# Patient Record
Sex: MALE | Race: WHITE | Employment: PART TIME | URBAN - METROPOLITAN AREA
[De-identification: names, ages, dates, MRNs, and addresses within clinical notes are randomized per-mention and may not be internally consistent; named-entity substitution may affect disease eponyms.]

---

## 2017-07-05 ENCOUNTER — HOSPITAL ENCOUNTER (OUTPATIENT)
Dept: NUCLEAR MEDICINE | Age: 72
Discharge: HOME | End: 2017-07-05
Attending: INTERNAL MEDICINE | Admitting: INTERNAL MEDICINE

## 2017-07-05 DIAGNOSIS — I20.9 ANGINA PECTORIS (HCC): ICD-10-CM

## 2017-07-05 LAB
LV EF: 28 %
LVEF MODALITY: NORMAL

## 2017-07-06 ENCOUNTER — OFFICE VISIT (OUTPATIENT)
Dept: CARDIOLOGY CLINIC | Age: 72
End: 2017-07-06

## 2017-07-06 VITALS
DIASTOLIC BLOOD PRESSURE: 88 MMHG | SYSTOLIC BLOOD PRESSURE: 138 MMHG | WEIGHT: 264 LBS | HEART RATE: 79 BPM | HEIGHT: 72 IN | BODY MASS INDEX: 35.76 KG/M2

## 2017-07-06 DIAGNOSIS — E78.00 PURE HYPERCHOLESTEROLEMIA: ICD-10-CM

## 2017-07-06 DIAGNOSIS — R07.89 OTHER CHEST PAIN: Primary | ICD-10-CM

## 2017-07-06 DIAGNOSIS — R07.9 CHEST PAIN, UNSPECIFIED TYPE: ICD-10-CM

## 2017-07-06 DIAGNOSIS — I10 ESSENTIAL HYPERTENSION: ICD-10-CM

## 2017-07-06 DIAGNOSIS — R94.39 ABNORMAL STRESS TEST: ICD-10-CM

## 2017-07-06 DIAGNOSIS — I42.9 CARDIOMYOPATHY (HCC): ICD-10-CM

## 2017-07-06 PROCEDURE — 99205 OFFICE O/P NEW HI 60 MIN: CPT | Performed by: INTERNAL MEDICINE

## 2017-07-06 PROCEDURE — 93000 ELECTROCARDIOGRAM COMPLETE: CPT | Performed by: INTERNAL MEDICINE

## 2017-07-06 RX ORDER — AMLODIPINE BESYLATE 2.5 MG/1
2.5 TABLET ORAL DAILY
COMMUNITY
End: 2017-07-06

## 2017-07-06 RX ORDER — METOPROLOL SUCCINATE 25 MG/1
25 TABLET, EXTENDED RELEASE ORAL DAILY
Qty: 30 TABLET | Refills: 3 | Status: ON HOLD | OUTPATIENT
Start: 2017-07-06 | End: 2017-07-23

## 2017-07-06 RX ORDER — NITROGLYCERIN 0.6 MG/1
TABLET SUBLINGUAL
Qty: 30 TABLET | Refills: 3 | Status: ON HOLD | OUTPATIENT
Start: 2017-07-06 | End: 2017-07-23 | Stop reason: HOSPADM

## 2017-07-06 RX ORDER — LISINOPRIL 10 MG/1
10 TABLET ORAL DAILY
Qty: 30 TABLET | Refills: 3 | Status: ON HOLD | OUTPATIENT
Start: 2017-07-06 | End: 2017-07-10

## 2017-07-06 RX ORDER — FUROSEMIDE 20 MG/1
20 TABLET ORAL DAILY
Status: ON HOLD | COMMUNITY
End: 2017-07-23 | Stop reason: HOSPADM

## 2017-07-07 ENCOUNTER — TELEPHONE (OUTPATIENT)
Dept: CARDIOLOGY CLINIC | Age: 72
End: 2017-07-07

## 2017-07-12 ENCOUNTER — OFFICE VISIT (OUTPATIENT)
Dept: CARDIOTHORACIC SURGERY | Age: 72
End: 2017-07-12

## 2017-07-12 ENCOUNTER — TELEPHONE (OUTPATIENT)
Dept: CARDIOTHORACIC SURGERY | Age: 72
End: 2017-07-12

## 2017-07-12 VITALS
DIASTOLIC BLOOD PRESSURE: 72 MMHG | HEART RATE: 67 BPM | SYSTOLIC BLOOD PRESSURE: 130 MMHG | HEIGHT: 72 IN | BODY MASS INDEX: 36.16 KG/M2 | WEIGHT: 267 LBS | OXYGEN SATURATION: 97 % | TEMPERATURE: 97.9 F

## 2017-07-12 DIAGNOSIS — I20.9 ISCHEMIC CHEST PAIN (HCC): Primary | ICD-10-CM

## 2017-07-12 PROCEDURE — 99214 OFFICE O/P EST MOD 30 MIN: CPT | Performed by: THORACIC SURGERY (CARDIOTHORACIC VASCULAR SURGERY)

## 2017-07-12 RX ORDER — ASPIRIN 325 MG
325 TABLET ORAL DAILY
Status: ON HOLD | COMMUNITY
End: 2017-07-23 | Stop reason: HOSPADM

## 2017-07-13 ENCOUNTER — HOSPITAL ENCOUNTER (OUTPATIENT)
Dept: VASCULAR LAB | Age: 72
Discharge: HOME | End: 2017-07-13
Attending: THORACIC SURGERY (CARDIOTHORACIC VASCULAR SURGERY) | Admitting: THORACIC SURGERY (CARDIOTHORACIC VASCULAR SURGERY)

## 2017-07-13 ENCOUNTER — TELEPHONE (OUTPATIENT)
Dept: CARDIOTHORACIC SURGERY | Age: 72
End: 2017-07-13

## 2017-07-13 VITALS — OXYGEN SATURATION: 97 %

## 2017-07-13 DIAGNOSIS — I25.10 CORONARY ARTERY DISEASE DUE TO LIPID RICH PLAQUE: ICD-10-CM

## 2017-07-13 DIAGNOSIS — I25.83 CORONARY ARTERY DISEASE DUE TO LIPID RICH PLAQUE: ICD-10-CM

## 2017-07-13 DIAGNOSIS — I25.10 ATHEROSCLEROTIC HEART DISEASE OF NATIVE CORONARY ARTERY WITHOUT ANGINA PECTORIS: ICD-10-CM

## 2017-07-13 DIAGNOSIS — Z01.811 PRE-OP CHEST EXAM: ICD-10-CM

## 2017-07-13 LAB
ABO/RH: NORMAL
ALBUMIN SERPL-MCNC: 4 G/DL (ref 3.4–5)
ALP BLD-CCNC: 62 U/L (ref 40–129)
ALT SERPL-CCNC: 20 U/L (ref 10–40)
ANTIBODY SCREEN: NORMAL
AST SERPL-CCNC: 18 U/L (ref 15–37)
BILIRUB SERPL-MCNC: 0.5 MG/DL (ref 0–1)
BILIRUBIN DIRECT: <0.2 MG/DL (ref 0–0.3)
BILIRUBIN URINE: NEGATIVE
BILIRUBIN, INDIRECT: NORMAL MG/DL (ref 0–1)
BLOOD, URINE: NEGATIVE
CLARITY: CLEAR
COLOR: YELLOW
GLUCOSE URINE: NEGATIVE MG/DL
KETONES, URINE: NEGATIVE MG/DL
LEUKOCYTE ESTERASE, URINE: NEGATIVE
MICROSCOPIC EXAMINATION: NORMAL
NITRITE, URINE: NEGATIVE
PH UA: 6
PROTEIN UA: NEGATIVE MG/DL
SPECIFIC GRAVITY UA: 1.01
TOTAL PROTEIN: 7.6 G/DL (ref 6.4–8.2)
URINE REFLEX TO CULTURE: NORMAL
URINE TYPE: NORMAL
UROBILINOGEN, URINE: 0.2 E.U./DL

## 2017-07-13 RX ORDER — ALBUTEROL SULFATE 90 UG/1
4 AEROSOL, METERED RESPIRATORY (INHALATION) ONCE
Status: COMPLETED | OUTPATIENT
Start: 2017-07-13 | End: 2017-07-13

## 2017-07-13 RX ADMIN — ALBUTEROL SULFATE 4 PUFF: 90 AEROSOL, METERED RESPIRATORY (INHALATION) at 09:13

## 2017-07-14 ENCOUNTER — TELEPHONE (OUTPATIENT)
Dept: CARDIOLOGY CLINIC | Age: 72
End: 2017-07-14

## 2017-07-14 ENCOUNTER — TELEPHONE (OUTPATIENT)
Dept: CARDIOTHORACIC SURGERY | Age: 72
End: 2017-07-14

## 2017-07-14 LAB
ESTIMATED AVERAGE GLUCOSE: 145.6 MG/DL
HBA1C MFR BLD: 6.7 %

## 2017-07-17 ENCOUNTER — TELEPHONE (OUTPATIENT)
Dept: CARDIOTHORACIC SURGERY | Age: 72
End: 2017-07-17

## 2017-07-18 LAB
ACTIVATED CLOTTING TIME: 113 SEC (ref 99–130)
ACTIVATED CLOTTING TIME: 125 SEC (ref 99–130)
ACTIVATED CLOTTING TIME: 465 SEC (ref 99–130)
ACTIVATED CLOTTING TIME: 478 SEC (ref 99–130)
ACTIVATED CLOTTING TIME: 544 SEC (ref 99–130)
ACTIVATED CLOTTING TIME: 571 SEC (ref 99–130)
BASE EXCESS ARTERIAL: -1 (ref -3–3)
BASE EXCESS ARTERIAL: -1 (ref -3–3)
BASE EXCESS ARTERIAL: -2 (ref -3–3)
BASE EXCESS ARTERIAL: -5 (ref -3–3)
BASE EXCESS ARTERIAL: 0 (ref -3–3)
CALCIUM IONIZED: 1.11 MMOL/L (ref 1.12–1.32)
CALCIUM IONIZED: 1.11 MMOL/L (ref 1.12–1.32)
CALCIUM IONIZED: 1.13 MMOL/L (ref 1.12–1.32)
CALCIUM IONIZED: 1.2 MMOL/L (ref 1.12–1.32)
CALCIUM IONIZED: 1.2 MMOL/L (ref 1.12–1.32)
CALCIUM IONIZED: 1.32 MMOL/L (ref 1.12–1.32)
GLUCOSE BLD-MCNC: 127 MG/DL (ref 70–99)
GLUCOSE BLD-MCNC: 128 MG/DL (ref 70–99)
GLUCOSE BLD-MCNC: 141 MG/DL (ref 70–99)
GLUCOSE BLD-MCNC: 144 MG/DL (ref 70–99)
GLUCOSE BLD-MCNC: 148 MG/DL (ref 70–99)
GLUCOSE BLD-MCNC: 163 MG/DL (ref 70–99)
GLUCOSE BLD-MCNC: 172 MG/DL (ref 70–99)
GLUCOSE BLD-MCNC: 172 MG/DL (ref 70–99)
GLUCOSE BLD-MCNC: 182 MG/DL (ref 70–99)
GLUCOSE BLD-MCNC: 209 MG/DL (ref 70–99)
GLUCOSE BLD-MCNC: 209 MG/DL (ref 70–99)
GLUCOSE BLD-MCNC: 216 MG/DL (ref 70–99)
GLUCOSE BLD-MCNC: 219 MG/DL (ref 70–99)
HCO3 ARTERIAL: 21.1 MMOL/L (ref 21–29)
HCO3 ARTERIAL: 21.8 MMOL/L (ref 21–29)
HCO3 ARTERIAL: 23 MMOL/L (ref 21–29)
HCO3 ARTERIAL: 23.2 MMOL/L (ref 21–29)
HCO3 ARTERIAL: 23.4 MMOL/L (ref 21–29)
HCO3 ARTERIAL: 23.5 MMOL/L (ref 21–29)
HCO3 ARTERIAL: 24 MMOL/L (ref 21–29)
HCO3 ARTERIAL: 24.3 MMOL/L (ref 21–29)
HEMOGLOBIN: 10 GM/DL (ref 13.5–17.5)
HEMOGLOBIN: 10.5 GM/DL (ref 13.5–17.5)
HEMOGLOBIN: 12.7 GM/DL (ref 13.5–17.5)
HEMOGLOBIN: 8.8 GM/DL (ref 13.5–17.5)
HEMOGLOBIN: 8.9 GM/DL (ref 13.5–17.5)
HEMOGLOBIN: 9.1 GM/DL (ref 13.5–17.5)
LACTATE: 1.25 MMOL/L (ref 0.4–2)
LACTATE: 1.31 MMOL/L (ref 0.4–2)
LACTATE: 1.42 MMOL/L (ref 0.4–2)
LACTATE: 1.67 MMOL/L (ref 0.4–2)
O2 SAT, ARTERIAL: 100 % (ref 93–100)
O2 SAT, ARTERIAL: 92 % (ref 93–100)
O2 SAT, ARTERIAL: 97 % (ref 93–100)
O2 SAT, ARTERIAL: 98 % (ref 93–100)
PCO2 ARTERIAL: 32 MM HG (ref 35–45)
PCO2 ARTERIAL: 35 MM HG (ref 35–45)
PCO2 ARTERIAL: 35 MM HG (ref 35–45)
PCO2 ARTERIAL: 37 MM HG (ref 35–45)
PCO2 ARTERIAL: 39 MM HG (ref 35–45)
PCO2 ARTERIAL: 39 MM HG (ref 35–45)
PCO2 ARTERIAL: 41 MM HG (ref 35–45)
PCO2 ARTERIAL: 44 MM HG (ref 35–45)
PERFORMED ON: ABNORMAL
PH ARTERIAL: 7.35 (ref 7.35–7.45)
PH ARTERIAL: 7.35 (ref 7.35–7.45)
PH ARTERIAL: 7.36 (ref 7.35–7.45)
PH ARTERIAL: 7.41 (ref 7.35–7.45)
PH ARTERIAL: 7.41 (ref 7.35–7.45)
PH ARTERIAL: 7.42 (ref 7.35–7.45)
PH ARTERIAL: 7.44 (ref 7.35–7.45)
PH ARTERIAL: 7.44 (ref 7.35–7.45)
PO2 ARTERIAL: 309 MM HG (ref 75–108)
PO2 ARTERIAL: 354 MM HG (ref 75–108)
PO2 ARTERIAL: 362 MM HG (ref 75–108)
PO2 ARTERIAL: 371 MM HG (ref 75–108)
PO2 ARTERIAL: 374 MM HG (ref 75–108)
PO2 ARTERIAL: 66 MM HG (ref 75–108)
PO2 ARTERIAL: 92 MM HG (ref 75–108)
PO2 ARTERIAL: 98 MM HG (ref 75–108)
POC HEMATOCRIT: 26 % (ref 41–53)
POC HEMATOCRIT: 26 % (ref 41–53)
POC HEMATOCRIT: 27 % (ref 41–53)
POC HEMATOCRIT: 29 % (ref 41–53)
POC HEMATOCRIT: 31 % (ref 41–53)
POC HEMATOCRIT: 37 % (ref 41–53)
POC POTASSIUM: 4 MMOL/L (ref 3.5–5.1)
POC POTASSIUM: 4.2 MMOL/L (ref 3.5–5.1)
POC POTASSIUM: 4.5 MMOL/L (ref 3.5–5.1)
POC POTASSIUM: 4.8 MMOL/L (ref 3.5–5.1)
POC POTASSIUM: 5.2 MMOL/L (ref 3.5–5.1)
POC POTASSIUM: 5.4 MMOL/L (ref 3.5–5.1)
POC SAMPLE TYPE: ABNORMAL
POC SODIUM: 133 MMOL/L (ref 136–145)
POC SODIUM: 134 MMOL/L (ref 136–145)
POC SODIUM: 135 MMOL/L (ref 136–145)
POC SODIUM: 138 MMOL/L (ref 136–145)
POC SODIUM: 139 MMOL/L (ref 136–145)
POC SODIUM: 140 MMOL/L (ref 136–145)
POTASSIUM SERPL-SCNC: 5 MMOL/L (ref 3.5–5.1)
REASON FOR REJECTION: NORMAL
REJECTED TEST: NORMAL
TCO2 ARTERIAL: 22 MMOL/L
TCO2 ARTERIAL: 23 MMOL/L
TCO2 ARTERIAL: 24 MMOL/L
TCO2 ARTERIAL: 24 MMOL/L
TCO2 ARTERIAL: 25 MMOL/L
TCO2 ARTERIAL: 26 MMOL/L

## 2017-07-19 LAB
GLUCOSE BLD-MCNC: 120 MG/DL (ref 70–99)
GLUCOSE BLD-MCNC: 166 MG/DL (ref 70–99)
GLUCOSE BLD-MCNC: 184 MG/DL (ref 70–99)
PERFORMED ON: ABNORMAL

## 2017-07-20 PROBLEM — Z95.1 S/P CABG X 3: Status: ACTIVE | Noted: 2017-07-20

## 2017-07-21 LAB
BLOOD BANK DISPENSE STATUS: NORMAL
BLOOD BANK DISPENSE STATUS: NORMAL
BLOOD BANK PRODUCT CODE: NORMAL
BLOOD BANK PRODUCT CODE: NORMAL
BPU ID: NORMAL
BPU ID: NORMAL
DESCRIPTION BLOOD BANK: NORMAL
DESCRIPTION BLOOD BANK: NORMAL

## 2017-07-23 PROBLEM — Z87.891 HX OF TOBACCO USE, PRESENTING HAZARDS TO HEALTH: Chronic | Status: ACTIVE | Noted: 2017-07-23

## 2017-07-25 ENCOUNTER — TELEPHONE (OUTPATIENT)
Dept: CARDIOTHORACIC SURGERY | Age: 72
End: 2017-07-25

## 2017-07-25 ENCOUNTER — TELEPHONE (OUTPATIENT)
Dept: CARDIOLOGY CLINIC | Age: 72
End: 2017-07-25

## 2017-07-26 ENCOUNTER — OFFICE VISIT (OUTPATIENT)
Dept: CARDIOTHORACIC SURGERY | Age: 72
End: 2017-07-26

## 2017-07-26 VITALS
HEART RATE: 89 BPM | SYSTOLIC BLOOD PRESSURE: 106 MMHG | WEIGHT: 260 LBS | HEIGHT: 72 IN | OXYGEN SATURATION: 98 % | BODY MASS INDEX: 35.21 KG/M2 | DIASTOLIC BLOOD PRESSURE: 60 MMHG | TEMPERATURE: 98 F

## 2017-07-26 DIAGNOSIS — Z95.1 S/P CABG X 4: Primary | ICD-10-CM

## 2017-07-26 PROCEDURE — 99024 POSTOP FOLLOW-UP VISIT: CPT | Performed by: THORACIC SURGERY (CARDIOTHORACIC VASCULAR SURGERY)

## 2017-07-26 RX ORDER — IBUPROFEN 200 MG
400 TABLET ORAL
Status: ON HOLD | COMMUNITY
End: 2017-08-01 | Stop reason: HOSPADM

## 2017-07-26 RX ORDER — AMIODARONE HYDROCHLORIDE 200 MG/1
200 TABLET ORAL DAILY
Qty: 60 TABLET | Refills: 0 | Status: ON HOLD | OUTPATIENT
Start: 2017-07-26 | End: 2017-08-01

## 2017-07-26 RX ORDER — DIPHENHYDRAMINE HCL 25 MG
25 CAPSULE ORAL EVERY 6 HOURS PRN
Qty: 30 CAPSULE | Refills: 0 | Status: SHIPPED | OUTPATIENT
Start: 2017-07-26 | End: 2017-08-05

## 2017-07-26 RX ORDER — AMIODARONE HYDROCHLORIDE 400 MG/1
400 TABLET ORAL 3 TIMES DAILY
Qty: 12 TABLET | Refills: 0 | Status: ON HOLD | OUTPATIENT
Start: 2017-07-26 | End: 2017-08-01 | Stop reason: HOSPADM

## 2017-07-26 RX ORDER — FUROSEMIDE 40 MG/1
40 TABLET ORAL 2 TIMES DAILY
Qty: 20 TABLET | Refills: 0 | Status: ON HOLD | OUTPATIENT
Start: 2017-07-26 | End: 2017-08-01 | Stop reason: HOSPADM

## 2017-07-28 PROBLEM — R00.2 PALPITATIONS: Status: ACTIVE | Noted: 2017-07-28

## 2017-08-02 ENCOUNTER — TELEPHONE (OUTPATIENT)
Dept: CARDIAC REHAB | Age: 72
End: 2017-08-02

## 2017-08-08 ENCOUNTER — TELEPHONE (OUTPATIENT)
Dept: CARDIOTHORACIC SURGERY | Age: 72
End: 2017-08-08

## 2017-08-11 ENCOUNTER — TELEPHONE (OUTPATIENT)
Dept: CARDIOTHORACIC SURGERY | Age: 72
End: 2017-08-11

## 2017-08-11 ENCOUNTER — OFFICE VISIT (OUTPATIENT)
Dept: CARDIOLOGY CLINIC | Age: 72
End: 2017-08-11

## 2017-08-11 VITALS
DIASTOLIC BLOOD PRESSURE: 60 MMHG | HEART RATE: 90 BPM | SYSTOLIC BLOOD PRESSURE: 104 MMHG | HEIGHT: 72 IN | WEIGHT: 249.5 LBS | OXYGEN SATURATION: 97 % | BODY MASS INDEX: 33.79 KG/M2

## 2017-08-11 DIAGNOSIS — I25.110 CORONARY ARTERY DISEASE INVOLVING NATIVE CORONARY ARTERY OF NATIVE HEART WITH UNSTABLE ANGINA PECTORIS (HCC): Primary | ICD-10-CM

## 2017-08-11 DIAGNOSIS — Z95.1 S/P CABG X 3: ICD-10-CM

## 2017-08-11 DIAGNOSIS — I48.0 PAROXYSMAL ATRIAL FIBRILLATION (HCC): ICD-10-CM

## 2017-08-11 PROCEDURE — 99214 OFFICE O/P EST MOD 30 MIN: CPT | Performed by: INTERNAL MEDICINE

## 2017-08-16 ENCOUNTER — OFFICE VISIT (OUTPATIENT)
Dept: CARDIOTHORACIC SURGERY | Age: 72
End: 2017-08-16

## 2017-08-16 VITALS
SYSTOLIC BLOOD PRESSURE: 106 MMHG | TEMPERATURE: 97.8 F | DIASTOLIC BLOOD PRESSURE: 60 MMHG | WEIGHT: 247 LBS | OXYGEN SATURATION: 94 % | HEART RATE: 92 BPM | BODY MASS INDEX: 33.46 KG/M2 | HEIGHT: 72 IN

## 2017-08-16 DIAGNOSIS — I25.110 CORONARY ARTERY DISEASE INVOLVING NATIVE CORONARY ARTERY OF NATIVE HEART WITH UNSTABLE ANGINA PECTORIS (HCC): ICD-10-CM

## 2017-08-16 DIAGNOSIS — I31.39 PERICARDIAL EFFUSION: Primary | ICD-10-CM

## 2017-08-16 PROCEDURE — 99024 POSTOP FOLLOW-UP VISIT: CPT | Performed by: THORACIC SURGERY (CARDIOTHORACIC VASCULAR SURGERY)

## 2017-08-22 ENCOUNTER — HOSPITAL ENCOUNTER (OUTPATIENT)
Dept: DIABETES SERVICES | Age: 72
Discharge: HOME | End: 2017-08-31
Admitting: INTERNAL MEDICINE

## 2017-08-22 DIAGNOSIS — E11.51 TYPE 2 DIABETES MELLITUS WITH DIABETIC PERIPHERAL ANGIOPATHY WITHOUT GANGRENE, WITHOUT LONG-TERM CURRENT USE OF INSULIN (HCC): Primary | ICD-10-CM

## 2017-08-22 DIAGNOSIS — E11.8 TYPE 2 DIABETES MELLITUS WITH COMPLICATIONS (HCC): ICD-10-CM

## 2017-08-22 DIAGNOSIS — E11.8 TYPE 2 DIABETES MELLITUS WITH COMPLICATION, WITHOUT LONG-TERM CURRENT USE OF INSULIN (HCC): ICD-10-CM

## 2017-08-22 PROCEDURE — G0444 DEPRESSION SCREEN ANNUAL: HCPCS | Performed by: DIETITIAN, REGISTERED

## 2017-08-22 ASSESSMENT — PATIENT HEALTH QUESTIONNAIRE - PHQ9
SUM OF ALL RESPONSES TO PHQ9 QUESTIONS 1 & 2: 0
2. FEELING DOWN, DEPRESSED OR HOPELESS: 0
1. LITTLE INTEREST OR PLEASURE IN DOING THINGS: 0
SUM OF ALL RESPONSES TO PHQ QUESTIONS 1-9: 0

## 2017-08-30 ENCOUNTER — OFFICE VISIT (OUTPATIENT)
Dept: CARDIOTHORACIC SURGERY | Age: 72
End: 2017-08-30

## 2017-08-30 VITALS
HEIGHT: 72 IN | WEIGHT: 249 LBS | OXYGEN SATURATION: 93 % | BODY MASS INDEX: 33.72 KG/M2 | HEART RATE: 88 BPM | DIASTOLIC BLOOD PRESSURE: 62 MMHG | SYSTOLIC BLOOD PRESSURE: 128 MMHG | TEMPERATURE: 97.8 F

## 2017-08-30 DIAGNOSIS — E78.00 PURE HYPERCHOLESTEROLEMIA: ICD-10-CM

## 2017-08-30 DIAGNOSIS — I31.39 PERICARDIAL EFFUSION: ICD-10-CM

## 2017-08-30 DIAGNOSIS — Z95.1 S/P CABG X 3: Primary | ICD-10-CM

## 2017-08-30 PROCEDURE — 99024 POSTOP FOLLOW-UP VISIT: CPT | Performed by: THORACIC SURGERY (CARDIOTHORACIC VASCULAR SURGERY)

## 2017-09-20 DIAGNOSIS — Z95.1 S/P CABG X 4: ICD-10-CM

## 2017-09-20 RX ORDER — AMIODARONE HYDROCHLORIDE 200 MG/1
TABLET ORAL
Qty: 30 TABLET | Refills: 5 | Status: SHIPPED | OUTPATIENT
Start: 2017-09-20 | End: 2018-03-11 | Stop reason: SDUPTHER

## 2017-10-05 ENCOUNTER — OFFICE VISIT (OUTPATIENT)
Dept: CARDIOLOGY CLINIC | Age: 72
End: 2017-10-05

## 2017-10-05 VITALS
HEART RATE: 68 BPM | BODY MASS INDEX: 33.86 KG/M2 | WEIGHT: 250 LBS | HEIGHT: 72 IN | SYSTOLIC BLOOD PRESSURE: 122 MMHG | DIASTOLIC BLOOD PRESSURE: 74 MMHG

## 2017-10-05 VITALS
SYSTOLIC BLOOD PRESSURE: 122 MMHG | HEIGHT: 72 IN | WEIGHT: 250 LBS | DIASTOLIC BLOOD PRESSURE: 74 MMHG | HEART RATE: 68 BPM | BODY MASS INDEX: 33.86 KG/M2

## 2017-10-05 DIAGNOSIS — Z95.1 S/P CABG X 3: Primary | ICD-10-CM

## 2017-10-05 DIAGNOSIS — I48.0 PAROXYSMAL ATRIAL FIBRILLATION (HCC): Primary | ICD-10-CM

## 2017-10-05 DIAGNOSIS — E78.00 PURE HYPERCHOLESTEROLEMIA: ICD-10-CM

## 2017-10-05 DIAGNOSIS — I25.5 ISCHEMIC CARDIOMYOPATHY: ICD-10-CM

## 2017-10-05 DIAGNOSIS — I25.110 CORONARY ARTERY DISEASE INVOLVING NATIVE CORONARY ARTERY OF NATIVE HEART WITH UNSTABLE ANGINA PECTORIS (HCC): ICD-10-CM

## 2017-10-05 DIAGNOSIS — I10 ESSENTIAL HYPERTENSION: ICD-10-CM

## 2017-10-05 PROCEDURE — 99214 OFFICE O/P EST MOD 30 MIN: CPT | Performed by: INTERNAL MEDICINE

## 2017-10-05 PROCEDURE — 93000 ELECTROCARDIOGRAM COMPLETE: CPT | Performed by: NURSE PRACTITIONER

## 2017-10-05 PROCEDURE — 99213 OFFICE O/P EST LOW 20 MIN: CPT | Performed by: NURSE PRACTITIONER

## 2017-10-05 NOTE — PROGRESS NOTES
[facility]   Cardiac Consultation    Referring Provider:  Baylee Sarah MD     Chief Complaint   Patient presents with    Follow-Up from Hospital        History of Present Illness:  New for abnormal stress test, chest pain, S/P CABG    Today he reports he has been feeling well since his surgery in July. He states he has went back to work and is tolerating activity. He denies chest pain, palpitations, dizziness, or syncope. Breathing getting better. He has been following with his PCP for his blood sugars that have been labile. He has been attending cardiac rehab for the last 4-5 weeks. He will be starting water exercise soon. He has been tolerating his medications. Past Medical History:   has a past medical history of Bronchitis; CAD (coronary artery disease); Cardiomyopathy [facility]); CHF (congestive heart failure) ([facility] [facility] Diabetes ([facility] Diastolic dysfunction; Family history of early CAD; Former smoker; High cholesterol; Hypertension; Knee replacement; and Obesity, Class II, BMI 35-39.9, with comorbidity ([facility] Surgical History:   has a past surgical history that includes Appendectomy; meniscectomy (Right, 1965); Tonsillectomy; Cardiac catheterization (07/10/2017); Carpal tunnel release (Bilateral); Coronary artery bypass graft (2017); transesophageal echocardiogram (2017); and Total knee arthroplasty (Right). Social History:   reports that he quit smoking about 39 years ago. His smoking use included Cigarettes, Pipe, and Cigars. He has never used smokeless tobacco. He reports that he drinks about 1.8 oz of alcohol per week  He reports that he does not use illicit drugs. Family History:  Father  at age 48 from \"angina\"    Home Medications:  Prior to Admission medications    Medication Sig Start Date End Date Taking?  Authorizing Provider   amiodarone (CORDARONE) 200 MG tablet TAKE 1 TABLET BY MOUTH DAILY 17   Liudmila Trevino MD   metFORMIN (GLUCOPHAGE) 1000 MG tablet hematuria. · Musculoskeletal:  No gait disturbance, weakness or joint complaints. · Integumentary: No rash or pruritis. · Neurological: No headache, diplopia, change in muscle strength, numbness or tingling. No change in gait, balance, coordination, mood, affect, memory, mentation, behavior. · Psychiatric: No anxiety, no depression. · Endocrine: No malaise, fatigue or temperature intolerance. No excessive thirst, fluid intake, or urination. No tremor. · Hematologic/Lymphatic: No abnormal bruising or bleeding, blood clots or swollen lymph nodes. · Allergic/Immunologic: No nasal congestion or hives. Physical Examination:    Vitals:    10/05/17 1331   BP: 122/74   Pulse: 68        Constitutional and General Appearance: NAD   Respiratory:  · Normal excursion and expansion without use of accessory muscles  Resp Auscultation: Diminished breath sounds, left lower base  Cardiovascular:  · The apical impulses not displaced  · Heart tones are crisp and normal  · Cervical veins are not engorged  · The carotid upstroke is normal in amplitude and contour without delay or bruit  · Normal S1S2, No S3, 1/6 murmur  · Peripheral pulses are symmetrical and full  · There is no clubbing, cyanosis of the extremities. · Bilateral trace edema.  L>R  · Femoral Arteries: 2+ and equal  · Pedal Pulses: 2+ and equal   Abdomen:  · No masses or tenderness  · Liver/Spleen: No Abnormalities Noted  Neurological/Psychiatric:  · Alert and oriented in all spheres  · Moves all extremities well  · Exhibits normal gait balance and coordination  · No abnormalities of mood, affect, memory, mentation, or behavior are noted    Myoview 7/2017   - Abnormal high risk myocardial perfusion study.    - There is a medium size moderate intensity perfusion defect involving the    apex, apical lateral, mid anteroseptal, mid inferior and apical inferior    walls during stress that reverses at rest consistent with ischemia in    distribution of LAD and RCA or

## 2017-10-05 NOTE — PATIENT INSTRUCTIONS
Plan:  Limited echo for low EF and pericardial effusion   Take heart rate and BP at home, keep a log  Stay active and watch your diet   Continue current medications    Follow up with me in 6 months

## 2017-10-05 NOTE — MR AVS SNAPSHOT
(duran/greg/yyyy) as indicated and click Submit. You will be taken to the next sign-up page. 5. Create a [email or web address] ID. This will be your [email or web address] login ID and cannot be changed, so think of one that is secure and easy to remember. 6. Create a [email or web address] password. You can change your password at any time. 7. Enter your Password Reset Question and Answer. This can be used at a later time if you forget your password. 8. Enter your e-mail address. You will receive e-mail notification when new information is available in [address]. 9. Click Sign Up. You can now view your medical record. Additional Information  If you have questions, please contact the physician practice where you receive care. Remember, [email or web address] is NOT to be used for urgent needs. For medical emergencies, dial 911. For questions regarding your [email or web address] account call [phone]. If you have a clinical question, please call your doctor's office.

## 2017-10-05 NOTE — PROGRESS NOTES
[facility]   Electrophysiology  Note              Date:  October 5, 2017  Patient name: Josefina Skelton  YOB: 1945    Primary Care physician: All Andre MD    HISTORY OF PRESENT ILLNESS: The patient is a 81 y.o.  male with a past medical history of CAD, HTN, chronic diastolic CHF, HLD, and DM. He had a CABG x4 on 7/18/2017. He came to the ER on 7/28/2017 complaining of palpitations, SOB, and insomnia. His EKG showed atrial fibrillation with a heart rate of 85. He was started on amiodarone and converted to sinus rhythm on 7/30/2017 at 1249. Echo on 7/28/2017 showed a pericardial effusion. A repeat echo on 7/31/2017 showed no change from previous echo. Today he is being seen for paroxysmal atrial fibrillation. His EKG shows sinus rhythm with an IVCD, HR 68. He is feeling generally well, frustrated with the diagnosis of diabetes post CABG. He complains of an \"odd sensation in the chest\", can't call it short of breath. He denies chest pain, palpitations, and dizziness. No known recurrence of atrial fibrillation. Past Medical History:   has a past medical history of Bronchitis; CAD (coronary artery disease); Cardiomyopathy [facility]); CHF (congestive heart failure) ([facility] Diabetes ([facility] Diastolic dysfunction; Family history of early CAD; Former smoker; High cholesterol; Hypertension; Knee replacement; and Obesity, Class II, BMI 35-39.9, with comorbidity ([facility] Past Surgical History:   has a past surgical history that includes Appendectomy; meniscectomy (Right, 1965); Tonsillectomy; Cardiac catheterization (07/10/2017); Carpal tunnel release (Bilateral); Coronary artery bypass graft (07/18/2017); transesophageal echocardiogram (07/18/2017); and Total knee arthroplasty (Right). Home Medications:    Prior to Admission medications    Medication Sig Start Date End Date Taking?  Authorizing Provider   amiodarone (CORDARONE) 200 MG tablet TAKE 1 TABLET BY MOUTH DAILY 9/20/17 HPI  Gastrointestinal: Negative. Genitourinary: Negative. Musculoskeletal: Negative. Skin: Negative. Neurological: Negative. Hematological: Negative. Psychiatric/Behavioral: Negative. PHYSICAL EXAM:    Physical Examination:    /74  Pulse 68  Ht 6' (1.829 m)  Wt 250 lb (113.4 kg)  BMI 33.91 kg/m2     Constitutional and general appearance: alert, cooperative, no distress and appears stated age  [de-identified]: PERRL, no cervical lymphadenopathy. No masses palpable. Normal oral mucosa  Respiratory:  · Normal excursion and expansion without use of accessory muscles  · Resp auscultation: Normal breath sounds without dullness or wheezing  Cardiovascular:  · The apical impulse is not displaced  · Heart tones are crisp and normal. Regular S1 and S2.  · Jugular venous pulsation Normal  · The carotid upstroke is normal in amplitude and contour without delay or bruit  · Peripheral pulses are symmetrical and full   Abdomen:  · No masses or tenderness  · Bowel sounds present  Extremities:  ·  No cyanosis or clubbing  ·  No lower extremity edema  ·  Skin: warm and dry  Neurological:  · Alert and oriented  · Moves all extremities well  · No abnormalities of mood, affect, memory, mentation, or behavior are noted    DATA:    ECG 10/5/2017:  SR with 1st degree AV block and IVCD HR 68    Echo 7/31/2017:   Limited exam for evaluation of pericardial effusion.   Moderate circumferential pericardial effusion without tamponade physiology.   No significant change since previous exam done 7/28/2017.     Limited echo 7/28/2017:   Limited only for LVEF pericardial effusion post heart surgery. 1 cm   posterior pericardial effusion and 0.5cm anterior pericardial effusion. At   the apex 2cm pericardial effusion. No tamponade physiology.   Left ventricular systolic function is normal. Atrial fibrillation with   controlled ventricular response.  Estimated ejection fraction of 55-60 %.   Severe concentric left ventricular hypertrophy. Small size of left   ventricle. No significant outflow tract obstruction.   Elevated left ventricle diastolic filling pressure.   There is a moderate circumferential pericardial effusion noted.     Limited echo 7/7/2017:  Left ventricular systolic function is normal with the ejection fraction   estimated at 55%.   No regional wall motion abnormalities.   Grade II diastolic dysfunction with elevated filling pressure.   There is moderate concentric left ventricular hypertrophy.   Mildly dilated left atrium.   The right ventricle is mildly enlarged.   Mild mitral regurgitation.     CT surgery 7/18/2017 [facility]):  Coronary artery bypass ×4 LIMA to LAD reverse saphenous vein to OM reverse saphenous vein to diagonal reverse saphenous vein to PDA     [facility] 7/10/2017 ([place]):  LM 70% distal heavily calcified  LAD 70% mid and distal                        D! 70% mid  Cx 50%  RCA 80% prox                        RPDA 100% prox  Collaterals RPL to RPDA  LVEF 35-40%     CABG    CARDIOLOGY LABS:   CBC: No results for input(s): WBC, HGB, HCT, PLT in the last 72 hours. BMP: No results for input(s): NA, K, CO2, BUN, CREATININE, LABGLOM, GLUCOSE in the last 72 hours. PT/INR: No results for input(s): PROTIME, INR in the last 72 hours. APTT:No results for input(s): APTT in the last 72 hours. FASTING LIPID PANEL:  Lab Results   Component Value Date    HDL 44 07/10/2017    LDLCALC 91 07/10/2017    TRIG 93 07/10/2017     LIVER PROFILE:No results for input(s): AST, ALT, ALB in the last 72 hours. Assessment:   1. Symptomatic paroxysmal atrial fibrillation: noted after CABG in 7/2017   -started on amiodarone in 7/2017   -on Eliquis for [id number] score 4 (age, CHF, DM, and HTN)  2. CAD: s/p CABG x4 in 7/2017   -followed by Dr. Gretchen Barbosa  3. Chronic diastolic CHF: compensated  4. HTN: controlled  5. HLD  6. Elevation of left hemidiaphragm  7. Abnormal TSH: 4.65 in 7/2017, free T4 was normal  8.  DM: on metformin    Plan: 1. Check TSH as baseline was slightly elevated. Patient has been on amiodarone for 2 months. 2. TSH, LFT, and BMP every 6 months  3. Continue current medications  4.  Follow up in 6 months    [facility], [address] [facility]  [phone]

## 2017-10-05 NOTE — MR AVS SNAPSHOT
After Visit Summary             Emory Lemon   10/5/2017 1:00 PM   Office Visit    Description:  Male : 1945   Provider:  Harjinder Oviedo CNP   Department:  [address] [place] [address] Cardiology - [address] [facility] and Future Appointments         Below is a list of your follow-up and future appointments. This may not be a complete list as you may have made appointments directly with providers that we are not aware of or your providers may have made some for you. Please call your providers to confirm appointments. It is important to keep your appointments. Please bring your current insurance card, photo ID, co-pay, and all medication bottles to your appointment. If self-pay, payment is expected at the time of service. Your To-Do List     Future Orders Complete By Expires    TSH WITH REFLEX TO FT4 [[id number] Custom]  10/5/2017 10/5/2018         Information from Your Visit        Department     Name Address Phone Fax    [address] [place] [address] Cardiology - [address] [facility] [address] Jareth Stack [address] [place], [place] [address] [facility] [phone]      You Were Seen for:         Comments    Paroxysmal atrial fibrillation [facility])   [[id number]]         Vital Signs     Blood Pressure Pulse Height Weight Body Mass Index Smoking Status    122/74 68 6' (1.829 m) 250 lb (113.4 kg) 33.91 kg/m2 Former Smoker      Additional Information about your Body Mass Index (BMI)           Your BMI as listed above is considered obese (30 or more). BMI is an estimate of body fat, calculated from your height and weight. The higher your BMI, the greater your risk of heart disease, high blood pressure, type 2 diabetes, stroke, gallstones, arthritis, sleep apnea, and certain cancers. BMI is not perfect. It may overestimate body fat in athletes and people who are more muscular.   Even a small weight loss (between 5 and 10 percent of your current weight) by decreasing your calorie intake and becoming more physically active will help lower your risk of developing or you do not sign up before the expiration date, you must request a new code. [employer] Access Code: [email or web address]  Expires: 12/4/2017  1:28 PM    4. Enter your Social Security Number ([id number]) and Date of Birth (mm/dd/yyyy) as indicated and click Submit. You will be taken to the next sign-up page. 5. Create a [employer] ID. This will be your [employer] login ID and cannot be changed, so think of one that is secure and easy to remember. 6. Create a [employer] password. You can change your password at any time. 7. Enter your Password Reset Question and Answer. This can be used at a later time if you forget your password. 8. Enter your e-mail address. You will receive e-mail notification when new information is available in [address] 9. Click Sign Up. You can now view your medical record. Additional Information  If you have questions, please contact the physician practice where you receive care. Remember, [employer] is NOT to be used for urgent needs. For medical emergencies, dial 911. For questions regarding your [employer] account call [phone]. If you have a clinical question, please call your doctor's office.

## 2017-10-05 NOTE — COMMUNICATION BODY
[address]   Cardiac Consultation    Referring Provider:  Virgilio Dunne MD     Chief Complaint   Patient presents with    Follow-Up from Hospital        History of Present Illness:  New for abnormal stress test, chest pain, S/P CABG    Today he reports he has been feeling well since his surgery in July. He states he has went back to work and is tolerating activity. He denies chest pain, palpitations, dizziness, or syncope. Breathing getting better. He has been following with his PCP for his blood sugars that have been labile. He has been attending cardiac rehab for the last 4-5 weeks. He will be starting water exercise soon. He has been tolerating his medications. Past Medical History:   has a past medical history of Bronchitis; CAD (coronary artery disease); Cardiomyopathy [facility]); CHF (congestive heart failure) ([facility] [address] Diabetes ([facility] Diastolic dysfunction; Family history of early CAD; Former smoker; High cholesterol; Hypertension; Knee replacement; and Obesity, Class II, BMI 35-39.9, with comorbidity ([facility] Surgical History:   has a past surgical history that includes Appendectomy; meniscectomy (Right, ); Tonsillectomy; Cardiac catheterization (07/10/2017); Carpal tunnel release (Bilateral); Coronary artery bypass graft (2017); transesophageal echocardiogram (2017); and Total knee arthroplasty (Right). Social History:   reports that he quit smoking about 39 years ago. His smoking use included Cigarettes, Pipe, and Cigars. He has never used smokeless tobacco. He reports that he drinks about 1.8 oz of alcohol per week  He reports that he does not use illicit drugs. Family History:  Father  at age 48 from \"angina\"    Home Medications:  Prior to Admission medications    Medication Sig Start Date End Date Taking?  Authorizing Provider   amiodarone (CORDARONE) 200 MG tablet TAKE 1 TABLET BY MOUTH DAILY 17   Sb Friedman MD   metFORMIN (GLUCOPHAGE) 1000 MG tablet dominant circumflex.    - Apical inferior and mid anteroseptal walls are akinetic. Other walls    severely hypokinetic.    - Left ventricular cavity is dilated.    - Left ventricular systolic function is severely reduced with ejection    fraction of 28 %. Echo 7/2017   Limited only for LVEF pericardial effusion post heart surgery. 1 cm   posterior pericardial effusion and 0.5cm anterior pericardial effusion. At   the apex 2cm pericardial effusion. No tamponade physiology.   Left ventricular systolic function is normal. Atrial fibrillation with   controlled ventricular response. Estimated ejection fraction of 55-60 %.   Severe concentric left ventricular hypertrophy. Small size of left   ventricle. No significant outflow tract obstruction.   Elevated left ventricle diastolic filling pressure.   There is a moderate circumferential pericardial effusion noted. Assessment:     1. Chest pain - no recurrence   3. Essential hypertension - well controlled   4. Pure hypercholesterolemia - controlled. Results reviewed   5. Abnormal stress test    6. Cardiomyopathy  - suspect ischemic, resolved post CABG   7. CAD - stable post CABG. No angina  8. S/P CABG 7/2017 - stable  9. PAF - currently NSR  10. SOB - resolved  11. Edema due to surgery - now minimal  12. Pericardial effusion, post-op - asymptomatic    Plan:  Limited echo for EF and pericardial effusion   Take heart rate and BP at home, keep a log  Stay active and watch your diet   Continue current medications    Follow up with me in 6 months     Mohan Early M.D., [place] [place]

## 2017-10-05 NOTE — LETTER
reports that he drinks about 1.8 oz of alcohol per week  He reports that he does not use illicit drugs. Family History:  Father  at age 48 from \"angina\"    Home Medications:  Prior to Admission medications    Medication Sig Start Date End Date Taking? Authorizing Provider   amiodarone (CORDARONE) 200 MG tablet TAKE 1 TABLET BY MOUTH DAILY 17   Deshaun Mclaughlin MD   metFORMIN (GLUCOPHAGE) 1000 MG tablet Take 1,000 mg by mouth 2 times daily (with meals)    Historical Provider, MD   apixaban (ELIQUIS) 5 MG TABS tablet Take 1 tablet by mouth 2 times daily 17   Deshaun Mclaughlin MD   mirtazapine (REMERON) 15 MG tablet Take 1 tablet by mouth nightly 17   Genet Castro MD   furosemide (LASIX) 40 MG tablet Take 1 tablet by mouth daily 17   Genet Castro MD   metoprolol succinate (TOPROL XL) 25 MG extended release tablet Take 1 tablet by mouth daily 17   Naveen Millan MD   aspirin 81 MG EC tablet Take 1 tablet by mouth daily 17   Naveen Millan MD   atorvastatin (LIPITOR) 80 MG tablet Take 1 tablet by mouth daily 7/10/17   Deshaun Mclaughlin MD   GuaiFENesin (MUCINEX PO) Take 1,200 mg by mouth 2 times daily    Historical Provider, MD   Probiotic Product (PROBIOTIC DAILY PO) Take by mouth    Historical Provider, MD   Glucosamine-Chondroitin (OSTEO BI-FLEX REGULAR STRENGTH PO) Take 2 capsules by mouth     Historical Provider, MD   fish oil-omega-3 fatty acids 1000 MG capsule Take 2 g by mouth daily. Historical Provider, MD   Multiple Vitamin (MULTI-VITAMIN PO) Take  by mouth daily. Historical Provider, MD   Lysine 1000 MG TABS Take  by mouth Daily. Historical Provider, MD   cetirizine (ZYRTEC) 10 MG tablet Take 10 mg by mouth daily. Historical Provider, MD        Allergies:  Oxycodone and Demerol     Review of Systems:   · Constitutional: there has been no unanticipated weight loss.  There's been no change in energy level, sleep pattern, or activity level. · Eyes: No visual changes or diplopia. No scleral icterus. · ENT: No Headaches, hearing loss or vertigo. No mouth sores or sore throat. · Cardiovascular: Reviewed in HPI  · Respiratory: No cough or wheezing, no sputum production. No hematemesis. · Gastrointestinal: No abdominal pain, appetite loss, blood in stools. No change in bowel or bladder habits. · Genitourinary: No dysuria, trouble voiding, or hematuria. · Musculoskeletal:  No gait disturbance, weakness or joint complaints. · Integumentary: No rash or pruritis. · Neurological: No headache, diplopia, change in muscle strength, numbness or tingling. No change in gait, balance, coordination, mood, affect, memory, mentation, behavior. · Psychiatric: No anxiety, no depression. · Endocrine: No malaise, fatigue or temperature intolerance. No excessive thirst, fluid intake, or urination. No tremor. · Hematologic/Lymphatic: No abnormal bruising or bleeding, blood clots or swollen lymph nodes. · Allergic/Immunologic: No nasal congestion or hives. Physical Examination:    Vitals:    10/05/17 1331   BP: 122/74   Pulse: 68        Constitutional and General Appearance: NAD   Respiratory:  · Normal excursion and expansion without use of accessory muscles  Resp Auscultation: Diminished breath sounds, left lower base  Cardiovascular:  · The apical impulses not displaced  · Heart tones are crisp and normal  · Cervical veins are not engorged  · The carotid upstroke is normal in amplitude and contour without delay or bruit  · Normal S1S2, No S3, 1/6 murmur  · Peripheral pulses are symmetrical and full  · There is no clubbing, cyanosis of the extremities. · Bilateral trace edema.  L>R  · Femoral Arteries: 2+ and equal  · Pedal Pulses: 2+ and equal   Abdomen:  · No masses or tenderness  · Liver/Spleen: No Abnormalities Noted  Neurological/Psychiatric:  · Alert and oriented in all spheres · Moves all extremities well  · Exhibits normal gait balance and coordination  · No abnormalities of mood, affect, memory, mentation, or behavior are noted    Myoview 7/2017   - Abnormal high risk myocardial perfusion study.    - There is a medium size moderate intensity perfusion defect involving the    apex, apical lateral, mid anteroseptal, mid inferior and apical inferior    walls during stress that reverses at rest consistent with ischemia in    distribution of LAD and RCA or dominant circumflex.    - Apical inferior and mid anteroseptal walls are akinetic. Other walls    severely hypokinetic.    - Left ventricular cavity is dilated.    - Left ventricular systolic function is severely reduced with ejection    fraction of 28 %. Echo 7/2017   Limited only for LVEF pericardial effusion post heart surgery. 1 cm   posterior pericardial effusion and 0.5cm anterior pericardial effusion. At   the apex 2cm pericardial effusion. No tamponade physiology.   Left ventricular systolic function is normal. Atrial fibrillation with   controlled ventricular response. Estimated ejection fraction of 55-60 %.   Severe concentric left ventricular hypertrophy. Small size of left   ventricle. No significant outflow tract obstruction.   Elevated left ventricle diastolic filling pressure.   There is a moderate circumferential pericardial effusion noted. Assessment:     1. Chest pain - no recurrence   3. Essential hypertension - well controlled   4. Pure hypercholesterolemia - controlled. Results reviewed   5. Abnormal stress test    6. Cardiomyopathy  - suspect ischemic, resolved post CABG   7. CAD - stable post CABG. No angina  8. S/P CABG 7/2017 - stable  9. PAF - currently NSR  10. SOB - resolved  11. Edema due to surgery - now minimal  12.     Pericardial effusion, post-op - asymptomatic    Plan:  Limited echo for EF and pericardial effusion   Take heart rate and BP at home, keep a log Stay active and watch your diet   Continue current medications    Follow up with me in 6 months     Mohan Dupont M.D., [facility]        If you have questions, please do not hesitate to call me. I look forward to following W along with you.     Sincerely,        Melissa Bedoya MD

## 2017-10-27 ENCOUNTER — TELEPHONE (OUTPATIENT)
Dept: CARDIOLOGY CLINIC | Age: 72
End: 2017-10-27

## 2017-10-27 NOTE — TELEPHONE ENCOUNTER
----- Message from Jessica Beard CNP sent at 10/27/2017  1:17 PM EDT -----  Please inform the patient his thyroid function is normal. No changes.

## 2017-10-31 ENCOUNTER — HOSPITAL ENCOUNTER (OUTPATIENT)
Dept: CARDIOLOGY | Facility: CLINIC | Age: 72
Discharge: HOME | End: 2017-10-31
Attending: INTERNAL MEDICINE | Admitting: INTERNAL MEDICINE

## 2017-11-01 ENCOUNTER — HOSPITAL ENCOUNTER (OUTPATIENT)
Dept: OTHER | Age: 72
Discharge: HOME | End: 2017-11-07
Attending: INTERNAL MEDICINE | Admitting: INTERNAL MEDICINE

## 2017-11-01 ENCOUNTER — TELEPHONE (OUTPATIENT)
Dept: CARDIOLOGY CLINIC | Age: 72
End: 2017-11-01

## 2017-11-01 NOTE — TELEPHONE ENCOUNTER
ECHO Limited   Order: [id number]   Status:  Final result   Visible to patient:  No (Not Released)   Notes Recorded by Meme Santacruz on 11/1/2017 at 4:57 PM EDT  [facility] please return call for results.   ------    Notes Recorded by Carmine Finch MD on 11/1/2017 at 4:34 PM EDT  Call  Echo ok  No pericardial fluid  nml hrt fxn

## 2018-01-02 RX ORDER — ATORVASTATIN CALCIUM 80 MG/1
80 TABLET, FILM COATED ORAL DAILY
Qty: 90 TABLET | Refills: 3 | Status: SHIPPED | OUTPATIENT
Start: 2018-01-02 | End: 2018-12-05 | Stop reason: SDUPTHER

## 2018-02-08 RX ORDER — METOPROLOL SUCCINATE 25 MG/1
25 TABLET, EXTENDED RELEASE ORAL DAILY
Qty: 30 TABLET | Refills: 5 | Status: SHIPPED | OUTPATIENT
Start: 2018-02-08 | End: 2018-05-02 | Stop reason: SDUPTHER

## 2018-03-11 DIAGNOSIS — Z95.1 S/P CABG X 4: ICD-10-CM

## 2018-03-12 RX ORDER — AMIODARONE HYDROCHLORIDE 200 MG/1
TABLET ORAL
Qty: 30 TABLET | Refills: 5 | Status: SHIPPED | OUTPATIENT
Start: 2018-03-12 | End: 2018-04-13 | Stop reason: ALTCHOICE

## 2018-04-13 ENCOUNTER — OFFICE VISIT (OUTPATIENT)
Dept: CARDIOLOGY CLINIC | Age: 73
End: 2018-04-13

## 2018-04-13 VITALS
HEIGHT: 72 IN | DIASTOLIC BLOOD PRESSURE: 60 MMHG | HEART RATE: 58 BPM | BODY MASS INDEX: 34.67 KG/M2 | SYSTOLIC BLOOD PRESSURE: 120 MMHG | WEIGHT: 256 LBS

## 2018-04-13 VITALS
HEART RATE: 62 BPM | HEIGHT: 72 IN | BODY MASS INDEX: 34.67 KG/M2 | SYSTOLIC BLOOD PRESSURE: 130 MMHG | WEIGHT: 256 LBS | DIASTOLIC BLOOD PRESSURE: 70 MMHG

## 2018-04-13 DIAGNOSIS — I48.0 PAROXYSMAL ATRIAL FIBRILLATION (HCC): ICD-10-CM

## 2018-04-13 DIAGNOSIS — I25.110 CORONARY ARTERY DISEASE INVOLVING NATIVE CORONARY ARTERY OF NATIVE HEART WITH UNSTABLE ANGINA PECTORIS (HCC): Primary | ICD-10-CM

## 2018-04-13 DIAGNOSIS — Z95.1 S/P CABG X 3: ICD-10-CM

## 2018-04-13 DIAGNOSIS — I25.5 ISCHEMIC CARDIOMYOPATHY: ICD-10-CM

## 2018-04-13 DIAGNOSIS — I50.33 ACUTE ON CHRONIC DIASTOLIC CONGESTIVE HEART FAILURE (HCC): ICD-10-CM

## 2018-04-13 DIAGNOSIS — R00.2 PALPITATIONS: Primary | ICD-10-CM

## 2018-04-13 PROCEDURE — 99214 OFFICE O/P EST MOD 30 MIN: CPT | Performed by: INTERNAL MEDICINE

## 2018-04-13 PROCEDURE — 93000 ELECTROCARDIOGRAM COMPLETE: CPT | Performed by: INTERNAL MEDICINE

## 2018-04-16 ENCOUNTER — HOSPITAL ENCOUNTER (OUTPATIENT)
Dept: OTHER | Age: 73
Discharge: HOME | End: 2018-04-16
Attending: INTERNAL MEDICINE | Admitting: INTERNAL MEDICINE

## 2018-04-16 DIAGNOSIS — I25.110 CORONARY ARTERY DISEASE INVOLVING NATIVE CORONARY ARTERY OF NATIVE HEART WITH UNSTABLE ANGINA PECTORIS (HCC): ICD-10-CM

## 2018-04-16 LAB
A/G RATIO: 1.7 (ref 1.1–2.2)
ALBUMIN SERPL-MCNC: 4.3 G/DL (ref 3.4–5)
ALP BLD-CCNC: 55 U/L (ref 40–129)
ALT SERPL-CCNC: 24 U/L (ref 10–40)
ANION GAP SERPL CALCULATED.3IONS-SCNC: 15 MMOL/L (ref 3–16)
AST SERPL-CCNC: 24 U/L (ref 15–37)
BILIRUB SERPL-MCNC: 0.6 MG/DL (ref 0–1)
BUN BLDV-MCNC: 17 MG/DL (ref 7–20)
CALCIUM SERPL-MCNC: 9.3 MG/DL (ref 8.3–10.6)
CHLORIDE BLD-SCNC: 99 MMOL/L (ref 99–110)
CHOLESTEROL, TOTAL: 136 MG/DL (ref 0–199)
CO2: 28 MMOL/L (ref 21–32)
CREAT SERPL-MCNC: 0.9 MG/DL (ref 0.8–1.3)
GFR AFRICAN AMERICAN: >60
GFR NON-AFRICAN AMERICAN: >60
GLOBULIN: 2.6 G/DL
GLUCOSE FASTING: 107 MG/DL (ref 70–99)
HDLC SERPL-MCNC: 49 MG/DL (ref 40–60)
LDL CHOLESTEROL CALCULATED: 65 MG/DL
POTASSIUM SERPL-SCNC: 4.5 MMOL/L (ref 3.5–5.1)
SODIUM BLD-SCNC: 142 MMOL/L (ref 136–145)
TOTAL PROTEIN: 6.9 G/DL (ref 6.4–8.2)
TRIGL SERPL-MCNC: 110 MG/DL (ref 0–150)
VLDLC SERPL CALC-MCNC: 22 MG/DL

## 2018-04-17 ENCOUNTER — TELEPHONE (OUTPATIENT)
Dept: CARDIOLOGY CLINIC | Age: 73
End: 2018-04-17

## 2018-05-02 RX ORDER — METOPROLOL SUCCINATE 25 MG/1
25 TABLET, EXTENDED RELEASE ORAL DAILY
Qty: 90 TABLET | Refills: 3 | Status: SHIPPED | OUTPATIENT
Start: 2018-05-02 | End: 2018-12-10 | Stop reason: SDUPTHER

## 2018-05-17 ENCOUNTER — TELEPHONE (OUTPATIENT)
Dept: CARDIOLOGY CLINIC | Age: 73
End: 2018-05-17

## 2018-05-29 ENCOUNTER — OFFICE VISIT (OUTPATIENT)
Dept: CARDIOLOGY CLINIC | Age: 73
End: 2018-05-29

## 2018-05-29 VITALS
HEIGHT: 72 IN | HEART RATE: 62 BPM | OXYGEN SATURATION: 96 % | WEIGHT: 254 LBS | DIASTOLIC BLOOD PRESSURE: 60 MMHG | SYSTOLIC BLOOD PRESSURE: 118 MMHG | BODY MASS INDEX: 34.4 KG/M2

## 2018-05-29 DIAGNOSIS — I48.0 PAROXYSMAL ATRIAL FIBRILLATION (HCC): ICD-10-CM

## 2018-05-29 DIAGNOSIS — I25.118 CORONARY ARTERY DISEASE OF NATIVE ARTERY OF NATIVE HEART WITH STABLE ANGINA PECTORIS (HCC): Primary | ICD-10-CM

## 2018-05-29 DIAGNOSIS — Z95.1 S/P CABG X 3: ICD-10-CM

## 2018-05-29 DIAGNOSIS — I10 ESSENTIAL HYPERTENSION: ICD-10-CM

## 2018-05-29 DIAGNOSIS — I25.5 ISCHEMIC CARDIOMYOPATHY: ICD-10-CM

## 2018-05-29 PROCEDURE — 99214 OFFICE O/P EST MOD 30 MIN: CPT | Performed by: INTERNAL MEDICINE

## 2018-05-29 RX ORDER — ASPIRIN 81 MG/1
81 TABLET ORAL DAILY
Qty: 30 TABLET | Refills: 3 | Status: SHIPPED | OUTPATIENT
Start: 2018-05-29

## 2018-06-09 DIAGNOSIS — Z95.1 S/P CABG X 4: ICD-10-CM

## 2018-06-11 RX ORDER — AMIODARONE HYDROCHLORIDE 200 MG/1
TABLET ORAL
Qty: 90 TABLET | Refills: 3 | Status: SHIPPED | OUTPATIENT
Start: 2018-06-11 | End: 2019-03-29 | Stop reason: ALTCHOICE

## 2018-06-21 RX ORDER — LISINOPRIL 5 MG/1
5 TABLET ORAL DAILY
Qty: 90 TABLET | Refills: 3 | Status: SHIPPED | OUTPATIENT
Start: 2018-06-21 | End: 2019-03-29 | Stop reason: SDUPTHER

## 2018-06-21 RX ORDER — CLOPIDOGREL BISULFATE 75 MG/1
75 TABLET ORAL DAILY
Qty: 90 TABLET | Refills: 3 | Status: SHIPPED | OUTPATIENT
Start: 2018-06-21 | End: 2018-12-10 | Stop reason: SDUPTHER

## 2018-07-27 ENCOUNTER — OFFICE VISIT (OUTPATIENT)
Dept: CARDIOLOGY CLINIC | Age: 73
End: 2018-07-27

## 2018-07-27 VITALS
BODY MASS INDEX: 34.81 KG/M2 | SYSTOLIC BLOOD PRESSURE: 106 MMHG | HEART RATE: 63 BPM | OXYGEN SATURATION: 95 % | DIASTOLIC BLOOD PRESSURE: 60 MMHG | HEIGHT: 72 IN | WEIGHT: 257 LBS

## 2018-07-27 DIAGNOSIS — R06.02 SOB (SHORTNESS OF BREATH): ICD-10-CM

## 2018-07-27 DIAGNOSIS — I10 ESSENTIAL HYPERTENSION: ICD-10-CM

## 2018-07-27 DIAGNOSIS — Z95.1 S/P CABG X 4: Primary | ICD-10-CM

## 2018-07-27 DIAGNOSIS — I25.118 CORONARY ARTERY DISEASE OF NATIVE ARTERY OF NATIVE HEART WITH STABLE ANGINA PECTORIS (HCC): ICD-10-CM

## 2018-07-27 DIAGNOSIS — E78.00 PURE HYPERCHOLESTEROLEMIA: ICD-10-CM

## 2018-07-27 PROCEDURE — 99214 OFFICE O/P EST MOD 30 MIN: CPT | Performed by: INTERNAL MEDICINE

## 2018-07-27 NOTE — PROGRESS NOTES
500 mg by mouth daily (with breakfast)   Yes Historical Provider, MD   aspirin EC 81 MG EC tablet Take 1 tablet by mouth daily 5/29/18  Yes Evin Pickard MD   Glucosamine-Chondroitin (OSTEO BI-FLEX REGULAR STRENGTH PO) Take 1 capsule by mouth   Yes Historical Provider, MD   metoprolol succinate (TOPROL XL) 25 MG extended release tablet Take 1 tablet by mouth daily 5/2/18  Yes Evin Pickard MD   apixaban (ELIQUIS) 5 MG TABS tablet Take 1 tablet by mouth 2 times daily 5/2/18  Yes Evin Pickard MD   atorvastatin (LIPITOR) 80 MG tablet TAKE 1 TABLET BY MOUTH DAILY 1/2/18  Yes Giacomo Patterson MD   furosemide (LASIX) 40 MG tablet Take 1 tablet by mouth daily 8/1/17  Yes Maria G Lezama MD   GuaiFENesin (MUCINEX PO) Take 1,200 mg by mouth 2 times daily   Yes Historical Provider, MD   Probiotic Product (PROBIOTIC DAILY PO) Take by mouth   Yes Historical Provider, MD   fish oil-omega-3 fatty acids 1000 MG capsule Take 2 g by mouth daily. Yes Historical Provider, MD   Multiple Vitamin (MULTI-VITAMIN PO) Take  by mouth daily. Yes Historical Provider, MD   Lysine 1000 MG TABS Take  by mouth Daily. Yes Historical Provider, MD   cetirizine (ZYRTEC) 10 MG tablet Take 10 mg by mouth daily. Yes Historical Provider, MD   amiodarone (CORDARONE) 200 MG tablet TAKE 1 TABLET BY MOUTH DAILY 6/11/18   Evin Pickard MD        Allergies:  Oxycodone and Demerol     Review of Systems:   · Constitutional: there has been no unanticipated weight loss. There's been no change in energy level, sleep pattern, or activity level. · Eyes: No visual changes or diplopia. No scleral icterus. · ENT: No Headaches, hearing loss or vertigo. No mouth sores or sore throat. · Cardiovascular: Reviewed in HPI  · Respiratory: No cough or wheezing, no sputum production. No hematemesis. · Gastrointestinal: No abdominal pain, appetite loss, blood in stools. No change in bowel or bladder habits.   · Genitourinary:

## 2018-07-27 NOTE — LETTER
[address] [place] [address] Cardiology - [address] [place] [address] Shy Johnson. [address]  Phone: [phone]  Fax: [phone]    Manuel Vidal MD        July 27, 2018     Edwina Marquez, [address] [facility] [place] [address] [id number]    Patient: Saumya Morrison  MR Number: [id number]  YOB: 1945  Date of Visit: 7/27/2018    Dear Dr. Edwina Marquez:        Melissa 83   Cardiac Consultation    Referring Provider:  Edwina Marquez MD     Chief Complaint   Patient presents with    Coronary Artery Disease        History of Present Illness:  F/U CAD, HLD, chest pain, S/P CABG. Today he states he has been feeling well. He has some SOB when he bends over or walks up stairs. Resolves with rest. No associated chest pain. He is active at the gym and has no chest pain or SOB. Denies recurrent chest pain or SOB, palpitations, dizziness, syncope. Tolerating his medications. Past Medical History:   has a past medical history of Bronchitis; CAD (coronary artery disease); Cardiomyopathy [facility]); CHF (congestive heart failure) ([facility] [address] Diabetes ([facility] [address] Diastolic dysfunction; Family history of early CAD; Former smoker; High cholesterol; Hypertension; Knee replacement; Obesity, Class II, BMI 35-39.9, with comorbidity; and Pneumonia. Surgical History:   has a past surgical history that includes Appendectomy; meniscectomy (Right, 1965); Tonsillectomy; Cardiac catheterization (07/10/2017); Carpal tunnel release (Bilateral); Coronary artery bypass graft (07/18/2017); transesophageal echocardiogram (07/18/2017); Total knee arthroplasty (Right); and Coronary angioplasty with stent (05/18/2018). Social History:   reports that he quit smoking about 40 years ago. His smoking use included Cigarettes, Pipe, and Cigars. He has never used smokeless tobacco. He reports that he drinks about 1.8 oz of alcohol per week . He reports that he does not use drugs.      Family History: Father  at age 48 from \"angina\"    Home Medications:  Prior to Admission medications    Medication Sig Start Date End Date Taking? Authorizing Provider   clopidogrel (PLAVIX) 75 MG tablet Take 1 tablet by mouth daily 18  Yes Justin Magallanes MD   lisinopril (PRINIVIL;ZESTRIL) 5 MG tablet Take 1 tablet by mouth daily 18  Yes Justin Magallanes MD   metFORMIN (GLUCOPHAGE) 500 MG tablet Take 500 mg by mouth daily (with breakfast)   Yes Historical Provider, MD   aspirin EC 81 MG EC tablet Take 1 tablet by mouth daily 18  Yes Justin Magallanes MD   Glucosamine-Chondroitin (OSTEO BI-FLEX REGULAR STRENGTH PO) Take 1 capsule by mouth   Yes Historical Provider, MD   metoprolol succinate (TOPROL XL) 25 MG extended release tablet Take 1 tablet by mouth daily 18  Yes Justin Magallanes MD   apixaban (ELIQUIS) 5 MG TABS tablet Take 1 tablet by mouth 2 times daily 18  Yes Justin Magallanes MD   atorvastatin (LIPITOR) 80 MG tablet TAKE 1 TABLET BY MOUTH DAILY 18  Yes Renata Braxton MD   furosemide (LASIX) 40 MG tablet Take 1 tablet by mouth daily 17  Yes Donald Hays MD   GuaiFENesin (MUCINEX PO) Take 1,200 mg by mouth 2 times daily   Yes Historical Provider, MD   Probiotic Product (PROBIOTIC DAILY PO) Take by mouth   Yes Historical Provider, MD   fish oil-omega-3 fatty acids 1000 MG capsule Take 2 g by mouth daily. Yes Historical Provider, MD   Multiple Vitamin (MULTI-VITAMIN PO) Take  by mouth daily. Yes Historical Provider, MD   Lysine 1000 MG TABS Take  by mouth Daily. Yes Historical Provider, MD   cetirizine (ZYRTEC) 10 MG tablet Take 10 mg by mouth daily. Yes Historical Provider, MD   amiodarone (CORDARONE) 200 MG tablet TAKE 1 TABLET BY MOUTH DAILY 18   Anderson MD Sona        Allergies:  Oxycodone and Demerol     Review of Systems:   · Constitutional: there has been no unanticipated weight loss.  There's · Alert and oriented in all spheres  · Moves all extremities well  · Exhibits normal gait balance and coordination  · No abnormalities of mood, affect, memory, mentation, or behavior are noted    Myoview 7/2017   - Abnormal high risk myocardial perfusion study.    - There is a medium size moderate intensity perfusion defect involving the    apex, apical lateral, mid anteroseptal, mid inferior and apical inferior    walls during stress that reverses at rest consistent with ischemia in    distribution of LAD and RCA or dominant circumflex.    - Apical inferior and mid anteroseptal walls are akinetic. Other walls    severely hypokinetic.    - Left ventricular cavity is dilated.    - Left ventricular systolic function is severely reduced with ejection    fraction of 28 %. Echo 7/2017   Limited only for LVEF pericardial effusion post heart surgery. 1 cm   posterior pericardial effusion and 0.5cm anterior pericardial effusion. At   the apex 2cm pericardial effusion. No tamponade physiology.   Left ventricular systolic function is normal. Atrial fibrillation with   controlled ventricular response. Estimated ejection fraction of 55-60 %.   Severe concentric left ventricular hypertrophy. Small size of left   ventricle. No significant outflow tract obstruction.   Elevated left ventricle diastolic filling pressure.   There is a moderate circumferential pericardial effusion noted. Cath 5/2018 V[facility] Larve   PCI of the native RCA with YUVAL  1. MVCD  2. Normal LVEF  3. Normal hemodynamics except for hypertension  4. Patent SVG to the DIAG, PDA, and OM  5. Patent LIMA to the LAD    Assessment:     1. Chest pain - no recurrence    3. Essential hypertension - well controlled   4. Pure hypercholesterolemia - controlled. Results reviewed w/ pt. Repeat yearly   5. Pericardial effusion, post-op - asymptomatic, resolved   6. Cardiomyopathy  - suspect ischemic, resolved post CABG   7. CAD - stable.  No angina

## 2018-07-27 NOTE — COMMUNICATION BODY
500 mg by mouth daily (with breakfast)   Yes Historical Provider, MD   aspirin EC 81 MG EC tablet Take 1 tablet by mouth daily 5/29/18  Yes Isabel Mccarthy MD   Glucosamine-Chondroitin (OSTEO BI-FLEX REGULAR STRENGTH PO) Take 1 capsule by mouth   Yes Historical Provider, MD   metoprolol succinate (TOPROL XL) 25 MG extended release tablet Take 1 tablet by mouth daily 5/2/18  Yes Isabel Mccarthy MD   apixaban (ELIQUIS) 5 MG TABS tablet Take 1 tablet by mouth 2 times daily 5/2/18  Yes Isabel Mccarthy MD   atorvastatin (LIPITOR) 80 MG tablet TAKE 1 TABLET BY MOUTH DAILY 1/2/18  Yes Brad Tamez MD   furosemide (LASIX) 40 MG tablet Take 1 tablet by mouth daily 8/1/17  Yes Wade Baeza MD   GuaiFENesin (MUCINEX PO) Take 1,200 mg by mouth 2 times daily   Yes Historical Provider, MD   Probiotic Product (PROBIOTIC DAILY PO) Take by mouth   Yes Historical Provider, MD   fish oil-omega-3 fatty acids 1000 MG capsule Take 2 g by mouth daily. Yes Historical Provider, MD   Multiple Vitamin (MULTI-VITAMIN PO) Take  by mouth daily. Yes Historical Provider, MD   Lysine 1000 MG TABS Take  by mouth Daily. Yes Historical Provider, MD   cetirizine (ZYRTEC) 10 MG tablet Take 10 mg by mouth daily. Yes Historical Provider, MD   amiodarone (CORDARONE) 200 MG tablet TAKE 1 TABLET BY MOUTH DAILY 6/11/18   Isabel Mccarthy MD        Allergies:  Oxycodone and Demerol     Review of Systems:   · Constitutional: there has been no unanticipated weight loss. There's been no change in energy level, sleep pattern, or activity level. · Eyes: No visual changes or diplopia. No scleral icterus. · ENT: No Headaches, hearing loss or vertigo. No mouth sores or sore throat. · Cardiovascular: Reviewed in HPI  · Respiratory: No cough or wheezing, no sputum production. No hematemesis. · Gastrointestinal: No abdominal pain, appetite loss, blood in stools. No change in bowel or bladder habits.   · Genitourinary: No dysuria, trouble voiding, or hematuria. · Musculoskeletal:  No gait disturbance, weakness or joint complaints. · Integumentary: No rash or pruritis. · Neurological: No headache, diplopia, change in muscle strength, numbness or tingling. No change in gait, balance, coordination, mood, affect, memory, mentation, behavior. · Psychiatric: No anxiety, no depression. · Endocrine: No malaise, fatigue or temperature intolerance. No excessive thirst, fluid intake, or urination. No tremor. · Hematologic/Lymphatic: No abnormal bruising or bleeding, blood clots or swollen lymph nodes. · Allergic/Immunologic: No nasal congestion or hives. Physical Examination:    Vitals:    07/27/18 0914   BP: 106/60   Pulse: 63   SpO2: 95%        Constitutional and General Appearance: NAD   Respiratory:  · Normal excursion and expansion without use of accessory muscles  Resp Auscultation: Diminished breath sounds, left lower base  Cardiovascular:  · The apical impulses not displaced  · Heart tones are crisp and normal  · Cervical veins are not engorged  · The carotid upstroke is normal in amplitude and contour without delay or bruit  · Normal S1S2, No S3, 1/6 murmur  · Peripheral pulses are symmetrical and full  · There is no clubbing, cyanosis of the extremities. · Bilateral trace edema.  L>R  · Femoral Arteries: 2+ and equal  · Pedal Pulses: 2+ and equal   Abdomen:  · No masses or tenderness  · Liver/Spleen: No Abnormalities Noted  Neurological/Psychiatric:  · Alert and oriented in all spheres  · Moves all extremities well  · Exhibits normal gait balance and coordination  · No abnormalities of mood, affect, memory, mentation, or behavior are noted    Myoview 7/2017   - Abnormal high risk myocardial perfusion study.    - There is a medium size moderate intensity perfusion defect involving the    apex, apical lateral, mid anteroseptal, mid inferior and apical inferior    walls during stress that reverses at rest consistent with

## 2018-07-27 NOTE — PATIENT INSTRUCTIONS
Plan:  Take heart rate and BP at home, keep a log  Stay active and watch your diet   Continue current medications   Will try and wait for dental work for 6 months   Follow up with me in 6 months

## 2018-12-06 RX ORDER — ATORVASTATIN CALCIUM 80 MG/1
80 TABLET, FILM COATED ORAL DAILY
Qty: 90 TABLET | Refills: 5 | Status: SHIPPED | OUTPATIENT
Start: 2018-12-06 | End: 2018-12-10 | Stop reason: SDUPTHER

## 2018-12-10 RX ORDER — METOPROLOL SUCCINATE 25 MG/1
25 TABLET, EXTENDED RELEASE ORAL DAILY
Qty: 90 TABLET | Refills: 3 | Status: SHIPPED | OUTPATIENT
Start: 2018-12-10 | End: 2019-03-29 | Stop reason: SDUPTHER

## 2018-12-10 RX ORDER — CLOPIDOGREL BISULFATE 75 MG/1
75 TABLET ORAL DAILY
Qty: 90 TABLET | Refills: 3 | Status: SHIPPED | OUTPATIENT
Start: 2018-12-10 | End: 2019-03-29 | Stop reason: SDUPTHER

## 2018-12-10 RX ORDER — ATORVASTATIN CALCIUM 80 MG/1
80 TABLET, FILM COATED ORAL DAILY
Qty: 90 TABLET | Refills: 5 | Status: SHIPPED | OUTPATIENT
Start: 2018-12-10 | End: 2019-03-29 | Stop reason: SDUPTHER

## 2018-12-10 NOTE — TELEPHONE ENCOUNTER
[facility] Pt  Last office visit 7/28/2017, return in 6 months  Lipid 5/19/18  Bmp 5/17/18  No upcoming visit scheduled.

## 2019-03-11 RX ORDER — APIXABAN 5 MG/1
TABLET, FILM COATED ORAL
Qty: 180 TABLET | Refills: 3 | Status: SHIPPED | OUTPATIENT
Start: 2019-03-11 | End: 2019-03-29 | Stop reason: SDUPTHER

## 2019-03-15 ENCOUNTER — APPOINTMENT (OUTPATIENT)
Dept: GENERAL RADIOLOGY | Age: 74
End: 2019-03-15
Payer: MEDICARE

## 2019-03-15 ENCOUNTER — HOSPITAL ENCOUNTER (EMERGENCY)
Age: 74
Discharge: HOME | End: 2019-03-15
Attending: EMERGENCY MEDICINE
Payer: MEDICARE

## 2019-03-15 VITALS
BODY MASS INDEX: 35.35 KG/M2 | SYSTOLIC BLOOD PRESSURE: 113 MMHG | WEIGHT: 261 LBS | OXYGEN SATURATION: 96 % | HEIGHT: 72 IN | RESPIRATION RATE: 18 BRPM | HEART RATE: 80 BPM | DIASTOLIC BLOOD PRESSURE: 66 MMHG | TEMPERATURE: 98.3 F

## 2019-03-15 DIAGNOSIS — J44.1 COPD EXACERBATION (HCC): Primary | ICD-10-CM

## 2019-03-15 LAB
A/G RATIO: 1.2 (ref 1.1–2.2)
ALBUMIN SERPL-MCNC: 3.7 G/DL (ref 3.4–5)
ALP BLD-CCNC: 61 U/L (ref 40–129)
ALT SERPL-CCNC: 21 U/L (ref 10–40)
ANION GAP SERPL CALCULATED.3IONS-SCNC: 13 MMOL/L (ref 3–16)
AST SERPL-CCNC: 23 U/L (ref 15–37)
BASOPHILS ABSOLUTE: 0.1 K/UL (ref 0–0.2)
BASOPHILS RELATIVE PERCENT: 1.1 %
BILIRUB SERPL-MCNC: 0.4 MG/DL (ref 0–1)
BUN BLDV-MCNC: 16 MG/DL (ref 7–20)
CALCIUM SERPL-MCNC: 9.2 MG/DL (ref 8.3–10.6)
CHLORIDE BLD-SCNC: 104 MMOL/L (ref 99–110)
CO2: 20 MMOL/L (ref 21–32)
CREAT SERPL-MCNC: 0.8 MG/DL (ref 0.8–1.3)
EKG ATRIAL RATE: 89 BPM
EKG DIAGNOSIS: NORMAL
EKG P AXIS: 105 DEGREES
EKG P-R INTERVAL: 234 MS
EKG Q-T INTERVAL: 416 MS
EKG QRS DURATION: 128 MS
EKG QTC CALCULATION (BAZETT): 483 MS
EKG R AXIS: -69 DEGREES
EKG T AXIS: 87 DEGREES
EKG VENTRICULAR RATE: 81 BPM
EOSINOPHILS ABSOLUTE: 0.1 K/UL (ref 0–0.6)
EOSINOPHILS RELATIVE PERCENT: 1.1 %
GFR AFRICAN AMERICAN: >60
GFR NON-AFRICAN AMERICAN: >60
GLOBULIN: 3.2 G/DL
GLUCOSE BLD-MCNC: 112 MG/DL (ref 70–99)
HCT VFR BLD CALC: 40.6 % (ref 40.5–52.5)
HEMOGLOBIN: 14.1 G/DL (ref 13.5–17.5)
LYMPHOCYTES ABSOLUTE: 2.8 K/UL (ref 1–5.1)
LYMPHOCYTES RELATIVE PERCENT: 27.6 %
MCH RBC QN AUTO: 31 PG (ref 26–34)
MCHC RBC AUTO-ENTMCNC: 34.9 G/DL (ref 31–36)
MCV RBC AUTO: 88.8 FL (ref 80–100)
MONOCYTES ABSOLUTE: 0.9 K/UL (ref 0–1.3)
MONOCYTES RELATIVE PERCENT: 9 %
NEUTROPHILS ABSOLUTE: 6.1 K/UL (ref 1.7–7.7)
NEUTROPHILS RELATIVE PERCENT: 61.2 %
PDW BLD-RTO: 12.7 % (ref 12.4–15.4)
PLATELET # BLD: 310 K/UL (ref 135–450)
PMV BLD AUTO: 7.5 FL (ref 5–10.5)
POTASSIUM SERPL-SCNC: 4 MMOL/L (ref 3.5–5.1)
RBC # BLD: 4.57 M/UL (ref 4.2–5.9)
SODIUM BLD-SCNC: 137 MMOL/L (ref 136–145)
TOTAL PROTEIN: 6.9 G/DL (ref 6.4–8.2)
WBC # BLD: 10 K/UL (ref 4–11)

## 2019-03-15 PROCEDURE — 96360 HYDRATION IV INFUSION INIT: CPT

## 2019-03-15 PROCEDURE — 99285 EMERGENCY DEPT VISIT HI MDM: CPT

## 2019-03-15 PROCEDURE — 94644 CONT INHLJ TX 1ST HOUR: CPT

## 2019-03-15 PROCEDURE — 80053 COMPREHEN METABOLIC PANEL: CPT

## 2019-03-15 PROCEDURE — 93010 ELECTROCARDIOGRAM REPORT: CPT | Performed by: INTERNAL MEDICINE

## 2019-03-15 PROCEDURE — 6370000000 HC RX 637 (ALT 250 FOR IP): Performed by: EMERGENCY MEDICINE

## 2019-03-15 PROCEDURE — 6360000002 HC RX W HCPCS: Performed by: EMERGENCY MEDICINE

## 2019-03-15 PROCEDURE — 85025 COMPLETE CBC W/AUTO DIFF WBC: CPT

## 2019-03-15 PROCEDURE — 71045 X-RAY EXAM CHEST 1 VIEW: CPT

## 2019-03-15 PROCEDURE — 2580000003 HC RX 258: Performed by: EMERGENCY MEDICINE

## 2019-03-15 PROCEDURE — 93005 ELECTROCARDIOGRAM TRACING: CPT | Performed by: EMERGENCY MEDICINE

## 2019-03-15 RX ORDER — ALBUTEROL SULFATE 90 UG/1
2 AEROSOL, METERED RESPIRATORY (INHALATION) 4 TIMES DAILY PRN
Qty: 1 INHALER | Refills: 0 | Status: SHIPPED | OUTPATIENT
Start: 2019-03-15 | End: 2021-10-22 | Stop reason: SDUPTHER

## 2019-03-15 RX ORDER — 0.9 % SODIUM CHLORIDE 0.9 %
1000 INTRAVENOUS SOLUTION INTRAVENOUS ONCE
Status: COMPLETED | OUTPATIENT
Start: 2019-03-15 | End: 2019-03-15

## 2019-03-15 RX ORDER — IPRATROPIUM BROMIDE AND ALBUTEROL SULFATE 2.5; .5 MG/3ML; MG/3ML
1 SOLUTION RESPIRATORY (INHALATION) ONCE
Status: COMPLETED | OUTPATIENT
Start: 2019-03-15 | End: 2019-03-15

## 2019-03-15 RX ORDER — PREDNISONE 20 MG/1
60 TABLET ORAL ONCE
Status: COMPLETED | OUTPATIENT
Start: 2019-03-15 | End: 2019-03-15

## 2019-03-15 RX ORDER — ALBUTEROL SULFATE 2.5 MG/3ML
2.5 SOLUTION RESPIRATORY (INHALATION) ONCE
Status: COMPLETED | OUTPATIENT
Start: 2019-03-15 | End: 2019-03-15

## 2019-03-15 RX ORDER — PREDNISONE 20 MG/1
20 TABLET ORAL 2 TIMES DAILY
Qty: 10 TABLET | Refills: 0 | Status: SHIPPED | OUTPATIENT
Start: 2019-03-15 | End: 2019-03-20

## 2019-03-15 RX ADMIN — ALBUTEROL SULFATE 2.5 MG: 2.5 SOLUTION RESPIRATORY (INHALATION) at 15:55

## 2019-03-15 RX ADMIN — IPRATROPIUM BROMIDE AND ALBUTEROL SULFATE 1 AMPULE: .5; 3 SOLUTION RESPIRATORY (INHALATION) at 15:55

## 2019-03-15 RX ADMIN — PREDNISONE 60 MG: 20 TABLET ORAL at 16:31

## 2019-03-15 RX ADMIN — SODIUM CHLORIDE 1000 ML: 9 INJECTION, SOLUTION INTRAVENOUS at 16:32

## 2019-03-22 ENCOUNTER — OFFICE VISIT (OUTPATIENT)
Dept: PULMONOLOGY | Age: 74
End: 2019-03-22
Payer: MEDICARE

## 2019-03-22 VITALS
BODY MASS INDEX: 36.03 KG/M2 | TEMPERATURE: 97.3 F | HEART RATE: 81 BPM | WEIGHT: 266 LBS | SYSTOLIC BLOOD PRESSURE: 131 MMHG | DIASTOLIC BLOOD PRESSURE: 77 MMHG | HEIGHT: 72 IN | OXYGEN SATURATION: 94 %

## 2019-03-22 DIAGNOSIS — Z87.891 FORMER SMOKER: ICD-10-CM

## 2019-03-22 DIAGNOSIS — R06.02 SOB (SHORTNESS OF BREATH): Primary | ICD-10-CM

## 2019-03-22 DIAGNOSIS — I51.7 LVH (LEFT VENTRICULAR HYPERTROPHY): ICD-10-CM

## 2019-03-22 DIAGNOSIS — R29.818 SUSPECTED SLEEP APNEA: ICD-10-CM

## 2019-03-22 DIAGNOSIS — J41.0 SIMPLE CHRONIC BRONCHITIS (HCC): ICD-10-CM

## 2019-03-22 DIAGNOSIS — J98.4 RESTRICTIVE LUNG DISEASE: ICD-10-CM

## 2019-03-22 DIAGNOSIS — E66.01 CLASS 2 SEVERE OBESITY WITH SERIOUS COMORBIDITY AND BODY MASS INDEX (BMI) OF 36.0 TO 36.9 IN ADULT, UNSPECIFIED OBESITY TYPE (HCC): ICD-10-CM

## 2019-03-22 DIAGNOSIS — I51.89 DIASTOLIC DYSFUNCTION: ICD-10-CM

## 2019-03-22 DIAGNOSIS — Z95.1 S/P CABG X 3: ICD-10-CM

## 2019-03-22 PROBLEM — E66.812 CLASS 2 OBESITY IN ADULT: Status: ACTIVE | Noted: 2019-03-22

## 2019-03-22 PROBLEM — E66.9 CLASS 2 OBESITY IN ADULT: Status: ACTIVE | Noted: 2019-03-22

## 2019-03-22 PROCEDURE — 99204 OFFICE O/P NEW MOD 45 MIN: CPT | Performed by: INTERNAL MEDICINE

## 2019-03-29 ENCOUNTER — OFFICE VISIT (OUTPATIENT)
Dept: CARDIOLOGY CLINIC | Age: 74
End: 2019-03-29
Payer: MEDICARE

## 2019-03-29 VITALS
SYSTOLIC BLOOD PRESSURE: 126 MMHG | DIASTOLIC BLOOD PRESSURE: 62 MMHG | BODY MASS INDEX: 36.16 KG/M2 | HEIGHT: 72 IN | HEART RATE: 78 BPM | WEIGHT: 267 LBS | OXYGEN SATURATION: 96 %

## 2019-03-29 DIAGNOSIS — I25.118 CORONARY ARTERY DISEASE OF NATIVE ARTERY OF NATIVE HEART WITH STABLE ANGINA PECTORIS (HCC): Primary | ICD-10-CM

## 2019-03-29 DIAGNOSIS — I25.5 ISCHEMIC CARDIOMYOPATHY: ICD-10-CM

## 2019-03-29 DIAGNOSIS — I10 ESSENTIAL HYPERTENSION: ICD-10-CM

## 2019-03-29 DIAGNOSIS — R06.02 SOB (SHORTNESS OF BREATH): ICD-10-CM

## 2019-03-29 DIAGNOSIS — E78.00 PURE HYPERCHOLESTEROLEMIA: ICD-10-CM

## 2019-03-29 PROCEDURE — 99214 OFFICE O/P EST MOD 30 MIN: CPT | Performed by: INTERNAL MEDICINE

## 2019-03-29 RX ORDER — CLOPIDOGREL BISULFATE 75 MG/1
75 TABLET ORAL DAILY
Qty: 90 TABLET | Refills: 3 | Status: ON HOLD | OUTPATIENT
Start: 2019-03-29 | End: 2020-01-20 | Stop reason: ALTCHOICE

## 2019-03-29 RX ORDER — LISINOPRIL 5 MG/1
5 TABLET ORAL DAILY
Qty: 90 TABLET | Refills: 3 | Status: SHIPPED | OUTPATIENT
Start: 2019-03-29 | End: 2020-08-31 | Stop reason: SDUPTHER

## 2019-03-29 RX ORDER — ATORVASTATIN CALCIUM 80 MG/1
80 TABLET, FILM COATED ORAL DAILY
Qty: 90 TABLET | Refills: 3 | Status: SHIPPED | OUTPATIENT
Start: 2019-03-29 | End: 2020-08-31 | Stop reason: SDUPTHER

## 2019-03-29 RX ORDER — FUROSEMIDE 40 MG/1
40 TABLET ORAL DAILY
Qty: 90 TABLET | Refills: 3 | Status: SHIPPED | OUTPATIENT
Start: 2019-03-29 | End: 2020-04-30

## 2019-03-29 RX ORDER — METOPROLOL SUCCINATE 25 MG/1
25 TABLET, EXTENDED RELEASE ORAL DAILY
Qty: 90 TABLET | Refills: 3 | Status: SHIPPED | OUTPATIENT
Start: 2019-03-29 | End: 2020-06-10

## 2019-04-09 ENCOUNTER — HOSPITAL ENCOUNTER (OUTPATIENT)
Age: 74
Discharge: HOME | End: 2019-04-09
Payer: MEDICARE

## 2019-04-09 DIAGNOSIS — R06.02 SOB (SHORTNESS OF BREATH): ICD-10-CM

## 2019-04-09 LAB
ANION GAP SERPL CALCULATED.3IONS-SCNC: 13 MMOL/L (ref 3–16)
BUN BLDV-MCNC: 21 MG/DL (ref 7–20)
CALCIUM SERPL-MCNC: 9.3 MG/DL (ref 8.3–10.6)
CHLORIDE BLD-SCNC: 101 MMOL/L (ref 99–110)
CO2: 25 MMOL/L (ref 21–32)
CREAT SERPL-MCNC: 0.8 MG/DL (ref 0.8–1.3)
GFR AFRICAN AMERICAN: >60
GFR NON-AFRICAN AMERICAN: >60
GLUCOSE BLD-MCNC: 177 MG/DL (ref 70–99)
POTASSIUM SERPL-SCNC: 4.1 MMOL/L (ref 3.5–5.1)
PRO-BNP: 233 PG/ML (ref 0–124)
SODIUM BLD-SCNC: 139 MMOL/L (ref 136–145)

## 2019-04-09 PROCEDURE — 36415 COLL VENOUS BLD VENIPUNCTURE: CPT

## 2019-04-09 PROCEDURE — 83880 ASSAY OF NATRIURETIC PEPTIDE: CPT

## 2019-04-09 PROCEDURE — 80048 BASIC METABOLIC PNL TOTAL CA: CPT

## 2019-04-10 ENCOUNTER — TELEPHONE (OUTPATIENT)
Dept: CARDIOLOGY CLINIC | Age: 74
End: 2019-04-10

## 2019-04-10 NOTE — TELEPHONE ENCOUNTER
Created telephone encounter. Spoke with Stephanie Jurist relayed message per [address] regarding labs. Pt verbalized understanding.

## 2019-04-16 ENCOUNTER — TELEPHONE (OUTPATIENT)
Dept: CARDIOLOGY CLINIC | Age: 74
End: 2019-04-16

## 2019-04-16 ENCOUNTER — HOSPITAL ENCOUNTER (OUTPATIENT)
Dept: NON INVASIVE DIAGNOSTICS | Age: 74
Discharge: HOME | End: 2019-04-16
Payer: MEDICARE

## 2019-04-16 DIAGNOSIS — R06.02 SOB (SHORTNESS OF BREATH): ICD-10-CM

## 2019-04-16 LAB
LV EF: 58 %
LVEF MODALITY: NORMAL

## 2019-04-16 PROCEDURE — 93306 TTE W/DOPPLER COMPLETE: CPT

## 2019-04-16 NOTE — TELEPHONE ENCOUNTER
----- Message from Elsi Bangura MD sent at 4/16/2019  4:31 PM EDT -----  Call  Echo looks ok  hrt fxn nml

## 2019-04-19 ENCOUNTER — OFFICE VISIT (OUTPATIENT)
Dept: CARDIOLOGY CLINIC | Age: 74
End: 2019-04-19
Payer: MEDICARE

## 2019-04-19 VITALS
HEART RATE: 82 BPM | BODY MASS INDEX: 36.7 KG/M2 | DIASTOLIC BLOOD PRESSURE: 64 MMHG | WEIGHT: 271 LBS | OXYGEN SATURATION: 96 % | HEIGHT: 72 IN | SYSTOLIC BLOOD PRESSURE: 130 MMHG

## 2019-04-19 DIAGNOSIS — I10 ESSENTIAL HYPERTENSION: ICD-10-CM

## 2019-04-19 DIAGNOSIS — I25.118 CORONARY ARTERY DISEASE OF NATIVE ARTERY OF NATIVE HEART WITH STABLE ANGINA PECTORIS (HCC): Primary | ICD-10-CM

## 2019-04-19 DIAGNOSIS — E78.00 PURE HYPERCHOLESTEROLEMIA: ICD-10-CM

## 2019-04-19 PROCEDURE — 99214 OFFICE O/P EST MOD 30 MIN: CPT | Performed by: INTERNAL MEDICINE

## 2019-04-19 NOTE — PROGRESS NOTES
[facility] [address]   Cardiac Consultation    Referring Provider:  Malik Augustine MD     Chief Complaint   Patient presents with    Congestive Heart Failure    Hypertension    Cardiomyopathy    Hyperlipidemia    Atrial Fibrillation    Shortness of Breath     on steps, or when he bends over and comes up        History of Present Illness:  Mr. Ric Richardson is here for follow up for CAD, HLD, chest pain, S/P CABG. Today he states he does not feel better with his increase in lasix. He has been having issues sleeping with the medication change due to urinating frequently. He has been having fatigue and falls asleep sitting. He has not seen a change in his SOB. No chest pain. Patient currently denies any weight gain, edema, palpitations, chest pain, dizziness, and syncope. He will be having a test for sleep apnea. Past Medical History:   has a past medical history of Bronchitis, CAD (coronary artery disease), Cardiomyopathy ([address] CHF (congestive heart failure) ([facility] [address] Diabetes ([facility] [address] Diastolic dysfunction, Family history of early CAD, Former smoker, High cholesterol, Hypertension, Knee replacement, Obesity, Class II, 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 Surgical History:   has a past surgical history that includes Appendectomy; meniscectomy (Right, ); Tonsillectomy; Cardiac catheterization (07/10/2017); Carpal tunnel release (Bilateral); Coronary artery bypass graft (2017); transesophageal echocardiogram (2017); Total knee arthroplasty (Right); and Coronary angioplasty with stent (2018). Social History:   reports that he quit smoking about 41 years ago. His smoking use included cigarettes, pipe, and cigars. He has never used smokeless tobacco. He reports that he drinks about 1.8 oz of alcohol per week. He reports that he does not use drugs.      Family History:  Father  at age 48 from \"angina\"    Home Medications:  Prior to Admission medications    Medication Sig Start Date Provider, MD   cetirizine (ZYRTEC) 10 MG tablet Take 10 mg by mouth daily. Yes Historical Provider, MD        Allergies:  Oxycodone and Demerol     Review of Systems:   · Constitutional: there has been no unanticipated weight loss. There's been no change in energy level, sleep pattern, or activity level. · Eyes: No visual changes or diplopia. No scleral icterus. · ENT: No Headaches, hearing loss or vertigo. No mouth sores or sore throat. · Cardiovascular: Reviewed in HPI  · Respiratory: rales in basis   · Gastrointestinal: No abdominal pain, appetite loss, blood in stools. No change in bowel or bladder habits. · Genitourinary: No dysuria, trouble voiding, or hematuria. · Musculoskeletal:  No gait disturbance, weakness or joint complaints. · Integumentary: No rash or pruritis. · Neurological: No headache, diplopia, change in muscle strength, numbness or tingling. No change in gait, balance, coordination, mood, affect, memory, mentation, behavior. · Psychiatric: No anxiety, no depression. · Endocrine: No malaise, fatigue or temperature intolerance. No excessive thirst, fluid intake, or urination. No tremor. · Hematologic/Lymphatic: No abnormal bruising or bleeding, blood clots or swollen lymph nodes. · Allergic/Immunologic: No nasal congestion or hives. Physical Examination:    Vitals:    04/19/19 1021   BP: 130/64   Pulse: 82   SpO2: 96%        Constitutional and General Appearance: NAD   Respiratory:  · Normal excursion and expansion without use of accessory muscles  Resp Auscultation: decreased breath sounds left base   Cardiovascular:  · The apical impulses not displaced  · Heart tones are crisp and normal  · Cervical veins- mild elevated JVD  · The carotid upstroke is normal in amplitude and contour without delay or bruit  · Normal S1S2, No S3, 1-2/6 murmur  · Peripheral pulses are symmetrical and full  · There is no clubbing, cyanosis of the extremities.   · trace edema   · Femoral Arteries: 2+ and equal  · Pedal Pulses: 2+ and equal   Abdomen:  · No masses or tenderness  · Liver/Spleen: No Abnormalities Noted  Neurological/Psychiatric:  · Alert and oriented in all spheres  · Moves all extremities well  · Exhibits normal gait balance and coordination  · No abnormalities of mood, affect, memory, mentation, or behavior are noted    Myoview 7/2017   - Abnormal high risk myocardial perfusion study.    - There is a medium size moderate intensity perfusion defect involving the    apex, apical lateral, mid anteroseptal, mid inferior and apical inferior    walls during stress that reverses at rest consistent with ischemia in    distribution of LAD and RCA or dominant circumflex.    - Apical inferior and mid anteroseptal walls are akinetic. Other walls    severely hypokinetic.    - Left ventricular cavity is dilated.    - Left ventricular systolic function is severely reduced with ejection    fraction of 28 %. Echo 7/2017   Limited only for LVEF pericardial effusion post heart surgery. 1 cm   posterior pericardial effusion and 0.5cm anterior pericardial effusion. At   the apex 2cm pericardial effusion. No tamponade physiology.   Left ventricular systolic function is normal. Atrial fibrillation with   controlled ventricular response. Estimated ejection fraction of 55-60 %.   Severe concentric left ventricular hypertrophy. Small size of left   ventricle. No significant outflow tract obstruction.   Elevated left ventricle diastolic filling pressure.   There is a moderate circumferential pericardial effusion noted. Cath 5/2018 [facility]   PCI of the native RCA with YUVAL  1. MVCD  2. Normal LVEF  3. Normal hemodynamics except for hypertension  4. Patent SVG to the DIAG, PDA, and OM  5. Patent LIMA to the LAD      Echo 4/16/19  Technically difficult examination. Left ventricular systolic function is normal with ejection fraction   estimated at 55-60 %. There is severe concentric left ventricular hypertrophy.    Left ventricular size is decreased. Diastolic dysfunction grade and filing pressure are indeterminate. The ascending aorta is mildly dilated. Aortic valve appears sclerotic but opens adequately. Mitral annular calcification is present. Mild mitral regurgitation. Previous echo done 10/2017 - EF 60%, LVH         Assessment:     1. Chest pain - no recurrence    3. Essential hypertension - well controlled   4. Pure hypercholesterolemia - controlled. Results reviewed w/ pt. Repeat yearly   5. Pericardial effusion, post-op - asymptomatic, resolved   6. Cardiomyopathy  - suspect ischemic, resolved post CABG. Resolved   7. CAD - stable. No angina  8. S/P CABG 7/2017 - stable  9. PAF - currently NSR. stable  10. SOB - not cardiac. Attempt at increase diuretics failed to improve symptoms. worsened sleep due to nocturia   11. Edema w/o sig change since surgery - unchanged      Have previously reviewed cath films along w/ pt and presentation at time of cath. No objective evidence ACS. Primary issue is small artery disease. Distal OM compromised by left main but also gets retrograde flow from OM1 SVG. Unlikely to alter symptoms which are at this time atypical. Consider for exertional angina, refractory to med mgmt. Plan:  Stop evening dose of lasix   Agree with sleep study   Check blood pressure at home weekly  Regular exercise and following a healthy diet encouraged   Follow up with me in 6 months       Scribe's attestation: This note was scribed in the presence of Dr. Arina Aguila M.D. By Corinne Booth RN    The scribes docuementation has been prepared under my direction and personally reviewed by me in its entirety. I confirm that the note above accurately reflects all work, treatment, procedures, and medical decision making performed by me. MD Dequan Castellon.  Lis Muller M.D., Sandra Harrell

## 2019-04-19 NOTE — LETTER
Medication Sig Start Date End Date Taking? Authorizing Provider   clopidogrel (PLAVIX) 75 MG tablet Take 1 tablet by mouth daily 3/29/19  Yes Samy Rosas MD   apixaban (ELIQUIS) 5 MG TABS tablet TAKE 1 TABLET BY MOUTH TWICE A DAY 3/29/19  Yes Samy Rosas MD   metoprolol succinate (TOPROL XL) 25 MG extended release tablet Take 1 tablet by mouth daily 3/29/19  Yes Samy Rosas MD   atorvastatin (LIPITOR) 80 MG tablet Take 1 tablet by mouth daily 3/29/19  Yes Samy Rosas MD   lisinopril (PRINIVIL;ZESTRIL) 5 MG tablet Take 1 tablet by mouth daily 3/29/19  Yes Samy Rosas MD   furosemide (LASIX) 40 MG tablet Take 1 tablet by mouth daily  Patient taking differently: Take 40 mg by mouth 2 times daily  3/29/19  Yes Samy Rosas MD   Fluticasone-Umeclidin-Vilant (TRELEGY ELLIPTA) 868-19.1-43 MCG/INH AEPB Inhale 1 puff into the lungs daily 3/22/19 4/22/19 Yes Jennyfer Ramirez MD   albuterol sulfate  (90 Base) MCG/ACT inhaler Inhale 2 puffs into the lungs 4 times daily as needed for Wheezing 3/15/19  Yes Salas Martinez DO   Spacer/Aero-Holding Chambers LETA 1 Device by Does not apply route daily as needed (sob) 3/15/19  Yes Salas Martinez, DO   metFORMIN (GLUCOPHAGE) 500 MG tablet Take 500 mg by mouth daily (with breakfast)   Yes Historical Provider, MD   aspirin EC 81 MG EC tablet Take 1 tablet by mouth daily 5/29/18  Yes Samy Rosas MD   Glucosamine-Chondroitin (OSTEO BI-FLEX REGULAR STRENGTH PO) Take 1 capsule by mouth   Yes Historical Provider, MD   GuaiFENesin (MUCINEX PO) Take 1,200 mg by mouth 2 times daily   Yes Historical Provider, MD   Probiotic Product (PROBIOTIC DAILY PO) Take by mouth   Yes Historical Provider, MD   fish oil-omega-3 fatty acids 1000 MG capsule Take 2 g by mouth daily. Yes Historical Provider, MD   Multiple Vitamin (MULTI-VITAMIN PO) Take  by mouth daily.      Yes Historical Provider, MD Lysine 1000 MG TABS Take  by mouth Daily. Yes Historical Provider, MD   cetirizine (ZYRTEC) 10 MG tablet Take 10 mg by mouth daily. Yes Historical Provider, MD        Allergies:  Oxycodone and Demerol     Review of Systems:   · Constitutional: there has been no unanticipated weight loss. There's been no change in energy level, sleep pattern, or activity level. · Eyes: No visual changes or diplopia. No scleral icterus. · ENT: No Headaches, hearing loss or vertigo. No mouth sores or sore throat. · Cardiovascular: Reviewed in HPI  · Respiratory: rales in basis   · Gastrointestinal: No abdominal pain, appetite loss, blood in stools. No change in bowel or bladder habits. · Genitourinary: No dysuria, trouble voiding, or hematuria. · Musculoskeletal:  No gait disturbance, weakness or joint complaints. · Integumentary: No rash or pruritis. · Neurological: No headache, diplopia, change in muscle strength, numbness or tingling. No change in gait, balance, coordination, mood, affect, memory, mentation, behavior. · Psychiatric: No anxiety, no depression. · Endocrine: No malaise, fatigue or temperature intolerance. No excessive thirst, fluid intake, or urination. No tremor. · Hematologic/Lymphatic: No abnormal bruising or bleeding, blood clots or swollen lymph nodes. · Allergic/Immunologic: No nasal congestion or hives.     Physical Examination:    Vitals:    04/19/19 1021   BP: 130/64   Pulse: 82   SpO2: 96%        Constitutional and General Appearance: NAD   Respiratory:  · Normal excursion and expansion without use of accessory muscles  Resp Auscultation: decreased breath sounds left base   Cardiovascular:  · The apical impulses not displaced  · Heart tones are crisp and normal  · Cervical veins- mild elevated JVD  · The carotid upstroke is normal in amplitude and contour without delay or bruit  · Normal S1S2, No S3, 1-2/6 murmur  · Peripheral pulses are symmetrical and full · There is no clubbing, cyanosis of the extremities. · trace edema   · Femoral Arteries: 2+ and equal  · Pedal Pulses: 2+ and equal   Abdomen:  · No masses or tenderness  · Liver/Spleen: No Abnormalities Noted  Neurological/Psychiatric:  · Alert and oriented in all spheres  · Moves all extremities well  · Exhibits normal gait balance and coordination  · No abnormalities of mood, affect, memory, mentation, or behavior are noted    Myoview 7/2017   - Abnormal high risk myocardial perfusion study.    - There is a medium size moderate intensity perfusion defect involving the    apex, apical lateral, mid anteroseptal, mid inferior and apical inferior    walls during stress that reverses at rest consistent with ischemia in    distribution of LAD and RCA or dominant circumflex.    - Apical inferior and mid anteroseptal walls are akinetic. Other walls    severely hypokinetic.    - Left ventricular cavity is dilated.    - Left ventricular systolic function is severely reduced with ejection    fraction of 28 %. Echo 7/2017   Limited only for LVEF pericardial effusion post heart surgery. 1 cm   posterior pericardial effusion and 0.5cm anterior pericardial effusion. At   the apex 2cm pericardial effusion. No tamponade physiology.   Left ventricular systolic function is normal. Atrial fibrillation with   controlled ventricular response. Estimated ejection fraction of 55-60 %.   Severe concentric left ventricular hypertrophy. Small size of left   ventricle. No significant outflow tract obstruction.   Elevated left ventricle diastolic filling pressure.   There is a moderate circumferential pericardial effusion noted. Cath 5/2018 [facility] [place]   PCI of the native RCA with YUVAL  1. MVCD  2. Normal LVEF  3. Normal hemodynamics except for hypertension  4. Patent SVG to the DIAG, PDA, and OM  5. Patent LIMA to the LAD      Echo 4/16/19  Technically difficult examination.    Left ventricular systolic function is normal with ejection fraction estimated at 55-60 %. There is severe concentric left ventricular hypertrophy. Left ventricular size is decreased. Diastolic dysfunction grade and filing pressure are indeterminate. The ascending aorta is mildly dilated. Aortic valve appears sclerotic but opens adequately. Mitral annular calcification is present. Mild mitral regurgitation. Previous echo done 10/2017 - EF 60%, LVH         Assessment:     1. Chest pain - no recurrence    3. Essential hypertension - well controlled   4. Pure hypercholesterolemia - controlled. Results reviewed w/ pt. Repeat yearly   5. Pericardial effusion, post-op - asymptomatic, resolved   6. Cardiomyopathy  - suspect ischemic, resolved post CABG. Resolved   7. CAD - stable. No angina  8. S/P CABG 7/2017 - stable  9. PAF - currently NSR. stable  10. SOB - not cardiac. Attempt at increase diuretics failed to improve symptoms. worsened sleep due to nocturia   11. Edema w/o sig change since surgery - unchanged      Have previously reviewed cath films along w/ pt and presentation at time of cath. No objective evidence ACS. Primary issue is small artery disease. Distal OM compromised by left main but also gets retrograde flow from OM1 SVG. Unlikely to alter symptoms which are at this time atypical. Consider for exertional angina, refractory to med mgmt. Plan:  Stop evening dose of lasix   Agree with sleep study   Check blood pressure at home weekly  Regular exercise and following a healthy diet encouraged   Follow up with me in 6 months       Scribe's attestation: This note was scribed in the presence of Dr. Allison Ramos M.D. By Ray Bell RN    The scribes docuementation has been prepared under my direction and personally reviewed by me in its entirety. I confirm that the note above accurately reflects all work, treatment, procedures, and medical decision making performed by me.     Dr. Allison Ramos MD Ray Morris M.D., Nadine Maki

## 2019-04-23 ENCOUNTER — OFFICE VISIT (OUTPATIENT)
Dept: PULMONOLOGY | Age: 74
End: 2019-04-23
Payer: MEDICARE

## 2019-04-23 VITALS
HEIGHT: 72 IN | OXYGEN SATURATION: 96 % | DIASTOLIC BLOOD PRESSURE: 77 MMHG | BODY MASS INDEX: 37.25 KG/M2 | SYSTOLIC BLOOD PRESSURE: 132 MMHG | RESPIRATION RATE: 16 BRPM | HEART RATE: 82 BPM | WEIGHT: 275 LBS

## 2019-04-23 DIAGNOSIS — J98.4 RESTRICTIVE LUNG DISEASE: ICD-10-CM

## 2019-04-23 DIAGNOSIS — I51.7 LVH (LEFT VENTRICULAR HYPERTROPHY): ICD-10-CM

## 2019-04-23 DIAGNOSIS — R06.02 SOB (SHORTNESS OF BREATH): Primary | ICD-10-CM

## 2019-04-23 DIAGNOSIS — G47.30 OBSERVED SLEEP APNEA: ICD-10-CM

## 2019-04-23 DIAGNOSIS — Z87.891 FORMER SMOKER: ICD-10-CM

## 2019-04-23 DIAGNOSIS — J44.1 COPD EXACERBATION (HCC): ICD-10-CM

## 2019-04-23 DIAGNOSIS — I51.89 DIASTOLIC DYSFUNCTION: ICD-10-CM

## 2019-04-23 DIAGNOSIS — J30.1 SEASONAL ALLERGIC RHINITIS DUE TO POLLEN: ICD-10-CM

## 2019-04-23 DIAGNOSIS — J41.0 SIMPLE CHRONIC BRONCHITIS (HCC): ICD-10-CM

## 2019-04-23 PROCEDURE — 99214 OFFICE O/P EST MOD 30 MIN: CPT | Performed by: INTERNAL MEDICINE

## 2019-04-23 RX ORDER — TRIAMCINOLONE ACETONIDE 40 MG/ML
80 INJECTION, SUSPENSION INTRA-ARTICULAR; INTRAMUSCULAR ONCE
Status: COMPLETED | OUTPATIENT
Start: 2019-04-23 | End: 2019-04-23

## 2019-04-23 RX ORDER — METHYLPREDNISOLONE 4 MG/1
TABLET ORAL
Qty: 1 KIT | Refills: 0 | Status: SHIPPED | OUTPATIENT
Start: 2019-04-23 | End: 2019-04-29

## 2019-04-23 RX ADMIN — TRIAMCINOLONE ACETONIDE 80 MG: 40 INJECTION, SUSPENSION INTRA-ARTICULAR; INTRAMUSCULAR at 15:54

## 2019-04-23 NOTE — PROGRESS NOTES
syncope, patient says that he was given a course of antibiotics and inhaler by his PCP without any improvement, patient's symptoms were progressively becoming worse for which reason he went to the ER and patient was given an inhaler with a spacer device along with that patient was also given a course of steroids and patient's symptoms are somewhat better, patient says that he has history of smoking in the past but he quit in ;patient states that he has been a passive smoker patient 2 years back because his wife is a chronic smoker who  of lung cancers about 2 years back, patient says that along with the cough he has scanty secretions which were initially clear then becomes yellow and also he had some hemoptysis at one point, patient says that he has no increasing nasal congestion, patient says only once in a while he has epistaxis but not on regular basis, patient does not have any sore throat, patient does not have to clear his throat quite often, patient does not have any otalgia or ear discharge, patient says that he takes Mucinex on regular basis which he improves his congestion, patient does not have any difficulty in swallowing, no coughing or choking with eating, no odynophagia or dysphagia, patient says that for last few weeks time he has not done any workout but otherwise he walks in a full breath and does some weights in the pool, patient says that he has difficulty in  Climbing stairs; patient does not have any pleuritic chest pain patient does not have any altered bowel habits, patient is not having dysuria or hematuria, patient says that he does not have any increasing leg edema, patient says that he is following excess program and he has lost about 40 pounds in last 1 year or so, patient also takes a water pill on whenever basis, patient does not have any change in the ambient environment or any sick contacts, patient does work as a manager straight, patient's family state that he snores;patient gives a history suggestive of sleep fragmentation;patient has a H/O CABG 2 years back and also has had PTCA with stent placement and is on chronic anticoagulation;patient does not have any other pertinent ROS of concern     Past Medical History:   Diagnosis Date    Bronchitis     chronic bronchitis once or twice per year twice has gone into pneumonia    CAD (coronary artery disease) 07/10/2017    + Stress, multi vessel CAD    Cardiomyopathy ([facility] [address] 2017    EF 35-40%    CHF (congestive heart failure) ([facility] [address] 2017    [facility]     Diastolic dysfunction     Family history of early CAD     father  at 48 of heart attack    Former smoker     High cholesterol 2017    Hypertension     Knee replacement     Right     Obesity, Class II, BMI 35-39.9, with comorbidity     Pneumonia        Past Surgical History:   Procedure Laterality Date    APPENDECTOMY      CARDIAC CATHETERIZATION  07/10/2017    Dr. Eileen Cowden - multi-vessel CAD, referred for CABG    CARPAL TUNNEL RELEASE Bilateral     CORONARY ANGIOPLASTY WITH STENT PLACEMENT  2018    YUVAL- 2.75 x 18 pRCA    CORONARY ARTERY BYPASS GRAFT  2017    Dr. Blanche Price - x4 (LIMA-LAD, reverse R SVG-diagonal, OM & PDA) w/placement of temporary pacing wire    MENISCECTOMY Right 1965    TONSILLECTOMY      TOTAL KNEE ARTHROPLASTY Right     TRANSESOPHAGEAL ECHOCARDIOGRAM  2017    during CABG       Allergies   Allergen Reactions    Oxycodone Other (See Comments)     hallucinatiions    Demerol Nausea And Vomiting       Medication list was reviewed and updated as needed in Epic    Social History     Socioeconomic History    Marital status:       Spouse name: Not on file    Number of children: Not on file    Years of education: Not on file    Highest education level: Not on file   Occupational History    Not on file   Social Needs    Financial resource strain: Not on file    Food insecurity:     Worry: Not on file     Inability: Not on file    Transportation needs:     Medical: Not on file     Non-medical: Not on file   Tobacco Use    Smoking status: Former Smoker     Packs/day: 1.00     Years: 15.00     Pack years: 15.00     Types: Cigarettes, Pipe, Cigars     Last attempt to quit: 1978     Years since quittin.3    Smokeless tobacco: Never Used    Tobacco comment: 2nd hand smoke until 1 year ago   Substance and Sexual Activity    Alcohol use: Yes     Alcohol/week: 1.8 oz     Types: 1 Shots of liquor, 2 Standard drinks or equivalent per week     Comment: occaisional    Drug use: No    Sexual activity: Not Currently     Partners: Female   Lifestyle    Physical activity:     Days per week: Not on file     Minutes per session: Not on file    Stress: Not on file   Relationships    Social connections:     Talks on phone: Not on file     Gets together: Not on file     Attends Sikhism service: Not on file     Active member of club or organization: Not on file     Attends meetings of clubs or organizations: Not on file     Relationship status: Not on file    Intimate partner violence:     Fear of current or ex partner: Not on file     Emotionally abused: Not on file     Physically abused: Not on file     Forced sexual activity: Not on file   Other Topics Concern    Not on file   Social History Narrative    Not on file       Family History   Problem Relation Age of Onset    Heart Attack Father     Heart Disease Father     Heart Attack Brother     High Blood Pressure Brother             Review of Systems same as above     Physical Exam:  Blood pressure 132/77, pulse 82, resp. rate 16, height 6' (1.829 m), weight 275 lb (124.7 kg), SpO2 96 %.'  Constitutional:  No acute distress. looking tired   HENT:  Oropharynx is clear and moist. No thyromegaly. Mild nasal mucosal hyperemia   Eyes:  Conjunctivae arenormal. Pupils equal, round, and reactive to light. No scleral icterus. Neck: .  No tracheal to as low as reasonably achievable.       COMPARISON:   None.       HISTORY:   ORDERING PHYSICIAN PROVIDED HISTORY: sob   TECHNOLOGIST PROVIDED HISTORY:   Technologist Provided Reason for Exam: s/p open heart x's 9 days ago, pt. now   having panic attacks, sweaty, irregular heart beat. .. best images possible,   pt uncooperative for scan, states he is not able to raise arms for scan       Elevated D-dimer       FINDINGS:   Pulmonary Arteries: Evaluation is severely compromised by motion artifact,   particularly at the lung bases.  There is also streak artifact from the   patient's arms.  No definite upper lobe pulmonary emboli are identified. Evaluation of the middle lobe, lingula and lower lobes is essentially   nondiagnostic.       Mediastinum: There is a moderate pericardial effusion.  There is stranding of   the anterior mediastinal fat without hematoma formation.  There is no aortic   aneurysm or dissection.  There is no adenopathy.       Lungs/pleura: There are small bilateral pleural effusions with airspace   disease in the middle lobe, lingula and both lower lobes.  There is no   pneumothorax.       Upper Abdomen: Limited images of the upper abdomen are unremarkable.       Soft Tissues/Bones: No acute bone or soft tissue abnormality.           Impression   1. No definite scan evidence for upper lobe pulmonary emboli.  The study is   nondiagnostic for pulmonary emboli in the middle lobe, lingula and lower   lobes. 2. Pericardial effusion with bilateral pleural effusions. 3. Bilateral airspace disease probably represents atelectasis though   pneumonia cannot be excluded.      ECHO in 2017-    Summary   Limited echo for LV function and pericardial effusion.   Normal systolic function with an estimated ejection fraction of 60%.   Moderate concentric left ventricular hypertrophy.   No regional wall motion abnormalities are seen.   Grade II diastolic dysfunction with elevated filing pressure.   No pericardial effusion.     PFT in 2017 prior to the CABG -Combined Moderate OAD and restrictive lung disease     Repeat echocardiogram-Summary   Technically difficult examination.   Left ventricular systolic function is normal with ejection fraction   estimated at 55-60 %.   There is severe concentric left ventricular hypertrophy.   Left ventricular size is decreased.   Diastolic dysfunction grade and filing pressure are indeterminate.   The ascending aorta is mildly dilated.   Aortic valve appears sclerotic but opens adequately.   Mitral annular calcification is present.   Mild mitral regurgitation.   Previous echo done 10/2017 - EF 60%, LVH      Assessment:    1. SOB (shortness of breath)      2. S/P CABG x 3      3. Simple chronic bronchitis (HCC) with acute exacerbation      4. Restrictive lung disease      5. Class 2 severe obesity with serious comorbidity and body mass index (BMI) of 36.0 to 36.9 in adult, unspecified obesity type ([address]      6. Suspected sleep apnea      7. LVH (left ventricular hypertrophy)      8.  Diastolic dysfunction          Plan:   · Patient and family were told about the clinical findings including auscultation and implications  · Patient and family were also told about the PFT results from 2017 along with interpretation and implications  · Patient clinically has combination of COPD reactive airway disease along with that patient also has morbid obesity with suspected sleep apnea and his treating disease along with that patient also has LVH with diastolic dysfunction and patient also appears to have some atelectasis along with this patient has history of CAD status post CABG and PTCA with stent placement which may be causing patient to have increased shortness of breath  · Patient does have acute bronchospasm at this time which may be because of change of weather along with that patient also has allergic rhinitis which may be contributing to patient's symptomatology  · Patient was given Kenalog 80 mg IM into 1  · Patient was also told her to take some saline nasal spray twice a day patient can try some Flonase or Nasacort over-the-counter and patient was told how to use it properly in order to avoid epistaxis patient in the view that he is on anti-coagulation  · Patient was also given a prescription for Medrol Dosepak   · Patient was given a sample of Anoro and Arnuity ellipta as Trelegy sample was not available and  was told to take one puff daily and was taught how to take it properly and also to rinse mouth with water after use otherwise he can have hoarseness of voice or oral thrush  · Patient to take albuterol inhaler 2 puffs every 6 on whenever necessary basis  · Patient was taught how to take the inhaler properly without a spacer device and patient exhibits understanding  · Patient does not need any antibiotics  · Patient's echocardiogram from 2017 was reviewed and patient has LVH with diastolic dysfunction  · Patient needs to have a less salt and less fluid and diet  · Patient to continue diuretics as given by the PCP/cardiology-patient's was given Lasix twice a day by Dr. Gi Rubin as per patient and patient had to  the whole night because of going to the bathroom and it was cut down back to once a day as per the patient  · Patient needs to lose weight  · Patient was told to suck up any hard candy or lozenges which are sugar-free   · Patient was told about the concept and sequelae of RHONDA  · Patient was offered a polysomnography -patient is agreeable -we will get a sleep studies done but not at this time may be after 1 month as patient is symptomatic at this time   · Patient's further treatment depending on patient's clinical status and the follow-up on above recommendations

## 2019-04-30 ENCOUNTER — TELEPHONE (OUTPATIENT)
Dept: PULMONOLOGY | Age: 74
End: 2019-04-30

## 2019-04-30 DIAGNOSIS — J41.0 SIMPLE CHRONIC BRONCHITIS (HCC): Primary | ICD-10-CM

## 2019-04-30 NOTE — TELEPHONE ENCOUNTER
Patient called and his insurance does not cover trelegy or anoro arnuity combo very will co pay for both 642.00 per month. Medication with PA will be 42.00 monthly. Patients only other inhaler is albuterol .     Patient has completed steroid and the cough is not ant better     Please advise

## 2019-05-01 RX ORDER — BUDESONIDE AND FORMOTEROL FUMARATE DIHYDRATE 160; 4.5 UG/1; UG/1
2 AEROSOL RESPIRATORY (INHALATION) 2 TIMES DAILY
Qty: 3 INHALER | Refills: 1 | Status: SHIPPED | OUTPATIENT
Start: 2019-05-01 | End: 2020-03-09

## 2019-05-23 ENCOUNTER — HOSPITAL ENCOUNTER (OUTPATIENT)
Dept: SLEEP CENTER | Age: 74
Discharge: HOME | End: 2019-05-25
Payer: MEDICARE

## 2019-05-23 DIAGNOSIS — G47.30 OBSERVED SLEEP APNEA: ICD-10-CM

## 2019-05-23 PROCEDURE — 95810 POLYSOM 6/> YRS 4/> PARAM: CPT

## 2019-06-11 ENCOUNTER — OFFICE VISIT (OUTPATIENT)
Dept: PULMONOLOGY | Age: 74
End: 2019-06-11
Payer: MEDICARE

## 2019-06-11 VITALS
BODY MASS INDEX: 36.03 KG/M2 | WEIGHT: 266 LBS | SYSTOLIC BLOOD PRESSURE: 118 MMHG | HEIGHT: 72 IN | OXYGEN SATURATION: 98 % | HEART RATE: 72 BPM | DIASTOLIC BLOOD PRESSURE: 68 MMHG | RESPIRATION RATE: 16 BRPM

## 2019-06-11 DIAGNOSIS — G47.33 OSA (OBSTRUCTIVE SLEEP APNEA): Primary | ICD-10-CM

## 2019-06-11 DIAGNOSIS — J98.4 RESTRICTIVE LUNG DISEASE: ICD-10-CM

## 2019-06-11 DIAGNOSIS — E66.01 CLASS 2 SEVERE OBESITY WITH SERIOUS COMORBIDITY AND BODY MASS INDEX (BMI) OF 35.0 TO 35.9 IN ADULT, UNSPECIFIED OBESITY TYPE (HCC): ICD-10-CM

## 2019-06-11 DIAGNOSIS — J41.0 SIMPLE CHRONIC BRONCHITIS (HCC): ICD-10-CM

## 2019-06-11 DIAGNOSIS — Z95.1 S/P CABG X 3: ICD-10-CM

## 2019-06-11 PROCEDURE — 99214 OFFICE O/P EST MOD 30 MIN: CPT | Performed by: INTERNAL MEDICINE

## 2019-06-11 NOTE — PROGRESS NOTES
doing his exercises in the water which includes lifting up of weight of 6 pounds and patient says that this weekend after the exercise he went to [employer] and he was having excruciating pain in the left wrist region for which reason patient had to use some Ace bandage, patient does not eat too much of salt, patient does take fluids, patient is on diuretics, patient does give history of sleep fragmentation has not been evaluated in the past, patient does not have any confusion or lethargy, no change in the ambient environment or any sick contacts, no other pertinent review of system of concern      : Patient is 81-year-old male who was come to the office for a pulmonary evaluation for bronchitis  Patient says that he has been diagnosed as having chronic bronchitis since that time he was in high school but patient says that he was asymptomatic and he was in his 35s and 45s and subsequently patient says that for last 15 years so his symptoms have become worse, patient has been having coughing along with expectoration and shortness of breath and on average twice a year his symptoms are quite significant, patient says that he has bouts of coughing which is supposed bad that he sees stars but patient does not have any history of syncope, patient says that he was given a course of antibiotics and inhaler by his PCP without any improvement, patient's symptoms were progressively becoming worse for which reason he went to the ER and patient was given an inhaler with a spacer device along with that patient was also given a course of steroids and patient's symptoms are somewhat better, patient says that he has history of smoking in the past but he quit in ;patient states that he has been a passive smoker patient 2 years back because his wife is a chronic smoker who  of lung cancers about 2 years back, patient says that along with the cough he has scanty secretions which were initially clear then becomes yellow and also he had some hemoptysis at one point, patient says that he has no increasing nasal congestion, patient says only once in a while he has epistaxis but not on regular basis, patient does not have any sore throat, patient does not have to clear his throat quite often, patient does not have any otalgia or ear discharge, patient says that he takes Mucinex on regular basis which he improves his congestion, patient does not have any difficulty in swallowing, no coughing or choking with eating, no odynophagia or dysphagia, patient says that for last few weeks time he has not done any workout but otherwise he walks in a full breath and does some weights in the pool, patient says that he has difficulty in  Climbing stairs; patient does not have any pleuritic chest pain patient does not have any altered bowel habits, patient is not having dysuria or hematuria, patient says that he does not have any increasing leg edema, patient says that he is following excess program and he has lost about 40 pounds in last 1 year or so, patient also takes a water pill on whenever basis, patient does not have any change in the ambient environment or any sick contacts, patient does work as a manager [employer], patient's family state that he snores;patient gives a history suggestive of sleep fragmentation;patient has a H/O CABG 2 years back and also has had PTCA with stent placement and is on chronic anticoagulation;patient does not have any other pertinent ROS of concern     Past Medical History:   Diagnosis Date    Bronchitis     chronic bronchitis once or twice per year twice has gone into pneumonia    CAD (coronary artery disease) 07/10/2017    + Stress, multi vessel CAD    [facility] 2017    EF 35-40%    CHF (congestive heart failure) ([facility] 2017    Diabetes ([facility]     Diastolic dysfunction     Family history of early CAD     father  at 48 of heart attack    Former smoker     High cholesterol 2017    Hypertension     Knee replacement 2009    Right     Obesity, Class II, BMI 35-39.9, with comorbidity     Pneumonia        Past Surgical History:   Procedure Laterality Date    APPENDECTOMY      CARDIAC CATHETERIZATION  07/10/2017    Dr. Jarett Salgado - multi-vessel CAD, referred for CABG    CARPAL TUNNEL RELEASE Bilateral     CORONARY ANGIOPLASTY WITH STENT PLACEMENT  2018    YUVAL- 2.75 x 18 pRCA    CORONARY ARTERY BYPASS GRAFT  2017    Dr. Daniela Centeno - x4 (LIMA-LAD, reverse R SVG-diagonal, OM & PDA) w/placement of temporary pacing wire    MENISCECTOMY Right 1965    TONSILLECTOMY      TOTAL KNEE ARTHROPLASTY Right     TRANSESOPHAGEAL ECHOCARDIOGRAM  2017    during CABG       Allergies   Allergen Reactions    Oxycodone Other (See Comments)     hallucinatiions    Demerol Nausea And Vomiting       Medication list was reviewed and updated as needed in Epic    Social History     Socioeconomic History    Marital status:      Spouse name: Not on file    Number of children: Not on file    Years of education: Not on file    Highest education level: Not on file   Occupational History    Not on file   Social Needs    Financial resource strain: Not on file    Food insecurity:     Worry: Not on file     Inability: Not on file    Transportation needs:     Medical: Not on file     Non-medical: Not on file   Tobacco Use    Smoking status: Former Smoker     Packs/day: 1.00     Years: 15.00     Pack years: 15.00     Types: Cigarettes, Pipe, Cigars     Last attempt to quit: 1978     Years since quittin.4    Smokeless tobacco: Never Used    Tobacco comment: 2nd hand smoke until 1 year ago   Substance and Sexual Activity    Alcohol use:  Yes     Alcohol/week: 1.8 oz     Types: 1 Shots of liquor, 2 Standard drinks or equivalent per week     Comment: occaisional    Drug use: No    Sexual activity: Not Currently     Partners: Female   Lifestyle    Physical activity:     Days per week: Not on file     Minutes per session: Not on file    Stress: Not on file   Relationships    Social connections:     Talks on phone: Not on file     Gets together: Not on file     Attends Latter-day service: Not on file     Active member of club or organization: Not on file     Attends meetings of clubs or organizations: Not on file     Relationship status: Not on file    Intimate partner violence:     Fear of current or ex partner: Not on file     Emotionally abused: Not on file     Physically abused: Not on file     Forced sexual activity: Not on file   Other Topics Concern    Not on file   Social History Narrative    Not on file       Family History   Problem Relation Age of Onset    Heart Attack Father     Heart Disease Father     Heart Attack Brother     High Blood Pressure Brother             Review of Systems same as above     Physical Exam:  Blood pressure 118/68, pulse 72, resp. rate 16, height 6' (1.829 m), weight 266 lb (120.7 kg), SpO2 98 %.'  Constitutional:  No acute distress. looking tired   HENT:  Oropharynx is clear and moist. No thyromegaly. Mild nasal mucosal hyperemia   Eyes:  Conjunctivae arenormal. Pupils equal, round, and reactive to light. No scleral icterus. Neck: . No tracheal deviation present. No obvious thyroid mass. Diameter is increased   Cardiovascular:Normal rate, regular rhythm, normal heart sounds. No right ventricular heave. (+) lower extremity edema. Pulmonary/Chest: Bilateral diffuse wheezes. Minimal  rales. Chest wall is not dull to percussion. Noaccessory muscle usage or stridor. decreased BSI  Abdominal: Soft. Bowel sounds present. No distension or hernia. No tenderness;ovese. Musculoskeletal: No cyanosis. No clubbing. No obvious joint deformity. Lymphadenopathy: No cervical or supraclavicular adenopathy. Skin: Skin is warm and dry. No rash or nodules on the exposed extremities. Psychiatric: Normal mood and affect.  Behavior is normal.  No anxiety. Neurologic: Alert, awake and oriented. PERRL. Speech fluent      Data:     Imaging:  I have reviewed radiology images personally. No orders to display     Xr Chest Portable    Result Date: 3/15/2019  EXAMINATION: SINGLE XRAY VIEW OF THE CHEST 3/15/2019 3:27 pm COMPARISON: May 17, 2018 HISTORY: ORDERING SYSTEM PROVIDED HISTORY: sob TECHNOLOGIST PROVIDED HISTORY: Reason for exam:->sob Ordering Physician Provided Reason for Exam: Shortness of Breath (3 weeks with SOB, on 3 ATB with no relief, was sent by PCP for evaluation, Hx of 1 stent in May with Hx of 4 way Bypass) FINDINGS: Elevation left hemidiaphragm, similar to previous exam.  Mild left basilar atelectasis, unchanged. Right lung is clear. No acute bony abnormality. No acute findings. No significant change in elevation left hemidiaphragm and mild left basilar atelectasis. Ct chest in 2017-  CTA OF THE CHEST 7/28/2017 3:42 am       TECHNIQUE:   CTA of the chest was performed after the administration of intravenous   contrast.  Multiplanar reformatted images are provided for review.  MIP   images are provided for review. Dose modulation, iterative reconstruction,   and/or weight based adjustment of the mA/kV was utilized to reduce the   radiation dose to as low as reasonably achievable.       COMPARISON:   None.       HISTORY:   ORDERING PHYSICIAN PROVIDED HISTORY: sob   TECHNOLOGIST PROVIDED HISTORY:   Technologist Provided Reason for Exam: s/p open heart x's 9 days ago, pt. now   having panic attacks, sweaty, irregular heart beat. .. best images possible,   pt uncooperative for scan, states he is not able to raise arms for scan       Elevated D-dimer       FINDINGS:   Pulmonary Arteries: Evaluation is severely compromised by motion artifact,   particularly at the lung bases.  There is also streak artifact from the   patient's arms.  No definite upper lobe pulmonary emboli are identified.    Evaluation of the middle lobe, lingula and lower lobes is essentially   nondiagnostic.       Mediastinum: There is a moderate pericardial effusion.  There is stranding of   the anterior mediastinal fat without hematoma formation.  There is no aortic   aneurysm or dissection.  There is no adenopathy.       Lungs/pleura: There are small bilateral pleural effusions with airspace   disease in the middle lobe, lingula and both lower lobes.  There is no   pneumothorax.       Upper Abdomen: Limited images of the upper abdomen are unremarkable.       Soft Tissues/Bones: No acute bone or soft tissue abnormality.           Impression   1. No definite scan evidence for upper lobe pulmonary emboli.  The study is   nondiagnostic for pulmonary emboli in the middle lobe, lingula and lower   lobes. 2. Pericardial effusion with bilateral pleural effusions. 3. Bilateral airspace disease probably represents atelectasis though   pneumonia cannot be excluded.      ECHO in 2017-    Summary   Limited echo for LV function and pericardial effusion.   Normal systolic function with an estimated ejection fraction of 60%.   Moderate concentric left ventricular hypertrophy.   No regional wall motion abnormalities are seen.   Grade II diastolic dysfunction with elevated filing pressure.   No pericardial effusion.     PFT in 2017 prior to the CABG -Combined Moderate OAD and restrictive lung disease     Repeat echocardiogram-Summary   Technically difficult examination.   Left ventricular systolic function is normal with ejection fraction   estimated at 55-60 %.   There is severe concentric left ventricular hypertrophy.   Left ventricular size is decreased.   Diastolic dysfunction grade and filing pressure are indeterminate.   The ascending aorta is mildly dilated.   Aortic valve appears sclerotic but opens adequately.   Mitral annular calcification is present.   Mild mitral regurgitation.   Previous echo done 10/2017 - EF 60%, LVH      Patient sleep study shows that patient has moderate RHONDA in general but he has severe RHONDA with AHI of 36/h and REM sleep along with that patient also has nocturnal hypoxemia to some extent    Assessment:    1. SOB (shortness of breath)      2. S/P CABG x 3      3. Simple chronic bronchitis (HCC) with acute exacerbation      4. Restrictive lung disease      5. Class 2 severe obesity with serious comorbidity and body mass index (BMI) of 36.0 to 36.9 in adult, unspecified obesity type ([address]      6. Obstructive sleep apnea      7. LVH (left ventricular hypertrophy)      8.  Diastolic dysfunction          Plan:   · Patient was e told about the clinical findings including auscultation and implications  · Patient was again told about the PFT results from 2017 along with interpretation and implications  · Patient clinically has combination of COPD reactive airway disease along with that patient also has morbid obesity with suspected sleep apnea and his treating disease along with that patient also has LVH with diastolic dysfunction and patient also appears to have some atelectasis along with this patient has history of CAD status post CABG and PTCA with stent placement which may be causing patient to have increased shortness of breath  · Patient was told about the sleep study results along with implications and patient was told about the potential short-term and long-term implications of RHONDA  · Patient was told that he will benefit from treatment for RHONDA but patient states that he is feeling great and he does not want any more additional treatment at this time in spite of knowing the consequences but if patient says that his symptoms worsen and he feels that he needs additional help then he will call for trying the CPAP  · Patient was also told  to take some saline nasal spray twice a day patient can try some Flonase or Nasacort over-the-counter and patient was told how to use it properly in order to avoid epistaxis patient in the view that he is on anti-coagulation  · Patient was also given a prescription for Medrol Dosepak   · Patient to continue with Symbicort and Spiriva as given before on the basis of patient's insurance coverage  · Patient to take albuterol inhaler 2 puffs every 6 on whenever necessary basis  · Patient does not need any antibiotics or steroids  · Patient's echocardiogram from 2017 was reviewed and patient has LVH with diastolic dysfunction  · Patient needs to have a less salt and less fluid and diet  · Patient to continue diuretics as given by the PCP/cardiology-patient's was given Lasix twice a day by Dr. Aravind Miller as per patient and patient had to  the whole night because of going to the bathroom and it was cut down back to once a day as per the patient  · Patient needs to lose weight  · Patient was told to suck up any hard candy or lozenges which are sugar-free   · Patient was told about the concept and sequelae of RHONDA  · Patient's further treatment depending on patient's clinical status and the follow-up on above recommendations

## 2019-08-08 ENCOUNTER — TELEPHONE (OUTPATIENT)
Dept: CARDIOLOGY CLINIC | Age: 74
End: 2019-08-08

## 2019-08-08 DIAGNOSIS — R06.02 SOB (SHORTNESS OF BREATH): Primary | ICD-10-CM

## 2019-08-08 DIAGNOSIS — R06.09 DYSPNEA ON EXERTION: ICD-10-CM

## 2019-08-08 NOTE — TELEPHONE ENCOUNTER
Patient only wants to speak directly to Armando Powell.   [place] 4[place]19/19 [facility] [place]

## 2019-08-09 ENCOUNTER — HOSPITAL ENCOUNTER (OUTPATIENT)
Age: 74
Discharge: HOME | End: 2019-08-09
Payer: MEDICARE

## 2019-08-09 ENCOUNTER — HOSPITAL ENCOUNTER (OUTPATIENT)
Dept: GENERAL RADIOLOGY | Age: 74
Discharge: HOME | End: 2019-08-09
Payer: MEDICARE

## 2019-08-09 DIAGNOSIS — R06.02 SOB (SHORTNESS OF BREATH): ICD-10-CM

## 2019-08-09 DIAGNOSIS — R06.09 DYSPNEA ON EXERTION: ICD-10-CM

## 2019-08-09 LAB
A/G RATIO: 1.5 (ref 1.1–2.2)
ALBUMIN SERPL-MCNC: 4.3 G/DL (ref 3.4–5)
ALP BLD-CCNC: 63 U/L (ref 40–129)
ALT SERPL-CCNC: 24 U/L (ref 10–40)
ANION GAP SERPL CALCULATED.3IONS-SCNC: 14 MMOL/L (ref 3–16)
AST SERPL-CCNC: 25 U/L (ref 15–37)
BILIRUB SERPL-MCNC: 0.5 MG/DL (ref 0–1)
BUN BLDV-MCNC: 13 MG/DL (ref 7–20)
CALCIUM SERPL-MCNC: 9.7 MG/DL (ref 8.3–10.6)
CHLORIDE BLD-SCNC: 100 MMOL/L (ref 99–110)
CO2: 24 MMOL/L (ref 21–32)
CREAT SERPL-MCNC: 0.8 MG/DL (ref 0.8–1.3)
GFR AFRICAN AMERICAN: >60
GFR NON-AFRICAN AMERICAN: >60
GLOBULIN: 2.9 G/DL
GLUCOSE BLD-MCNC: 85 MG/DL (ref 70–99)
HCT VFR BLD CALC: 43.1 % (ref 40.5–52.5)
HEMOGLOBIN: 14.6 G/DL (ref 13.5–17.5)
MCH RBC QN AUTO: 31.3 PG (ref 26–34)
MCHC RBC AUTO-ENTMCNC: 34 G/DL (ref 31–36)
MCV RBC AUTO: 92.2 FL (ref 80–100)
PDW BLD-RTO: 13 % (ref 12.4–15.4)
PLATELET # BLD: 299 K/UL (ref 135–450)
PMV BLD AUTO: 8.3 FL (ref 5–10.5)
POTASSIUM SERPL-SCNC: 4.1 MMOL/L (ref 3.5–5.1)
PRO-BNP: 222 PG/ML (ref 0–124)
RBC # BLD: 4.67 M/UL (ref 4.2–5.9)
SODIUM BLD-SCNC: 138 MMOL/L (ref 136–145)
TOTAL PROTEIN: 7.2 G/DL (ref 6.4–8.2)
TSH REFLEX FT4: 1.83 UIU/ML (ref 0.27–4.2)
WBC # BLD: 9.3 K/UL (ref 4–11)

## 2019-08-09 PROCEDURE — 36415 COLL VENOUS BLD VENIPUNCTURE: CPT

## 2019-08-09 PROCEDURE — 71046 X-RAY EXAM CHEST 2 VIEWS: CPT

## 2019-08-09 PROCEDURE — 85027 COMPLETE CBC AUTOMATED: CPT

## 2019-08-09 PROCEDURE — 83880 ASSAY OF NATRIURETIC PEPTIDE: CPT

## 2019-08-09 PROCEDURE — 84443 ASSAY THYROID STIM HORMONE: CPT

## 2019-08-09 PROCEDURE — 80053 COMPREHEN METABOLIC PANEL: CPT

## 2019-08-09 NOTE — TELEPHONE ENCOUNTER
Pt called back on answering service wants to get heath today he is off work.  Pls call to advise Thank you

## 2019-08-12 ENCOUNTER — TELEPHONE (OUTPATIENT)
Dept: CARDIOLOGY CLINIC | Age: 74
End: 2019-08-12

## 2019-09-30 ENCOUNTER — TELEPHONE (OUTPATIENT)
Dept: PULMONOLOGY | Age: 74
End: 2019-09-30

## 2019-09-30 RX ORDER — DOXYCYCLINE HYCLATE 100 MG
100 TABLET ORAL 2 TIMES DAILY
Qty: 14 TABLET | Refills: 0 | Status: SHIPPED | OUTPATIENT
Start: 2019-09-30 | End: 2019-10-07

## 2019-10-15 ENCOUNTER — OFFICE VISIT (OUTPATIENT)
Dept: PULMONOLOGY | Age: 74
End: 2019-10-15
Payer: MEDICARE

## 2019-10-15 VITALS
WEIGHT: 272.4 LBS | DIASTOLIC BLOOD PRESSURE: 68 MMHG | SYSTOLIC BLOOD PRESSURE: 123 MMHG | BODY MASS INDEX: 36.9 KG/M2 | RESPIRATION RATE: 18 BRPM | HEIGHT: 72 IN | OXYGEN SATURATION: 92 % | TEMPERATURE: 98.4 F | HEART RATE: 80 BPM

## 2019-10-15 DIAGNOSIS — J44.1 COPD EXACERBATION (HCC): Primary | ICD-10-CM

## 2019-10-15 PROCEDURE — 99213 OFFICE O/P EST LOW 20 MIN: CPT | Performed by: INTERNAL MEDICINE

## 2019-10-25 ENCOUNTER — OFFICE VISIT (OUTPATIENT)
Dept: CARDIOLOGY CLINIC | Age: 74
End: 2019-10-25
Payer: MEDICARE

## 2019-10-25 VITALS
BODY MASS INDEX: 36.98 KG/M2 | DIASTOLIC BLOOD PRESSURE: 78 MMHG | WEIGHT: 273 LBS | HEIGHT: 72 IN | OXYGEN SATURATION: 93 % | SYSTOLIC BLOOD PRESSURE: 140 MMHG | HEART RATE: 72 BPM

## 2019-10-25 DIAGNOSIS — Z95.1 S/P CABG X 3: ICD-10-CM

## 2019-10-25 DIAGNOSIS — I25.118 CORONARY ARTERY DISEASE OF NATIVE ARTERY OF NATIVE HEART WITH STABLE ANGINA PECTORIS (HCC): Primary | ICD-10-CM

## 2019-10-25 DIAGNOSIS — I10 ESSENTIAL HYPERTENSION: ICD-10-CM

## 2019-10-25 PROCEDURE — 99214 OFFICE O/P EST MOD 30 MIN: CPT | Performed by: INTERNAL MEDICINE

## 2019-10-25 RX ORDER — TADALAFIL 5 MG/1
5 TABLET ORAL DAILY
Status: ON HOLD | COMMUNITY
End: 2020-01-23

## 2019-10-25 ASSESSMENT — ENCOUNTER SYMPTOMS
ABDOMINAL PAIN: 0
DIARRHEA: 0
EYE ITCHING: 0
SORE THROAT: 0
CHOKING: 0
EYE DISCHARGE: 0
VOICE CHANGE: 0
CHEST TIGHTNESS: 0
STRIDOR: 0
CONSTIPATION: 0
EYE PAIN: 0

## 2019-11-25 ENCOUNTER — TELEPHONE (OUTPATIENT)
Dept: PULMONOLOGY | Age: 74
End: 2019-11-25

## 2019-11-25 RX ORDER — DOXYCYCLINE HYCLATE 100 MG
100 TABLET ORAL 2 TIMES DAILY
Qty: 14 TABLET | Refills: 0 | Status: SHIPPED | OUTPATIENT
Start: 2019-11-25 | End: 2019-12-02

## 2019-12-12 ENCOUNTER — TELEPHONE (OUTPATIENT)
Dept: PULMONOLOGY | Age: 74
End: 2019-12-12

## 2019-12-12 NOTE — TELEPHONE ENCOUNTER
Pt LVM stating he had some concerns about his COPD and wanted to discuss them or schedule an appt. I attempted to call patient back but VM was full. Will try again later.

## 2019-12-20 ENCOUNTER — TELEPHONE (OUTPATIENT)
Dept: CARDIOLOGY CLINIC | Age: 74
End: 2019-12-20

## 2020-01-15 ENCOUNTER — TELEPHONE (OUTPATIENT)
Dept: PULMONOLOGY | Age: 75
End: 2020-01-15

## 2020-01-15 NOTE — TELEPHONE ENCOUNTER
Occ.  productive no color but not sure did not look, no temp, some wheezing.  General ache  Head and chest.

## 2020-01-15 NOTE — TELEPHONE ENCOUNTER
Patient called and stated he just returned from vacation and is coughing, would like to know if you can call something in. [facility] [address].

## 2020-01-16 RX ORDER — METHYLPREDNISOLONE 4 MG/1
TABLET ORAL
Qty: 1 KIT | Refills: 0 | Status: ON HOLD
Start: 2020-01-16 | End: 2020-01-24 | Stop reason: HOSPADM

## 2020-01-20 ENCOUNTER — HOSPITAL ENCOUNTER (INPATIENT)
Age: 75
LOS: 4 days | Discharge: HOME HEALTH | DRG: 193 | End: 2020-01-24
Attending: EMERGENCY MEDICINE | Admitting: INTERNAL MEDICINE
Payer: MEDICARE

## 2020-01-20 ENCOUNTER — TELEPHONE (OUTPATIENT)
Dept: PULMONOLOGY | Age: 75
End: 2020-01-20

## 2020-01-20 ENCOUNTER — APPOINTMENT (OUTPATIENT)
Dept: GENERAL RADIOLOGY | Age: 75
DRG: 193 | End: 2020-01-20
Payer: MEDICARE

## 2020-01-20 PROBLEM — J96.00 ACUTE RESPIRATORY FAILURE (HCC): Status: ACTIVE | Noted: 2020-01-20

## 2020-01-20 LAB
A/G RATIO: 1.1 (ref 1.1–2.2)
ALBUMIN SERPL-MCNC: 4 G/DL (ref 3.4–5)
ALP BLD-CCNC: 46 U/L (ref 40–129)
ALT SERPL-CCNC: 21 U/L (ref 10–40)
ANION GAP SERPL CALCULATED.3IONS-SCNC: 14 MMOL/L (ref 3–16)
AST SERPL-CCNC: 50 U/L (ref 15–37)
BASE EXCESS VENOUS: -1.1 MMOL/L (ref -3–3)
BASOPHILS ABSOLUTE: 0.1 K/UL (ref 0–0.2)
BASOPHILS RELATIVE PERCENT: 0.7 %
BILIRUB SERPL-MCNC: 0.8 MG/DL (ref 0–1)
BUN BLDV-MCNC: 18 MG/DL (ref 7–20)
CALCIUM SERPL-MCNC: 9 MG/DL (ref 8.3–10.6)
CARBOXYHEMOGLOBIN: 1.7 % (ref 0–1.5)
CHLORIDE BLD-SCNC: 97 MMOL/L (ref 99–110)
CO2: 21 MMOL/L (ref 21–32)
CREAT SERPL-MCNC: 0.8 MG/DL (ref 0.8–1.3)
EOSINOPHILS ABSOLUTE: 0 K/UL (ref 0–0.6)
EOSINOPHILS RELATIVE PERCENT: 0.2 %
GFR AFRICAN AMERICAN: >60
GFR NON-AFRICAN AMERICAN: >60
GLOBULIN: 3.5 G/DL
GLUCOSE BLD-MCNC: 142 MG/DL (ref 70–99)
GLUCOSE BLD-MCNC: 162 MG/DL (ref 70–99)
GLUCOSE BLD-MCNC: 186 MG/DL (ref 70–99)
GLUCOSE BLD-MCNC: 201 MG/DL (ref 70–99)
HCO3 VENOUS: 22.7 MMOL/L (ref 23–29)
HCT VFR BLD CALC: 44.1 % (ref 40.5–52.5)
HEMOGLOBIN: 14.8 G/DL (ref 13.5–17.5)
LACTIC ACID: 2.9 MMOL/L (ref 0.4–2)
LACTIC ACID: NORMAL MMOL/L (ref 0.4–2)
LYMPHOCYTES ABSOLUTE: 1.9 K/UL (ref 1–5.1)
LYMPHOCYTES RELATIVE PERCENT: 12.8 %
MCH RBC QN AUTO: 30.7 PG (ref 26–34)
MCHC RBC AUTO-ENTMCNC: 33.6 G/DL (ref 31–36)
MCV RBC AUTO: 91.5 FL (ref 80–100)
METHEMOGLOBIN VENOUS: 0.4 %
MONOCYTES ABSOLUTE: 1.2 K/UL (ref 0–1.3)
MONOCYTES RELATIVE PERCENT: 7.9 %
NEUTROPHILS ABSOLUTE: 11.5 K/UL (ref 1.7–7.7)
NEUTROPHILS RELATIVE PERCENT: 78.4 %
O2 CONTENT, VEN: 17 VOL %
O2 SAT, VEN: 82 %
O2 THERAPY: ABNORMAL
PCO2, VEN: 35.6 MMHG (ref 40–50)
PDW BLD-RTO: 13.2 % (ref 12.4–15.4)
PERFORMED ON: ABNORMAL
PH VENOUS: 7.42 (ref 7.35–7.45)
PLATELET # BLD: 281 K/UL (ref 135–450)
PMV BLD AUTO: 7.8 FL (ref 5–10.5)
PO2, VEN: 44.1 MMHG (ref 25–40)
POTASSIUM SERPL-SCNC: 3.6 MMOL/L (ref 3.5–5.1)
POTASSIUM SERPL-SCNC: 5.6 MMOL/L (ref 3.5–5.1)
PRO-BNP: 389 PG/ML (ref 0–124)
PROCALCITONIN: 0.04 NG/ML (ref 0–0.15)
RAPID INFLUENZA  B AGN: NEGATIVE
RAPID INFLUENZA A AGN: NEGATIVE
RBC # BLD: 4.81 M/UL (ref 4.2–5.9)
SODIUM BLD-SCNC: 132 MMOL/L (ref 136–145)
TCO2 CALC VENOUS: 24 MMOL/L
TOTAL PROTEIN: 7.5 G/DL (ref 6.4–8.2)
TROPONIN: 0.03 NG/ML
TROPONIN: 0.03 NG/ML
WBC # BLD: 14.6 K/UL (ref 4–11)

## 2020-01-20 PROCEDURE — 2580000003 HC RX 258: Performed by: PHYSICIAN ASSISTANT

## 2020-01-20 PROCEDURE — 6370000000 HC RX 637 (ALT 250 FOR IP): Performed by: EMERGENCY MEDICINE

## 2020-01-20 PROCEDURE — 36415 COLL VENOUS BLD VENIPUNCTURE: CPT

## 2020-01-20 PROCEDURE — 83880 ASSAY OF NATRIURETIC PEPTIDE: CPT

## 2020-01-20 PROCEDURE — 6370000000 HC RX 637 (ALT 250 FOR IP): Performed by: PHYSICIAN ASSISTANT

## 2020-01-20 PROCEDURE — 96375 TX/PRO/DX INJ NEW DRUG ADDON: CPT

## 2020-01-20 PROCEDURE — 83605 ASSAY OF LACTIC ACID: CPT

## 2020-01-20 PROCEDURE — 6370000000 HC RX 637 (ALT 250 FOR IP): Performed by: INTERNAL MEDICINE

## 2020-01-20 PROCEDURE — 6360000002 HC RX W HCPCS: Performed by: PHYSICIAN ASSISTANT

## 2020-01-20 PROCEDURE — 94640 AIRWAY INHALATION TREATMENT: CPT

## 2020-01-20 PROCEDURE — 87040 BLOOD CULTURE FOR BACTERIA: CPT

## 2020-01-20 PROCEDURE — 87804 INFLUENZA ASSAY W/OPTIC: CPT

## 2020-01-20 PROCEDURE — 1200000000 HC SEMI PRIVATE

## 2020-01-20 PROCEDURE — 96365 THER/PROPH/DIAG IV INF INIT: CPT

## 2020-01-20 PROCEDURE — 84132 ASSAY OF SERUM POTASSIUM: CPT

## 2020-01-20 PROCEDURE — 99285 EMERGENCY DEPT VISIT HI MDM: CPT

## 2020-01-20 PROCEDURE — 94761 N-INVAS EAR/PLS OXIMETRY MLT: CPT

## 2020-01-20 PROCEDURE — 80053 COMPREHEN METABOLIC PANEL: CPT

## 2020-01-20 PROCEDURE — 84145 PROCALCITONIN (PCT): CPT

## 2020-01-20 PROCEDURE — 71046 X-RAY EXAM CHEST 2 VIEWS: CPT

## 2020-01-20 PROCEDURE — 2700000000 HC OXYGEN THERAPY PER DAY

## 2020-01-20 PROCEDURE — 84484 ASSAY OF TROPONIN QUANT: CPT

## 2020-01-20 PROCEDURE — 82803 BLOOD GASES ANY COMBINATION: CPT

## 2020-01-20 PROCEDURE — 85025 COMPLETE CBC W/AUTO DIFF WBC: CPT

## 2020-01-20 RX ORDER — OMEGA-3/DHA/EPA/FISH OIL 300-1000MG
2 CAPSULE ORAL DAILY
Status: DISCONTINUED | OUTPATIENT
Start: 2020-01-20 | End: 2020-01-20 | Stop reason: CLARIF

## 2020-01-20 RX ORDER — ASPIRIN 81 MG/1
81 TABLET ORAL DAILY
Status: DISCONTINUED | OUTPATIENT
Start: 2020-01-21 | End: 2020-01-24 | Stop reason: HOSPADM

## 2020-01-20 RX ORDER — DEXTROSE MONOHYDRATE 50 MG/ML
100 INJECTION, SOLUTION INTRAVENOUS PRN
Status: DISCONTINUED | OUTPATIENT
Start: 2020-01-20 | End: 2020-01-24 | Stop reason: HOSPADM

## 2020-01-20 RX ORDER — DEXTROSE MONOHYDRATE 25 G/50ML
12.5 INJECTION, SOLUTION INTRAVENOUS PRN
Status: DISCONTINUED | OUTPATIENT
Start: 2020-01-20 | End: 2020-01-24 | Stop reason: HOSPADM

## 2020-01-20 RX ORDER — IPRATROPIUM BROMIDE AND ALBUTEROL SULFATE 2.5; .5 MG/3ML; MG/3ML
3 SOLUTION RESPIRATORY (INHALATION) ONCE
Status: COMPLETED | OUTPATIENT
Start: 2020-01-20 | End: 2020-01-20

## 2020-01-20 RX ORDER — METHYLPREDNISOLONE SODIUM SUCCINATE 40 MG/ML
40 INJECTION, POWDER, LYOPHILIZED, FOR SOLUTION INTRAMUSCULAR; INTRAVENOUS EVERY 12 HOURS
Status: DISCONTINUED | OUTPATIENT
Start: 2020-01-21 | End: 2020-01-22

## 2020-01-20 RX ORDER — SODIUM CHLORIDE 0.9 % (FLUSH) 0.9 %
10 SYRINGE (ML) INJECTION PRN
Status: DISCONTINUED | OUTPATIENT
Start: 2020-01-20 | End: 2020-01-24 | Stop reason: HOSPADM

## 2020-01-20 RX ORDER — GUAIFENESIN 600 MG/1
1200 TABLET, EXTENDED RELEASE ORAL 2 TIMES DAILY
Status: DISCONTINUED | OUTPATIENT
Start: 2020-01-20 | End: 2020-01-24 | Stop reason: HOSPADM

## 2020-01-20 RX ORDER — CETIRIZINE HYDROCHLORIDE 10 MG/1
10 TABLET ORAL DAILY
Status: DISCONTINUED | OUTPATIENT
Start: 2020-01-20 | End: 2020-01-24 | Stop reason: HOSPADM

## 2020-01-20 RX ORDER — BUDESONIDE AND FORMOTEROL FUMARATE DIHYDRATE 160; 4.5 UG/1; UG/1
2 AEROSOL RESPIRATORY (INHALATION) 2 TIMES DAILY
Status: DISCONTINUED | OUTPATIENT
Start: 2020-01-20 | End: 2020-01-24 | Stop reason: HOSPADM

## 2020-01-20 RX ORDER — NICOTINE POLACRILEX 4 MG
15 LOZENGE BUCCAL PRN
Status: DISCONTINUED | OUTPATIENT
Start: 2020-01-20 | End: 2020-01-24 | Stop reason: HOSPADM

## 2020-01-20 RX ORDER — METOPROLOL SUCCINATE 25 MG/1
25 TABLET, EXTENDED RELEASE ORAL DAILY
Status: DISCONTINUED | OUTPATIENT
Start: 2020-01-21 | End: 2020-01-24 | Stop reason: HOSPADM

## 2020-01-20 RX ORDER — FUROSEMIDE 40 MG/1
40 TABLET ORAL DAILY
Status: DISCONTINUED | OUTPATIENT
Start: 2020-01-21 | End: 2020-01-24 | Stop reason: HOSPADM

## 2020-01-20 RX ORDER — LISINOPRIL 5 MG/1
5 TABLET ORAL DAILY
Status: DISCONTINUED | OUTPATIENT
Start: 2020-01-20 | End: 2020-01-24 | Stop reason: HOSPADM

## 2020-01-20 RX ORDER — METHYLPREDNISOLONE SODIUM SUCCINATE 125 MG/2ML
125 INJECTION, POWDER, LYOPHILIZED, FOR SOLUTION INTRAMUSCULAR; INTRAVENOUS ONCE
Status: COMPLETED | OUTPATIENT
Start: 2020-01-20 | End: 2020-01-20

## 2020-01-20 RX ORDER — CODEINE PHOSPHATE AND GUAIFENESIN 10; 100 MG/5ML; MG/5ML
10 SOLUTION ORAL ONCE
Status: COMPLETED | OUTPATIENT
Start: 2020-01-20 | End: 2020-01-20

## 2020-01-20 RX ORDER — ONDANSETRON 2 MG/ML
4 INJECTION INTRAMUSCULAR; INTRAVENOUS EVERY 6 HOURS PRN
Status: DISCONTINUED | OUTPATIENT
Start: 2020-01-20 | End: 2020-01-24 | Stop reason: HOSPADM

## 2020-01-20 RX ORDER — ACETAMINOPHEN 325 MG/1
650 TABLET ORAL ONCE
Status: COMPLETED | OUTPATIENT
Start: 2020-01-20 | End: 2020-01-20

## 2020-01-20 RX ORDER — IPRATROPIUM BROMIDE AND ALBUTEROL SULFATE 2.5; .5 MG/3ML; MG/3ML
1 SOLUTION RESPIRATORY (INHALATION)
Status: DISCONTINUED | OUTPATIENT
Start: 2020-01-20 | End: 2020-01-21

## 2020-01-20 RX ORDER — 0.9 % SODIUM CHLORIDE 0.9 %
30 INTRAVENOUS SOLUTION INTRAVENOUS ONCE
Status: COMPLETED | OUTPATIENT
Start: 2020-01-20 | End: 2020-01-20

## 2020-01-20 RX ORDER — TADALAFIL 5 MG/1
5 TABLET ORAL DAILY
Status: DISCONTINUED | OUTPATIENT
Start: 2020-01-20 | End: 2020-01-23

## 2020-01-20 RX ORDER — ATORVASTATIN CALCIUM 80 MG/1
80 TABLET, FILM COATED ORAL DAILY
Status: DISCONTINUED | OUTPATIENT
Start: 2020-01-20 | End: 2020-01-24 | Stop reason: HOSPADM

## 2020-01-20 RX ORDER — SODIUM CHLORIDE 0.9 % (FLUSH) 0.9 %
10 SYRINGE (ML) INJECTION EVERY 12 HOURS SCHEDULED
Status: DISCONTINUED | OUTPATIENT
Start: 2020-01-20 | End: 2020-01-24 | Stop reason: HOSPADM

## 2020-01-20 RX ORDER — BENZONATATE 100 MG/1
100 CAPSULE ORAL 3 TIMES DAILY PRN
Status: DISCONTINUED | OUTPATIENT
Start: 2020-01-20 | End: 2020-01-24 | Stop reason: HOSPADM

## 2020-01-20 RX ADMIN — GUAIFENESIN AND CODEINE PHOSPHATE 10 ML: 10; 100 LIQUID ORAL at 15:05

## 2020-01-20 RX ADMIN — ATORVASTATIN CALCIUM 80 MG: 80 TABLET, FILM COATED ORAL at 20:15

## 2020-01-20 RX ADMIN — AZITHROMYCIN MONOHYDRATE 500 MG: 500 INJECTION, POWDER, LYOPHILIZED, FOR SOLUTION INTRAVENOUS at 15:05

## 2020-01-20 RX ADMIN — APIXABAN 5 MG: 5 TABLET, FILM COATED ORAL at 20:15

## 2020-01-20 RX ADMIN — IPRATROPIUM BROMIDE AND ALBUTEROL SULFATE 3 AMPULE: .5; 3 SOLUTION RESPIRATORY (INHALATION) at 13:23

## 2020-01-20 RX ADMIN — SODIUM CHLORIDE 2000 ML: 9 INJECTION, SOLUTION INTRAVENOUS at 16:11

## 2020-01-20 RX ADMIN — CEFTRIAXONE SODIUM 1 G: 1 INJECTION, POWDER, FOR SOLUTION INTRAMUSCULAR; INTRAVENOUS at 16:10

## 2020-01-20 RX ADMIN — GUAIFENESIN 1200 MG: 600 TABLET, EXTENDED RELEASE ORAL at 20:15

## 2020-01-20 RX ADMIN — ACETAMINOPHEN 650 MG: 325 TABLET ORAL at 15:30

## 2020-01-20 RX ADMIN — METHYLPREDNISOLONE SODIUM SUCCINATE 125 MG: 125 INJECTION, POWDER, FOR SOLUTION INTRAMUSCULAR; INTRAVENOUS at 13:53

## 2020-01-20 ASSESSMENT — PAIN SCALES - GENERAL
PAINLEVEL_OUTOF10: 1
PAINLEVEL_OUTOF10: 4
PAINLEVEL_OUTOF10: 0

## 2020-01-20 NOTE — PROGRESS NOTES
Patient arrived to the floor in calm and stable condition. AOx4. VS and assessment completed. 4 eye skin assessment completed- no wounds present. Right AC PIV infusing saline per sepsis protocol order. Patient oriented to the room and plan of care. SR on tele monitor. Continuous SpO2 in place and monitoring- on 4L via NC but does not normally wear oxygen at home. Call light and bedside table within reach, bed alarm off as the patient is independent, bed in lowest position and locked with 2/4 rails raised. Patient denies needs. Dietary dept called to order dinner for patient.

## 2020-01-20 NOTE — H&P
MENISCECTOMY Right 1965    TONSILLECTOMY      TOTAL KNEE ARTHROPLASTY Right     TRANSESOPHAGEAL ECHOCARDIOGRAM  07/18/2017    during CABG       Medications Prior to Admission:      Prior to Admission medications    Medication Sig Start Date End Date Taking? Authorizing Provider   methylPREDNISolone (MEDROL, ROSALVA,) 4 MG tablet Take by mouth. 1/16/20 1/22/20 Yes Sara Gonzáles MD   budesonide-formoterol (SYMBICORT) 160-4.5 MCG/ACT AERO Inhale 2 puffs into the lungs 2 times daily 5/1/19  Yes Sara Gonzáles MD   tiotropium (SPIRIVA RESPIMAT) 2.5 MCG/ACT AERS inhaler Inhale 2 puffs into the lungs daily 5/1/19  Yes Sara Gonzáles MD   apixaban (ELIQUIS) 5 MG TABS tablet TAKE 1 TABLET BY MOUTH TWICE A DAY 3/29/19  Yes Treasa Frankel, MD   metoprolol succinate (TOPROL XL) 25 MG extended release tablet Take 1 tablet by mouth daily 3/29/19  Yes Treasa Frankel, MD   atorvastatin (LIPITOR) 80 MG tablet Take 1 tablet by mouth daily 3/29/19  Yes Treasa Frankel, MD   lisinopril (PRINIVIL;ZESTRIL) 5 MG tablet Take 1 tablet by mouth daily 3/29/19  Yes Treasa Frankel, MD   furosemide (LASIX) 40 MG tablet Take 1 tablet by mouth daily 3/29/19  Yes Treasa Frankel, MD   metFORMIN (GLUCOPHAGE) 500 MG tablet Take 500 mg by mouth daily (with breakfast)   Yes Historical Provider, MD   aspirin EC 81 MG EC tablet Take 1 tablet by mouth daily 5/29/18  Yes Treasa Frankel, MD   Glucosamine-Chondroitin (OSTEO BI-FLEX REGULAR STRENGTH PO) Take 1 capsule by mouth   Yes Historical Provider, MD   GuaiFENesin (MUCINEX PO) Take 1,200 mg by mouth 2 times daily   Yes Historical Provider, MD   Probiotic Product (PROBIOTIC DAILY PO) Take by mouth   Yes Historical Provider, MD   fish oil-omega-3 fatty acids 1000 MG capsule Take 2 g by mouth daily. Yes Historical Provider, MD   Multiple Vitamin (MULTI-VITAMIN PO) Take  by mouth daily. Yes Historical Provider, MD   Lysine 1000 MG TABS Take  by mouth Daily.      Yes Historical Provider, MD   cetirizine (ZYRTEC) 10 MG tablet Take 10 mg by mouth daily. Yes Historical Provider, MD   tadalafil (CIALIS) 5 MG tablet Take 5 mg by mouth daily    Historical Provider, MD   albuterol sulfate  (90 Base) MCG/ACT inhaler Inhale 2 puffs into the lungs 4 times daily as needed for Wheezing 3/15/19   Neptali Bella DO   Spacer/Aero-Holding Chambers LETA 1 Device by Does not apply route daily as needed (sob) 3/15/19   Neptali Bella DO       Allergies:  Oxycodone and Demerol    Social History:      The patient currently lives at home    TOBACCO:   reports that he quit smoking about 42 years ago. His smoking use included cigarettes, pipe, and cigars. He has a 15.00 pack-year smoking history. He has never used smokeless tobacco.  ETOH:   reports current alcohol use of about 3.0 standard drinks of alcohol per week. E-Cigarettes Vaping or Juuling     Questions Responses    E-Cigarette Use Never User    Start Date     Does device contain nicotine? Quit Date     E-Cigarette Type             Family History:       Reviewed in detail and negative for DM, CAD, Cancer, CVA. Positive as follows:        Problem Relation Age of Onset    Heart Attack Father     Heart Disease Father     Heart Attack Brother     High Blood Pressure Brother        REVIEW OF SYSTEMS:   Pertinent positives as noted in the HPI. All other systems reviewed and negative. PHYSICAL EXAM PERFORMED:    /74   Pulse 88   Temp 98.7 °F (37.1 °C) (Oral)   Resp 16   Ht 6' (1.829 m)   Wt 267 lb 3 oz (121.2 kg)   SpO2 96%   BMI 36.24 kg/m²     General appearance:  No apparent distress, appears stated age and cooperative. obese  HEENT:  Normal cephalic, atraumatic without obvious deformity. Pupils equal, round, and reactive to light. Extra ocular muscles intact. Conjunctivae/corneas clear. Neck: Supple, with full range of motion. No jugular venous distention. Trachea midline.   Respiratory:  Normal respiratory PLAN:    Acute resp failure- possibly from early pna and/or copd exaccerbation, not on oxygen at home, 88% ra in ER  Iv rocephin/azithro given on 1/20, continued for now, reeval need in AM  -duonebs q6h while awake(wa)  -iv steroids ordered  Tessalon perles  On home symbicort(sub dulera here), on spiriva  -checked procalcitonin, consider stopping abx  -pulm consulted(pt sees Dr. Lloyd Ivey and apparently failed outpt tx)    Troponin elevation-mild, ?demand , pt denies cp  -trended out  -tele    Cardiomyopathy/CAD- per emr  Continued home meds of asa, acei,statin, lasix,bb    afib- rate controlled  On eliquis  -tele monitored    DM2-controlled  Ac/hs bs  Low ssi  Held metformin for now    DVT Prophylaxis: on home eliquis  Diet: DIET CARB CONTROL; Low Sodium (2 GM); Daily Fluid Restriction: 2000 ml  Code Status: Full Code    PT/OT Eval Status: not ordered    Dispo - pending workup, Carmen Caceres MD    Thank you Isidra Gilman MD for the opportunity to be involved in this patient's care. If you have any questions or concerns please feel free to contact me at [phone].

## 2020-01-20 NOTE — ED NOTES
ELICEO Bullard at bedside assessing pt immediatly on arrival to ER.      Lizzie Coronado RN  01/20/20 [id number]

## 2020-01-20 NOTE — ED NOTES
Pt's family came to nurses station and state pt is having increased difficulty breathing after a coughing spell. RN applied 3L nasal cannula to pt and it is currently reading at 93%.      Haritha Carter RN  01/20/20 [id number]

## 2020-01-20 NOTE — PROGRESS NOTES
RESPIRATORY THERAPY ASSESSMENT    Name:  [facility] Record Number:  [id number]  Age: 69 y.o. Gender: male  : 1945  Today's Date:  2020  Room:  [facility]0363-01    Assessment     Is the patient being admitted for a COPD or Asthma exacerbation? Yes   (If yes the patient will be seen every 4 hours for the first 24 hours and then reassessed)    Patient Admission Diagnosis      Allergies  Allergies   Allergen Reactions    Oxycodone Other (See Comments)     hallucinatiions    Demerol Nausea And Vomiting       Minimum Predicted Vital Capacity:     N/A          Actual Vital Capacity:      N/A           Pulmonary History:COPD  Home Oxygen Therapy:  room air  Home Respiratory Therapy:Budesonide/Formoterol , Tiotropium  Current Respiratory Therapy:  same  Treatment Type: N  Medications: Albuterol/Ipratropium    Respiratory Severity Index(RSI)   Patients with orders for inhalation medications, oxygen, or any therapeutic treatment modality will be placed on Respiratory Protocol. They will be assessed with the first treatment and at least every 72 hours thereafter. The following severity scale will be used to determine frequency of treatment intervention. Smoking History: Severe Exacerbation = 4    Social History  Social History     Tobacco Use    Smoking status: Former Smoker     Packs/day: 1.00     Years: 15.00     Pack years: 15.00     Types: Cigarettes, Pipe, Cigars     Last attempt to quit: 1978     Years since quittin.0    Smokeless tobacco: Never Used    Tobacco comment: 2nd hand smoke until 1 year ago   Substance Use Topics    Alcohol use:  Yes     Alcohol/week: 3.0 standard drinks     Types: 1 Shots of liquor, 2 Standard drinks or equivalent per week     Comment: occaisional    Drug use: No       Recent Surgical History: None = 0  Past Surgical History  Past Surgical History:   Procedure Laterality Date    APPENDECTOMY      CARDIAC CATHETERIZATION  07/10/2017     Bridget - multi-vessel CAD, referred for CABG    CARPAL TUNNEL RELEASE Bilateral     CORONARY ANGIOPLASTY WITH STENT PLACEMENT  05/18/2018    YUVAL- 2.75 x 18 pRCA    CORONARY ARTERY BYPASS GRAFT  07/18/2017    Dr. Siria Kilpatrick - x4 (LIMA-LAD, reverse R SVG-diagonal, OM & PDA) w/placement of temporary pacing wire    MENISCECTOMY Right 1965    TONSILLECTOMY      TOTAL KNEE ARTHROPLASTY Right     TRANSESOPHAGEAL ECHOCARDIOGRAM  07/18/2017    during CABG       Level of Consciousness: Alert, Oriented, and Cooperative = 0    Level of Activity: Walking with assistance = 1    Respiratory Pattern: Increased; RR 21-30 = 1    Breath Sounds: Absent bilaterally and/or with wheezes = 3    Sputum   ,  ,    Cough: Strong, productive = 1    Vital Signs   /71   Pulse 87   Temp 97.7 °F (36.5 °C) (Oral)   Resp 18   Ht 6' (1.829 m)   Wt 267 lb 3 oz (121.2 kg)   SpO2 96%   BMI 36.24 kg/m²   SPO2 (COPD values may differ): 88-89% on room air or greater than 92% on FiO2 28- 35% = 2    Peak Flow (asthma only): not applicable = 0    RSI: 99-43 = Q6H or QID and Q4HPRN for dyspnea        Plan       Goals: medication delivery, mobilize retained secretions, volume expansion and improve oxygenation    Patient/caregiver was educated on the proper method of use for Respiratory Care Devices:  Yes      Level of patient/caregiver understanding able to:   ? Verbalize understanding   ? Demonstrate understanding       ? Teach back        ? Needs reinforcement       ? No available caregiver               ? Other:     Response to education:  Excellent     Is patient being placed on Home Treatment Regimen? yes    Does the patient have everything they need prior to discharge? Yes     Comments: Home tx. Plan of Care:  - CAH given in ED    Electronically signed by Ligia Berger RCP on 1/20/2020 at 5:54 PM    Respiratory Protocol Guidelines     1.  Assessment and treatment by Respiratory Therapy will be initiated for medication and therapeutic interventions upon initiation of aerosolized medication. 2. Physician will be contacted for respiratory rate (RR) greater than 35 breaths per minute. Therapy will be held for heart rate (HR) greater than 140 beats per minute, pending direction from physician. 3. Bronchodilators will be administered via Metered Dose Inhaler (MDI) with spacer when the following criteria are met:  a. Alert and cooperative     b. HR < 140 bpm  c. RR < 30 bpm                d. Can demonstrate a 2-3 second inspiratory hold  4. Bronchodilators will be administered via Hand Held Nebulizer [facility]) to patients when ANY of the following criteria are met  a. Incognizant or uncooperative          b. Patients treated with HHN at Home        c. Unable to demonstrate proper use of MDI with spacer     d. RR > 30 bpm   5. Bronchodilators will be delivered via Metered Dose Inhaler (MDI), HHN, Aerogen to intubated patients on mechanical ventilation. 6. Inhalation medication orders will be delivered and/or substituted as outlined below. Aerosolized Medications Ordering and Administration Guidelines:    1. All Medications will be ordered by a physician, and their frequency and/or modality will be adjusted as defined by the patients Respiratory Severity Index (RSI) score. 2. If the patient does not have documented COPD, consider discontinuing anticholinergics when RSI is less than 9.  3. If the bronchospasm worsens (increased RSI), then the bronchodilator frequency can be increased to a maximum of every 4 hours. If greater than every 4 hours is required, the physician will be contacted. 4. If the bronchospasm improves, the frequency of the bronchodilator can be decreased, based on the patient's RSI, but not less than home treatment regimen frequency. 5. Bronchodilator(s) will be discontinued if patient has a RSI less than 9 and has received no scheduled or as needed treatment for 72  Hrs. Patients Ordered on a Mucolytic Agent:    1.  Must always be administered with a bronchodilator. 2. Discontinue if patient experiences worsened bronchospasm, or secretions have lessened to the point that the patient is able to clear them with a cough. Anti-inflammatory and Combination Medications:    1. If the patient lacks prior history of lung disease, is not using inhaled anti-inflammatory medication at home, and lacks wheezing by examination or by history for at least 24 hours, contact physician for possible discontinuation.

## 2020-01-20 NOTE — PROGRESS NOTES
4 Eyes Skin Assessment     The patient is being assess for   Admission    I agree that 2 RN's have performed a thorough Head to Toe Skin Assessment on the patient. ALL assessment sites listed below have been assessed. Areas assessed by both nurses:   [x]   Head, Face, and Ears   [x]   Shoulders, Back, and Chest, Abdomen  [x]   Arms, Elbows, and Hands   [x]   Coccyx, Sacrum, and Ischium  [x]   Legs, Feet, and Heels            **SHARE this note so that the co-signing nurse is able to place an eSignature**    Co-signer eSignature: Electronically signed by Ronaldo Stallworth RN on 1/20/20 at 6:00 PM    Does the Patient have Skin Breakdown?   No          Jesús Prevention initiated:  NA   Wound Care Orders initiated:  NA      [facility] nurse consulted for Pressure Injury (Stage 3,4, Unstageable, DTI, NWPT, Complex wounds)and New or Established Ostomies:  NA      Primary Nurse eSignature: Electronically signed by Jai Conley RN on 1/20/20 at 5:52 PM

## 2020-01-20 NOTE — ED PROVIDER NOTES
[facility] Emergency Department    CHIEF COMPLAINT  Shortness of Breath (x1 week. family at bedside states frequently has pneumonia. )      HISTORY OF PRESENT ILLNESS  Freedom Avendaño is a 69 y.o. male who presents to the ED complaining of several day history of worsening cough and shortness of breath. Accompanied by daughters today for evaluation. Patient with history of COPD. On maintenance inhalers and has rescue inhaler at home. States recently diagnosed with bronchitis. Placed on steroids. States that cough has worsened and he feels more short of breath. Denies chest pain. No neck, arm, jaw pain. No back pain. Does go into coughing fits. Has had no headaches or confusion. Does feel dizzy following coughing bouts. Does have some chest tightness. No abdominal pain. No nausea or vomiting. No diarrhea or constipation. No fevers chills. No recent travel, trauma or surgery. Denies pain with deep breaths. No leg pain or swelling. No sick contacts. No rashes. No other complaints, modifying factors or associated symptoms. Nursing notes reviewed.    Past Medical History:   Diagnosis Date    Bronchitis     chronic bronchitis once or twice per year twice has gone into pneumonia    CAD (coronary artery disease) 07/10/2017    + Stress, multi vessel CAD    Cardiomyopathy ([facility] [address] 2017    EF 35-40%    CHF (congestive heart failure) ([facility] [address] 2017    Diabetes ([facility] [address] 4935    Diastolic dysfunction     Family history of early CAD     father  at 48 of heart attack    Former smoker     High cholesterol 2017    Hypertension     Knee replacement     Right     Obesity, Class II, BMI 35-39.9, with comorbidity     Pneumonia      Past Surgical History:   Procedure Laterality Date    APPENDECTOMY      CARDIAC CATHETERIZATION  07/10/2017    Dr. Jose Burgess - multi-vessel CAD, referred for CABG    CARPAL TUNNEL RELEASE Bilateral     CORONARY ANGIOPLASTY WITH STENT PLACEMENT  2018    YUVAL- 2.75 x 18 pRCA    CORONARY ARTERY BYPASS GRAFT  2017    Dr. Adam Ware - x4 (LIMA-LAD, reverse R SVG-diagonal, OM & PDA) w/placement of temporary pacing wire    MENISCECTOMY Right 1965    TONSILLECTOMY      TOTAL KNEE ARTHROPLASTY Right     TRANSESOPHAGEAL ECHOCARDIOGRAM  2017    during CABG     Family History   Problem Relation Age of Onset    Heart Attack Father     Heart Disease Father     Heart Attack Brother     High Blood Pressure Brother      Social History     Socioeconomic History    Marital status:      Spouse name: Not on file    Number of children: Not on file    Years of education: Not on file    Highest education level: Not on file   Occupational History    Not on file   Social Needs    Financial resource strain: Not on file    Food insecurity:     Worry: Not on file     Inability: Not on file    Transportation needs:     Medical: Not on file     Non-medical: Not on file   Tobacco Use    Smoking status: Former Smoker     Packs/day: 1.00     Years: 15.00     Pack years: 15.00     Types: Cigarettes, Pipe, Cigars     Last attempt to quit: 1978     Years since quittin.0    Smokeless tobacco: Never Used    Tobacco comment: 2nd hand smoke until 1 year ago   Substance and Sexual Activity    Alcohol use:  Yes     Alcohol/week: 3.0 standard drinks     Types: 1 Shots of liquor, 2 Standard drinks or equivalent per week     Comment: occaisional    Drug use: No    Sexual activity: Not Currently     Partners: Female   Lifestyle    Physical activity:     Days per week: Not on file     Minutes per session: Not on file    Stress: Not on file   Relationships    Social connections:     Talks on phone: Not on file     Gets together: Not on file     Attends Gnosticism service: Not on file     Active member of club or organization: Not on file     Attends meetings of clubs or organizations: Not on file     Relationship status: Not on file  Intimate partner violence:     Fear of current or ex partner: Not on file     Emotionally abused: Not on file     Physically abused: Not on file     Forced sexual activity: Not on file   Other Topics Concern    Not on file   Social History Narrative    Not on file     Current Facility-Administered Medications   Medication Dose Route Frequency Provider Last Rate Last Dose    ipratropium-albuterol (DUONEB) nebulizer solution 1 ampule  1 ampule Inhalation Q4H Mitzi Marrufo MD   1 ampule at 01/21/20 1621    apixaban (ELIQUIS) tablet 5 mg  5 mg Oral BID Liyah Ferreira MD   5 mg at 01/21/20 1004    aspirin EC tablet 81 mg  81 mg Oral Daily Liyah Ferreira MD   81 mg at 01/21/20 1001    atorvastatin (LIPITOR) tablet 80 mg  80 mg Oral Daily Liyah Ferreira MD   80 mg at 01/20/20 2015    budesonide-formoterol (SYMBICORT) 160-4.5 MCG/ACT inhaler 2 puff  2 puff Inhalation BID Liyah Ferreira MD        cetirizine (ZYRTEC) tablet 10 mg  10 mg Oral Daily Liyah Ferreira MD   10 mg at 01/21/20 1003    furosemide (LASIX) tablet 40 mg  40 mg Oral Daily Liyah Ferreira MD   40 mg at 01/21/20 1002    guaiFENesin (MUCINEX) extended release tablet 1,200 mg  1,200 mg Oral BID Liyah Ferreira MD   1,200 mg at 01/21/20 1002    lisinopril (PRINIVIL;ZESTRIL) tablet 5 mg  5 mg Oral Daily Liyah Ferreira MD   5 mg at 01/21/20 1003    metoprolol succinate (TOPROL XL) extended release tablet 25 mg  25 mg Oral Daily Liyah Ferreira MD   25 mg at 01/21/20 1004    tadalafil (CIALIS) tablet 5 mg  5 mg Oral Daily Liyah Ferreira MD        sodium chloride flush 0.9 % injection 10 mL  10 mL Intravenous 2 times per day Liyah Ferreira MD   10 mL at 01/21/20 1006    sodium chloride flush 0.9 % injection 10 mL  10 mL Intravenous PRN Liyah Ferreira MD        magnesium hydroxide (MILK OF MAGNESIA) 400 MG/5ML suspension 30 mL  30 mL Oral Daily PRN Liyah Ferreira MD        ondansetron [facility] injection Trachea is midline. Osseous structures and soft tissues are grossly intact. Multilevel degenerative disease of the thoracic spine. Stable examination. Elevation of the left hemidiaphragm with adjacent airspace disease/atelectasis. Otherwise, no convincing evidence for acute cardiopulmonary pathology. ED COURSE   I have evaluated this patient in collaboration with Dr. Lamont Norris. Pain control was not required while here in the emergency department. Triage vitals without tachycardia. Room air oxygen saturation 88%. Patient does not wear home oxygen. Placed on nasal cannula 2 L with increased to 95%. Patient had leukocytosis at 14.6 on CBC with left shift. No bandemia. Lactic acid 2.9. Blood cultures x2 pending. Initiated on a 30 mL/kg IV fluid bolus using ideal body to prevent fluid overload. Chest x-ray with what appears to be left lower lobe airspace disease. Initiated on Rocephin and azithromycin. Rapid flu negative. VBG without acidosis. CMP with mild dehydration. Troponin elevated at 0.03. Expected to be appears secondary to demand and hypoxia. BNP was negative. At this time we are recommending admission for com a while patient does appear septic does not appear in septic shock. Munity-acquired pneumonia and hypoxia. Discussed case with hospital medicine and admission orders placed. A discussion was had with Mr. Martha Dinero regarding cough and shortness of breath, ED findings and recommendations for admission. All questions were answered is in agreement. 60 minutes of critical care time spent not including any separately billable procedures. MDM  Results for orders placed or performed during the hospital encounter of 01/20/20   Rapid influenza A/B antigens   Result Value Ref Range    Rapid Influenza A Ag Negative Negative    Rapid Influenza B Ag Negative Negative   Culture Blood #1   Result Value Ref Range    Blood Culture, Routine       No Growth to date.   Any change in status will be Content, [address] Not Established VOL %    O2 Therapy Unknown    Troponin   Result Value Ref Range    Troponin 0.03 (H) <0.01 ng/mL   Brain Natriuretic Peptide   Result Value Ref Range    Pro- (H) 0 - 124 pg/mL   Lactic acid, plasma   Result Value Ref Range    Lactic Acid 2.9 (H) 0.4 - 2.0 mmol/L   Potassium   Result Value Ref Range    Potassium 3.6 3.5 - 5.1 mmol/L   Troponin   Result Value Ref Range    Troponin 0.02 (H) <0.01 ng/mL   Procalcitonin   Result Value Ref Range    Procalcitonin 0.04 0.00 - 0.15 ng/mL   Troponin   Result Value Ref Range    Troponin 0.03 (H) <0.01 ng/mL   Basic Metabolic Panel w/ Reflex to MG   Result Value Ref Range    Sodium 135 (L) 136 - 145 mmol/L    Potassium reflex Magnesium 4.4 3.5 - 5.1 mmol/L    Chloride 101 99 - 110 mmol/L    CO2 23 21 - 32 mmol/L    Anion Gap 11 3 - 16    Glucose 170 (H) 70 - 99 mg/dL    BUN 20 7 - 20 mg/dL    CREATININE 0.7 (L) 0.8 - 1.3 mg/dL    GFR Non-African American >60 >60    GFR African American >60 >60    Calcium 8.9 8.3 - 10.6 mg/dL   CBC auto differential   Result Value Ref Range    WBC 12.9 (H) 4.0 - 11.0 K/uL    RBC 4.49 4.20 - 5.90 M/uL    Hemoglobin 13.9 13.5 - 17.5 g/dL    Hematocrit 40.8 40.5 - 52.5 %    MCV 90.9 80.0 - 100.0 fL    MCH 30.9 26.0 - 34.0 pg    MCHC 34.0 31.0 - 36.0 g/dL    RDW 13.3 12.4 - 15.4 %    Platelets 979 047 - 500 K/uL    MPV 7.9 5.0 - 10.5 fL    Neutrophils % 88.6 %    Lymphocytes % 8.8 %    Monocytes % 2.4 %    Eosinophils % 0.0 %    Basophils % 0.2 %    Neutrophils Absolute 11.4 (H) 1.7 - 7.7 K/uL    Lymphocytes Absolute 1.1 1.0 - 5.1 K/uL    Monocytes Absolute 0.3 0.0 - 1.3 K/uL    Eosinophils Absolute 0.0 0.0 - 0.6 K/uL    Basophils Absolute 0.0 0.0 - 0.2 K/uL   Troponin   Result Value Ref Range    Troponin 0.02 (H) <0.01 ng/mL   POCT Glucose   Result Value Ref Range    POC Glucose 162 (H) 70 - 99 mg/dl    Performed on ACCU-CHEK    POCT Glucose   Result Value Ref Range    POC Glucose 186 (H) 70 - 99 mg/dl Performed on ACCU-CHEK    POCT Glucose   Result Value Ref Range    POC Glucose 201 (H) 70 - 99 mg/dl    Performed on ACCU-CHEK    POCT Glucose   Result Value Ref Range    POC Glucose 166 (H) 70 - 99 mg/dl    Performed on ACCU-CHEK    POCT Glucose   Result Value Ref Range    POC Glucose 235 (H) 70 - 99 mg/dl    Performed on ACCU-CHEK    POCT Glucose   Result Value Ref Range    POC Glucose 113 (H) 70 - 99 mg/dl    Performed on ACCU-CHEK      I spoke with Prudence Poe and [address] [place] [address] [employer] [facility]. We thoroughly discussed the history, physical exam, laboratory and imaging studies, as well as, emergency department course. Based upon that discussion, we've decided to admit Lilly Antoine to the hospital for further observation, evaluation, and treatment. Final Impression  1. Acute respiratory failure with hypoxia ([address]    2. Community acquired pneumonia of left lower lobe of lung ([address]      Blood pressure 129/71, pulse 72, temperature 98.1 °F (36.7 °C), temperature source Oral, resp. rate 18, height 6' (1.829 m), weight 270 lb 11.2 oz (122.8 kg), SpO2 95 %. DISPOSITION  Patient was admitted to the hospital in stable condition.           [place], [place]  01/21/20 6588

## 2020-01-21 ENCOUNTER — APPOINTMENT (OUTPATIENT)
Dept: GENERAL RADIOLOGY | Age: 75
DRG: 193 | End: 2020-01-21
Payer: MEDICARE

## 2020-01-21 PROBLEM — J96.01 ACUTE RESPIRATORY FAILURE WITH HYPOXIA (HCC): Status: ACTIVE | Noted: 2020-01-20

## 2020-01-21 LAB
ANION GAP SERPL CALCULATED.3IONS-SCNC: 11 MMOL/L (ref 3–16)
BASOPHILS ABSOLUTE: 0 K/UL (ref 0–0.2)
BASOPHILS RELATIVE PERCENT: 0.2 %
BUN BLDV-MCNC: 20 MG/DL (ref 7–20)
CALCIUM SERPL-MCNC: 8.9 MG/DL (ref 8.3–10.6)
CHLORIDE BLD-SCNC: 101 MMOL/L (ref 99–110)
CO2: 23 MMOL/L (ref 21–32)
CREAT SERPL-MCNC: 0.7 MG/DL (ref 0.8–1.3)
EOSINOPHILS ABSOLUTE: 0 K/UL (ref 0–0.6)
EOSINOPHILS RELATIVE PERCENT: 0 %
GFR AFRICAN AMERICAN: >60
GFR NON-AFRICAN AMERICAN: >60
GLUCOSE BLD-MCNC: 113 MG/DL (ref 70–99)
GLUCOSE BLD-MCNC: 166 MG/DL (ref 70–99)
GLUCOSE BLD-MCNC: 167 MG/DL (ref 70–99)
GLUCOSE BLD-MCNC: 170 MG/DL (ref 70–99)
GLUCOSE BLD-MCNC: 235 MG/DL (ref 70–99)
HCT VFR BLD CALC: 40.8 % (ref 40.5–52.5)
HEMOGLOBIN: 13.9 G/DL (ref 13.5–17.5)
LYMPHOCYTES ABSOLUTE: 1.1 K/UL (ref 1–5.1)
LYMPHOCYTES RELATIVE PERCENT: 8.8 %
MCH RBC QN AUTO: 30.9 PG (ref 26–34)
MCHC RBC AUTO-ENTMCNC: 34 G/DL (ref 31–36)
MCV RBC AUTO: 90.9 FL (ref 80–100)
MONOCYTES ABSOLUTE: 0.3 K/UL (ref 0–1.3)
MONOCYTES RELATIVE PERCENT: 2.4 %
NEUTROPHILS ABSOLUTE: 11.4 K/UL (ref 1.7–7.7)
NEUTROPHILS RELATIVE PERCENT: 88.6 %
PDW BLD-RTO: 13.3 % (ref 12.4–15.4)
PERFORMED ON: ABNORMAL
PLATELET # BLD: 265 K/UL (ref 135–450)
PMV BLD AUTO: 7.9 FL (ref 5–10.5)
POTASSIUM REFLEX MAGNESIUM: 4.4 MMOL/L (ref 3.5–5.1)
RBC # BLD: 4.49 M/UL (ref 4.2–5.9)
SODIUM BLD-SCNC: 135 MMOL/L (ref 136–145)
TROPONIN: 0.02 NG/ML
TROPONIN: 0.02 NG/ML
WBC # BLD: 12.9 K/UL (ref 4–11)

## 2020-01-21 PROCEDURE — 6360000002 HC RX W HCPCS: Performed by: INTERNAL MEDICINE

## 2020-01-21 PROCEDURE — 94761 N-INVAS EAR/PLS OXIMETRY MLT: CPT

## 2020-01-21 PROCEDURE — 84484 ASSAY OF TROPONIN QUANT: CPT

## 2020-01-21 PROCEDURE — 6370000000 HC RX 637 (ALT 250 FOR IP): Performed by: INTERNAL MEDICINE

## 2020-01-21 PROCEDURE — 94669 MECHANICAL CHEST WALL OSCILL: CPT

## 2020-01-21 PROCEDURE — 99223 1ST HOSP IP/OBS HIGH 75: CPT | Performed by: INTERNAL MEDICINE

## 2020-01-21 PROCEDURE — 85025 COMPLETE CBC W/AUTO DIFF WBC: CPT

## 2020-01-21 PROCEDURE — 2580000003 HC RX 258: Performed by: INTERNAL MEDICINE

## 2020-01-21 PROCEDURE — 1200000000 HC SEMI PRIVATE

## 2020-01-21 PROCEDURE — 71046 X-RAY EXAM CHEST 2 VIEWS: CPT

## 2020-01-21 PROCEDURE — 94640 AIRWAY INHALATION TREATMENT: CPT

## 2020-01-21 PROCEDURE — 36415 COLL VENOUS BLD VENIPUNCTURE: CPT

## 2020-01-21 PROCEDURE — 80048 BASIC METABOLIC PNL TOTAL CA: CPT

## 2020-01-21 PROCEDURE — 94150 VITAL CAPACITY TEST: CPT

## 2020-01-21 PROCEDURE — 2700000000 HC OXYGEN THERAPY PER DAY

## 2020-01-21 RX ORDER — IPRATROPIUM BROMIDE AND ALBUTEROL SULFATE 2.5; .5 MG/3ML; MG/3ML
1 SOLUTION RESPIRATORY (INHALATION)
Status: DISCONTINUED | OUTPATIENT
Start: 2020-01-21 | End: 2020-01-24 | Stop reason: HOSPADM

## 2020-01-21 RX ADMIN — FUROSEMIDE 40 MG: 40 TABLET ORAL at 10:02

## 2020-01-21 RX ADMIN — IPRATROPIUM BROMIDE AND ALBUTEROL SULFATE 1 AMPULE: .5; 3 SOLUTION RESPIRATORY (INHALATION) at 16:21

## 2020-01-21 RX ADMIN — CEFTRIAXONE SODIUM 1 G: 1 INJECTION, POWDER, FOR SOLUTION INTRAMUSCULAR; INTRAVENOUS at 10:05

## 2020-01-21 RX ADMIN — APIXABAN 5 MG: 5 TABLET, FILM COATED ORAL at 20:38

## 2020-01-21 RX ADMIN — IPRATROPIUM BROMIDE AND ALBUTEROL SULFATE 1 AMPULE: .5; 3 SOLUTION RESPIRATORY (INHALATION) at 11:46

## 2020-01-21 RX ADMIN — ATORVASTATIN CALCIUM 80 MG: 80 TABLET, FILM COATED ORAL at 20:38

## 2020-01-21 RX ADMIN — Medication 10 ML: at 10:06

## 2020-01-21 RX ADMIN — BENZONATATE 100 MG: 100 CAPSULE ORAL at 12:58

## 2020-01-21 RX ADMIN — IPRATROPIUM BROMIDE AND ALBUTEROL SULFATE 1 AMPULE: .5; 3 SOLUTION RESPIRATORY (INHALATION) at 00:38

## 2020-01-21 RX ADMIN — GUAIFENESIN 1200 MG: 600 TABLET, EXTENDED RELEASE ORAL at 20:38

## 2020-01-21 RX ADMIN — METHYLPREDNISOLONE SODIUM SUCCINATE 40 MG: 40 INJECTION, POWDER, FOR SOLUTION INTRAMUSCULAR; INTRAVENOUS at 04:30

## 2020-01-21 RX ADMIN — APIXABAN 5 MG: 5 TABLET, FILM COATED ORAL at 10:04

## 2020-01-21 RX ADMIN — GUAIFENESIN 1200 MG: 600 TABLET, EXTENDED RELEASE ORAL at 10:02

## 2020-01-21 RX ADMIN — IPRATROPIUM BROMIDE AND ALBUTEROL SULFATE 1 AMPULE: .5; 3 SOLUTION RESPIRATORY (INHALATION) at 20:04

## 2020-01-21 RX ADMIN — METHYLPREDNISOLONE SODIUM SUCCINATE 40 MG: 40 INJECTION, POWDER, FOR SOLUTION INTRAMUSCULAR; INTRAVENOUS at 20:38

## 2020-01-21 RX ADMIN — CETIRIZINE HYDROCHLORIDE 10 MG: 10 TABLET, FILM COATED ORAL at 10:03

## 2020-01-21 RX ADMIN — IPRATROPIUM BROMIDE AND ALBUTEROL SULFATE 1 AMPULE: .5; 3 SOLUTION RESPIRATORY (INHALATION) at 08:07

## 2020-01-21 RX ADMIN — AZITHROMYCIN MONOHYDRATE 500 MG: 500 INJECTION, POWDER, LYOPHILIZED, FOR SOLUTION INTRAVENOUS at 12:11

## 2020-01-21 RX ADMIN — METOPROLOL SUCCINATE 25 MG: 25 TABLET, EXTENDED RELEASE ORAL at 10:04

## 2020-01-21 RX ADMIN — LISINOPRIL 5 MG: 5 TABLET ORAL at 10:03

## 2020-01-21 RX ADMIN — ASPIRIN 81 MG: 81 TABLET, COATED ORAL at 10:01

## 2020-01-21 ASSESSMENT — ENCOUNTER SYMPTOMS
CONSTIPATION: 0
EYE DISCHARGE: 0
VOICE CHANGE: 0
COUGH: 1
SORE THROAT: 0
SHORTNESS OF BREATH: 1
STRIDOR: 0
EYE ITCHING: 0
CHEST TIGHTNESS: 0
EYE PAIN: 0
ABDOMINAL PAIN: 0
CHOKING: 0
DIARRHEA: 0

## 2020-01-21 ASSESSMENT — PAIN DESCRIPTION - LOCATION: LOCATION: HEAD

## 2020-01-21 ASSESSMENT — PAIN SCALES - GENERAL
PAINLEVEL_OUTOF10: 4
PAINLEVEL_OUTOF10: 0

## 2020-01-21 NOTE — CONSULTS
Pulmonary & Critical Care Consultation Note   Nancy Azul MD    REASONFOR CONSULTATION/CC: acute resp failure    Consult at request of  Giovani Chapman MD  PCP: Pamella Loza MD    HISTORYOF PRESENT ILLNESS:    69y.o. 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 He was recently treated for an acute respiratory infection back in October with doxy. Went to [place] and upon returning he developed increased congestion, a daily cough scantly productive of sputum, and shortness of breath now constant at rest. Symptoms have progressed over the past 10 days despite treatment for an acute exac with prednisone. He denies fevers. Workup in the ED was significant for leukocytosis and a possible pneumonia, was admitted and placed on CABP coverage atb. Required supplemental oxygen for hypoxia.         Past Medical History:   Diagnosis Date    Bronchitis     chronic bronchitis once or twice per year twice has gone into pneumonia    CAD (coronary artery disease) 07/10/2017    + Stress, multi vessel CAD    Cardiomy[facility] [address] 2017    EF 35-40%    CHF (congestive heart failure) ([facility] [address] 2017    Diabetes ([facility] [address]     Diastolic dysfunction     Family history of early CAD     father  at 48 of heart attack    Former smoker     High cholesterol 2017    Hypertension     Knee replacement     Right     Obesity, Class II, BMI 35-39.9, with comorbidity     Pneumonia        Past Surgical History:   Procedure Laterality Date    APPENDECTOMY      CARDIAC CATHETERIZATION  07/10/2017    Dr. Froylan Rutherford - multi-vessel CAD, referred for CABG    CARPAL TUNNEL RELEASE Bilateral     CORONARY ANGIOPLASTY WITH STENT PLACEMENT  2018    YUVAL- 2.75 x 18 pRCA    CORONARY ARTERY BYPASS GRAFT  2017    Dr. Loly Aaron - x4 (LIMA-LAD, reverse R SVG-diagonal, OM & PDA) w/placement of temporary pacing wire    MENISCECTOMY Right 1965    TONSILLECTOMY sores, sore throat and voice change. Eyes: Negative for pain, discharge and itching. Respiratory: Positive for cough and shortness of breath. Negative for choking, chest tightness and stridor. Cardiovascular: Negative for chest pain, palpitations and leg swelling. Gastrointestinal: Negative for abdominal pain, constipation and diarrhea. Endocrine: Negative for cold intolerance, heat intolerance and polydipsia. Genitourinary: Negative for dysuria, frequency and hematuria. Musculoskeletal: Negative for gait problem, joint swelling and neck stiffness. Neurological: Negative for dizziness, numbness and headaches. Psychiatric/Behavioral: Negative for agitation, confusion and hallucinations. Objective:   PHYSICAL EXAM:  /80   Pulse 73   Temp 98 °F (36.7 °C) (Oral)   Resp 14   Ht 6' (1.829 m)   Wt 270 lb 11.2 oz (122.8 kg)   SpO2 95%   BMI 36.71 kg/m²    Physical Exam  Constitutional:       General: He is not in acute distress. Appearance: He is well-developed. He is not diaphoretic. HENT:      Head: Normocephalic and atraumatic. Mouth/Throat:      Pharynx: No oropharyngeal exudate. Eyes:      Pupils: Pupils are equal, round, and reactive to light. Neck:      Musculoskeletal: Neck supple. Vascular: No JVD. Cardiovascular:      Heart sounds: Normal heart sounds. No murmur. No friction rub. No gallop. Pulmonary:      Effort: Pulmonary effort is normal.      Breath sounds: No wheezing or rales. Abdominal:      General: Bowel sounds are normal. There is no distension. Palpations: Abdomen is soft. Tenderness: There is no tenderness. Musculoskeletal: Normal range of motion. Lymphadenopathy:      Cervical: No cervical adenopathy. Skin:     General: Skin is warm and dry. Findings: No rash. Neurological:      Mental Status: He is alert. Cranial Nerves: No cranial nerve deficit.       Comments: CN 2-12 grossly intact            Data Reviewed:   LABS:  CBC:   Recent Labs     01/20/20  1318 01/21/20  0758   WBC 14.6* 12.9*   HGB 14.8 13.9   HCT 44.1 40.8   MCV 91.5 90.9    265     BMP:   Recent Labs     01/20/20  1318 01/20/20  1429 01/21/20  0758   *  --  135*   K 5.6* 3.6 4.4   CL 97*  --  101   CO2 21  --  23   BUN 18  --  20   CREATININE 0.8  --  0.7*     LIVER PROFILE:   Recent Labs     01/20/20  1318   AST 50*   ALT 21   BILITOT 0.8   ALKPHOS 46     PT/INR: No results for input(s): PROTIME, INR in the last 72 hours. APTT:No results for input(s): APTT in the last 72 hours. UA:No results for input(s): NITRITE, COLORU, PHUR, LABCAST, WBCUA, RBCUA, MUCUS, TRICHOMONAS, YEAST, BACTERIA, CLARITYU, SPECGRAV, LEUKOCYTESUR, UROBILINOGEN, BILIRUBINUR, BLOODU, GLUCOSEU, AMORPHOUS in the last 72 hours. Invalid input(s): KETONESU  No results for input(s): PHART, FRU8TEH, PO2ART in the last 72 hours. CXR personally reviewed, elevated left diaphragm and possible atelectasis or infiltrate           Assessment:     1. Acute on chronic resp failure, hypoxic  2. CABP, left lung  3. Chronic elevated diaphragm, left   -?congenital malformation of left lung  4. COPD without acute exac  5.  Chronic systolic CHF    Plan:      -Continue empiric atb  -Pulm toileting  -Bronch today or tomorrow depending on endo schedule  -Eventual CTA chest if not improving, he is already committed to anticoagulation so low prob for acute PE  -Wean FIO2 as tolerated  -Bronchodilators  -Follows in the outpatient pulm clinic with us already    Luis Bloom MD

## 2020-01-21 NOTE — PROGRESS NOTES
Hospitalist Progress Note      PCP: Ventura Lemus MD    Date of Admission: 1/20/2020        Hospital Course: admitted with acute SOB, managed for acute resp failure with hypoxia , w/u in progress. Subjective: pt seen and examined. Still dyspnic at rest, on O2, 4L. Per pt plans for bronch per pulmo. Medications:  Reviewed    Infusion Medications    dextrose       Scheduled Medications    ipratropium-albuterol  1 ampule Inhalation Q4H WA    apixaban  5 mg Oral BID    aspirin EC  81 mg Oral Daily    atorvastatin  80 mg Oral Daily    budesonide-formoterol  2 puff Inhalation BID    cetirizine  10 mg Oral Daily    furosemide  40 mg Oral Daily    guaiFENesin  1,200 mg Oral BID    lisinopril  5 mg Oral Daily    metoprolol succinate  25 mg Oral Daily    tadalafil  5 mg Oral Daily    sodium chloride flush  10 mL Intravenous 2 times per day    insulin lispro  0-6 Units Subcutaneous TID WC    insulin lispro  0-3 Units Subcutaneous Nightly    cefTRIAXone (ROCEPHIN) IV  1 g Intravenous Q24H    azithromycin  500 mg Intravenous Q24H    methylPREDNISolone  40 mg Intravenous Q12H     PRN Meds: sodium chloride flush, magnesium hydroxide, ondansetron, glucose, dextrose, glucagon (rDNA), dextrose, benzonatate      Intake/Output Summary (Last 24 hours) at 1/21/2020 1032  Last data filed at 1/20/2020 2212  Gross per 24 hour   Intake 375 ml   Output 300 ml   Net 75 ml       Physical Exam Performed:    /80   Pulse 73   Temp 98 °F (36.7 °C) (Oral)   Resp 14   Ht 6' (1.829 m)   Wt 270 lb 11.2 oz (122.8 kg)   SpO2 95%   BMI 36.71 kg/m²     General appearance: No apparent distress, appears stated age and cooperative. HEENT: mild icterus  Respiratory:  Reduced BS on the Left lung fields , intermittent wheeze  Cardiovascular: Regular rate and rhythm with normal S1/S2 without murmurs, rubs or gallops. Abdomen: Soft, non-tender, non-distended with normal bowel sounds.   Musculoskeletal: No clubbing, controlled, monitor FS, andres scale as needed   Cont home cardiac meds  cont supportive care  Follow up with pulmo recomm  DVT Prophylaxis: on eliquis  Diet: DIET CARB CONTROL; Low Sodium (2 GM);  Daily Fluid Restriction: 2000 ml  Code Status: Full Code      Dispo - possible d/c home in 2-3 days pending progress     Nicholaus Schirmer, MD

## 2020-01-21 NOTE — CARE COORDINATION
CASE MANAGEMENT INITIAL ASSESSMENT      Reviewed chart and met with patient today, re: potential needs at discharge. Triggered by age and dx  Explained Case Management role/services. Family present: yes. Daughter. Primary contact information: Steffanie cast [phone]    Admit date/status: inpatient 01/20/2020  Diagnosis: acute respiratory failure    Is this a Readmission? no    Insurance: BCBS Medicare    Precert required for SNF - Y        3 night stay required - N    Living arrangements, Adls, care needs, prior to admission: lives alone in 2 story home. indep in adls. drives    Transportation: likely private/family    Durable Medical Equipment at home: none    Services in the home and/or outpatient, prior to admission: none    PT/OT recs: [address] Notification (HEN): not started, as pt anticipates being discharged home. Barriers to discharge: none identified    Plan/comments: pt intends to discharge home without needs from case mgmt team. IF home o2 or home care is needed at discharge, pt IS agreeable and states he used [facility] [employer] [facility] and [facility] home care previously (after CABG). CM will follow for potential home o2 needs. Nursing will need to document o2 walk test in chart. Dot phrase placed in treatment team sticky note.     Desmond Rader RN CM

## 2020-01-21 NOTE — PROGRESS NOTES
Date    APPENDECTOMY      CARDIAC CATHETERIZATION  07/10/2017    Dr. Derick Escalante - multi-vessel CAD, referred for CABG    CARPAL TUNNEL RELEASE Bilateral     CORONARY ANGIOPLASTY WITH STENT PLACEMENT  05/18/2018    YUVAL- 2.75 x 18 pRCA    CORONARY ARTERY BYPASS GRAFT  07/18/2017    Dr. Romana Petite - x4 (LIMA-LAD, reverse R SVG-diagonal, OM & PDA) w/placement of temporary pacing wire    MENISCECTOMY Right 1965    TONSILLECTOMY      TOTAL KNEE ARTHROPLASTY Right     TRANSESOPHAGEAL ECHOCARDIOGRAM  07/18/2017    during CABG       Level of Consciousness: Alert, Oriented, and Cooperative = 0    Level of Activity: Walking with assistance = 1    Respiratory Pattern: Dyspnea with exertion;Irregular pattern;or RR less than 6 = 2    Breath Sounds: Absent bilaterally and/or with wheezes = 3    Sputum   ,  ,    Cough: Strong, productive = 1    Vital Signs   /71   Pulse 70   Temp 97.9 °F (36.6 °C) (Oral)   Resp 16   Ht 6' (1.829 m)   Wt 267 lb 3 oz (121.2 kg)   SpO2 95%   BMI 36.24 kg/m²   SPO2 (COPD values may differ): 86-87% on room air or greater than 92% on FiO2 35- 50% = 3    Peak Flow (asthma only): not applicable = 0    RSI: 50-52 = Q6H or QID and Q4HPRN for dyspnea        Plan       Goals: medication delivery    Patient/caregiver was educated on the proper method of use for Respiratory Care Devices:  Yes      Level of patient/caregiver understanding able to:   ? Verbalize understanding   ? Demonstrate understanding       ? Teach back        ? Needs reinforcement       ? No available caregiver               ? Other:     Response to education:  Excellent     Is patient being placed on Home Treatment Regimen? No     Does the patient have everything they need prior to discharge? NA     Comments: pt seen and chart reviewed    Plan of Care: duoneb q4w/a    Electronically signed by Miguel King RCP on 1/21/2020 at 12:58 AM    Respiratory Protocol Guidelines     1.  Assessment and treatment by Respiratory Therapy will be initiated for medication and therapeutic interventions upon initiation of aerosolized medication. 2. Physician will be contacted for respiratory rate (RR) greater than 35 breaths per minute. Therapy will be held for heart rate (HR) greater than 140 beats per minute, pending direction from physician. 3. Bronchodilators will be administered via Metered Dose Inhaler (MDI) with spacer when the following criteria are met:  a. Alert and cooperative     b. HR < 140 bpm  c. RR < 30 bpm                d. Can demonstrate a 2-3 second inspiratory hold  4. Bronchodilators will be administered via Hand Held Nebulizer [facility]) to patients when ANY of the following criteria are met  a. Incognizant or uncooperative          b. Patients treated with HHN at Home        c. Unable to demonstrate proper use of MDI with spacer     d. RR > 30 bpm   5. Bronchodilators will be delivered via Metered Dose Inhaler (MDI), HHN, Aerogen to intubated patients on mechanical ventilation. 6. Inhalation medication orders will be delivered and/or substituted as outlined below. Aerosolized Medications Ordering and Administration Guidelines:    1. All Medications will be ordered by a physician, and their frequency and/or modality will be adjusted as defined by the patients Respiratory Severity Index (RSI) score. 2. If the patient does not have documented COPD, consider discontinuing anticholinergics when RSI is less than 9.  3. If the bronchospasm worsens (increased RSI), then the bronchodilator frequency can be increased to a maximum of every 4 hours. If greater than every 4 hours is required, the physician will be contacted. 4. If the bronchospasm improves, the frequency of the bronchodilator can be decreased, based on the patient's RSI, but not less than home treatment regimen frequency.   5. Bronchodilator(s) will be discontinued if patient has a RSI less than 9 and has received no scheduled or as needed treatment for 72  Hrs. Patients Ordered on a Mucolytic Agent:    1. Must always be administered with a bronchodilator. 2. Discontinue if patient experiences worsened bronchospasm, or secretions have lessened to the point that the patient is able to clear them with a cough. Anti-inflammatory and Combination Medications:    1. If the patient lacks prior history of lung disease, is not using inhaled anti-inflammatory medication at home, and lacks wheezing by examination or by history for at least 24 hours, contact physician for possible discontinuation.

## 2020-01-22 LAB
APPEARANCE BAL (LAVAGE): ABNORMAL
CLOT EVALUATION BAL: ABNORMAL
COLOR LAVAGE: COLORLESS
EPITHELIAL CELLS FLUID: 30 %
GLUCOSE BLD-MCNC: 163 MG/DL (ref 70–99)
GLUCOSE BLD-MCNC: 163 MG/DL (ref 70–99)
GLUCOSE BLD-MCNC: 233 MG/DL (ref 70–99)
GLUCOSE BLD-MCNC: 95 MG/DL (ref 70–99)
LYMPHOCYTES, BAL: 43 % (ref 5–10)
NUMBER OF CELLS COUNTED BAL (LAVAGE): 100
PERFORMED ON: ABNORMAL
PERFORMED ON: NORMAL
RBC, BAL: 33 /CUMM
SEGMENTED NEUTROPHILS, BAL: 27 % (ref 5–10)
WBC/EPI CELLS BAL: 56 /CUMM

## 2020-01-22 PROCEDURE — 88312 SPECIAL STAINS GROUP 1: CPT

## 2020-01-22 PROCEDURE — 6360000002 HC RX W HCPCS: Performed by: INTERNAL MEDICINE

## 2020-01-22 PROCEDURE — 7100000010 HC PHASE II RECOVERY - FIRST 15 MIN: Performed by: INTERNAL MEDICINE

## 2020-01-22 PROCEDURE — 99233 SBSQ HOSP IP/OBS HIGH 50: CPT | Performed by: INTERNAL MEDICINE

## 2020-01-22 PROCEDURE — 31645 BRNCHSC W/THER ASPIR 1ST: CPT | Performed by: INTERNAL MEDICINE

## 2020-01-22 PROCEDURE — 7100000011 HC PHASE II RECOVERY - ADDTL 15 MIN: Performed by: INTERNAL MEDICINE

## 2020-01-22 PROCEDURE — 94640 AIRWAY INHALATION TREATMENT: CPT

## 2020-01-22 PROCEDURE — 6370000000 HC RX 637 (ALT 250 FOR IP): Performed by: INTERNAL MEDICINE

## 2020-01-22 PROCEDURE — 99152 MOD SED SAME PHYS/QHP 5/>YRS: CPT | Performed by: INTERNAL MEDICINE

## 2020-01-22 PROCEDURE — 0BC78ZZ EXTIRPATION OF MATTER FROM LEFT MAIN BRONCHUS, VIA NATURAL OR ARTIFICIAL OPENING ENDOSCOPIC: ICD-10-PCS | Performed by: INTERNAL MEDICINE

## 2020-01-22 PROCEDURE — 99153 MOD SED SAME PHYS/QHP EA: CPT | Performed by: INTERNAL MEDICINE

## 2020-01-22 PROCEDURE — 87206 SMEAR FLUORESCENT/ACID STAI: CPT

## 2020-01-22 PROCEDURE — 87070 CULTURE OTHR SPECIMN AEROBIC: CPT

## 2020-01-22 PROCEDURE — 3609010900 HC BRONCHOSCOPY THERAPUTIC ASPIRATION INITIAL: Performed by: INTERNAL MEDICINE

## 2020-01-22 PROCEDURE — 2700000000 HC OXYGEN THERAPY PER DAY

## 2020-01-22 PROCEDURE — 87632 RESP VIRUS 6-11 TARGETS: CPT

## 2020-01-22 PROCEDURE — 87015 SPECIMEN INFECT AGNT CONCNTJ: CPT

## 2020-01-22 PROCEDURE — 1200000000 HC SEMI PRIVATE

## 2020-01-22 PROCEDURE — 94669 MECHANICAL CHEST WALL OSCILL: CPT

## 2020-01-22 PROCEDURE — 87205 SMEAR GRAM STAIN: CPT

## 2020-01-22 PROCEDURE — 94761 N-INVAS EAR/PLS OXIMETRY MLT: CPT

## 2020-01-22 PROCEDURE — 2500000003 HC RX 250 WO HCPCS: Performed by: INTERNAL MEDICINE

## 2020-01-22 PROCEDURE — 2580000003 HC RX 258: Performed by: INTERNAL MEDICINE

## 2020-01-22 PROCEDURE — 88112 CYTOPATH CELL ENHANCE TECH: CPT

## 2020-01-22 PROCEDURE — 89051 BODY FLUID CELL COUNT: CPT

## 2020-01-22 PROCEDURE — 0B9J8ZX DRAINAGE OF LEFT LOWER LUNG LOBE, VIA NATURAL OR ARTIFICIAL OPENING ENDOSCOPIC, DIAGNOSTIC: ICD-10-PCS | Performed by: INTERNAL MEDICINE

## 2020-01-22 PROCEDURE — 87102 FUNGUS ISOLATION CULTURE: CPT

## 2020-01-22 PROCEDURE — 3609010800 HC BRONCHOSCOPY ALVEOLAR LAVAGE: Performed by: INTERNAL MEDICINE

## 2020-01-22 PROCEDURE — 87116 MYCOBACTERIA CULTURE: CPT

## 2020-01-22 PROCEDURE — 31624 DX BRONCHOSCOPE/LAVAGE: CPT | Performed by: INTERNAL MEDICINE

## 2020-01-22 PROCEDURE — 2709999900 HC NON-CHARGEABLE SUPPLY: Performed by: INTERNAL MEDICINE

## 2020-01-22 PROCEDURE — 88305 TISSUE EXAM BY PATHOLOGIST: CPT

## 2020-01-22 RX ORDER — MIDAZOLAM HYDROCHLORIDE 5 MG/ML
INJECTION INTRAMUSCULAR; INTRAVENOUS PRN
Status: DISCONTINUED | OUTPATIENT
Start: 2020-01-22 | End: 2020-01-22 | Stop reason: ALTCHOICE

## 2020-01-22 RX ORDER — LIDOCAINE HYDROCHLORIDE 20 MG/ML
INJECTION, SOLUTION INFILTRATION; PERINEURAL PRN
Status: DISCONTINUED | OUTPATIENT
Start: 2020-01-22 | End: 2020-01-22 | Stop reason: ALTCHOICE

## 2020-01-22 RX ORDER — PREDNISONE 20 MG/1
40 TABLET ORAL DAILY
Status: DISCONTINUED | OUTPATIENT
Start: 2020-01-23 | End: 2020-01-24 | Stop reason: HOSPADM

## 2020-01-22 RX ORDER — ACETYLCYSTEINE 200 MG/ML
SOLUTION ORAL; RESPIRATORY (INHALATION) PRN
Status: DISCONTINUED | OUTPATIENT
Start: 2020-01-22 | End: 2020-01-22 | Stop reason: ALTCHOICE

## 2020-01-22 RX ORDER — MAGNESIUM HYDROXIDE 1200 MG/15ML
LIQUID ORAL CONTINUOUS PRN
Status: COMPLETED | OUTPATIENT
Start: 2020-01-22 | End: 2020-01-22

## 2020-01-22 RX ORDER — FENTANYL CITRATE 50 UG/ML
INJECTION, SOLUTION INTRAMUSCULAR; INTRAVENOUS PRN
Status: DISCONTINUED | OUTPATIENT
Start: 2020-01-22 | End: 2020-01-22 | Stop reason: ALTCHOICE

## 2020-01-22 RX ORDER — 0.9 % SODIUM CHLORIDE 0.9 %
INTRAVENOUS SOLUTION INTRAVENOUS CONTINUOUS PRN
Status: COMPLETED | OUTPATIENT
Start: 2020-01-22 | End: 2020-01-22

## 2020-01-22 RX ADMIN — CETIRIZINE HYDROCHLORIDE 10 MG: 10 TABLET, FILM COATED ORAL at 13:27

## 2020-01-22 RX ADMIN — GUAIFENESIN 1200 MG: 600 TABLET, EXTENDED RELEASE ORAL at 20:59

## 2020-01-22 RX ADMIN — IPRATROPIUM BROMIDE AND ALBUTEROL SULFATE 1 AMPULE: .5; 3 SOLUTION RESPIRATORY (INHALATION) at 20:25

## 2020-01-22 RX ADMIN — IPRATROPIUM BROMIDE AND ALBUTEROL SULFATE 1 AMPULE: .5; 3 SOLUTION RESPIRATORY (INHALATION) at 08:03

## 2020-01-22 RX ADMIN — ASPIRIN 81 MG: 81 TABLET, COATED ORAL at 13:27

## 2020-01-22 RX ADMIN — AZITHROMYCIN MONOHYDRATE 500 MG: 500 INJECTION, POWDER, LYOPHILIZED, FOR SOLUTION INTRAVENOUS at 13:27

## 2020-01-22 RX ADMIN — APIXABAN 5 MG: 5 TABLET, FILM COATED ORAL at 20:59

## 2020-01-22 RX ADMIN — Medication 10 ML: at 13:28

## 2020-01-22 RX ADMIN — GUAIFENESIN 1200 MG: 600 TABLET, EXTENDED RELEASE ORAL at 07:52

## 2020-01-22 RX ADMIN — METOPROLOL SUCCINATE 25 MG: 25 TABLET, EXTENDED RELEASE ORAL at 13:27

## 2020-01-22 RX ADMIN — CEFTRIAXONE SODIUM 1 G: 1 INJECTION, POWDER, FOR SOLUTION INTRAMUSCULAR; INTRAVENOUS at 13:26

## 2020-01-22 RX ADMIN — IPRATROPIUM BROMIDE AND ALBUTEROL SULFATE 1 AMPULE: .5; 3 SOLUTION RESPIRATORY (INHALATION) at 15:30

## 2020-01-22 RX ADMIN — FUROSEMIDE 40 MG: 40 TABLET ORAL at 13:27

## 2020-01-22 RX ADMIN — Medication 10 ML: at 20:59

## 2020-01-22 RX ADMIN — ATORVASTATIN CALCIUM 80 MG: 80 TABLET, FILM COATED ORAL at 20:59

## 2020-01-22 RX ADMIN — METHYLPREDNISOLONE SODIUM SUCCINATE 40 MG: 40 INJECTION, POWDER, FOR SOLUTION INTRAMUSCULAR; INTRAVENOUS at 07:51

## 2020-01-22 RX ADMIN — Medication 10 ML: at 07:52

## 2020-01-22 RX ADMIN — LISINOPRIL 5 MG: 5 TABLET ORAL at 13:27

## 2020-01-22 RX ADMIN — APIXABAN 5 MG: 5 TABLET, FILM COATED ORAL at 13:27

## 2020-01-22 ASSESSMENT — PAIN SCALES - GENERAL
PAINLEVEL_OUTOF10: 0

## 2020-01-22 ASSESSMENT — PAIN - FUNCTIONAL ASSESSMENT: PAIN_FUNCTIONAL_ASSESSMENT: 0-10

## 2020-01-22 NOTE — PROGRESS NOTES
Assessment complete. VSS Afebrile lungs wheezy throughout states he feels worse today achy and more SOB. Keeping NPO for possible bronch today. Call light left in reach.

## 2020-01-22 NOTE — PROGRESS NOTES
Called report to Yoseph Herrera. Reported procedure done, specimen sent, procedure toleration, meds given during procedure and NPO until 1400. Reported pt condition now and oxygen requirements at this time. Steph To states understanding.

## 2020-01-22 NOTE — PROGRESS NOTES
Pt continues to refuse SSI. Educated pt on importance of BS control and also will increase while on steroids. Pt verbalized understanding, refused teaching, no evidence of learning. Wife at bedside.

## 2020-01-22 NOTE — PROGRESS NOTES
Pulmonary & Critical Care Inpatient Progress Note   Renae Andrea MD     REASON FOR TODAY'S VISIT:  Acute resp failure    SUBJECTIVE:   Remains congested, worse than yesterday  Plan for bronch today asa 3, m2    Scheduled Meds:   ipratropium-albuterol  1 ampule Inhalation Q4H WA    apixaban  5 mg Oral BID    aspirin EC  81 mg Oral Daily    atorvastatin  80 mg Oral Daily    budesonide-formoterol  2 puff Inhalation BID    cetirizine  10 mg Oral Daily    furosemide  40 mg Oral Daily    guaiFENesin  1,200 mg Oral BID    lisinopril  5 mg Oral Daily    metoprolol succinate  25 mg Oral Daily    tadalafil  5 mg Oral Daily    sodium chloride flush  10 mL Intravenous 2 times per day    insulin lispro  0-6 Units Subcutaneous TID WC    insulin lispro  0-3 Units Subcutaneous Nightly    cefTRIAXone (ROCEPHIN) IV  1 g Intravenous Q24H    azithromycin  500 mg Intravenous Q24H    methylPREDNISolone  40 mg Intravenous Q12H       Continuous Infusions:   dextrose         PRN Meds:  fentaNYL, midazolam, sodium chloride flush, magnesium hydroxide, ondansetron, glucose, dextrose, glucagon (rDNA), dextrose, benzonatate    ALLERGIES:  Patient is allergic to oxycodone and demerol. Objective:   PHYSICAL EXAM:  BP (!) 152/76   Pulse 80   Temp 98.1 °F (36.7 °C)   Resp 20   Ht 6' (1.829 m)   Wt 268 lb (121.6 kg)   SpO2 95%   BMI 36.35 kg/m²    Physical Exam  Constitutional:       General: He is not in acute distress. Appearance: He is well-developed. He is not diaphoretic. HENT:      Head: Normocephalic and atraumatic. Mouth/Throat:      Pharynx: No oropharyngeal exudate. Eyes:      Pupils: Pupils are equal, round, and reactive to light. Neck:      Musculoskeletal: Neck supple. Vascular: No JVD. Cardiovascular:      Heart sounds: Normal heart sounds. No murmur. No friction rub. No gallop. Pulmonary:      Effort: Pulmonary effort is normal.      Breath sounds: No wheezing or rales.    Abdominal: General: Bowel sounds are normal. There is no distension. Palpations: Abdomen is soft. Tenderness: There is no tenderness. Musculoskeletal: Normal range of motion. Lymphadenopathy:      Cervical: No cervical adenopathy. Skin:     General: Skin is warm and dry. Findings: No rash. Neurological:      Mental Status: He is alert. Cranial Nerves: No cranial nerve deficit. Comments: CN 2-12 grossly intact            Data Reviewed:   LABS:  CBC:  Recent Labs     01/20/20  1318 01/21/20  0758   WBC 14.6* 12.9*   HGB 14.8 13.9   HCT 44.1 40.8   MCV 91.5 90.9    265     BMP:  Recent Labs     01/20/20  1318 01/20/20  1429 01/21/20  0758   *  --  135*   K 5.6* 3.6 4.4   CL 97*  --  101   CO2 21  --  23   BUN 18  --  20   CREATININE 0.8  --  0.7*     LIVER PROFILE:   Recent Labs     01/20/20  1318   AST 50*   ALT 21   BILITOT 0.8   ALKPHOS 46     PT/INR:No results for input(s): PROTIME, INR in the last 72 hours. APTT: No results for input(s): APTT in the last 72 hours. UA:No results for input(s): NITRITE, COLORU, PHUR, LABCAST, WBCUA, RBCUA, MUCUS, TRICHOMONAS, YEAST, BACTERIA, CLARITYU, SPECGRAV, LEUKOCYTESUR, UROBILINOGEN, BILIRUBINUR, BLOODU, GLUCOSEU, AMORPHOUS in the last 72 hours. Invalid input(s): KETONESU  No results for input(s): PHART, WLC2DAY, PO2ART in the last 72 hours. CXR personally reviewed, elevated left diaphragm and possible atelectasis or infiltrate            Assessment:     1. Acute on chronic resp failure, hypoxic  2. CABP, left lung  3. Chronic elevated diaphragm, left              -?congenital malformation of left lung  4. COPD without acute exac  5. Chronic systolic CHF    Plan:      -Bronch today  -Pulm toileting  -Empiric atb pending BAL cultures  -CT if not improving  -Wean FIO2    Addendum: Bronch completed, complete occlusion of left airways with thick bloody mucous plugs aspirated out. Needs aggressive pulm toileting.      Scott Bejarano MD

## 2020-01-22 NOTE — OP NOTE
Preoperative Diagnosis:  1. Acute resp failure  2. COPD  3. Elevated left diaphragm    Postoperative Diagnosis:  1. Acute resp failure  2. COPD  3. Elevated left diaphragm    Procedure Performed:  1. Flexible bronchoscopy  2. Therapeutic aspiration of endobronchial secretions  3. Bronchoalveolar lavage of left lower lobe    Findings:  1. Improved airway patency after aspiration of secretions from the tracheobronchial tree, complete occlusion of left airways with thick mucous plugging    Recommendations:  1. Await results of collected specimen    Indications:  Filemon Cooney is a pleasant 69year old male who has had ongoing congestion and infiltrates on chest imaging of his left lung. Bronchoscopy indicated for further evaluation of findings and to assist with pulmonary toileting. After review of the procedure and associated risks consent was obtained to proceed. ASA 3, Mallampati 2    Procedure Details: The patient was correctly identified as  Filemon Cooney, a safety timeout was performed. After sedation was administered with versed and fentanyl intravenously as well as topical lidocaine an adult therapeutic bronchoscope was inserted through the mouth and into the airways. There was complete occlusion of the left airway starting at the left mainstem with mucous plugs. Thick yellow and bloody secretions were aspirated from the tracheobronchial tree with subsequent improved airway patency. An airway exam was then performed, all subsegments were well visualized and did not appear to show any acute abnormality. The bronchoscope was wedged into the left lower lobe and a bronchoalveolar lavage was performed. Bronchoscope was withdrawn and the procedure was terminated. There was no immediate complications, the patient tolerated the procedure well. Estimated blood loss was less than 1 cc.      Specimens:  LLL BAL    Sedation Details:  Sedation start time 1140  Sedation stop time 1158  Total moderate sedation time 18 minutes  I was physically present and the patient was monitored continuously 1:1 throughout the entire procedure while administering sedation.

## 2020-01-22 NOTE — PROGRESS NOTES
nontender  No edema          Labs:   Recent Labs     01/20/20  1318 01/21/20  0758   WBC 14.6* 12.9*   HGB 14.8 13.9   HCT 44.1 40.8    265     Recent Labs     01/20/20  1318 01/20/20  1429 01/21/20  0758   *  --  135*   K 5.6* 3.6 4.4   CL 97*  --  101   CO2 21  --  23   BUN 18  --  20   CREATININE 0.8  --  0.7*   CALCIUM 9.0  --  8.9     Recent Labs     01/20/20  1318   AST 50*   ALT 21   BILITOT 0.8   ALKPHOS 46     No results for input(s): INR in the last 72 hours. Recent Labs     01/20/20  1429 01/20/20  2343 01/21/20  0758   TROPONINI 0.03* 0.02* 0.02*       Urinalysis:      Lab Results   Component Value Date    NITRU Negative 07/28/2017    WBCUA 6-10 07/28/2017    BACTERIA 1+ 07/28/2017    RBCUA  07/28/2017    BLOODU LARGE 07/28/2017    SPECGRAV 1.010 07/28/2017    GLUCOSEU Negative 07/28/2017       Radiology:  XR CHEST STANDARD (2 VW)   Final Result   Suspected cardiomegaly. Gross but stable elevation of the left hemidiaphragm. This is most likely   diaphragmatic paralysis. XR CHEST STANDARD (2 VW)   Final Result   Stable examination. Elevation of the left hemidiaphragm with adjacent airspace   disease/atelectasis. Otherwise, no convincing evidence for acute   cardiopulmonary pathology. 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 Aggressive pulmonary toilet, incentive spirometry, nebs as needed. Wean off oxygen as tolerated   Empiric treatment antibiotics for underlying pneumonia  Possible COPD exacerbation in setting of above: Continue nebs as needed, steroids and antibiotics. Transition to p.o. steroids.   DM type II: Monitor fingersticks, correction scale as needed  Continue home medications  Continue supportive care  DVT Prophylaxis: on eliquis  Diet: DIET CARB CONTROL; Low Sodium (2 GM);  Daily Fluid Restriction: 2000 ml  Code Status: Full Code      Dispo -possible discharge home in 1 to 2 days pending progress     Kenny Hyman MD

## 2020-01-23 LAB
GLUCOSE BLD-MCNC: 105 MG/DL (ref 70–99)
GLUCOSE BLD-MCNC: 138 MG/DL (ref 70–99)
GLUCOSE BLD-MCNC: 179 MG/DL (ref 70–99)
GLUCOSE BLD-MCNC: 199 MG/DL (ref 70–99)
PERFORMED ON: ABNORMAL

## 2020-01-23 PROCEDURE — 2580000003 HC RX 258: Performed by: INTERNAL MEDICINE

## 2020-01-23 PROCEDURE — 99233 SBSQ HOSP IP/OBS HIGH 50: CPT | Performed by: INTERNAL MEDICINE

## 2020-01-23 PROCEDURE — 6360000002 HC RX W HCPCS: Performed by: INTERNAL MEDICINE

## 2020-01-23 PROCEDURE — 94669 MECHANICAL CHEST WALL OSCILL: CPT

## 2020-01-23 PROCEDURE — 6370000000 HC RX 637 (ALT 250 FOR IP): Performed by: INTERNAL MEDICINE

## 2020-01-23 PROCEDURE — 94640 AIRWAY INHALATION TREATMENT: CPT

## 2020-01-23 PROCEDURE — 1200000000 HC SEMI PRIVATE

## 2020-01-23 PROCEDURE — 87252 VIRUS INOCULATION TISSUE: CPT

## 2020-01-23 RX ORDER — SODIUM CHLORIDE FOR INHALATION 0.9 %
3 VIAL, NEBULIZER (ML) INHALATION 3 TIMES DAILY
Status: COMPLETED | OUTPATIENT
Start: 2020-01-23 | End: 2020-01-24

## 2020-01-23 RX ADMIN — LISINOPRIL 5 MG: 5 TABLET ORAL at 08:59

## 2020-01-23 RX ADMIN — CETIRIZINE HYDROCHLORIDE 10 MG: 10 TABLET, FILM COATED ORAL at 08:59

## 2020-01-23 RX ADMIN — ASPIRIN 81 MG: 81 TABLET, COATED ORAL at 08:58

## 2020-01-23 RX ADMIN — PREDNISONE 40 MG: 20 TABLET ORAL at 08:59

## 2020-01-23 RX ADMIN — APIXABAN 5 MG: 5 TABLET, FILM COATED ORAL at 21:54

## 2020-01-23 RX ADMIN — IPRATROPIUM BROMIDE AND ALBUTEROL SULFATE 1 AMPULE: .5; 3 SOLUTION RESPIRATORY (INHALATION) at 12:11

## 2020-01-23 RX ADMIN — Medication 10 ML: at 21:54

## 2020-01-23 RX ADMIN — FUROSEMIDE 40 MG: 40 TABLET ORAL at 08:59

## 2020-01-23 RX ADMIN — CEFTRIAXONE SODIUM 1 G: 1 INJECTION, POWDER, FOR SOLUTION INTRAMUSCULAR; INTRAVENOUS at 09:43

## 2020-01-23 RX ADMIN — APIXABAN 5 MG: 5 TABLET, FILM COATED ORAL at 08:58

## 2020-01-23 RX ADMIN — METOPROLOL SUCCINATE 25 MG: 25 TABLET, EXTENDED RELEASE ORAL at 08:59

## 2020-01-23 RX ADMIN — GUAIFENESIN 1200 MG: 600 TABLET, EXTENDED RELEASE ORAL at 08:59

## 2020-01-23 RX ADMIN — ISODIUM CHLORIDE 3 ML: 0.03 SOLUTION RESPIRATORY (INHALATION) at 12:11

## 2020-01-23 RX ADMIN — Medication 10 ML: at 08:59

## 2020-01-23 RX ADMIN — ISODIUM CHLORIDE 3 ML: 0.03 SOLUTION RESPIRATORY (INHALATION) at 19:59

## 2020-01-23 RX ADMIN — IPRATROPIUM BROMIDE AND ALBUTEROL SULFATE 1 AMPULE: .5; 3 SOLUTION RESPIRATORY (INHALATION) at 16:16

## 2020-01-23 RX ADMIN — GUAIFENESIN 1200 MG: 600 TABLET, EXTENDED RELEASE ORAL at 21:54

## 2020-01-23 RX ADMIN — IPRATROPIUM BROMIDE AND ALBUTEROL SULFATE 1 AMPULE: .5; 3 SOLUTION RESPIRATORY (INHALATION) at 19:59

## 2020-01-23 RX ADMIN — IPRATROPIUM BROMIDE AND ALBUTEROL SULFATE 1 AMPULE: .5; 3 SOLUTION RESPIRATORY (INHALATION) at 07:28

## 2020-01-23 RX ADMIN — ATORVASTATIN CALCIUM 80 MG: 80 TABLET, FILM COATED ORAL at 21:54

## 2020-01-23 RX ADMIN — AZITHROMYCIN MONOHYDRATE 500 MG: 500 INJECTION, POWDER, LYOPHILIZED, FOR SOLUTION INTRAVENOUS at 10:49

## 2020-01-23 ASSESSMENT — PAIN SCALES - GENERAL
PAINLEVEL_OUTOF10: 0
PAINLEVEL_OUTOF10: 0

## 2020-01-23 NOTE — PROGRESS NOTES
Oxygen documentation:    O2 saturation at REST on ROOM AIR = ____91__%    If saturation is 89% or above please proceed with steps 2 and 3.     O2 saturation with AMBULATION of _____ feet on ROOM AIR = ____88_%  O2 saturation with AMBULATION on _______ liter/min = ______%

## 2020-01-23 NOTE — PLAN OF CARE
Patient education ongoing
Patient will remain free from falls this shift. Call light within reach, bedside table within reach, bed in lowest position, bed alarm off as the patient is independent, 2/4 rails raised, bed in locked position, phone within patient's reach, non-skid socks on. Patient instructed to call out if he needs to get up or needs any assistance. Patient verbalized understanding. RN will continue to monitor.
Problem: Falls - Risk of:  Goal: Will remain free from falls  Description  Will remain free from falls  1/21/2020 1209 by Oliver Soliz RN  Outcome: Ongoing  1/21/2020 0004 by Alvarado Orta RN  Outcome: Ongoing     Problem: Falls - Risk of:  Goal: Absence of physical injury  Description  Absence of physical injury  1/21/2020 1209 by Oliver Soliz RN  Outcome: Ongoing  1/21/2020 0004 by Alvarado Orta RN  Outcome: Ongoing
discharge

## 2020-01-23 NOTE — PROGRESS NOTES
Labs     01/20/20  1318 01/20/20  1429 01/21/20  0758   *  --  135*   K 5.6* 3.6 4.4   CL 97*  --  101   CO2 21  --  23   BUN 18  --  20   CREATININE 0.8  --  0.7*   CALCIUM 9.0  --  8.9     Recent Labs     01/20/20  1318   AST 50*   ALT 21   BILITOT 0.8   ALKPHOS 46     No results for input(s): INR in the last 72 hours. Recent Labs     01/20/20  1429 01/20/20  2343 01/21/20  0758   TROPONINI 0.03* 0.02* 0.02*       Urinalysis:      Lab Results   Component Value Date    NITRU Negative 07/28/2017    WBCUA 6-10 07/28/2017    BACTERIA 1+ 07/28/2017    RBCUA  07/28/2017    BLOODU LARGE 07/28/2017    SPECGRAV 1.010 07/28/2017    GLUCOSEU Negative 07/28/2017       Radiology:  XR CHEST STANDARD (2 VW)   Final Result   Suspected cardiomegaly. Gross but stable elevation of the left hemidiaphragm. This is most likely   diaphragmatic paralysis. XR CHEST STANDARD (2 VW)   Final Result   Stable examination. Elevation of the left hemidiaphragm with adjacent airspace   disease/atelectasis. Otherwise, no convincing evidence for acute   cardiopulmonary pathology. 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  Still needing O2 with ambulation  - pulm toilet    COPD with mild exacerbation  - continue steroids, breathing treatments, abx  - holding home inhalers for now    Troponin elevation with known CAD  - mild, suspect demand ischemia related to pneumonia  - trop down trending, chest pain free  - low concern for ACS  - continue home ASA, statin, BB    DMII  - well controlled  - holding home oral regimen  - SSI ordered    Paroxsymal Afib  - rate controlled  - continue BB  - on eliquis for [facility]    Chronic diastolic heart failure  - appears euvolemic  - last echo 4/19 with EF 55-60%  - continue home lasix, BB, ACEi    HTN  - well controlled  - continue home regimen    HLD  - continue statin    DVT Prophylaxis: eliquis  Diet: DIET CARB CONTROL; Low Sodium (2 GM); Daily Fluid Restriction: 2000 ml  Code Status: Full Code    Dispo - plan for DC home tomorrow. May need Home O2.     Anisa Terrazas MD

## 2020-01-24 VITALS
OXYGEN SATURATION: 95 % | HEIGHT: 72 IN | TEMPERATURE: 98.2 F | SYSTOLIC BLOOD PRESSURE: 115 MMHG | HEART RATE: 73 BPM | RESPIRATION RATE: 16 BRPM | BODY MASS INDEX: 35.65 KG/M2 | WEIGHT: 263.2 LBS | DIASTOLIC BLOOD PRESSURE: 72 MMHG

## 2020-01-24 LAB
ADENOVIRUS PCR: NOT DETECTED
BLOOD CULTURE, ROUTINE: NORMAL
CULTURE, BLOOD 2: NORMAL
CULTURE, RESPIRATORY: NORMAL
GLUCOSE BLD-MCNC: 104 MG/DL (ref 70–99)
GLUCOSE BLD-MCNC: 128 MG/DL (ref 70–99)
GRAM STAIN RESULT: NORMAL
HUMAN METAPNEUMOVIRUS PCR: DETECTED
INFLUENZA A: NOT DETECTED
INFLUENZA B: NOT DETECTED
PARAINFLUENZA 1 PCR: NOT DETECTED
PARAINFLUENZA 2 PCR: NOT DETECTED
PARAINFLUENZA 3 PCR: NOT DETECTED
PARAINFLUENZA 4 PCR: NOT DETECTED
PERFORMED ON: ABNORMAL
PERFORMED ON: ABNORMAL
RHINO/ENTEROVIRUS PCR: DETECTED
RSV BY PCR: NOT DETECTED
RSV SOURCE: ABNORMAL

## 2020-01-24 PROCEDURE — 6370000000 HC RX 637 (ALT 250 FOR IP): Performed by: INTERNAL MEDICINE

## 2020-01-24 PROCEDURE — 2580000003 HC RX 258: Performed by: INTERNAL MEDICINE

## 2020-01-24 PROCEDURE — 94640 AIRWAY INHALATION TREATMENT: CPT

## 2020-01-24 PROCEDURE — 94669 MECHANICAL CHEST WALL OSCILL: CPT

## 2020-01-24 PROCEDURE — 6360000002 HC RX W HCPCS: Performed by: INTERNAL MEDICINE

## 2020-01-24 RX ORDER — BENZONATATE 100 MG/1
100 CAPSULE ORAL 3 TIMES DAILY PRN
Qty: 21 CAPSULE | Refills: 0 | Status: SHIPPED | OUTPATIENT
Start: 2020-01-24 | End: 2020-01-31

## 2020-01-24 RX ORDER — PREDNISONE 20 MG/1
40 TABLET ORAL DAILY
Qty: 6 TABLET | Refills: 0 | Status: SHIPPED | OUTPATIENT
Start: 2020-01-24 | End: 2020-01-27

## 2020-01-24 RX ADMIN — IPRATROPIUM BROMIDE AND ALBUTEROL SULFATE 1 AMPULE: .5; 3 SOLUTION RESPIRATORY (INHALATION) at 12:25

## 2020-01-24 RX ADMIN — ASPIRIN 81 MG: 81 TABLET, COATED ORAL at 10:03

## 2020-01-24 RX ADMIN — GUAIFENESIN 1200 MG: 600 TABLET, EXTENDED RELEASE ORAL at 10:02

## 2020-01-24 RX ADMIN — CETIRIZINE HYDROCHLORIDE 10 MG: 10 TABLET, FILM COATED ORAL at 10:03

## 2020-01-24 RX ADMIN — PREDNISONE 40 MG: 20 TABLET ORAL at 10:03

## 2020-01-24 RX ADMIN — CEFTRIAXONE SODIUM 1 G: 1 INJECTION, POWDER, FOR SOLUTION INTRAMUSCULAR; INTRAVENOUS at 09:45

## 2020-01-24 RX ADMIN — AZITHROMYCIN MONOHYDRATE 500 MG: 500 INJECTION, POWDER, LYOPHILIZED, FOR SOLUTION INTRAVENOUS at 10:42

## 2020-01-24 RX ADMIN — ISODIUM CHLORIDE 3 ML: 0.03 SOLUTION RESPIRATORY (INHALATION) at 08:15

## 2020-01-24 RX ADMIN — FUROSEMIDE 40 MG: 40 TABLET ORAL at 10:03

## 2020-01-24 RX ADMIN — APIXABAN 5 MG: 5 TABLET, FILM COATED ORAL at 10:02

## 2020-01-24 RX ADMIN — LISINOPRIL 5 MG: 5 TABLET ORAL at 10:02

## 2020-01-24 RX ADMIN — Medication 10 ML: at 09:45

## 2020-01-24 RX ADMIN — METOPROLOL SUCCINATE 25 MG: 25 TABLET, EXTENDED RELEASE ORAL at 10:02

## 2020-01-24 RX ADMIN — IPRATROPIUM BROMIDE AND ALBUTEROL SULFATE 1 AMPULE: .5; 3 SOLUTION RESPIRATORY (INHALATION) at 08:15

## 2020-01-24 ASSESSMENT — PAIN SCALES - GENERAL
PAINLEVEL_OUTOF10: 0
PAINLEVEL_OUTOF10: 3

## 2020-01-24 ASSESSMENT — PAIN DESCRIPTION - FREQUENCY: FREQUENCY: CONTINUOUS

## 2020-01-24 ASSESSMENT — PAIN DESCRIPTION - PAIN TYPE: TYPE: ACUTE PAIN

## 2020-01-24 ASSESSMENT — PAIN DESCRIPTION - ONSET: ONSET: GRADUAL

## 2020-01-24 ASSESSMENT — PAIN DESCRIPTION - PROGRESSION: CLINICAL_PROGRESSION: GRADUALLY WORSENING

## 2020-01-24 ASSESSMENT — PAIN DESCRIPTION - DESCRIPTORS: DESCRIPTORS: ACHING;DULL

## 2020-01-24 ASSESSMENT — PAIN DESCRIPTION - LOCATION: LOCATION: HEAD

## 2020-01-24 ASSESSMENT — PULMONARY FUNCTION TESTS: PEFR_L/MIN: 20

## 2020-01-24 ASSESSMENT — PAIN DESCRIPTION - ORIENTATION: ORIENTATION: MID

## 2020-01-24 ASSESSMENT — PAIN - FUNCTIONAL ASSESSMENT: PAIN_FUNCTIONAL_ASSESSMENT: ACTIVITIES ARE NOT PREVENTED

## 2020-01-24 NOTE — DISCHARGE INSTR - COC
kg)     Mental Status:  {IP PT MENTAL STATUS:}    IV Access:  { TAMI IV ACCESS:573476906}    Nursing Mobility/ADLs:  Walking   {CHP DME EMUI:650842008}  Transfer  {CHP DME AKVL:451363618}  Bathing  {CHP DME HBQV:494897218}  Dressing  {CHP DME ZVJC:299666029}  Toileting  {CHP DME HKTW:097122178}  Feeding  {P DME CSKT:756629431}  Med Admin  {P DME AMEB:123457809}  Med Delivery   { TAMI MED Delivery:410225898}    Wound Care Documentation and Therapy:        Elimination:  Continence:   · Bowel: {YES / ZU:05494}  · Bladder: {YES / ZJ:56927}  Urinary Catheter: {Urinary Catheter:210979646}   Colostomy/Ileostomy/Ileal Conduit: {YES / IVETTE:22033}       Date of Last BM: ***    Intake/Output Summary (Last 24 hours) at 2020 1247  Last data filed at 2020 0437  Gross per 24 hour   Intake 960 ml   Output 1925 ml   Net -965 ml     I/O last 3 completed shifts:   In: 1440 [P.O.:1440]  Out: 2475 [Urine:2475]    Safety Concerns:     [employer] Safety Concerns:743827446}    Impairments/Disabilities:      [employer] Impairments/Disabilities:567199654}    Nutrition Therapy:  Current Nutrition Therapy:   [employer] Diet List:726111312}    Routes of Feeding: {[facility] DME Other Feedings:270699559}  Liquids: {Slp liquid thickness:52199}  Daily Fluid Restriction: {CHP DME Yes amt example:071257112}  Last Modified Barium Swallow with Video (Video Swallowing Test): {Done Not Done YINP:215919198}    Treatments at the Time of Hospital Discharge:   Respiratory Treatments: ***  Oxygen Therapy:  {Therapy; copd oxygen:55956}  Ventilator:    { CC Vent KJDF:160259863}    Rehab Therapies: {THERAPEUTIC INTERVENTION:6230983529}  Weight Bearing Status/Restrictions: [employer]  Weight Bearin}  Other Medical Equipment (for information only, NOT a DME order):  {EQUIPMENT:454132416}  Other Treatments: ***    Patient's personal belongings (please select all that are sent with patient):  {[facility] DME Belongings:053955791}    RN SIGNATURE:

## 2020-01-24 NOTE — DISCHARGE SUMMARY
ACEi     HTN  - well controlled  - continue home regimen     HLD  - continue statin    Physical Exam Performed:     /72   Pulse 72   Temp 97.8 °F (36.6 °C) (Oral)   Resp 16   Ht 6' (1.829 m)   Wt 263 lb 3.2 oz (119.4 kg)   SpO2 96%   BMI 35.70 kg/m²       General appearance:  No apparent distress, appears stated age and cooperative. obese  HEENT:  Normal cephalic, atraumatic without obvious deformity. Pupils equal, round, and reactive to light. Extra ocular muscles intact. Conjunctivae/corneas clear. Neck: Supple, with full range of motion. No jugular venous distention. Trachea midline. Respiratory:  Normal respiratory effort. Clear to auscultation, bilaterally without Rales/Wheezes/Rhonchi. Dullness at left base noted  Cardiovascular:  Regular rate and rhythm with normal S1/S2 without murmurs, rubs or gallops. Abdomen: Soft, non-tender, non-distended with normal bowel sounds. Musculoskeletal:  No clubbing, cyanosis or edema bilaterally. Full range of motion without deformity. Skin: Skin color, texture, turgor normal.  No rashes or lesions. Neurologic:  Neurovascularly intact without any focal sensory/motor deficits. Cranial nerves: II-XII intact, grossly non-focal.  Psychiatric:  Alert and oriented, thought content appropriate, normal insight  Capillary Refill: Brisk,< 3 seconds   Peripheral Pulses: +2 palpable, equal bilaterally     Labs:  For convenience and continuity at follow-up the following most recent labs are provided:      CBC:    Lab Results   Component Value Date    WBC 12.9 01/21/2020    HGB 13.9 01/21/2020    HCT 40.8 01/21/2020     01/21/2020       Renal:    Lab Results   Component Value Date     01/21/2020    K 4.4 01/21/2020     01/21/2020    CO2 23 01/21/2020    BUN 20 01/21/2020    CREATININE 0.7 01/21/2020    CALCIUM 8.9 01/21/2020    PHOS 3.4 07/18/2017         Significant Diagnostic Studies    Radiology:   XR CHEST STANDARD (2 VW)   Final Result

## 2020-02-03 LAB
FINAL REPORT: NORMAL
PRELIMINARY: NORMAL

## 2020-02-04 ENCOUNTER — OFFICE VISIT (OUTPATIENT)
Dept: PULMONOLOGY | Age: 75
End: 2020-02-04
Payer: MEDICARE

## 2020-02-04 VITALS
TEMPERATURE: 97.3 F | HEIGHT: 72 IN | HEART RATE: 79 BPM | OXYGEN SATURATION: 95 % | WEIGHT: 269.8 LBS | BODY MASS INDEX: 36.54 KG/M2 | SYSTOLIC BLOOD PRESSURE: 116 MMHG | DIASTOLIC BLOOD PRESSURE: 59 MMHG

## 2020-02-04 PROCEDURE — 99214 OFFICE O/P EST MOD 30 MIN: CPT | Performed by: INTERNAL MEDICINE

## 2020-02-04 RX ORDER — IPRATROPIUM BROMIDE AND ALBUTEROL SULFATE 2.5; .5 MG/3ML; MG/3ML
1 SOLUTION RESPIRATORY (INHALATION) 4 TIMES DAILY
Qty: 360 ML | Refills: 11 | Status: SHIPPED | OUTPATIENT
Start: 2020-02-04 | End: 2021-05-03

## 2020-02-04 ASSESSMENT — ENCOUNTER SYMPTOMS
SORE THROAT: 0
EYE DISCHARGE: 0
CONSTIPATION: 0
STRIDOR: 0
DIARRHEA: 0
EYE ITCHING: 0
SHORTNESS OF BREATH: 1
EYE PAIN: 0
VOICE CHANGE: 0
ABDOMINAL PAIN: 0
CHOKING: 0
CHEST TIGHTNESS: 0

## 2020-02-04 NOTE — PROGRESS NOTES
Pulmonary Outpatient Note   Marilu Heck MD       2020    Chief Complaint:  Follow-Up from Hospital     HPI:   70y.o. year old male here for further evaluation of COPD. Has an elevated left diaphragm and subsequent impaired mucous clearance. Was hospitalized for an acute left side pneumonia after failing outpatient treatment. Required a bronchoscopy with extensive therapeutic aspiration of mucous plugs from his left lung. Also placed on a vest device while inpatient after bronch findings which helped his symptoms. Prior CXRs personally reviewed, elevated left diaphragm.      Past Medical History:   Diagnosis Date    Bronchitis     chronic bronchitis once or twice per year twice has gone into pneumonia    CAD (coronary artery disease) 07/10/2017    + Stress, multi vessel CAD    Cardiomyopathy ([address] 2017    EF 35-40%    [facility]    [facility]     Diabetes ([facility]     Diastolic dysfunction     Family history of early CAD     father  at 48 of heart attack    Former smoker     High cholesterol 2017    Hypertension     Knee replacement     Right     Obesity, Class II, BMI 35-39.9, with comorbidity     Pneumonia        Past Surgical History:   Procedure Laterality Date    APPENDECTOMY      BRONCHOSCOPY N/A 2020    BRONCHOSCOPY ALVEOLAR LAVAGE performed by Luzmaria Porras MD at [address]   2020    BRONCHOSCOPY [address] [facility]  [facility]    Dr. Mary Ann Hebert - multi-vessel CAD, referred for CABG    CARPAL TUNNEL RELEASE Bilateral     CORONARY ANGIOPLASTY WITH STENT PLACEMENT  2018    YUVAL- 2.75 x 18 pRCA    CORONARY ARTERY BYPASS GRAFT  2017    Dr. Aleja Pugh - x4 (LIMA-LAD, reverse R SVG-diagonal, OM & PDA) w/placement of temporary pacing wire    MENISCECTOMY Right 1965    TONSILLECTOMY      TOTAL KNEE ARTHROPLASTY Right     TRANSESOPHAGEAL ECHOCARDIOGRAM  2017    during CABG       Social History     Tobacco Use    Smoking status: Former Smoker     Packs/day: 1.00     Years: 15.00     Pack years: 15.00     Types: Cigarettes, Pipe, Cigars     Start date: 1968     Last attempt to quit: 1978     Years since quittin.1    Smokeless tobacco: Never Used    Tobacco comment: 2nd hand smoke until 1 year ago   Substance Use Topics    Alcohol use:  Yes     Alcohol/week: 3.0 standard drinks     Types: 1 Shots of liquor, 2 Standard drinks or equivalent per week     Comment: occaisional          Family History   Problem Relation Age of Onset    Heart Attack Father     Heart Disease Father     Heart Attack Brother     High Blood Pressure Brother          Current Outpatient Medications:     ipratropium-albuterol (DUONEB) 0.5-2.5 (3) MG/3ML SOLN nebulizer solution, Inhale 3 mLs into the lungs 4 times daily, Disp: 360 mL, Rfl: 11    apixaban (ELIQUIS) 5 MG TABS tablet, TAKE 1 TABLET BY MOUTH TWICE A DAY, Disp: 180 tablet, Rfl: 3    metoprolol succinate (TOPROL XL) 25 MG extended release tablet, Take 1 tablet by mouth daily, Disp: 90 tablet, Rfl: 3    atorvastatin (LIPITOR) 80 MG tablet, Take 1 tablet by mouth daily, Disp: 90 tablet, Rfl: 3    lisinopril (PRINIVIL;ZESTRIL) 5 MG tablet, Take 1 tablet by mouth daily, Disp: 90 tablet, Rfl: 3    furosemide (LASIX) 40 MG tablet, Take 1 tablet by mouth daily, Disp: 90 tablet, Rfl: 3    albuterol sulfate  (90 Base) MCG/ACT inhaler, Inhale 2 puffs into the lungs 4 times daily as needed for Wheezing, Disp: 1 Inhaler, Rfl: 0    Spacer/Aero-Holding Chambers LETA, 1 Device by Does not apply route daily as needed (sob), Disp: 1 Device, Rfl: 0    metFORMIN (GLUCOPHAGE) 500 MG tablet, Take 500 mg by mouth daily (with breakfast), Disp: , Rfl:     aspirin EC 81 MG EC tablet, Take 1 tablet by mouth daily, Disp: 30

## 2020-02-04 NOTE — PATIENT INSTRUCTIONS
Remember to bring all pulmonary medications to your next appointment with the office. Please keep all of your future appointments scheduled by [facility]. Out of respect for other patients and providers, you may be asked to reschedule your appointment if you arrive later than your scheduled appointment time. Appointments cancelled less than 24hrs in advance will be considered a no show. Patients with three missed appointments within 1 year or four missed appointments within 2 years can be dismissed from the practice. You may receive a survey regarding the care you received during your visit. Your input is valuable to us. We encourage you to complete and return your survey. We hope you will choose us in the future for your healthcare needs.

## 2020-02-07 ENCOUNTER — HOSPITAL ENCOUNTER (OUTPATIENT)
Dept: GENERAL RADIOLOGY | Age: 75
Discharge: HOME | End: 2020-02-07
Payer: MEDICARE

## 2020-02-07 PROCEDURE — 76000 FLUOROSCOPY <1 HR PHYS/QHP: CPT

## 2020-02-11 ENCOUNTER — TELEPHONE (OUTPATIENT)
Dept: CARDIOLOGY CLINIC | Age: 75
End: 2020-02-11

## 2020-02-12 ENCOUNTER — TELEPHONE (OUTPATIENT)
Dept: CARDIOLOGY CLINIC | Age: 75
End: 2020-02-12

## 2020-02-12 NOTE — LETTER
[facility] - [address] [place] [address] [facility]  Phone: [phone]  Fax: [phone]    Tanya Kolb MD        February 12, 2020     [address] [address]  1945      To whom it may concern,             Anthony Naqvi is ok for procedure. He can stop Eliquis for 3 days prior. He should remain on Aspirin without interruption. If you have any questions or concerns, please don't hesitate to call.     Sincerely,        Tanya Kolb MD

## 2020-02-18 ENCOUNTER — OFFICE VISIT (OUTPATIENT)
Dept: PULMONOLOGY | Age: 75
End: 2020-02-18
Payer: MEDICARE

## 2020-02-18 ENCOUNTER — HOSPITAL ENCOUNTER (OUTPATIENT)
Age: 75
Discharge: HOME | End: 2020-02-18
Payer: MEDICARE

## 2020-02-18 VITALS
HEIGHT: 72 IN | TEMPERATURE: 97.5 F | SYSTOLIC BLOOD PRESSURE: 128 MMHG | BODY MASS INDEX: 36.54 KG/M2 | HEART RATE: 76 BPM | DIASTOLIC BLOOD PRESSURE: 73 MMHG | WEIGHT: 269.8 LBS

## 2020-02-18 LAB
EKG ATRIAL RATE: 78 BPM
EKG DIAGNOSIS: NORMAL
EKG P AXIS: 100 DEGREES
EKG P-R INTERVAL: 246 MS
EKG Q-T INTERVAL: 430 MS
EKG QRS DURATION: 132 MS
EKG QTC CALCULATION (BAZETT): 490 MS
EKG R AXIS: -66 DEGREES
EKG T AXIS: 95 DEGREES
EKG VENTRICULAR RATE: 78 BPM

## 2020-02-18 PROCEDURE — 99214 OFFICE O/P EST MOD 30 MIN: CPT | Performed by: INTERNAL MEDICINE

## 2020-02-18 ASSESSMENT — ENCOUNTER SYMPTOMS
CONSTIPATION: 0
VOICE CHANGE: 0
ABDOMINAL PAIN: 0
CHOKING: 0
SORE THROAT: 0
DIARRHEA: 0
STRIDOR: 0
EYE PAIN: 0
CHEST TIGHTNESS: 0
EYE DISCHARGE: 0
EYE ITCHING: 0

## 2020-02-18 NOTE — PROGRESS NOTES
Pulmonary Outpatient Note   Khushboo Day MD       2020    Chief Complaint:  Follow-up (4 mo) and COPD     HPI:   70y.o. year old male here for follow up regarding COPD and an abnormal diaphragm. Sniff test reviewed, paralyzed diaphragm with paradoxical motion. Decreased congestion with neb treatments 3 times a day  Has not yet received his pulmonary vest.   Still very short of breath with minimal exertion.      Past Medical History:   Diagnosis Date    Bronchitis     chronic bronchitis once or twice per year twice has gone into pneumonia    CAD (coronary artery disease) 07/10/2017    + Stress, multi vessel CAD    Cardiomy[facility] 2017    EF 35-40%    [facility]    [facility]ch[facility]     Diabetes ([facility]     Diastolic dysfunction     Family history of early CAD     father  at 48 of heart attack    Former smoker     High cholesterol 2017    Hypertension     Knee replacement     Right     Obesity, Class II, BMI 35-39.9, with comorbidity     Pneumonia        Past Surgical History:   Procedure Laterality Date    APPENDECTOMY      BRONCHOSCOPY N/A 2020    BRONCHOSCOPY ALVEOLAR LAVAGE performed by Doc Story MD at [address]  2020    [address]COPY [address] [facility] [address] performed by Doc Story MD at [address]  07/10/2017    Dr. Rigo Gan - multi-vessel CAD, referred for CABG    CARPAL TUNNEL RELEASE Bilateral     CORONARY ANGIOPLASTY WITH STENT PLACEMENT  2018    YUVAL- 2.75 x 18 pRCA    CORONARY ARTERY BYPASS GRAFT  2017    Dr. Adam Ware - x4 (LIMA-LAD, reverse R SVG-diagonal, OM & PDA) w/placement of temporary pacing wire    MENISCECTOMY Right 1965    TONSILLECTOMY      TOTAL KNEE ARTHROPLASTY Right     TRANSESOPHAGEAL ECHOCARDIOGRAM  2017    during CABG       Social History     Tobacco Use    Smoking Rfl:     aspirin EC 81 MG EC tablet, Take 1 tablet by mouth daily, Disp: 30 tablet, Rfl: 3    Glucosamine-Chondroitin (OSTEO BI-FLEX REGULAR STRENGTH PO), Take 1 capsule by mouth, Disp: , Rfl:     GuaiFENesin (MUCINEX PO), Take 1,200 mg by mouth 2 times daily, Disp: , Rfl:     Probiotic Product (PROBIOTIC DAILY PO), Take by mouth, Disp: , Rfl:     fish oil-omega-3 fatty acids 1000 MG capsule, Take 2 g by mouth daily. , Disp: , Rfl:     Multiple Vitamin (MULTI-VITAMIN PO), Take  by mouth daily. , Disp: , Rfl:     Lysine 1000 MG TABS, Take  by mouth Daily. , Disp: , Rfl:     cetirizine (ZYRTEC) 10 MG tablet, Take 10 mg by mouth daily. , Disp: , Rfl:     Oxycodone and Demerol    Vitals:    02/18/20 1338   BP: 128/73   Pulse: 76   Temp: 97.5 °F (36.4 °C)   TempSrc: Oral   Weight: 269 lb 12.8 oz (122.4 kg)   Height: 6' (1.829 m)       Review of Systems   Constitutional: Negative for chills, fever and unexpected weight change. HENT: Negative for mouth sores, sore throat and voice change. Eyes: Negative for pain, discharge and itching. Respiratory: Negative for choking, chest tightness and stridor. Cardiovascular: Negative for chest pain, palpitations and leg swelling. Gastrointestinal: Negative for abdominal pain, constipation and diarrhea. Endocrine: Negative for cold intolerance, heat intolerance and polydipsia. Genitourinary: Negative for dysuria, frequency and hematuria. Musculoskeletal: Negative for gait problem, joint swelling and neck stiffness. Neurological: Negative for dizziness, numbness and headaches. Psychiatric/Behavioral: Negative for agitation, confusion and hallucinations. Physical Exam  Constitutional:       General: He is not in acute distress. Appearance: He is well-developed. He is not diaphoretic. HENT:      Head: Normocephalic and atraumatic. Mouth/Throat:      Pharynx: No oropharyngeal exudate.    Eyes:      Pupils: Pupils are equal, round, and

## 2020-02-24 LAB
FUNGUS (MYCOLOGY) CULTURE: NORMAL
FUNGUS STAIN: NORMAL

## 2020-03-09 ENCOUNTER — OFFICE VISIT (OUTPATIENT)
Dept: CARDIOTHORACIC SURGERY | Age: 75
End: 2020-03-09
Payer: MEDICARE

## 2020-03-09 VITALS
SYSTOLIC BLOOD PRESSURE: 132 MMHG | OXYGEN SATURATION: 96 % | BODY MASS INDEX: 36.98 KG/M2 | TEMPERATURE: 97.9 F | DIASTOLIC BLOOD PRESSURE: 68 MMHG | WEIGHT: 273 LBS | HEIGHT: 72 IN | HEART RATE: 80 BPM | RESPIRATION RATE: 14 BRPM

## 2020-03-09 PROCEDURE — 99214 OFFICE O/P EST MOD 30 MIN: CPT | Performed by: THORACIC SURGERY (CARDIOTHORACIC VASCULAR SURGERY)

## 2020-03-10 LAB
AFB CULTURE (MYCOBACTERIA): NORMAL
AFB SMEAR: NORMAL

## 2020-03-11 ENCOUNTER — TELEPHONE (OUTPATIENT)
Dept: CARDIOTHORACIC SURGERY | Age: 75
End: 2020-03-11

## 2020-03-11 NOTE — TELEPHONE ENCOUNTER
Dr. Jayne Fraire saw patient on Monday. Ordered pulmonary rehab referral and referral to Dr. Laisha Padron. Also ordered a CT Chest w/o. I called and spoke to [facility]. Gave referral over the phone. They will call patient to schedule initial consultation. Patient needs 3 weeks of rehab before surgery. Spoke to Dr. Aissatou Wells office. Paper referral faxed. They will call patient to set up appointment. Dr. Jayne Fraire will be doing a left diaphragmatic plication on 2/67/87. If patient qualifies for gastric bypass surgery, it will be a co-surgery with Dr. Laisha Padron. Patient aware that all referrals were faxed. I also gave him the office phone numbers to call if he doesn't hear from them. CT chest scheduled for 3/20/20. Patient agreeable to that date. We will continue to follow.

## 2020-03-12 NOTE — PROGRESS NOTES
TABS Take  by mouth Daily. Yes Historical Provider, MD   cetirizine (ZYRTEC) 10 MG tablet Take 10 mg by mouth daily. Yes Historical Provider, MD        Facility Administered Medications: Allergies: Allergies   Allergen Reactions    Oxycodone Other (See Comments)     hallucinatiions    Demerol Nausea And Vomiting        Social History:    Working:   Caffeine:   Lifestyle:    Social History     Socioeconomic History    Marital status:      Spouse name: Not on file    Number of children: Not on file    Years of education: Not on file    Highest education level: Not on file   Occupational History    Not on file   Social Needs    Financial resource strain: Not on file    Food insecurity     Worry: Not on file     Inability: Not on file    Transportation needs     Medical: Not on file     Non-medical: Not on file   Tobacco Use    Smoking status: Former Smoker     Packs/day: 1.00     Years: 15.00     Pack years: 15.00     Types: Cigarettes, Pipe, Cigars     Start date: 1968     Last attempt to quit: 1978     Years since quittin.2    Smokeless tobacco: Never Used    Tobacco comment: 2nd hand smoke until 1 year ago   Substance and Sexual Activity    Alcohol use:  Yes     Alcohol/week: 3.0 standard drinks     Types: 1 Shots of liquor, 2 Standard drinks or equivalent per week     Comment: occaisional    Drug use: No    Sexual activity: Not Currently     Partners: Female   Lifestyle    Physical activity     Days per week: Not on file     Minutes per session: Not on file    Stress: Not on file   Relationships    Social connections     Talks on phone: Not on file     Gets together: Not on file     Attends Yarsanism service: Not on file     Active member of club or organization: Not on file     Attends meetings of clubs or organizations: Not on file     Relationship status: Not on file    Intimate partner violence     Fear of current or ex partner: Not on file     Emotionally abused: Not on file     Physically abused: Not on file     Forced sexual activity: Not on file   Other Topics Concern    Not on file   Social History Narrative    Not on file       Family History:  Heart Disease:   Stroke:   Cancer:   Diabetes:   Hypertension:   Aneurysm/PVD:         Problem Relation Age of Onset    Heart Attack Father     Heart Disease Father     Heart Attack Brother     High Blood Pressure Brother        Review of Systems:  Constitutional:  No night sweats, headaches, weight loss. Eyes:  No glaucoma, cataracts. ENMT:  No nosebleeds, deviated septum. Cardiac:  No arrhythmias, previous MI. Vascular:  No claudication, varicosities. GI:  No PUD, heartburn. :  No kidney stones, frequent UTIs  Musculoskeletal:  No arthritis, gout. Respiratory:  No SOB, emphysema, asthma. Integumentary:  No dermatitis, itching, rash. Neurological:  No stroke, TIAs, seizures. Psychiatric:  No depression, anxiety. Endocrine: No diabetes, thyroid issues. Hematologic:  No bleeding, easy bruising. Immunologic:  No known cancer, steroid therapies. Physical Examination:    /68 (Site: Left Upper Arm, Position: Sitting)   Pulse 80   Temp 97.9 °F (36.6 °C)   Resp 14   Ht 6' (1.829 m)   Wt 273 lb (123.8 kg)   SpO2 96%   BMI 37.03 kg/m²      BP RUE:  BP LUE:   Admission Weight: 273 lb (123.8 kg)   Hand dominance:    General appearance: NAD, well nourished  Eyes: anicteric, PERRLA  ENMT: no scars or lesions, no nasal deformity, normal dentition, no cyanosis of oral mucosa  Neck: no masses, no thyroid enlargement, no JVD. Respiratory: effort is unlabored, symmetric, no crackles, wheezes or rubs. No palpable/percussable abnormalities. Cardiovascular: regular, no murmur. PMI normal, no thrill. No carotid bruits. No edema or varicosities. Abdominal aorta cannot be appreciated given body habitus. GI: abdomen soft, nondistended, no organomegaly. No masses.   Lymphatic: no cervical/supraclavicular adenopathy  Musculoskeletal: strength and tone normal. Full ROM. No scoliosis. Extremities: warm and pink. No clubbing or petechiae. Skin: no dermatitis or ulceration. No nodularity or induration. Neuro: CN grossly intact. Sensation and motor function grossly intact. Psychiatric: oriented, appropriate mood/affect. MEDICAL DECISION MAKING/TESTING  Studies personally reviewed. Echo:     CXR:   Left hemidiaphragm paralysis             CT    PET/CT    PFT          Labs:   CBC: No results for input(s): WBC, HGB, HCT, MCV, PLT in the last 72 hours. BMP: No results for input(s): NA, K, CL, CO2, PHOS, BUN, CREATININE, CALCIUM, MG in the last 72 hours. Cardiac Enzymes: No results for input(s): CKTOTAL, CKMB, CKMBINDEX, TROPONINI in the last 72 hours. PT/INR: No results for input(s): PROTIME, INR in the last 72 hours. APTT: No results for input(s): APTT in the last 72 hours.   Liver Profile:  Lab Results   Component Value Date    AST 50 01/20/2020    ALT 21 01/20/2020    BILIDIR <0.2 07/13/2017    BILITOT 0.8 01/20/2020    ALKPHOS 46 01/20/2020     Lab Results   Component Value Date    CHOL 100 05/19/2018    HDL 39 05/19/2018    TRIG 71 05/19/2018     UA:   Lab Results   Component Value Date    COLORU Yellow 07/28/2017    PHUR 5.5 07/28/2017    WBCUA 6-10 07/28/2017    RBCUA  07/28/2017    MUCUS 1+ 07/28/2017    BACTERIA 1+ 07/28/2017    CLARITYU SL CLOUDY 07/28/2017    SPECGRAV 1.010 07/28/2017    LEUKOCYTESUR Negative 07/28/2017    UROBILINOGEN 0.2 07/28/2017    BILIRUBINUR Negative 07/28/2017    BLOODU LARGE 07/28/2017    GLUCOSEU Negative 07/28/2017       History obtained: chart, pt    Risk factors: Heart surgery, overweight    Diagnosis: Left diaphragmatic paralysis    Plan: Laparoscopic left diaphragmatic plication  I suggested to the patient's to proceed with visit to weight loss program because if there would like to perform any procedure we could do it in a combination fashion    Typical periop/postop course reviewed including initial limitations on driving/heavy lifting. Risks, benefits and postoperative complications discussed including bleeding, infection, stroke, death, postop pulmonary and renal issues.     Lucia Bean MD FACS

## 2020-03-13 ENCOUNTER — TELEPHONE (OUTPATIENT)
Dept: BARIATRICS/WEIGHT MGMT | Age: 75
End: 2020-03-13

## 2020-03-13 NOTE — TELEPHONE ENCOUNTER
Patient was calling to schedule an appointment. Went to [facility] and was under the impression he could get an appointment right away.  * See telephone encounter on 3/11/20*

## 2020-03-20 ENCOUNTER — HOSPITAL ENCOUNTER (OUTPATIENT)
Dept: CT IMAGING | Age: 75
Discharge: HOME | End: 2020-03-20
Payer: MEDICARE

## 2020-03-20 PROCEDURE — 71250 CT THORAX DX C-: CPT

## 2020-03-26 ENCOUNTER — TELEPHONE (OUTPATIENT)
Dept: PULMONOLOGY | Age: 75
End: 2020-03-26

## 2020-03-31 ENCOUNTER — TELEPHONE (OUTPATIENT)
Dept: BARIATRICS/WEIGHT MGMT | Age: 75
End: 2020-03-31

## 2020-04-06 ENCOUNTER — VIRTUAL VISIT (OUTPATIENT)
Dept: CARDIOTHORACIC SURGERY | Age: 75
End: 2020-04-06

## 2020-04-07 NOTE — PROGRESS NOTES
[facility]iac[facility]  Clinic Note     4/6/2020 8:26 PM  Surgeon:  Marino Flowers     left diaphragmatic paralysis       chief complaint :  Subjective:  Mr. Ascencion Portillo   Significant shortness of breath     vital Signs: There were no vitals taken for this visit. I/O:  No intake or output data in the 24 hours ending 04/06/20 2026    Exam:   Cardiovascular: S1 plus S2 +0 no additional sound  Pulmonary: Bilaterally clear no additional sound    Labs:   CBC: No results for input(s): WBC, HGB, HCT, MCV, PLT in the last 72 hours. BMP: No results for input(s): NA, K, CL, CO2, PHOS, BUN, CREATININE in the last 72 hours. Invalid input(s): CA  PT/INR: No results for input(s): PROTIME, INR in the last 72 hours. APTT: No results for input(s): APTT in the last 72 hours. Scheduled Meds:     Patient Active Problem List   Diagnosis    HTN (hypertension)    Hyperlipidemia    Allergic rhinitis    Chest pain    Abnormal stress test    Cardiomyopathy ([address]    Coronary artery disease of native artery of native heart with stable angina pectoris ([address]    S/P CABG x 3    Hx of tobacco use, presenting hazards to health    Palpitations    SOB (shortness of breath)    Acute on chronic diastolic congestive heart failure (HCC)    Acid reflux    History of total knee replacement    Vasovagal syncope    Paroxysmal atrial fibrillation (HCC)    Pericardial effusion    Acute pericarditis    Panic disorder    Obesity, Class II, BMI 35-39.9, with comorbidity    NSTEMI (non-ST elevated myocardial infarction) (HCC)    Simple chronic bronchitis (HCC)    Restrictive lung disease    Class 2 obesity in adult    Suspected sleep apnea    LVH (left ventricular hypertrophy)    Diastolic dysfunction    Former smoker    COPD exacerbation ([address]    RHONDA (obstructive sleep apnea)    Acute respiratory failure with hypoxia (HCC)       Assessment/Plan:   1.  Discussion about the timing of surgery as discussed with the

## 2020-04-10 ENCOUNTER — HOSPITAL ENCOUNTER (OUTPATIENT)
Age: 75
Discharge: HOME | End: 2020-04-10
Payer: MEDICARE

## 2020-04-10 ENCOUNTER — TELEPHONE (OUTPATIENT)
Dept: CARDIOLOGY CLINIC | Age: 75
End: 2020-04-10

## 2020-04-10 ENCOUNTER — OFFICE VISIT (OUTPATIENT)
Dept: CARDIOLOGY CLINIC | Age: 75
End: 2020-04-10
Payer: MEDICARE

## 2020-04-10 VITALS
DIASTOLIC BLOOD PRESSURE: 66 MMHG | WEIGHT: 271 LBS | SYSTOLIC BLOOD PRESSURE: 114 MMHG | BODY MASS INDEX: 36.7 KG/M2 | HEART RATE: 96 BPM | HEIGHT: 72 IN | OXYGEN SATURATION: 96 %

## 2020-04-10 LAB
ANION GAP SERPL CALCULATED.3IONS-SCNC: 17 MMOL/L (ref 3–16)
BUN BLDV-MCNC: 17 MG/DL (ref 7–20)
CALCIUM SERPL-MCNC: 9.7 MG/DL (ref 8.3–10.6)
CHLORIDE BLD-SCNC: 99 MMOL/L (ref 99–110)
CO2: 25 MMOL/L (ref 21–32)
CREAT SERPL-MCNC: 0.9 MG/DL (ref 0.8–1.3)
GFR AFRICAN AMERICAN: >60
GFR NON-AFRICAN AMERICAN: >60
GLUCOSE BLD-MCNC: 176 MG/DL (ref 70–99)
HCT VFR BLD CALC: 42.9 % (ref 40.5–52.5)
HEMOGLOBIN: 14.4 G/DL (ref 13.5–17.5)
MCH RBC QN AUTO: 30.8 PG (ref 26–34)
MCHC RBC AUTO-ENTMCNC: 33.7 G/DL (ref 31–36)
MCV RBC AUTO: 91.3 FL (ref 80–100)
PDW BLD-RTO: 13.2 % (ref 12.4–15.4)
PLATELET # BLD: 260 K/UL (ref 135–450)
PMV BLD AUTO: 8.6 FL (ref 5–10.5)
POTASSIUM SERPL-SCNC: 4.5 MMOL/L (ref 3.5–5.1)
PRO-BNP: 385 PG/ML (ref 0–124)
RBC # BLD: 4.7 M/UL (ref 4.2–5.9)
SODIUM BLD-SCNC: 141 MMOL/L (ref 136–145)
WBC # BLD: 10.1 K/UL (ref 4–11)

## 2020-04-10 PROCEDURE — 83880 ASSAY OF NATRIURETIC PEPTIDE: CPT

## 2020-04-10 PROCEDURE — 99214 OFFICE O/P EST MOD 30 MIN: CPT | Performed by: INTERNAL MEDICINE

## 2020-04-10 PROCEDURE — 36415 COLL VENOUS BLD VENIPUNCTURE: CPT

## 2020-04-10 PROCEDURE — 85027 COMPLETE CBC AUTOMATED: CPT

## 2020-04-10 PROCEDURE — 80048 BASIC METABOLIC PNL TOTAL CA: CPT

## 2020-04-10 NOTE — PROGRESS NOTES
[facility]   Cardiac Consultation    Referring Provider:  Karrie Pastrana MD     Chief Complaint   Patient presents with    Follow-up     was hospitalized in Jan for a week.  Coronary Artery Disease    Cardiomyopathy    Atrial Fibrillation    Hypertension    Hyperlipidemia    Shortness of Breath    Edema     left leg started 3 days ago    Fatigue        History of Present Illness:  Mr. Gordy Anaya is here for follow up for CAD, HLD, chest pain, S/P CABG, SOB, and preoperative cardiac evaluation for laparoscopic left diaphragmatic plication now in June due to Covid-19. He is following with Dr. Mike Kang for SOB and left diaphragmatic paralysis. He was admitted to the hospital in January for 1/2020 for SOB, underwent bronchoscopy and treated for PNA. Today he states he has been having fatigue and left foot swelling. Left leg is SVG site from CABG surgery. He always has some swelling in this leg but it has recently been worse. He has also been having a frequent cough. He coughs so hard he thinks he will pass out. No actual syncope. No fevers. This started in January when he was being treated for PNA. He has been having the feeling of an electrical shock that runs through his right inner thigh. This occurs when he is sitting or walking. It has almost caused him to fall. No leg cramps. Blood pressure has been stable at home. His weight is up 3 lbs recently. It has been fluctuating over the last 6 months. He has been anxious due to Covid-19.  Patient currently denies any palpitations, chest pain, and syncope          Past Medical History:   has a past medical history of Bronchitis, CAD (coronary artery disease), Cardiomyopathy ([address] CHF (congestive heart failure) ([address] COPD (chronic obstructive pulmonary disease) ([address] Diabetes ([address] Diastolic dysfunction, Family history of early CAD, Former smoker, High cholesterol, Hypertension, Knee replacement, Obesity, Class II, 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 Surgical History:   has a past surgical history that includes Appendectomy; meniscectomy (Right, 1965); Tonsillectomy; Cardiac catheterization (07/10/2017); Carpal tunnel release (Bilateral); Coronary artery bypass graft (2017); transesophageal echocardiogram (2017); Total knee arthroplasty (Right); Coronary angioplasty with stent (2018); bronchoscopy (N/A, 2020); and bronchoscopy (2020). Social History:   reports that he quit smoking about 42 years ago. His smoking use included cigarettes, pipe, and cigars. He started smoking about 52 years ago. He has a 15.00 pack-year smoking history. He has never used smokeless tobacco. He reports current alcohol use of about 3.0 standard drinks of alcohol per week. He reports that he does not use drugs. Family History:  Father  at age 48 from \"angina\"    Home Medications:  Prior to Admission medications    Medication Sig Start Date End Date Taking?  Authorizing Provider   UNABLE TO FIND VESIVEST TWICE DAILY FOR 20 MINUTES   Yes Historical Provider, MD   ipratropium-albuterol (DUONEB) 0.5-2.5 (3) MG/3ML SOLN nebulizer solution Inhale 3 mLs into the lungs 4 times daily 20  Yes Richie Dunn MD   apixaban (ELIQUIS) 5 MG TABS tablet TAKE 1 TABLET BY MOUTH TWICE A DAY 3/29/19  Yes Grace Marx MD   metoprolol succinate (TOPROL XL) 25 MG extended release tablet Take 1 tablet by mouth daily 3/29/19  Yes Grace Marx MD   atorvastatin (LIPITOR) 80 MG tablet Take 1 tablet by mouth daily 3/29/19  Yes Grace Marx MD   lisinopril (PRINIVIL;ZESTRIL) 5 MG tablet Take 1 tablet by mouth daily 3/29/19  Yes Grace Marx MD   furosemide (LASIX) 40 MG tablet Take 1 tablet by mouth daily 3/29/19  Yes Grace Marx MD   albuterol sulfate  (90 Base) MCG/ACT inhaler Inhale 2 puffs into the lungs 4 times daily as needed for Wheezing 3/15/19  Yes Lilly Nunez, DO   [facility] presentation at time of cath. No objective evidence ACS. Primary issue is small artery disease. Distal OM compromised by left main but also gets retrograde flow from OM1 SVG. Unlikely to alter symptoms which are at this time atypical. Consider for exertional angina, refractory to med mgmt. Plan:  Increase Lasix to 40 mg twice a day (6 hours apart)   Labs- BNP and BMP, CBC now   Echocardiogram- We will call to get this scheduled   Repeat Labs- BNP and BMP in 7-10 days after starting Lasix   Try to eat bananas daily   Continue other medications   Check blood pressure at home daily   Regular exercise and following a healthy diet encouraged   Follow up with me in a virtual visit in 2 weeks     Scribe's attestation: This note was scribed in the presence of Dr. Sophie Loredo M.D. By Memo Rider RN     The scribes documentation has been prepared under my direction and personally reviewed by me in its entirety. I confirm that the note above accurately reflects all work, treatment, procedures, and medical decision making performed by me. MD Rupa Kauffman.  Jo Ann Amor M.D., Monique Melendez

## 2020-04-10 NOTE — LETTER
[address]   Cardiac Consultation    Referring Provider:  Josh Freire MD     Chief Complaint   Patient presents with    Follow-up     was hospitalized in Jan for a week.  Coronary Artery Disease    Cardiomyopathy    Atrial Fibrillation    Hypertension    Hyperlipidemia    Shortness of Breath    Edema     left leg started 3 days ago    Fatigue        History of Present Illness:  Mr. Florencia Moraes is here for follow up for CAD, HLD, chest pain, S/P CABG, SOB, and preoperative cardiac evaluation for laparoscopic left diaphragmatic plication now in June due to Covid-19. He is following with Dr. Bismark Quintanilla for SOB and left diaphragmatic paralysis. He was admitted to the hospital in January for 1/2020 for SOB, underwent bronchoscopy and treated for PNA. Today he states he has been having fatigue and left foot swelling. Left leg is SVG site from CABG surgery. He always has some swelling in this leg but it has recently been worse. He has also been having a frequent cough. He coughs so hard he thinks he will pass out. No actual syncope. No fevers. This started in January when he was being treated for PNA. He has been having the feeling of an electrical shock that runs through his right inner thigh. This occurs when he is sitting or walking. It has almost caused him to fall. No leg cramps. Blood pressure has been stable at home. His weight is up 3 lbs recently. It has been fluctuating over the last 6 months. He has been anxious due to Covid-19.  Patient currently denies any palpitations, chest pain, and syncope          Past Medical History:   has a past medical history of Bronchitis, CAD (coronary artery disease), Cardiomyopathy ([address] CHF (congestive heart failure) ([address] COPD (chronic obstructive pulmonary disease) ([address] Diabetes ([address] Diastolic dysfunction, Family history of early CAD, Former smoker, High cholesterol, Hypertension, Knee replacement, Obesity, Class II, 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 Surgical History:   has a past surgical history that includes Appendectomy; meniscectomy (Right, ); Tonsillectomy; Cardiac catheterization (07/10/2017); Carpal tunnel release (Bilateral); Coronary artery bypass graft (2017); transesophageal echocardiogram (2017); Total knee arthroplasty (Right); Coronary angioplasty with stent (2018); bronchoscopy (N/A, 2020); and bronchoscopy (2020). Social History:   reports that he quit smoking about 42 years ago. His smoking use included cigarettes, pipe, and cigars. He started smoking about 52 years ago. He has a 15.00 pack-year smoking history. He has never used smokeless tobacco. He reports current alcohol use of about 3.0 standard drinks of alcohol per week. He reports that he does not use drugs. Family History:  Father  at age 48 from \"angina\"    Home Medications:  Prior to Admission medications    Medication Sig Start Date End Date Taking?  Authorizing Provider   UNABLE TO FIND VESIVEST TWICE DAILY FOR 20 MINUTES   Yes Historical Provider, MD   ipratropium-albuterol (DUONEB) 0.5-2.5 (3) MG/3ML SOLN nebulizer solution Inhale 3 mLs into the lungs 4 times daily 20  Yes Sweta Barron MD   apixaban (ELIQUIS) 5 MG TABS tablet TAKE 1 TABLET BY MOUTH TWICE A DAY 3/29/19  Yes Livia Garcia MD   metoprolol succinate (TOPROL XL) 25 MG extended release tablet Take 1 tablet by mouth daily 3/29/19  Yes Livia Garcia MD   atorvastatin (LIPITOR) 80 MG tablet Take 1 tablet by mouth daily 3/29/19  Yes Livia Garcia MD   lisinopril (PRINIVIL;ZESTRIL) 5 MG tablet Take 1 tablet by mouth daily 3/29/19  Yes Livia Garcia MD   furosemide (LASIX) 40 MG tablet Take 1 tablet by mouth daily 3/29/19  Yes Livai Garcia MD   albuterol sulfate  (90 Base) MCG/ACT inhaler Inhale 2 puffs into the lungs 4 times daily as needed for Wheezing 3/15/19  Yes Solange Rodriguez,   Limited only for LVEF pericardial effusion post heart surgery. 1 cm   posterior pericardial effusion and 0.5cm anterior pericardial effusion. At   the apex 2cm pericardial effusion. No tamponade physiology.   Left ventricular systolic function is normal. Atrial fibrillation with   controlled ventricular response. Estimated ejection fraction of 55-60 %.   Severe concentric left ventricular hypertrophy. Small size of left   ventricle. No significant outflow tract obstruction.   Elevated left ventricle diastolic filling pressure.   There is a moderate circumferential pericardial effusion noted. Cath 5/2018 [facility]   PCI of the native RCA with YUVAL  1. MVCD  2. Normal LVEF  3. Normal hemodynamics except for hypertension  4. Patent SVG to the DIAG, PDA, and OM  5. Patent LIMA to the LAD      Echo 4/16/19  Technically difficult examination. Left ventricular systolic function is normal with ejection fraction   estimated at 55-60 %. There is severe concentric left ventricular hypertrophy. Left ventricular size is decreased. Diastolic dysfunction grade and filing pressure are indeterminate. The ascending aorta is mildly dilated. Aortic valve appears sclerotic but opens adequately. Mitral annular calcification is present. Mild mitral regurgitation. Previous echo done 10/2017 - EF 60%, LVH         Assessment:     1. Chest pain - no recurrence    3. Essential hypertension - well controlled. 4. Pure hypercholesterolemia - controlled. Results reviewed w/ pt. Repeat yearly   5. Pericardial effusion, post-op - asymptomatic, resolved   6. Cardiomyopathy  - suspect ischemic, resolved post CABG. Possible recurrent   7. CAD - stable. No angina  8. S/P CABG 7/2017 - stable  9. PAF - currently NSR. stable   10. SOB - possible cardiac. Increased. Note attempt at increase diuretics failed to improve symptoms in past. Will reattempt. 11.    Edema - increased.  Suspect volume overload

## 2020-04-16 ENCOUNTER — HOSPITAL ENCOUNTER (OUTPATIENT)
Dept: NON INVASIVE DIAGNOSTICS | Age: 75
Discharge: HOME | End: 2020-04-16
Payer: MEDICARE

## 2020-04-16 ENCOUNTER — TELEPHONE (OUTPATIENT)
Dept: CARDIOLOGY CLINIC | Age: 75
End: 2020-04-16

## 2020-04-16 LAB
LV EF: 55 %
LVEF MODALITY: NORMAL

## 2020-04-16 PROCEDURE — 6360000004 HC RX CONTRAST MEDICATION: Performed by: INTERNAL MEDICINE

## 2020-04-16 PROCEDURE — C8929 TTE W OR WO FOL WCON,DOPPLER: HCPCS

## 2020-04-16 RX ADMIN — PERFLUTREN 2.2 MG: 6.52 INJECTION, SUSPENSION INTRAVENOUS at 10:13

## 2020-04-16 NOTE — TELEPHONE ENCOUNTER
----- Message from Lucero Kam MD sent at 4/16/2020 11:12 AM EDT -----  Call  Echo looks good  Normal heart function

## 2020-04-29 ENCOUNTER — HOSPITAL ENCOUNTER (OUTPATIENT)
Age: 75
Discharge: HOME | End: 2020-04-29
Payer: MEDICARE

## 2020-04-29 ENCOUNTER — VIRTUAL VISIT (OUTPATIENT)
Dept: CARDIOLOGY CLINIC | Age: 75
End: 2020-04-29
Payer: MEDICARE

## 2020-04-29 LAB
ANION GAP SERPL CALCULATED.3IONS-SCNC: 12 MMOL/L (ref 3–16)
BUN BLDV-MCNC: 17 MG/DL (ref 7–20)
CALCIUM SERPL-MCNC: 9.1 MG/DL (ref 8.3–10.6)
CHLORIDE BLD-SCNC: 98 MMOL/L (ref 99–110)
CO2: 27 MMOL/L (ref 21–32)
CREAT SERPL-MCNC: 0.9 MG/DL (ref 0.8–1.3)
GFR AFRICAN AMERICAN: >60
GFR NON-AFRICAN AMERICAN: >60
GLUCOSE BLD-MCNC: 174 MG/DL (ref 70–99)
POTASSIUM SERPL-SCNC: 4.1 MMOL/L (ref 3.5–5.1)
PRO-BNP: 193 PG/ML (ref 0–124)
SODIUM BLD-SCNC: 137 MMOL/L (ref 136–145)

## 2020-04-29 PROCEDURE — 99214 OFFICE O/P EST MOD 30 MIN: CPT | Performed by: INTERNAL MEDICINE

## 2020-04-29 PROCEDURE — 83880 ASSAY OF NATRIURETIC PEPTIDE: CPT

## 2020-04-29 PROCEDURE — 36415 COLL VENOUS BLD VENIPUNCTURE: CPT

## 2020-04-29 PROCEDURE — 80048 BASIC METABOLIC PNL TOTAL CA: CPT

## 2020-04-29 NOTE — LETTER
'    2020    Follow-up; Congestive Heart Failure; Hypertension; Atrial Fibrillation; Coronary Artery Disease; and Edema (increased lasix at last visit, for edema in ankles, swells in the evening)       TELEHEALTH EVALUATION -- Audio/Visual (During [id number] public health emergency)    HPI: Arthur Philip is a 70 y.o. male who is here for a virtual visit today for follow up for CAD, HLD, chest pain, S/P CABG, and SOB. Plan is for laparoscopic left diaphragmatic plication now in  due to Covid-19  He was admitted to the hospital in January for 2020 for SOB, underwent bronchoscopy and treated for PNA. he complained of SOB at his last visit a few weeks ago. Lasix was increased to 40 mg BID. An echocardiogram on 4/10/2020 showed an EF of 55%. BNP was 385. Today he states he has had a decrease in his cough and swelling in his left ankle with lasix. He states left leg swelling is gone in the morning and increases as the day goes on worse by 7:00 pm. Assoc SOB is improved. Overall better since his last visit. His weight is stable, no change since hs last visit. He had repeat blood work this morning. He is tolerating his medications. He was not able to check his heat rate or blood pressure at home. Patient currently denies any palpitations, chest pain, dizziness, and syncope. Arthur Philip (:  1945) has requested an audio/video evaluation for the following concern(s): follow up for SOB and CAD. [x] Medications and dosages reviewed. ROS:  [x]Full ROS obtained and negative except as mentioned in HPI    Prior to Visit Medications    Medication Sig Taking?  Authorizing Provider   UNABLE TO FIND VESIVEST TWICE DAILY FOR 20 MINUTES Yes Historical Provider, MD   ipratropium-albuterol (DUONEB) 0.5-2.5 (3) MG/3ML SOLN nebulizer solution Inhale 3 mLs into the lungs 4 times daily Yes Mikal Trujillo MD   apixaban (ELIQUIS) 5 MG TABS tablet TAKE 1 TABLET BY MOUTH TWICE A DAY Yes Teresa Leon MD Types: 1 Shots of liquor, 2 Standard drinks or equivalent per week     Comment: occaisional    Drug use: No      There were no vitals taken for this visit.     Allergies   Allergen Reactions    Oxycodone Other (See Comments)     hallucinatiions    Demerol Nausea And Vomiting   ,   Past Medical History:   Diagnosis Date    Bronchitis     chronic bronchitis once or twice per year twice has gone into pneumonia    CAD (coronary artery disease) 07/10/2017    + Stress, multi vessel CAD    Cardiomy[facility] 2017    EF 35-40%    CHF (congestive heart failure) ([facility] 2017    COPD (chronic obstructive pulmonary disease) [facility]     Diabetes ([facility]     Diastolic dysfunction     Family history of early CAD     father  at 48 of heart attack    Former smoker     High cholesterol 2017    Hypertension     Knee replacement     Right     Obesity, Class II, BMI 35-39.9, with comorbidity     Pneumonia    ,   Past Surgical History:   Procedure Laterality Date    APPENDECTOMY      BRONCHOSCOPY N/A 2020    BRONCHOSCOPY ALVEOLAR LAVAGE performed by Eloisa Harris MD at [address] [place] [address]  2020    BRONCHOSCOPY [facility] performed by Eloisa Harris MD at [place] [address]  07/10/2017    Dr. Chin Led - multi-vessel CAD, referred for CABG    CARPAL TUNNEL RELEASE Bilateral     CORONARY ANGIOPLASTY WITH STENT PLACEMENT  2018    YUVAL- 2.75 x 18 pRCA    CORONARY ARTERY BYPASS GRAFT  2017    Dr. Tanya Schneider - x4 (LIMA-LAD, reverse R SVG-diagonal, OM & PDA) w/placement of temporary pacing wire    MENISCECTOMY Right 1965    TONSILLECTOMY      TOTAL KNEE ARTHROPLASTY Right     TRANSESOPHAGEAL ECHOCARDIOGRAM  2017    during CABG   ,   Social History     Tobacco Use    Smoking status: Former Smoker     Packs/day: 1.00     Years: 15.00     Pack years: 15.00     Types: Cigarettes, Pipe, Cigars

## 2020-04-29 NOTE — PROGRESS NOTES
2020    Follow-up; Congestive Heart Failure; Hypertension; Atrial Fibrillation; Coronary Artery Disease; and Edema (increased lasix at last visit, for edema in ankles, swells in the evening)       TELEHEALTH EVALUATION -- Audio/Visual (During VTDGS-17 public health emergency)    HPI: Erin Banda is a 70 y.o. male who is here for a virtual visit today for follow up for CAD, HLD, chest pain, S/P CABG, and SOB. Plan is for laparoscopic left diaphragmatic plication now in  due to Covid-19  He was admitted to the hospital in January for 2020 for SOB, underwent bronchoscopy and treated for PNA. he complained of SOB at his last visit a few weeks ago. Lasix was increased to 40 mg BID. An echocardiogram on 4/10/2020 showed an EF of 55%. BNP was 385. Today he states he has had a decrease in his cough and swelling in his left ankle with lasix. He states left leg swelling is gone in the morning and increases as the day goes on worse by 7:00 pm. Assoc SOB is improved. Overall better since his last visit. His weight is stable, no change since hs last visit. He had repeat blood work this morning. He is tolerating his medications. He was not able to check his heat rate or blood pressure at home. Patient currently denies any palpitations, chest pain, dizziness, and syncope. Erin Banda (:  1945) has requested an audio/video evaluation for the following concern(s): follow up for SOB and CAD. [x] Medications and dosages reviewed. ROS:  [x]Full ROS obtained and negative except as mentioned in HPI    Prior to Visit Medications    Medication Sig Taking?  Authorizing Provider   UNABLE TO FIND VESIVEST TWICE DAILY FOR 20 MINUTES Yes Historical Provider, MD   ipratropium-albuterol (DUONEB) 0.5-2.5 (3) MG/3ML SOLN nebulizer solution Inhale 3 mLs into the lungs 4 times daily Yes Terri Phelps MD   apixaban (ELIQUIS) 5 MG TABS tablet TAKE 1 TABLET BY MOUTH TWICE A DAY Yes Alla Lockhart MD 1 Shots of liquor, 2 Standard drinks or equivalent per week     Comment: occaisional    Drug use: No      There were no vitals taken for this visit.     Allergies   Allergen Reactions    Oxycodone Other (See Comments)     hallucinatiions    Demerol Nausea And Vomiting   ,   Past Medical History:   Diagnosis Date    Bronchitis     chronic bronchitis once or twice per year twice has gone into pneumonia    CAD (coronary artery disease) 07/10/2017    + Stress, multi vessel CAD    Cardiomyopathy ([facility] 2017    EF 35-40%    CHF (congestive heart failure) ([facility] 2017    COPD (chronic obstructive pulmonary disease) ([facility]     Diabetes ([facility]     Diastolic dysfunction     Family history of early CAD     father  at 48 of heart attack    Former smoker     High cholesterol 2017    Hypertension     Knee replacement     Right     Obesity, Class II, BMI 35-39.9, with comorbidity     Pneumonia    ,   Past Surgical History:   Procedure Laterality Date    APPENDECTOMY      BRONCHOSCOPY N/A 2020    BRONCHOSCOPY ALVEOLAR LAVAGE performed by Stas Woody MD at [address] [place] [address]  2020    BRONCHOSCOPY [facility] performed by Stas Woody MD at [place] [address]  07/10/2017    Dr. Madan Mora - multi-vessel CAD, referred for CABG    CARPAL TUNNEL RELEASE Bilateral     CORONARY ANGIOPLASTY WITH STENT PLACEMENT  2018    YUVAL- 2.75 x 18 pRCA    CORONARY ARTERY BYPASS GRAFT  2017    Dr. Nena Dave - x4 (LIMA-LAD, reverse R SVG-diagonal, OM & PDA) w/placement of temporary pacing wire    MENISCECTOMY Right 1965    TONSILLECTOMY      TOTAL KNEE ARTHROPLASTY Right     TRANSESOPHAGEAL ECHOCARDIOGRAM  2017    during CABG   ,   Social History     Tobacco Use    Smoking status: Former Smoker     Packs/day: 1.00     Years: 15.00     Pack years: 15.00     Types: Cigarettes, Pipe, Cigars     Start date: 1968 Last attempt to quit: 1978     Years since quittin.3    Smokeless tobacco: Never Used    Tobacco comment: 2nd hand smoke until 1 year ago   Substance Use Topics    Alcohol use: Yes     Alcohol/week: 3.0 standard drinks     Types: 1 Shots of liquor, 2 Standard drinks or equivalent per week     Comment: occaisional    Drug use: No   ,   Family History   Problem Relation Age of Onset    Heart Attack Father     Heart Disease Father     Heart Attack Brother     High Blood Pressure Brother    ,   There is no immunization history on file for this patient.,   Health Maintenance   Topic Date Due    AAA screen  1945    Hepatitis C screen  1945    Diabetic foot exam  1957    Diabetic retinal exam  1957    Diabetic microalbuminuria test  1965    DTaP/Tdap/Td vaccine (1 - Tdap) 1966    Shingles Vaccine (1 of 2) 1997    Colon cancer screen colonoscopy  1997    Pneumococcal 65+ years Vaccine (1 of 1 - PPSV23) 2012    A1C test (Diabetic or Prediabetic)  2018    Lipid screen  2019    Annual Wellness Visit (AWV)  2019    Flu vaccine (Season Ended) 2020    Potassium monitoring  04/10/2021    Creatinine monitoring  04/10/2021    Hepatitis A vaccine  Aged Out    Hepatitis B vaccine  Aged Out    Hib vaccine  Aged Out    Meningococcal (ACWY) vaccine  Aged Out     There were no vitals taken for this visit.      PHYSICAL EXAMINATION:  [ INSTRUCTIONS:  \"[x]\" Indicates a positive item  \"[]\" Indicates a negative item  -- DELETE ALL ITEMS NOT EXAMINED]  Vital Signs: (As obtained by patient/caregiver or practitioner observation)    Blood pressure-  Heart rate-    Respiratory rate-    Temperature-  Pulse oximetry-     Constitutional: [x] Appears well-developed and well-nourished [] No apparent distress      [] Abnormal-   Mental status  [x] Alert and awake  [] Oriented to person/place/time []Able to follow commands      Eyes:  EOM [x]  Normal  [] Abnormal-  Sclera  [x]  Normal  [] Abnormal -         Discharge []  None visible  [] Abnormal -    HENT:   [x] Normocephalic, atraumatic. [] Abnormal   [x] Mouth/Throat: Mucous membranes are moist.     External Ears [x] Normal  [] Abnormal-     Neck: [x] No visualized mass     Pulmonary/Chest: [x] Respiratory effort normal.  [] No visualized signs of difficulty breathing or respiratory distress        [] Abnormal-      Musculoskeletal:   [x] Normal gait with no signs of ataxia         [] Normal range of motion of neck        [] Abnormal-       Neurological:        [x] No Facial Asymmetry (Cranial nerve 7 motor function) (limited exam to video visit)          [] No gaze palsy        [] Abnormal-         Skin:        [x] No significant exanthematous lesions or discoloration noted on facial skin         [] Abnormal-            Psychiatric:       [x] Normal Affect [] No Hallucinations        [] Abnormal-     Other pertinent observable physical exam findings-     Myoview 7/2017   - Abnormal high risk myocardial perfusion study.    - There is a medium size moderate intensity perfusion defect involving the    apex, apical lateral, mid anteroseptal, mid inferior and apical inferior    walls during stress that reverses at rest consistent with ischemia in    distribution of LAD and RCA or dominant circumflex.    - Apical inferior and mid anteroseptal walls are akinetic. Other walls    severely hypokinetic.    - Left ventricular cavity is dilated.    - Left ventricular systolic function is severely reduced with ejection    fraction of 28 %.    Echo 7/2017   Limited only for LVEF pericardial effusion post heart surgery. 1 cm   posterior pericardial effusion and 0.5cm anterior pericardial effusion. At   the apex 2cm pericardial effusion. No tamponade physiology.   Left ventricular systolic function is normal. Atrial fibrillation with   controlled ventricular response.  Estimated ejection fraction of 55-60 medications   Check blood pressure at home weekly  Regular exercise and following a healthy diet encouraged   Remain on high dose lasix  Follow up with me in 3 months         Kenia Gomez is a 70 y.o. male being evaluated by a Virtual Visit (video visit) encounter to address concerns as mentioned above. A caregiver was present when appropriate. Due to this being a TeleHealth encounter (During TQFPG-82 public health emergency), evaluation of the following organ systems was limited: Vitals/Constitutional/EENT/Resp/CV/GI//MS/Neuro/Skin/Heme-Lymph-Imm. Pursuant to the emergency declaration under the [address] [place] [address], [address] [place] [address] authority and the [employer] and [employer] Act, this Virtual Visit was conducted with patient's (and/or legal guardian's) consent, to reduce the patient's risk of exposure to COVID-19 and provide necessary medical care. The patient (and/or legal guardian) has also been advised to contact this office for worsening conditions or problems, and seek emergency medical treatment and/or call 911 if deemed necessary. Services were provided through a video synchronous discussion virtually to substitute for in-person clinic visit. Scribe's attestation: This note was scribed in the presence of Dr. Sourav Flores M.D. By Elvi Meade RN    The scribes documentation has been prepared under my direction and personally reviewed by me in its entirety. I confirm that the note above accurately reflects all work, treatment, procedures, and medical decision making performed by me. Dr. Sourav Flores MD      I am seeing the patient today from my  office and the patient from their home. --Elvi Meade RN on 4/29/2020 at 1:41 PM    An electronic signature was used to authenticate this note.

## 2020-04-30 RX ORDER — FUROSEMIDE 40 MG/1
TABLET ORAL
Qty: 90 TABLET | Refills: 3 | Status: SHIPPED | OUTPATIENT
Start: 2020-04-30 | End: 2020-08-31 | Stop reason: SDUPTHER

## 2020-04-30 NOTE — TELEPHONE ENCOUNTER
----- Message from Alber Ivey MD sent at 4/30/2020  8:55 AM EDT -----  Call  Labs ok  Cont current dose lasix

## 2020-05-12 ENCOUNTER — TELEPHONE (OUTPATIENT)
Dept: CARDIOTHORACIC SURGERY | Age: 75
End: 2020-05-12

## 2020-05-12 NOTE — TELEPHONE ENCOUNTER
Spoke with patient regarding surgery scheduled for 5/28/2020 at 12:00 pm with arrival time of 10:00 am @ [facility] with Dr. Sy Gardiner to include: left laparoscopic diaphragmatic plication. Patient is scheduled for COVID-19 swab on 5/22/2020 @ 10:30 am with pre-op lab completion to either precede or follow with a nurse from the hospital calling to coordinate. Patient knows not to eat or drink after midnight the morning of surgery and to call our office with any questions or concerns.

## 2020-05-13 ENCOUNTER — TELEPHONE (OUTPATIENT)
Dept: CARDIOLOGY CLINIC | Age: 75
End: 2020-05-13

## 2020-05-22 ENCOUNTER — OFFICE VISIT (OUTPATIENT)
Dept: PRIMARY CARE CLINIC | Age: 75
End: 2020-05-22

## 2020-05-22 ENCOUNTER — HOSPITAL ENCOUNTER (OUTPATIENT)
Dept: PREADMISSION TESTING | Age: 75
Discharge: HOME | End: 2020-05-26
Payer: MEDICARE

## 2020-05-22 ENCOUNTER — HOSPITAL ENCOUNTER (OUTPATIENT)
Dept: GENERAL RADIOLOGY | Age: 75
Discharge: HOME | End: 2020-05-22
Payer: MEDICARE

## 2020-05-22 ENCOUNTER — TELEPHONE (OUTPATIENT)
Dept: CARDIOTHORACIC SURGERY | Age: 75
End: 2020-05-22

## 2020-05-22 LAB
ABO/RH: NORMAL
ANION GAP SERPL CALCULATED.3IONS-SCNC: 10 MMOL/L (ref 3–16)
ANTIBODY SCREEN: NORMAL
BASOPHILS ABSOLUTE: 0 K/UL (ref 0–0.2)
BASOPHILS RELATIVE PERCENT: 0.5 %
BILIRUBIN URINE: NEGATIVE
BLOOD, URINE: NEGATIVE
BUN BLDV-MCNC: 16 MG/DL (ref 7–20)
CALCIUM SERPL-MCNC: 9.2 MG/DL (ref 8.3–10.6)
CHLORIDE BLD-SCNC: 103 MMOL/L (ref 99–110)
CLARITY: CLEAR
CO2: 27 MMOL/L (ref 21–32)
COLOR: YELLOW
CREAT SERPL-MCNC: 0.8 MG/DL (ref 0.8–1.3)
EOSINOPHILS ABSOLUTE: 0.4 K/UL (ref 0–0.6)
EOSINOPHILS RELATIVE PERCENT: 5.2 %
GFR AFRICAN AMERICAN: >60
GFR NON-AFRICAN AMERICAN: >60
GLUCOSE BLD-MCNC: 164 MG/DL (ref 70–99)
GLUCOSE URINE: NEGATIVE MG/DL
HCT VFR BLD CALC: 41.6 % (ref 40.5–52.5)
HEMOGLOBIN: 13.9 G/DL (ref 13.5–17.5)
KETONES, URINE: NEGATIVE MG/DL
LEUKOCYTE ESTERASE, URINE: NEGATIVE
LYMPHOCYTES ABSOLUTE: 2.9 K/UL (ref 1–5.1)
LYMPHOCYTES RELATIVE PERCENT: 33.8 %
MCH RBC QN AUTO: 30.4 PG (ref 26–34)
MCHC RBC AUTO-ENTMCNC: 33.5 G/DL (ref 31–36)
MCV RBC AUTO: 90.6 FL (ref 80–100)
MICROSCOPIC EXAMINATION: NORMAL
MONOCYTES ABSOLUTE: 0.7 K/UL (ref 0–1.3)
MONOCYTES RELATIVE PERCENT: 8.2 %
NEUTROPHILS ABSOLUTE: 4.4 K/UL (ref 1.7–7.7)
NEUTROPHILS RELATIVE PERCENT: 52.3 %
NITRITE, URINE: NEGATIVE
PDW BLD-RTO: 12.8 % (ref 12.4–15.4)
PH UA: 7 (ref 5–8)
PLATELET # BLD: 225 K/UL (ref 135–450)
PMV BLD AUTO: 8.3 FL (ref 5–10.5)
POTASSIUM SERPL-SCNC: 4.4 MMOL/L (ref 3.5–5.1)
PROTEIN UA: NEGATIVE MG/DL
RBC # BLD: 4.59 M/UL (ref 4.2–5.9)
SODIUM BLD-SCNC: 140 MMOL/L (ref 136–145)
SPECIFIC GRAVITY UA: 1.01 (ref 1–1.03)
URINE REFLEX TO CULTURE: NORMAL
URINE TYPE: NORMAL
UROBILINOGEN, URINE: 0.2 E.U./DL
WBC # BLD: 8.5 K/UL (ref 4–11)

## 2020-05-22 PROCEDURE — 81003 URINALYSIS AUTO W/O SCOPE: CPT

## 2020-05-22 PROCEDURE — 71046 X-RAY EXAM CHEST 2 VIEWS: CPT

## 2020-05-22 PROCEDURE — 80048 BASIC METABOLIC PNL TOTAL CA: CPT

## 2020-05-22 PROCEDURE — 36415 COLL VENOUS BLD VENIPUNCTURE: CPT

## 2020-05-22 PROCEDURE — 86850 RBC ANTIBODY SCREEN: CPT

## 2020-05-22 PROCEDURE — 85025 COMPLETE CBC W/AUTO DIFF WBC: CPT

## 2020-05-22 PROCEDURE — 86901 BLOOD TYPING SEROLOGIC RH(D): CPT

## 2020-05-22 PROCEDURE — 86900 BLOOD TYPING SEROLOGIC ABO: CPT

## 2020-05-26 ENCOUNTER — ANESTHESIA EVENT (OUTPATIENT)
Dept: OPERATING ROOM | Age: 75
DRG: 164 | End: 2020-05-26
Payer: MEDICARE

## 2020-05-26 LAB
SARS-COV-2: NOT DETECTED
SOURCE: NORMAL

## 2020-05-26 NOTE — RESULT ENCOUNTER NOTE
Please contact patient with their testing results: Your test for COVID-19, also known as novel coronavirus, came back negative. No virus was detected from the sample collected. Until your symptoms are fully resolved, you may still be contagious. We recommend that you remain isolated for 7 days minimum or 72 hours after your symptoms have completely resolved, whichever is longer. Continually monitor symptoms. Contact a medical provider if symptoms are worsening. If you have any additional questions, contact your PCP.     For additional information, please visit the Centers for Disease Control and Prevention   [email or web address]

## 2020-05-27 ASSESSMENT — LIFESTYLE VARIABLES: SMOKING_STATUS: 0

## 2020-05-27 NOTE — ANESTHESIA PRE PROCEDURE
Department of Anesthesiology  Preprocedure Note       Name:  Neelam Jamison   Age:  70 y.o.  :  1945                                          MRN:  [id number]         Date:  2020      Surgeon: Kaleb Finn):  Antoinette Duong MD    Procedure: Procedure(s):  LEFT LAPAROSCOPIC DIAPHRAGMATIC PLICATION  CPT CODE - 36672, 88393    Medications prior to admission:   Prior to Admission medications    Medication Sig Start Date End Date Taking?  Authorizing Provider   furosemide (LASIX) 40 MG tablet TAKE 1 TABLET BY MOUTH EVERY DAY  Patient taking differently: Take 40 mg by mouth 2 times daily  20  Yes Karoline Mark MD   UNABLE TO FIND VESIVEST TWICE DAILY FOR 20 MINUTES - lung congestion   Yes Historical Provider, MD   ipratropium-albuterol (DUONEB) 0.5-2.5 (3) MG/3ML SOLN nebulizer solution Inhale 3 mLs into the lungs 4 times daily  Patient taking differently: Inhale 1 vial into the lungs 2 times daily  20  Yes Audelia Ponce MD   metoprolol succinate (TOPROL XL) 25 MG extended release tablet Take 1 tablet by mouth daily 3/29/19  Yes Karoline Mark MD   atorvastatin (LIPITOR) 80 MG tablet Take 1 tablet by mouth daily  Patient taking differently: Take 80 mg by mouth nightly  3/29/19  Yes Karoline Mark MD   lisinopril (PRINIVIL;ZESTRIL) 5 MG tablet Take 1 tablet by mouth daily 3/29/19  Yes Karoline Mark MD   Spacer/Aero-Holding Dairl Bachelor 1 Device by Does not apply route daily as needed (sob) 3/15/19  Yes David Truong DO   metFORMIN (GLUCOPHAGE) 500 MG tablet Take 500 mg by mouth daily (with breakfast)   Yes Historical Provider, MD   aspirin EC 81 MG EC tablet Take 1 tablet by mouth daily  Patient taking differently: Take 81 mg by mouth nightly  18  Yes Karoline Mark MD   Glucosamine-Chondroitin (OSTEO BI-FLEX REGULAR STRENGTH PO) Take 1 capsule by mouth daily    Yes Historical Provider, MD   GuaiFENesin (MUCINEX PO) Take 1,200 mg by mouth 2 times daily   Yes Historical

## 2020-05-28 ENCOUNTER — HOSPITAL ENCOUNTER (INPATIENT)
Age: 75
LOS: 1 days | Discharge: HOME HEALTH | DRG: 164 | End: 2020-05-29
Attending: THORACIC SURGERY (CARDIOTHORACIC VASCULAR SURGERY) | Admitting: THORACIC SURGERY (CARDIOTHORACIC VASCULAR SURGERY)
Payer: MEDICARE

## 2020-05-28 ENCOUNTER — APPOINTMENT (OUTPATIENT)
Dept: GENERAL RADIOLOGY | Age: 75
DRG: 164 | End: 2020-05-28
Attending: THORACIC SURGERY (CARDIOTHORACIC VASCULAR SURGERY)
Payer: MEDICARE

## 2020-05-28 ENCOUNTER — ANESTHESIA (OUTPATIENT)
Dept: OPERATING ROOM | Age: 75
DRG: 164 | End: 2020-05-28
Payer: MEDICARE

## 2020-05-28 VITALS — SYSTOLIC BLOOD PRESSURE: 133 MMHG | OXYGEN SATURATION: 97 % | TEMPERATURE: 96.8 F | DIASTOLIC BLOOD PRESSURE: 96 MMHG

## 2020-05-28 PROBLEM — J98.6 DIAPHRAGM PARALYSIS: Status: ACTIVE | Noted: 2020-05-28

## 2020-05-28 LAB
ABO/RH: NORMAL
ANTIBODY SCREEN: NORMAL
GLUCOSE BLD-MCNC: 140 MG/DL (ref 70–99)
GLUCOSE BLD-MCNC: 144 MG/DL (ref 70–99)
GLUCOSE BLD-MCNC: 208 MG/DL (ref 70–99)
HCT VFR BLD CALC: 44.8 % (ref 40.5–52.5)
HEMOGLOBIN: 15.1 G/DL (ref 13.5–17.5)
MCH RBC QN AUTO: 30.4 PG (ref 26–34)
MCHC RBC AUTO-ENTMCNC: 33.8 G/DL (ref 31–36)
MCV RBC AUTO: 90 FL (ref 80–100)
PDW BLD-RTO: 12.9 % (ref 12.4–15.4)
PERFORMED ON: ABNORMAL
PLATELET # BLD: 226 K/UL (ref 135–450)
PMV BLD AUTO: 7.7 FL (ref 5–10.5)
RBC # BLD: 4.98 M/UL (ref 4.2–5.9)
WBC # BLD: 14.5 K/UL (ref 4–11)

## 2020-05-28 PROCEDURE — 2720000010 HC SURG SUPPLY STERILE: Performed by: THORACIC SURGERY (CARDIOTHORACIC VASCULAR SURGERY)

## 2020-05-28 PROCEDURE — 6370000000 HC RX 637 (ALT 250 FOR IP): Performed by: THORACIC SURGERY (CARDIOTHORACIC VASCULAR SURGERY)

## 2020-05-28 PROCEDURE — 71045 X-RAY EXAM CHEST 1 VIEW: CPT

## 2020-05-28 PROCEDURE — 6360000002 HC RX W HCPCS: Performed by: NURSE ANESTHETIST, CERTIFIED REGISTERED

## 2020-05-28 PROCEDURE — 6360000002 HC RX W HCPCS: Performed by: ANESTHESIOLOGY

## 2020-05-28 PROCEDURE — 2500000003 HC RX 250 WO HCPCS: Performed by: NURSE ANESTHETIST, CERTIFIED REGISTERED

## 2020-05-28 PROCEDURE — 2580000003 HC RX 258: Performed by: ANESTHESIOLOGY

## 2020-05-28 PROCEDURE — 3600000005 HC SURGERY LEVEL 5 BASE: Performed by: THORACIC SURGERY (CARDIOTHORACIC VASCULAR SURGERY)

## 2020-05-28 PROCEDURE — 2000000000 HC ICU R&B

## 2020-05-28 PROCEDURE — 6360000002 HC RX W HCPCS: Performed by: THORACIC SURGERY (CARDIOTHORACIC VASCULAR SURGERY)

## 2020-05-28 PROCEDURE — 0BQT4ZZ REPAIR DIAPHRAGM, PERCUTANEOUS ENDOSCOPIC APPROACH: ICD-10-PCS | Performed by: THORACIC SURGERY (CARDIOTHORACIC VASCULAR SURGERY)

## 2020-05-28 PROCEDURE — 7100000000 HC PACU RECOVERY - FIRST 15 MIN: Performed by: THORACIC SURGERY (CARDIOTHORACIC VASCULAR SURGERY)

## 2020-05-28 PROCEDURE — 85027 COMPLETE CBC AUTOMATED: CPT

## 2020-05-28 PROCEDURE — 3700000000 HC ANESTHESIA ATTENDED CARE: Performed by: THORACIC SURGERY (CARDIOTHORACIC VASCULAR SURGERY)

## 2020-05-28 PROCEDURE — 7100000001 HC PACU RECOVERY - ADDTL 15 MIN: Performed by: THORACIC SURGERY (CARDIOTHORACIC VASCULAR SURGERY)

## 2020-05-28 PROCEDURE — 3700000001 HC ADD 15 MINUTES (ANESTHESIA): Performed by: THORACIC SURGERY (CARDIOTHORACIC VASCULAR SURGERY)

## 2020-05-28 PROCEDURE — 94761 N-INVAS EAR/PLS OXIMETRY MLT: CPT

## 2020-05-28 PROCEDURE — 39545 REVISION OF DIAPHRAGM: CPT | Performed by: THORACIC SURGERY (CARDIOTHORACIC VASCULAR SURGERY)

## 2020-05-28 PROCEDURE — 86901 BLOOD TYPING SEROLOGIC RH(D): CPT

## 2020-05-28 PROCEDURE — 2580000003 HC RX 258: Performed by: NURSE ANESTHETIST, CERTIFIED REGISTERED

## 2020-05-28 PROCEDURE — 2500000003 HC RX 250 WO HCPCS

## 2020-05-28 PROCEDURE — 3600000015 HC SURGERY LEVEL 5 ADDTL 15MIN: Performed by: THORACIC SURGERY (CARDIOTHORACIC VASCULAR SURGERY)

## 2020-05-28 PROCEDURE — 86850 RBC ANTIBODY SCREEN: CPT

## 2020-05-28 PROCEDURE — 2709999900 HC NON-CHARGEABLE SUPPLY: Performed by: THORACIC SURGERY (CARDIOTHORACIC VASCULAR SURGERY)

## 2020-05-28 PROCEDURE — 36415 COLL VENOUS BLD VENIPUNCTURE: CPT

## 2020-05-28 PROCEDURE — 2500000003 HC RX 250 WO HCPCS: Performed by: THORACIC SURGERY (CARDIOTHORACIC VASCULAR SURGERY)

## 2020-05-28 PROCEDURE — 86900 BLOOD TYPING SEROLOGIC ABO: CPT

## 2020-05-28 PROCEDURE — 2700000000 HC OXYGEN THERAPY PER DAY

## 2020-05-28 RX ORDER — PROPOFOL 10 MG/ML
INJECTION, EMULSION INTRAVENOUS PRN
Status: DISCONTINUED | OUTPATIENT
Start: 2020-05-28 | End: 2020-05-28 | Stop reason: SDUPTHER

## 2020-05-28 RX ORDER — DEXTROSE, SODIUM CHLORIDE, AND POTASSIUM CHLORIDE 5; .45; .15 G/100ML; G/100ML; G/100ML
INJECTION INTRAVENOUS CONTINUOUS
Status: DISCONTINUED | OUTPATIENT
Start: 2020-05-28 | End: 2020-05-29 | Stop reason: HOSPADM

## 2020-05-28 RX ORDER — PROMETHAZINE HYDROCHLORIDE 25 MG/ML
INJECTION, SOLUTION INTRAMUSCULAR; INTRAVENOUS
Status: DISPENSED
Start: 2020-05-28 | End: 2020-05-29

## 2020-05-28 RX ORDER — OXYCODONE HYDROCHLORIDE AND ACETAMINOPHEN 5; 325 MG/1; MG/1
2 TABLET ORAL PRN
Status: ACTIVE | OUTPATIENT
Start: 2020-05-28 | End: 2020-05-28

## 2020-05-28 RX ORDER — ONDANSETRON 2 MG/ML
4 INJECTION INTRAMUSCULAR; INTRAVENOUS
Status: COMPLETED | OUTPATIENT
Start: 2020-05-28 | End: 2020-05-28

## 2020-05-28 RX ORDER — FENTANYL CITRATE 50 UG/ML
INJECTION, SOLUTION INTRAMUSCULAR; INTRAVENOUS PRN
Status: DISCONTINUED | OUTPATIENT
Start: 2020-05-28 | End: 2020-05-28 | Stop reason: SDUPTHER

## 2020-05-28 RX ORDER — LIDOCAINE HYDROCHLORIDE 10 MG/ML
INJECTION, SOLUTION INFILTRATION; PERINEURAL PRN
Status: DISCONTINUED | OUTPATIENT
Start: 2020-05-28 | End: 2020-05-28 | Stop reason: SDUPTHER

## 2020-05-28 RX ORDER — DEXTROSE, SODIUM CHLORIDE, AND POTASSIUM CHLORIDE 5; .45; .15 G/100ML; G/100ML; G/100ML
INJECTION INTRAVENOUS
Status: COMPLETED
Start: 2020-05-28 | End: 2020-05-28

## 2020-05-28 RX ORDER — PROMETHAZINE HYDROCHLORIDE 25 MG/ML
6.25 INJECTION, SOLUTION INTRAMUSCULAR; INTRAVENOUS EVERY 10 MIN PRN
Status: COMPLETED | OUTPATIENT
Start: 2020-05-28 | End: 2020-05-28

## 2020-05-28 RX ORDER — PROMETHAZINE HYDROCHLORIDE 25 MG/ML
6.25 INJECTION, SOLUTION INTRAMUSCULAR; INTRAVENOUS EVERY 10 MIN PRN
Status: DISCONTINUED | OUTPATIENT
Start: 2020-05-28 | End: 2020-05-28

## 2020-05-28 RX ORDER — CLONIDINE 0.2 MG/24H
1 PATCH, EXTENDED RELEASE TRANSDERMAL WEEKLY
Status: DISCONTINUED | OUTPATIENT
Start: 2020-05-28 | End: 2020-05-29 | Stop reason: HOSPADM

## 2020-05-28 RX ORDER — MORPHINE SULFATE 2 MG/ML
1 INJECTION, SOLUTION INTRAMUSCULAR; INTRAVENOUS EVERY 5 MIN PRN
Status: DISCONTINUED | OUTPATIENT
Start: 2020-05-28 | End: 2020-05-29

## 2020-05-28 RX ORDER — SODIUM CHLORIDE, SODIUM LACTATE, POTASSIUM CHLORIDE, CALCIUM CHLORIDE 600; 310; 30; 20 MG/100ML; MG/100ML; MG/100ML; MG/100ML
INJECTION, SOLUTION INTRAVENOUS CONTINUOUS
Status: DISCONTINUED | OUTPATIENT
Start: 2020-05-28 | End: 2020-05-29

## 2020-05-28 RX ORDER — SODIUM CHLORIDE 0.9 % (FLUSH) 0.9 %
10 SYRINGE (ML) INJECTION EVERY 12 HOURS SCHEDULED
Status: DISCONTINUED | OUTPATIENT
Start: 2020-05-28 | End: 2020-05-29

## 2020-05-28 RX ORDER — ROCURONIUM BROMIDE 10 MG/ML
INJECTION, SOLUTION INTRAVENOUS PRN
Status: DISCONTINUED | OUTPATIENT
Start: 2020-05-28 | End: 2020-05-28 | Stop reason: SDUPTHER

## 2020-05-28 RX ORDER — SODIUM CHLORIDE 0.9 % (FLUSH) 0.9 %
10 SYRINGE (ML) INJECTION PRN
Status: DISCONTINUED | OUTPATIENT
Start: 2020-05-28 | End: 2020-05-29

## 2020-05-28 RX ORDER — ONDANSETRON 2 MG/ML
INJECTION INTRAMUSCULAR; INTRAVENOUS PRN
Status: DISCONTINUED | OUTPATIENT
Start: 2020-05-28 | End: 2020-05-28 | Stop reason: SDUPTHER

## 2020-05-28 RX ORDER — MORPHINE SULFATE 2 MG/ML
2 INJECTION, SOLUTION INTRAMUSCULAR; INTRAVENOUS EVERY 5 MIN PRN
Status: DISCONTINUED | OUTPATIENT
Start: 2020-05-28 | End: 2020-05-29

## 2020-05-28 RX ORDER — OXYCODONE HYDROCHLORIDE AND ACETAMINOPHEN 5; 325 MG/1; MG/1
1 TABLET ORAL PRN
Status: ACTIVE | OUTPATIENT
Start: 2020-05-28 | End: 2020-05-28

## 2020-05-28 RX ORDER — SODIUM CHLORIDE, SODIUM LACTATE, POTASSIUM CHLORIDE, CALCIUM CHLORIDE 600; 310; 30; 20 MG/100ML; MG/100ML; MG/100ML; MG/100ML
INJECTION, SOLUTION INTRAVENOUS CONTINUOUS PRN
Status: DISCONTINUED | OUTPATIENT
Start: 2020-05-28 | End: 2020-05-28 | Stop reason: SDUPTHER

## 2020-05-28 RX ORDER — SCOLOPAMINE TRANSDERMAL SYSTEM 1 MG/1
1 PATCH, EXTENDED RELEASE TRANSDERMAL
Status: DISCONTINUED | OUTPATIENT
Start: 2020-05-28 | End: 2020-05-29 | Stop reason: HOSPADM

## 2020-05-28 RX ADMIN — LIDOCAINE HYDROCHLORIDE 20 MG: 10 INJECTION, SOLUTION INFILTRATION; PERINEURAL at 11:31

## 2020-05-28 RX ADMIN — ROCURONIUM BROMIDE 50 MG: 10 SOLUTION INTRAVENOUS at 11:31

## 2020-05-28 RX ADMIN — POTASSIUM CHLORIDE, DEXTROSE MONOHYDRATE AND SODIUM CHLORIDE: 150; 5; 450 INJECTION, SOLUTION INTRAVENOUS at 15:49

## 2020-05-28 RX ADMIN — FENTANYL CITRATE 100 MCG: 50 INJECTION INTRAMUSCULAR; INTRAVENOUS at 11:31

## 2020-05-28 RX ADMIN — PROMETHAZINE HYDROCHLORIDE 6.25 MG: 25 INJECTION INTRAMUSCULAR; INTRAVENOUS at 18:08

## 2020-05-28 RX ADMIN — PROPOFOL 200 MG: 10 INJECTION, EMULSION INTRAVENOUS at 11:31

## 2020-05-28 RX ADMIN — HYDROMORPHONE HYDROCHLORIDE 0.5 MG: 1 INJECTION, SOLUTION INTRAMUSCULAR; INTRAVENOUS; SUBCUTANEOUS at 15:08

## 2020-05-28 RX ADMIN — SODIUM CHLORIDE, POTASSIUM CHLORIDE, SODIUM LACTATE AND CALCIUM CHLORIDE: 600; 310; 30; 20 INJECTION, SOLUTION INTRAVENOUS at 11:31

## 2020-05-28 RX ADMIN — HYDROMORPHONE HYDROCHLORIDE 0.5 MG: 1 INJECTION, SOLUTION INTRAMUSCULAR; INTRAVENOUS; SUBCUTANEOUS at 14:01

## 2020-05-28 RX ADMIN — ROCURONIUM BROMIDE 50 MG: 10 SOLUTION INTRAVENOUS at 12:36

## 2020-05-28 RX ADMIN — PROMETHAZINE HYDROCHLORIDE 6.25 MG: 25 INJECTION INTRAMUSCULAR; INTRAVENOUS at 14:38

## 2020-05-28 RX ADMIN — MORPHINE SULFATE 2 MG: 2 INJECTION, SOLUTION INTRAMUSCULAR; INTRAVENOUS at 21:47

## 2020-05-28 RX ADMIN — Medication 10 ML: at 22:20

## 2020-05-28 RX ADMIN — ONDANSETRON 4 MG: 2 INJECTION INTRAMUSCULAR; INTRAVENOUS at 14:01

## 2020-05-28 RX ADMIN — Medication 3 G: at 11:31

## 2020-05-28 RX ADMIN — SUGAMMADEX 300 MG: 100 INJECTION, SOLUTION INTRAVENOUS at 13:40

## 2020-05-28 RX ADMIN — ROCURONIUM BROMIDE 50 MG: 10 SOLUTION INTRAVENOUS at 12:02

## 2020-05-28 RX ADMIN — HYDROMORPHONE HYDROCHLORIDE 0.5 MG: 1 INJECTION, SOLUTION INTRAMUSCULAR; INTRAVENOUS; SUBCUTANEOUS at 18:06

## 2020-05-28 RX ADMIN — ONDANSETRON 4 MG: 2 INJECTION INTRAMUSCULAR; INTRAVENOUS at 11:31

## 2020-05-28 ASSESSMENT — PULMONARY FUNCTION TESTS
PIF_VALUE: 38
PIF_VALUE: 28
PIF_VALUE: 28
PIF_VALUE: 30
PIF_VALUE: 43
PIF_VALUE: 26
PIF_VALUE: 34
PIF_VALUE: 43
PIF_VALUE: 26
PIF_VALUE: 30
PIF_VALUE: 27
PIF_VALUE: 44
PIF_VALUE: 1
PIF_VALUE: 42
PIF_VALUE: 41
PIF_VALUE: 35
PIF_VALUE: 35
PIF_VALUE: 43
PIF_VALUE: 24
PIF_VALUE: 0
PIF_VALUE: 2
PIF_VALUE: 29
PIF_VALUE: 44
PIF_VALUE: 26
PIF_VALUE: 35
PIF_VALUE: 25
PIF_VALUE: 49
PIF_VALUE: 7
PIF_VALUE: 22
PIF_VALUE: 41
PIF_VALUE: 33
PIF_VALUE: 27
PIF_VALUE: 26
PIF_VALUE: 1
PIF_VALUE: 0
PIF_VALUE: 1
PIF_VALUE: 37
PIF_VALUE: 37
PIF_VALUE: 35
PIF_VALUE: 34
PIF_VALUE: 31
PIF_VALUE: 37
PIF_VALUE: 44
PIF_VALUE: 58
PIF_VALUE: 1
PIF_VALUE: 9
PIF_VALUE: 41
PIF_VALUE: 32
PIF_VALUE: 37
PIF_VALUE: 39
PIF_VALUE: 37
PIF_VALUE: 38
PIF_VALUE: 36
PIF_VALUE: 35
PIF_VALUE: 43
PIF_VALUE: 31
PIF_VALUE: 44
PIF_VALUE: 39
PIF_VALUE: 46
PIF_VALUE: 30
PIF_VALUE: 41
PIF_VALUE: 43
PIF_VALUE: 43
PIF_VALUE: 3
PIF_VALUE: 35
PIF_VALUE: 41
PIF_VALUE: 42
PIF_VALUE: 45
PIF_VALUE: 29
PIF_VALUE: 40
PIF_VALUE: 41
PIF_VALUE: 28
PIF_VALUE: 41
PIF_VALUE: 36
PIF_VALUE: 42
PIF_VALUE: 32
PIF_VALUE: 34
PIF_VALUE: 42
PIF_VALUE: 27
PIF_VALUE: 26
PIF_VALUE: 43
PIF_VALUE: 26
PIF_VALUE: 26
PIF_VALUE: 25
PIF_VALUE: 30
PIF_VALUE: 26
PIF_VALUE: 25
PIF_VALUE: 40
PIF_VALUE: 26
PIF_VALUE: 36
PIF_VALUE: 3
PIF_VALUE: 32
PIF_VALUE: 44
PIF_VALUE: 0
PIF_VALUE: 25
PIF_VALUE: 38
PIF_VALUE: 47
PIF_VALUE: 26
PIF_VALUE: 36
PIF_VALUE: 30
PIF_VALUE: 31
PIF_VALUE: 30
PIF_VALUE: 31
PIF_VALUE: 40
PIF_VALUE: 39
PIF_VALUE: 26
PIF_VALUE: 30
PIF_VALUE: 43
PIF_VALUE: 8
PIF_VALUE: 6
PIF_VALUE: 30
PIF_VALUE: 3
PIF_VALUE: 41
PIF_VALUE: 40
PIF_VALUE: 35
PIF_VALUE: 27
PIF_VALUE: 45
PIF_VALUE: 33
PIF_VALUE: 27
PIF_VALUE: 7
PIF_VALUE: 36
PIF_VALUE: 36
PIF_VALUE: 41
PIF_VALUE: 42
PIF_VALUE: 41
PIF_VALUE: 1
PIF_VALUE: 25
PIF_VALUE: 34
PIF_VALUE: 30
PIF_VALUE: 1
PIF_VALUE: 42
PIF_VALUE: 42
PIF_VALUE: 57
PIF_VALUE: 0
PIF_VALUE: 43
PIF_VALUE: 0
PIF_VALUE: 31
PIF_VALUE: 1
PIF_VALUE: 34
PIF_VALUE: 33
PIF_VALUE: 26
PIF_VALUE: 26
PIF_VALUE: 41
PIF_VALUE: 27
PIF_VALUE: 37
PIF_VALUE: 1

## 2020-05-28 ASSESSMENT — PAIN - FUNCTIONAL ASSESSMENT: PAIN_FUNCTIONAL_ASSESSMENT: 0-10

## 2020-05-28 ASSESSMENT — PAIN SCALES - GENERAL
PAINLEVEL_OUTOF10: 9
PAINLEVEL_OUTOF10: 7
PAINLEVEL_OUTOF10: 6
PAINLEVEL_OUTOF10: 7

## 2020-05-28 ASSESSMENT — ENCOUNTER SYMPTOMS: SHORTNESS OF BREATH: 1

## 2020-05-28 NOTE — ANESTHESIA POSTPROCEDURE EVALUATION
Department of Anesthesiology  Postprocedure Note    Patient: Kaushik Duncan  MRN: [id number]  YOB: 1945  Date of evaluation: 5/28/2020  Time:  3:11 PM     Procedure Summary     Date:  05/28/20 Room / Location:  David Mullen [address] [place] [address]    Anesthesia Start:  0909 Anesthesia Stop:  7881    Procedure:  LEFT LAPAROSCOPIC DIAPHRAGMATIC PLICATION  CPT CODE - 64515, [id number] (Left Chest) Diagnosis:       Diaphragmitis      (LEFT DIAPHRAGMATIC PARALYSIS)    Surgeon:  Lis Nolasco MD Responsible Provider:  Philip Barrera MD    Anesthesia Type:  general ASA Status:  3          Anesthesia Type: general    Kamran Phase I: Kamran Score: 8    Kamran Phase II:      Last vitals: Reviewed and per EMR   Vitals:    05/28/20 1400 05/28/20 1401 05/28/20 1415 05/28/20 1430   BP: (!) 164/79 (!) 164/79 (!) 161/76    Pulse: 67 63 64 63   Resp: 14 18 25 18   Temp:  96.6 °F (35.9 °C)     TempSrc:  Temporal     SpO2: 93% 95% 92% 94%   Weight:       Height:         Anesthesia Post Evaluation    Patient location during evaluation: bedside  Patient participation: complete - patient participated  Level of consciousness: awake and alert  Airway patency: patent  Nausea & Vomiting: nausea (improving, zofran and phenergan)  Complications: no  Cardiovascular status: hemodynamically stable  Respiratory status: acceptable  Hydration status: euvolemic

## 2020-05-29 ENCOUNTER — APPOINTMENT (OUTPATIENT)
Dept: GENERAL RADIOLOGY | Age: 75
DRG: 164 | End: 2020-05-29
Attending: THORACIC SURGERY (CARDIOTHORACIC VASCULAR SURGERY)
Payer: MEDICARE

## 2020-05-29 VITALS
HEIGHT: 72 IN | DIASTOLIC BLOOD PRESSURE: 55 MMHG | OXYGEN SATURATION: 91 % | TEMPERATURE: 98.1 F | WEIGHT: 263.23 LBS | SYSTOLIC BLOOD PRESSURE: 116 MMHG | HEART RATE: 79 BPM | BODY MASS INDEX: 35.65 KG/M2 | RESPIRATION RATE: 26 BRPM

## 2020-05-29 LAB
GLUCOSE BLD-MCNC: 160 MG/DL (ref 70–99)
PERFORMED ON: ABNORMAL

## 2020-05-29 PROCEDURE — 94761 N-INVAS EAR/PLS OXIMETRY MLT: CPT

## 2020-05-29 PROCEDURE — 71045 X-RAY EXAM CHEST 1 VIEW: CPT

## 2020-05-29 PROCEDURE — 6360000002 HC RX W HCPCS: Performed by: THORACIC SURGERY (CARDIOTHORACIC VASCULAR SURGERY)

## 2020-05-29 PROCEDURE — 2700000000 HC OXYGEN THERAPY PER DAY

## 2020-05-29 PROCEDURE — 99024 POSTOP FOLLOW-UP VISIT: CPT | Performed by: THORACIC SURGERY (CARDIOTHORACIC VASCULAR SURGERY)

## 2020-05-29 PROCEDURE — 94640 AIRWAY INHALATION TREATMENT: CPT

## 2020-05-29 PROCEDURE — 6370000000 HC RX 637 (ALT 250 FOR IP): Performed by: THORACIC SURGERY (CARDIOTHORACIC VASCULAR SURGERY)

## 2020-05-29 PROCEDURE — 2580000003 HC RX 258: Performed by: THORACIC SURGERY (CARDIOTHORACIC VASCULAR SURGERY)

## 2020-05-29 PROCEDURE — 2500000003 HC RX 250 WO HCPCS: Performed by: THORACIC SURGERY (CARDIOTHORACIC VASCULAR SURGERY)

## 2020-05-29 PROCEDURE — 94669 MECHANICAL CHEST WALL OSCILL: CPT

## 2020-05-29 RX ORDER — ALBUTEROL SULFATE 90 UG/1
2 AEROSOL, METERED RESPIRATORY (INHALATION) 4 TIMES DAILY PRN
Status: DISCONTINUED | OUTPATIENT
Start: 2020-05-29 | End: 2020-05-29 | Stop reason: HOSPADM

## 2020-05-29 RX ORDER — SODIUM CHLORIDE 0.9 % (FLUSH) 0.9 %
10 SYRINGE (ML) INJECTION EVERY 12 HOURS SCHEDULED
Status: DISCONTINUED | OUTPATIENT
Start: 2020-05-29 | End: 2020-05-29 | Stop reason: HOSPADM

## 2020-05-29 RX ORDER — OXYCODONE HYDROCHLORIDE AND ACETAMINOPHEN 5; 325 MG/1; MG/1
2 TABLET ORAL EVERY 4 HOURS PRN
Status: DISCONTINUED | OUTPATIENT
Start: 2020-05-29 | End: 2020-05-29 | Stop reason: HOSPADM

## 2020-05-29 RX ORDER — METOPROLOL SUCCINATE 25 MG/1
25 TABLET, EXTENDED RELEASE ORAL DAILY
Status: DISCONTINUED | OUTPATIENT
Start: 2020-05-29 | End: 2020-05-29 | Stop reason: HOSPADM

## 2020-05-29 RX ORDER — POLYETHYLENE GLYCOL 3350 17 G/17G
17 POWDER, FOR SOLUTION ORAL DAILY
Status: DISCONTINUED | OUTPATIENT
Start: 2020-05-29 | End: 2020-05-29 | Stop reason: HOSPADM

## 2020-05-29 RX ORDER — CETIRIZINE HYDROCHLORIDE 10 MG/1
10 TABLET ORAL DAILY
Status: DISCONTINUED | OUTPATIENT
Start: 2020-05-29 | End: 2020-05-29 | Stop reason: HOSPADM

## 2020-05-29 RX ORDER — OXYCODONE HYDROCHLORIDE 5 MG/1
5 TABLET ORAL EVERY 4 HOURS PRN
Status: DISCONTINUED | OUTPATIENT
Start: 2020-05-29 | End: 2020-05-29

## 2020-05-29 RX ORDER — IPRATROPIUM BROMIDE AND ALBUTEROL SULFATE 2.5; .5 MG/3ML; MG/3ML
1 SOLUTION RESPIRATORY (INHALATION) 2 TIMES DAILY
Status: DISCONTINUED | OUTPATIENT
Start: 2020-05-29 | End: 2020-05-29

## 2020-05-29 RX ORDER — MORPHINE SULFATE 2 MG/ML
2 INJECTION, SOLUTION INTRAMUSCULAR; INTRAVENOUS
Status: DISCONTINUED | OUTPATIENT
Start: 2020-05-29 | End: 2020-05-29 | Stop reason: HOSPADM

## 2020-05-29 RX ORDER — ATORVASTATIN CALCIUM 80 MG/1
80 TABLET, FILM COATED ORAL NIGHTLY
Status: DISCONTINUED | OUTPATIENT
Start: 2020-05-29 | End: 2020-05-29 | Stop reason: HOSPADM

## 2020-05-29 RX ORDER — ACETAMINOPHEN 325 MG/1
650 TABLET ORAL EVERY 4 HOURS PRN
Status: DISCONTINUED | OUTPATIENT
Start: 2020-05-29 | End: 2020-05-29 | Stop reason: HOSPADM

## 2020-05-29 RX ORDER — MORPHINE SULFATE 4 MG/ML
4 INJECTION, SOLUTION INTRAMUSCULAR; INTRAVENOUS
Status: DISCONTINUED | OUTPATIENT
Start: 2020-05-29 | End: 2020-05-29 | Stop reason: HOSPADM

## 2020-05-29 RX ORDER — ASPIRIN 81 MG/1
81 TABLET ORAL DAILY
Status: DISCONTINUED | OUTPATIENT
Start: 2020-05-29 | End: 2020-05-29 | Stop reason: HOSPADM

## 2020-05-29 RX ORDER — SODIUM CHLORIDE 0.9 % (FLUSH) 0.9 %
10 SYRINGE (ML) INJECTION PRN
Status: DISCONTINUED | OUTPATIENT
Start: 2020-05-29 | End: 2020-05-29 | Stop reason: HOSPADM

## 2020-05-29 RX ORDER — ALBUTEROL SULFATE 2.5 MG/3ML
2.5 SOLUTION RESPIRATORY (INHALATION)
Status: DISCONTINUED | OUTPATIENT
Start: 2020-05-29 | End: 2020-05-29 | Stop reason: HOSPADM

## 2020-05-29 RX ORDER — OXYCODONE HYDROCHLORIDE AND ACETAMINOPHEN 5; 325 MG/1; MG/1
1 TABLET ORAL EVERY 4 HOURS PRN
Qty: 28 TABLET | Refills: 0 | Status: SHIPPED | OUTPATIENT
Start: 2020-05-29 | End: 2020-06-05

## 2020-05-29 RX ORDER — LISINOPRIL 5 MG/1
5 TABLET ORAL DAILY
Status: DISCONTINUED | OUTPATIENT
Start: 2020-05-29 | End: 2020-05-29 | Stop reason: HOSPADM

## 2020-05-29 RX ORDER — OXYCODONE HYDROCHLORIDE 5 MG/1
10 TABLET ORAL EVERY 4 HOURS PRN
Status: DISCONTINUED | OUTPATIENT
Start: 2020-05-29 | End: 2020-05-29

## 2020-05-29 RX ORDER — FUROSEMIDE 40 MG/1
40 TABLET ORAL 2 TIMES DAILY
Status: DISCONTINUED | OUTPATIENT
Start: 2020-05-29 | End: 2020-05-29 | Stop reason: HOSPADM

## 2020-05-29 RX ORDER — OXYCODONE HYDROCHLORIDE AND ACETAMINOPHEN 5; 325 MG/1; MG/1
1 TABLET ORAL EVERY 4 HOURS PRN
Status: DISCONTINUED | OUTPATIENT
Start: 2020-05-29 | End: 2020-05-29 | Stop reason: HOSPADM

## 2020-05-29 RX ADMIN — POTASSIUM CHLORIDE, DEXTROSE MONOHYDRATE AND SODIUM CHLORIDE: 150; 5; 450 INJECTION, SOLUTION INTRAVENOUS at 01:23

## 2020-05-29 RX ADMIN — OXYCODONE HYDROCHLORIDE AND ACETAMINOPHEN 1 TABLET: 5; 325 TABLET ORAL at 13:04

## 2020-05-29 RX ADMIN — FUROSEMIDE 40 MG: 40 TABLET ORAL at 07:58

## 2020-05-29 RX ADMIN — LISINOPRIL 5 MG: 5 TABLET ORAL at 07:58

## 2020-05-29 RX ADMIN — POLYETHYLENE GLYCOL 3350 17 G: 17 POWDER, FOR SOLUTION ORAL at 07:59

## 2020-05-29 RX ADMIN — MUPIROCIN: 20 OINTMENT TOPICAL at 07:58

## 2020-05-29 RX ADMIN — CETIRIZINE HYDROCHLORIDE 10 MG: 10 TABLET, FILM COATED ORAL at 07:58

## 2020-05-29 RX ADMIN — METOPROLOL SUCCINATE 25 MG: 25 TABLET, EXTENDED RELEASE ORAL at 07:58

## 2020-05-29 RX ADMIN — METFORMIN HYDROCHLORIDE 500 MG: 500 TABLET ORAL at 07:58

## 2020-05-29 RX ADMIN — ALBUTEROL SULFATE 2.5 MG: 2.5 SOLUTION RESPIRATORY (INHALATION) at 11:29

## 2020-05-29 RX ADMIN — APIXABAN 5 MG: 5 TABLET, FILM COATED ORAL at 07:59

## 2020-05-29 RX ADMIN — Medication 10 ML: at 08:00

## 2020-05-29 ASSESSMENT — PAIN SCALES - GENERAL
PAINLEVEL_OUTOF10: 2
PAINLEVEL_OUTOF10: 0
PAINLEVEL_OUTOF10: 1
PAINLEVEL_OUTOF10: 0
PAINLEVEL_OUTOF10: 4

## 2020-05-29 NOTE — PLAN OF CARE
parameters  Outcome: Ongoing     Problem: Skin Integrity - Impaired:  Goal: Will show no infection signs and symptoms  Description: Will show no infection signs and symptoms  Outcome: Ongoing     Problem: Tissue Perfusion, Altered:  Goal: Circulatory function within specified parameters  Description: Circulatory function within specified parameters  Outcome: Ongoing     Problem: Tissue Perfusion - Cardiopulmonary, Altered:  Goal: Absence of angina  Description: Absence of angina  Outcome: Ongoing     Problem: Tissue Perfusion - Cardiopulmonary, Altered:  Goal: Hemodynamic stability will improve  Description: Hemodynamic stability will improve  Outcome: Ongoing     Problem: OXYGENATION/RESPIRATORY FUNCTION  Goal: Patient will maintain patent airway  Outcome: Ongoing     Problem: OXYGENATION/RESPIRATORY FUNCTION  Goal: Patient will achieve/maintain normal respiratory rate/effort  Description: Respiratory rate and effort will be within normal limits for the patient  Outcome: Ongoing     Patient's EF (Ejection Fraction) is greater than 40%    Heart Failure Medications:  Diuretics[de-identified] Furosemide    (One of the following REQUIRED for EF <40%/SYSTOLIC FAILURE but MAY be used in EF% >40%/DIASTOLIC FAILURE)        ACE[de-identified] Lisinopril        ARB[de-identified] None         ARNI[de-identified] None    (Beta Blockers)  NON- Evidenced Based Beta Blocker (for EF% >40%/DIASTOLIC FAILURE): Metoprolol TARTrate- Lopressor    . .................................................................................................................................................. Patient's Last Weight: 263lbs obtained by bed scale.      Intake/Output Summary (Last 24 hours) at 5/29/2020 0226  Last data filed at 5/29/2020 0214  Gross per 24 hour   Intake 1402 ml   Output 550 ml   Net 852 ml       Comorbidities Reviewed Yes  Patient has a past medical history of Bronchitis, CAD (coronary artery disease), Cardiomyopathy ([facility] CHF (congestive heart failure) ([facility] COPD

## 2020-05-29 NOTE — DISCHARGE SUMMARY
DISCHARGE SUMMARY    Admission Date:  2020  9:45 AM  Discharge Date:  2020        Principle Diagnosis: Diaphragmatic paralysis  Secondary diagnosis  1. Hypertension  2. Hyperlipidemia  3. History of alcohol abuse  4. Status post coronary artery bypass x3  5. Paroxysmal atrial fibrillation  6. COPD  7.   Sleep apnea  Past Medical History:  Past Medical History:   Diagnosis Date    Bronchitis     chronic bronchitis once or twice per year twice has gone into pneumonia    CAD (coronary artery disease) 07/10/2017    + Stress, multi vessel CAD    [facility] 2017    EF 35-40%    CHF (congestive [facility] failure) ([facility] 2017    COPD [facility]) [facility]     Diabetes ([facility] 2017    Diaphragmatic paralysis     left    Diastolic dysfunction     Family history of early CAD     father  at 48 of heart attack    Former smoker     High cholesterol 2017    Hypertension     Knee replacement     Right     Obesity, Class II, BMI 35-39.9, with comorbidity     Pneumonia        Past Surgical History:  Past Surgical History:   Procedure Laterality Date    APPENDECTOMY      BRONCHOSCOPY N/A 2020    BRONCHOSCOPY ALVEOLAR LAVAGE performed by Leni Burns MD at [address] [place] [address]  2020    BRONCHOSCOPY [facility] performed by Leni Burns MD at [place] [address]  07/10/2017    Dr. Trista Crocker - multi-vessel CAD, referred for CABG    CARPAL TUNNEL RELEASE Bilateral     CORONARY ANGIOPLASTY WITH STENT PLACEMENT  2018    YUVAL- 2.75 x 18 pRCA    CORONARY ARTERY BYPASS GRAFT  2017    Dr. Nelia Stallings - x4 (LIMA-LAD, reverse R SVG-diagonal, OM & PDA) w/placement of temporary pacing wire    MENISCECTOMY Right 1965    THORACOSCOPY Left 2020    LEFT LAPAROSCOPIC DIAPHRAGMATIC PLICATION  CPT CODE - 68680, 37955 performed by Michelle Sidhu MD at [place] [address] TONSILLECTOMY      TOTAL KNEE ARTHROPLASTY Right     TRANSESOPHAGEAL ECHOCARDIOGRAM  07/18/2017    during CABG       Procedure:  Procedure(s):  LEFT LAPAROSCOPIC DIAPHRAGMATIC PLICATION     History:  The patient is a 70 y.o. male underwent emergency heart surgery roughly 3 years ago since then found to have significant shortness of breath required for a total investigation found to have left diaphragmatic paralysis      Hospital Course: Patient did very well overnight no major complaint or event tolerated his p.o. well      Day of discharge 5/29/2020    Disposition: stable home    Patient Instructions:   [place] [facility]   Home Medication Instructions [facility]:[id number]    Printed on:05/29/20 1148   Medication Information                      albuterol sulfate  (90 Base) MCG/ACT inhaler  Inhale 2 puffs into the lungs 4 times daily as needed for Wheezing             apixaban (ELIQUIS) 5 MG TABS tablet  TAKE 1 TABLET BY MOUTH TWICE A DAY             aspirin EC 81 MG EC tablet  Take 1 tablet by mouth daily             atorvastatin (LIPITOR) 80 MG tablet  Take 1 tablet by mouth daily             cetirizine (ZYRTEC) 10 MG tablet  Take 10 mg by mouth daily.                fish oil-omega-3 fatty acids 1000 MG capsule  Take 1 g by mouth nightly              furosemide (LASIX) 40 MG tablet  TAKE 1 TABLET BY MOUTH EVERY DAY             Glucosamine-Chondroitin (OSTEO BI-FLEX REGULAR STRENGTH PO)  Take 1 capsule by mouth daily              GuaiFENesin (MUCINEX PO)  Take 1,200 mg by mouth 2 times daily             ipratropium-albuterol (DUONEB) 0.5-2.5 (3) MG/3ML SOLN nebulizer solution  Inhale 3 mLs into the lungs 4 times daily             lisinopril (PRINIVIL;ZESTRIL) 5 MG tablet  Take 1 tablet by mouth daily             Lysine 1000 MG TABS  Take 1 tablet by mouth Daily              metFORMIN (GLUCOPHAGE) 500 MG tablet  Take 500 mg by mouth daily (with breakfast)             metoprolol succinate (TOPROL XL) 25 MG extended release tablet  Take 1 tablet by mouth daily             Multiple Vitamin (MULTI-VITAMIN PO)  Take 1 tablet by mouth daily              oxyCODONE-acetaminophen (PERCOCET) 5-325 MG per tablet  Take 1 tablet by mouth every 4 hours as needed for Pain for up to 7 days. Probiotic Product (PROBIOTIC DAILY PO)  Take by mouth             Spacer/Aero-Holding Chambers LETA  1 Device by Does not apply route daily as needed (sob)             UNABLE TO FIND  VESIVEST TWICE DAILY FOR 20 MINUTES - lung congestion               Activity:  No heavy lifting (over 10 lbs) or driving until seen in the office  Diet: cardiac  Wound Care: none    Follow up with Dr. Jenny Velez in 1 week with CXR  and 3 week . Please call the office at [phone] to make an appointment.     MD Sy Duarte MD FACS  5/29/2020  11:49 AM

## 2020-05-29 NOTE — CARE COORDINATION
Writer aware of discharge orders. Pt lives in a two story house alone. Stays on the first floor. Has a HHN at home. No other DME. Discussed HHC. Pt agreeable without preference on agency. Pt stated his daughter Vivian Valdez is going to stay with him at discharge. CASE MANAGEMENT DISCHARGE SUMMARY      Discharge to: home with [address] [facility] ordered/agency: none    Transportation: private   Family/car: daughter to transport home. Confirmed discharge plan with:   Patient: yes   Family, name and contact number: pt has already spoken to daughter and she is waiting outside for discharge   Facility/Agency, name:  [facility] faxed to [address] [place] RN, name: Lennie Thomas RN aware of discharge plans. Note: Discharging nurse to complete [facility], reconcile [facility]S, and place final copy with patient's discharge packet. RN to ensure that written prescriptions for  Level II medications are sent with patient to the facility as per protocol.     Piotr Urena RN

## 2020-05-29 NOTE — DISCHARGE INSTR - COC
Continuity of Care Form    Patient Name: Shira Thomas   :  1945  MRN:  [id number]    Admit date:  2020  Discharge date: 2020    Code Status Order: Full Code   Advance Directives:   [facility] Documentation     Date/Time Healthcare Directive Type of Healthcare Directive Copy in [address] Agent's Name Healthcare Agent's Phone Number    20 1024  Yes, patient has an advance directive for healthcare treatment  --  Yes, copy in chart  --  -- --    20 1229  --  --  Yes, copy in chart SCANNED [facility] 2017  --  -- --    20 1222  Yes, patient has an advance directive for healthcare treatment  Living will;Durable power of  for health care  --  Adult [facility] --          Admitting Physician:  Gabriela Naidu MD  PCP: Janette Hirsch MD    Discharging Nurse: Amena Gaytan MSN, RN  [address] Unit/Room#: 6545/4815-25  Discharging Unit Phone Number: [phone]    Emergency Contact:   Extended Emergency Contact Information  Primary Emergency Contact: Alice Love [facility] [address] [facility] Phone: [phone]  Relation: Child  Secondary Emergency Contact: luis hooker  [place] Phone: [phone]  Mobile Phone: [phone]  Relation: Child    Past Surgical History:  Past Surgical History:   Procedure Laterality Date    APPENDECTOMY      BRONCHOSCOPY N/A 2020    BRONCHOSCOPY ALVEOLAR LAVAGE performed by Blanca Raymond MD at [address] [place] [address]  2020    BRONCHOSCOPY THERAPUTIC ASPIRATION INITIAL performed by Blanca Raymond MD at [place] [address]  07/10/2017    Dr. Mazin Doss - multi-vessel CAD, referred for CABG    CARPAL TUNNEL RELEASE Bilateral     CORONARY ANGIOPLASTY WITH STENT PLACEMENT  2018    YUVAL- 2.75 x 18 pRCA    CORONARY ARTERY BYPASS GRAFT  2017    Dr. Abraham Mae - x4 (LIMA-LAD, reverse R SVG-diagonal, OM &

## 2020-05-29 NOTE — PROGRESS NOTES
4 Eyes Skin Assessment     The patient is being assess for   Admission    I agree that 2 RN's have performed a thorough Head to Toe Skin Assessment on the patient. ALL assessment sites listed below have been assessed. Areas assessed by both nurses:   [x]   Head, Face, and Ears   [x]   Shoulders, Back, and Chest, Abdomen  [x]   Arms, Elbows, and Hands   [x]   Coccyx, Sacrum, and Ischium  [x]   Legs, Feet, and Heels        Surgical incisions    **SHARE this note so that the co-signing nurse is able to place an eSignature**    Co-signer eSignature: Electronically signed by Jorge Werner RN on 5/29/20 at 1:48 AM EDT    Does the Patient have Skin Breakdown?   No          Jesús Prevention initiated:  Yes   Wound Care Orders initiated:  No      [facility] nurse consulted for Pressure Injury (Stage 3,4, Unstageable, DTI, NWPT, Complex wounds)and New or Established Ostomies:  No      Primary Nurse eSignature: Electronically signed by Maria Esther Huber RN on 5/28/20 at 9:37 PM EDT

## 2020-06-02 NOTE — OP NOTE
chest.   On inspection, we could see significant atelectasis to the left lower  lobe. Insufflation of the lung was performed under direct vision, and we could  clearly see the right lower lobe coming up. Insufflation was performed  while we were inflating the lung holding the pressure. The introducer  was removed out of the chest.  The counts were told as correct. Allis  clamp was removed. A 12-mm port was closed by using a figure-of-eight  new stitch. The patient was dispositioned to the recovery room in  stable condition with no complication. Counts were told as correct x2.         Trevor Lopes MD    D: 06/01/2020 9:07:39       T: 06/01/2020 15:30:10     [email or web address]  Job#: [id number]     Doc#: [id number]    CC:

## 2020-06-03 ENCOUNTER — TELEPHONE (OUTPATIENT)
Dept: CARDIOTHORACIC SURGERY | Age: 75
End: 2020-06-03

## 2020-06-03 RX ORDER — ONDANSETRON 4 MG/1
4 TABLET, FILM COATED ORAL EVERY 8 HOURS PRN
Qty: 30 TABLET | Refills: 0 | OUTPATIENT
Start: 2020-06-03 | End: 2021-03-29 | Stop reason: CLARIF

## 2020-06-10 RX ORDER — METOPROLOL SUCCINATE 25 MG/1
TABLET, EXTENDED RELEASE ORAL
Qty: 90 TABLET | Refills: 3 | Status: SHIPPED | OUTPATIENT
Start: 2020-06-10 | End: 2020-08-31 | Stop reason: SDUPTHER

## 2020-06-15 ENCOUNTER — OFFICE VISIT (OUTPATIENT)
Dept: CARDIOTHORACIC SURGERY | Age: 75
End: 2020-06-15

## 2020-06-15 VITALS
WEIGHT: 267 LBS | OXYGEN SATURATION: 96 % | SYSTOLIC BLOOD PRESSURE: 142 MMHG | BODY MASS INDEX: 36.16 KG/M2 | DIASTOLIC BLOOD PRESSURE: 70 MMHG | HEIGHT: 72 IN | TEMPERATURE: 97.3 F | HEART RATE: 80 BPM

## 2020-06-15 PROCEDURE — 99024 POSTOP FOLLOW-UP VISIT: CPT | Performed by: THORACIC SURGERY (CARDIOTHORACIC VASCULAR SURGERY)

## 2020-06-15 NOTE — PROGRESS NOTES
[facility]  Clinic Note     6/15/2020 9:18 AM  Surgeon:  Sera Ramirez     S/P :  Lap diaphragm plication on 6/74/09. Chief complaint : 1st post op   Subjective:  Mr. Isaías Roldan is doing very well post operatively. He denies pain. Has noticed improvement in his shortness of breath. Significant improvement of shortness of breath patient feels significantly better he does not think about breathing anymore  Blood sugars running around 125 daily. Vital Signs: BP (!) 142/70 (Site: Left Upper Arm, Position: Sitting, Cuff Size: Medium Adult)   Pulse 80   Temp 97.3 °F (36.3 °C) (Infrared)   Ht 6' (1.829 m)   Wt 267 lb (121.1 kg)   SpO2 96%   BMI 36.21 kg/m²      I/O:  No intake or output data in the 24 hours ending 06/15/20 0918    Exam:   Cardiovascular: S1 plus S2 +0 no additional sound  Pulmonary: Bilaterally clear no additional sound  Incision: Incision is dry and clean  Labs:   CBC: No results for input(s): WBC, HGB, HCT, MCV, PLT in the last 72 hours. BMP: No results for input(s): NA, K, CL, CO2, PHOS, BUN, CREATININE in the last 72 hours. Invalid input(s): CA  PT/INR: No results for input(s): PROTIME, INR in the last 72 hours. APTT: No results for input(s): APTT in the last 72 hours.     Scheduled Meds:     Patient Active Problem List   Diagnosis    HTN (hypertension)    Hyperlipidemia    Allergic rhinitis    Chest pain    Abnormal stress test    Cardiomyopathy ([address]    Coronary artery disease of native artery of native heart with stable angina pectoris ([address]    S/P CABG x 3    Hx of tobacco use, presenting hazards to health    Palpitations    SOB (shortness of breath)    Acute on chronic diastolic congestive heart failure (HCC)    Acid reflux    History of total knee replacement    Vasovagal syncope    Paroxysmal atrial fibrillation (HCC)    Pericardial effusion    Acute pericarditis    Panic disorder    Obesity, Class II, BMI 35-39.9, with comorbidity    [facility]STEMI (non-ST elevated myocardial infarction) [facility]    Simple chronic bronchitis (HCC)    Restrictive lung disease    Class 2 obesity in adult    Suspected sleep apnea    LVH (left ventricular hypertrophy)    Diastolic dysfunction    Former smoker    COPD exacerbation ([facility]    RHONDA (obstructive sleep apnea)    Acute respiratory failure with hypoxia (HCC)    Diaphragm paralysis       Assessment/Plan:   1. Patient will obtain chest x-ray in 4 weeks  2. Refer back to pulmonary Dr. Jc Huber  3.  I will call the patient if there there is any issue with his x-ray follow-up with us on a as needed basis    Pascal Peabody, MD FACS

## 2020-07-15 ENCOUNTER — TELEPHONE (OUTPATIENT)
Dept: CARDIOTHORACIC SURGERY | Age: 75
End: 2020-07-15

## 2020-07-15 NOTE — TELEPHONE ENCOUNTER
Dr. Denise Masterson ordered patient to have a chest xray at some point either last week or this week. Patient has not gotten this done yet. I reached out to patient to remind him and to see how he is feeling. No answer. Left a message to please call me when he is able.

## 2020-07-17 ENCOUNTER — HOSPITAL ENCOUNTER (OUTPATIENT)
Age: 75
Discharge: HOME | End: 2020-07-17
Payer: MEDICARE

## 2020-07-17 ENCOUNTER — HOSPITAL ENCOUNTER (OUTPATIENT)
Dept: GENERAL RADIOLOGY | Age: 75
Discharge: HOME | End: 2020-07-17
Payer: MEDICARE

## 2020-07-17 PROCEDURE — 71046 X-RAY EXAM CHEST 2 VIEWS: CPT

## 2020-08-28 ENCOUNTER — OFFICE VISIT (OUTPATIENT)
Dept: ENT CLINIC | Age: 75
End: 2020-08-28
Payer: MEDICARE

## 2020-08-28 VITALS
WEIGHT: 261.6 LBS | TEMPERATURE: 96.8 F | HEART RATE: 83 BPM | SYSTOLIC BLOOD PRESSURE: 138 MMHG | HEIGHT: 72 IN | DIASTOLIC BLOOD PRESSURE: 69 MMHG | BODY MASS INDEX: 35.43 KG/M2

## 2020-08-28 PROCEDURE — 69210 REMOVE IMPACTED EAR WAX UNI: CPT | Performed by: OTOLARYNGOLOGY

## 2020-08-28 NOTE — PROGRESS NOTES
[address], [address], [address] [place] [address]  P: [phone]       Patient     Leti Hands  1945    ChiefComplaint     Chief Complaint   Patient presents with    Cerumen Impaction     Has impacted cerumen in left ear, went to have a hearing test at [facility] and he was told he needed to have his ear cleaned he is here to get his ear cleaned. States left ear has felt clogged for several months. Was evaluated at [facility] and told he had a left cerumen impaction. PhysicalExam     Vitals:    08/28/20 1457   BP: 138/69   Site: Right Upper Arm   Position: Sitting   Cuff Size: Medium Adult   Pulse: 83   Temp: 96.8 °F (36 °C)   TempSrc: Infrared   Weight: 261 lb 9.6 oz (118.7 kg)   Height: 6' (1.829 m)       Left cerumen impaction. Procedure     Removal Impacted Cerumen    An operating microscope was utilized to visualize the external auditory canals using a 1mm speculum. Cerumen was removed with curettes and silvestre suctions on the left side. The tympanic membrane is intact. No fluid visualized in the middle ear. No complications. Assessment and Plan     1. Impacted cerumen of left ear  -Removed with use of suction and curette  -Reported hearing returned to baseline    Return as needed        Nery Echevarria DO  8/28/20      Portions of this note were dictated using Dragon.  There may be linguistic errors secondary to the use of this program.

## 2020-08-31 ENCOUNTER — VIRTUAL VISIT (OUTPATIENT)
Dept: CARDIOLOGY CLINIC | Age: 75
End: 2020-08-31
Payer: MEDICARE

## 2020-08-31 PROCEDURE — 99214 OFFICE O/P EST MOD 30 MIN: CPT | Performed by: INTERNAL MEDICINE

## 2020-08-31 RX ORDER — ATORVASTATIN CALCIUM 80 MG/1
80 TABLET, FILM COATED ORAL DAILY
Qty: 90 TABLET | Refills: 3 | Status: SHIPPED | OUTPATIENT
Start: 2020-08-31

## 2020-08-31 RX ORDER — LISINOPRIL 5 MG/1
5 TABLET ORAL DAILY
Qty: 90 TABLET | Refills: 3 | Status: SHIPPED | OUTPATIENT
Start: 2020-08-31 | End: 2021-09-30 | Stop reason: SDUPTHER

## 2020-08-31 RX ORDER — FUROSEMIDE 40 MG/1
TABLET ORAL
Qty: 90 TABLET | Refills: 3 | Status: SHIPPED | OUTPATIENT
Start: 2020-08-31 | End: 2022-03-18

## 2020-08-31 RX ORDER — METOPROLOL SUCCINATE 25 MG/1
TABLET, EXTENDED RELEASE ORAL
Qty: 90 TABLET | Refills: 3 | Status: SHIPPED | OUTPATIENT
Start: 2020-08-31 | End: 2021-03-12 | Stop reason: SDUPTHER

## 2020-08-31 NOTE — PATIENT INSTRUCTIONS
Plan:   Continue current medications   Check blood pressure at home weekly  Regular exercise and following a healthy diet encouraged   Follow up with me in 6 months in [place]

## 2020-08-31 NOTE — PROGRESS NOTES
2020    3 Month Follow-Up; Cardiomyopathy; Coronary Artery Disease; Hyperlipidemia; Hypertension; and Shortness of Breath       TELEHEALTH EVALUATION -- Audio/Visual (During LZURU-02 public health emergency)    HPI: Ishmael Toledo is a 70 y.o. male who is here for a virtual visit today for follow up for CAD, HLD, chest pain, S/P CABG, and SOB. Plan is for laparoscopic left diaphragmatic plication now in  due to Covid-19 He was admitted to the hospital in January for 2020 for SOB, underwent bronchoscopy and treated for PNA. He complained of SOB at his last visit a few weeks ago. Lasix was increased to 40 mg BID. An echocardiogram on 4/10/2020 showed an EF of 55%. BNP was 385. He underwent laparoscopic left diaphragmatic plication surgery with Dr. Rolf Garces on 2020. Today he states he is feeling much better since his surgery. He has had significant  in his SOB. He still has some issues when he is in the heat. He is tolerating his medications. He was not able to take his blood pressure today. He will be moving to [facility] in October. He has not had any leg swelling. Patient currently denies any weight gain, edema, palpitations, chest pain, shortness of breath, dizziness, and syncope. Ishmael Toledo (:  1945) has requested an audio/video evaluation for the following concern(s): Follow up for CAD, HTN and SOB. [x] Medications and dosages reviewed. ROS:  [x]Full ROS obtained and negative except as mentioned in HPI    Prior to Visit Medications    Medication Sig Taking?  Authorizing Provider   metoprolol succinate (TOPROL XL) 25 MG extended release tablet TAKE 1 TABLET BY MOUTH EVERY DAY Yes Tang Alcaraz MD   apixaban (ELIQUIS) 5 MG TABS tablet TAKE 1 TABLET BY MOUTH TWICE A DAY Yes Tang Alcaraz MD   ondansetron (ZOFRAN) 4 MG tablet Take 1 tablet by mouth every 8 hours as needed for Nausea or Vomiting Yes Armando Allison MD   furosemide (LASIX) 40 MG tablet TAKE 1 TABLET BY MOUTH EVERY DAY  Patient taking differently: Take 20 mg by mouth daily Pt states 20 mg once daily Yes Alvina Cao MD   UNABLE TO FIND VESIVEST TWICE DAILY FOR 20 MINUTES - lung congestion Yes Historical Provider, MD   ipratropium-albuterol (DUONEB) 0.5-2.5 (3) MG/3ML SOLN nebulizer solution Inhale 3 mLs into the lungs 4 times daily  Patient taking differently: Inhale 1 vial into the lungs 2 times daily  Yes Aj Buck MD   atorvastatin (LIPITOR) 80 MG tablet Take 1 tablet by mouth daily  Patient taking differently: Take 80 mg by mouth nightly  Yes Alvina Cao MD   lisinopril (PRINIVIL;ZESTRIL) 5 MG tablet Take 1 tablet by mouth daily Yes Alvina Cao MD   albuterol sulfate  (90 Base) MCG/ACT inhaler Inhale 2 puffs into the lungs 4 times daily as needed for Wheezing Yes Laymon Kussmaul, DO   Spacer/Aero-Holding Chambers LETA 1 Device by Does not apply route daily as needed (sob) Yes Laymon Kussmaul, DO   metFORMIN (GLUCOPHAGE) 500 MG tablet Take 500 mg by mouth daily (with breakfast) Yes Historical Provider, MD   aspirin EC 81 MG EC tablet Take 1 tablet by mouth daily  Patient taking differently: Take 81 mg by mouth nightly  Yes Alvina Cao MD   Glucosamine-Chondroitin (OSTEO BI-FLEX REGULAR STRENGTH PO) Take 1 capsule by mouth daily  Yes Historical Provider, MD   GuaiFENesin (MUCINEX PO) Take 1,200 mg by mouth 2 times daily Yes Historical Provider, MD   Probiotic Product (PROBIOTIC DAILY PO) Take by mouth daily  Yes Historical Provider, MD   fish oil-omega-3 fatty acids 1000 MG capsule Take 1 g by mouth nightly  Yes Historical Provider, MD   Multiple Vitamin (MULTI-VITAMIN PO) Take 1 tablet by mouth daily  Yes Historical Provider, MD   Lysine 1000 MG TABS Take 1 tablet by mouth Daily  Yes Historical Provider, MD   cetirizine (ZYRTEC) 10 MG tablet Take 10 mg by mouth daily.    Yes Historical Provider, MD       Social History     Tobacco Use    Smoking status: Former Smoker     Packs/day: 1.00     Years: 15.00     Pack years: 15.00     Types: Cigarettes, Pipe, Cigars     Start date: 1968     Last attempt to quit: 1978     Years since quittin.6    Smokeless tobacco: Never Used    Tobacco comment: 2nd hand smoke until 1 year ago   Substance Use Topics    Alcohol use: Yes     Alcohol/week: 3.0 standard drinks     Types: 1 Shots of liquor, 2 Standard drinks or equivalent per week     Comment: occaisional    Drug use: No      There were no vitals taken for this visit.     Allergies   Allergen Reactions    Oxycodone Other (See Comments)     hallucinatiions  \"Percocet ok to take\" per pt    Demerol Nausea And Vomiting   ,   Past Medical History:   Diagnosis Date    Bronchitis     chronic bronchitis once or twice per year twice has gone into pneumonia    CAD (coronary artery disease) 07/10/2017    + Stress, multi vessel CAD    Cardiomy[facility] 2017    EF 35-40%    CHF (congestive heart failure) [facility] 2017    COPD (chronic obstructive pulmonary disease) [facility]     Diabetes [facility] 2017    Diaphragmatic paralysis     left    Diastolic dysfunction     Family history of early CAD     father  at 48 of heart attack    Former smoker     High cholesterol 2017    Hypertension     Knee replacement     Right     Obesity, Class II, BMI 35-39.9, with comorbidity     Pneumonia    ,   Past Surgical History:   Procedure Laterality Date    APPENDECTOMY      BRONCHOSCOPY N/A 2020    BRONCHOSCOPY ALVEOLAR LAVAGE performed by Lamont Mccallum MD at [address]   2020    [address] performed by Lamont Mccallum MD at [place] [address]  07/10/2017    Dr. Maile Waters - multi-vessel CAD, referred for CABG    CARPAL TUNNEL RELEASE Bilateral     CORONARY ANGIOPLASTY WITH STENT PLACEMENT  2018    YUVAL- 2.75 x 18 pRCA    CORONARY ARTERY BYPASS GRAFT 2017    Dr. Enoc Grace - x4 (LIMA-LAD, reverse R SVG-diagonal, OM & PDA) w/placement of temporary pacing wire    MENISCECTOMY Right 1965    THORACOSCOPY Left 2020    LEFT LAPAROSCOPIC DIAPHRAGMATIC PLICATION  CPT CODE - 59448, 04772 performed by Brandan Carey MD at [place] [address] Right     TRANSESOPHAGEAL ECHOCARDIOGRAM  2017    during CABG   ,   Social History     Tobacco Use    Smoking status: Former Smoker     Packs/day: 1.00     Years: 15.00     Pack years: 15.00     Types: Cigarettes, Pipe, Cigars     Start date: 1968     Last attempt to quit: 1978     Years since quittin.6    Smokeless tobacco: Never Used    Tobacco comment: 2nd hand smoke until 1 year ago   Substance Use Topics    Alcohol use: Yes     Alcohol/week: 3.0 standard drinks     Types: 1 Shots of liquor, 2 Standard drinks or equivalent per week     Comment: occaisional    Drug use: No   ,   Family History   Problem Relation Age of Onset    Heart Attack Father     Heart Disease Father     Heart Attack Brother     High Blood Pressure Brother    ,   There is no immunization history on file for this patient.,   Health Maintenance   Topic Date Due    AAA screen  1945    Hepatitis C screen  1945    DTaP/Tdap/Td vaccine (1 - Tdap) 1966    Shingles Vaccine (1 of 2) 1997    Colon cancer screen colonoscopy  1997    Pneumococcal 65+ years Vaccine (1 of 1 - PPSV23) 2012    Lipid screen  2019    Annual Wellness Visit (AWV)  2019    Flu vaccine (1) 2020    Potassium monitoring  2021    Creatinine monitoring  2021    Hepatitis A vaccine  Aged Out    Hepatitis B vaccine  Aged Out    Hib vaccine  Aged Out    Meningococcal (ACWY) vaccine  Aged Out     There were no vitals taken for this visit.      PHYSICAL EXAMINATION:  [ INSTRUCTIONS:  \"[x]\" Indicates a positive item  \"[]\" Indicates a negative item  -- DELETE ALL ITEMS NOT EXAMINED]  Vital Signs: (As obtained by patient/caregiver or practitioner observation)    Blood pressure-  Heart rate-    Respiratory rate-    Temperature-  Pulse oximetry-     Constitutional: [x] Appears well-developed and well-nourished [] No apparent distress      [] Abnormal-   Mental status  [x] Alert and awake  [] Oriented to person/place/time []Able to follow commands      Eyes:  EOM    [x]  Normal  [] Abnormal-  Sclera  [x]  Normal  [] Abnormal -         Discharge []  None visible  [] Abnormal -    HENT:   [x] Normocephalic, atraumatic. [] Abnormal   [x] Mouth/Throat: Mucous membranes are moist.     External Ears [x] Normal  [] Abnormal-     Neck: [x] No visualized mass     Pulmonary/Chest: [x] Respiratory effort normal.  [] No visualized signs of difficulty breathing or respiratory distress        [] Abnormal-      Musculoskeletal:   [x] Normal gait with no signs of ataxia         [] Normal range of motion of neck        [] Abnormal-       Neurological:        [x] No Facial Asymmetry (Cranial nerve 7 motor function) (limited exam to video visit)          [] No gaze palsy        [] Abnormal-         Skin:        [x] No significant exanthematous lesions or discoloration noted on facial skin         [] Abnormal-            Psychiatric:       [x] Normal Affect [] No Hallucinations        [] Abnormal-     Other pertinent observable physical exam findings-     Myoview 7/2017   - Abnormal high risk myocardial perfusion study.    - There is a medium size moderate intensity perfusion defect involving the    apex, apical lateral, mid anteroseptal, mid inferior and apical inferior    walls during stress that reverses at rest consistent with ischemia in    distribution of LAD and RCA or dominant circumflex.    - Apical inferior and mid anteroseptal walls are akinetic.  Other walls    severely hypokinetic.    - Left ventricular cavity is dilated.    - Left ventricular systolic function is severely reduced with ejection    fraction of 28 %.    Echo 7/2017   Limited only for LVEF pericardial effusion post heart surgery. 1 cm   posterior pericardial effusion and 0.5cm anterior pericardial effusion. At   the apex 2cm pericardial effusion. No tamponade physiology.   Left ventricular systolic function is normal. Atrial fibrillation with   controlled ventricular response. Estimated ejection fraction of 55-60 %.   Severe concentric left ventricular hypertrophy. Small size of left   ventricle. No significant outflow tract obstruction.   Elevated left ventricle diastolic filling pressure.   There is a moderate circumferential pericardial effusion noted.     Cath 5/2018 Don [facility]inton   PCI of the native RCA with YUVAL  1. MVCD  2. Normal LVEF  3. Normal hemodynamics except for hypertension  4. Patent SVG to the DIAG, PDA, and OM  5. Patent LIMA to the LAD        Echo 4/16/19  Technically difficult examination.   Left ventricular systolic function is normal with ejection fraction   estimated at 55-60 %.   There is severe concentric left ventricular hypertrophy.   Left ventricular size is decreased.   Diastolic dysfunction grade and filing pressure are indeterminate.   The ascending aorta is mildly dilated.   Aortic valve appears sclerotic but opens adequately.   Mitral annular calcification is present.   Mild mitral regurgitation.   Previous echo done 10/2017 - EF 60%, LVH      Echocardiogram 4/16/2020  Summary   Technically difficult examination secondary to habitus and COPD. Left ventricular systolic function is normal with ejection fraction   estimated at 55%. No regional wall motion abnormalities. There is moderate to severe concentric left ventricular hypertrophy with   sigmoid septum (2.21 cm.). Grade II diastolic dysfunction with elevated left ventricular filling   pressure. The left atrium is mildly dilated. ASSESSMENT:  Coronary artery disease- S/P CABG 7/2017.  Stable  Chest pain - no Emily Aldana MD        I am seeing the patient today from my  office and the patient from their home. --Blanco Vizcaino RN on 8/31/2020 at 3:05 PM    An electronic signature was used to authenticate this note.

## 2020-08-31 NOTE — LETTER
2020    3 Month Follow-Up; Cardiomyopathy; Coronary Artery Disease; Hyperlipidemia; Hypertension; and Shortness of Breath       TELEHEALTH EVALUATION -- [facility]Visual [facility])    HPI: Dulce Toledo is a 70 y.o. male who is here for a virtual visit today for follow up for CAD, HLD, chest pain, S/P CABG, and SOB. Plan is for laparoscopic left diaphragmatic plication now in  due to Covid-19 He was admitted to the hospital in January for 2020 for SOB, underwent bronchoscopy and treated for PNA. He complained of SOB at his last visit a few weeks ago. Lasix was increased to 40 mg BID. An echocardiogram on 4/10/2020 showed an EF of 55%. BNP was 385. He underwent laparoscopic left diaphragmatic plication surgery with Dr. Arleth Cuevas on 2020. Today he states he is feeling much better since his surgery. He has had significant  in his SOB. He still has some issues when he is in the heat. He is tolerating his medications. He was not able to take his blood pressure today. He will be moving to [facility] in October. He has not had any leg swelling. Patient currently denies any weight gain, edema, palpitations, chest pain, shortness of breath, dizziness, and syncope. Dulce Toledo (:  1945) has requested an audio/video evaluation for the following concern(s): Follow up for CAD, HTN and SOB. [x] Medications and dosages reviewed. ROS:  [x]Full ROS obtained and negative except as mentioned in HPI    Prior to Visit Medications    Medication Sig Taking?  Authorizing Provider   metoprolol succinate (TOPROL XL) 25 MG extended release tablet TAKE 1 TABLET BY MOUTH EVERY DAY Yes Kushal Garay MD   apixaban (ELIQUIS) 5 MG TABS tablet TAKE 1 TABLET BY MOUTH TWICE A DAY Yes Kushal Garay MD   ondansetron (ZOFRAN) 4 MG tablet Take 1 tablet by mouth every 8 hours as needed for Nausea or Vomiting Yes Moshe Cadet MD furosemide (LASIX) 40 MG tablet TAKE 1 TABLET BY MOUTH EVERY DAY  Patient taking differently: Take 20 mg by mouth daily Pt states 20 mg once daily Yes Blease Goldberg, MD   UNABLE TO FIND VESIVEST TWICE DAILY FOR 20 MINUTES - lung congestion Yes Historical Provider, MD   ipratropium-albuterol (DUONEB) 0.5-2.5 (3) MG/3ML SOLN nebulizer solution Inhale 3 mLs into the lungs 4 times daily  Patient taking differently: Inhale 1 vial into the lungs 2 times daily  Yes Sarah De La Rosa MD   atorvastatin (LIPITOR) 80 MG tablet Take 1 tablet by mouth daily  Patient taking differently: Take 80 mg by mouth nightly  Yes Blease Goldberg, MD   lisinopril (PRINIVIL;ZESTRIL) 5 MG tablet Take 1 tablet by mouth daily Yes Blease Goldberg, MD   albuterol sulfate  (90 Base) MCG/ACT inhaler Inhale 2 puffs into the lungs 4 times daily as needed for Wheezing Yes Leocadia Night, DO   Spacer/Aero-Holding Chambers LETA 1 Device by Does not apply route daily as needed (sob) Yes Leocadia Night, DO   metFORMIN (GLUCOPHAGE) 500 MG tablet Take 500 mg by mouth daily (with breakfast) Yes Historical Provider, MD   aspirin EC 81 MG EC tablet Take 1 tablet by mouth daily  Patient taking differently: Take 81 mg by mouth nightly  Yes Blease Goldberg, MD   Glucosamine-Chondroitin (OSTEO BI-FLEX REGULAR STRENGTH PO) Take 1 capsule by mouth daily  Yes Historical Provider, MD   GuaiFENesin (MUCINEX PO) Take 1,200 mg by mouth 2 times daily Yes Historical Provider, MD   Probiotic Product (PROBIOTIC DAILY PO) Take by mouth daily  Yes Historical Provider, MD   fish oil-omega-3 fatty acids 1000 MG capsule Take 1 g by mouth nightly  Yes Historical Provider, MD   Multiple Vitamin (MULTI-VITAMIN PO) Take 1 tablet by mouth daily  Yes Historical Provider, MD   Lysine 1000 MG TABS Take 1 tablet by mouth Daily  Yes Historical Provider, MD   cetirizine (ZYRTEC) 10 MG tablet Take 10 mg by mouth daily.    Yes Historical Provider, MD Social History     Tobacco Use    Smoking status: Former Smoker     Packs/day: 1.00     Years: 15.00     Pack years: 15.00     Types: Cigarettes, Pipe, Cigars     Start date: 1968     Last attempt to quit: 1978     Years since quittin.6    Smokeless tobacco: Never Used    Tobacco comment: 2nd hand smoke until 1 year ago   Substance Use Topics    Alcohol use: Yes     Alcohol/week: 3.0 standard drinks     Types: 1 Shots of liquor, 2 Standard drinks or equivalent per week     Comment: occaisional    Drug use: No      There were no vitals taken for this visit.     Allergies   Allergen Reactions    Oxycodone Other (See Comments)     hallucinatiions  \"Percocet ok to take\" per pt    Demerol Nausea And Vomiting   ,   Past Medical History:   Diagnosis Date    Bronchitis     chronic bronchitis once or twice per year twice has gone into pneumonia    CAD (coronary artery disease) 07/10/2017    + Stress, multi vessel CAD    Cardiomy[facility] 2017    EF 35-40%    CHF (congestive heart failure) ([facility] 2017    COPD (chronic obstructive pulmonary disease) ([facility]     Diabetes ([facility] [facility] 2017    Diaphragmatic paralysis     left    Diastolic dysfunction     Family history of early CAD     father  at 48 of heart attack    Former smoker     High cholesterol 2017    Hypertension     Knee replacement     Right     Obesity, Class II, BMI 35-39.9, with comorbidity     Pneumonia    ,   Past Surgical History:   Procedure Laterality Date    APPENDECTOMY      BRONCHOSCOPY N/A 2020    BRONCHOSCOPY ALVEOLAR LAVAGE performed by Doris Moseley MD at [address] [place] [address]  2020    BRONCHOSCOPY [facility] performed by Doris Moseley MD at [place] [address]  07/10/2017    Dr. Quique Mcfarland - multi-vessel CAD, referred for CABG    CARPAL TUNNEL RELEASE Bilateral     CORONARY ANGIOPLASTY WITH STENT PLACEMENT  2018 [ INSTRUCTIONS:  \"[x]\" Indicates a positive item  \"[]\" Indicates a negative item  -- DELETE ALL ITEMS NOT EXAMINED]  Vital Signs: (As obtained by patient/caregiver or practitioner observation)    Blood pressure-  Heart rate-    Respiratory rate-    Temperature-  Pulse oximetry-     Constitutional: [x] Appears well-developed and well-nourished [] No apparent distress      [] Abnormal-   Mental status  [x] Alert and awake  [] Oriented to person/place/time []Able to follow commands      Eyes:  EOM    [x]  Normal  [] Abnormal-  Sclera  [x]  Normal  [] Abnormal -         Discharge []  None visible  [] Abnormal -    HENT:   [x] Normocephalic, atraumatic. [] Abnormal   [x] Mouth/Throat: Mucous membranes are moist.     External Ears [x] Normal  [] Abnormal-     Neck: [x] No visualized mass     Pulmonary/Chest: [x] Respiratory effort normal.  [] No visualized signs of difficulty breathing or respiratory distress        [] Abnormal-      Musculoskeletal:   [x] Normal gait with no signs of ataxia         [] Normal range of motion of neck        [] Abnormal-       Neurological:        [x] No Facial Asymmetry (Cranial nerve 7 motor function) (limited exam to video visit)          [] No gaze palsy        [] Abnormal-         Skin:        [x] No significant exanthematous lesions or discoloration noted on facial skin         [] Abnormal-            Psychiatric:       [x] Normal Affect [] No Hallucinations        [] Abnormal-     Other pertinent observable physical exam findings-     Myoview 7/2017   - Abnormal high risk myocardial perfusion study.    - There is a medium size moderate intensity perfusion defect involving the    apex, apical lateral, mid anteroseptal, mid inferior and apical inferior    walls during stress that reverses at rest consistent with ischemia in    distribution of LAD and RCA or dominant circumflex.    - Apical inferior and mid anteroseptal walls are akinetic. Other walls    severely hypokinetic.  - Left ventricular cavity is dilated.    - Left ventricular systolic function is severely reduced with ejection    fraction of 28 %.    Echo 7/2017   Limited only for LVEF pericardial effusion post heart surgery. 1 cm   posterior pericardial effusion and 0.5cm anterior pericardial effusion. At   the apex 2cm pericardial effusion. No tamponade physiology.   Left ventricular systolic function is normal. Atrial fibrillation with   controlled ventricular response. Estimated ejection fraction of 55-60 %.   Severe concentric left ventricular hypertrophy. Small size of left   ventricle. No significant outflow tract obstruction.   Elevated left ventricle diastolic filling pressure.   There is a moderate circumferential pericardial effusion noted.     Cath 5/2018 [facility]   PCI of the native RCA with YUVAL  1. MVCD  2. Normal LVEF  3. Normal hemodynamics except for hypertension  4. Patent SVG to the DIAG, PDA, and OM  5. Patent LIMA to the LAD        Echo 4/16/19  Technically difficult examination.   Left ventricular systolic function is normal with ejection fraction   estimated at 55-60 %.   There is severe concentric left ventricular hypertrophy.   Left ventricular size is decreased.   Diastolic dysfunction grade and filing pressure are indeterminate.   The ascending aorta is mildly dilated.   Aortic valve appears sclerotic but opens adequately.   Mitral annular calcification is present.   Mild mitral regurgitation.   Previous echo done 10/2017 - EF 60%, LVH      Echocardiogram 4/16/2020  Summary   Technically difficult examination secondary to habitus and COPD. Left ventricular systolic function is normal with ejection fraction   estimated at 55%. No regional wall motion abnormalities. There is moderate to severe concentric left ventricular hypertrophy with   sigmoid septum (2.21 cm.). Grade II diastolic dysfunction with elevated left ventricular filling   pressure. The left atrium is mildly dilated. ASSESSMENT:  Coronary artery disease- S/P CABG 7/2017. Stable  Chest pain - no recent  Hypertension - controlled  Hyperlipidemia   Pericardial effusion   Cardiomyopathy - improved. EF 55%  Shortness of breath - sig improved since hemidiaphragm plication   Edema - multifactorial. Do not believe sig CHF component. 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 male being evaluated by a Virtual Visit (video visit) encounter to address concerns as mentioned above. A caregiver was present when appropriate. Due to this being a TeleHealth encounter (During [facility]-07 public health emergency), evaluation of the following organ systems was limited: Vitals/Constitutional/EENT/Resp/CV/GI//MS/Neuro/Skin/Heme-Lymph-Imm. Pursuant to the emergency declaration under the [facility], [address] [place] [employer] authority and the [employer] Act, this Virtual Visit was conducted with patient's (and/or legal guardian's) consent, to reduce the patient's risk of exposure to COVID-19 and provide necessary medical care. The patient (and/or legal guardian) has also been advised to contact this office for worsening conditions or problems, and seek emergency medical treatment and/or call 911 if deemed necessary. Services were provided through a video synchronous discussion virtually to substitute for in-person clinic visit. Scribe's attestation: This note was scribed in the presence of Dr. Juan Avendaño M.D. By Pilar Sánchez RN    The scribes documentation has been prepared under my direction and personally reviewed by me in its entirety.   I confirm that the note above accurately reflects all work, treatment, procedures, and medical decision making performed by me. Dr. Hugo Guerrero MD        I am seeing the patient today from my  office and the patient from their home. --Roshni Werner RN on 8/31/2020 at 3:05 PM    An electronic signature was used to authenticate this note.

## 2020-10-16 ENCOUNTER — TELEPHONE (OUTPATIENT)
Dept: PULMONOLOGY | Age: 75
End: 2020-10-16

## 2020-10-16 NOTE — TELEPHONE ENCOUNTER
He was in court yesterday and the client was sent home from school yesterday because one of her classmates had Pricila. He wants to know if he needs to get tested. Has no symptoms and they were both wearing mask but were sitting next to each other. Please advise.

## 2020-10-16 NOTE — TELEPHONE ENCOUNTER
If both entities were wearing a mask and the risk of transmission of COVID-19 is only 1.5%  Patient can hold off on any testing for now  If patient starts having any headaches, dry cough or any shortness of breath or diarrhea then he needs to be evaluated at that time

## 2020-12-15 ENCOUNTER — TELEPHONE (OUTPATIENT)
Dept: CARDIOLOGY CLINIC | Age: 75
End: 2020-12-15

## 2020-12-15 NOTE — LETTER
[address]  [address] [place] [address] [address] [address]  Phone: [phone]  Fax: [phone]    Shahzad Garcia MD        December 16, 2020     Johnathon George  [address]  [facility] [address]  1945      To whom it may concern,         Johnathon George has no cardiac contraindications to a colonoscopy. Ok hold eliquis x 2 days prior. If you have any questions or concerns, please don't hesitate to call.     Sincerely,        Shahzad Garcia MD

## 2020-12-15 NOTE — TELEPHONE ENCOUNTER
[facility] requesting clearance for patient to have a colonoscopy on 1/15/2021. Asking to hold Eliquis for 2-5 days. Patient had a VV on 8/31/2020. CAD- CABG surgery in 2017, repeat cath in 2018 patent grafts.

## 2021-02-23 DIAGNOSIS — J41.0 SIMPLE CHRONIC BRONCHITIS (HCC): ICD-10-CM

## 2021-02-23 RX ORDER — IPRATROPIUM BROMIDE AND ALBUTEROL SULFATE 2.5; .5 MG/3ML; MG/3ML
1 SOLUTION RESPIRATORY (INHALATION) 4 TIMES DAILY
Qty: 360 ML | Refills: 5 | OUTPATIENT
Start: 2021-02-23

## 2021-03-12 DIAGNOSIS — J41.0 SIMPLE CHRONIC BRONCHITIS (HCC): ICD-10-CM

## 2021-03-12 RX ORDER — METOPROLOL SUCCINATE 25 MG/1
TABLET, EXTENDED RELEASE ORAL
Qty: 90 TABLET | Refills: 0 | Status: SHIPPED | OUTPATIENT
Start: 2021-03-12 | End: 2021-03-18 | Stop reason: SDUPTHER

## 2021-03-12 NOTE — TELEPHONE ENCOUNTER
Medication Refill    Medication needing refilled: metoprolol succinate     Dosage of the medication: 25 mg    How are you taking this medication (QD, BID, TID, QID, PRN): once daily    30 or 90 day supply called in: 90    When will you run out of your medication: 5 days    Which Pharmacy are we sending the medication to?: [facility]

## 2021-03-15 RX ORDER — IPRATROPIUM BROMIDE AND ALBUTEROL SULFATE 2.5; .5 MG/3ML; MG/3ML
1 SOLUTION RESPIRATORY (INHALATION) 4 TIMES DAILY
Qty: 360 ML | Refills: 5 | OUTPATIENT
Start: 2021-03-15

## 2021-03-17 NOTE — PROGRESS NOTES
[address]   Cardiac Consultation    Referring Provider:  Abiola Baca MD     Chief Complaint   Patient presents with    6 Month Follow-Up    Shortness of Breath     copd, with steps        History of Present Illness:   Homa Virk is a 78 y.o. 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 Most recent echocardiogram from 4/2020 showed an EF of 55%, (EF 28% by stress test 2017). Today he states he has been feeling well overall. He has recently moved to [facility] and is now working here. He states he continues to have SOB with stairs which is unchanged. Better since his surgery in 5/2020. He states he uses his inhalers and has SOB if he misses a dose. Some dizziness when he beds over and stands back up. He has received both COVID vaccines. Patient currently denies any weight gain, edema, palpitations, chest pain, shortness of breath, dizziness, and syncope. Past Medical History:   has a past medical history of Bronchitis, CAD (coronary artery disease), Cardiomyopathy ([address] CHF (congestive heart failure) ([address] COPD (chronic obstructive pulmonary disease) ([address] Diabetes ([address] Diaphragmatic paralysis, Diastolic dysfunction, Family history of early CAD, Former smoker, High cholesterol, Hypertension, Knee replacement, Obesity, Class II, 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 Surgical History:   has a past surgical history that includes Appendectomy; meniscectomy (Right, 1965); Tonsillectomy; Cardiac catheterization (07/10/2017); Carpal tunnel release (Bilateral); Coronary artery bypass graft (07/18/2017); transesophageal echocardiogram (07/18/2017); Total knee arthroplasty (Right);  Coronary angioplasty with stent (05/18/2018); bronchoscopy (N/A, 1/22/2020); bronchoscopy (2020); and Thoracoscopy (Left, 2020). Social History:   reports that he quit smoking about 43 years ago. His smoking use included cigarettes, pipe, and cigars. He started smoking about 53 years ago. He has a 15.00 pack-year smoking history. He has never used smokeless tobacco. He reports current alcohol use of about 3.0 standard drinks of alcohol per week. He reports that he does not use drugs. Family History:  Father  at age 48 from \"angina\"    Home Medications:  Prior to Admission medications    Medication Sig Start Date End Date Taking?  Authorizing Provider   metoprolol succinate (TOPROL XL) 25 MG extended release tablet TAKE 1 TABLET BY MOUTH EVERY DAY 3/12/21  Yes Cassandra Angel MD   apixaban (ELIQUIS) 5 MG TABS tablet TAKE 1 TABLET BY MOUTH TWICE A DAY 20  Yes Cassandra Angel MD   furosemide (LASIX) 40 MG tablet TAKE 1 TABLET BY MOUTH EVERY DAY 20  Yes Cassandra Angel MD   atorvastatin (LIPITOR) 80 MG tablet Take 1 tablet by mouth daily 20  Yes Cassandra Angel MD   lisinopril (PRINIVIL;ZESTRIL) 5 MG tablet Take 1 tablet by mouth daily 20  Yes Cassandra Angel MD   ipratropium-albuterol (DUONEB) 0.5-2.5 (3) MG/3ML SOLN nebulizer solution Inhale 3 mLs into the lungs 4 times daily  Patient taking differently: Inhale 1 vial into the lungs 2 times daily  20  Yes Duncan Enrique MD   albuterol sulfate  (90 Base) MCG/ACT inhaler Inhale 2 puffs into the lungs 4 times daily as needed for Wheezing 3/15/19  Yes Ignacio Lawson DO   metFORMIN (GLUCOPHAGE) 500 MG tablet Take 500 mg by mouth daily (with breakfast)   Yes Historical Provider, MD   aspirin EC 81 MG EC tablet Take 1 tablet by mouth daily  Patient taking differently: Take 81 mg by mouth nightly  18  Yes Cassandra Angel MD   Glucosamine-Chondroitin (OSTEO BI-FLEX REGULAR STRENGTH PO) Take 1 capsule by mouth daily    Yes Historical Provider, MD   GuaiFENesin [facility] PO) Take 1,200 mg by mouth 2 times daily   Yes Historical Provider, MD   Probiotic Product (PROBIOTIC DAILY PO) Take by mouth daily    Yes Historical Provider, MD   fish oil-omega-3 fatty acids 1000 MG capsule Take 1 g by mouth nightly    Yes Historical Provider, MD   Multiple Vitamin (MULTI-VITAMIN PO) Take 1 tablet by mouth daily    Yes Historical Provider, MD   Lysine 1000 MG TABS Take 1 tablet by mouth Daily    Yes Historical Provider, MD   cetirizine (ZYRTEC) 10 MG tablet Take 10 mg by mouth daily. Yes Historical Provider, MD   ondansetron (ZOFRAN) 4 MG tablet Take 1 tablet by mouth every 8 hours as needed for Nausea or Vomiting  Patient not taking: Reported on 3/18/2021 6/3/20   Kiersten Logan MD   UNABLE TO FIND VESIVEST TWICE DAILY FOR 20 MINUTES - lung congestion    Historical Provider, MD   Spacer/Aero-Holding Retia Potter 1 Device by Does not apply route daily as needed (sob) 3/15/19   Mari , DO        Allergies:  Oxycodone and Demerol     Review of Systems:   · Constitutional: there has been no unanticipated weight loss. There's been no change in energy level, sleep pattern, or activity level. · Eyes: No visual changes or diplopia. No scleral icterus. · ENT: No Headaches, hearing loss or vertigo. No mouth sores or sore throat. · Cardiovascular: Reviewed in HPI  · Respiratory: rales in basis   · Gastrointestinal: No abdominal pain, appetite loss, blood in stools. No change in bowel or bladder habits. · Genitourinary: No dysuria, trouble voiding, or hematuria. · Musculoskeletal:  No gait disturbance, weakness or joint complaints. · Integumentary: No rash or pruritis. · Neurological: No headache, diplopia, change in muscle strength, numbness or tingling. No change in gait, balance, coordination, mood, affect, memory, mentation, behavior. · Psychiatric: No anxiety, no depression. · Endocrine: No malaise, fatigue or temperature intolerance.  No excessive thirst, fluid intake, or urination. No tremor. · Hematologic/Lymphatic: No abnormal bruising or bleeding, blood clots or swollen lymph nodes. · Allergic/Immunologic: No nasal congestion or hives. Physical Examination:    Vitals:    03/18/21 0831   BP: 132/77   Pulse: 77   SpO2: 96%        Constitutional and General Appearance: NAD   Respiratory:  · Normal excursion and expansion without use of accessory muscles  Resp Auscultation: decreased breath sounds left base, rales   Cardiovascular:  · The apical impulses not displaced  · Heart tones are crisp and normal  · Cervical veins- mild elevated JVD  · The carotid upstroke is normal in amplitude and contour without delay or bruit  · Normal S1S2, No S3, 2/6 murmur  · Peripheral pulses are symmetrical and full  · There is no clubbing, cyanosis of the extremities. · 1+ edema. L>R  · Femoral Arteries: 2+ and equal  · Pedal Pulses: 2+ and equal   Abdomen:  · No masses or tenderness  · Liver/Spleen: No Abnormalities Noted  Neurological/Psychiatric:  · Alert and oriented in all spheres  · Moves all extremities well  · Exhibits normal gait balance and coordination  · No abnormalities of mood, affect, memory, mentation, or behavior are noted    Myoview 7/2017   - Abnormal high risk myocardial perfusion study.    - There is a medium size moderate intensity perfusion defect involving the    apex, apical lateral, mid anteroseptal, mid inferior and apical inferior    walls during stress that reverses at rest consistent with ischemia in    distribution of LAD and RCA or dominant circumflex.    - Apical inferior and mid anteroseptal walls are akinetic. Other walls    severely hypokinetic.    - Left ventricular cavity is dilated.    - Left ventricular systolic function is severely reduced with ejection    fraction of 28 %. Echo 7/2017   Limited only for LVEF pericardial effusion post heart surgery. 1 cm   posterior pericardial effusion and 0.5cm anterior pericardial effusion.  At   the apex 2cm pericardial effusion. No tamponade physiology.   Left ventricular systolic function is normal. Atrial fibrillation with   controlled ventricular response. Estimated ejection fraction of 55-60 %.   Severe concentric left ventricular hypertrophy. Small size of left   ventricle. No significant outflow tract obstruction.   Elevated left ventricle diastolic filling pressure.   There is a moderate circumferential pericardial effusion noted. Cath 5/2018 Sumeet Shrestha   PCI of the native RCA with YUVAL  1. MVCD  2. Normal LVEF  3. Normal hemodynamics except for hypertension  4. Patent SVG to the DIAG, PDA, and OM  5. Patent LIMA to the LAD      Echo 4/16/19  Technically difficult examination. Left ventricular systolic function is normal with ejection fraction   estimated at 55-60 %. There is severe concentric left ventricular hypertrophy. Left ventricular size is decreased. Diastolic dysfunction grade and filing pressure are indeterminate. The ascending aorta is mildly dilated. Aortic valve appears sclerotic but opens adequately. Mitral annular calcification is present. Mild mitral regurgitation. Previous echo done 10/2017 - EF 60%, LVH     Echocardiogram 4/16/2020  Summary   Technically difficult examination secondary to habitus and COPD.   Left ventricular systolic function is normal with ejection fraction   estimated at 55%.   No regional wall motion abnormalities.   There is moderate to severe concentric left ventricular hypertrophy with   sigmoid septum (2.21 cm.).   Grade II diastolic dysfunction with elevated left ventricular filling   pressure.   The left atrium is mildly dilated. Assessment:   Coronary artery disease- S/P CABG 7/2017. Stable. No angina  Chest pain - no recent  Hypertension - well controlled  Hyperlipidemia - no recent ck  H/O pericardial effusion - not noted recent echo    Cardiomyopathy - improved. EF 55%  Shortness of breath - sig improved since hemidiaphragm plication.  Primarily pulm and not cardiac    Edema - multifactorial. Do not believe sig CHF component. minimal at this time  Left diaphragmatic paralysis - s/p hemidiaphragm plication 5/28/2020  RHONDA     Have previously reviewed cath films along w/ pt and presentation at time of cath. No objective evidence ACS. Primary issue is small artery disease. Distal OM compromised by left main but also gets retrograde flow from OM1 SVG. Unlikely to alter symptoms which are at this time atypical. Consider for exertional angina, refractory to med mgmt. Plan:  Fasting lipids through PCP   Medication discussed and refilled   Check blood pressure at home weekly  Regular exercise and following a healthy diet encouraged   Follow up with me in 6 months   Plan to repeat echocardiogram next year (April 2022)     Scribe's attestation: This note was scribed in the presence of Dr. Teddy Tong M.D. By Gaby Clemente RN    The scribes documentation has been prepared under my direction and personally reviewed by me in its entirety. I confirm that the note above accurately reflects all work, treatment, procedures, and medical decision making performed by me. MD Yvette Sheth.  Lyssa Lomas M.D., [place]

## 2021-03-18 ENCOUNTER — OFFICE VISIT (OUTPATIENT)
Dept: CARDIOLOGY CLINIC | Age: 76
End: 2021-03-18
Payer: MEDICARE

## 2021-03-18 VITALS
OXYGEN SATURATION: 96 % | DIASTOLIC BLOOD PRESSURE: 77 MMHG | SYSTOLIC BLOOD PRESSURE: 132 MMHG | BODY MASS INDEX: 35.33 KG/M2 | HEART RATE: 77 BPM | HEIGHT: 72 IN | WEIGHT: 260.8 LBS

## 2021-03-18 DIAGNOSIS — I10 ESSENTIAL HYPERTENSION: Primary | ICD-10-CM

## 2021-03-18 DIAGNOSIS — I25.118 CORONARY ARTERY DISEASE OF NATIVE ARTERY OF NATIVE HEART WITH STABLE ANGINA PECTORIS (HCC): ICD-10-CM

## 2021-03-18 DIAGNOSIS — Z95.1 S/P CABG X 3: ICD-10-CM

## 2021-03-18 DIAGNOSIS — E78.00 PURE HYPERCHOLESTEROLEMIA: ICD-10-CM

## 2021-03-18 PROCEDURE — 99214 OFFICE O/P EST MOD 30 MIN: CPT | Performed by: INTERNAL MEDICINE

## 2021-03-18 RX ORDER — METOPROLOL SUCCINATE 25 MG/1
TABLET, EXTENDED RELEASE ORAL
Qty: 90 TABLET | Refills: 3 | Status: SHIPPED | OUTPATIENT
Start: 2021-03-18 | End: 2021-09-30 | Stop reason: SDUPTHER

## 2021-03-18 NOTE — PATIENT INSTRUCTIONS
Plan:  Fasting lipids through PCP   Continue current medications   Check blood pressure at home weekly  Regular exercise and following a healthy diet encouraged   Follow up with me in 6 months   Plan to repeat echocardiogram next year (April 2022)

## 2021-03-29 ENCOUNTER — VIRTUAL VISIT (OUTPATIENT)
Dept: PULMONOLOGY | Age: 76
End: 2021-03-29
Payer: MEDICARE

## 2021-03-29 DIAGNOSIS — J98.6 DIAPHRAGM PARALYSIS: ICD-10-CM

## 2021-03-29 DIAGNOSIS — J41.0 SIMPLE CHRONIC BRONCHITIS (HCC): Primary | ICD-10-CM

## 2021-03-29 PROCEDURE — 99214 OFFICE O/P EST MOD 30 MIN: CPT | Performed by: INTERNAL MEDICINE

## 2021-03-29 RX ORDER — BUDESONIDE, GLYCOPYRROLATE, AND FORMOTEROL FUMARATE 160; 9; 4.8 UG/1; UG/1; UG/1
2 AEROSOL, METERED RESPIRATORY (INHALATION) 2 TIMES DAILY
Qty: 2 INHALER | Refills: 0
Start: 2021-03-29 | End: 2021-04-27 | Stop reason: SDUPTHER

## 2021-03-29 ASSESSMENT — ENCOUNTER SYMPTOMS
CHEST TIGHTNESS: 0
STRIDOR: 0
CONSTIPATION: 0
SHORTNESS OF BREATH: 1
ABDOMINAL PAIN: 0
COUGH: 1
VOICE CHANGE: 0
DIARRHEA: 0
EYE PAIN: 0
EYE DISCHARGE: 0
CHOKING: 0
EYE ITCHING: 0
SORE THROAT: 0

## 2021-03-29 NOTE — Clinical Note
Breztri x2 samples  CXR Friday  Asked him to call us if not improving to set up bronchoscopy, otherwise no follow up

## 2021-03-29 NOTE — PROGRESS NOTES
MA Communication: The following orders are received by verbal communication from Dr. Mook Miller. Orders include:  Pt to get CXR.   Will call with results       Breztri samples given to patient       Pt to follow up depending on CXR

## 2021-03-29 NOTE — PROGRESS NOTES
Pulmonary Outpatient Note   Lis Franklin MD       3/29/2021    Chief Complaint:  Shortness of Breath     HPI:   78y.o. year old male here for COPD and diaphragm dysfunction. Virtual visit through [facility]. me    Was diagnosed with diaphragm dysfunction  Underwent plication intervention with Dr. Alirio Calle  He had improvement in his symptoms after the procedure   Now feeling worsening shortness of breath and a cough with difficulty expectorating sputum  Using nebs and albuterol MDI without much relief      Past Medical History:   Diagnosis Date    Bronchitis     chronic bronchitis once or twice per year twice has gone into pneumonia    CAD (coronary artery disease) 07/10/2017    + Stress, multi vessel CAD    Cardiomyopathy ([facility] 2017    EF 35-40%    CHF (congestive heart failure) ([facility] 2017    COPD (chronic obstructive pulmonary disease) ([facility]     Diabetes ([facility] 2017    Diaphragmatic paralysis     left    Diastolic dysfunction     Family history of early CAD     father  at 48 of heart attack    Former smoker     High cholesterol 2017    Hypertension     Knee replacement 2009    Right     Obesity, Class II, BMI 35-39.9, with comorbidity     Pneumonia        Past Surgical History:   Procedure Laterality Date    APPENDECTOMY      BRONCHOSCOPY N/A 2020    BRONCHOSCOPY ALVEOLAR LAVAGE performed by Marshall Saha MD at [facility]  2020    [address]ONCHOSCOPY [address] performed by Marshall Saha MD at [place] [address]  07/10/2017    Dr. Epifanio Malhotra - multi-vessel CAD, referred for CABG    CARPAL TUNNEL RELEASE Bilateral     CORONARY ANGIOPLASTY WITH STENT PLACEMENT  2018    YUVAL- 2.75 x 18 pRCA    CORONARY ARTERY BYPASS GRAFT  2017    Dr. uBrden Loop - x4 (LIMA-LAD, reverse R SVG-diagonal, OM & PDA) w/placement of temporary pacing wire    MENISCECTOMY Right     THORACOSCOPY Left 2020    LEFT LAPAROSCOPIC DIAPHRAGMATIC PLICATION  CPT CODE - 68305, 65996 performed by Zonia Brannon MD at [address] [facility] Right     TRANSESOPHAGEAL ECHOCARDIOGRAM  2017    during CABG       Social History     Tobacco Use    Smoking status: Former Smoker     Packs/day: 1.00     Years: 15.00     Pack years: 15.00     Types: Cigarettes, Pipe, Cigars     Start date: 1968     Quit date: 1978     Years since quittin.2    Smokeless tobacco: Never Used    Tobacco comment: 2nd hand smoke until 1 year ago   Substance Use Topics    Alcohol use:  Yes     Alcohol/week: 3.0 standard drinks     Types: 1 Shots of liquor, 2 Standard drinks or equivalent per week     Comment: occaisional       Family History   Problem Relation Age of Onset    Heart Attack Father     Heart Disease Father     Heart Attack Brother     High Blood Pressure Brother          Current Outpatient Medications:     apixaban (ELIQUIS) 5 MG TABS tablet, TAKE 1 TABLET BY MOUTH TWICE A DAY, Disp: 180 tablet, Rfl: 3    metoprolol succinate (TOPROL XL) 25 MG extended release tablet, TAKE 1 TABLET BY MOUTH EVERY DAY, Disp: 90 tablet, Rfl: 3    furosemide (LASIX) 40 MG tablet, TAKE 1 TABLET BY MOUTH EVERY DAY, Disp: 90 tablet, Rfl: 3    atorvastatin (LIPITOR) 80 MG tablet, Take 1 tablet by mouth daily, Disp: 90 tablet, Rfl: 3    lisinopril (PRINIVIL;ZESTRIL) 5 MG tablet, Take 1 tablet by mouth daily, Disp: 90 tablet, Rfl: 3    UNABLE TO FIND, VESIVEST TWICE DAILY FOR 20 MINUTES - lung congestion, Disp: , Rfl:     ipratropium-albuterol (DUONEB) 0.5-2.5 (3) MG/3ML SOLN nebulizer solution, Inhale 3 mLs into the lungs 4 times daily (Patient taking differently: Inhale 1 vial into the lungs 2 times daily ), Disp: 360 mL, Rfl: 11    albuterol sulfate  (90 Base) MCG/ACT inhaler, Inhale 2 puffs into the lungs 4 times daily as needed for Wheezing, Disp: 1 Inhaler, Rfl: 0    Spacer/Aero-Holding Angela Thrasher, 1 Device

## 2021-04-02 ENCOUNTER — HOSPITAL ENCOUNTER (OUTPATIENT)
Dept: GENERAL RADIOLOGY | Age: 76
Discharge: HOME | End: 2021-04-02
Payer: MEDICARE

## 2021-04-02 ENCOUNTER — HOSPITAL ENCOUNTER (OUTPATIENT)
Age: 76
Discharge: HOME | End: 2021-04-02
Payer: MEDICARE

## 2021-04-02 DIAGNOSIS — J41.0 SIMPLE CHRONIC BRONCHITIS (HCC): ICD-10-CM

## 2021-04-02 DIAGNOSIS — J98.6 DIAPHRAGM PARALYSIS: ICD-10-CM

## 2021-04-02 PROCEDURE — 71046 X-RAY EXAM CHEST 2 VIEWS: CPT

## 2021-04-27 RX ORDER — BUDESONIDE, GLYCOPYRROLATE, AND FORMOTEROL FUMARATE 160; 9; 4.8 UG/1; UG/1; UG/1
2 AEROSOL, METERED RESPIRATORY (INHALATION) 2 TIMES DAILY
Qty: 1 INHALER | Refills: 5 | Status: SHIPPED | OUTPATIENT
Start: 2021-04-27 | End: 2021-06-08

## 2021-04-27 NOTE — TELEPHONE ENCOUNTER
Patient called the office and stated that the [facility] worked well and would like a script sent to pharmacy. Script pended.

## 2021-04-28 ENCOUNTER — TELEPHONE (OUTPATIENT)
Dept: PULMONOLOGY | Age: 76
End: 2021-04-28

## 2021-04-28 RX ORDER — BUDESONIDE, GLYCOPYRROLATE, AND FORMOTEROL FUMARATE 160; 9; 4.8 UG/1; UG/1; UG/1
1 AEROSOL, METERED RESPIRATORY (INHALATION) 2 TIMES DAILY
Qty: 1 INHALER | Refills: 11 | Status: SHIPPED | OUTPATIENT
Start: 2021-04-28 | End: 2021-06-08

## 2021-05-03 ENCOUNTER — TELEPHONE (OUTPATIENT)
Dept: PULMONOLOGY | Age: 76
End: 2021-05-03

## 2021-05-03 DIAGNOSIS — J41.0 SIMPLE CHRONIC BRONCHITIS (HCC): ICD-10-CM

## 2021-05-03 DIAGNOSIS — J41.0 SIMPLE CHRONIC BRONCHITIS (HCC): Primary | ICD-10-CM

## 2021-05-03 RX ORDER — FLUTICASONE FUROATE, UMECLIDINIUM BROMIDE AND VILANTEROL TRIFENATATE 100; 62.5; 25 UG/1; UG/1; UG/1
1 POWDER RESPIRATORY (INHALATION) DAILY
Qty: 1 EACH | Refills: 15 | Status: SHIPPED | OUTPATIENT
Start: 2021-05-03 | End: 2022-05-31

## 2021-05-03 RX ORDER — IPRATROPIUM BROMIDE AND ALBUTEROL SULFATE 2.5; .5 MG/3ML; MG/3ML
1 SOLUTION RESPIRATORY (INHALATION) 4 TIMES DAILY
Qty: 360 ML | Refills: 5 | Status: SHIPPED | OUTPATIENT
Start: 2021-05-03 | End: 2022-05-18

## 2021-05-03 NOTE — TELEPHONE ENCOUNTER
Insurance won't cover Ilia riley what they will cover:Spiriva® HandiHaler®/Respimat® (tiotropium)  Tudorza® Pressair®  (aclidinium)  Incruse®  Ellipta® (umeclidinium)  Advair Diskus® (fluticasone-salmeterol, Wixela Inhub)  Breo Ellipta® (fluticasone-vilanterol)  Symbicort® (budesonide-formoterol)  Stiolto®  Respimat® (tiotropium-olodaterol)  Anoro Ellipta® (umeclidinium-vilanterol)  Trelegy Ellipta® (fluticasone-umeclidinium-vilanterol)  Striverdi®  Respimat® (olodaterol)  Serevent®  Diskus® (salmeterol)  Other (please specify)    Cover my Med would not even let me enter that he failed Trelegy.

## 2021-06-08 ENCOUNTER — OFFICE VISIT (OUTPATIENT)
Dept: PULMONOLOGY | Age: 76
End: 2021-06-08
Payer: MEDICARE

## 2021-06-08 VITALS
HEIGHT: 72 IN | SYSTOLIC BLOOD PRESSURE: 129 MMHG | HEART RATE: 80 BPM | WEIGHT: 263.2 LBS | DIASTOLIC BLOOD PRESSURE: 71 MMHG | BODY MASS INDEX: 35.65 KG/M2 | TEMPERATURE: 98 F | OXYGEN SATURATION: 96 %

## 2021-06-08 DIAGNOSIS — J98.6 DIAPHRAGM PARALYSIS: ICD-10-CM

## 2021-06-08 DIAGNOSIS — J41.0 SIMPLE CHRONIC BRONCHITIS (HCC): ICD-10-CM

## 2021-06-08 DIAGNOSIS — J98.11 ATELECTASIS OF LEFT LUNG: Primary | ICD-10-CM

## 2021-06-08 PROCEDURE — 99214 OFFICE O/P EST MOD 30 MIN: CPT | Performed by: INTERNAL MEDICINE

## 2021-06-08 NOTE — PROGRESS NOTES
MA Communication: The following orders are received by verbal communication from Dr. Leatha Segura.     Orders include:  Bronch scheduled 6/11/21 @ 12 PM       FU scheduled 6/22/21

## 2021-06-08 NOTE — PATIENT INSTRUCTIONS
You have been scheduled for a Bronchoscopy on Friday, 6/11/21 at 12:00 PM (Arrival time 11:00 AM) at [facility]. Please check in at the main registration desk. Follow-up appointment with Dr. Darvin Bowman is scheduled for 6/22/21 @ 1 PM.    Nothing by mouth after midnight. Anticoagulant Information:       *  Patient NEVER needs to stop Aspirin       *  Eliquis/Xarelto:  Hold for 72 hours prior to procedure       *  Coumadin:  Hold 72 hours prior to procedure (If INRO is high - it may require longer time)       *  Plavix:  Hold 7 days prior to procedure   **Patient can start anticoagulant the day after your procedure. Please plan to have transportation for this test, as you WILL NOT be able to drive following this procedure. Remember to bring a list of pulmonary medications and any CPAP or BiPAP machines to your next appointment with the office. Please keep all of your future appointments scheduled by [address] office. Out of respect for other patients and providers, you may be asked to reschedule your appointment if you arrive later than your scheduled appointment time. Appointments cancelled less than 24hrs in advance will be considered a no show. Patients with three missed appointments within 1 year or four missed appointments within 2 years can be dismissed from the practice. Please be aware that our physicians are required to work in the Intensive Care Unit at [facility].  Your appointment may need to be rescheduled if they are designated to work during your appointment time. You may receive a survey regarding the care you received during your visit. Your input is valuable to us. We encourage you to complete and return your survey. We hope you will choose us in the future for your healthcare needs. Pt instructed of all future appointment dates & times, including radiology, labs, procedures & referrals.  If procedures were scheduled preparation instructions provided. Instructions on future appointments with [facility] were given.

## 2021-06-09 ASSESSMENT — ENCOUNTER SYMPTOMS
VOICE CHANGE: 0
CHOKING: 0
EYE PAIN: 0
CHEST TIGHTNESS: 0
ABDOMINAL PAIN: 0
STRIDOR: 0
COUGH: 1
SHORTNESS OF BREATH: 1
EYE ITCHING: 0
EYE DISCHARGE: 0
SORE THROAT: 0
DIARRHEA: 0
CONSTIPATION: 0

## 2021-06-09 NOTE — PROGRESS NOTES
Pulmonary Outpatient Note   Jonnie Chandler MD       2021    Chief Complaint:  Breathing Problem (COPD)     HPI:   78y.o. year old male here for follow up of COPD. For the past couple months he has had worsening shortness of breath and congestion. Thick clear to white sputum production on a daily basis. He is adherent to inhalers without as much benefit. Denies wheezing, fevers, or sick contacts.   Adherent to vest airway clearance device without much benefit      Had a CXR I personally reviewed showing persistent left lower lobe atelectasis and elevated diaphragm       Past Medical History:   Diagnosis Date    Bronchitis     chronic bronchitis once or twice per year twice has gone into pneumonia    CAD (coronary artery disease) 07/10/2017    + Stress, multi vessel CAD    Cardiomyopathy ([facility] [address] 2017    EF 35-40%    CHF (congestive heart failure) ([facility] [address] 2017    COPD (chronic obstructive pulmonary disease) ([facility] [address] [facility]     Diabetes ([facility] [address] 2017    Diaphragmatic paralysis     left    Diastolic dysfunction     Family history of early CAD     father  at 48 of heart attack    Former smoker     High cholesterol 2017    Hypertension     Knee replacement     Right     Obesity, Class II, BMI 35-39.9, with comorbidity     Pneumonia        Past Surgical History:   Procedure Laterality Date    APPENDECTOMY      BRONCHOSCOPY N/A 2020    BRONCHOSCOPY ALVEOLAR LAVAGE performed by Felipe Sanders MD at [address] [place] [address]  2020    BRONCHOSCOPY THERAPUTIC ASPIRATION INITIAL performed by Felipe Sanders MD at [place] [address]  07/10/2017    Dr. Karen Clements - multi-vessel CAD, referred for CABG    CARPAL TUNNEL RELEASE Bilateral     CORONARY ANGIOPLASTY WITH STENT PLACEMENT  2018    YUVAL- 2.75 x 18 pRCA    CORONARY ARTERY BYPASS GRAFT  2017    Dr. Kilo Quinones - x4 (LIMA-LAD, reverse R SVG-diagonal, OM & PDA) w/placement of temporary pacing wire    MENISCECTOMY Right     THORACOSCOPY Left 2020    LEFT LAPAROSCOPIC DIAPHRAGMATIC PLICATION  CPT CODE - 41276, 01252 performed by Quenten Moritz, MD at [address] [facility] Right     TRANSESOPHAGEAL ECHOCARDIOGRAM  2017    during CABG       Social History     Tobacco Use    Smoking status: Former Smoker     Packs/day: 1.00     Years: 15.00     Pack years: 15.00     Types: Cigarettes, Pipe, Cigars     Start date: 1968     Quit date: 1978     Years since quittin.4    Smokeless tobacco: Never Used    Tobacco comment: 2nd hand smoke until 1 year ago   Substance Use Topics    Alcohol use:  Yes     Alcohol/week: 3.0 standard drinks     Types: 1 Shots of liquor, 2 Standard drinks or equivalent per week     Comment: 1-3 drinks per week          Family History   Problem Relation Age of Onset    Heart Attack Father     Heart Disease Father     Heart Attack Brother     High Blood Pressure Brother          Current Outpatient Medications:     ipratropium-albuterol (DUONEB) 0.5-2.5 (3) MG/3ML SOLN nebulizer solution, INHALE 3 MLS INTO THE LUNGS 4 TIMES DAILY, Disp: 360 mL, Rfl: 5    fluticasone-umeclidin-vilant (TRELEGY ELLIPTA) 100-62.5-25 MCG/INH AEPB, Inhale 1 puff into the lungs daily, Disp: 1 each, Rfl: 15    apixaban (ELIQUIS) 5 MG TABS tablet, TAKE 1 TABLET BY MOUTH TWICE A DAY, Disp: 180 tablet, Rfl: 3    metoprolol succinate (TOPROL XL) 25 MG extended release tablet, TAKE 1 TABLET BY MOUTH EVERY DAY, Disp: 90 tablet, Rfl: 3    furosemide (LASIX) 40 MG tablet, TAKE 1 TABLET BY MOUTH EVERY DAY, Disp: 90 tablet, Rfl: 3    atorvastatin (LIPITOR) 80 MG tablet, Take 1 tablet by mouth daily, Disp: 90 tablet, Rfl: 3    lisinopril (PRINIVIL;ZESTRIL) 5 MG tablet, Take 1 tablet by mouth daily, Disp: 90 tablet, Rfl: 3    UNABLE TO FIND, VESIVEST TWICE DAILY FOR 20 MINUTES - lung congestion, Disp: , Rfl:     albuterol sulfate  (90 Base) MCG/ACT inhaler, Inhale 2 puffs into the lungs 4 times daily as needed for Wheezing, Disp: 1 Inhaler, Rfl: 0    metFORMIN (GLUCOPHAGE) 500 MG tablet, Take 500 mg by mouth daily (with breakfast), Disp: , Rfl:     aspirin EC 81 MG EC tablet, Take 1 tablet by mouth daily (Patient taking differently: Take 81 mg by mouth nightly ), Disp: 30 tablet, Rfl: 3    Glucosamine-Chondroitin (OSTEO BI-FLEX REGULAR STRENGTH PO), Take 1 capsule by mouth daily , Disp: , Rfl:     GuaiFENesin (MUCINEX PO), Take 1,200 mg by mouth 2 times daily, Disp: , Rfl:     Probiotic Product (PROBIOTIC DAILY PO), Take by mouth daily , Disp: , Rfl:     fish oil-omega-3 fatty acids 1000 MG capsule, Take 1 g by mouth nightly , Disp: , Rfl:     Multiple Vitamin (MULTI-VITAMIN PO), Take 1 tablet by mouth daily , Disp: , Rfl:     Lysine 1000 MG TABS, Take 1 tablet by mouth Daily , Disp: , Rfl:     cetirizine (ZYRTEC) 10 MG tablet, Take 10 mg by mouth daily. , Disp: , Rfl:     Spacer/Aero-Holding Chambers LETA, 1 Device by Does not apply route daily as needed (sob), Disp: 1 Device, Rfl: 0    Oxycodone and Demerol    Vitals:    06/08/21 1446   BP: 129/71   Site: Left Upper Arm   Position: Sitting   Cuff Size: Medium Adult   Pulse: 80   Temp: 98 °F (36.7 °C)   TempSrc: Oral   SpO2: 96%   Weight: 263 lb 3.2 oz (119.4 kg)   Height: 6' (1.829 m)       Review of Systems   Constitutional: Negative for chills, fever and unexpected weight change. HENT: Negative for mouth sores, sore throat and voice change. Eyes: Negative for pain, discharge and itching. Respiratory: Positive for cough and shortness of breath. Negative for choking, chest tightness and stridor. Cardiovascular: Negative for chest pain, palpitations and leg swelling. Gastrointestinal: Negative for abdominal pain, constipation and diarrhea. Endocrine: Negative for cold intolerance, heat intolerance and polydipsia. Genitourinary: Negative for dysuria, frequency and hematuria. Musculoskeletal: Negative for gait problem, joint swelling and neck stiffness. Neurological: Negative for dizziness, numbness and headaches. Psychiatric/Behavioral: Negative for agitation, confusion and hallucinations. Physical Exam  Constitutional:       General: He is not in acute distress. Appearance: He is well-developed. He is not diaphoretic. HENT:      Head: Normocephalic and atraumatic. Mouth/Throat:      Pharynx: No oropharyngeal exudate. Eyes:      Pupils: Pupils are equal, round, and reactive to light. Neck:      Vascular: No JVD. Cardiovascular:      Heart sounds: Normal heart sounds. No murmur heard. No friction rub. No gallop. Pulmonary:      Effort: Pulmonary effort is normal.      Breath sounds: No wheezing or rales. Abdominal:      General: Bowel sounds are normal. There is no distension. Palpations: Abdomen is soft. Tenderness: There is no abdominal tenderness. Musculoskeletal:         General: Normal range of motion. Cervical back: Neck supple. Lymphadenopathy:      Cervical: No cervical adenopathy. Skin:     General: Skin is warm and dry. Findings: No rash. Neurological:      Mental Status: He is alert. Cranial Nerves: No cranial nerve deficit. Comments: CN 2-12 grossly intact         ASSESSMENT:    1. Atelectasis of left lung    2. Diaphragm paralysis    3. Simple chronic bronchitis (HCC)      PLAN:    -Reviewed options, has impaired mucous clearance of left lung due to impaired diaphragm movement.  Will proceed with bronchoscopy to evaluate and assist with pulm toileting  -Start cough assist device  -Continue vest airway clearance  -Bronchodilators  -Further workup if symptoms not improving with mucous clearance  -Obesity contributing to respiratory issues, counseled on weight management as well as consuming smaller more frequent meals to avoid gastric distension hindering lung expansion   -Return to clinic after bronch Orders Placed This Encounter   Procedures   Marily Augustin MD

## 2021-06-11 ENCOUNTER — HOSPITAL ENCOUNTER (OUTPATIENT)
Age: 76
Setting detail: OUTPATIENT SURGERY
Discharge: HOME | End: 2021-06-11
Attending: INTERNAL MEDICINE | Admitting: INTERNAL MEDICINE
Payer: MEDICARE

## 2021-06-11 VITALS
BODY MASS INDEX: 35.49 KG/M2 | SYSTOLIC BLOOD PRESSURE: 122 MMHG | HEIGHT: 72 IN | OXYGEN SATURATION: 95 % | RESPIRATION RATE: 18 BRPM | HEART RATE: 74 BPM | TEMPERATURE: 97 F | WEIGHT: 262 LBS | DIASTOLIC BLOOD PRESSURE: 72 MMHG

## 2021-06-11 PROCEDURE — 3609010900 HC BRONCHOSCOPY THERAPUTIC ASPIRATION INITIAL: Performed by: INTERNAL MEDICINE

## 2021-06-11 PROCEDURE — 87102 FUNGUS ISOLATION CULTURE: CPT

## 2021-06-11 PROCEDURE — 6360000002 HC RX W HCPCS: Performed by: INTERNAL MEDICINE

## 2021-06-11 PROCEDURE — 7100000011 HC PHASE II RECOVERY - ADDTL 15 MIN: Performed by: INTERNAL MEDICINE

## 2021-06-11 PROCEDURE — 87205 SMEAR GRAM STAIN: CPT

## 2021-06-11 PROCEDURE — 7100000010 HC PHASE II RECOVERY - FIRST 15 MIN: Performed by: INTERNAL MEDICINE

## 2021-06-11 PROCEDURE — 87070 CULTURE OTHR SPECIMN AEROBIC: CPT

## 2021-06-11 PROCEDURE — 87015 SPECIMEN INFECT AGNT CONCNTJ: CPT

## 2021-06-11 PROCEDURE — 31624 DX BRONCHOSCOPE/LAVAGE: CPT | Performed by: INTERNAL MEDICINE

## 2021-06-11 PROCEDURE — 87116 MYCOBACTERIA CULTURE: CPT

## 2021-06-11 PROCEDURE — 3609010800 HC BRONCHOSCOPY ALVEOLAR LAVAGE: Performed by: INTERNAL MEDICINE

## 2021-06-11 PROCEDURE — 99152 MOD SED SAME PHYS/QHP 5/>YRS: CPT | Performed by: INTERNAL MEDICINE

## 2021-06-11 PROCEDURE — 2709999900 HC NON-CHARGEABLE SUPPLY: Performed by: INTERNAL MEDICINE

## 2021-06-11 PROCEDURE — 87206 SMEAR FLUORESCENT/ACID STAI: CPT

## 2021-06-11 PROCEDURE — 88305 TISSUE EXAM BY PATHOLOGIST: CPT

## 2021-06-11 PROCEDURE — 88112 CYTOPATH CELL ENHANCE TECH: CPT

## 2021-06-11 RX ORDER — FENTANYL CITRATE 50 UG/ML
INJECTION, SOLUTION INTRAMUSCULAR; INTRAVENOUS PRN
Status: DISCONTINUED | OUTPATIENT
Start: 2021-06-11 | End: 2021-06-11 | Stop reason: ALTCHOICE

## 2021-06-11 RX ORDER — MIDAZOLAM HYDROCHLORIDE 5 MG/ML
INJECTION INTRAMUSCULAR; INTRAVENOUS PRN
Status: DISCONTINUED | OUTPATIENT
Start: 2021-06-11 | End: 2021-06-11 | Stop reason: ALTCHOICE

## 2021-06-11 ASSESSMENT — PAIN - FUNCTIONAL ASSESSMENT: PAIN_FUNCTIONAL_ASSESSMENT: 0-10

## 2021-06-11 ASSESSMENT — PAIN SCALES - GENERAL
PAINLEVEL_OUTOF10: 0

## 2021-06-11 NOTE — H&P
The patient was seen and examined today  Plan for bronchoscopy    Chief Complaint:  Breathing Problem (COPD)     HPI:   78y.o. year old male here for follow up of COPD.     For the past couple months he has had worsening shortness of breath and congestion. Thick clear to white sputum production on a daily basis. He is adherent to inhalers without as much benefit. Denies wheezing, fevers, or sick contacts.   Adherent to vest airway clearance device without much benefit       Had a CXR I personally reviewed showing persistent left lower lobe atelectasis and elevated diaphragm         Past Medical History        Past Medical History:   Diagnosis Date    Bronchitis       chronic bronchitis once or twice per year twice has gone into pneumonia    CAD (coronary artery disease) 07/10/2017     + Stress, multi vessel CAD    Cardiomyopathy ([facility] [address] 2017     EF 35-40%    CHF (congestive heart failure) ([facility] [address] 2017    COPD (chronic obstructive pulmonary disease) (HCC)      Diabetes ([facility] [address] 2017    Diaphragmatic paralysis       left    Diastolic dysfunction      Family history of early CAD       father  at 48 of heart attack    Former smoker      High cholesterol 2017    Hypertension      Knee replacement      Right     Obesity, Class II, BMI 35-39.9, with comorbidity      Pneumonia              Past Surgical History         Past Surgical History:   Procedure Laterality Date    APPENDECTOMY        BRONCHOSCOPY N/A 2020     BRONCHOSCOPY ALVEOLAR LAVAGE performed by Melissa Bey MD at [address] [place] [address]   2020     BRONCHOSCOPY THERAPUTIC ASPIRATION INITIAL performed by Melissa Bey MD at [place] [address]   07/10/2017     Dr. Carlin Veras - multi-vessel CAD, referred for CABG    CARPAL TUNNEL RELEASE Bilateral      CORONARY ANGIOPLASTY WITH STENT PLACEMENT   2018     YUVAL- 2.75 x 18 pRCA    CORONARY ARTERY BYPASS GRAFT   2017     Dr. Aury Soliman - x4 (LIMA-LAD, reverse R SVG-diagonal, OM & PDA) w/placement of temporary pacing wire    MENISCECTOMY Right     THORACOSCOPY Left 2020     LEFT LAPAROSCOPIC DIAPHRAGMATIC PLICATION  CPT CODE - 32326, 30966 performed by Glenna Lopez MD at [facility] Right      TRANSESOPHAGEAL ECHOCARDIOGRAM   2017     during CABG            Social History            Tobacco Use    Smoking status: Former Smoker       Packs/day: 1.00       Years: 15.00       Pack years: 15.00       Types: Cigarettes, Pipe, Cigars       Start date: 1968       Quit date: 1978       Years since quittin.3    Smokeless tobacco: Never Used    Tobacco comment: 2nd hand smoke until 1 year ago   Substance Use Topics    Alcohol use:  Yes       Alcohol/week: 3.0 standard drinks       Types: 1 Shots of liquor, 2 Standard drinks or equivalent per week       Comment: 1-3 drinks per week            Family History         Family History   Problem Relation Age of Onset    Heart Attack Father      Heart Disease Father      Heart Attack Brother      High Blood Pressure Brother                Current Medication      Current Outpatient Medications:     ipratropium-albuterol (DUONEB) 0.5-2.5 (3) MG/3ML SOLN nebulizer solution, INHALE 3 MLS INTO THE LUNGS 4 TIMES DAILY, Disp: 360 mL, Rfl: 5    fluticasone-umeclidin-vilant (TRELEGY ELLIPTA) 100-62.5-25 MCG/INH AEPB, Inhale 1 puff into the lungs daily, Disp: 1 each, Rfl: 15    apixaban (ELIQUIS) 5 MG TABS tablet, TAKE 1 TABLET BY MOUTH TWICE A DAY, Disp: 180 tablet, Rfl: 3    metoprolol succinate (TOPROL XL) 25 MG extended release tablet, TAKE 1 TABLET BY MOUTH EVERY DAY, Disp: 90 tablet, Rfl: 3    furosemide (LASIX) 40 MG tablet, TAKE 1 TABLET BY MOUTH EVERY DAY, Disp: 90 tablet, Rfl: 3    atorvastatin (LIPITOR) 80 MG tablet, Take 1 tablet by mouth daily, Disp: 90 tablet, Rfl: 3    lisinopril (PRINIVIL;ZESTRIL) 5 MG tablet, Take 1 tablet by mouth daily, Disp: 90 tablet, Rfl: 3    UNABLE TO FIND, VESIVEST TWICE DAILY FOR 20 MINUTES - lung congestion, Disp: , Rfl:     albuterol sulfate  (90 Base) MCG/ACT inhaler, Inhale 2 puffs into the lungs 4 times daily as needed for Wheezing, Disp: 1 Inhaler, Rfl: 0    metFORMIN (GLUCOPHAGE) 500 MG tablet, Take 500 mg by mouth daily (with breakfast), Disp: , Rfl:     aspirin EC 81 MG EC tablet, Take 1 tablet by mouth daily (Patient taking differently: Take 81 mg by mouth nightly ), Disp: 30 tablet, Rfl: 3    Glucosamine-Chondroitin (OSTEO BI-FLEX REGULAR STRENGTH PO), Take 1 capsule by mouth daily , Disp: , Rfl:     GuaiFENesin (MUCINEX PO), Take 1,200 mg by mouth 2 times daily, Disp: , Rfl:     Probiotic Product (PROBIOTIC DAILY PO), Take by mouth daily , Disp: , Rfl:     fish oil-omega-3 fatty acids 1000 MG capsule, Take 1 g by mouth nightly , Disp: , Rfl:     Multiple Vitamin (MULTI-VITAMIN PO), Take 1 tablet by mouth daily , Disp: , Rfl:     Lysine 1000 MG TABS, Take 1 tablet by mouth Daily , Disp: , Rfl:     cetirizine (ZYRTEC) 10 MG tablet, Take 10 mg by mouth daily. , Disp: , Rfl:     Spacer/Aero-Holding Chambers LETA, 1 Device by Does not apply route daily as needed (sob), Disp: 1 Device, Rfl: 0        Oxycodone and Demerol     Vitals       Vitals:     06/08/21 1446   BP: 129/71   Site: Left Upper Arm   Position: Sitting   Cuff Size: Medium Adult   Pulse: 80   Temp: 98 °F (36.7 °C)   TempSrc: Oral   SpO2: 96%   Weight: 263 lb 3.2 oz (119.4 kg)   Height: 6' (1.829 m)            Review of Systems   Constitutional: Negative for chills, fever and unexpected weight change. HENT: Negative for mouth sores, sore throat and voice change. Eyes: Negative for pain, discharge and itching. Respiratory: Positive for cough and shortness of breath. Negative for choking, chest tightness and stridor. Cardiovascular: Negative for chest pain, palpitations and leg swelling. Gastrointestinal: Negative for abdominal pain, constipation and diarrhea. Endocrine: Negative for cold intolerance, heat intolerance and polydipsia. Genitourinary: Negative for dysuria, frequency and hematuria. Musculoskeletal: Negative for gait problem, joint swelling and neck stiffness. Neurological: Negative for dizziness, numbness and headaches. Psychiatric/Behavioral: Negative for agitation, confusion and hallucinations.         Physical Exam  Constitutional:       General: He is not in acute distress. Appearance: He is well-developed. He is not diaphoretic. HENT:      Head: Normocephalic and atraumatic. Mouth/Throat:      Pharynx: No oropharyngeal exudate. Eyes:      Pupils: Pupils are equal, round, and reactive to light. Neck:      Vascular: No JVD. Cardiovascular:      Heart sounds: Normal heart sounds. No murmur heard. No friction rub. No gallop. Pulmonary:      Effort: Pulmonary effort is normal.      Breath sounds: No wheezing or rales. Abdominal:      General: Bowel sounds are normal. There is no distension. Palpations: Abdomen is soft. Tenderness: There is no abdominal tenderness. Musculoskeletal:         General: Normal range of motion. Cervical back: Neck supple. Lymphadenopathy:      Cervical: No cervical adenopathy. Skin:     General: Skin is warm and dry. Findings: No rash. Neurological:      Mental Status: He is alert. Cranial Nerves: No cranial nerve deficit. Comments: CN 2-12 grossly intact          ASSESSMENT:     1. Atelectasis of left lung    2. Diaphragm paralysis    3. Simple chronic bronchitis (HCC)       PLAN:     -Reviewed options, has impaired mucous clearance of left lung due to impaired diaphragm movement.  Will proceed with bronchoscopy to evaluate and assist with pulm toileting  -Start cough assist device  -Continue vest airway clearance  -Bronchodilators  -Further workup if symptoms not improving with mucous clearance  -Obesity contributing to respiratory issues, counseled on weight management as well as consuming smaller more frequent meals to avoid gastric distension hindering lung expansion   -Return to clinic after bronch

## 2021-06-13 LAB
CULTURE, RESPIRATORY: NORMAL
GRAM STAIN RESULT: NORMAL

## 2021-06-22 ENCOUNTER — OFFICE VISIT (OUTPATIENT)
Dept: PULMONOLOGY | Age: 76
End: 2021-06-22
Payer: MEDICARE

## 2021-06-22 VITALS
HEART RATE: 76 BPM | BODY MASS INDEX: 35.76 KG/M2 | WEIGHT: 264 LBS | DIASTOLIC BLOOD PRESSURE: 57 MMHG | TEMPERATURE: 97.9 F | HEIGHT: 72 IN | RESPIRATION RATE: 16 BRPM | SYSTOLIC BLOOD PRESSURE: 107 MMHG | OXYGEN SATURATION: 97 %

## 2021-06-22 DIAGNOSIS — R06.02 SOB (SHORTNESS OF BREATH): Primary | ICD-10-CM

## 2021-06-22 DIAGNOSIS — E66.9 OBESITY (BMI 35.0-39.9 WITHOUT COMORBIDITY): ICD-10-CM

## 2021-06-22 DIAGNOSIS — J98.6 DIAPHRAGM PARALYSIS: ICD-10-CM

## 2021-06-22 DIAGNOSIS — J41.0 SIMPLE CHRONIC BRONCHITIS (HCC): ICD-10-CM

## 2021-06-22 PROCEDURE — 99213 OFFICE O/P EST LOW 20 MIN: CPT | Performed by: INTERNAL MEDICINE

## 2021-06-22 NOTE — PATIENT INSTRUCTIONS
Remember to bring a list of pulmonary medications and any CPAP or BiPAP machines to your next appointment with the office. Please keep all of your future appointments scheduled by [address], [facility] office. Out of respect for other patients and providers, you may be asked to reschedule your appointment if you arrive later than your scheduled appointment time. Appointments cancelled less than 24hrs in advance will be considered a no show. Patients with three missed appointments within 1 year or four missed appointments within 2 years can be dismissed from the practice. Please be aware that our physicians are required to work in the Intensive Care Unit at [facility].  Your appointment may need to be rescheduled if they are designated to work during your appointment time. You may receive a survey regarding the care you received during your visit. Your input is valuable to us. We encourage you to complete and return your survey. We hope you will choose us in the future for your healthcare needs. Pt instructed of all future appointment dates & times, including radiology, labs, procedures & referrals. If procedures were scheduled preparation instructions provided. Instructions on future appointments with [facility] were given.

## 2021-06-22 NOTE — PROGRESS NOTES
MA Communication:   The following orders are received by verbal communication from Sebastian Morel MD    Orders include:    6 month: 12/28/2021 2:30 pm

## 2021-06-27 ASSESSMENT — ENCOUNTER SYMPTOMS
SHORTNESS OF BREATH: 1
COUGH: 1

## 2021-06-27 NOTE — PROGRESS NOTES
Pulmonary Outpatient Note   Chay Agustin MD       6/22/2021    1. SOB (shortness of breath)    2. Simple chronic bronchitis ([address]    3. Diaphragm paralysis    4. Obesity (BMI 35.0-39.9 without comorbidity)          ASSESSMENT/PLAN:  Shortness of breath. Likely due to restrictive effect of diaphragmatic paralysis, PFT showed more of restriction and obstruction. Simple chronic bronchitis, chronic cough. On Trelegy, DuoNeb, as needed albuterol inhaler. Continue same for now. He says his cough has decreased after bronchoscopy. Recommend aggressive use of the Acapella after using his bronchodilator. Currently he is using his vest twice a day  Diaphragmatic paralysis. Status post plication. Showed him the images, there is likely to be chronic atelectasis related to diaphragmatic architecture rather than true endobronchial disease. RHONDA. Wishes to wait for considering treatment, he will call back  Obesity. Should make attempts to lose weight. Prophylaxis. Per PCP    RTC 6 mths, call earlier if symtptomatic      No orders of the defined types were placed in this encounter. No follow-ups on file. Chief Complaint:   Results (Bronch fu)       HPI: Bassem Hayes is a very pleasant 78y.o. year old male here for follow-up to discuss bronchoscopy results. His PCP is Dr. Subhash Rondon    The patient was seen by Dr. Apolonia Guevara for COPD/chronic bronchitis, with persistent thick white phlegm in spite of regular use of bronchodilator. He says he had a bout of pneumonia in January. He was subjected to bronchoscopy on 6/11/2021, presents to discuss results. On bronchoscopy, he had scant clear secretions, BAL of the left lower lobe showed increased segmented neutrophils, cultures and cytology were negative. The patient says he felt better after the bronchoscopy, has been using the vest.  He has been using nebulizers. He uses the vest at least 4 days a week.   He is short of breath climbing up steps, but has monitored his pulse oximetry and is does not desaturate. He has some coughing in the evenings, occasionally spits up small amounts of phlegm. He is able to sleep through the night. Before the bronchoscopy he had \"chunks\" of mucoid plugs, now relieved. The patient also tells me that during his CABG he was told that his left upper lobe was not \"developed\". 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 He was seen by Dr. Valente Seip, has undergone diaphragmatic plication. He does not believe that his dyspnea is significantly improved after the surgery. Regarding his sleep apnea, he says he sleeps well, wants to wait until the end of July and will call. The patient lives in [facility], was smoking 1 pack/day for only about 5 to 6 years. He quit in . He did have secondhand smoke exposure for almost 48 years. He is a , his wife  from lung cancer about 5 years ago. He is a  in the [employer] system. PFT 17  FVC 2.75 L, 58% predicted, FEV1 2.12 L, 60% predicted with no response to bronchodilator. TLC 67% predicted, DLCO 68% predicted. Sleep study 2019  Moderate RHONDA with AHI 15.6/h: 5 apneas, 25 hypopneas. Low oxygen saturation was 76%, time spent with SaO2 <89% was 42.7 minutes. He had PLMS. CT chest 3/20/20  1.  Elevated left hemidiaphragm, similar to the CT scan 2017.  There is   associated left basilar atelectasis.       2.  No acute abnormality.       3.  Cholelithiasis.        Past Medical History:   Diagnosis Date    Bronchitis     chronic bronchitis once or twice per year twice has gone into pneumonia    CAD (coronary artery disease) 07/10/2017    + Stress, multi vessel CAD    Cardiomy[facility] 2017    EF 35-40%    CHF (congestive heart failure) ([facility] 2017    COPD (chronic obstructive pulmonary disease) [facility]     Diabetes [facility]    Diaphragmatic paralysis     left    Diastolic dysfunction     Family history of early CAD     father  at 48 of heart attack    Former smoker     High cholesterol 2017    Hypertension     Knee replacement 2009    Right     Obesity, Class II, BMI 35-39.9, with comorbidity     Pneumonia        Past Surgical History:   Procedure Laterality Date    APPENDECTOMY      BRONCHOSCOPY N/A 2020    BRONCHOSCOPY ALVEOLAR LAVAGE performed by Pauline Chavez MD at [address] [place] [address]  2020    BRONCHOSCOPY [address] performed by Pauline Chavez MD at [address] [place] [address] N/A 2021    BRONCHOSCOPY ALVEOLAR LAVAGE performed by Pauline Chavez MD at [address] [place] [address]  2021    BRONCHOSCOPY THERAPUTIC ASPIRATION INITIAL performed by Pauline Chavez MD at [place] [address]  07/10/2017    Dr. Miles Soria - multi-vessel CAD, referred for CABG    CARPAL TUNNEL RELEASE Bilateral     CORONARY ANGIOPLASTY WITH STENT PLACEMENT  2018    YUVAL- 2.75 x 18 pRCA    CORONARY ARTERY BYPASS GRAFT  2017    Dr. Louie Drop - x4 (LIMA-LAD, reverse R SVG-diagonal, OM & PDA) w/placement of temporary pacing wire    MENISCECTOMY Right 1965    THORACOSCOPY Left 2020    LEFT LAPAROSCOPIC DIAPHRAGMATIC PLICATION  CPT CODE - 45830, 46875 performed by Jc Stevens MD at [address] [facility] Right     TRANSESOPHAGEAL ECHOCARDIOGRAM  2017    during CABG       Social History     Tobacco Use    Smoking status: Former Smoker     Packs/day: 1.00     Years: 15.00     Pack years: 15.00     Types: Cigarettes, Pipe, Cigars     Start date: 1968     Quit date: 1978     Years since quittin.5    Smokeless tobacco: Never Used    Tobacco comment: 2nd hand smoke until 1 year ago   Substance Use Topics    Alcohol use:  Yes     Alcohol/week: 3.0 standard drinks     Types: 1 Shots of liquor, 2 Standard drinks or equivalent per week     Comment: 1-3 drinks per week       Family History   Problem Relation Age of Onset    Heart Attack Father     Heart Disease Father     Heart Attack Brother     High Blood Pressure Brother          Current Outpatient Medications:     ipratropium-albuterol (DUONEB) 0.5-2.5 (3) MG/3ML SOLN nebulizer solution, INHALE 3 MLS INTO THE LUNGS 4 TIMES DAILY, Disp: 360 mL, Rfl: 5    fluticasone-umeclidin-vilant (TRELEGY ELLIPTA) 100-62.5-25 MCG/INH AEPB, Inhale 1 puff into the lungs daily, Disp: 1 each, Rfl: 15    apixaban (ELIQUIS) 5 MG TABS tablet, TAKE 1 TABLET BY MOUTH TWICE A DAY, Disp: 180 tablet, Rfl: 3    metoprolol succinate (TOPROL XL) 25 MG extended release tablet, TAKE 1 TABLET BY MOUTH EVERY DAY, Disp: 90 tablet, Rfl: 3    furosemide (LASIX) 40 MG tablet, TAKE 1 TABLET BY MOUTH EVERY DAY, Disp: 90 tablet, Rfl: 3    atorvastatin (LIPITOR) 80 MG tablet, Take 1 tablet by mouth daily, Disp: 90 tablet, Rfl: 3    lisinopril (PRINIVIL;ZESTRIL) 5 MG tablet, Take 1 tablet by mouth daily, Disp: 90 tablet, Rfl: 3    UNABLE TO FIND, VESIVEST TWICE DAILY FOR 20 MINUTES - lung congestion, Disp: , Rfl:     albuterol sulfate  (90 Base) MCG/ACT inhaler, Inhale 2 puffs into the lungs 4 times daily as needed for Wheezing, Disp: 1 Inhaler, Rfl: 0    Spacer/Aero-Holding Chambers LETA, 1 Device by Does not apply route daily as needed (sob), Disp: 1 Device, Rfl: 0    metFORMIN (GLUCOPHAGE) 500 MG tablet, Take 500 mg by mouth daily (with breakfast), Disp: , Rfl:     aspirin EC 81 MG EC tablet, Take 1 tablet by mouth daily (Patient taking differently: Take 81 mg by mouth nightly ), Disp: 30 tablet, Rfl: 3    Glucosamine-Chondroitin (OSTEO BI-FLEX REGULAR STRENGTH PO), Take 1 capsule by mouth daily , Disp: , Rfl:     GuaiFENesin (MUCINEX PO), Take 1,200 mg by mouth 2 times daily, Disp: , Rfl:     Probiotic Product (PROBIOTIC DAILY PO), Take by mouth daily , Disp: , Rfl:     fish oil-omega-3 wheezing. Comments: Reduced air entry left base  Abdominal:      Palpations: Abdomen is soft. Tenderness: There is no abdominal tenderness. There is no guarding or rebound. Comments: Protuberant   Musculoskeletal:      Right lower leg: No edema. Left lower leg: No edema. Lymphadenopathy:      Cervical: No cervical adenopathy. Skin:     General: Skin is warm and dry. Coloration: Skin is not jaundiced or pale. Findings: No bruising, erythema, lesion or rash. Neurological:      Mental Status: He is alert and oriented to person, place, and time.       Gait: Gait normal.      Deep Tendon Reflexes: Reflexes normal.   Psychiatric:         Mood and Affect: Mood normal.         Behavior: Behavior normal.

## 2021-07-12 LAB
FUNGUS (MYCOLOGY) CULTURE: NORMAL
FUNGUS STAIN: NORMAL

## 2021-07-27 LAB
AFB CULTURE (MYCOBACTERIA): NORMAL
AFB SMEAR: NORMAL

## 2021-09-29 NOTE — PROGRESS NOTES
[address]   Cardiac Consultation    Referring Provider:  Duncan Macias MD     Chief Complaint   Patient presents with    Follow-up    Cardiomyopathy    Hypertension    Hyperlipidemia    Congestive Heart Failure    Shortness of Breath     certain days        History of Present Illness:   Artur Abad is a 78 y.o. 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 His echocardiogram from 4/2020 showed an EF of 55%, (EF 28% by stress test 2017). Today he reports he feels well from a cardiac standpoint. He reports SOB due to COPD, unchanged. He has SOB with steps and needs to rest. He reports increased SOB with extreme temperatures. He denies CP, palpitations, dizziness or syncope. He has had his Booster COVID vaccine. He has increased allergy symptoms. He checks his BP occasionally with PCP and has been controlled. Past Medical History:   has a past medical history of Bronchitis, CAD (coronary artery disease), Cardiomyopathy ([address] CHF (congestive heart failure) ([address] COPD (chronic obstructive pulmonary disease) ([address] Diabetes ([address] Diaphragmatic paralysis, Diastolic dysfunction, Family history of early CAD, Former smoker, High cholesterol, Hypertension, Knee replacement, Obesity, Class II, 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 Surgical History:   has a past surgical history that includes Appendectomy; meniscectomy (Right, 1965); Tonsillectomy; Cardiac catheterization (07/10/2017); Carpal tunnel release (Bilateral); Coronary artery bypass graft (07/18/2017); transesophageal echocardiogram (07/18/2017); Total knee arthroplasty (Right); Coronary angioplasty with stent (05/18/2018); bronchoscopy (N/A, 1/22/2020); bronchoscopy (1/22/2020);  Thoracoscopy (Left, 2020); bronchoscopy (N/A, 2021); and bronchoscopy (2021). Social History:   reports that he quit smoking about 43 years ago. His smoking use included cigarettes, pipe, and cigars. He started smoking about 53 years ago. He has a 15.00 pack-year smoking history. He has never used smokeless tobacco. He reports current alcohol use of about 3.0 standard drinks of alcohol per week. He reports that he does not use drugs. Family History:  Father  at age 48 from \"angina\"    Home Medications:  Prior to Admission medications    Medication Sig Start Date End Date Taking?  Authorizing Provider   ipratropium-albuterol (DUONEB) 0.5-2.5 (3) MG/3ML SOLN nebulizer solution INHALE 3 MLS INTO THE LUNGS 4 TIMES DAILY 5/3/21  Yes Mari Majano MD   fluticasone-umeclidin-vilant (TRELEGY ELLIPTA) 188-42.4-14 MCG/INH AEPB Inhale 1 puff into the lungs daily 5/3/21  Yes Mari Majano MD   apixaban (ELIQUIS) 5 MG TABS tablet TAKE 1 TABLET BY MOUTH TWICE A DAY 3/18/21  Yes Dane Lovell MD   metoprolol succinate (TOPROL XL) 25 MG extended release tablet TAKE 1 TABLET BY MOUTH EVERY DAY 3/18/21  Yes Dane Lovell MD   furosemide (LASIX) 40 MG tablet TAKE 1 TABLET BY MOUTH EVERY DAY 20  Yes Dane Lovell MD   atorvastatin (LIPITOR) 80 MG tablet Take 1 tablet by mouth daily 20  Yes Dane Lovell MD   lisinopril (PRINIVIL;ZESTRIL) 5 MG tablet Take 1 tablet by mouth daily 20  Yes Dane Lovell MD   UNABLE TO FIND VESIVEST TWICE DAILY FOR 20 MINUTES - lung congestion   Yes Historical Provider, MD   albuterol sulfate  (90 Base) MCG/ACT inhaler Inhale 2 puffs into the lungs 4 times daily as needed for Wheezing 3/15/19  Yes Dony Jones,    Spacer/Aero-Holding Chambers LETA 1 Device by Does not apply route daily as needed (sob) 3/15/19  Yes Dony Paterson, DO   metFORMIN (GLUCOPHAGE) 500 MG tablet Take 500 mg by mouth daily (with breakfast)   Yes Historical Provider, MD   aspirin EC 81 MG EC tablet Take 1 tablet by mouth daily  Patient taking differently: Take 81 mg by mouth nightly  5/29/18  Yes Zuri Corbin MD   Glucosamine-Chondroitin (OSTEO BI-FLEX REGULAR STRENGTH PO) Take 1 capsule by mouth daily    Yes Historical Provider, MD   GuaiFENesin (MUCINEX PO) Take 1,200 mg by mouth 2 times daily   Yes Historical Provider, MD   Probiotic Product (PROBIOTIC DAILY PO) Take by mouth daily    Yes Historical Provider, MD   fish oil-omega-3 fatty acids 1000 MG capsule Take 1 g by mouth nightly    Yes Historical Provider, MD   Multiple Vitamin (MULTI-VITAMIN PO) Take 1 tablet by mouth daily    Yes Historical Provider, MD   Lysine 1000 MG TABS Take 1 tablet by mouth Daily    Yes Historical Provider, MD   cetirizine (ZYRTEC) 10 MG tablet Take 10 mg by mouth daily. Yes Historical Provider, MD        Allergies:  Oxycodone and Demerol     Review of Systems:   · Constitutional: there has been no unanticipated weight loss. There's been no change in energy level, sleep pattern, or activity level. · Eyes: No visual changes or diplopia. No scleral icterus. · ENT: No Headaches, hearing loss or vertigo. No mouth sores or sore throat. · Cardiovascular: Reviewed in HPI  · Respiratory: rales in basis   · Gastrointestinal: No abdominal pain, appetite loss, blood in stools. No change in bowel or bladder habits. · Genitourinary: No dysuria, trouble voiding, or hematuria. · Musculoskeletal:  No gait disturbance, weakness or joint complaints. · Integumentary: No rash or pruritis. · Neurological: No headache, diplopia, change in muscle strength, numbness or tingling. No change in gait, balance, coordination, mood, affect, memory, mentation, behavior. · Psychiatric: No anxiety, no depression. · Endocrine: No malaise, fatigue or temperature intolerance. No excessive thirst, fluid intake, or urination. No tremor.   · Hematologic/Lymphatic: No abnormal bruising or bleeding, blood clots or swollen lymph nodes. · Allergic/Immunologic: No nasal congestion or hives. Physical Examination:    Vitals:    09/30/21 0935   BP: 130/73   Pulse: 75   SpO2: 94%        Constitutional and General Appearance: NAD   Respiratory:  · Normal excursion and expansion without use of accessory muscles  Resp Auscultation: decreased breath sounds left base, rales   Cardiovascular:  · The apical impulses not displaced  · Heart tones are crisp and normal  · Cervical veins- mild elevated JVD  · The carotid upstroke is normal in amplitude and contour without delay or bruit  · Normal S1S2, No S3, 2/6 murmur  · Peripheral pulses are symmetrical and full  · There is no clubbing, cyanosis of the extremities. · trace edema. L>R  · Femoral Arteries: 2+ and equal  · Pedal Pulses: 2+ and equal   Abdomen:  · No masses or tenderness  · Liver/Spleen: No Abnormalities Noted  Neurological/Psychiatric:  · Alert and oriented in all spheres  · Moves all extremities well  · Exhibits normal gait balance and coordination  · No abnormalities of mood, affect, memory, mentation, or behavior are noted    Myoview 7/2017   - Abnormal high risk myocardial perfusion study.    - There is a medium size moderate intensity perfusion defect involving the    apex, apical lateral, mid anteroseptal, mid inferior and apical inferior    walls during stress that reverses at rest consistent with ischemia in    distribution of LAD and RCA or dominant circumflex.    - Apical inferior and mid anteroseptal walls are akinetic. Other walls    severely hypokinetic.    - Left ventricular cavity is dilated.    - Left ventricular systolic function is severely reduced with ejection    fraction of 28 %. Echo 7/2017   Limited only for LVEF pericardial effusion post heart surgery. 1 cm   posterior pericardial effusion and 0.5cm anterior pericardial effusion. At   the apex 2cm pericardial effusion. No tamponade physiology.   Left ventricular systolic function is normal. Atrial fibrillation with   controlled ventricular response. Estimated ejection fraction of 55-60 %.   Severe concentric left ventricular hypertrophy. Small size of left   ventricle. No significant outflow tract obstruction.   Elevated left ventricle diastolic filling pressure.   There is a moderate circumferential pericardial effusion noted. Cath 5/2018 Gearld Hay   PCI of the native RCA with YUVAL  1. MVCD  2. Normal LVEF  3. Normal hemodynamics except for hypertension  4. Patent SVG to the DIAG, PDA, and OM  5. Patent LIMA to the LAD      Echo 4/16/19  Technically difficult examination. Left ventricular systolic function is normal with ejection fraction   estimated at 55-60 %. There is severe concentric left ventricular hypertrophy. Left ventricular size is decreased. Diastolic dysfunction grade and filing pressure are indeterminate. The ascending aorta is mildly dilated. Aortic valve appears sclerotic but opens adequately. Mitral annular calcification is present. Mild mitral regurgitation. Previous echo done 10/2017 - EF 60%, LVH     Echocardiogram 4/16/2020  Summary   Technically difficult examination secondary to habitus and COPD.   Left ventricular systolic function is normal with ejection fraction   estimated at 55%.   No regional wall motion abnormalities.   There is moderate to severe concentric left ventricular hypertrophy with   sigmoid septum (2.21 cm.).   Grade II diastolic dysfunction with elevated left ventricular filling   pressure.   The left atrium is mildly dilated. Assessment:   Coronary artery disease- S/P CABG 7/2017. Stable. No angina  Chest pain - no recent  Hypertension - well controlled  Hyperlipidemia - no recent ck  H/O pericardial effusion - not noted recent echo    Cardiomyopathy - improved. EF 55%  Shortness of breath - persistent, unchanged. sig improved since hemidiaphragm plication.  Primarily pulm and not cardiac    Edema - multifactorial. Do not believe sig CHF component. mild at this time. Stable   Left diaphragmatic paralysis - s/p hemidiaphragm plication 5/28/2020  RHONDA     Have previously reviewed cath films along w/ pt and presentation at time of cath. No objective evidence ACS. Primary issue is small artery disease. Distal OM compromised by left main but also gets retrograde flow from OM1 SVG. PCI unlikely to alter symptoms which are at this time atypical. Consider for exertional angina, refractory to med mgmt. Plan:  Lipids per PCP  Discussed repeating stress test. Will defer for now  Cardiac medications reviewed including indications and pertinent side effects    Check blood pressure and heart rate at home a few times per week- keep a log with dates and times and bring to office visit   Regular exercise and following a healthy diet encouraged   Follow up with me in 6 months        Scribe's attestation: This note was scribed in the presence of Dr. Concha Jackson by Kenia Curran RN    The scribes documentation has been prepared under my direction and personally reviewed by me in its entirety. I confirm that the note above accurately reflects all work, treatment, procedures, and medical decision making performed by me. MD Kaylen Richter.  Gabe Smith M.D., [facility]

## 2021-09-30 ENCOUNTER — OFFICE VISIT (OUTPATIENT)
Dept: CARDIOLOGY CLINIC | Age: 76
End: 2021-09-30
Payer: MEDICARE

## 2021-09-30 VITALS
OXYGEN SATURATION: 94 % | BODY MASS INDEX: 33.46 KG/M2 | DIASTOLIC BLOOD PRESSURE: 73 MMHG | WEIGHT: 247 LBS | HEIGHT: 72 IN | SYSTOLIC BLOOD PRESSURE: 130 MMHG | HEART RATE: 75 BPM

## 2021-09-30 DIAGNOSIS — Z95.1 S/P CABG X 3: ICD-10-CM

## 2021-09-30 DIAGNOSIS — I25.118 CORONARY ARTERY DISEASE OF NATIVE ARTERY OF NATIVE HEART WITH STABLE ANGINA PECTORIS (HCC): ICD-10-CM

## 2021-09-30 DIAGNOSIS — I51.7 LVH (LEFT VENTRICULAR HYPERTROPHY): ICD-10-CM

## 2021-09-30 DIAGNOSIS — E78.00 PURE HYPERCHOLESTEROLEMIA: ICD-10-CM

## 2021-09-30 DIAGNOSIS — I10 ESSENTIAL HYPERTENSION: ICD-10-CM

## 2021-09-30 DIAGNOSIS — I48.0 PAROXYSMAL ATRIAL FIBRILLATION (HCC): ICD-10-CM

## 2021-09-30 DIAGNOSIS — I25.5 ISCHEMIC CARDIOMYOPATHY: Primary | ICD-10-CM

## 2021-09-30 PROCEDURE — 99214 OFFICE O/P EST MOD 30 MIN: CPT | Performed by: INTERNAL MEDICINE

## 2021-09-30 RX ORDER — LISINOPRIL 5 MG/1
5 TABLET ORAL DAILY
Qty: 90 TABLET | Refills: 3 | Status: SHIPPED | OUTPATIENT
Start: 2021-09-30 | End: 2022-06-07

## 2021-09-30 RX ORDER — METOPROLOL SUCCINATE 25 MG/1
TABLET, EXTENDED RELEASE ORAL
Qty: 90 TABLET | Refills: 3 | Status: SHIPPED | OUTPATIENT
Start: 2021-09-30 | End: 2022-09-29

## 2021-09-30 NOTE — PATIENT INSTRUCTIONS
Plan:  Lipids per PCP  Discussed repeating stress test. Will defer for now  Cardiac medications reviewed including indications and pertinent side effects    Check blood pressure and heart rate at home a few times per week- keep a log with dates and times and bring to office visit   Regular exercise and following a healthy diet encouraged   Follow up with me in 6 months

## 2021-10-22 RX ORDER — ALBUTEROL SULFATE 90 UG/1
2 AEROSOL, METERED RESPIRATORY (INHALATION) 4 TIMES DAILY PRN
Qty: 1 EACH | Refills: 5 | Status: SHIPPED | OUTPATIENT
Start: 2021-10-22

## 2022-02-01 ENCOUNTER — TELEPHONE (OUTPATIENT)
Dept: PULMONOLOGY | Age: 77
End: 2022-02-01

## 2022-03-18 RX ORDER — FUROSEMIDE 40 MG/1
TABLET ORAL
Qty: 90 TABLET | Refills: 2 | Status: SHIPPED | OUTPATIENT
Start: 2022-03-18 | End: 2022-09-29

## 2022-05-18 DIAGNOSIS — J41.0 SIMPLE CHRONIC BRONCHITIS (HCC): ICD-10-CM

## 2022-05-18 RX ORDER — IPRATROPIUM BROMIDE AND ALBUTEROL SULFATE 2.5; .5 MG/3ML; MG/3ML
1 SOLUTION RESPIRATORY (INHALATION) 4 TIMES DAILY
Qty: 360 ML | Refills: 0 | Status: SHIPPED | OUTPATIENT
Start: 2022-05-18 | End: 2022-06-14

## 2022-05-30 DIAGNOSIS — J41.0 SIMPLE CHRONIC BRONCHITIS (HCC): ICD-10-CM

## 2022-05-31 RX ORDER — FLUTICASONE FUROATE, UMECLIDINIUM BROMIDE AND VILANTEROL TRIFENATATE 100; 62.5; 25 UG/1; UG/1; UG/1
POWDER RESPIRATORY (INHALATION)
Qty: 60 EACH | Refills: 0 | Status: SHIPPED | OUTPATIENT
Start: 2022-05-31 | End: 2022-06-28 | Stop reason: SDUPTHER

## 2022-06-06 ASSESSMENT — ENCOUNTER SYMPTOMS
SHORTNESS OF BREATH: 1
COUGH: 1

## 2022-06-06 NOTE — PROGRESS NOTES
Pulmonary Outpatient Note   Ye Macedo MD       6/7/2022    1. Simple chronic bronchitis ([address]    2. Restrictive lung disease    3. Diaphragm paralysis    4. RHONDA (obstructive sleep apnea)    5. Former smoker    6. Obesity (BMI 30.0-34. 9)          ASSESSMENT/PLAN:  Shortness of breath. Likely due to restrictive effect of diaphragmatic paralysis, PFT showed more of restriction than obstruction. Simple chronic bronchitis, chronic cough. On Trelegy, DuoNeb, as needed albuterol inhaler. He is more short of breath, endurance has decreased. He is not as active, as the nearest gym is now 1 hour away. Recommend repeat PFT, 6-minute walk test  Diaphragmatic paralysis. Status post plication. Likely to be chronic atelectasis related to diaphragmatic architecture rather than true endobronchial disease. RHONDA. Wishes to wait for considering treatment, he will call back  Obesity. Should make attempts to lose weight. Prophylaxis. Check with PCP about Pneumovax. He has received 2 doses as well as 2 booster doses of the COVID-19 vaccine. Continue with flu shot annually. RTC with testing      No orders of the defined types were placed in this encounter. No follow-ups on file. Chief Complaint:   Follow-up (6 mo) and Shortness of Breath       HPI: Michael Lyons is a very pleasant 78y.o. year old male here for follow-up. His PCP is Dr. Tanna Diaz, cardiologist Dr. Juanito Berg was last seen on 6/27/2021, presents for follow-up. The patient had simple chronic bronchitis, chronic cough, shortness of breath. He had restrictive physiology on PFT. He was on Trelegy and DuoNeb, his cough had decreased after bronchoscopy. I had recommended aggressive Acapella use, use of his vest.  He had diaphragmatic paralysis status post plication. He had suspected RHONDA, did not wish to pursue evaluation at that point. He was told to make attempts to lose weight.   His other medical problems include hypertension, hyperlipidemia, CAD status post CABG x3 3, acute on chronic diastolic CHF, cardiomyopathy, paroxysmal atrial fibrillation, GERD. The patient says he is short of breath more frequently, walking shorter distances. He thinks that the distance he can walk is markedly decreased. Now he is short of breath just walking into the stands and watching a basketball game at [place]. This did not occur in the past.  He has to stop walking on an incline, even without climbing steps. He thinks he can walk less than 100 yards. At peak shortness of breath he denies wheezing or cough. He thinks the Trelegy has helped his breathing a lot. He is single, his wife  6 years ago from lung cancer. She was a very heavy smoker, more than 2 PPD. The patient was never a heavy smoker but thinks he could have had lung problems from secondhand smoke exposure. The patient has not smoked since , smoked less than a pack per day for about 10 years, quit in between. The patient goes to bed between 10 PM and 11:30 PM, does not watch TV in bed. He has no difficulty falling asleep. He awakens around 6:45 AM, continues to work as a . He is usually not sleepy on working days, which is 4 days a week. He occasionally falls asleep around 4 PM.    Should previous notes reviewed and edited as necessary. The patient was seen by Dr. Candis Gleason for COPD/chronic bronchitis, with persistent thick white phlegm in spite of regular use of bronchodilator. He says he had a bout of pneumonia in January. He was subjected to bronchoscopy on 2021, presents to discuss results. On bronchoscopy, he had scant clear secretions, BAL of the left lower lobe showed increased segmented neutrophils, cultures and cytology were negative. The patient says he felt better after the bronchoscopy, has been using the vest.  He has been using nebulizers. He uses the vest at least 4 days a week.   He is short of breath climbing up steps, but has monitored his pulse oximetry and is does not desaturate. He has some coughing in the evenings, occasionally spits up small amounts of phlegm. He is able to sleep through the night. Before the bronchoscopy he had \"chunks\" of mucoid plugs, now relieved. The patient also tells me that during his CABG he was told that his left upper lobe was not \"developed\". 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 He was seen by Dr. Hiren Zuluaga, has undergone diaphragmatic plication. He does not believe that his dyspnea is significantly improved after the surgery. Regarding his sleep apnea, he says he sleeps well, wants to wait until the end of July and will call. The patient lives in [facility], was smoking 1 pack/day for only about 5 to 6 years. He quit in . He did have secondhand smoke exposure for almost 48 years. He is a , his wife  from lung cancer about 5 years ago. He is a  in the [employer] system. PFT 17  FVC 2.75 L, 58% predicted, FEV1 2.12 L, 60% predicted with no response to bronchodilator. TLC 67% predicted, DLCO 68% predicted. Sleep study 2019  Moderate RHONDA with AHI 15.6/h: 5 apneas, 25 hypopneas. Low oxygen saturation was 76%, time spent with SaO2 <89% was 42.7 minutes. He had PLMS. CT chest 3/20/20  1.  Elevated left hemidiaphragm, similar to the CT scan 2017.  There is   associated left basilar atelectasis.       2.  No acute abnormality.       3.  Cholelithiasis.        Past Medical History:   Diagnosis Date    Bronchitis     chronic bronchitis once or twice per year twice has gone into pneumonia    CAD (coronary artery disease) 07/10/2017    + Stress, multi vessel CAD    Cardiomyopathy [facility] 2017    EF 35-40%    CHF (congestive heart failure) ([facility] 2017    COPD (chronic obstructive pulmonary disease) ([facility]     [facility]    Diaphragmatic paralysis     left    Diastolic dysfunction     Family history of early CAD     father  at 48 of heart attack    Former smoker     High cholesterol 2017    Hypertension     Knee replacement 2009    Right     Obesity, Class II, BMI 35-39.9, with comorbidity     Pneumonia        Past Surgical History:   Procedure Laterality Date    APPENDECTOMY      BRONCHOSCOPY N/A 2020    BRONCHOSCOPY ALVEOLAR LAVAGE performed by Ruben Cobos MD at  [facility]  2020    BRONCHOSCOPY [address] performed by Ruben Cobos MD at  [address] N/A 2021    BRONCHOSCOPY ALVEOLAR LAVAGE performed by Ruben Cobos MD at  [address]  2021    BRONCHOSCOPY THERAPUTIC ASPIRATION INITIAL performed by Ruben Cobos MD at [place] [address]  07/10/2017    Dr. Love Rubalcava - multi-vessel CAD, referred for CABG    CARPAL TUNNEL RELEASE Bilateral     CORONARY ANGIOPLASTY WITH STENT PLACEMENT  2018    YUVAL- 2.75 x 18 pRCA    CORONARY ARTERY BYPASS GRAFT  2017    Dr. Farhad Mcneill - x4 (LIMA-LAD, reverse R SVG-diagonal, OM & PDA) w/placement of temporary pacing wire    MENISCECTOMY Right 1965    THORACOSCOPY Left 2020    LEFT LAPAROSCOPIC DIAPHRAGMATIC PLICATION  CPT CODE - 55779, 15107 performed by May Orr MD at [address] [facility] [facility] Right     TRANSESOPHAGEAL ECHOCARDIOGRAM  2017    during CABG       Social History     Tobacco Use    Smoking status: Former Smoker     Packs/day: 1.00     Years: 15.00     Pack years: 15.00     Types: Cigarettes, Pipe, Cigars     Start date: 1968     Quit date: 1978     Years since quittin.4    Smokeless tobacco: Never Used    Tobacco comment: 2nd hand smoke until 1 year ago   Substance Use Topics    Alcohol use:  Yes     Alcohol/week: 3.0 standard drinks     Types: 1 Shots of liquor, 2 Standard drinks or daily , Disp: , Rfl:     Lysine 1000 MG TABS, Take 1 tablet by mouth Daily , Disp: , Rfl:     cetirizine (ZYRTEC) 10 MG tablet, Take 10 mg by mouth daily. , Disp: , Rfl:     losartan (COZAAR) 50 mg tablet, TAKE 1 TABLET BY MOUTH EVERY DAY, Disp: , Rfl:     Oxycodone and Demerol    Vitals:    06/07/22 1302   BP: (!) 145/84   Site: Right Upper Arm   Position: Sitting   Cuff Size: Large Adult   Pulse: 68   Resp: 16   Temp: 97.5 °F (36.4 °C)   TempSrc: Oral   SpO2: 97%   Weight: 247 lb 12.8 oz (112.4 kg)   Height: 6' (1.829 m)       Review of Systems   Constitutional: Positive for fatigue. Respiratory: Positive for cough and shortness of breath. Psychiatric/Behavioral: Positive for sleep disturbance. All other systems reviewed and are negative. Physical Exam  Vitals reviewed. Constitutional:       General: He is not in acute distress. Appearance: He is well-developed. He is obese. He is not ill-appearing, toxic-appearing or diaphoretic. Comments: Pleasant, tall, well-built, obese   HENT:      Head: Normocephalic and atraumatic. Nose: Nose normal.      Mouth/Throat:      Mouth: Mucous membranes are moist.      Pharynx: Oropharynx is clear. No oropharyngeal exudate or posterior oropharyngeal erythema. Comments: Class III airway, dental repair  Eyes:      General: No scleral icterus. Right eye: No discharge. Left eye: No discharge. Conjunctiva/sclera: Conjunctivae normal.      Pupils: Pupils are equal, round, and reactive to light. Neck:      Thyroid: No thyromegaly. Vascular: No JVD. Trachea: No tracheal deviation. Cardiovascular:      Rate and Rhythm: Normal rate and regular rhythm. Heart sounds: Normal heart sounds. No murmur heard. No friction rub. No gallop. Pulmonary:      Effort: Pulmonary effort is normal. No respiratory distress. Breath sounds: No stridor. Rales (Few right basal crackles) present. No wheezing.       Comments: Reduced air entry left base  Abdominal:      Palpations: Abdomen is soft. Tenderness: There is no abdominal tenderness. There is no guarding or rebound. Comments: Protuberant   Musculoskeletal:      Right lower leg: No edema. Left lower leg: No edema. Lymphadenopathy:      Cervical: No cervical adenopathy. Skin:     General: Skin is warm and dry. Coloration: Skin is not jaundiced or pale. Findings: No bruising, erythema, lesion or rash. Neurological:      Mental Status: He is alert and oriented to person, place, and time.       Gait: Gait normal.      Deep Tendon Reflexes: Reflexes normal.   Psychiatric:         Mood and Affect: Mood normal.         Behavior: Behavior normal.

## 2022-06-07 ENCOUNTER — OFFICE VISIT (OUTPATIENT)
Dept: PULMONOLOGY | Age: 77
End: 2022-06-07
Payer: MEDICARE

## 2022-06-07 VITALS
WEIGHT: 247.8 LBS | HEIGHT: 72 IN | DIASTOLIC BLOOD PRESSURE: 84 MMHG | BODY MASS INDEX: 33.56 KG/M2 | SYSTOLIC BLOOD PRESSURE: 145 MMHG | HEART RATE: 68 BPM | RESPIRATION RATE: 16 BRPM | OXYGEN SATURATION: 97 % | TEMPERATURE: 97.5 F

## 2022-06-07 DIAGNOSIS — G47.33 OSA (OBSTRUCTIVE SLEEP APNEA): ICD-10-CM

## 2022-06-07 DIAGNOSIS — J98.6 DIAPHRAGM PARALYSIS: ICD-10-CM

## 2022-06-07 DIAGNOSIS — E66.9 OBESITY (BMI 30.0-34.9): ICD-10-CM

## 2022-06-07 DIAGNOSIS — Z87.891 FORMER SMOKER: ICD-10-CM

## 2022-06-07 DIAGNOSIS — J41.0 SIMPLE CHRONIC BRONCHITIS (HCC): Primary | ICD-10-CM

## 2022-06-07 DIAGNOSIS — J98.4 RESTRICTIVE LUNG DISEASE: ICD-10-CM

## 2022-06-07 PROCEDURE — 1123F ACP DISCUSS/DSCN MKR DOCD: CPT | Performed by: INTERNAL MEDICINE

## 2022-06-07 PROCEDURE — 99213 OFFICE O/P EST LOW 20 MIN: CPT | Performed by: INTERNAL MEDICINE

## 2022-06-07 RX ORDER — LOSARTAN POTASSIUM 50 MG/1
TABLET ORAL
COMMUNITY
Start: 2022-04-08 | End: 2022-10-06 | Stop reason: SDUPTHER

## 2022-06-07 NOTE — PATIENT INSTRUCTIONS
Remember to bring a list of pulmonary medications and any CPAP or BiPAP machines to your next appointment with the office. Please keep all of your future appointments scheduled by [address], [facility] Pulmonary office. Out of respect for other patients and providers, you may be asked to reschedule your appointment if you arrive later than your scheduled appointment time. Appointments cancelled less than 24hrs in advance will be considered a no show. Patients with three missed appointments within 1 year or four missed appointments within 2 years can be dismissed from the practice. Please be aware that our physicians are required to work in the Intensive Care Unit at [facility].  Your appointment may need to be rescheduled if they are designated to work during your appointment time. You may receive a survey regarding the care you received during your visit. Your input is valuable to us. We encourage you to complete and return your survey. We hope you will choose us in the future for your healthcare needs. Pt instructed of all future appointment dates & times, including radiology, labs, procedures & referrals. If procedures were scheduled preparation instructions provided. Instructions on future appointments with [facility] Pulmonary were given.

## 2022-06-07 NOTE — PROGRESS NOTES
MA Communication:   The following orders are received by verbal communication from Gio Maya MD    Orders include:  PFT 6 min walk       Call for results

## 2022-06-14 DIAGNOSIS — J41.0 SIMPLE CHRONIC BRONCHITIS (HCC): ICD-10-CM

## 2022-06-14 RX ORDER — IPRATROPIUM BROMIDE AND ALBUTEROL SULFATE 2.5; .5 MG/3ML; MG/3ML
SOLUTION RESPIRATORY (INHALATION)
Qty: 360 ML | Refills: 5 | Status: SHIPPED | OUTPATIENT
Start: 2022-06-14

## 2022-06-24 ENCOUNTER — HOSPITAL ENCOUNTER (OUTPATIENT)
Dept: PULMONOLOGY | Age: 77
Discharge: HOME | End: 2022-06-24
Payer: MEDICARE

## 2022-06-24 VITALS — OXYGEN SATURATION: 96 %

## 2022-06-24 DIAGNOSIS — J41.0 SIMPLE CHRONIC BRONCHITIS (HCC): ICD-10-CM

## 2022-06-24 DIAGNOSIS — J98.6 DIAPHRAGM PARALYSIS: ICD-10-CM

## 2022-06-24 DIAGNOSIS — J98.4 RESTRICTIVE LUNG DISEASE: ICD-10-CM

## 2022-06-24 DIAGNOSIS — E66.9 OBESITY (BMI 30.0-34.9): ICD-10-CM

## 2022-06-24 LAB
DLCO %PRED: 98 %
DLCO PRED: NORMAL
DLCO/VA %PRED: NORMAL
DLCO/VA PRED: NORMAL
DLCO/VA: NORMAL
DLCO: NORMAL
EXPIRATORY TIME-POST: NORMAL
EXPIRATORY TIME: NORMAL
FEF 25-75% %CHNG: NORMAL
FEF 25-75% %PRED-POST: NORMAL
FEF 25-75% %PRED-PRE: NORMAL
FEF 25-75% PRED: NORMAL
FEF 25-75%-POST: NORMAL
FEF 25-75%-PRE: NORMAL
FEV1 %PRED-POST: 53 %
FEV1 %PRED-PRE: 50 %
FEV1 PRED: NORMAL
FEV1-POST: NORMAL
FEV1-PRE: NORMAL
FEV1/FVC %PRED-POST: NORMAL
FEV1/FVC %PRED-PRE: NORMAL
FEV1/FVC PRED: NORMAL
FEV1/FVC-POST: 70 %
FEV1/FVC-PRE: 76 %
FVC %PRED-POST: 54 L
FVC %PRED-PRE: 48 %
FVC PRED: NORMAL
FVC-POST: NORMAL
FVC-PRE: NORMAL
GAW %PRED: NORMAL
GAW PRED: NORMAL
GAW: NORMAL
IC %PRED: NORMAL
IC PRED: NORMAL
IC: NORMAL
MEP: NORMAL
MIP: NORMAL
MVV %PRED-PRE: NORMAL
MVV PRED: NORMAL
MVV-PRE: NORMAL
PEF %PRED-POST: NORMAL
PEF %PRED-PRE: NORMAL
PEF PRED: NORMAL
PEF%CHNG: NORMAL
PEF-POST: NORMAL
PEF-PRE: NORMAL
RAW %PRED: NORMAL
RAW PRED: NORMAL
RAW: NORMAL
RV %PRED: NORMAL
RV PRED: NORMAL
RV: NORMAL
SVC %PRED: NORMAL
SVC PRED: NORMAL
SVC: NORMAL
TLC %PRED: 60 %
TLC PRED: NORMAL
TLC: NORMAL
VA %PRED: NORMAL
VA PRED: NORMAL
VA: NORMAL
VTG %PRED: NORMAL
VTG PRED: NORMAL
VTG: NORMAL

## 2022-06-24 PROCEDURE — 94726 PLETHYSMOGRAPHY LUNG VOLUMES: CPT

## 2022-06-24 PROCEDURE — 94618 PULMONARY STRESS TESTING: CPT

## 2022-06-24 PROCEDURE — 6370000000 HC RX 637 (ALT 250 FOR IP): Performed by: INTERNAL MEDICINE

## 2022-06-24 PROCEDURE — 94729 DIFFUSING CAPACITY: CPT

## 2022-06-24 PROCEDURE — 94060 EVALUATION OF WHEEZING: CPT

## 2022-06-24 RX ORDER — ALBUTEROL SULFATE 90 UG/1
4 AEROSOL, METERED RESPIRATORY (INHALATION) ONCE
Status: COMPLETED | OUTPATIENT
Start: 2022-06-24 | End: 2022-06-24

## 2022-06-24 RX ADMIN — Medication 4 PUFF: at 09:24

## 2022-06-24 ASSESSMENT — PULMONARY FUNCTION TESTS
FEV1/FVC_POST: 70
FVC_PERCENT_PREDICTED_PRE: 48
FEV1_PERCENT_PREDICTED_PRE: 50
FEV1/FVC_PRE: 76
FVC_PERCENT_PREDICTED_POST: 54
FEV1_PERCENT_PREDICTED_POST: 53

## 2022-06-24 NOTE — PROCEDURES
[facility] Pulmonary Function Lab - Six Minute Walk      Test Performed on:   Room Air__X____   Oxygen at _____ lpm via N/C- continuous Oxygen at _____ lpm via N/C- OCD  Assist Device Used During Test:  None___X___ Cane________ Walker_________    Modified Carey's Scale  0 Nothing at all 5 Strong    0.5 Extremely Weak 6 Stronger (Hard)    1 Very weak 7 Very Strong   2 Weak (light) 8 Very Very Strong   3 Moderate 9 Extremely Strong   4 Somewhat Strong 10 Maximum All out      Time SpO2 Heart Rate Respiratory Rate Dyspnea-  Modified Careys Scale Fatigue- Modified Careys Scale Other Symptoms   Baseline                     96% room air @rest 75 18 0 0      1 minute                     96% 81 18 0 0    2 minutes                     95% 86 20 0 0      3 minutes                     94% 87 21 0.5 0.5    4 minutes                     94% 86 22 0.5 0.5    5 minutes                     94% 87 22 0.5 0.5    6 minutes                     94% 89 23 1 1    Recovery x 1 minute                     94% 81 20 0 0    Recovery x 2 Minute                     96% 76 18 0 0     Number of Laps______9___ X 120 feet + _________ additional feet = Total Distance __1080____feet   Stopped or paused before 6 minutes? No___X____ Yes ________  Total expected 6 MW distance is __1596___feet. Patient achieved __68____% of expected distance.    Pre Blood Pressure: 148/80  Post Blood Pressure: 142/75  Other symptoms at the end of exercise:

## 2022-06-28 DIAGNOSIS — J41.0 SIMPLE CHRONIC BRONCHITIS (HCC): ICD-10-CM

## 2022-06-28 RX ORDER — FLUTICASONE FUROATE, UMECLIDINIUM BROMIDE AND VILANTEROL TRIFENATATE 100; 62.5; 25 UG/1; UG/1; UG/1
POWDER RESPIRATORY (INHALATION)
Qty: 60 EACH | Refills: 3 | Status: SHIPPED | OUTPATIENT
Start: 2022-06-28

## 2022-06-28 NOTE — PROCEDURES
[address] [facility]                 JoseLoraine rodriguez                                PULMONARY FUNCTION    PATIENT NAME: Andrei Rubio                       :        1945  MED REC NO:   [id number]                          ROOM:  ACCOUNT NO:   [de-identified]                           ADMIT DATE: 2022  PROVIDER:     Arabella Dean MD    DATE OF PROCEDURE:  2022    PFT    Spirometry showed FVC 2.21 L, 48% predicted, FEV1 1.67 L, 50% predicted,  with FEV1/FVC ratio of 76%. There was a response to bronchodilator by  FVC. TLC 60% predicted, ERV 2% predicted. DLCO was 98% predicted. IMPRESSION:  PFT shows a moderate restrictive defect, a co-existing  obstructive defect is not ruled out due to the response to  bronchodilator by FVC. Clinical correlation is recommended. Low ERV  is likely due to body habitus. When compared to the prior study from  2017, there has been a decline in FVC and FEV1, total lung  capacity is mildly decreased. Diffusion capacity is actually increased. Clinical correlation is recommended.         Donavan Garcia MD    D: 2022 17:12:56       T: 2022 17:15:12     [email or web address]  Job#: [id number]     Doc#: [id number]    CC:  Drew Bacon MD

## 2022-06-28 NOTE — TELEPHONE ENCOUNTER
Patient call for refill of [facility]

## 2022-06-28 NOTE — PROCEDURES
[address] [place] [address]                               PULMONARY FUNCTION    PATIENT NAME: Dina Haddad                       :        1945  MED REC NO:   [id number]                          ROOM:  ACCOUNT NO:   [de-identified]                           ADMIT DATE: 2022  PROVIDER:     Yomi Alvarez MD    DATE OF PROCEDURE:  2022    SIX MINUTE WALK TEST    A six minute walk test was performed utilizing standard criteria on  2022. Baseline oxygen saturation was 96%, Carey scales were 0  each. The patient walked 1080 feet, 68% predicted distance. He did not  desaturate, had minimal increase in heart rate and respiratory rate. Carey scales peaked at 1 each. Clinical correlation is recommended.         Iraida Cloud MD    D: 2022 17:12:56       T: 2022 3:01:40     [email or web address]  Job#: [id number]     Doc#: [id number]    CC:  Hamida Spann MD

## 2022-07-04 ASSESSMENT — ENCOUNTER SYMPTOMS
SHORTNESS OF BREATH: 1
COUGH: 1

## 2022-07-05 ENCOUNTER — TELEMEDICINE (OUTPATIENT)
Dept: PULMONOLOGY | Age: 77
End: 2022-07-05
Payer: MEDICARE

## 2022-07-05 DIAGNOSIS — G47.33 OSA (OBSTRUCTIVE SLEEP APNEA): ICD-10-CM

## 2022-07-05 DIAGNOSIS — Z87.891 FORMER SMOKER: ICD-10-CM

## 2022-07-05 DIAGNOSIS — J98.4 RESTRICTIVE LUNG DISEASE: ICD-10-CM

## 2022-07-05 DIAGNOSIS — J98.11 ATELECTASIS OF LEFT LUNG: ICD-10-CM

## 2022-07-05 DIAGNOSIS — J98.6 DIAPHRAGM PARALYSIS: ICD-10-CM

## 2022-07-05 DIAGNOSIS — R06.02 SOB (SHORTNESS OF BREATH): ICD-10-CM

## 2022-07-05 DIAGNOSIS — J41.0 SIMPLE CHRONIC BRONCHITIS (HCC): Primary | ICD-10-CM

## 2022-07-05 PROCEDURE — 99213 OFFICE O/P EST LOW 20 MIN: CPT | Performed by: INTERNAL MEDICINE

## 2022-07-05 PROCEDURE — 1123F ACP DISCUSS/DSCN MKR DOCD: CPT | Performed by: INTERNAL MEDICINE

## 2022-07-05 NOTE — PROGRESS NOTES
MA Communication:   The following orders are received by verbal communication from Mundo Mariscal MD    Orders include:  6 mo FU          Pnumo vac/ pt to get at [facility]       My chart sign up

## 2022-07-05 NOTE — PROGRESS NOTES
Pulmonary Outpatient Note   Domenico Carr MD       7/5/2022    1. Simple chronic bronchitis ([address]    2. SOB (shortness of breath)    3. Atelectasis of left lung    4. Diaphragm paralysis    5. Restrictive lung disease    6. RHONDA (obstructive sleep apnea)    7. Former smoker          ASSESSMENT/PLAN:  Simple chronic bronchitis, chronic cough. On Trelegy, DuoNeb, as needed albuterol inhaler. Discussed PFT, 6-minute walk test.  He is using his Acapella/Aerobika about twice a day, recommended he use it about 3 times a day. We could add Daliresp, but I suggested a conservative approach for now. AFB and fungus cultures have been negative in the past.  Shortness of breath. Likely due to restrictive effect of diaphragmatic paralysis, PFT showed more of restriction than obstruction. Discussed repeat PFT with him, this does explain his shortness of breath. Explained he does need to increase activity, and this could improve his shortness of breath  Diaphragmatic paralysis, atelectasis left lung. Status post plication. Likely to be chronic atelectasis related to diaphragmatic architecture rather than true endobronchial disease. RHONDA. Wishes to wait for considering treatment for now  Obesity. Should make attempts to lose weight. Prophylaxis. He has received 2 doses as well as 2 booster doses of the COVID-19 vaccine. Continue with flu shot annually. He does not think he received Pneumovax, received a much older vaccine. Recommended he proceed with Pneumovax. RTC 6 months, call earlier if symptomatic. Orders Placed This Encounter   Procedures    Pneumococcal, PPSV23, PNEUMOVAX 21, (age 2 yrs+), SC/IM       No follow-ups on file. Chief Complaint:   Follow-up and Results (PFT)       HPI: Ynes Coello is a very pleasant 78y.o. year old male here for follow-up via virtual visit through [employer]. me. His PCP is Dr. Tigist Petty, cardiologist Dr. Joel Azevedo.     Kenton Iglesias was seen on 6/7/2022, returns by virtual visit today to discuss results of PFT and 6-minute walk test.  He had moderate COPD, chronic bronchitis, chronic cough. 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 He had noted increasing shortness of breath over his baseline at his last visit. He has a 15-pack-year history of smoking, quit in 1978. Since his last visit with me, the patient has been attempting to walk more, feels that it helps. Yesterday he was at the [employer] at [place] [address], had to park far away and was able to walk several blocks. He is using his Aerobika/Acapella at least twice a day, using his nebulizer about twice a day. He feels that his symptoms have decreased after going on Trelegy. He does bring up phlegm every day, clear to mucoid. He denies much wheezing. He denies chest pain. His appetite is fair. There is no other change in the rest of his ROS. There is no other change in his medical or surgical history since his last visit. PFT 6/24/2022  Spirometry showed FVC 2.21 L, 48% predicted, FEV1 1.67 L, 50% predicted, with FEV1/FVC ratio of 76%. There was a response to bronchodilator by FVC. TLC 60% predicted, ERV 2% predicted. DLCO was 98% predicted. 6-minute walk test 6/24/2022  A six minute walk test was performed utilizing standard criteria on 06/24/2022. Baseline oxygen saturation was 96%, Carey scales were 0 each. The patient walked 1080 feet, 68% predicted distance. He did not desaturate, had minimal increase in heart rate and respiratory rate. Carey scales peaked at 1 each. Clinical correlation is recommended. Previous notes reviewed and edited as necessary  Isabel Rivers was last seen on 6/27/2021, presents for follow-up. The patient had simple chronic bronchitis, chronic cough, shortness of breath. He had restrictive physiology on PFT. He was on Trelegy and DuoNeb, his cough had decreased after bronchoscopy.   I had recommended aggressive Acapella use, use of his vest.  He had diaphragmatic paralysis status post plication. He had suspected RHONDA, did not wish to pursue evaluation at that point. He was told to make attempts to lose weight. His other medical problems include hypertension, hyperlipidemia, CAD status post CABG x3, acute on chronic diastolic CHF, cardiomyopathy, paroxysmal atrial fibrillation, GERD. The patient says he is short of breath more frequently, walking shorter distances. He thinks that the distance he can walk is markedly decreased. Now he is short of breath just walking into the stands and watching a basketball game at [place]. This did not occur in the past.  He has to stop walking on an incline, even without climbing steps. He thinks he can walk less than 100 yards. At peak shortness of breath he denies wheezing or cough. He thinks the Trelegy has helped his breathing a lot. He is single, his wife  6 years ago from lung cancer. She was a very heavy smoker, more than 2 PPD. The patient was never a heavy smoker but thinks he could have had lung problems from secondhand smoke exposure. The patient has not smoked since , smoked less than a pack per day for about 10 years, quit in between. The patient goes to bed between 10 PM and 11:30 PM, does not watch TV in bed. He has no difficulty falling asleep. He awakens around 6:45 AM, continues to work as a . He is usually not sleepy on working days, which is 4 days a week. He occasionally falls asleep around 4 PM.    Should previous notes reviewed and edited as necessary. The patient was seen by Dr. Dottie Barber for COPD/chronic bronchitis, with persistent thick white phlegm in spite of regular use of bronchodilator. He says he had a bout of pneumonia in January. He was subjected to bronchoscopy on 2021, presents to discuss results.   On bronchoscopy, he had scant clear secretions, BAL of the left lower lobe showed increased 07/10/2017    + Stress, multi vessel CAD    Cardiomyopathy ([facility] 2017    EF 35-40%    CHF (congestive heart failure) ([facility] 2017    [facility]     [facility] 2017    Diaphragmatic paralysis     left    Diastolic dysfunction     Family history of early CAD     father  at 48 of heart attack    Former smoker     High cholesterol 2017    Hypertension     Knee replacement 2009    Right     Obesity, Class II, BMI 35-39.9, with comorbidity     Pneumonia        Past Surgical History:   Procedure Laterality Date    APPENDECTOMY      BRONCHOSCOPY N/A 2020    BRONCHOSCOPY ALVEOLAR LAVAGE performed by Blair Genao MD at [place] [address]  2020    BRONCHOSCOPY [address] performed by Blair Genao MD at [place] [address] N/A 2021    BRONCHOSCOPY ALVEOLAR LAVAGE performed by Blair Genao MD at [place] [address]  2021    BRONCHOSCOPY THERAPUTIC ASPIRATION INITIAL performed by Blair Genao MD at [place] [address]  07/10/2017    Dr. Vaishnavi Werner - multi-vessel CAD, referred for CABG    CARPAL TUNNEL RELEASE Bilateral     CORONARY ANGIOPLASTY WITH STENT PLACEMENT  2018    YUVAL- 2.75 x 18 pRCA    CORONARY ARTERY BYPASS GRAFT  2017    Dr. Ariana Raphael - x4 (LIMA-LAD, reverse R SVG-diagonal, OM & PDA) w/placement of temporary pacing wire    MENISCECTOMY Right 1965    THORACOSCOPY Left 2020    LEFT LAPAROSCOPIC DIAPHRAGMATIC PLICATION  CPT CODE - 41607, 83999 performed by Jamila Hamilton MD at [address] [facility] Right     TRANSESOPHAGEAL ECHOCARDIOGRAM  2017    during CABG       Social History     Tobacco Use    Smoking status: Former Smoker     Packs/day: 1.00     Years: 15.00     Pack years: 15.00     Types: Cigarettes, Pipe, Cigars     Start date: 1968     Quit date: 1978 Years since quittin.6    Smokeless tobacco: Never Used    Tobacco comment: 2nd hand smoke until 1 year ago   Substance Use Topics    Alcohol use:  Yes     Alcohol/week: 3.0 standard drinks     Types: 1 Shots of liquor, 2 Standard drinks or equivalent per week     Comment: 1-3 drinks per week       Family History   Problem Relation Age of Onset    Heart Attack Father     Heart Disease Father     Heart Attack Brother     High Blood Pressure Brother          Current Outpatient Medications:     fluticasone-umeclidin-vilant (TRELEGY ELLIPTA) 100-62.5-25 MCG/INH AEPB, TAKE 1 PUFF BY MOUTH EVERY DAY, Disp: 60 each, Rfl: 3    ipratropium-albuterol (DUONEB) 0.5-2.5 (3) MG/3ML SOLN nebulizer solution, INHALE 3 MLS BY MOUTH 4 TIMES DAILY, Disp: 360 mL, Rfl: 5    losartan (COZAAR) 50 mg tablet, TAKE 1 TABLET BY MOUTH EVERY DAY, Disp: , Rfl:     furosemide (LASIX) 40 MG tablet, TAKE 1 TABLET BY MOUTH EVERY DAY, Disp: 90 tablet, Rfl: 2    albuterol sulfate  (90 Base) MCG/ACT inhaler, Inhale 2 puffs into the lungs 4 times daily as needed for Wheezing, Disp: 1 each, Rfl: 5    apixaban (ELIQUIS) 5 MG TABS tablet, TAKE 1 TABLET BY MOUTH TWICE A DAY, Disp: 180 tablet, Rfl: 3    metoprolol succinate (TOPROL XL) 25 MG extended release tablet, TAKE 1 TABLET BY MOUTH EVERY DAY, Disp: 90 tablet, Rfl: 3    atorvastatin (LIPITOR) 80 MG tablet, Take 1 tablet by mouth daily, Disp: 90 tablet, Rfl: 3    Spacer/Aero-Holding Chambers LETA, 1 Device by Does not apply route daily as needed (sob), Disp: 1 Device, Rfl: 0    metFORMIN (GLUCOPHAGE) 500 MG tablet, Take 500 mg by mouth daily (with breakfast), Disp: , Rfl:     aspirin EC 81 MG EC tablet, Take 1 tablet by mouth daily (Patient taking differently: Take 81 mg by mouth nightly ), Disp: 30 tablet, Rfl: 3    Glucosamine-Chondroitin (OSTEO BI-FLEX REGULAR STRENGTH PO), Take 1 capsule by mouth daily , Disp: , Rfl:     GuaiFENesin (MUCINEX PO), Take 1,200 mg by mouth 2 times daily, Disp: , Rfl:     Probiotic Product (PROBIOTIC DAILY PO), Take by mouth daily , Disp: , Rfl:     fish oil-omega-3 fatty acids 1000 MG capsule, Take 1 g by mouth nightly , Disp: , Rfl:     Multiple Vitamin (MULTI-VITAMIN PO), Take 1 tablet by mouth daily , Disp: , Rfl:     Lysine 1000 MG TABS, Take 1 tablet by mouth Daily , Disp: , Rfl:     cetirizine (ZYRTEC) 10 MG tablet, Take 10 mg by mouth daily. , Disp: , Rfl:     UNABLE TO FIND, VESIVEST TWICE DAILY FOR 20 MINUTES - lung congestion, Disp: , Rfl:     Oxycodone and Demerol    There were no vitals filed for this visit. Review of Systems   Constitutional: Positive for fatigue. Respiratory: Positive for cough and shortness of breath. Psychiatric/Behavioral: Positive for sleep disturbance. All other systems reviewed and are negative. Physical examination:  Detailed physical examination could not be performed as this was a video virtual visit. The patient was very awake, alert, oriented. He was able to speak in full sentences, did not cough during the interview.

## 2022-07-05 NOTE — PATIENT INSTRUCTIONS
Remember to bring a list of pulmonary medications and any CPAP or BiPAP machines to your next appointment with the office. Please keep all of your future appointments scheduled by [facility] office. Out of respect for other patients and providers, you may be asked to reschedule your appointment if you arrive later than your scheduled appointment time. Appointments cancelled less than 24hrs in advance will be considered a no show. Patients with three missed appointments within 1 year or four missed appointments within 2 years can be dismissed from the practice. Please be aware that our physicians are required to work in the Intensive Care Unit at [facility].  Your appointment may need to be rescheduled if they are designated to work during your appointment time. You may receive a survey regarding the care you received during your visit. Your input is valuable to us. We encourage you to complete and return your survey. We hope you will choose us in the future for your healthcare needs. Pt instructed of all future appointment dates & times, including radiology, labs, procedures & referrals. If procedures were scheduled preparation instructions provided. Instructions on future appointments with [facility] Pulmonary were given.

## 2022-08-16 ENCOUNTER — HOSPITAL ENCOUNTER (OUTPATIENT)
Age: 77
Discharge: HOME | End: 2022-08-16
Payer: MEDICARE

## 2022-08-16 ENCOUNTER — HOSPITAL ENCOUNTER (OUTPATIENT)
Dept: GENERAL RADIOLOGY | Age: 77
Discharge: HOME | End: 2022-08-16
Payer: MEDICARE

## 2022-08-16 DIAGNOSIS — R06.02 SHORTNESS OF BREATH: ICD-10-CM

## 2022-08-16 PROCEDURE — 71046 X-RAY EXAM CHEST 2 VIEWS: CPT

## 2022-09-28 ENCOUNTER — OFFICE VISIT (OUTPATIENT)
Dept: PULMONOLOGY | Age: 77
End: 2022-09-28
Payer: MEDICARE

## 2022-09-28 VITALS
HEIGHT: 72 IN | HEART RATE: 74 BPM | TEMPERATURE: 97.5 F | SYSTOLIC BLOOD PRESSURE: 151 MMHG | DIASTOLIC BLOOD PRESSURE: 85 MMHG | RESPIRATION RATE: 18 BRPM | WEIGHT: 267.5 LBS | OXYGEN SATURATION: 94 % | BODY MASS INDEX: 36.23 KG/M2

## 2022-09-28 DIAGNOSIS — J44.9 CHRONIC OBSTRUCTIVE PULMONARY DISEASE, UNSPECIFIED COPD TYPE (HCC): Primary | ICD-10-CM

## 2022-09-28 DIAGNOSIS — Z87.891 FORMER SMOKER: ICD-10-CM

## 2022-09-28 DIAGNOSIS — I48.0 PAROXYSMAL ATRIAL FIBRILLATION (HCC): ICD-10-CM

## 2022-09-28 DIAGNOSIS — R06.02 SHORTNESS OF BREATH: ICD-10-CM

## 2022-09-28 DIAGNOSIS — I25.118 CORONARY ARTERY DISEASE OF NATIVE ARTERY OF NATIVE HEART WITH STABLE ANGINA PECTORIS (HCC): ICD-10-CM

## 2022-09-28 DIAGNOSIS — J98.6 DIAPHRAGMATIC PARALYSIS: ICD-10-CM

## 2022-09-28 DIAGNOSIS — J39.8 TRACHEOBRONCHOMALACIA: ICD-10-CM

## 2022-09-28 PROCEDURE — 99213 OFFICE O/P EST LOW 20 MIN: CPT | Performed by: INTERNAL MEDICINE

## 2022-09-28 PROCEDURE — 1123F ACP DISCUSS/DSCN MKR DOCD: CPT | Performed by: INTERNAL MEDICINE

## 2022-09-28 ASSESSMENT — ENCOUNTER SYMPTOMS
SHORTNESS OF BREATH: 1
COUGH: 1

## 2022-09-28 NOTE — PATIENT INSTRUCTIONS
Remember to bring a list of pulmonary medications and any CPAP or BiPAP machines to your next appointment with the office. Please keep all of your future appointments scheduled by [facility] office. Out of respect for other patients and providers, you may be asked to reschedule your appointment if you arrive later than your scheduled appointment time. Appointments cancelled less than 24hrs in advance will be considered a no show. Patients with three missed appointments within 1 year or four missed appointments within 2 years can be dismissed from the practice. Please be aware that our physicians are required to work in the Intensive Care Unit at [facility].  Your appointment may need to be rescheduled if they are designated to work during your appointment time. You may receive a survey regarding the care you received during your visit. Your input is valuable to us. We encourage you to complete and return your survey. We hope you will choose us in the future for your healthcare needs. Pt instructed of all future appointment dates & times, including radiology, labs, procedures & referrals. If procedures were scheduled preparation instructions provided. Instructions on future appointments with [facility] Pulmonary were given.

## 2022-09-28 NOTE — PROGRESS NOTES
Pulmonary Outpatient Note   Ramesh Scott MD       9/28/2022    1. Chronic obstructive pulmonary disease, unspecified COPD type ([address]    2. Diaphragmatic paralysis    3. Shortness of breath    4. Tracheobronchomalacia    5. Former smoker          ASSESSMENT/PLAN:  Shortness of breath. Likely due to restrictive effect of diaphragmatic paralysis, PFT showed more of restriction than obstruction. However he did have chronic cough. Will obtain CT chest as the etiology of his shortness of breath is not clear. He does have pitting leg edema, could have CHF. He is due to see Dr. Matamoros Early  Simple chronic bronchitis, chronic cough. On Trelegy, DuoNeb, as needed albuterol inhaler. He is more short of breath, endurance has decreased. Explained to him that he does not have emphysema, is not a candidate for the [place] valve  Diaphragmatic paralysis. Status post plication. Likely to be chronic atelectasis related to diaphragmatic architecture rather than true endobronchial disease. RHONDA. Wishes to wait for considering treatment  Obesity. Should make attempts to lose weight. Prophylaxis. Check with PCP about Pneumovax. He has received 2 doses as well as 2 booster doses of the COVID-19 vaccine. Would consider the new COVID-19 vaccine. Continue with flu shot annually. Orders Placed This Encounter   Procedures    CT CHEST WO CONTRAST       No follow-ups on file. Chief Complaint:   No chief complaint on file. HPI: Isabella Viera is a very pleasant 78y.o. year old male here for follow-up visit . His PCP is Dr. Anirudh Jolly, cardiologist Dr. Joselyn West. 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 He has undergone plication of the left hemidiaphragm. He had a short period of improved shortness of breath following the surgery, but subsequently has had progressive shortness of breath.   The patient was last seen on 7/5/2022 by virtual visit via telephone, was recommended 6-month follow-up visit. The patient continues to do poorly, and for a few weeks was completely fatigued and very short of breath. He saw Dr. Annabelle Washington, CXR was ordered along with blood work. Reportedly the blood work was all normal, CXR is as below. The patient came in today due to increased shortness of breath and a question as to whether he was a candidate for a [place] valve. He had antibiotic. He is on Trelegy, which he uses at night. He is on DuoNeb and albuterol. He says the nebulizer does help his breathing. He denies chest pain, productive cough, wheezing. His appetite is fair, he denies GI or  complaints. The rest of the ROS was negative. CXR 8/16/2022  Cardiomegaly. Posterior changes status post sternotomy identified. Elevation left hemidiaphragm with left basilar atelectasis versus scarring   again identified. The lungs are clear. No pleural effusion or pneumothorax   is present. Impression   No acute cardiopulmonary process      Previous notes reviewed and edited as necessary. Kirby Ballesteros was seen on 6/7/2022, returns by virtual visit today to discuss results of PFT and 6-minute walk test.  He had moderate COPD, chronic bronchitis, chronic cough. 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 He had noted increasing shortness of breath over his baseline at his last visit. He has a 15-pack-year history of smoking, quit in 1978. Since his last visit with me, the patient has been attempting to walk more, feels that it helps. Yesterday he was at the [employer] at [place] [address], had to park far away and was able to walk several blocks. He is using his Aerobika/Acapella at least twice a day, using his nebulizer about twice a day. He feels that his symptoms have decreased after going on Trelegy.   He does bring up phlegm every day, clear to mucoid. He denies much wheezing. He denies chest pain. His appetite is fair. There is no other change in the rest of his ROS. There is no other change in his medical or surgical history since his last visit. PFT 6/24/2022  Spirometry showed FVC 2.21 L, 48% predicted, FEV1 1.67 L, 50% predicted, with FEV1/FVC ratio of 76%. There was a response to bronchodilator by FVC. TLC 60% predicted, ERV 2% predicted. DLCO was 98% predicted. 6-minute walk test 6/24/2022  A six minute walk test was performed utilizing standard criteria on 06/24/2022. Baseline oxygen saturation was 96%, Carey scales were 0 each. The patient walked 1080 feet, 68% predicted distance. He did not desaturate, had minimal increase in heart rate and respiratory rate. Carey scales peaked at 1 each. Clinical correlation is recommended. Should previous notes reviewed and edited as necessary. The patient was seen by Dr. Sho Duncan for COPD/chronic bronchitis, with persistent thick white phlegm in spite of regular use of bronchodilator. He says he had a bout of pneumonia in January. He was subjected to bronchoscopy on 6/11/2021, presents to discuss results. On bronchoscopy, he had scant clear secretions, BAL of the left lower lobe showed increased segmented neutrophils, cultures and cytology were negative. The patient says he felt better after the bronchoscopy, has been using the vest.  He has been using nebulizers. He uses the vest at least 4 days a week. He is short of breath climbing up steps, but has monitored his pulse oximetry and is does not desaturate. He has some coughing in the evenings, occasionally spits up small amounts of phlegm. He is able to sleep through the night. Before the bronchoscopy he had \"chunks\" of mucoid plugs, now relieved. The patient also tells me that during his CABG he was told that his left upper lobe was not \"developed\".   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 He was seen by Dr. Silviano Gaines, has undergone diaphragmatic plication. He does not believe that his dyspnea is significantly improved after the surgery. Regarding his sleep apnea, he says he sleeps well, wants to wait until the end of July and will call. The patient lives in [facility], was smoking 1 pack/day for only about 5 to 6 years. He quit in . He did have secondhand smoke exposure for almost 48 years. He is a , his wife  from lung cancer about 5 years ago. He is a  in the [employer] system. PFT 17  FVC 2.75 L, 58% predicted, FEV1 2.12 L, 60% predicted with no response to bronchodilator. TLC 67% predicted, DLCO 68% predicted. Sleep study 2019  Moderate RHONDA with AHI 15.6/h: 5 apneas, 25 hypopneas. Low oxygen saturation was 76%, time spent with SaO2 <89% was 42.7 minutes. He had PLMS. CT chest 3/20/20  1. Elevated left hemidiaphragm, similar to the CT scan 2017. There is   associated left basilar atelectasis. 2.  No acute abnormality. 3.  Cholelithiasis.        Past Medical History:   Diagnosis Date    Bronchitis     chronic bronchitis once or twice per year twice has gone into pneumonia    CAD (coronary artery disease) 07/10/2017    + Stress, multi vessel CAD    Cardiomy[address] 2017    EF 35-40%    CHF (congestive heart failure) ([address] 2017    COPD (chronic obstructive pulmonary disease) ([address]     Diabetes ([facility] [address] 2017    Diaphragmatic paralysis     left    Diastolic dysfunction     Family history of early CAD     father  at 48 of heart attack    Former smoker     High cholesterol 2017    Hypertension     Knee replacement     Right     Obesity, Class II, BMI 35-39.9, with comorbidity     Pneumonia        Past Surgical History:   Procedure Laterality Date    APPENDECTOMY      BRONCHOSCOPY N/A 2020    BRONCHOSCOPY ALVEOLAR LAVAGE performed by Mauro Fowler MD at [address] [place] [address] ENDOSCOPY    BRONCHOSCOPY  2020    BRONCHOSCOPY THERAPUTIC ASPIRATION INITIAL performed by Angela Coronado MD at [facility] N/A 2021    BRONCHOSCOPY ALVEOLAR LAVAGE performed by Angela Coronado MD at [facility]  2021    BRONCHOSCOPY THERAPUTIC ASPIRATION INITIAL performed by Angela Coronado MD at [place] [address]  07/10/2017    Dr. Antoinette Daley - multi-vessel CAD, referred for CABG    CARPAL TUNNEL RELEASE Bilateral     CORONARY ANGIOPLASTY WITH STENT PLACEMENT  2018    YUVAL- 2.75 x 18 pRCA    CORONARY ARTERY BYPASS GRAFT  2017    Dr. Delonte Collado - x4 (LIMA-LAD, reverse R SVG-diagonal, OM & PDA) w/placement of temporary pacing wire    MENISCECTOMY Right 1965    THORACOSCOPY Left 2020    LEFT LAPAROSCOPIC DIAPHRAGMATIC PLICATION  CPT CODE - 69778, 63802 performed by Maral Rivers MD at [address] [place] [address] Right     TRANSESOPHAGEAL ECHOCARDIOGRAM  2017    during CABG       Social History     Tobacco Use    Smoking status: Former     Packs/day: 1.00     Years: 15.00     Pack years: 15.00     Types: Cigarettes, Pipe, Cigars     Start date: 1968     Quit date: 1978     Years since quittin.7    Smokeless tobacco: Never    Tobacco comments:     2nd hand smoke until 1 year ago   Substance Use Topics    Alcohol use:  Yes     Alcohol/week: 3.0 standard drinks     Types: 1 Shots of liquor, 2 Standard drinks or equivalent per week     Comment: 1-3 drinks per week       Family History   Problem Relation Age of Onset    Heart Attack Father     Heart Disease Father     Heart Attack Brother     High Blood Pressure Brother          Current Outpatient Medications:     fluticasone-umeclidin-vilant (TRELEGY ELLIPTA) 100-62.5-25 MCG/INH AEPB, TAKE 1 PUFF BY MOUTH EVERY DAY, Disp: 60 each, Rfl: 3    ipratropium-albuterol (DUONEB) 0.5-2.5 (3) MG/3ML SOLN nebulizer solution, INHALE 3 MLS BY MOUTH 4 TIMES DAILY, Disp: 360 mL, Rfl: 5    losartan (COZAAR) 50 mg tablet, TAKE 1 TABLET BY MOUTH EVERY DAY, Disp: , Rfl:     furosemide (LASIX) 40 MG tablet, TAKE 1 TABLET BY MOUTH EVERY DAY, Disp: 90 tablet, Rfl: 2    albuterol sulfate  (90 Base) MCG/ACT inhaler, Inhale 2 puffs into the lungs 4 times daily as needed for Wheezing, Disp: 1 each, Rfl: 5    apixaban (ELIQUIS) 5 MG TABS tablet, TAKE 1 TABLET BY MOUTH TWICE A DAY, Disp: 180 tablet, Rfl: 3    metoprolol succinate (TOPROL XL) 25 MG extended release tablet, TAKE 1 TABLET BY MOUTH EVERY DAY, Disp: 90 tablet, Rfl: 3    atorvastatin (LIPITOR) 80 MG tablet, Take 1 tablet by mouth daily, Disp: 90 tablet, Rfl: 3    UNABLE TO FIND, VESIVEST TWICE DAILY FOR 20 MINUTES - lung congestion, Disp: , Rfl:     metFORMIN (GLUCOPHAGE) 500 MG tablet, Take 500 mg by mouth daily (with breakfast), Disp: , Rfl:     aspirin EC 81 MG EC tablet, Take 1 tablet by mouth daily (Patient taking differently: Take 81 mg by mouth nightly), Disp: 30 tablet, Rfl: 3    Glucosamine-Chondroitin (OSTEO BI-FLEX REGULAR STRENGTH PO), Take 1 capsule by mouth daily , Disp: , Rfl:     GuaiFENesin (MUCINEX PO), Take 1,200 mg by mouth 2 times daily, Disp: , Rfl:     Probiotic Product (PROBIOTIC DAILY PO), Take by mouth daily , Disp: , Rfl:     fish oil-omega-3 fatty acids 1000 MG capsule, Take 1 g by mouth nightly , Disp: , Rfl:     Multiple Vitamin (MULTI-VITAMIN PO), Take 1 tablet by mouth daily , Disp: , Rfl:     Lysine 1000 MG TABS, Take 1 tablet by mouth Daily , Disp: , Rfl:     cetirizine (ZYRTEC) 10 MG tablet, Take 10 mg by mouth daily.   , Disp: , Rfl:     Spacer/Aero-Holding Chambers LETA, 1 Device by Does not apply route daily as needed (sob), Disp: 1 Device, Rfl: 0    Oxycodone and Demerol    Vitals:    09/28/22 1143   BP: (!) 151/85   Pulse: 74   Resp: 18   Temp: 97.5 °F (36.4 °C)   TempSrc: Oral   SpO2: 94%   Weight: 267 lb 8 oz (121.3 kg)   Height: 6' (1.829 m)       Review of Systems   Constitutional:  Positive for fatigue. Negative for unexpected weight change. Respiratory:  Positive for cough and shortness of breath. Cardiovascular:  Positive for leg swelling. Negative for chest pain. Psychiatric/Behavioral:  Positive for sleep disturbance. All other systems reviewed and are negative. Physical Exam  Vitals reviewed. Constitutional:       General: He is not in acute distress. Appearance: He is well-developed. He is not ill-appearing, toxic-appearing or diaphoretic. Comments: Pleasant, tall, well-built, overweight   HENT:      Head: Normocephalic and atraumatic. Nose: Nose normal.      Mouth/Throat:      Mouth: Mucous membranes are moist.      Pharynx: Oropharynx is clear. No oropharyngeal exudate or posterior oropharyngeal erythema. Comments: Class III airway, dental repair  Eyes:      General: No scleral icterus. Right eye: No discharge. Left eye: No discharge. Conjunctiva/sclera: Conjunctivae normal.      Pupils: Pupils are equal, round, and reactive to light. Neck:      Thyroid: No thyromegaly. Vascular: No JVD. Trachea: No tracheal deviation. Cardiovascular:      Rate and Rhythm: Normal rate and regular rhythm. Heart sounds: Normal heart sounds. No murmur heard. No friction rub. No gallop. Pulmonary:      Effort: Pulmonary effort is normal. No respiratory distress. Breath sounds: No stridor. Rales (Few right basal crackles) present. No wheezing or rhonchi. Comments: Reduced air entry left base  Abdominal:      Tenderness: abdominal tenderness There is rebound. There is no guarding. Comments: Protuberant   Musculoskeletal:      Right lower leg: Edema (1+ pitting shin edema) present. Left lower leg: Edema (2+ pitting shin edema) present. Lymphadenopathy:      Cervical: No cervical adenopathy. Skin:     General: Skin is warm and dry. Coloration: Skin is not jaundiced or pale. Findings: No bruising, erythema, lesion or rash. Neurological:      Mental Status: He is alert and oriented to person, place, and time.       Gait: Gait normal.      Deep Tendon Reflexes: Reflexes normal.   Psychiatric:         Mood and Affect: Mood normal.         Behavior: Behavior normal.

## 2022-09-28 NOTE — PROGRESS NOTES
MA Communication:   The following orders are received by verbal communication from Carl Lyons MD    Orders include:  FU 6 mo CT now/ discuss          Results on phone

## 2022-09-29 ENCOUNTER — TELEPHONE (OUTPATIENT)
Dept: CARDIOLOGY CLINIC | Age: 77
End: 2022-09-29

## 2022-09-29 RX ORDER — METOPROLOL SUCCINATE 25 MG/1
TABLET, EXTENDED RELEASE ORAL
Qty: 90 TABLET | Refills: 1 | Status: SHIPPED | OUTPATIENT
Start: 2022-09-29 | End: 2022-10-06 | Stop reason: SDUPTHER

## 2022-09-29 RX ORDER — FUROSEMIDE 40 MG/1
TABLET ORAL
Qty: 90 TABLET | Refills: 1 | Status: SHIPPED | OUTPATIENT
Start: 2022-09-29

## 2022-09-29 NOTE — TELEPHONE ENCOUNTER
Please contact pt for yearly appt[place] [facility] [place] If the office can not get a hold of the patient for an appt please mail a letter to have them call and schedule. Thanks.

## 2022-10-05 ENCOUNTER — HOSPITAL ENCOUNTER (OUTPATIENT)
Dept: CT IMAGING | Age: 77
Discharge: HOME | End: 2022-10-05
Payer: MEDICARE

## 2022-10-05 DIAGNOSIS — R06.02 SHORTNESS OF BREATH: ICD-10-CM

## 2022-10-05 DIAGNOSIS — J39.8 TRACHEOBRONCHOMALACIA: ICD-10-CM

## 2022-10-05 DIAGNOSIS — J44.9 CHRONIC OBSTRUCTIVE PULMONARY DISEASE, UNSPECIFIED COPD TYPE (HCC): ICD-10-CM

## 2022-10-05 DIAGNOSIS — Z87.891 FORMER SMOKER: ICD-10-CM

## 2022-10-05 DIAGNOSIS — J98.6 DIAPHRAGMATIC PARALYSIS: ICD-10-CM

## 2022-10-05 PROCEDURE — 71250 CT THORAX DX C-: CPT

## 2022-10-06 ENCOUNTER — OFFICE VISIT (OUTPATIENT)
Dept: CARDIOLOGY CLINIC | Age: 77
End: 2022-10-06
Payer: MEDICARE

## 2022-10-06 VITALS
DIASTOLIC BLOOD PRESSURE: 66 MMHG | WEIGHT: 273 LBS | SYSTOLIC BLOOD PRESSURE: 122 MMHG | BODY MASS INDEX: 36.98 KG/M2 | OXYGEN SATURATION: 95 % | HEART RATE: 84 BPM | HEIGHT: 72 IN

## 2022-10-06 DIAGNOSIS — R06.02 SOB (SHORTNESS OF BREATH): Primary | ICD-10-CM

## 2022-10-06 DIAGNOSIS — I25.118 CORONARY ARTERY DISEASE OF NATIVE ARTERY OF NATIVE HEART WITH STABLE ANGINA PECTORIS (HCC): ICD-10-CM

## 2022-10-06 DIAGNOSIS — I31.39 PERICARDIAL EFFUSION: ICD-10-CM

## 2022-10-06 DIAGNOSIS — I48.0 PAROXYSMAL ATRIAL FIBRILLATION (HCC): ICD-10-CM

## 2022-10-06 DIAGNOSIS — I50.33 ACUTE ON CHRONIC DIASTOLIC CONGESTIVE HEART FAILURE (HCC): ICD-10-CM

## 2022-10-06 DIAGNOSIS — R94.39 ABNORMAL STRESS TEST: ICD-10-CM

## 2022-10-06 DIAGNOSIS — I25.5 ISCHEMIC CARDIOMYOPATHY: ICD-10-CM

## 2022-10-06 PROCEDURE — 99214 OFFICE O/P EST MOD 30 MIN: CPT | Performed by: INTERNAL MEDICINE

## 2022-10-06 PROCEDURE — 93000 ELECTROCARDIOGRAM COMPLETE: CPT | Performed by: INTERNAL MEDICINE

## 2022-10-06 PROCEDURE — 1123F ACP DISCUSS/DSCN MKR DOCD: CPT | Performed by: INTERNAL MEDICINE

## 2022-10-06 RX ORDER — LOSARTAN POTASSIUM 100 MG/1
TABLET ORAL
Qty: 90 TABLET | Refills: 3 | Status: SHIPPED | OUTPATIENT
Start: 2022-10-06

## 2022-10-06 RX ORDER — METOPROLOL SUCCINATE 50 MG/1
TABLET, EXTENDED RELEASE ORAL
Qty: 90 TABLET | Refills: 3 | Status: SHIPPED | OUTPATIENT
Start: 2022-10-06

## 2022-10-06 NOTE — PROGRESS NOTES
[facility]   Cardiac Consultation    Referring Provider:  Anibal Merchant MD     Chief Complaint   Patient presents with    Follow-up    Congestive Heart Failure    Coronary Artery Disease    Cardiomyopathy    Hypertension    Hyperlipidemia    Atrial Fibrillation    Shortness of Breath        Agustin An   1945      History of Present Illness:   Agustin An is a 78 y.o. 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 His echocardiogram from 4/2020 showed an EF of 55%, (EF 28% by stress test 2017). Exposed to second hand smoke for years. Today he states he has been having SOB related to COPD. SOB with at rest with slight walking. Better with cooler weather. States he had a CT chest yesterday to see if he qualifies for a special [place] valve. States a few months ago he started to feel weak and lack of energy for 2 weeks. Though he may have had COVID or the flu. These symptoms have resolves and now he is back to his chronic SOB baseline. Blood pressure has been generally controlled- 150/55 at Dr. Stevie Abbasi office. States he had had a lot of coffee that day. States blood pressure at home ranges 110-150's. Patient currently denies any weight gain, edema, palpitations, chest pain,  dizziness, and syncope.'           Past Medical History:   has a past medical history of Bronchitis, CAD (coronary artery disease), Cardiomyopathy ([address] CHF (congestive heart failure) ([address] COPD (chronic obstructive pulmonary disease) ([address] Diabetes ([address] Diaphragmatic paralysis, Diastolic dysfunction, Family history of early CAD, Former smoker, High cholesterol, Hypertension, Knee replacement, Obesity, Class II, 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     Surgical History:   has a past mouth daily 8/31/20  Yes Lucita Dean MD   UNABLE TO FIND VESIVEST TWICE DAILY FOR 20 MINUTES - lung congestion   Yes Historical Provider, MD   Spacer/Aero-Holding [employer] 1 Device by Does not apply route daily as needed (sob) 3/15/19  Yes Rebecca Pace DO   metFORMIN (GLUCOPHAGE) 500 MG tablet Take 500 mg by mouth daily (with breakfast)   Yes Historical Provider, MD   aspirin EC 81 MG EC tablet Take 1 tablet by mouth daily  Patient taking differently: Take 81 mg by mouth nightly 5/29/18  Yes Lucita Dean MD   Glucosamine-Chondroitin (OSTEO BI-FLEX REGULAR STRENGTH PO) Take 1 capsule by mouth daily    Yes Historical Provider, MD   GuaiFENesin (MUCINEX PO) Take 1,200 mg by mouth 2 times daily   Yes Historical Provider, MD   Probiotic Product (PROBIOTIC DAILY PO) Take by mouth daily    Yes Historical Provider, MD   fish oil-omega-3 fatty acids 1000 MG capsule Take 1 g by mouth nightly    Yes Historical Provider, MD   Multiple Vitamin (MULTI-VITAMIN PO) Take 1 tablet by mouth daily    Yes Historical Provider, MD   Lysine 1000 MG TABS Take 1 tablet by mouth Daily    Yes Historical Provider, MD   cetirizine (ZYRTEC) 10 MG tablet Take 10 mg by mouth daily. Yes Historical Provider, MD        Allergies:  Oxycodone and Demerol     Review of Systems:   Constitutional: there has been no unanticipated weight loss. There's been no change in energy level, sleep pattern, or activity level. Eyes: No visual changes or diplopia. No scleral icterus. ENT: No Headaches, hearing loss or vertigo. No mouth sores or sore throat. Cardiovascular: Reviewed in HPI  Respiratory: rales in basis   Gastrointestinal: No abdominal pain, appetite loss, blood in stools. No change in bowel or bladder habits. Genitourinary: No dysuria, trouble voiding, or hematuria. Musculoskeletal:  No gait disturbance, weakness or joint complaints. Integumentary: No rash or pruritis.   Neurological: No headache, diplopia, change in muscle strength, numbness or tingling. No change in gait, balance, coordination, mood, affect, memory, mentation, behavior. Psychiatric: No anxiety, no depression. Endocrine: No malaise, fatigue or temperature intolerance. No excessive thirst, fluid intake, or urination. No tremor. Hematologic/Lymphatic: No abnormal bruising or bleeding, blood clots or swollen lymph nodes. Allergic/Immunologic: No nasal congestion or hives. Physical Examination:    Vitals:    10/06/22 1035   BP: 122/66   Pulse: 84   SpO2: 95%          Constitutional and General Appearance: NAD   Respiratory:  Normal excursion and expansion without use of accessory muscles  Resp Auscultation: decreased breath sounds left base, rales   Cardiovascular: The apical impulses not displaced  Heart tones are crisp and normal  Cervical veins- mild elevated JVD  The carotid upstroke is normal in amplitude and contour without delay or bruit  Normal S1S2, No S3, 2/6 murmur  Peripheral pulses are symmetrical and full  There is no clubbing, cyanosis of the extremities. trace edema. L>R  Femoral Arteries: 2+ and equal  Pedal Pulses: 2+ and equal   Abdomen:  No masses or tenderness  Liver/Spleen: No Abnormalities Noted  Neurological/Psychiatric:  Alert and oriented in all spheres  Moves all extremities well  Exhibits normal gait balance and coordination  No abnormalities of mood, affect, memory, mentation, or behavior are noted    Myoview 7/2017   - Abnormal high risk myocardial perfusion study. - There is a medium size moderate intensity perfusion defect involving the    apex, apical lateral, mid anteroseptal, mid inferior and apical inferior    walls during stress that reverses at rest consistent with ischemia in    distribution of LAD and RCA or dominant circumflex. - Apical inferior and mid anteroseptal walls are akinetic. Other walls    severely hypokinetic.    - Left ventricular cavity is dilated.     - Left ventricular systolic function is severely reduced with ejection    fraction of 28 %. Echo 7/2017   Limited only for LVEF pericardial effusion post heart surgery. 1 cm   posterior pericardial effusion and 0.5cm anterior pericardial effusion. At   the apex 2cm pericardial effusion. No tamponade physiology. Left ventricular systolic function is normal. Atrial fibrillation with   controlled ventricular response. Estimated ejection fraction of 55-60 %. Severe concentric left ventricular hypertrophy. Small size of left   ventricle. No significant outflow tract obstruction. Elevated left ventricle diastolic filling pressure. There is a moderate circumferential pericardial effusion noted. Cath 5/2018 Todd Sprain   PCI of the native RCA with YUVAL  1. MVCD  2. Normal LVEF  3. Normal hemodynamics except for hypertension  4. Patent SVG to the DIAG, PDA, and OM  5. Patent LIMA to the LAD      Echo 4/16/19  Technically difficult examination. Left ventricular systolic function is normal with ejection fraction   estimated at 55-60 %. There is severe concentric left ventricular hypertrophy. Left ventricular size is decreased. Diastolic dysfunction grade and filing pressure are indeterminate. The ascending aorta is mildly dilated. Aortic valve appears sclerotic but opens adequately. Mitral annular calcification is present. Mild mitral regurgitation. Previous echo done 10/2017 - EF 60%, LVH     Echocardiogram 4/16/2020  Summary   Technically difficult examination secondary to habitus and COPD. Left ventricular systolic function is normal with ejection fraction   estimated at 55%. No regional wall motion abnormalities. There is moderate to severe concentric left ventricular hypertrophy with   sigmoid septum (2.21 cm.). Grade II diastolic dysfunction with elevated left ventricular filling   pressure. The left atrium is mildly dilated. Assessment:   Coronary artery disease- S/P CABG 7/2017. Stable.  No angina  Hypertension - suboptimal   Hyperlipidemia - obtains thru PCP. stable  H/O pericardial effusion - not noted recent echo    H/O cardiomyopathy - improved. EF 55%  Shortness of breath - persistent, severe, unchanged. improved since hemidiaphragm plication but still severe. Primarily pulm and not cardiac    Edema - multifactorial. Do not believe sig CHF component. mild at this time. Stable   Left diaphragmatic paralysis - s/p hemidiaphragm plication 6/39/4918  RHONDA     Have previously reviewed cath films along w/ pt and presentation at time of cath. No objective evidence ACS. Primary issue is small artery disease. Distal OM compromised by left main but also gets retrograde flow from OM1 SVG. PCI unlikely to alter symptoms which are at this time atypical. Consider for exertional angina, refractory to med mgmt. Plan:  Increase Losartan to 100 mg daily for around 1-2 weeks. If you are tolerating this then increase metoprolol to 50 mg daily  Cardiac medications reviewed including indications and pertinent side effects. Medication list updated at this visit. Check blood pressure and heart rate at home a few times per week- keep a log with dates and times and bring to office visit   Regular exercise and following a healthy diet encouraged   Follow up with me in 6 months       Scribe's attestation: This note was scribed in the presence of Dr. Bernie Phelps M.D. By Keenan Peabody RN    The scribes documentation has been prepared under my direction and personally reviewed by me in its entirety. I confirm that the note above accurately reflects all work, treatment, procedures, and medical decision making performed by me. MD Gretchen Arambula.  Denys Bullock M.D., [facility]

## 2022-10-06 NOTE — PATIENT INSTRUCTIONS
Plan:  Increase Losartan to 100 mg daily for around 1-2 weeks. If you are tolerating this then increase metoprolol to 50 mg daily  Cardiac medications reviewed including indications and pertinent side effects. Medication list updated at this visit.    Check blood pressure and heart rate at home a few times per week- keep a log with dates and times and bring to office visit   Regular exercise and following a healthy diet encouraged   Follow up with me in 6 months

## 2022-10-10 DIAGNOSIS — R06.02 SOB (SHORTNESS OF BREATH): ICD-10-CM

## 2022-10-10 DIAGNOSIS — J44.1 CHRONIC OBSTRUCTIVE PULMONARY DISEASE WITH ACUTE EXACERBATION (HCC): Primary | ICD-10-CM

## 2022-11-22 ENCOUNTER — HOSPITAL ENCOUNTER (INPATIENT)
Age: 77
LOS: 7 days | Discharge: HOME | DRG: 545 | End: 2022-11-29
Attending: INTERNAL MEDICINE | Admitting: INTERNAL MEDICINE
Payer: MEDICARE

## 2022-11-22 ENCOUNTER — HOSPITAL ENCOUNTER (EMERGENCY)
Age: 77
Discharge: ANOTHER HOSPITAL | End: 2022-11-22
Attending: EMERGENCY MEDICINE
Payer: MEDICARE

## 2022-11-22 ENCOUNTER — APPOINTMENT (OUTPATIENT)
Dept: GENERAL RADIOLOGY | Age: 77
End: 2022-11-22
Payer: MEDICARE

## 2022-11-22 VITALS
HEART RATE: 83 BPM | DIASTOLIC BLOOD PRESSURE: 62 MMHG | TEMPERATURE: 98.9 F | RESPIRATION RATE: 18 BRPM | SYSTOLIC BLOOD PRESSURE: 116 MMHG | OXYGEN SATURATION: 95 %

## 2022-11-22 DIAGNOSIS — I51.7 LVH (LEFT VENTRICULAR HYPERTROPHY): Primary | ICD-10-CM

## 2022-11-22 DIAGNOSIS — I44.7 LBBB (LEFT BUNDLE BRANCH BLOCK): ICD-10-CM

## 2022-11-22 DIAGNOSIS — I42.9 CARDIOMYOPATHY, UNSPECIFIED TYPE (HCC): ICD-10-CM

## 2022-11-22 DIAGNOSIS — R09.02 HYPOXEMIA: ICD-10-CM

## 2022-11-22 DIAGNOSIS — Z79.01 ANTICOAGULATED BY ANTICOAGULATION TREATMENT: ICD-10-CM

## 2022-11-22 DIAGNOSIS — J44.1 COPD EXACERBATION (HCC): Primary | ICD-10-CM

## 2022-11-22 DIAGNOSIS — R79.89 ELEVATED BRAIN NATRIURETIC PEPTIDE (BNP) LEVEL: ICD-10-CM

## 2022-11-22 DIAGNOSIS — R50.9 ACUTE FEBRILE ILLNESS: ICD-10-CM

## 2022-11-22 DIAGNOSIS — R60.9 PERIPHERAL EDEMA: ICD-10-CM

## 2022-11-22 DIAGNOSIS — I50.31 ACUTE DIASTOLIC HEART FAILURE (HCC): ICD-10-CM

## 2022-11-22 DIAGNOSIS — I42.2 HYPERTROPHIC CARDIOMYOPATHY (HCC): ICD-10-CM

## 2022-11-22 PROBLEM — R77.8 ELEVATED TROPONIN: Status: ACTIVE | Noted: 2022-11-22

## 2022-11-22 PROBLEM — J18.9 PNEUMONIA, UNSPECIFIED ORGANISM: Status: ACTIVE | Noted: 2022-11-22

## 2022-11-22 LAB
A/G RATIO: 1.4 (ref 1.1–2.2)
ALBUMIN SERPL-MCNC: 4 G/DL (ref 3.4–5)
ALP BLD-CCNC: 59 U/L (ref 40–129)
ALT SERPL-CCNC: 30 U/L (ref 10–40)
ANION GAP SERPL CALCULATED.3IONS-SCNC: 12 MMOL/L (ref 3–16)
AST SERPL-CCNC: 41 U/L (ref 15–37)
BASOPHILS ABSOLUTE: 0 K/UL (ref 0–0.2)
BASOPHILS RELATIVE PERCENT: 0.5 %
BILIRUB SERPL-MCNC: 1 MG/DL (ref 0–1)
BUN BLDV-MCNC: 18 MG/DL (ref 7–20)
CALCIUM SERPL-MCNC: 9.6 MG/DL (ref 8.3–10.6)
CHLORIDE BLD-SCNC: 100 MMOL/L (ref 99–110)
CO2: 22 MMOL/L (ref 21–32)
CREAT SERPL-MCNC: 0.8 MG/DL (ref 0.8–1.3)
EKG ATRIAL RATE: 67 BPM
EKG DIAGNOSIS: NORMAL
EKG Q-T INTERVAL: 442 MS
EKG QRS DURATION: 160 MS
EKG QTC CALCULATION (BAZETT): 537 MS
EKG R AXIS: -77 DEGREES
EKG T AXIS: 103 DEGREES
EKG VENTRICULAR RATE: 89 BPM
EOSINOPHILS ABSOLUTE: 0 K/UL (ref 0–0.6)
EOSINOPHILS RELATIVE PERCENT: 0.3 %
GFR SERPL CREATININE-BSD FRML MDRD: >60 ML/MIN/{1.73_M2}
GLUCOSE BLD-MCNC: 120 MG/DL (ref 70–99)
HCT VFR BLD CALC: 39.6 % (ref 40.5–52.5)
HEMOGLOBIN: 13.6 G/DL (ref 13.5–17.5)
LYMPHOCYTES ABSOLUTE: 1.1 K/UL (ref 1–5.1)
LYMPHOCYTES RELATIVE PERCENT: 13.8 %
MCH RBC QN AUTO: 31 PG (ref 26–34)
MCHC RBC AUTO-ENTMCNC: 34.3 G/DL (ref 31–36)
MCV RBC AUTO: 90.5 FL (ref 80–100)
MONOCYTES ABSOLUTE: 0.7 K/UL (ref 0–1.3)
MONOCYTES RELATIVE PERCENT: 9 %
NEUTROPHILS ABSOLUTE: 5.8 K/UL (ref 1.7–7.7)
NEUTROPHILS RELATIVE PERCENT: 76.4 %
PDW BLD-RTO: 13.7 % (ref 12.4–15.4)
PLATELET # BLD: 190 K/UL (ref 135–450)
PMV BLD AUTO: 8.6 FL (ref 5–10.5)
POTASSIUM REFLEX MAGNESIUM: 4.1 MMOL/L (ref 3.5–5.1)
PRO-BNP: 1785 PG/ML (ref 0–449)
PROCALCITONIN: 0.04 NG/ML (ref 0–0.15)
RAPID INFLUENZA  B AGN: NEGATIVE
RAPID INFLUENZA A AGN: NEGATIVE
RBC # BLD: 4.38 M/UL (ref 4.2–5.9)
SARS-COV-2, NAAT: NOT DETECTED
SODIUM BLD-SCNC: 134 MMOL/L (ref 136–145)
TOTAL PROTEIN: 6.9 G/DL (ref 6.4–8.2)
TROPONIN: 0.05 NG/ML
WBC # BLD: 7.6 K/UL (ref 4–11)

## 2022-11-22 PROCEDURE — 87635 SARS-COV-2 COVID-19 AMP PRB: CPT

## 2022-11-22 PROCEDURE — 84484 ASSAY OF TROPONIN QUANT: CPT

## 2022-11-22 PROCEDURE — 99285 EMERGENCY DEPT VISIT HI MDM: CPT

## 2022-11-22 PROCEDURE — 6370000000 HC RX 637 (ALT 250 FOR IP): Performed by: EMERGENCY MEDICINE

## 2022-11-22 PROCEDURE — 2580000003 HC RX 258: Performed by: INTERNAL MEDICINE

## 2022-11-22 PROCEDURE — 80053 COMPREHEN METABOLIC PANEL: CPT

## 2022-11-22 PROCEDURE — 2700000000 HC OXYGEN THERAPY PER DAY

## 2022-11-22 PROCEDURE — 1200000000 HC SEMI PRIVATE

## 2022-11-22 PROCEDURE — 96367 TX/PROPH/DG ADDL SEQ IV INF: CPT

## 2022-11-22 PROCEDURE — 85025 COMPLETE CBC W/AUTO DIFF WBC: CPT

## 2022-11-22 PROCEDURE — 71045 X-RAY EXAM CHEST 1 VIEW: CPT

## 2022-11-22 PROCEDURE — 96375 TX/PRO/DX INJ NEW DRUG ADDON: CPT

## 2022-11-22 PROCEDURE — 93010 ELECTROCARDIOGRAM REPORT: CPT | Performed by: INTERNAL MEDICINE

## 2022-11-22 PROCEDURE — 93005 ELECTROCARDIOGRAM TRACING: CPT | Performed by: EMERGENCY MEDICINE

## 2022-11-22 PROCEDURE — 6360000002 HC RX W HCPCS: Performed by: INTERNAL MEDICINE

## 2022-11-22 PROCEDURE — 84145 PROCALCITONIN (PCT): CPT

## 2022-11-22 PROCEDURE — 87040 BLOOD CULTURE FOR BACTERIA: CPT

## 2022-11-22 PROCEDURE — 94761 N-INVAS EAR/PLS OXIMETRY MLT: CPT

## 2022-11-22 PROCEDURE — 36415 COLL VENOUS BLD VENIPUNCTURE: CPT

## 2022-11-22 PROCEDURE — 2580000003 HC RX 258: Performed by: EMERGENCY MEDICINE

## 2022-11-22 PROCEDURE — 87804 INFLUENZA ASSAY W/OPTIC: CPT

## 2022-11-22 PROCEDURE — 96365 THER/PROPH/DIAG IV INF INIT: CPT

## 2022-11-22 PROCEDURE — 83880 ASSAY OF NATRIURETIC PEPTIDE: CPT

## 2022-11-22 PROCEDURE — 6370000000 HC RX 637 (ALT 250 FOR IP): Performed by: INTERNAL MEDICINE

## 2022-11-22 PROCEDURE — 6360000002 HC RX W HCPCS: Performed by: EMERGENCY MEDICINE

## 2022-11-22 RX ORDER — IPRATROPIUM BROMIDE AND ALBUTEROL SULFATE 2.5; .5 MG/3ML; MG/3ML
1 SOLUTION RESPIRATORY (INHALATION) ONCE
Status: COMPLETED | OUTPATIENT
Start: 2022-11-22 | End: 2022-11-22

## 2022-11-22 RX ORDER — TRAZODONE HYDROCHLORIDE 50 MG/1
1 TABLET ORAL NIGHTLY PRN
COMMUNITY
Start: 2022-11-15

## 2022-11-22 RX ORDER — METOPROLOL SUCCINATE 50 MG/1
50 TABLET, EXTENDED RELEASE ORAL DAILY
Status: DISCONTINUED | OUTPATIENT
Start: 2022-11-23 | End: 2022-11-29 | Stop reason: HOSPADM

## 2022-11-22 RX ORDER — FUROSEMIDE 10 MG/ML
40 INJECTION INTRAMUSCULAR; INTRAVENOUS 2 TIMES DAILY
Status: DISCONTINUED | OUTPATIENT
Start: 2022-11-22 | End: 2022-11-23

## 2022-11-22 RX ORDER — SODIUM CHLORIDE 0.9 % (FLUSH) 0.9 %
10 SYRINGE (ML) INJECTION EVERY 12 HOURS SCHEDULED
Status: DISCONTINUED | OUTPATIENT
Start: 2022-11-22 | End: 2022-11-29 | Stop reason: HOSPADM

## 2022-11-22 RX ORDER — IPRATROPIUM BROMIDE AND ALBUTEROL SULFATE 2.5; .5 MG/3ML; MG/3ML
1 SOLUTION RESPIRATORY (INHALATION)
Status: DISCONTINUED | OUTPATIENT
Start: 2022-11-22 | End: 2022-11-22

## 2022-11-22 RX ORDER — ONDANSETRON 2 MG/ML
4 INJECTION INTRAMUSCULAR; INTRAVENOUS EVERY 4 HOURS PRN
Status: DISCONTINUED | OUTPATIENT
Start: 2022-11-22 | End: 2022-11-29 | Stop reason: HOSPADM

## 2022-11-22 RX ORDER — INSULIN LISPRO 100 [IU]/ML
0-4 INJECTION, SOLUTION INTRAVENOUS; SUBCUTANEOUS NIGHTLY
Status: DISCONTINUED | OUTPATIENT
Start: 2022-11-22 | End: 2022-11-29 | Stop reason: HOSPADM

## 2022-11-22 RX ORDER — POLYETHYLENE GLYCOL 3350 17 G/17G
17 POWDER, FOR SOLUTION ORAL DAILY PRN
Status: DISCONTINUED | OUTPATIENT
Start: 2022-11-22 | End: 2022-11-29 | Stop reason: HOSPADM

## 2022-11-22 RX ORDER — AZITHROMYCIN 250 MG/1
500 TABLET, FILM COATED ORAL EVERY 24 HOURS
Status: DISCONTINUED | OUTPATIENT
Start: 2022-11-23 | End: 2022-11-23

## 2022-11-22 RX ORDER — BENZONATATE 100 MG/1
200 CAPSULE ORAL 3 TIMES DAILY PRN
Status: DISCONTINUED | OUTPATIENT
Start: 2022-11-22 | End: 2022-11-29 | Stop reason: HOSPADM

## 2022-11-22 RX ORDER — INSULIN LISPRO 100 [IU]/ML
0-8 INJECTION, SOLUTION INTRAVENOUS; SUBCUTANEOUS
Status: DISCONTINUED | OUTPATIENT
Start: 2022-11-23 | End: 2022-11-29 | Stop reason: HOSPADM

## 2022-11-22 RX ORDER — METHYLPREDNISOLONE SODIUM SUCCINATE 125 MG/2ML
125 INJECTION, POWDER, LYOPHILIZED, FOR SOLUTION INTRAMUSCULAR; INTRAVENOUS ONCE
Status: COMPLETED | OUTPATIENT
Start: 2022-11-22 | End: 2022-11-22

## 2022-11-22 RX ORDER — DEXTROSE MONOHYDRATE 100 MG/ML
INJECTION, SOLUTION INTRAVENOUS CONTINUOUS PRN
Status: DISCONTINUED | OUTPATIENT
Start: 2022-11-22 | End: 2022-11-29 | Stop reason: HOSPADM

## 2022-11-22 RX ORDER — IPRATROPIUM BROMIDE AND ALBUTEROL SULFATE 2.5; .5 MG/3ML; MG/3ML
1 SOLUTION RESPIRATORY (INHALATION) EVERY 4 HOURS PRN
Status: DISCONTINUED | OUTPATIENT
Start: 2022-11-22 | End: 2022-11-29 | Stop reason: HOSPADM

## 2022-11-22 RX ORDER — ATORVASTATIN CALCIUM 80 MG/1
80 TABLET, FILM COATED ORAL DAILY
Status: DISCONTINUED | OUTPATIENT
Start: 2022-11-23 | End: 2022-11-29 | Stop reason: HOSPADM

## 2022-11-22 RX ORDER — ACETAMINOPHEN 500 MG
1000 TABLET ORAL ONCE
Status: COMPLETED | OUTPATIENT
Start: 2022-11-22 | End: 2022-11-22

## 2022-11-22 RX ORDER — KETOROLAC TROMETHAMINE 30 MG/ML
30 INJECTION, SOLUTION INTRAMUSCULAR; INTRAVENOUS ONCE
Status: COMPLETED | OUTPATIENT
Start: 2022-11-22 | End: 2022-11-22

## 2022-11-22 RX ORDER — LOSARTAN POTASSIUM 100 MG/1
100 TABLET ORAL DAILY
Status: DISCONTINUED | OUTPATIENT
Start: 2022-11-23 | End: 2022-11-29 | Stop reason: HOSPADM

## 2022-11-22 RX ORDER — ASPIRIN 81 MG/1
81 TABLET ORAL NIGHTLY
Status: DISCONTINUED | OUTPATIENT
Start: 2022-11-22 | End: 2022-11-29 | Stop reason: HOSPADM

## 2022-11-22 RX ORDER — SODIUM CHLORIDE 9 MG/ML
INJECTION, SOLUTION INTRAVENOUS PRN
Status: DISCONTINUED | OUTPATIENT
Start: 2022-11-22 | End: 2022-11-29 | Stop reason: HOSPADM

## 2022-11-22 RX ORDER — ONDANSETRON 2 MG/ML
4 INJECTION INTRAMUSCULAR; INTRAVENOUS ONCE
Status: COMPLETED | OUTPATIENT
Start: 2022-11-22 | End: 2022-11-22

## 2022-11-22 RX ORDER — SODIUM CHLORIDE 0.9 % (FLUSH) 0.9 %
10 SYRINGE (ML) INJECTION PRN
Status: DISCONTINUED | OUTPATIENT
Start: 2022-11-22 | End: 2022-11-29 | Stop reason: HOSPADM

## 2022-11-22 RX ORDER — TRAZODONE HYDROCHLORIDE 50 MG/1
50 TABLET ORAL NIGHTLY PRN
Status: DISCONTINUED | OUTPATIENT
Start: 2022-11-23 | End: 2022-11-29 | Stop reason: HOSPADM

## 2022-11-22 RX ORDER — METHYLPREDNISOLONE SODIUM SUCCINATE 40 MG/ML
40 INJECTION, POWDER, LYOPHILIZED, FOR SOLUTION INTRAMUSCULAR; INTRAVENOUS EVERY 6 HOURS
Status: DISCONTINUED | OUTPATIENT
Start: 2022-11-22 | End: 2022-11-23

## 2022-11-22 RX ORDER — ACETAMINOPHEN 325 MG/1
650 TABLET ORAL EVERY 4 HOURS PRN
Status: DISCONTINUED | OUTPATIENT
Start: 2022-11-22 | End: 2022-11-29 | Stop reason: HOSPADM

## 2022-11-22 RX ORDER — CETIRIZINE HYDROCHLORIDE 10 MG/1
10 TABLET ORAL DAILY
Status: DISCONTINUED | OUTPATIENT
Start: 2022-11-23 | End: 2022-11-23

## 2022-11-22 RX ORDER — FUROSEMIDE 10 MG/ML
20 INJECTION INTRAMUSCULAR; INTRAVENOUS ONCE
Status: COMPLETED | OUTPATIENT
Start: 2022-11-22 | End: 2022-11-22

## 2022-11-22 RX ORDER — ACETAMINOPHEN 650 MG/1
650 SUPPOSITORY RECTAL EVERY 4 HOURS PRN
Status: DISCONTINUED | OUTPATIENT
Start: 2022-11-22 | End: 2022-11-29 | Stop reason: HOSPADM

## 2022-11-22 RX ADMIN — FUROSEMIDE 20 MG: 10 INJECTION, SOLUTION INTRAMUSCULAR; INTRAVENOUS at 16:11

## 2022-11-22 RX ADMIN — ACETAMINOPHEN 1000 MG: 500 TABLET ORAL at 13:16

## 2022-11-22 RX ADMIN — DEXTROSE MONOHYDRATE 500 MG: 50 INJECTION, SOLUTION INTRAVENOUS at 16:47

## 2022-11-22 RX ADMIN — METHYLPREDNISOLONE SODIUM SUCCINATE 40 MG: 40 INJECTION, POWDER, FOR SOLUTION INTRAMUSCULAR; INTRAVENOUS at 22:08

## 2022-11-22 RX ADMIN — ONDANSETRON HYDROCHLORIDE 4 MG: 2 INJECTION, SOLUTION INTRAMUSCULAR; INTRAVENOUS at 13:16

## 2022-11-22 RX ADMIN — FUROSEMIDE 40 MG: 10 INJECTION, SOLUTION INTRAMUSCULAR; INTRAVENOUS at 22:08

## 2022-11-22 RX ADMIN — IPRATROPIUM BROMIDE AND ALBUTEROL SULFATE 1 AMPULE: 2.5; .5 SOLUTION RESPIRATORY (INHALATION) at 13:16

## 2022-11-22 RX ADMIN — METHYLPREDNISOLONE SODIUM SUCCINATE 125 MG: 125 INJECTION, POWDER, FOR SOLUTION INTRAMUSCULAR; INTRAVENOUS at 13:16

## 2022-11-22 RX ADMIN — CEFTRIAXONE SODIUM 1000 MG: 1 INJECTION, POWDER, FOR SOLUTION INTRAMUSCULAR; INTRAVENOUS at 16:12

## 2022-11-22 RX ADMIN — APIXABAN 5 MG: 5 TABLET, FILM COATED ORAL at 22:08

## 2022-11-22 RX ADMIN — KETOROLAC TROMETHAMINE 30 MG: 30 INJECTION, SOLUTION INTRAMUSCULAR at 13:17

## 2022-11-22 RX ADMIN — SODIUM CHLORIDE, PRESERVATIVE FREE 10 ML: 5 INJECTION INTRAVENOUS at 22:08

## 2022-11-22 RX ADMIN — ASPIRIN 81 MG: 81 TABLET, COATED ORAL at 22:08

## 2022-11-22 ASSESSMENT — ENCOUNTER SYMPTOMS
SINUS PRESSURE: 0
WHEEZING: 1
CHOKING: 0
STRIDOR: 1
SHORTNESS OF BREATH: 1
ABDOMINAL PAIN: 0
BACK PAIN: 0
COUGH: 1
CHEST TIGHTNESS: 0
SINUS PAIN: 0
SORE THROAT: 0

## 2022-11-22 ASSESSMENT — PAIN - FUNCTIONAL ASSESSMENT
PAIN_FUNCTIONAL_ASSESSMENT: NONE - DENIES PAIN
PAIN_FUNCTIONAL_ASSESSMENT: NONE - DENIES PAIN

## 2022-11-22 ASSESSMENT — PAIN SCALES - GENERAL
PAINLEVEL_OUTOF10: 4
PAINLEVEL_OUTOF10: 0

## 2022-11-22 ASSESSMENT — LIFESTYLE VARIABLES: HOW OFTEN DO YOU HAVE A DRINK CONTAINING ALCOHOL: NEVER

## 2022-11-22 NOTE — ED PROVIDER NOTES
[facility] Name: Rey Calderon  MRN: [id number]  [place] 1945  Date of evaluation: 11/22/2022  Provider: Jennifer Valle MD    [address] [place] [address]       Chief Complaint   Patient presents with    Shortness of Breath         HISTORY OF PRESENT ILLNESS   (Location/Symptom, Timing/Onset, Context/Setting, Quality, Duration, Modifying Factors, Severity)  Note limiting factors. Rey Calderon is a 78 y.o. male who presents to the emergency department     This patient apparently is known to have bronchitis also has had history of pneumonia history of COPD he does not normally wear oxygen he does have a history of heart failure apparently diastolic dysfunction used to be a former smoker diagnosed with COPD 3 years ago he presents history of generalized fatigue myalgias increased cough and shortness of breath to the point where he could not drive he was going to drive himself to [facility] where he is wants to go  Has had a CABG with midline surgical scar that looks good several to multiple years ago  He states that for 3 to 4 days he is just had increased cough weakness and fatigue sounds very much like influenza or COVID-19 however when we did the test he apparently is all negative his chest x-ray is negative for pneumonia his white count is normal however he does have a temp of 1-1.5 I did give him fever control for this he has very thick sounding cough he denies any UTI symptoms denies any open skin lesions denies any other sources for infection so at this point with his flu and COVID being negative I did give him some antibiotics for community-acquired pneumonia    The history is provided by the patient and medical records.    Developed some leg swelling over the last several weeks he did see his primary care physician 2 weeks ago he stopped before he started to have increased coughing and fever and he was given a pair of support hose which he says he really cannot put on due to debility cannot reach over to get him put on  Nursing Notes were reviewed. REVIEW OF SYSTEMS    (2-9 systems for level 4, 10 or more for level 5)     Review of Systems   Constitutional:  Positive for activity change. Negative for chills and fever. HENT:  Positive for congestion. Negative for sinus pressure, sinus pain, sneezing, sore throat and tinnitus. Eyes:  Negative for visual disturbance. Respiratory:  Positive for cough, shortness of breath, wheezing and stridor. Negative for choking and chest tightness. Cardiovascular:  Negative for chest pain, palpitations and leg swelling. Gastrointestinal:  Negative for abdominal pain. Genitourinary:  Negative for difficulty urinating. Musculoskeletal:  Negative for back pain and neck pain. Neurological:  Negative for dizziness. Psychiatric/Behavioral:  Negative for behavioral problems. All other systems reviewed and are negative. Except as noted above the remainder of the review of systems was reviewed and negative.        PAST MEDICAL HISTORY     Past Medical History:   Diagnosis Date    Bronchitis     chronic bronchitis once or twice per year twice has gone into pneumonia    CAD (coronary artery disease) 07/10/2017    + Stress, multi vessel CAD    Cardiomyopathy ([facility] [address] 2017    EF 35-40%    CHF (congestive heart failure) ([facility] [address] 2017    COPD (chronic obstructive pulmonary disease) ([facility] [address] [facility]     Diabetes ([facility] [address] 2017    Diaphragmatic paralysis     left    Diastolic dysfunction     Family history of early CAD     father  at 48 of heart attack    Former smoker     High cholesterol 2017    Hypertension     Knee replacement     Right     Obesity, Class II, BMI 35-39.9, with comorbidity     Pneumonia          SURGICAL HISTORY       Past Surgical History:   Procedure Laterality Date    APPENDECTOMY      BRONCHOSCOPY N/A 2020    BRONCHOSCOPY ALVEOLAR LAVAGE performed by Delores Alfredo MD at [facility]    BRONCHOSCOPY  1/22/2020    BRONCHOSCOPY THERAPUTIC ASPIRATION INITIAL performed by Melvin Lopez MD at [facility] N/A 6/11/2021    BRONCHOSCOPY ALVEOLAR LAVAGE performed by Melvin Lopez MD at [facility]  6/11/2021    BRONCHOSCOPY THERAPUTIC ASPIRATION INITIAL performed by Melvin Lopez MD at [address] [place] [address]  07/10/2017    Dr. Yeni Keyes - multi-vessel CAD, referred for CABG    CARPAL TUNNEL RELEASE Bilateral     CORONARY ANGIOPLASTY WITH STENT PLACEMENT  05/18/2018    YUVAL- 2.75 x 18 pRCA    CORONARY ARTERY BYPASS GRAFT  07/18/2017    Dr. Harry Hawkins - x4 (LIMA-LAD, reverse R SVG-diagonal, OM & PDA) w/placement of temporary pacing wire    MENISCECTOMY Right 1965    THORACOSCOPY Left 5/28/2020    LEFT LAPAROSCOPIC DIAPHRAGMATIC PLICATION  CPT CODE - 57087, 96285 performed by Ct Paris MD at [facility] Right     TRANSESOPHAGEAL ECHOCARDIOGRAM  07/18/2017    during CABG         CURRENT MEDICATIONS       Previous Medications    ALBUTEROL SULFATE  (90 BASE) MCG/ACT INHALER    Inhale 2 puffs into the lungs 4 times daily as needed for Wheezing    APIXABAN (ELIQUIS) 5 MG TABS TABLET    TAKE 1 TABLET BY MOUTH TWICE A DAY    ASPIRIN EC 81 MG EC TABLET    Take 1 tablet by mouth daily    ATORVASTATIN (LIPITOR) 80 MG TABLET    Take 1 tablet by mouth daily    CETIRIZINE (ZYRTEC) 10 MG TABLET    Take 10 mg by mouth daily.       FISH OIL-OMEGA-3 FATTY ACIDS 1000 MG CAPSULE    Take 1 g by mouth nightly     FLUTICASONE-UMECLIDIN-VILANT (TRELEGY ELLIPTA) 100-62.5-25 MCG/INH AEPB    TAKE 1 PUFF BY MOUTH EVERY DAY    FUROSEMIDE (LASIX) 40 MG TABLET    TAKE 1 TABLET BY MOUTH EVERY DAY    GLUCOSAMINE-CHONDROITIN (OSTEO BI-FLEX REGULAR STRENGTH PO)    Take 1 capsule by mouth daily     GUAIFENESIN (MUCINEX PO)    Take 1,200 mg by mouth 2 times daily    IPRATROPIUM-ALBUTEROL (DUONEB) 0.5-2.5 (3) MG/3ML SOLN NEBULIZER SOLUTION    INHALE 3 MLS BY MOUTH 4 TIMES DAILY    LOSARTAN (COZAAR) 100 MG TABLET    TAKE 1 TABLET BY MOUTH EVERY DAY    LYSINE 1000 MG TABS    Take 1 tablet by mouth Daily     METFORMIN (GLUCOPHAGE) 500 MG TABLET    Take 500 mg by mouth daily (with breakfast)    METOPROLOL SUCCINATE (TOPROL XL) 50 MG EXTENDED RELEASE TABLET    Take one tablet by mouth daily    MULTIPLE VITAMIN (MULTI-VITAMIN PO)    Take 1 tablet by mouth daily     PROBIOTIC PRODUCT (PROBIOTIC DAILY PO)    Take by mouth daily     SPACER/AERO-HOLDING CHAMBERS LETA    1 Device by Does not apply route daily as needed (sob)    TRAZODONE (DESYREL) 50 MG TABLET    Take 1 tablet by mouth nightly as needed    UNABLE TO FIND    VESIVEST TWICE DAILY FOR 20 MINUTES - lung congestion       ALLERGIES     Oxycodone and Demerol    FAMILY HISTORY       Family History   Problem Relation Age of Onset    Heart Attack Father     Heart Disease Father     Heart Attack Brother     High Blood Pressure Brother           SOCIAL HISTORY       Social History     Socioeconomic History    Marital status:      Spouse name: None    Number of children: None    Years of education: None    Highest education level: None   Tobacco Use    Smoking status: Former     Packs/day: 1.00     Years: 15.00     Pack years: 15.00     Types: Cigarettes, Pipe, Cigars     Start date: 1968     Quit date: 1978     Years since quittin.9    Smokeless tobacco: Never    Tobacco comments:     2nd hand smoke until 1 year ago   Vaping Use    Vaping Use: Never used   Substance and Sexual Activity    Alcohol use:  Yes     Alcohol/week: 3.0 standard drinks     Types: 1 Shots of liquor, 2 Standard drinks or equivalent per week     Comment: 1-3 drinks per week    Drug use: No    Sexual activity: Not Currently     Partners: Female       SCREENINGS    [place] Coma Scale  Eye Opening: Spontaneous  Best Verbal Response: Oriented  Best Motor Response: Obeys commands  Tae Coma Scale Score: 15          PHYSICAL EXAM    (up to 7 for level 4, 8 or more for level 5)     ED Triage Vitals [11/22/22 1014]   BP Temp Temp Source Heart Rate Resp SpO2 Height Weight   138/73 99.1 °F (37.3 °C) Oral 82 20 92 % -- --       Physical Exam  Vitals and nursing note reviewed. Constitutional:       General: He is not in acute distress. Appearance: He is well-developed. He is not ill-appearing or diaphoretic. HENT:      Head: Normocephalic and atraumatic. Right Ear: Ear canal and external ear normal.      Left Ear: Ear canal and external ear normal.      Nose: Nose normal.      Mouth/Throat:      Mouth: Mucous membranes are moist.   Eyes:      Conjunctiva/sclera: Conjunctivae normal.      Pupils: Pupils are equal, round, and reactive to light. Neck:      Thyroid: No thyromegaly. Cardiovascular:      Rate and Rhythm: Normal rate and regular rhythm. Heart sounds: Normal heart sounds. No murmur heard. No friction rub. No gallop. Pulmonary:      Effort: Pulmonary effort is normal. No respiratory distress. Breath sounds: Wheezing, rhonchi and rales present. Abdominal:      General: Bowel sounds are normal. There is no distension. Palpations: Abdomen is soft. Tenderness: There is no abdominal tenderness. Musculoskeletal:         General: Normal range of motion. Cervical back: Normal range of motion and neck supple. Skin:     General: Skin is warm. Neurological:      General: No focal deficit present. Mental Status: He is alert and oriented to person, place, and time. GCS: GCS eye subscore is 4. GCS verbal subscore is 5. GCS motor subscore is 6. Cranial Nerves: No cranial nerve deficit. Sensory: No sensory deficit. Motor: No abnormal muscle tone.       Coordination: Coordination normal.      Deep Tendon Reflexes: Reflexes normal.   Psychiatric:         Behavior: Behavior normal.       DIAGNOSTIC RESULTS     EKG: All EKG's are interpreted by the Emergency Department Physician who either signs or Co-signs this chart in the absence of a cardiologist.        RADIOLOGY:   Non-plain film images such as CT, Ultrasound and MRI are read by the radiologist. Plain radiographic images are visualized and preliminarily interpreted by the emergency physician with the below findings:        Interpretation per the Radiologist below, if available at the time of this note:    XR CHEST PORTABLE   Final Result   No radiographic evidence of acute pulmonary disease.                  LABS:  Results for orders placed or performed during the hospital encounter of 11/22/22   COVID-19, Rapid    Specimen: Nasopharyngeal Swab   Result Value Ref Range    SARS-CoV-2, NAAT Not Detected Not Detected   Rapid influenza A/B antigens    Specimen: Nasopharyngeal   Result Value Ref Range    Rapid Influenza A Ag Negative Negative    Rapid Influenza B Ag Negative Negative   Comprehensive Metabolic Panel w/ Reflex to MG   Result Value Ref Range    Sodium 134 (L) 136 - 145 mmol/L    Potassium reflex Magnesium 4.1 3.5 - 5.1 mmol/L    Chloride 100 99 - 110 mmol/L    CO2 22 21 - 32 mmol/L    Anion Gap 12 3 - 16    Glucose 120 (H) 70 - 99 mg/dL    BUN 18 7 - 20 mg/dL    Creatinine 0.8 0.8 - 1.3 mg/dL    Est, Glom Filt Rate >60 >60    Calcium 9.6 8.3 - 10.6 mg/dL    Total Protein 6.9 6.4 - 8.2 g/dL    Albumin 4.0 3.4 - 5.0 g/dL    Albumin/Globulin Ratio 1.4 1.1 - 2.2    Total Bilirubin 1.0 0.0 - 1.0 mg/dL    Alkaline Phosphatase 59 40 - 129 U/L    ALT 30 10 - 40 U/L    AST 41 (H) 15 - 37 U/L   CBC with Auto Differential   Result Value Ref Range    WBC 7.6 4.0 - 11.0 K/uL    RBC 4.38 4.20 - 5.90 M/uL    Hemoglobin 13.6 13.5 - 17.5 g/dL    Hematocrit 39.6 (L) 40.5 - 52.5 %    MCV 90.5 80.0 - 100.0 fL    MCH 31.0 26.0 - 34.0 pg    MCHC 34.3 31.0 - 36.0 g/dL    RDW 13.7 12.4 - 15.4 %    Platelets 235 713 - 705 K/uL    MPV 8.6 5.0 - 10.5 fL    Neutrophils % 76.4 %    Lymphocytes % 13.8 %    Monocytes % 9.0 %    Eosinophils % 0.3 %    Basophils % 0.5 %    Neutrophils Absolute 5.8 1.7 - 7.7 K/uL    Lymphocytes Absolute 1.1 1.0 - 5.1 K/uL    Monocytes Absolute 0.7 0.0 - 1.3 K/uL    Eosinophils Absolute 0.0 0.0 - 0.6 K/uL    Basophils Absolute 0.0 0.0 - 0.2 K/uL   Troponin   Result Value Ref Range    Troponin 0.05 (H) <0.01 ng/mL   Brain Natriuretic Peptide   Result Value Ref Range    Pro-BNP 1,785 (H) 0 - 449 pg/mL   EKG 12 Lead   Result Value Ref Range    Ventricular Rate 89 BPM    Atrial Rate 67 BPM    QRS Duration 160 ms    Q-T Interval 442 ms    QTc Calculation (Bazett) 537 ms    R Axis -77 degrees    T Axis 103 degrees    Diagnosis       Wide QRS rhythmLeft axis deviationLeft bundle branch blockAbnormal ECGWhen compared with ECG of 18-FEB-2020 15:27,Wide QRS rhythm has replaced Sinus rhythm            EMERGENCY DEPARTMENT COURSE and DIFFERENTIAL DIAGNOSIS/MDM:     Vitals:    11/22/22 1410 11/22/22 1440 11/22/22 1518 11/22/22 1524   BP: (!) 99/57 (!) 98/52 99/60 (!) 122/57   Pulse: 83 80 80 83   Resp: 21 22 18 24   Temp:       TempSrc:       SpO2: 94% 91% 93% 95%           MDM        REASSESSMENT          CRITICAL CARE TIME     CONSULTS:  None      PROCEDURES:     Procedures    MEDICATIONS GIVEN THIS VISIT:  Medications   azithromycin (ZITHROMAX) 500 mg in D5W 250ml addavial (has no administration in time range)   cefTRIAXone (ROCEPHIN) 1,000 mg in dextrose 5 % 50 mL IVPB mini-bag (has no administration in time range)   furosemide (LASIX) injection 20 mg (has no administration in time range)   methylPREDNISolone sodium (SOLU-MEDROL) injection 125 mg (125 mg IntraVENous Given 11/22/22 1316)   ipratropium-albuterol (DUONEB) nebulizer solution 1 ampule (1 ampule Inhalation Given 11/22/22 1316)   acetaminophen (TYLENOL) tablet 1,000 mg (1,000 mg Oral Given 11/22/22 1316)   ketorolac (TORADOL) injection 30 mg (30 mg IntraVENous Given 11/22/22 1317)   ondansetron (ZOFRAN) injection 4 mg (4 mg IntraVENous Given 11/22/22 1316)        FINAL IMPRESSION      1. COPD exacerbation ([address]    2. Hypoxemia    3. Acute febrile illness    4. LBBB (left bundle branch block)    5. Peripheral edema    6. Elevated brain natriuretic peptide (BNP) level            DISPOSITION/PLAN   DISPOSITION Decision To Transfer 11/22/2022 03:30:18 PM      PATIENT REFERRED TO:  No follow-up provider specified. DISCHARGE MEDICATIONS:  New Prescriptions    No medications on file       Controlled Substances Monitoring  No flowsheet data found. (Please note that portions of this note were completed with a voice recognition program.  Efforts were made to edit the dictations but occasionally words are mis-transcribed.)    Patient was advised to return to the Emergency Department if there was any worsening.     Liudmila Murray MD (electronically signed)  Attending Emergency Physician         Madhavi Nobles MD  11/22/22 [id number] Herb Quiñones MD  11/22/22 0891

## 2022-11-22 NOTE — ED NOTES
Transfer center called to establish transfer via hosiptalist at [facility]  11/22/22 [id number]

## 2022-11-22 NOTE — ED TRIAGE NOTES
Patient to the ED via EMS c/o sudden onset shortness of breath with coughing that started while sitting at his office. Arrives alert/oriented and speaking in full sentences.

## 2022-11-22 NOTE — ED NOTES
Boxed lunch provided. Patient alert and oriented. No visual signs of acute changes noted. Call light in reach. Denies additional needs at this time.       Nimo Dos Santos RN  11/22/22 1196

## 2022-11-23 ENCOUNTER — TELEPHONE (OUTPATIENT)
Dept: CARDIOLOGY CLINIC | Age: 77
End: 2022-11-23

## 2022-11-23 PROBLEM — E11.65 UNCONTROLLED TYPE 2 DIABETES MELLITUS WITH HYPERGLYCEMIA (HCC): Status: ACTIVE | Noted: 2022-11-23

## 2022-11-23 PROBLEM — I50.9 CHF (CONGESTIVE HEART FAILURE) (HCC): Status: ACTIVE | Noted: 2022-11-23

## 2022-11-23 LAB
ANION GAP SERPL CALCULATED.3IONS-SCNC: 13 MMOL/L (ref 3–16)
BASOPHILS ABSOLUTE: 0 K/UL (ref 0–0.2)
BASOPHILS RELATIVE PERCENT: 0.1 %
BUN BLDV-MCNC: 21 MG/DL (ref 7–20)
CALCIUM SERPL-MCNC: 8.8 MG/DL (ref 8.3–10.6)
CHLORIDE BLD-SCNC: 99 MMOL/L (ref 99–110)
CO2: 25 MMOL/L (ref 21–32)
CREAT SERPL-MCNC: 1.2 MG/DL (ref 0.8–1.3)
EOSINOPHILS ABSOLUTE: 0 K/UL (ref 0–0.6)
EOSINOPHILS RELATIVE PERCENT: 0 %
GFR SERPL CREATININE-BSD FRML MDRD: >60 ML/MIN/{1.73_M2}
GLUCOSE BLD-MCNC: 168 MG/DL (ref 70–99)
GLUCOSE BLD-MCNC: 169 MG/DL (ref 70–99)
GLUCOSE BLD-MCNC: 191 MG/DL (ref 70–99)
GLUCOSE BLD-MCNC: 201 MG/DL (ref 70–99)
GLUCOSE BLD-MCNC: 215 MG/DL (ref 70–99)
HCT VFR BLD CALC: 37.8 % (ref 40.5–52.5)
HEMOGLOBIN: 13 G/DL (ref 13.5–17.5)
LYMPHOCYTES ABSOLUTE: 0.7 K/UL (ref 1–5.1)
LYMPHOCYTES RELATIVE PERCENT: 11.1 %
MCH RBC QN AUTO: 31 PG (ref 26–34)
MCHC RBC AUTO-ENTMCNC: 34.4 G/DL (ref 31–36)
MCV RBC AUTO: 90 FL (ref 80–100)
MONOCYTES ABSOLUTE: 0.2 K/UL (ref 0–1.3)
MONOCYTES RELATIVE PERCENT: 2.6 %
NEUTROPHILS ABSOLUTE: 5.8 K/UL (ref 1.7–7.7)
NEUTROPHILS RELATIVE PERCENT: 86.2 %
PDW BLD-RTO: 13.7 % (ref 12.4–15.4)
PERFORMED ON: ABNORMAL
PLATELET # BLD: 188 K/UL (ref 135–450)
PMV BLD AUTO: 7.6 FL (ref 5–10.5)
POTASSIUM REFLEX MAGNESIUM: 4.7 MMOL/L (ref 3.5–5.1)
PROCALCITONIN: 0.05 NG/ML (ref 0–0.15)
RBC # BLD: 4.19 M/UL (ref 4.2–5.9)
SODIUM BLD-SCNC: 137 MMOL/L (ref 136–145)
TROPONIN: 0.03 NG/ML
TROPONIN: 0.03 NG/ML
WBC # BLD: 6.7 K/UL (ref 4–11)

## 2022-11-23 PROCEDURE — 80048 BASIC METABOLIC PNL TOTAL CA: CPT

## 2022-11-23 PROCEDURE — 87077 CULTURE AEROBIC IDENTIFY: CPT

## 2022-11-23 PROCEDURE — 84145 PROCALCITONIN (PCT): CPT

## 2022-11-23 PROCEDURE — 87186 SC STD MICRODIL/AGAR DIL: CPT

## 2022-11-23 PROCEDURE — 99223 1ST HOSP IP/OBS HIGH 75: CPT | Performed by: INTERNAL MEDICINE

## 2022-11-23 PROCEDURE — 99291 CRITICAL CARE FIRST HOUR: CPT | Performed by: INTERNAL MEDICINE

## 2022-11-23 PROCEDURE — 87070 CULTURE OTHR SPECIMN AEROBIC: CPT

## 2022-11-23 PROCEDURE — 36415 COLL VENOUS BLD VENIPUNCTURE: CPT

## 2022-11-23 PROCEDURE — 2580000003 HC RX 258: Performed by: INTERNAL MEDICINE

## 2022-11-23 PROCEDURE — 87205 SMEAR GRAM STAIN: CPT

## 2022-11-23 PROCEDURE — 94761 N-INVAS EAR/PLS OXIMETRY MLT: CPT

## 2022-11-23 PROCEDURE — 85025 COMPLETE CBC W/AUTO DIFF WBC: CPT

## 2022-11-23 PROCEDURE — 2700000000 HC OXYGEN THERAPY PER DAY

## 2022-11-23 PROCEDURE — 87449 NOS EACH ORGANISM AG IA: CPT

## 2022-11-23 PROCEDURE — 6360000002 HC RX W HCPCS: Performed by: INTERNAL MEDICINE

## 2022-11-23 PROCEDURE — 6370000000 HC RX 637 (ALT 250 FOR IP): Performed by: INTERNAL MEDICINE

## 2022-11-23 PROCEDURE — 84484 ASSAY OF TROPONIN QUANT: CPT

## 2022-11-23 PROCEDURE — 1200000000 HC SEMI PRIVATE

## 2022-11-23 RX ORDER — PANTOPRAZOLE SODIUM 40 MG/1
40 TABLET, DELAYED RELEASE ORAL
Status: DISCONTINUED | OUTPATIENT
Start: 2022-11-24 | End: 2022-11-29 | Stop reason: HOSPADM

## 2022-11-23 RX ORDER — METHYLPREDNISOLONE SODIUM SUCCINATE 40 MG/ML
40 INJECTION, POWDER, LYOPHILIZED, FOR SOLUTION INTRAMUSCULAR; INTRAVENOUS EVERY 12 HOURS
Status: DISCONTINUED | OUTPATIENT
Start: 2022-11-24 | End: 2022-11-26

## 2022-11-23 RX ORDER — DOXYCYCLINE HYCLATE 100 MG
100 TABLET ORAL EVERY 12 HOURS SCHEDULED
Status: COMPLETED | OUTPATIENT
Start: 2022-11-23 | End: 2022-11-28

## 2022-11-23 RX ADMIN — FUROSEMIDE 10 MG/HR: 10 INJECTION, SOLUTION INTRAMUSCULAR; INTRAVENOUS at 16:59

## 2022-11-23 RX ADMIN — INSULIN LISPRO 2 UNITS: 100 INJECTION, SOLUTION INTRAVENOUS; SUBCUTANEOUS at 16:53

## 2022-11-23 RX ADMIN — METHYLPREDNISOLONE SODIUM SUCCINATE 40 MG: 40 INJECTION, POWDER, FOR SOLUTION INTRAMUSCULAR; INTRAVENOUS at 03:28

## 2022-11-23 RX ADMIN — CEFTRIAXONE SODIUM 1000 MG: 1 INJECTION, POWDER, FOR SOLUTION INTRAMUSCULAR; INTRAVENOUS at 15:31

## 2022-11-23 RX ADMIN — CETIRIZINE HYDROCHLORIDE 10 MG: 10 TABLET, FILM COATED ORAL at 08:30

## 2022-11-23 RX ADMIN — METHYLPREDNISOLONE SODIUM SUCCINATE 40 MG: 40 INJECTION, POWDER, FOR SOLUTION INTRAMUSCULAR; INTRAVENOUS at 15:24

## 2022-11-23 RX ADMIN — METHYLPREDNISOLONE SODIUM SUCCINATE 40 MG: 40 INJECTION, POWDER, FOR SOLUTION INTRAMUSCULAR; INTRAVENOUS at 09:21

## 2022-11-23 RX ADMIN — BENZONATATE 200 MG: 100 CAPSULE ORAL at 03:28

## 2022-11-23 RX ADMIN — ASPIRIN 81 MG: 81 TABLET, COATED ORAL at 21:05

## 2022-11-23 RX ADMIN — AZITHROMYCIN MONOHYDRATE 500 MG: 250 TABLET ORAL at 15:25

## 2022-11-23 RX ADMIN — LOSARTAN POTASSIUM 100 MG: 100 TABLET, FILM COATED ORAL at 08:30

## 2022-11-23 RX ADMIN — FUROSEMIDE 40 MG: 10 INJECTION, SOLUTION INTRAMUSCULAR; INTRAVENOUS at 09:14

## 2022-11-23 RX ADMIN — APIXABAN 5 MG: 5 TABLET, FILM COATED ORAL at 08:30

## 2022-11-23 RX ADMIN — METOPROLOL SUCCINATE 50 MG: 50 TABLET, EXTENDED RELEASE ORAL at 08:30

## 2022-11-23 RX ADMIN — TRAZODONE HYDROCHLORIDE 50 MG: 50 TABLET ORAL at 21:05

## 2022-11-23 RX ADMIN — DOXYCYCLINE HYCLATE 100 MG: 100 TABLET, COATED ORAL at 21:05

## 2022-11-23 RX ADMIN — SODIUM CHLORIDE, PRESERVATIVE FREE 10 ML: 5 INJECTION INTRAVENOUS at 09:15

## 2022-11-23 RX ADMIN — INSULIN LISPRO 2 UNITS: 100 INJECTION, SOLUTION INTRAVENOUS; SUBCUTANEOUS at 12:16

## 2022-11-23 RX ADMIN — APIXABAN 5 MG: 5 TABLET, FILM COATED ORAL at 21:05

## 2022-11-23 ASSESSMENT — PAIN DESCRIPTION - DESCRIPTORS: DESCRIPTORS: SORE

## 2022-11-23 ASSESSMENT — PAIN - FUNCTIONAL ASSESSMENT: PAIN_FUNCTIONAL_ASSESSMENT: ACTIVITIES ARE NOT PREVENTED

## 2022-11-23 ASSESSMENT — PAIN DESCRIPTION - LOCATION: LOCATION: OTHER (COMMENT)

## 2022-11-23 ASSESSMENT — PAIN SCALES - GENERAL: PAINLEVEL_OUTOF10: 4

## 2022-11-23 NOTE — H&P
Hospital Medicine  History and Physical    PCP: Sofía Knowles MD  Patient Name: Naya Razo    Date of Service: Pt seen/examined on 22 and admitted to Inpatient with expected LOS greater than two midnights due to medical therapy    CHIEF COMPLAINT:  Pt c/o shortness of breath, productive cough, fevers, myalgias, arthralgias, headache, poor appetite, nausea, vomiting and diarrhea  HISTORY OF PRESENT ILLNESS: Pt is an 78y.o. year-old male with a history of hypertension, hyperlipidemia, COPD, CHF, coronary artery disease status post CABG x3 and atrial fibrillation. He presents to the emergency room for evaluation following a several week history of increased weakness and shortness of breath which has become acutely worse over the past 3 to 4 days. He reports having a dry cough, increased weakness which is causing him to shuffle around inside of walk. He recently saw his primary care provider for increased lower extremity edema. He was started on support hose. He states that he has been unable to put them on. In the emergency room he was found to have a CHF exacerbation, COPD exacerbation and suspected pneumonia. He is being admitted for further evaluation and treatment. Associated signs and symptoms do not include chest pain, lightheaded, dizziness, diaphoresis, orthopnea, paroxysmal nocturnal dyspnea, fever or chills. No recent medication changes.           Past Medical History:        Diagnosis Date    Bronchitis     chronic bronchitis once or twice per year twice has gone into pneumonia    CAD (coronary artery disease) 07/10/2017    + Stress, multi vessel CAD    Cardiomyopathy ([facility] 2017    EF 35-40%    CHF (congestive heart failure) ([address] 2017    COPD (chronic obstructive pulmonary disease) ([facility] [address]     Diabetes ([facility] [address] 2017    Diaphragmatic paralysis     left    Diastolic dysfunction     Family history of early CAD     father  at 48 of heart attack    Former smoker     High cholesterol 07/2017    Hypertension     Knee replacement 2009    Right     Obesity, Class II, BMI 35-39.9, with comorbidity     Pneumonia        Past Surgical History:        Procedure Laterality Date    APPENDECTOMY      BRONCHOSCOPY N/A 1/22/2020    BRONCHOSCOPY ALVEOLAR LAVAGE performed by Lizzeth Mitchell MD at [facility]  1/22/2020    BRONCHOSCOPY THERAPUTIC ASPIRATION INITIAL performed by Lizzeth Mitchell MD at [facility] N/A 6/11/2021    BRONCHOSCOPY ALVEOLAR LAVAGE performed by Lizzeth Mitchell MD at [facility]  6/11/2021    BRONCHOSCOPY THERAPUTIC ASPIRATION INITIAL performed by Lizzeth Mitchell MD at [place] [address]  07/10/2017    Dr. Franco Latif - multi-vessel CAD, referred for CABG    CARPAL TUNNEL RELEASE Bilateral     CORONARY ANGIOPLASTY WITH STENT PLACEMENT  05/18/2018    YUVAL- 2.75 x 18 pRCA    CORONARY ARTERY BYPASS GRAFT  07/18/2017    Dr. Rochelle Hunter - x4 (LIMA-LAD, reverse R SVG-diagonal, OM & PDA) w/placement of temporary pacing wire    MENISCECTOMY Right 1965    THORACOSCOPY Left 5/28/2020    LEFT LAPAROSCOPIC DIAPHRAGMATIC PLICATION  CPT CODE - 28346, 09118 performed by Gretchen Mahajan MD at [address] [place] [address] Right     TRANSESOPHAGEAL ECHOCARDIOGRAM  07/18/2017    during CABG       Allergies:  Oxycodone and Demerol    Medications Prior to Admission:    Prior to Admission medications    Medication Sig Start Date End Date Taking?  Authorizing Provider   traZODone (DESYREL) 50 MG tablet Take 1 tablet by mouth nightly as needed 11/15/22   Historical Provider, MD   metoprolol succinate (TOPROL XL) 50 MG extended release tablet Take one tablet by mouth daily 10/6/22   Rachel Huston MD   apixaban (ELIQUIS) 5 MG TABS tablet TAKE 1 TABLET BY MOUTH TWICE A DAY 10/6/22   Rachel Huston MD   losartan (COZAAR) 100 MG tablet TAKE 1 TABLET BY MOUTH EVERY DAY 10/6/22   Rachel Huston MD   furosemide (LASIX) 40 MG tablet TAKE 1 TABLET BY MOUTH EVERY DAY 9/29/22   Liudmila Grove MD   fluticasone-umeclidin-vilant (Thierry Schneider) 032-79.4-58 MCG/INH AEPB TAKE 1 PUFF BY MOUTH EVERY DAY 6/28/22   Katya Miranda MD   ipratropium-albuterol (DUONEB) 0.5-2.5 (3) MG/3ML SOLN nebulizer solution INHALE 3 MLS BY MOUTH 4 TIMES DAILY 6/14/22   Katya Miranda MD   albuterol sulfate  (90 Base) MCG/ACT inhaler Inhale 2 puffs into the lungs 4 times daily as needed for Wheezing 10/22/21   Valarie Leslie MD   atorvastatin (LIPITOR) 80 MG tablet Take 1 tablet by mouth daily 8/31/20   Liudmila Grove MD   UNABLE TO FIND VESIVEST TWICE DAILY FOR 20 MINUTES - lung congestion    Historical Provider, MD   Spacer/Aero-Holding Verline Bruce 1 Device by Does not apply route daily as needed (sob) 3/15/19   Doris Bass DO   metFORMIN (GLUCOPHAGE) 500 MG tablet Take 500 mg by mouth daily (with breakfast)    Historical Provider, MD   aspirin EC 81 MG EC tablet Take 1 tablet by mouth daily  Patient taking differently: Take 81 mg by mouth nightly 5/29/18   Liudmila Grove MD   Glucosamine-Chondroitin (OSTEO BI-FLEX REGULAR STRENGTH PO) Take 1 capsule by mouth daily     Historical Provider, MD   GuaiFENesin (MUCINEX PO) Take 1,200 mg by mouth 2 times daily    Historical Provider, MD   Probiotic Product (PROBIOTIC DAILY PO) Take by mouth daily     Historical Provider, MD   fish oil-omega-3 fatty acids 1000 MG capsule Take 1 g by mouth nightly     Historical Provider, MD   Multiple Vitamin (MULTI-VITAMIN PO) Take 1 tablet by mouth daily     Historical Provider, MD   Lysine 1000 MG TABS Take 1 tablet by mouth Daily     Historical Provider, MD   cetirizine (ZYRTEC) 10 MG tablet Take 10 mg by mouth daily.       Historical Provider, MD       Family History:       Problem Relation Age of Onset    Heart Attack Father     Heart Disease Father     Heart Attack Brother     High Blood Pressure Brother      Social History:   TOBACCO:   reports that he quit smoking about 44 years ago. His smoking use included cigarettes, pipe, and cigars. He started smoking about 54 years ago. He has a 15.00 pack-year smoking history. He has never used smokeless tobacco.  ETOH:   reports current alcohol use of about 3.0 standard drinks per week. OCCUPATION:      REVIEW OF SYSTEMS:  A full review of systems was performed and is negative except for that which appears in the HPI    Physical Exam:    Vitals: /72   Pulse 96   Temp 98.2 °F (36.8 °C) (Oral)   Resp 19   Ht 6' (1.829 m)   Wt 273 lb 8 oz (124.1 kg)   SpO2 96%   BMI 37.09 kg/m²   General appearance: Ill but not toxic appearing 78y.o. year-old male who is alert, appears stated age and is cooperative  HEENT: Head: Normocephalic, no lesions, without obvious abnormality. Eye: Normal external eye, conjunctiva, lids cornea, PEERL. Ears: Normal external ears. Non-tender. Nose: Normal external nose, mucus membranes and septum. Pharynx: Dental Hygiene adequate. Normal buccal mucosa. Normal pharynx. Neck: no adenopathy, no carotid bruit, no JVD, supple, symmetrical, trachea midline and thyroid not enlarged, symmetric, no tenderness/mass/nodules  Lungs: restricted air movement on auscultation bilaterally. Scattered crackles and rales bilaterally. Heart: regular rate and rhythm, S1, S2 normal, no murmur, click, rub or gallop and normal apical impulse  Abdomen: soft, non-tender; bowel sounds normal; no masses, no organomegaly  Extremities: extremities atraumatic, no cyanosis, + edema and Homans sign is negative, no sign of DVT. Capillary Refill: Acceptable < 3 seconds   Peripheral Pulses: +3 easily felt, not easily obliterated with pressures   Skin: Skin color, texture, turgor normal. No rashes or lesions on exposed skin  Neurologic: Neurovascularly intact without any focal sensory/motor deficits. Cranial nerves: II-XII intact, grossly non-focal. Gait was not tested.   Mental Status: Alert and oriented, thought content appropriate, normal insight        CBC:   Recent Labs     11/22/22  1013   WBC 7.6   HGB 13.6        BMP:    Recent Labs     11/22/22  1013   *   K 4.1      CO2 22   BUN 18   CREATININE 0.8   GLUCOSE 120*     Troponin:   Recent Labs     11/22/22  1013   TROPONINI 0.05*     PT/INR:  No results found for: PTINR  U/A:    Lab Results   Component Value Date/Time    LEUKOCYTESUR Negative 05/22/2020 09:31 AM    RBCUA  07/28/2017 08:50 AM    SPECGRAV 1.015 05/22/2020 09:31 AM    UROBILINOGEN 0.2 05/22/2020 09:31 AM    BILIRUBINUR Negative 05/22/2020 09:31 AM    BLOODU Negative 05/22/2020 09:31 AM    GLUCOSEU Negative 05/22/2020 09:31 AM    PROTEINU Negative 05/22/2020 09:31 AM         RAD:   I have independently reviewed and interpreted the imaging studies below and based my recommendations to the patient on those findings. XR CHEST PORTABLE    Result Date: 11/22/2022  EXAMINATION: ONE XRAY VIEW OF THE CHEST 11/22/2022 11:35 am COMPARISON: Chest x-ray dated 08/16/2022. HISTORY: ORDERING SYSTEM PROVIDED HISTORY: shortness of breath TECHNOLOGIST PROVIDED HISTORY: Reason for exam:->shortness of breath Reason for Exam: cough,congestion,sob FINDINGS: HEART/MEDIASTINUM: The cardiomediastinal silhouette is within normal limits. PLEURA/LUNGS: There is elevation of the left hemidiaphragm with chronic left basilar atelectasis. .  There is no appreciable pneumothorax. BONES/SOFT TISSUE: No acute abnormality. No radiographic evidence of acute pulmonary disease.          Assessment:   Principal Problem:    COPD exacerbation (HCC)  Active Problems:    Pneumonia, unspecified organism    Elevated troponin    Essential hypertension    Hyperlipidemia    CAD (coronary artery disease)    S/P CABG x 3    Acute on chronic diastolic congestive heart failure (HCC)    Paroxysmal atrial fibrillation ([address]    Morbid obesity due to excess calories (HCC)    Acute respiratory failure with hypoxia ([facility]  Resolved Problems:    * No resolved hospital problems. *      Plan:       COPD (acute exacerbation) - Patient has been started on Nebulizer treatments to be given on a scheduled basis every 4 hours, and on an as-needed basis every 2 hours based upon needs identified through close monitoring. In addition, Solumedrol and Antibiotics have been prescribed. The Solumedrol will be tapered as the patient improves. Patient will be monitored closely, and deep breathing and coughing will be encouraged while awake. CHF - Acute on chronic diastolic dysfunction. A 2D Echocardiogram on 04/16/2020 shows an EF of 55%  and grade II diastolic dysfunction. A follow up Echocardiogram has been ordered to reassess cardiac function. Diurese with IV Lasix. Monitor strict I&Os and daily weights. A low sodium fluid restricted diet has been ordered. Pneumonia suspected based upon symptoms, not apparent on imaging- Pt has been started on Rocephin and Azithromycin  - Blood cultures x 2 ordered  - Respiratory culture ordered  - Legionella pneumophilia and Strep pneumoniae urine antigens ordered  - Procalcitonin ordered     Acute on chronic respiratory failure with hypoxia - as evidenced by an oxygen saturation of less than 90% on room air  - Due to COPD, CHF exacerbations and possibly pneumonia  -We will provide oxygen as necessary to maintain an oxygen saturation of 92% or higher     Elevated troponin -patient's troponin is chronically elevated but currently slightly higher than his baseline. He denies any specific chest pain. We will monitor him on telemetry and follow serial cardiac enzymes. CAD (coronary artery disease) s/p CABG x 3 - Continue beta blocker, statin and aspirin. Monitor on telemetry. Paroxysmal atrial fibrillation (HCC) - rate controlled.  Continue Metoprolol and Eliquis    Essential (primary) hypertension - continue home meds and monitor blood pressure    Hyperlipidemia - No current evidence of Rhabdomyolysis or other adverse effects. Continue statin therapy while in the hospital    Morbid obesity due to excess calories (Body mass index is 37.09 kg/m². ) - Complicating assessment and treatment. Placing patient at risk for multiple co-morbidities as well as early death and contributing to the patient's presentation.  on weight loss when appropriate. Code Status: Full Code  Diet: ADULT DIET; Regular; 5 carb choices (75 gm/meal); Low Sodium (2 gm); 1500 ml  DVT Prophylaxis: Eliquis    (Advanced care planning has been discussed with patient and/or responsible family member and is reflected in the code status.  Further orders associated with this have been entered if appropriate)    Disposition: Anticipate that patient will remain in the hospital for 2 or more midnights depending on further evaluation and clinical course   Please note that over 50 minutes was spent in evaluating the patient, review of records and results, discussion with staff/family, etc.      Jaquelin Chacon MD

## 2022-11-23 NOTE — ED NOTES
Report called to [facility] at [facility]. Advised of Critical access hospital for transport.       Aly Krishna RN  11/22/22 [id number]

## 2022-11-23 NOTE — PROGRESS NOTES
4 Eyes Admission Assessment     I agree as the admission nurse that 2 RN's have performed a thorough Head to Toe Skin Assessment on the patient. ALL assessment sites listed below have been assessed on admission. Areas assessed by both nurses:   [x]   Head, Face, and Ears   [x]   Shoulders, Back, and Chest  [x]   Arms, Elbows, and Hands   [x]   Coccyx, Sacrum, and Ischium  [x]   Legs, Feet, and Heels      No skin issues noted   Does the Patient have Skin Breakdown?   No         Jesús Prevention initiated:  No   Wound Care Orders initiated:  No      [facility] nurse consulted for Pressure Injury (Stage 3,4, Unstageable, DTI, NWPT, and Complex wounds) or Jesús score 18 or lower:  No      Nurse 1 eSignature: Electronically signed by Galo Bravo RN on 11/23/22 at 4:14 AM EST    **SHARE this note so that the co-signing nurse is able to place an eSignature**    Nurse 2 eSignature: Electronically signed by Wes Tirado RN on 11/23/22 at 4:16 AM EST

## 2022-11-23 NOTE — ED NOTES
Report to [facility] for transport to [facility]. The patient is alert and oriented at the time of departure with personal belongings.       Milka Jones RN  11/22/22 2005

## 2022-11-23 NOTE — PROGRESS NOTES
11/22/22 2217   RT Protocol   History Pulmonary Disease 2   Respiratory pattern 0   Breath sounds 0   Cough 0   Indications for Bronchodilator Therapy On home bronchodilators   Bronchodilator Assessment Score 2

## 2022-11-23 NOTE — CONSULTS
Consult placed    Who:[facility]  Date:11/23/2022,  Time:2:05 PM        Electronically signed by Silvia Valentin on 11/23/2022 at 2:05 PM

## 2022-11-23 NOTE — CONSULTS
Consult placed   On floor to see patient  Who:Dr. Himanshu Taylor  Date:11/23/2022,  Time:2:01 PM        Electronically signed by Maura Jordan on 11/23/2022 at 2:01 PM

## 2022-11-23 NOTE — CONSULTS
INPATIENT PULMONARY CRITICAL CARE CONSULT NOTE      Chief Complaint/Referring Provider:  Patient is being seen at the request of Dr. Eliud Disla  for a consultation for Acute resp distress -patient follows Dr Lillian Bejarano as an outpatient      Presenting HPI: Patient came to the hospital because of increasing shortness of breath  As per the admitting provider-Pt is an 78y.o. year-old male with a history of hypertension, hyperlipidemia, COPD, CHF, coronary artery disease status post CABG x3 and atrial fibrillation. He presents to the emergency room for evaluation following a several week history of increased weakness and shortness of breath which has become acutely worse over the past 3 to 4 days. He reports having a dry cough, increased weakness which is causing him to shuffle around inside of walk. He recently saw his primary care provider for increased lower extremity edema. He was started on support hose. He states that he has been unable to put them on. In the emergency room he was found to have a CHF exacerbation, COPD exacerbation and suspected pneumonia. He is being admitted for further evaluation and treatment. Associated signs and symptoms do not include chest pain, lightheaded, dizziness, diaphoresis, orthopnea, paroxysmal nocturnal dyspnea, fever or chills. No recent medication changes.   Patient when seen earlier states that he has been having some increasing shortness of breath along with cough with mucopurulent expectoration off-and-on patient states that he was also having fever with chills when he came to the ER at [facility] , patient was also having wheezing and patient was given steroids at that time, patient states that his symptoms became progressively worse which made him come to the hospital, patient states that he had no energy, patient states that he did not have any chest pain or palpitations per se, patient has been having increased myalgias and arthralgias along with poor appetite, patient also had some nausea vomiting and diarrhea, patient did not have any sick contacts, patient also has been having increased leg edema, patient has gained significant weight in the last few weeks time, patient has been on Lasix on a daily basis but patient had gained 8 pounds in 1 days time recently, patient has not spoken to his cardiologist about titration of diuretics as an outpatient, patient states that he had intermittent sensation as if he cannot take of breath and the breath got stuck and patient states that only when he stood up with difficulty he could feel the air going his lungs and this happened twice which made him concerned and patient called 911 for that reason, patient does not use any CPAP or BiPAP at home, patient has been subjected to a sleep study but it was not objectively and patient was sent home at 5 AM at 1 point as per patient, patient was alert and oriented, no other pertinent review of system of concern      Patient Active Problem List    Diagnosis Date Noted    Pericardial effusion     Hx of tobacco use, presenting hazards to health 07/23/2017    Class 4 congestive heart failure ([address] 11/23/2022    Uncontrolled type 2 diabetes mellitus with hyperglycemia ([address] 11/23/2022    Pneumonia, unspecified organism 11/22/2022    Elevated troponin 11/22/2022    Diaphragm paralysis 05/28/2020    Acute respiratory failure with hypoxia ([address] 01/20/2020    COPD exacerbation ([address] 04/23/2019    RHONDA (obstructive sleep apnea) 04/23/2019    Simple chronic bronchitis ([address] 03/22/2019    Restrictive lung disease 03/22/2019    Class 2 obesity in adult 03/22/2019    Suspected sleep apnea 03/22/2019    LVH (left ventricular hypertrophy) 51/55/9441    Diastolic dysfunction 64/46/2221    Former smoker 03/22/2019    NSTEMI (non-ST elevated myocardial infarction) ([address]     Morbid obesity due to excess calories ([address]     Panic disorder     Acute pericarditis     Paroxysmal atrial fibrillation ([address]     Palpitations 2017    SOB (shortness of breath)     Acute on chronic diastolic congestive heart failure (HCC)     S/P CABG x 3 2017    CAD (coronary artery disease)     Chest pain 2017    Abnormal stress test 2017    Cardiomyopathy ([facility] 2017    Essential hypertension 2011    Hyperlipidemia 2011    Allergic rhinitis 2011    Vasovagal syncope 2010    Acid reflux 2010    History of total knee replacement 2010       Past Medical History:   Diagnosis Date    Bronchitis     chronic bronchitis once or twice per year twice has gone into pneumonia    CAD (coronary artery disease) 07/10/2017    + Stress, multi vessel CAD    Cardiomyopathy ([facility] 2017    EF 35-40%    CHF (congestive heart failure) ([facility] 2017    COPD (chronic obstructive pulmonary disease) ([facility]     Diabetes ([facility] 2017    Diaphragmatic paralysis     left    Diastolic dysfunction     Family history of early CAD     father  at 48 of heart attack    Former smoker     High cholesterol 2017    Hypertension     Knee replacement     Right     Obesity, Class II, BMI 35-39.9, with comorbidity     Pneumonia         Past Surgical History:   Procedure Laterality Date    APPENDECTOMY      BRONCHOSCOPY N/A 2020    BRONCHOSCOPY ALVEOLAR LAVAGE performed by Delores Alfredo MD at [place] [address]  2020    BRONCHOSCOPY [address] performed by Delores Alfredo MD at [place] [address] N/A 2021    BRONCHOSCOPY ALVEOLAR LAVAGE performed by Delores Alfredo MD at [place] [address]  2021    BRONCHOSCOPY THERAPUTIC ASPIRATION INITIAL performed by Delores Alfredo MD at [place] [address]  07/10/2017    Dr. Tiffanie Cook - multi-vessel CAD, referred for CABG    CARPAL TUNNEL RELEASE Bilateral     CORONARY ANGIOPLASTY WITH STENT PLACEMENT  2018    YUVAL- 2.75 x 18 pRCA    CORONARY ARTERY BYPASS GRAFT 2017    Dr. Brandan Barbosa - x4 (LIMA-LAD, reverse R SVG-diagonal, OM & PDA) w/placement of temporary pacing wire    MENISCECTOMY Right 1965    THORACOSCOPY Left 2020    LEFT LAPAROSCOPIC DIAPHRAGMATIC PLICATION  CPT CODE - 43012, 56450 performed by Guy Treviño MD at [address] [place] [address],[facility] Right     TRANSESOPHAGEAL ECHOCARDIOGRAM  2017    during CABG        Family History   Problem Relation Age of Onset    Heart Attack Father     Heart Disease Father     Heart Attack Brother     High Blood Pressure Brother         Social History     Tobacco Use    Smoking status: Former     Packs/day: 1.00     Years: 15.00     Pack years: 15.00     Types: Cigarettes, Pipe, Cigars     Start date: 1968     Quit date: 1978     Years since quittin.9    Smokeless tobacco: Never    Tobacco comments:     2nd hand smoke until 1 year ago   Substance Use Topics    Alcohol use: Yes     Alcohol/week: 3.0 standard drinks     Types: 1 Shots of liquor, 2 Standard drinks or equivalent per week     Comment: 1-3 drinks per week        Allergies   Allergen Reactions    Oxycodone Other (See Comments)     hallucinatiions  \"Percocet ok to take\" per pt    Demerol Nausea And Vomiting               Physical Exam:  Blood pressure 132/78, pulse 75, temperature 98.2 °F (36.8 °C), temperature source Oral, resp. rate 18, height 6' (1.829 m), weight 273 lb 8 oz (124.1 kg), SpO2 95 %.'   Constitutional:  No acute distress. HENT:  Oropharynx is clear and moist. No thyromegaly. Eyes:  Conjunctivae are normal. Pupils equal, round, and reactive to light. No scleral icterus. Neck: . No tracheal deviation present. No obvious thyroid mass. Short enlarged neck   Cardiovascular: Normal rate, regular rhythm, normal heart sounds. No right ventricular heave. Atleat 2+ lower extremity edema. Pulmonary/Chest: Bibasilar scattered wheezes. B/L  rales. Chest wall is not dull to percussion.   No accessory muscle usage or stridor. Abdominal: Soft. Bowel sounds present. No distension or hernia. No tenderness. Obese    Musculoskeletal: No cyanosis. No clubbing. No obvious joint deformity. Lymphadenopathy: No cervical or supraclavicular adenopathy. Skin: Skin is warm and dry. No rash or nodules on the exposed extremities. Psychiatric: Normal mood and affect. Behavior is normal.  No anxiety. Neurologic: Alert, awake and oriented. PERRL. Speech fluent        Results:  CBC:   Recent Labs     11/22/22  1013 11/23/22  0402   WBC 7.6 6.7   HGB 13.6 13.0*   HCT 39.6* 37.8*   MCV 90.5 90.0    188     BMP:   Recent Labs     11/22/22  1013 11/23/22  0402   * 137   K 4.1 4.7    99   CO2 22 25   BUN 18 21*   CREATININE 0.8 1.2     LIVER PROFILE:   Recent Labs     11/22/22  1013   AST 41*   ALT 30   BILITOT 1.0   ALKPHOS 59         Imaging:  I have reviewed radiology images personally. No orders to display     XR CHEST PORTABLE    Result Date: 11/22/2022  EXAMINATION: ONE XRAY VIEW OF THE CHEST 11/22/2022 11:35 am COMPARISON: Chest x-ray dated 08/16/2022. HISTORY: ORDERING SYSTEM PROVIDED HISTORY: shortness of breath TECHNOLOGIST PROVIDED HISTORY: Reason for exam:->shortness of breath Reason for Exam: cough,congestion,sob FINDINGS: HEART/MEDIASTINUM: The cardiomediastinal silhouette is within normal limits. PLEURA/LUNGS: There is elevation of the left hemidiaphragm with chronic left basilar atelectasis. .  There is no appreciable pneumothorax. BONES/SOFT TISSUE: No acute abnormality. No radiographic evidence of acute pulmonary disease. FLUOROSCOPIC SNIFF TEST       TECHNIQUE:   Patient was evaluated in inspiration and expiration under real-time   fluoroscopy.        FLUOROSCOPY DOSE AND TYPE OR TIME AND EXPOSURES:   0.1 minutes fluoroscopy       3 spot films       HISTORY:   ORDERING SYSTEM PROVIDED HISTORY: Acquired elevated diaphragm       FINDINGS:   Left hemidiaphragm is elevated compared to the right.  There is paradoxical   motion of the left hemidiaphragm. Gaseous distention is seen in the left upper quadrant. There is evidence of   prior sternotomy. Impression   Left hemidiaphragm paralysis       Echocardiogram:Summary   Technically difficult examination secondary to habitus and COPD. Left ventricular systolic function is normal with ejection fraction   estimated at 55%. No regional wall motion abnormalities. There is moderate to severe concentric left ventricular hypertrophy with   sigmoid septum (2.21 cm.). Grade II diastolic dysfunction with elevated left ventricular filling   pressure. The left atrium is mildly dilated. PFT:   Latest Reference Range & Units 6/24/22 23:59   DLCO %Pred % 98   FEV1 %Pred-Post % 53   FEV1 %Pred-Pre % 50   FEV1/FVC-Post % 70   FEV1/FVC-Pre % 76   FVC %Pred-Post L 54   FVC %Pred-Pre % 48   TLC %Pred % 60        Latest Reference Range & Units 11/22/22 10:13 11/23/22 04:02   Procalcitonin 0.00 - 0.15 ng/mL 0.04 0.05       Assessment:  Principal Problem:    COPD exacerbation (HCC)  Active Problems:    Elevated troponin    Class 4 congestive heart failure (HCC)    Uncontrolled type 2 diabetes mellitus with hyperglycemia (HCC)    Essential hypertension    Hyperlipidemia    CAD (coronary artery disease)    S/P CABG x 3    SOB (shortness of breath)    Acute on chronic diastolic congestive heart failure (HCC)    Paroxysmal atrial fibrillation (HCC)    Simple chronic bronchitis (HCC)    Restrictive lung disease    Class 2 obesity in adult    Suspected sleep apnea    Diastolic dysfunction    Former smoker    Acute respiratory failure with hypoxia ([address]    Diaphragm paralysis  Resolved Problems:    * No resolved hospital problems.  *          Plan:   Oxygen supplementation to keep saturation between 90 and 94% only  Please titrate the oxygen as per the above parameters  Pulmonary toilet  Patient has had a single isolated reading of high temperature in ER but patient has been afebrile since that time  Patient's x-ray chest does not show any pneumonic process  Patient does have left diaphragmatic paralysis  Clinically it appears that patient has acute diastolic heart failure contributing to patient's symptomatology  Patient does have history of significant COPD with restrictive lung disease  Patient's bronchospasm can be secondary to fluid overload but will get benefit of doubt and treat patient with IV Solu-Medrol but patient's Solu-Medrol was decreased to 40 mg IV push to 12 hours for now and titration as per clinical status and cultures  Patient's blood sugars are not optimally controlled and insulins as per internal medicine team  Blood glucose monitoring with sliding scale insulin  Patient was evaluated by cardiology and patient has been started on Lasix infusion  Monitoring blood pressure and BMP  Patient's creatinine has increased from 0.82-1.2 in last 24 hours time which needs to be addressed and evaluated with the Lasix infusion  Patient's IV Rocephin and Zithromax was discontinued and patient was started on p.o. doxycycline for now  Patient does not have any elevated procalcitonin levels  Bronchodilators to continue  Cardiac medications as per IM and cardiology team  Salt and fluid restriction in the diet  Patient may need to be evaluated as an outpatient about CPAP/BiPAP for dermatochalasis  Correct electrolytes on whenever necessary basis  Patient is on Eliquis which can be continued  PUD prophylaxis    Case discussed with patient, family and nursing        Electronically signed by:  Derek Davies MD    11/23/2022    5:03 PM.

## 2022-11-23 NOTE — PROGRESS NOTES
Pt alert and oriented. Denies pain. Independent of needs. Assessment completed and charted. Denies needs at this time. Call bell in reach.

## 2022-11-23 NOTE — PLAN OF CARE
Problem: Safety - Adult  Goal: Free from fall injury  Outcome: Progressing  Flowsheets (Taken 11/23/2022 1444)  Free From Fall Injury:   Instruct family/caregiver on patient safety   Based on caregiver fall risk screen, instruct family/caregiver to ask for assistance with transferring infant if caregiver noted to have fall risk factors  Note: Remains free from fall. Problem: Pain  Goal: Verbalizes/displays adequate comfort level or baseline comfort level  Outcome: Progressing  Flowsheets (Taken 11/23/2022 1444)  Verbalizes/displays adequate comfort level or baseline comfort level:   Encourage patient to monitor pain and request assistance   Administer analgesics based on type and severity of pain and evaluate response   Assess pain using appropriate pain scale   Notify Licensed Independent Practitioner if interventions unsuccessful or patient reports new pain   Implement non-pharmacological measures as appropriate and evaluate response   Consider cultural and social influences on pain and pain management  Note: Denies pain.       Problem: Chronic Conditions and Co-morbidities  Goal: Patient's chronic conditions and co-morbidity symptoms are monitored and maintained or improved  Outcome: Progressing  Flowsheets (Taken 11/23/2022 1444)  Care Plan - Patient's Chronic Conditions and Co-Morbidity Symptoms are Monitored and Maintained or Improved:   Monitor and assess patient's chronic conditions and comorbid symptoms for stability, deterioration, or improvement   Collaborate with multidisciplinary team to address chronic and comorbid conditions and prevent exacerbation or deterioration   Update acute care plan with appropriate goals if chronic or comorbid symptoms are exacerbated and prevent overall improvement and discharge

## 2022-11-23 NOTE — CONSULTS
[address] [facility]   Cardiovascular Evaluation    PATIENT: Balta Hawkins  DATE: 2022  MRN: [id number]  CSN: [id number]  : 1945    Primary Care Doctor/Referring provider: Reina Hamilton MD, Gia Angulo MD     Reason for evaluation/Chief complaint:   Shortness of breath    Subjective:    History of present illness on initial date of evaluation:   Balta Hawkins is a 78 y.o. patient who presents evaluation of progressive shortness of breath. The patient is known to our cardiovascular practice and has a history of ischemic heart disease and underwent coronary artery bypass grafting surgery in . 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 Patient states that he has been feeling short of breath for several months which worsened over the past week or so. This was associated with significant fatigue, cough, and further manifestations of congestive heart failure including orthopnea PND and lower extremity edema. At the time of the patient's coronary bypass grafting surgery in  he had left ventricular dysfunction that was quite severe. This has improved with medical therapy and treatment to an ejection fraction of 55% as measured in 2020. The patient was seen within our emergency room and diagnosed with congestive heart failure. He was started on IV Lasix therapy. He is currently getting 40 mg of Lasix IV twice daily. He states that this has helped him with mild improvement of his symptoms. He also does have coexisting chronic obstructive pulmonary disease and follows with our pulmonary team.  He has been seen by pulmonary during hospitalization at this time.           Patient Active Problem List   Diagnosis    Essential hypertension    Hyperlipidemia    Allergic rhinitis    Chest pain    Abnormal stress test    Cardiomyopathy ([facility] [address]    CAD (coronary artery disease)    S/P CABG x 3    Hx of tobacco use, presenting hazards to health    Palpitations    SOB (shortness of breath)    Acute on chronic diastolic congestive heart failure (HCC)    Acid reflux    History of total knee replacement    Vasovagal syncope    Paroxysmal atrial fibrillation (HCC)    Pericardial effusion    Acute pericarditis    Panic disorder    Morbid obesity due to excess calories (HCC)    NSTEMI (non-ST elevated myocardial infarction) (HCC)    Simple chronic bronchitis (HCC)    Restrictive lung disease    Class 2 obesity in adult    Suspected sleep apnea    LVH (left ventricular hypertrophy)    Diastolic dysfunction    Former smoker    COPD exacerbation (HCC)    RHONDA (obstructive sleep apnea)    Acute respiratory failure with hypoxia ([facility])    Diaphragm paralysis    Pneumonia, unspecified organism    Elevated troponin    CHF (congestive heart failure) ([facility] [address]         Cardiac Testing: I have reviewed the findings below. EKG:  ECHO:   STRESS TEST:  CATH:  BYPASS:  VASCULAR:    Past Medical History:   has a past medical history of Bronchitis, CAD (coronary artery disease), Cardiomyopathy ([address] CHF (congestive heart failure) ([address] COPD (chronic obstructive pulmonary disease) ([facility] [address] Diabetes ([facility] [address] Diaphragmatic paralysis, Diastolic dysfunction, Family history of early CAD, Former smoker, High cholesterol, Hypertension, Knee replacement, Obesity, Class II, 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 Surgical History:   has a past surgical history that includes Appendectomy; meniscectomy (Right, 1965); Tonsillectomy; Cardiac catheterization (07/10/2017); Carpal tunnel release (Bilateral); Coronary artery bypass graft (07/18/2017); transesophageal echocardiogram (07/18/2017); Total knee arthroplasty (Right); Coronary angioplasty with stent (05/18/2018); bronchoscopy (N/A, 1/22/2020); bronchoscopy (1/22/2020); Thoracoscopy (Left, 5/28/2020); bronchoscopy (N/A, 6/11/2021); and bronchoscopy (6/11/2021). Social History:   reports that he quit smoking about 44 years ago. His smoking use included cigarettes, pipe, and cigars. He started smoking about 54 years ago. He has a 15.00 pack-year smoking history. He has never used smokeless tobacco. He reports current alcohol use of about 3.0 standard drinks per week. He reports that he does not use drugs. Family History:  No evidence for sudden cardiac death or premature CAD    Medications:  Reviewed and are listed in nursing record. and/or listed below  Outpatient Medications:  Prior to Admission medications    Medication Sig Start Date End Date Taking?  Authorizing Provider   traZODone (DESYREL) 50 MG tablet Take 1 tablet by mouth nightly as needed 11/15/22   Historical Provider, MD   metoprolol succinate (TOPROL XL) 50 MG extended release tablet Take one tablet by mouth daily 10/6/22   Socorro Phelps MD   apixaban (ELIQUIS) 5 MG TABS tablet TAKE 1 TABLET BY MOUTH TWICE A DAY 10/6/22   Socorro hPelps MD   losartan (COZAAR) 100 MG tablet TAKE 1 TABLET BY MOUTH EVERY DAY 10/6/22   Socorro Phelps MD   furosemide (LASIX) 40 MG tablet TAKE 1 TABLET BY MOUTH EVERY DAY 9/29/22   Socorro Phelps MD   fluticasone-umeclidin-vilant (TRELEGY ELLIPTA) 209-85.7-93 MCG/INH AEPB TAKE 1 PUFF BY MOUTH EVERY DAY 6/28/22   Sarah Bolton MD   ipratropium-albuterol (DUONEB) 0.5-2.5 (3) MG/3ML SOLN nebulizer solution INHALE 3 MLS BY MOUTH 4 TIMES DAILY 6/14/22   Sarah Bolton MD   albuterol sulfate  (90 Base) MCG/ACT inhaler Inhale 2 puffs into the lungs 4 times daily as needed for Wheezing 10/22/21   Stiven Guaman MD   atorvastatin (LIPITOR) 80 MG tablet Take 1 tablet by mouth daily 8/31/20   Socorro Phelps MD   UNABLE TO FIND VESIVEST TWICE DAILY FOR 20 MINUTES - lung congestion    Historical Provider, MD   Spacer/Aero-Holding Chambers LETA 1 Device by Does not apply route daily as needed (sob) 3/15/19   Jose D Ballesteros DO   metFORMIN (GLUCOPHAGE) 500 MG tablet Take 500 mg by mouth daily (with breakfast) Historical Provider, MD   aspirin EC 81 MG EC tablet Take 1 tablet by mouth daily  Patient taking differently: Take 81 mg by mouth nightly 5/29/18   Lina Gastelum MD   Glucosamine-Chondroitin (OSTEO BI-FLEX REGULAR STRENGTH PO) Take 1 capsule by mouth daily     Historical Provider, MD   GuaiFENesin (MUCINEX PO) Take 1,200 mg by mouth 2 times daily    Historical Provider, MD   Probiotic Product (PROBIOTIC DAILY PO) Take by mouth daily     Historical Provider, MD   fish oil-omega-3 fatty acids 1000 MG capsule Take 1 g by mouth nightly     Historical Provider, MD   Multiple Vitamin (MULTI-VITAMIN PO) Take 1 tablet by mouth daily     Historical Provider, MD   Lysine 1000 MG TABS Take 1 tablet by mouth Daily     Historical Provider, MD   cetirizine (ZYRTEC) 10 MG tablet Take 10 mg by mouth daily. Historical Provider, MD       In-patient schedule medications:   metoprolol succinate  50 mg Oral Daily    losartan  100 mg Oral Daily    fluticasone-umeclidin-vilant  1 puff Inhalation Daily    cetirizine  10 mg Oral Daily    atorvastatin  80 mg Oral Daily    aspirin EC  81 mg Oral Nightly    apixaban  5 mg Oral BID    furosemide  40 mg IntraVENous BID    methylPREDNISolone  40 mg IntraVENous Q6H    sodium chloride flush  10 mL IntraVENous 2 times per day    cefTRIAXone (ROCEPHIN) IV  1,000 mg IntraVENous Q24H    And    azithromycin  500 mg Oral Q24H    insulin lispro  0-8 Units SubCUTAneous TID WC    insulin lispro  0-4 Units SubCUTAneous Nightly         Infusion Medications:   dextrose      sodium chloride           Allergies:  Oxycodone and Demerol     Review of Systems:   14 point review of systems unable to be completed due to patient condition/cooperation. All available positives mentioned in history of present illness.        Physical Examination:    [unfilled]  /78   Pulse 75   Temp 98.2 °F (36.8 °C) (Oral)   Resp 18   Ht 6' (1.829 m)   Wt 273 lb 8 oz (124.1 kg)   SpO2 95%   BMI 37.09 kg/m² Weight: 273 lb 8 oz (124.1 kg)     Wt Readings from Last 3 Encounters:   11/22/22 273 lb 8 oz (124.1 kg)   10/06/22 273 lb (123.8 kg)   09/28/22 267 lb 8 oz (121.3 kg)       Intake/Output Summary (Last 24 hours) at 11/23/2022 1439  Last data filed at 11/23/2022 1113  Gross per 24 hour   Intake 10 ml   Output 1700 ml   Net -1690 ml     Physical Examination:    [unfilled]  /78   Pulse 75   Temp 98.2 °F (36.8 °C) (Oral)   Resp 18   Ht 6' (1.829 m)   Wt 273 lb 8 oz (124.1 kg)   SpO2 95%   BMI 37.09 kg/m²    Weight: 273 lb 8 oz (124.1 kg)     Wt Readings from Last 3 Encounters:   11/22/22 273 lb 8 oz (124.1 kg)   10/06/22 273 lb (123.8 kg)   09/28/22 267 lb 8 oz (121.3 kg)       Intake/Output Summary (Last 24 hours) at 11/23/2022 1441  Last data filed at 11/23/2022 1113  Gross per 24 hour   Intake 10 ml   Output 1700 ml   Net -1690 ml       General Appearance:  Alert, cooperative, mild distress, appears stated age   Head:  Normocephalic, without obvious abnormality, atraumatic   Eyes:  PERRL, conjunctiva/corneas clear       Nose: Nares normal, no drainage or sinus tenderness   Throat: Lips, mucosa, and tongue normal   Neck: Supple, symmetrical, trachea midline, no adenopathy, thyroid: not enlarged, symmetric, no tenderness/mass/nodules, no carotid bruit. + JVD       Lungs:   Reduced to auscultation with rales and wheezes bilaterally, respirations mildly labored   Chest Wall:  No tenderness or deformity   Heart:  Regular rhythm and normal rate; S1, S2 are normal; no murmur noted; no rub or gallop   Abdomen:   Soft, non-tender, bowel sounds active all four quadrants,  no masses, no organomegaly           Extremities: Extremities atraumatic, no cyanosis.  + edema   Pulses: 2+ and symmetric   Skin: Skin color, texture, turgor normal, no rashes or lesions   Pysch: Normal mood and affect   Neurologic: Normal gross motor and sensory exam.           Labs  Recent Labs     11/22/22  1013 11/23/22  0402   WBC 7.6 6.7 HGB 13.6 13.0*   HCT 39.6* 37.8*   MCV 90.5 90.0    188     Recent Labs     11/22/22  1013 11/23/22  0402   CREATININE 0.8 1.2   BUN 18 21*   * 137   K 4.1 4.7    99   CO2 22 25     No results for input(s): INR, PROTIME in the last 72 hours. Recent Labs     11/22/22  1013 11/23/22  0402 11/23/22  0939   TROPONINI 0.05* 0.03* 0.03*     Invalid input(s): PRO-BNP  No results for input(s): CHOL, LDL, HDL in the last 72 hours. Invalid input(s): TG      Imaging:  I have reviewed the below testing personally and my interpretation is below. EKG:  sinus rhythm with low amplitude P wave best seen in L1  Left axis deviation  Left bundle branch block  Abnormal ECG  When compared with ECG of 18-FEB-2020 15:27,  No significant change was found  Confirmed by Chau Salgado MD, [address] ([id number]) on 11/22/2022 5:58:02 PM    CXR:      Assessment:  78 y.o. patient with:  Acute class IV congestive heart failure  Chronic ischemic heart disease status post coronary artery bypass grafting surgery x 3   ~Last known ejection fraction of 55% and April 2020  Elevated troponin levels in secondary to the patient's decompensated congestive heart failure    Plan:  Clinically the patient appears to have significant decompensation of his heart failure. More aggressive therapy with diuretics will be initiated. ~he is currently on 40 mg of Lasix IV twice daily. ~I will increase this to a Lasix infusion 10 mg/h due to the significance of the patient's fluid retention. Repeat echocardiogram would be helpful to re-document his left ventricular function. We will continue his prehospitalization cardiovascular regimen. He may benefit from conversion to Schmitz Bailee therapy. Medical Decision Making:   The following items were considered in medical decision making:  Independent review of images  Review / order clinical lab tests  Review / order radiology tests  Decision to obtain old records  Review and summation of old records as accessed through [place] (a summary of my findings in these old records)      Due to the high probability of clinically significant life threating deterioration of the patient's condition that required my urgent intervention, a total critical care time 35 minutes was used. This time excludes any time that may have been spent performing procedures. This includes but not limited to vital sign monitoring, telemetry monitoring, continuous pulse oximety, IV medication, clinical response to the IV medications, documentation time , consultation time, interpretation of lab data, review of nursing notes and old record review. All questions and concerns were addressed to the patient/family. Alternatives to my treatment were discussed. The note was completed using EMR. Every effort was made to ensure accuracy; however, inadvertent computerized transcription errors may be present.     Christel Lima MD, [address], [facility]  [phone] [facility] office  [phone] [address] [place]  11/23/2022  2:39 PM

## 2022-11-23 NOTE — TELEPHONE ENCOUNTER
[facility]     CHF w/ exasperation  Anna Marie Mendez [phone]

## 2022-11-23 NOTE — CARE COORDINATION
Case Management Assessment  Initial Evaluation    Date/Time of Evaluation: 11/23/2022 9:43 AM  Assessment Completed by: AZUL Rogers    If patient is discharged prior to next notation, then this note serves as note for discharge by case management. Patient Name: Jne Gamez                   YOB: 1945  Diagnosis: Pneumonia, unspecified organism [J18.9]                   Date / Time: 11/22/2022  9:03 PM    Patient Admission Status: Inpatient   Readmission Risk (Low < 19, Mod (19-27), High > 27): Readmission Risk Score: 11.1    Current PCP: Salvatore Gomez MD  PCP verified by CM? Yes    Chart Reviewed: Yes      History Provided by: Patient  Patient Orientation: Alert and Oriented, Person, Place, Situation, Self    Patient Cognition: Alert    Hospitalization in the last 30 days (Readmission):  No  .    Advance Directives:      Code Status: Full Code   Patient's Primary Decision Maker is: Named in Scanned ACP Document    Primary Decision MakerCatherene Salty - Jossie - [phone]    Discharge Planning:    Patient lives with: Alone Type of Home: House  Primary Care Giver: Self  Patient Support Systems include: Children, Family Members, Friends/Neighbors   Current Financial resources: Medicare  Current services prior to admission: Durable Medical Equipment            Current DME: Meka Murillo Chair (reports DME is from prior knee and heart surgery-does not currently use)               ADLS  Prior functional level: Independent in ADLs/IADLs  Current functional level: Independent in ADLs/IADLs    Family can provide assistance at DC: No  Would you like Case Management to discuss the discharge plan with any other family members/significant others, and if so, who?  No  Plans to Return to Present Housing: Yes  Potential Assistance needed at discharge: N/A  Patient expects to discharge to: [address] [place] [address] for transportation at discharge: Self    Financial    Payor: Shanna Hawkins / Plan: [facility] ESSENTIAL/PLUS / Product Type: *No Product type* /     Does insurance require precert for SNF: Yes    Potential assistance Purchasing Medications: No  Meds-to-Beds request: Yes    [facility] [address], [address]  [place]  [address]  Phone: [phone] Fax: [phone]    Notes:    Factors facilitating achievement of predicted outcomes: Family support, Friend support, Motivated, Cooperative, Pleasant, Sense of humor, and Good insight into deficits    Barriers to discharge: Pending clinical improvement, watch for 02 need    Additional Case Management Notes: Patient from home alone, [facility], one level living [place], still working. Patient requesting portable concentrator if 02 needed at [place]. The Plan for Transition of Care is related to the following treatment goals of Pneumonia, unspecified organism [J18.9]    The Patient and/or Patient Representative Agree with the Discharge Plan?  Yes    Gabby Black [place]  Case Management Department  Ph: [phone] Fax: [phone]

## 2022-11-23 NOTE — PROGRESS NOTES
Hospitalist Progress Note      PCP: Jocelyn Gonzalez MD    Date of Admission: 11/22/2022    Chief Complaint: shortness of breath, productive cough, fevers, myalgias, arthralgias, headache, poor appetite, nausea, vomiting and diarrhea    Subjective: no new c/o. Medications:  Reviewed    Infusion Medications    dextrose      sodium chloride       Scheduled Medications    metoprolol succinate  50 mg Oral Daily    losartan  100 mg Oral Daily    fluticasone-umeclidin-vilant  1 puff Inhalation Daily    cetirizine  10 mg Oral Daily    atorvastatin  80 mg Oral Daily    aspirin EC  81 mg Oral Nightly    apixaban  5 mg Oral BID    furosemide  40 mg IntraVENous BID    methylPREDNISolone  40 mg IntraVENous Q6H    sodium chloride flush  10 mL IntraVENous 2 times per day    cefTRIAXone (ROCEPHIN) IV  1,000 mg IntraVENous Q24H    And    azithromycin  500 mg Oral Q24H    insulin lispro  0-8 Units SubCUTAneous TID WC    insulin lispro  0-4 Units SubCUTAneous Nightly     PRN Meds: traZODone, glucose, dextrose bolus **OR** dextrose bolus, glucagon (rDNA), dextrose, sodium chloride flush, sodium chloride, ondansetron, polyethylene glycol, acetaminophen **OR** acetaminophen, ipratropium-albuterol, benzonatate      Intake/Output Summary (Last 24 hours) at 11/23/2022 0926  Last data filed at 11/23/2022 2322  Gross per 24 hour   Intake 10 ml   Output 500 ml   Net -490 ml       Physical Exam Performed:    /80   Pulse 79   Temp 98.2 °F (36.8 °C) (Oral)   Resp 18   Ht 6' (1.829 m)   Wt 273 lb 8 oz (124.1 kg)   SpO2 94%   BMI 37.09 kg/m²     General appearance: No apparent distress, appears stated age and cooperative. HEENT: Pupils equal, round, and reactive to light. Conjunctivae/corneas clear. Neck: Supple, with full range of motion. No jugular venous distention. Trachea midline. Respiratory:  Normal respiratory effort. Clear to auscultation, bilaterally without Rales/Wheezes/Rhonchi.   Cardiovascular: Regular rate and rhythm with normal S1/S2 without murmurs, rubs or gallops. Abdomen: Soft, non-tender, non-distended with normal bowel sounds. Musculoskeletal: No clubbing, cyanosis or edema bilaterally. Full range of motion without deformity. Skin: Skin color, texture, turgor normal.  No rashes or lesions. Neurologic:  Neurovascularly intact without any focal sensory/motor deficits. Cranial nerves: II-XII intact, grossly non-focal.  Psychiatric: Alert and oriented, thought content appropriate, normal insight  Capillary Refill: Brisk,< 3 seconds   Peripheral Pulses: +2 palpable, equal bilaterally       Labs:   Recent Labs     11/22/22  1013 11/23/22  0402   WBC 7.6 6.7   HGB 13.6 13.0*   HCT 39.6* 37.8*    188     Recent Labs     11/22/22  1013 11/23/22  0402   * 137   K 4.1 4.7    99   CO2 22 25   BUN 18 21*   CREATININE 0.8 1.2   CALCIUM 9.6 8.8     Recent Labs     11/22/22  1013   AST 41*   ALT 30   BILITOT 1.0   ALKPHOS 59     No results for input(s): INR in the last 72 hours.   Recent Labs     11/22/22  1013 11/23/22  0402   TROPONINI 0.05* 0.03*       Urinalysis:      Lab Results   Component Value Date/Time    NITRU Negative 05/22/2020 09:31 AM    WBCUA 6-10 07/28/2017 08:50 AM    BACTERIA 1+ 07/28/2017 08:50 AM    RBCUA  07/28/2017 08:50 AM    BLOODU Negative 05/22/2020 09:31 AM    SPECGRAV 1.015 05/22/2020 09:31 AM    GLUCOSEU Negative 05/22/2020 09:31 AM       Consults:    None      Assessment/Plan:    Active Hospital Problems    Diagnosis     CHF (congestive heart failure) ([facility] [address] [I50.9]      Priority: Medium    Pneumonia, unspecified organism [J18.9]      Priority: Medium    Elevated troponin [R77.8]      Priority: Medium    Acute respiratory failure with hypoxia ([facility] [address] [J96.01]     COPD exacerbation ([facility] [address] [J44.1]     Morbid obesity due to excess calories ([facility] [address] [E66.01]     Paroxysmal atrial fibrillation ([facility] [address] [I48.0]     S/P CABG x 3 [Z95.1]     CAD (coronary artery disease) [I25.10] Hyperlipidemia [E78.5]     Essential hypertension [I10]           COPD (acute exacerbation) - Patient has been started on Nebulizer treatments to be given on a scheduled basis every 4 hours, and on an as-needed basis every 2 hours based upon needs identified through close monitoring. In addition, Solumedrol and Antibiotics have been prescribed. The Solumedrol will be tapered as the patient improves. Patient will be monitored closely, and deep breathing and coughing will be encouraged while awake. Followed outpt by Dr. Yolanda Norton. Pulmonology consulted and appreciated. CHF - Acute on chronic diastolic dysfunction. A 2D Echocardiogram on 04/16/2020 shows an EF of 55%  and grade II diastolic dysfunction. A follow up Echocardiogram has been ordered to reassess cardiac function. Diurese with IV Lasix. Monitor strict I&Os and daily weights. A low sodium fluid restricted diet has been ordered. Followed outpt by Dr. Thuy Armstrong. Cardiology consulted and appreciated. Pneumonia suspected based upon symptoms, not apparent on imaging- Pt has been started on Rocephin and Azithromycin  - Blood cultures x 2 ordered  - Respiratory culture ordered  - Legionella pneumophilia and Strep pneumoniae urine antigens ordered  - Procalcitonin ordered      Acute on chronic respiratory failure with hypoxia - as evidenced by an oxygen saturation of less than 90% on room air  - Due to COPD, CHF exacerbations and possibly pneumonia  -We will provide oxygen as necessary to maintain an oxygen saturation of 92% or higher      Elevated troponin -patient's troponin is chronically elevated but currently slightly higher than his baseline. He denies any specific chest pain. We will monitor him on telemetry and follow serial cardiac enzymes. HTN/CAD - w/ known CAD s/p prior CABG x 3,  but no evidence of active signs/sxs of ischemia/failure. Currently controlled on home meds w/ vitals reviewed and documented as above.   HyperLipidemia - controlled on home Statin. Continue, w/ f/u and med adjustment w/ PCP    Afib - chronic paroxysmal of unspecified and clinically unable to determine etiology. Normally rate controlled on BBlocker - continued. Anticoagulated at baseline on home Eliquis due to secondary hypercoaguable state due to Afib - continued. Monitored on tele. Obesity -  With Body mass index is 37.09 kg/m². Complicating assessment and treatment. Placing patient at risk for multiple co-morbidities as well as early death and contributing to the patient's presentation. Counseled on weight loss. DVT Prophylaxis: Eliquis     Recent Labs     11/22/22  1013 11/23/22  0402    188     Diet: ADULT DIET; Regular; Low Sodium (2 gm); 1500 ml  Code Status: Full Code      PT/OT Eval Status: not yet ordered. Dispo - Patient is likely to remain in-house at least until Friday/Sat 25/26 Nov pending clinical course and subspecialty recs.   Girlfriend and daughter present at bedside when seen 23 Nov.       Laura Duarte MD

## 2022-11-24 PROBLEM — J44.9 CHRONIC OBSTRUCTIVE PULMONARY DISEASE (HCC): Status: ACTIVE | Noted: 2022-11-24

## 2022-11-24 PROBLEM — I50.31 ACUTE DIASTOLIC HEART FAILURE (HCC): Status: ACTIVE | Noted: 2022-11-24

## 2022-11-24 LAB
ANION GAP SERPL CALCULATED.3IONS-SCNC: 8 MMOL/L (ref 3–16)
BASOPHILS ABSOLUTE: 0 K/UL (ref 0–0.2)
BASOPHILS RELATIVE PERCENT: 0.1 %
BUN BLDV-MCNC: 23 MG/DL (ref 7–20)
CALCIUM SERPL-MCNC: 8.6 MG/DL (ref 8.3–10.6)
CHLORIDE BLD-SCNC: 96 MMOL/L (ref 99–110)
CO2: 35 MMOL/L (ref 21–32)
CREAT SERPL-MCNC: 1 MG/DL (ref 0.8–1.3)
EOSINOPHILS ABSOLUTE: 0 K/UL (ref 0–0.6)
EOSINOPHILS RELATIVE PERCENT: 0 %
GFR SERPL CREATININE-BSD FRML MDRD: >60 ML/MIN/{1.73_M2}
GLUCOSE BLD-MCNC: 124 MG/DL (ref 70–99)
GLUCOSE BLD-MCNC: 155 MG/DL (ref 70–99)
GLUCOSE BLD-MCNC: 156 MG/DL (ref 70–99)
GLUCOSE BLD-MCNC: 230 MG/DL (ref 70–99)
GLUCOSE BLD-MCNC: 240 MG/DL (ref 70–99)
HCT VFR BLD CALC: 38 % (ref 40.5–52.5)
HEMOGLOBIN: 12.7 G/DL (ref 13.5–17.5)
L. PNEUMOPHILA SEROGP 1 UR AG: NORMAL
LYMPHOCYTES ABSOLUTE: 1 K/UL (ref 1–5.1)
LYMPHOCYTES RELATIVE PERCENT: 7.1 %
MAGNESIUM: 1.8 MG/DL (ref 1.8–2.4)
MCH RBC QN AUTO: 30.2 PG (ref 26–34)
MCHC RBC AUTO-ENTMCNC: 33.4 G/DL (ref 31–36)
MCV RBC AUTO: 90.4 FL (ref 80–100)
MONOCYTES ABSOLUTE: 0.7 K/UL (ref 0–1.3)
MONOCYTES RELATIVE PERCENT: 5 %
NEUTROPHILS ABSOLUTE: 12.7 K/UL (ref 1.7–7.7)
NEUTROPHILS RELATIVE PERCENT: 87.8 %
PDW BLD-RTO: 13.9 % (ref 12.4–15.4)
PERFORMED ON: ABNORMAL
PLATELET # BLD: 213 K/UL (ref 135–450)
PMV BLD AUTO: 8.1 FL (ref 5–10.5)
POTASSIUM REFLEX MAGNESIUM: 3.7 MMOL/L (ref 3.5–5.1)
RBC # BLD: 4.21 M/UL (ref 4.2–5.9)
SODIUM BLD-SCNC: 139 MMOL/L (ref 136–145)
STREP PNEUMONIAE ANTIGEN, URINE: NORMAL
WBC # BLD: 14.4 K/UL (ref 4–11)

## 2022-11-24 PROCEDURE — 99232 SBSQ HOSP IP/OBS MODERATE 35: CPT | Performed by: INTERNAL MEDICINE

## 2022-11-24 PROCEDURE — 6360000002 HC RX W HCPCS: Performed by: INTERNAL MEDICINE

## 2022-11-24 PROCEDURE — 6370000000 HC RX 637 (ALT 250 FOR IP): Performed by: INTERNAL MEDICINE

## 2022-11-24 PROCEDURE — 2580000003 HC RX 258: Performed by: INTERNAL MEDICINE

## 2022-11-24 PROCEDURE — 83735 ASSAY OF MAGNESIUM: CPT

## 2022-11-24 PROCEDURE — 2700000000 HC OXYGEN THERAPY PER DAY

## 2022-11-24 PROCEDURE — 99233 SBSQ HOSP IP/OBS HIGH 50: CPT | Performed by: INTERNAL MEDICINE

## 2022-11-24 PROCEDURE — 85025 COMPLETE CBC W/AUTO DIFF WBC: CPT

## 2022-11-24 PROCEDURE — 80048 BASIC METABOLIC PNL TOTAL CA: CPT

## 2022-11-24 PROCEDURE — 1200000000 HC SEMI PRIVATE

## 2022-11-24 PROCEDURE — 94640 AIRWAY INHALATION TREATMENT: CPT

## 2022-11-24 PROCEDURE — 94761 N-INVAS EAR/PLS OXIMETRY MLT: CPT

## 2022-11-24 PROCEDURE — 36415 COLL VENOUS BLD VENIPUNCTURE: CPT

## 2022-11-24 RX ORDER — ALBUTEROL SULFATE 2.5 MG/3ML
2.5 SOLUTION RESPIRATORY (INHALATION) 3 TIMES DAILY
Status: DISCONTINUED | OUTPATIENT
Start: 2022-11-25 | End: 2022-11-29 | Stop reason: HOSPADM

## 2022-11-24 RX ADMIN — INSULIN LISPRO 2 UNITS: 100 INJECTION, SOLUTION INTRAVENOUS; SUBCUTANEOUS at 12:32

## 2022-11-24 RX ADMIN — LOSARTAN POTASSIUM 100 MG: 100 TABLET, FILM COATED ORAL at 10:31

## 2022-11-24 RX ADMIN — FUROSEMIDE 10 MG/HR: 10 INJECTION, SOLUTION INTRAMUSCULAR; INTRAVENOUS at 03:22

## 2022-11-24 RX ADMIN — METOPROLOL SUCCINATE 50 MG: 50 TABLET, EXTENDED RELEASE ORAL at 10:31

## 2022-11-24 RX ADMIN — METHYLPREDNISOLONE SODIUM SUCCINATE 40 MG: 40 INJECTION, POWDER, LYOPHILIZED, FOR SOLUTION INTRAMUSCULAR; INTRAVENOUS at 15:38

## 2022-11-24 RX ADMIN — ASPIRIN 81 MG: 81 TABLET, COATED ORAL at 20:14

## 2022-11-24 RX ADMIN — DOXYCYCLINE HYCLATE 100 MG: 100 TABLET, COATED ORAL at 10:31

## 2022-11-24 RX ADMIN — ATORVASTATIN CALCIUM 80 MG: 80 TABLET, FILM COATED ORAL at 10:31

## 2022-11-24 RX ADMIN — FUROSEMIDE 10 MG/HR: 10 INJECTION, SOLUTION INTRAMUSCULAR; INTRAVENOUS at 18:18

## 2022-11-24 RX ADMIN — APIXABAN 5 MG: 5 TABLET, FILM COATED ORAL at 20:14

## 2022-11-24 RX ADMIN — SODIUM CHLORIDE, PRESERVATIVE FREE 10 ML: 5 INJECTION INTRAVENOUS at 10:32

## 2022-11-24 RX ADMIN — METHYLPREDNISOLONE SODIUM SUCCINATE 40 MG: 40 INJECTION, POWDER, LYOPHILIZED, FOR SOLUTION INTRAMUSCULAR; INTRAVENOUS at 03:17

## 2022-11-24 RX ADMIN — APIXABAN 5 MG: 5 TABLET, FILM COATED ORAL at 10:31

## 2022-11-24 RX ADMIN — IPRATROPIUM BROMIDE AND ALBUTEROL SULFATE 1 AMPULE: 2.5; .5 SOLUTION RESPIRATORY (INHALATION) at 14:16

## 2022-11-24 RX ADMIN — PANTOPRAZOLE SODIUM 40 MG: 40 TABLET, DELAYED RELEASE ORAL at 05:57

## 2022-11-24 RX ADMIN — BENZONATATE 200 MG: 100 CAPSULE ORAL at 03:30

## 2022-11-24 RX ADMIN — DOXYCYCLINE HYCLATE 100 MG: 100 TABLET, COATED ORAL at 20:14

## 2022-11-24 RX ADMIN — IPRATROPIUM BROMIDE AND ALBUTEROL SULFATE 1 AMPULE: 2.5; .5 SOLUTION RESPIRATORY (INHALATION) at 20:32

## 2022-11-24 ASSESSMENT — PAIN SCALES - GENERAL
PAINLEVEL_OUTOF10: 0
PAINLEVEL_OUTOF10: 0

## 2022-11-24 NOTE — PROGRESS NOTES
[facility], operated by [facility]   PROGRESS NOTE  [phone]      Attending Physician: Esperanza Lofton MD  Reason for Consultation/Chief Complaint: Acute heart failure. Subjective     Patient reports that his shortness of breath has improved. He still has a cough. Still has significant lower extremity edema. He denies any chest pain. CURRENT Medications:  furosemide (LASIX) 100 mg in dextrose 5 % 100 mL infusion, Continuous  methylPREDNISolone sodium (SOLU-MEDROL) injection 40 mg, Q12H  pantoprazole (PROTONIX) tablet 40 mg, QAM AC  doxycycline hyclate (VIBRA-TABS) tablet 100 mg, 2 times per day  traZODone (DESYREL) tablet 50 mg, Nightly PRN  metoprolol succinate (TOPROL XL) extended release tablet 50 mg, Daily  losartan (COZAAR) tablet 100 mg, Daily  fluticasone-umeclidin-vilant (TRELEGY ELLIPTA) 100-62.5-25 MCG/INH inhaler-- PATIENT SUPPLIED (Patient Supplied), Daily  atorvastatin (LIPITOR) tablet 80 mg, Daily  aspirin EC tablet 81 mg, Nightly  apixaban (ELIQUIS) tablet 5 mg, BID  glucose chewable tablet 16 g, PRN  dextrose bolus 10% 125 mL, PRN   Or  dextrose bolus 10% 250 mL, PRN  glucagon (rDNA) injection 1 mg, PRN  dextrose 10 % infusion, Continuous PRN  sodium chloride flush 0.9 % injection 10 mL, 2 times per day  sodium chloride flush 0.9 % injection 10 mL, PRN  0.9 % sodium chloride infusion, PRN  ondansetron (ZOFRAN) injection 4 mg, Q4H PRN  polyethylene glycol (GLYCOLAX) packet 17 g, Daily PRN  acetaminophen (TYLENOL) tablet 650 mg, Q4H PRN   Or  acetaminophen (TYLENOL) suppository 650 mg, Q4H PRN  insulin lispro (HUMALOG) injection vial 0-8 Units, TID WC  insulin lispro (HUMALOG) injection vial 0-4 Units, Nightly  ipratropium-albuterol (DUONEB) nebulizer solution 1 ampule, Q4H PRN  benzonatate (TESSALON) capsule 200 mg, TID PRN        Allergies:  Oxycodone and Demerol     Review of Systems:   Negative except as noted above.       Objective   PHYSICAL EXAM:    Vitals:    11/24/22 1344   BP: 120/73 Pulse: 83   Resp: 18   Temp: 97.6 °F (36.4 °C)   SpO2:     Weight: 263 lb 8 oz (119.5 kg)      General: Adult male sitting up on edge of bed. Pleasant and interactive on exam.  HEENT: Normocephalic, atraumatic, non-icteric, hearing intact, nares normal, mucous membranes moist.  Neck: JVP difficult to assess. Heart: Heart sounds distant. Irregular rhythm. Normal S1 and S2. No appreciable murmurs, gallops, or rubs. Lungs: Normal respiratory effort. Breath sounds coarse and diminished bilaterally. Bibasilar crackles present. Abdomen: Soft, non-tender. Normoactive bowel sounds. No masses or organomegaly. Skin: No rashes, wounds, or lesions. Pulses: 2+ and symmetric. Extremities: 2+ bilateral pitting LE edema. No clubbing or cyanosis. Psych: Normal mood and affect. Neuro: Alert and oriented to person, place, and time. No focal deficits noted.       Labs   CBC:   Lab Results   Component Value Date/Time    WBC 14.4 11/24/2022 05:09 AM    RBC 4.21 11/24/2022 05:09 AM    HGB 12.7 11/24/2022 05:09 AM    HCT 38.0 11/24/2022 05:09 AM    MCV 90.4 11/24/2022 05:09 AM    RDW 13.9 11/24/2022 05:09 AM     11/24/2022 05:09 AM     CMP:  Lab Results   Component Value Date/Time     11/24/2022 05:09 AM    K 3.7 11/24/2022 05:09 AM    CL 96 11/24/2022 05:09 AM    CO2 35 11/24/2022 05:09 AM    BUN 23 11/24/2022 05:09 AM    CREATININE 1.0 11/24/2022 05:09 AM    GFRAA >60 05/22/2020 09:31 AM    AGRATIO 1.4 11/22/2022 10:13 AM    LABGLOM >60 11/24/2022 05:09 AM    GLUCOSE 155 11/24/2022 05:09 AM    PROT 6.9 11/22/2022 10:13 AM    CALCIUM 8.6 11/24/2022 05:09 AM    BILITOT 1.0 11/22/2022 10:13 AM    ALKPHOS 59 11/22/2022 10:13 AM    AST 41 11/22/2022 10:13 AM    ALT 30 11/22/2022 10:13 AM     PT/INR:  No results found for: PTINR  HgBA1c:  Lab Results   Component Value Date    LABA1C 6.7 07/13/2017     Lab Results   Component Value Date    CKTOTAL 121 05/17/2018    TROPONINI 0.03 (H) 11/23/2022         Cardiac Data TTE 4/10/20:  Conclusions   Summary   Technically difficult examination secondary to habitus and COPD. Left ventricular systolic function is normal with ejection fraction   estimated at 55%. No regional wall motion abnormalities. There is moderate to severe concentric left ventricular hypertrophy with   sigmoid septum (2.21 cm.). Grade II diastolic dysfunction with elevated left ventricular filling   pressure. The left atrium is mildly dilated. Assessment:      1. Acute heart failure - LVEF was last assessed in 4/2020 and was noted to be preserved at that time. RV size and systolic function were noted to be normal as well. Repeat TTE pending to re-assess cardiac function. He remains hypervolemic, but has thus far had good response to diuresis on IV Lasix infusion. 2. Mild troponin T elevation - Likely secondary to acute heart failure and demand ischemia from acute hypoxic respiratory failure. Overall troponin trend has been flat and not consistent with ACS. 3.  CAD s/p CABG  4. Atrial fibrillation  5. Suspected community-acquired pneumonia  6. COPD  7. Essential hypertension  8. Hyperlipidemia  9. Type II DM  10. Former tobacco use    Assessment:     1. Recommend continuing IV Lasix infusion at 10 mg/h. 2.  Continue Eliquis 5 mg twice daily, aspirin 81 mg daily, atorvastatin 80 mg daily, metoprolol succinate 50 mg daily, and losartan 100 mg daily. Can consider eventual transition to [facility]. 3.  Repeat TTE ordered to re-assess cardiac function. 4.  Antibiotic management per primary team.  5.  Monitor strict I/O's, obtain daily standing weights. 6.  Trend BMP and magnesium levels and replete electrolytes as needed to maintain K >4 and Mg >2.  7.  Recommend 1.5 L/day fluid restriction and low-sodium diet. 8.  Cardiology will continue to follow. Thank you for allowing us to participate in the care of Yolanda Murray. Please call me with any questions [phone].       Rashad Hyde Bassam Amanda HernandezJohn E. Fog[facility]  [phone] [facility]  [phone] [address] [place] [address]  11/24/2022 5:10 PM    I will address the patient's cardiac risk factors and adjusted pharmacologic treatment as needed. In addition, I have reinforced the need for patient directed risk factor modification. All questions and concerns were addressed to the patient/family. Alternatives to my treatment were discussed. The note was completed using EMR. Every effort was made to ensure accuracy; however, inadvertent computerized transcription errors may be present.

## 2022-11-24 NOTE — PROGRESS NOTES
Hospitalist Progress Note      PCP: Ramon Sanford MD    Date of Admission: 11/22/2022    Chief Complaint: shortness of breath, productive cough, fevers, myalgias, arthralgias, headache, poor appetite, nausea, vomiting and diarrhea    Subjective: no new c/o. Medications:  Reviewed    Infusion Medications    furosemide (LASIX) 1mg/ml infusion 10 mg/hr (11/24/22 0322)    dextrose      sodium chloride       Scheduled Medications    methylPREDNISolone  40 mg IntraVENous Q12H    pantoprazole  40 mg Oral QAM AC    doxycycline hyclate  100 mg Oral 2 times per day    metoprolol succinate  50 mg Oral Daily    losartan  100 mg Oral Daily    fluticasone-umeclidin-vilant  1 puff Inhalation Daily    atorvastatin  80 mg Oral Daily    aspirin EC  81 mg Oral Nightly    apixaban  5 mg Oral BID    sodium chloride flush  10 mL IntraVENous 2 times per day    insulin lispro  0-8 Units SubCUTAneous TID WC    insulin lispro  0-4 Units SubCUTAneous Nightly     PRN Meds: traZODone, glucose, dextrose bolus **OR** dextrose bolus, glucagon (rDNA), dextrose, sodium chloride flush, sodium chloride, ondansetron, polyethylene glycol, acetaminophen **OR** acetaminophen, ipratropium-albuterol, benzonatate      Intake/Output Summary (Last 24 hours) at 11/24/2022 0935  Last data filed at 11/24/2022 1623  Gross per 24 hour   Intake 120 ml   Output 6070 ml   Net -5950 ml         Physical Exam Performed:    /74   Pulse 68   Temp 97.5 °F (36.4 °C) (Oral)   Resp 18   Ht 6' (1.829 m)   Wt 263 lb 8 oz (119.5 kg)   SpO2 93%   BMI 35.74 kg/m²     General appearance: No apparent distress, appears stated age and cooperative. HEENT: Pupils equal, round, and reactive to light. Conjunctivae/corneas clear. Neck: Supple, with full range of motion. No jugular venous distention. Trachea midline. Respiratory:  Normal respiratory effort. Clear to auscultation, bilaterally without Rales/Wheezes/Rhonchi.   Cardiovascular: Regular rate and rhythm with normal S1/S2 without murmurs, rubs or gallops. Abdomen: Soft, non-tender, non-distended with normal bowel sounds. Musculoskeletal: No clubbing, cyanosis or edema bilaterally. Full range of motion without deformity. Skin: Skin color, texture, turgor normal.  No rashes or lesions. Neurologic:  Neurovascularly intact without any focal sensory/motor deficits. Cranial nerves: II-XII intact, grossly non-focal.  Psychiatric: Alert and oriented, thought content appropriate, normal insight  Capillary Refill: Brisk,< 3 seconds   Peripheral Pulses: +2 palpable, equal bilaterally       Labs:   Recent Labs     11/22/22  1013 11/23/22  0402 11/24/22  0509   WBC 7.6 6.7 14.4*   HGB 13.6 13.0* 12.7*   HCT 39.6* 37.8* 38.0*    188 213       Recent Labs     11/22/22  1013 11/23/22  0402 11/24/22  0509   * 137 139   K 4.1 4.7 3.7    99 96*   CO2 22 25 35*   BUN 18 21* 23*   CREATININE 0.8 1.2 1.0   CALCIUM 9.6 8.8 8.6       Recent Labs     11/22/22  1013   AST 41*   ALT 30   BILITOT 1.0   ALKPHOS 59       No results for input(s): INR in the last 72 hours.   Recent Labs     11/22/22  1013 11/23/22  0402 11/23/22  0939   TROPONINI 0.05* 0.03* 0.03*         Urinalysis:      Lab Results   Component Value Date/Time    NITRU Negative 05/22/2020 09:31 AM    WBCUA 6-10 07/28/2017 08:50 AM    BACTERIA 1+ 07/28/2017 08:50 AM    RBCUA  07/28/2017 08:50 AM    BLOODU Negative 05/22/2020 09:31 AM    SPECGRAV 1.015 05/22/2020 09:31 AM    GLUCOSEU Negative 05/22/2020 09:31 AM       Consults:    IP CONSULT TO PULMONOLOGY  IP CONSULT TO CARDIOLOGY      Assessment/Plan:    Active Hospital Problems    Diagnosis     Class 4 congestive heart failure ([facility] [I50.9]      Priority: Medium    Uncontrolled type 2 diabetes mellitus with hyperglycemia [facility] [E11.65]      Priority: Medium    Elevated troponin [R77.8]      Priority: Medium    Diaphragm paralysis [J98.6]     Acute respiratory failure with hypoxia ([facility] [J96.01]     COPD exacerbation ([facility] [J44.1]     Class 2 obesity in adult [E66.9]     Restrictive lung disease [J98.4]     Suspected sleep apnea [T02.504]     Diastolic dysfunction [I91.60]     Former smoker [Z87.891]     Simple chronic bronchitis ([facility] [J41.0]     Paroxysmal atrial fibrillation (HCC) [I48.0]     Acute on chronic diastolic congestive heart failure (HCC) [I50.33]     SOB (shortness of breath) [R06.02]     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 In addition, Solumedrol and Antibiotics have been prescribed. The Solumedrol will be tapered as the patient improves. Patient will be monitored closely, and deep breathing and coughing will be encouraged while awake. Followed outpt by Dr. Kaylyn Pena. Pulmonology consulted and appreciated. CHF - Acute on chronic diastolic dysfunction. A 2D Echocardiogram on 04/16/2020 shows an EF of 55%  and grade II diastolic dysfunction. A follow up Echocardiogram has been ordered to reassess cardiac function. Diurese with IV Lasix - changed to gtt per Cardiology. Monitor strict I&Os and daily weights. A low sodium fluid restricted diet has been ordered. Followed outpt by Dr. Elías Philip. Cardiology consulted and appreciated.       Pneumonia suspected based upon symptoms, not apparent on imaging- Pt has been started on Rocephin and Azithromycin  - Blood cultures x 2 ordered  - Respiratory culture ordered  - Legionella pneumophilia and Strep pneumoniae urine antigens ordered  - Procalcitonin ordered      Acute on chronic respiratory failure with hypoxia - as evidenced by an oxygen saturation of less than 90% on room air  - Due to COPD, CHF exacerbations and possibly pneumonia  -We will provide oxygen as necessary to maintain an oxygen saturation of 92% or higher      Elevated troponin -patient's troponin is chronically elevated but currently slightly higher than his baseline. He denies any specific chest pain. We will monitor him on telemetry and follow serial cardiac enzymes. HTN/CAD - w/ known CAD s/p prior CABG x 3,  but no evidence of active signs/sxs of ischemia/failure. Currently controlled on home meds w/ vitals reviewed and documented as above. HyperLipidemia - controlled on home Statin. Continue, w/ f/u and med adjustment w/ PCP    Afib - chronic paroxysmal of unspecified and clinically unable to determine etiology. Normally rate controlled on BBlocker - continued. Anticoagulated at baseline on home Eliquis due to secondary hypercoaguable state due to Afib - continued. Monitored on tele. Obesity -  With Body mass index is 35.74 kg/m². Complicating assessment and treatment. Placing patient at risk for multiple co-morbidities as well as early death and contributing to the patient's presentation. Counseled on weight loss. DVT Prophylaxis: Eliquis     Recent Labs     11/22/22  1013 11/23/22  0402 11/24/22  0509    188 213       Diet: ADULT DIET; Regular; Low Sodium (2 gm); 1500 ml  Code Status: Full Code      PT/OT Eval Status: not yet ordered. Dispo - Patient is likely to remain in-house at least until Sat/Sunday 26/27 Nov pending clinical course and subspecialty recs.   Girlfriend and daughter present at bedside when seen 23 Nov.       Latisha Yanez MD

## 2022-11-24 NOTE — PROGRESS NOTES
Patient: Tyesha Rojas MRN: [id number]  Date of  Admission: 11/22/2022   YOB: 1945  Age: 78 y.o. Sex: male    Unit: [place] [address] TELE/MED/ONC  Room/Bed: [address] [place] [address] Admitting Physician: London Ashley    Attending Physician:  Reggie Baker MD         Pulmonary Service Note      71-year-old gentleman who was admitted on 11/22/2022 because of shortness of breath cough and fevers. Patient has a past medical history of hypertension, COPD, CHF, coronary artery disease status post CABG x3, atrial fibrillation. Patient was having increasing shortness of breath and weakness over 3 to 4 days prior to admission.     SUBJECTIVE:    Patient tolerating Lasix drip  Still having some shortness of breath  No chest pains or palpitations  Still having swelling in his lower extremities  Still having issues with his abdominal swelling    ROS:  A comprehensive review of systems was negative except for: Above    OBJECTIVE    Medications    Continuous Infusions:   furosemide (LASIX) 1mg/ml infusion 10 mg/hr (11/24/22 0322)    dextrose      sodium chloride         Scheduled Meds:   methylPREDNISolone  40 mg IntraVENous Q12H    pantoprazole  40 mg Oral QAM AC    doxycycline hyclate  100 mg Oral 2 times per day    metoprolol succinate  50 mg Oral Daily    losartan  100 mg Oral Daily    fluticasone-umeclidin-vilant  1 puff Inhalation Daily    atorvastatin  80 mg Oral Daily    aspirin EC  81 mg Oral Nightly    apixaban  5 mg Oral BID    sodium chloride flush  10 mL IntraVENous 2 times per day    insulin lispro  0-8 Units SubCUTAneous TID WC    insulin lispro  0-4 Units SubCUTAneous Nightly       PRN Meds:  traZODone, glucose, dextrose bolus **OR** dextrose bolus, glucagon (rDNA), dextrose, sodium chloride flush, sodium chloride, ondansetron, polyethylene glycol, acetaminophen **OR** acetaminophen, ipratropium-albuterol, benzonatate    Physical    Patient Vitals for the past 24 hrs:   BP Temp Temp src Pulse Resp SpO2 Weight 11/24/22 0345 121/74 97.5 °F (36.4 °C) Oral 68 18 93 % 263 lb 8 oz (119.5 kg)   11/23/22 2334 123/87 97.6 °F (36.4 °C) Oral 67 18 95 % --   11/23/22 1953 133/77 98.3 °F (36.8 °C) Oral 75 18 95 % --   11/23/22 1402 132/78 98.2 °F (36.8 °C) Oral 75 18 95 % --   11/23/22 1223 (!) 149/80 98.1 °F (36.7 °C) Oral 75 18 96 % --        Physical Exam  Vitals and nursing note reviewed. Constitutional:       General: He is not in acute distress. Appearance: Normal appearance. He is obese. HENT:      Head: Normocephalic and atraumatic. Right Ear: External ear normal.      Left Ear: External ear normal.      Nose: Nose normal.      Mouth/Throat:      Mouth: Mucous membranes are moist.      Pharynx: Oropharynx is clear. Comments: Mallampati class III throat  Eyes:      General:         Right eye: No discharge. Left eye: No discharge. Extraocular Movements: Extraocular movements intact. Conjunctiva/sclera: Conjunctivae normal.      Pupils: Pupils are equal, round, and reactive to light. Cardiovascular:      Rate and Rhythm: Normal rate and regular rhythm. Pulses: Normal pulses. Heart sounds: No murmur heard. Pulmonary:      Effort: No respiratory distress. Breath sounds: No stridor. Comments: Decreased breath sounds at the bases bilaterally  Abdominal:      General: Bowel sounds are normal. There is no distension. Palpations: Abdomen is soft. There is no mass. Tenderness: There is no abdominal tenderness. Hernia: No hernia is present. Musculoskeletal:         General: No swelling. Normal range of motion. Cervical back: Normal range of motion. No rigidity. Right lower leg: Edema present. Left lower leg: Edema present. Skin:     General: Skin is warm and dry. Coloration: Skin is not jaundiced or pale. Neurological:      General: No focal deficit present. Mental Status: He is alert and oriented to person, place, and time.  Mental status is at baseline. Cranial Nerves: No cranial nerve deficit. Sensory: No sensory deficit. Psychiatric:         Mood and Affect: Mood normal.         Behavior: Behavior normal.         Thought Content: Thought content normal.            Weight  Weight change: -10 lb (-4.536 kg)      Labs:   Recent Labs     11/22/22  1013 11/23/22  0402 11/24/22  0509   WBC 7.6 6.7 14.4*   HGB 13.6 13.0* 12.7*   HCT 39.6* 37.8* 38.0*    188 213      Recent Labs     11/22/22  1013 11/23/22  0402 11/24/22  0509   * 137 139   K 4.1 4.7 3.7    99 96*   CO2 22 25 35*   BUN 18 21* 23*   GLUCOSE 120* 169* 155*       Additional labs      Radiology, images personally reviewed. Narrative   EXAMINATION:   ONE XRAY VIEW OF THE CHEST       11/22/2022 11:35 am       COMPARISON:   Chest x-ray dated 08/16/2022. HISTORY:   ORDERING SYSTEM PROVIDED HISTORY: shortness of breath   TECHNOLOGIST PROVIDED HISTORY:   Reason for exam:->shortness of breath   Reason for Exam: cough,congestion,sob       FINDINGS:   HEART/MEDIASTINUM: The cardiomediastinal silhouette is within normal limits. PLEURA/LUNGS: There is elevation of the left hemidiaphragm with chronic left   basilar atelectasis. .  There is no appreciable pneumothorax. BONES/SOFT TISSUE: No acute abnormality. Impression   No radiographic evidence of acute pulmonary disease.              Assessment:     Principal Problem:    COPD exacerbation (HCC)  Active Problems:    Elevated troponin    Class 4 congestive heart failure (HCC)    Uncontrolled type 2 diabetes mellitus with hyperglycemia (HCC)    Essential hypertension    Hyperlipidemia    CAD (coronary artery disease)    S/P CABG x 3    SOB (shortness of breath)    Acute on chronic diastolic congestive heart failure (HCC)    Paroxysmal atrial fibrillation (HCC)    Simple chronic bronchitis (HCC)    Restrictive lung disease    Class 2 obesity in adult    Suspected sleep apnea    Diastolic dysfunction    Former smoker    Acute respiratory failure with hypoxia ([facility]    Diaphragm paralysis  Resolved Problems:    * No resolved hospital problems. *      Discussion /Plan:   COPD exacerbation  At home-was on Trelegy 100, with albuterol nebulized and MDI  Last chest x-ray done 11/22/2022-no acute pulmonary disease  Procalcitonin negative x2  Continue with  Trelegy 100  1 puff daily  Solu-Medrol 40 IV every 12 hours  Doxycycline 100 mg every 12 hours        Congestive heart failure  BNP elevated to 1785    Last echo done 4/16/2020  Conclusions      Summary   Technically difficult examination secondary to habitus and COPD. Left ventricular systolic function is normal with ejection fraction   estimated at 55%. No regional wall motion abnormalities. There is moderate to severe concentric left ventricular hypertrophy with   sigmoid septum (2.21 cm.). Grade II diastolic dysfunction with elevated left ventricular filling   pressure. The left atrium is mildly dilated. Echo pending    Continue with  Lasix drip  Continue with aggressive diuresis      Paralyzed left diaphragm  Confirmed with sniff test in 2/7/2020  Impression   Left hemidiaphragm paralysis           Restrictive lung disease  PFT done 6/24/2022  DATE OF PROCEDURE:  06/24/2022     PFT     Spirometry showed FVC 2.21 L, 48% predicted, FEV1 1.67 L, 50% predicted,  with FEV1/FVC ratio of 76%. There was a response to bronchodilator by  FVC. TLC 60% predicted, ERV 2% predicted. DLCO was 98% predicted. IMPRESSION:  PFT shows a moderate restrictive defect, a co-existing  obstructive defect is not ruled out due to the response to  bronchodilator by FVC. Clinical correlation is recommended. Low ERV  is likely due to body habitus. When compared to the prior study from  07/13/2017, there has been a decline in FVC and FEV1, total lung  capacity is mildly decreased. Diffusion capacity is actually increased.   Clinical correlation is recommended      Will consider doing a nocturnal pulse ox on the patient once the patient is off of oxygen  Could consider doing an ABG also, but not right now      MD Stephanie Dye Pulmonary, Critical Care and Sleep Medicine  [phone]      Please note that some or all of this record was generated using voice recognition software. If there are any questions about the content of this document, please contact the author as some errors in transcription may have occurred.

## 2022-11-24 NOTE — PLAN OF CARE
Problem: Safety - Adult  Goal: Free from fall injury  Flowsheets (Taken 11/24/2022 1323)  Free From Fall Injury:   Instruct family/caregiver on patient safety   Based on caregiver fall risk screen, instruct family/caregiver to ask for assistance with transferring infant if caregiver noted to have fall risk factors     Problem: Pain  Goal: Verbalizes/displays adequate comfort level or baseline comfort level  Flowsheets (Taken 11/24/2022 1323)  Verbalizes/displays adequate comfort level or baseline comfort level:   Encourage patient to monitor pain and request assistance   Assess pain using appropriate pain scale   Administer analgesics based on type and severity of pain and evaluate response   Implement non-pharmacological measures as appropriate and evaluate response     Problem: Chronic Conditions and Co-morbidities  Goal: Patient's chronic conditions and co-morbidity symptoms are monitored and maintained or improved  Flowsheets (Taken 11/24/2022 1323)  Care Plan - Patient's Chronic Conditions and Co-Morbidity Symptoms are Monitored and Maintained or Improved:   Monitor and assess patient's chronic conditions and comorbid symptoms for stability, deterioration, or improvement   Collaborate with multidisciplinary team to address chronic and comorbid conditions and prevent exacerbation or deterioration

## 2022-11-24 NOTE — PROGRESS NOTES
Patient sitting up in bed at this time. PRN sleep medication given per order per patient request. Patient call light within reach and patient denies further needs.

## 2022-11-24 NOTE — PLAN OF CARE
Problem: Safety - Adult  Goal: Free from fall injury  11/23/2022 2215 by Ted Mccarthy RN  Outcome: Progressing  11/23/2022 1444 by Son Macedo  Outcome: Progressing  Flowsheets (Taken 11/23/2022 1444)  Free From Fall Injury:   Chet Codding family/caregiver on patient safety   Based on caregiver fall risk screen, instruct family/caregiver to ask for assistance with transferring infant if caregiver noted to have fall risk factors  Note: Remains free from fall. Problem: Pain  Goal: Verbalizes/displays adequate comfort level or baseline comfort level  11/23/2022 2215 by Ted Mccarthy RN  Outcome: Progressing  11/23/2022 1444 by Son Macedo  Outcome: Progressing  Flowsheets (Taken 11/23/2022 1444)  Verbalizes/displays adequate comfort level or baseline comfort level:   Encourage patient to monitor pain and request assistance   Administer analgesics based on type and severity of pain and evaluate response   Assess pain using appropriate pain scale   Notify Licensed Independent Practitioner if interventions unsuccessful or patient reports new pain   Implement non-pharmacological measures as appropriate and evaluate response   Consider cultural and social influences on pain and pain management  Note: Denies pain.       Problem: Chronic Conditions and Co-morbidities  Goal: Patient's chronic conditions and co-morbidity symptoms are monitored and maintained or improved  11/23/2022 2215 by Ted Mccarthy RN  Outcome: Progressing  Flowsheets (Taken 11/23/2022 2100)  Care Plan - Patient's Chronic Conditions and Co-Morbidity Symptoms are Monitored and Maintained or Improved: Monitor and assess patient's chronic conditions and comorbid symptoms for stability, deterioration, or improvement  11/23/2022 1444 by Son Macedo  Outcome: Progressing  Flowsheets (Taken 11/23/2022 1444)  Care Plan - Patient's Chronic Conditions and Co-Morbidity Symptoms are Monitored and Maintained or Improved:   Monitor and assess patient's chronic conditions and comorbid symptoms for stability, deterioration, or improvement   Collaborate with multidisciplinary team to address chronic and comorbid conditions and prevent exacerbation or deterioration   Update acute care plan with appropriate goals if chronic or comorbid symptoms are exacerbated and prevent overall improvement and discharge

## 2022-11-25 LAB
ANION GAP SERPL CALCULATED.3IONS-SCNC: 10 MMOL/L (ref 3–16)
BASOPHILS ABSOLUTE: 0 K/UL (ref 0–0.2)
BASOPHILS RELATIVE PERCENT: 0.1 %
BUN BLDV-MCNC: 27 MG/DL (ref 7–20)
CALCIUM SERPL-MCNC: 8.5 MG/DL (ref 8.3–10.6)
CHLORIDE BLD-SCNC: 93 MMOL/L (ref 99–110)
CO2: 35 MMOL/L (ref 21–32)
CREAT SERPL-MCNC: 1 MG/DL (ref 0.8–1.3)
EOSINOPHILS ABSOLUTE: 0 K/UL (ref 0–0.6)
EOSINOPHILS RELATIVE PERCENT: 0 %
GFR SERPL CREATININE-BSD FRML MDRD: >60 ML/MIN/{1.73_M2}
GLUCOSE BLD-MCNC: 134 MG/DL (ref 70–99)
GLUCOSE BLD-MCNC: 140 MG/DL (ref 70–99)
GLUCOSE BLD-MCNC: 190 MG/DL (ref 70–99)
GLUCOSE BLD-MCNC: 215 MG/DL (ref 70–99)
GLUCOSE BLD-MCNC: 273 MG/DL (ref 70–99)
HCT VFR BLD CALC: 42 % (ref 40.5–52.5)
HEMOGLOBIN: 14 G/DL (ref 13.5–17.5)
LYMPHOCYTES ABSOLUTE: 1.4 K/UL (ref 1–5.1)
LYMPHOCYTES RELATIVE PERCENT: 10.1 %
MAGNESIUM: 1.9 MG/DL (ref 1.8–2.4)
MCH RBC QN AUTO: 29.7 PG (ref 26–34)
MCHC RBC AUTO-ENTMCNC: 33.3 G/DL (ref 31–36)
MCV RBC AUTO: 89.4 FL (ref 80–100)
MONOCYTES ABSOLUTE: 0.9 K/UL (ref 0–1.3)
MONOCYTES RELATIVE PERCENT: 6.4 %
NEUTROPHILS ABSOLUTE: 11.8 K/UL (ref 1.7–7.7)
NEUTROPHILS RELATIVE PERCENT: 83.4 %
PDW BLD-RTO: 13.9 % (ref 12.4–15.4)
PERFORMED ON: ABNORMAL
PLATELET # BLD: 269 K/UL (ref 135–450)
PMV BLD AUTO: 8.4 FL (ref 5–10.5)
POTASSIUM REFLEX MAGNESIUM: 3.3 MMOL/L (ref 3.5–5.1)
RBC # BLD: 4.7 M/UL (ref 4.2–5.9)
SODIUM BLD-SCNC: 138 MMOL/L (ref 136–145)
WBC # BLD: 14.2 K/UL (ref 4–11)

## 2022-11-25 PROCEDURE — 6360000002 HC RX W HCPCS: Performed by: INTERNAL MEDICINE

## 2022-11-25 PROCEDURE — 83735 ASSAY OF MAGNESIUM: CPT

## 2022-11-25 PROCEDURE — 99233 SBSQ HOSP IP/OBS HIGH 50: CPT | Performed by: NURSE PRACTITIONER

## 2022-11-25 PROCEDURE — 94640 AIRWAY INHALATION TREATMENT: CPT

## 2022-11-25 PROCEDURE — 94761 N-INVAS EAR/PLS OXIMETRY MLT: CPT

## 2022-11-25 PROCEDURE — 2700000000 HC OXYGEN THERAPY PER DAY

## 2022-11-25 PROCEDURE — 85025 COMPLETE CBC W/AUTO DIFF WBC: CPT

## 2022-11-25 PROCEDURE — 36415 COLL VENOUS BLD VENIPUNCTURE: CPT

## 2022-11-25 PROCEDURE — 6370000000 HC RX 637 (ALT 250 FOR IP): Performed by: INTERNAL MEDICINE

## 2022-11-25 PROCEDURE — 80048 BASIC METABOLIC PNL TOTAL CA: CPT

## 2022-11-25 PROCEDURE — 1200000000 HC SEMI PRIVATE

## 2022-11-25 PROCEDURE — 2580000003 HC RX 258: Performed by: INTERNAL MEDICINE

## 2022-11-25 RX ORDER — POTASSIUM CHLORIDE 750 MG/1
10 TABLET, EXTENDED RELEASE ORAL 3 TIMES DAILY
Status: COMPLETED | OUTPATIENT
Start: 2022-11-25 | End: 2022-11-25

## 2022-11-25 RX ADMIN — LOSARTAN POTASSIUM 100 MG: 100 TABLET, FILM COATED ORAL at 09:11

## 2022-11-25 RX ADMIN — FUROSEMIDE 10 MG/HR: 10 INJECTION, SOLUTION INTRAMUSCULAR; INTRAVENOUS at 06:05

## 2022-11-25 RX ADMIN — DOXYCYCLINE HYCLATE 100 MG: 100 TABLET, COATED ORAL at 20:27

## 2022-11-25 RX ADMIN — POLYETHYLENE GLYCOL 3350 17 G: 17 POWDER, FOR SOLUTION ORAL at 20:27

## 2022-11-25 RX ADMIN — PANTOPRAZOLE SODIUM 40 MG: 40 TABLET, DELAYED RELEASE ORAL at 06:45

## 2022-11-25 RX ADMIN — METHYLPREDNISOLONE SODIUM SUCCINATE 40 MG: 40 INJECTION, POWDER, LYOPHILIZED, FOR SOLUTION INTRAMUSCULAR; INTRAVENOUS at 16:02

## 2022-11-25 RX ADMIN — DOXYCYCLINE HYCLATE 100 MG: 100 TABLET, COATED ORAL at 09:12

## 2022-11-25 RX ADMIN — ALBUTEROL SULFATE 2.5 MG: 2.5 SOLUTION RESPIRATORY (INHALATION) at 07:58

## 2022-11-25 RX ADMIN — METOPROLOL SUCCINATE 50 MG: 50 TABLET, EXTENDED RELEASE ORAL at 09:12

## 2022-11-25 RX ADMIN — POTASSIUM CHLORIDE 10 MEQ: 750 TABLET, EXTENDED RELEASE ORAL at 14:37

## 2022-11-25 RX ADMIN — APIXABAN 5 MG: 5 TABLET, FILM COATED ORAL at 09:12

## 2022-11-25 RX ADMIN — ALBUTEROL SULFATE 2.5 MG: 2.5 SOLUTION RESPIRATORY (INHALATION) at 12:14

## 2022-11-25 RX ADMIN — APIXABAN 5 MG: 5 TABLET, FILM COATED ORAL at 20:27

## 2022-11-25 RX ADMIN — ALBUTEROL SULFATE 2.5 MG: 2.5 SOLUTION RESPIRATORY (INHALATION) at 19:06

## 2022-11-25 RX ADMIN — POTASSIUM CHLORIDE 10 MEQ: 750 TABLET, EXTENDED RELEASE ORAL at 20:27

## 2022-11-25 RX ADMIN — POTASSIUM CHLORIDE 10 MEQ: 750 TABLET, EXTENDED RELEASE ORAL at 10:43

## 2022-11-25 RX ADMIN — BENZONATATE 200 MG: 100 CAPSULE ORAL at 14:37

## 2022-11-25 RX ADMIN — METHYLPREDNISOLONE SODIUM SUCCINATE 40 MG: 40 INJECTION, POWDER, LYOPHILIZED, FOR SOLUTION INTRAMUSCULAR; INTRAVENOUS at 04:39

## 2022-11-25 RX ADMIN — TRAZODONE HYDROCHLORIDE 50 MG: 50 TABLET ORAL at 23:53

## 2022-11-25 RX ADMIN — ATORVASTATIN CALCIUM 80 MG: 80 TABLET, FILM COATED ORAL at 09:12

## 2022-11-25 RX ADMIN — INSULIN LISPRO 2 UNITS: 100 INJECTION, SOLUTION INTRAVENOUS; SUBCUTANEOUS at 16:05

## 2022-11-25 RX ADMIN — ASPIRIN 81 MG: 81 TABLET, COATED ORAL at 20:27

## 2022-11-25 RX ADMIN — SODIUM CHLORIDE, PRESERVATIVE FREE 10 ML: 5 INJECTION INTRAVENOUS at 09:11

## 2022-11-25 RX ADMIN — FUROSEMIDE 10 MG/HR: 10 INJECTION, SOLUTION INTRAMUSCULAR; INTRAVENOUS at 15:20

## 2022-11-25 ASSESSMENT — PAIN SCALES - GENERAL: PAINLEVEL_OUTOF10: 0

## 2022-11-25 NOTE — PROGRESS NOTES
[address]   Daily Progress Note    Admit Date:  11/22/2022  HPI:  No chief complaint on file. Interval history: Anne Marie is being followed for acute heart failure    Subjective:  Mr. Kayy Carter breathing is improving. Doing well with the lasix infusion. No chest pain. Edema is slowly improving.      Objective:   /74   Pulse 97   Temp 97.9 °F (36.6 °C) (Oral)   Resp 18   Ht 6' (1.829 m)   Wt 257 lb 1.6 oz (116.6 kg)   SpO2 93%   BMI 34.87 kg/m²     Intake/Output Summary (Last 24 hours) at 11/25/2022 1045  Last data filed at 11/25/2022 0605  Gross per 24 hour   Intake 2044.11 ml   Output 4450 ml   Net -2405.89 ml       NYHA: IV    Physical Exam:  General:  Awake, alert, NAD  Skin:  Warm and dry  Neck:  JVD elevated   Chest:  + ralesl in the right base >Left base  to auscultation,  Telemetry: NSR with 1st deg avb  Cardiovascular:  RRR S1S2, no m/r/g   Abdomen:  Soft, nontender, +bowel sounds  Extremities:  ++ bilateral lower extremity edema    Medications:    potassium chloride  10 mEq Oral TID    albuterol  2.5 mg Nebulization TID    methylPREDNISolone  40 mg IntraVENous Q12H    pantoprazole  40 mg Oral QAM AC    doxycycline hyclate  100 mg Oral 2 times per day    metoprolol succinate  50 mg Oral Daily    losartan  100 mg Oral Daily    fluticasone-umeclidin-vilant  1 puff Inhalation Daily    atorvastatin  80 mg Oral Daily    aspirin EC  81 mg Oral Nightly    apixaban  5 mg Oral BID    sodium chloride flush  10 mL IntraVENous 2 times per day    insulin lispro  0-8 Units SubCUTAneous TID     insulin lispro  0-4 Units SubCUTAneous Nightly      furosemide (LASIX) 1mg/ml infusion 10 mg/hr (11/25/22 0605)    dextrose      sodium chloride         Lab Data:  CBC:   Recent Labs     11/23/22  0402 11/24/22  0509 11/25/22  0535   WBC 6.7 14.4* 14.2*   HGB 13.0* 12.7* 14.0    213 269     BMP:    Recent Labs     11/23/22  0402 11/24/22  0509 11/25/22  0535    139 138   K 4.7 3.7 3.3*   CO2 25 35* 35*   BUN 21* 23* 27*   CREATININE 1.2 1.0 1.0     INR:  No results for input(s): INR in the last 72 hours. BNP:  No results for input(s): PROBNP in the last 72 hours. Lab Results   Component Value Date    LVEF 55 04/16/2020       Testing:       TTE 4/10/20:  Conclusions   Summary   Technically difficult examination secondary to habitus and COPD. Left ventricular systolic function is normal with ejection fraction   estimated at 55%. No regional wall motion abnormalities. There is moderate to severe concentric left ventricular hypertrophy with   sigmoid septum (2.21 cm.). Grade II diastolic dysfunction with elevated left ventricular filling   pressure. The left atrium is mildly dilated. 11/25/22 0445 257 lb 1.6 oz (116.6 kg) Standing scale     11/24/22 0345 263 lb 8 oz (119.5 kg) Standing scale     11/22/22 2130 273 lb 8 oz (124.1 kg) --     Principal Problem:    COPD exacerbation (HCC)  Active Problems:    Elevated troponin    Class 4 congestive heart failure (HCC)    Uncontrolled type 2 diabetes mellitus with hyperglycemia (HCC)    Chronic obstructive pulmonary disease (HCC)    Acute diastolic heart failure (HCC)    Essential hypertension    Hyperlipidemia    CAD (coronary artery disease)    S/P CABG x 3    SOB (shortness of breath)    Acute on chronic diastolic congestive heart failure (HCC)    Paroxysmal atrial fibrillation (HCC)    Simple chronic bronchitis (HCC)    Restrictive lung disease    Class 2 obesity in adult    Suspected sleep apnea    Diastolic dysfunction    Former smoker    Acute respiratory failure with hypoxia ([address]    Diaphragm paralysis  Resolved Problems:    * No resolved hospital problems.  *      Assessment:  Acute on chronic heart failure with preserved EF  Acute respiratory failure   Elevated troponin  CAD s/p CABG  AFib  CAP- pseudomonas in resp culture   COPD  HTn  HLD  DM      Plan:  Daily weights  Daily bmp  Lasix infusion 10mg/hr- continue   Potassium being replaced  Repeat echocardiogram pending  Wean oxygen     Education provided today Disease process and medications discussed. Questions answered fully.   Total Time spent educating today 10 minutes     QIAN Zavaleta - CNP,  11/25/2022, 10:45 AM

## 2022-11-25 NOTE — PROGRESS NOTES
Hospitalist Progress Note      PCP: Smiley Lopez MD    Date of Admission: 11/22/2022    Chief Complaint: shortness of breath, productive cough, fevers, myalgias, arthralgias, headache, poor appetite, nausea, vomiting and diarrhea    Subjective: no new c/o. Medications:  Reviewed    Infusion Medications    furosemide (LASIX) 1mg/ml infusion 10 mg/hr (11/25/22 0605)    dextrose      sodium chloride       Scheduled Medications    albuterol  2.5 mg Nebulization TID    methylPREDNISolone  40 mg IntraVENous Q12H    pantoprazole  40 mg Oral QAM AC    doxycycline hyclate  100 mg Oral 2 times per day    metoprolol succinate  50 mg Oral Daily    losartan  100 mg Oral Daily    fluticasone-umeclidin-vilant  1 puff Inhalation Daily    atorvastatin  80 mg Oral Daily    aspirin EC  81 mg Oral Nightly    apixaban  5 mg Oral BID    sodium chloride flush  10 mL IntraVENous 2 times per day    insulin lispro  0-8 Units SubCUTAneous TID WC    insulin lispro  0-4 Units SubCUTAneous Nightly     PRN Meds: traZODone, glucose, dextrose bolus **OR** dextrose bolus, glucagon (rDNA), dextrose, sodium chloride flush, sodium chloride, ondansetron, polyethylene glycol, acetaminophen **OR** acetaminophen, ipratropium-albuterol, benzonatate      Intake/Output Summary (Last 24 hours) at 11/25/2022 1006  Last data filed at 11/25/2022 0605  Gross per 24 hour   Intake 2044.11 ml   Output 5100 ml   Net -3055.89 ml         Physical Exam Performed:    /74   Pulse 97   Temp 97.9 °F (36.6 °C) (Oral)   Resp 18   Ht 6' (1.829 m)   Wt 257 lb 1.6 oz (116.6 kg)   SpO2 93%   BMI 34.87 kg/m²     General appearance: No apparent distress, appears stated age and cooperative. HEENT: Pupils equal, round, and reactive to light. Conjunctivae/corneas clear. Neck: Supple, with full range of motion. No jugular venous distention. Trachea midline. Respiratory:  Normal respiratory effort.  Clear to auscultation, bilaterally without Rales/Wheezes/Rhonchi. Cardiovascular: Regular rate and rhythm with normal S1/S2 without murmurs, rubs or gallops. Abdomen: Soft, non-tender, non-distended with normal bowel sounds. Musculoskeletal: No clubbing, cyanosis or edema bilaterally. Full range of motion without deformity. Skin: Skin color, texture, turgor normal.  No rashes or lesions. Neurologic:  Neurovascularly intact without any focal sensory/motor deficits. Cranial nerves: II-XII intact, grossly non-focal.  Psychiatric: Alert and oriented, thought content appropriate, normal insight  Capillary Refill: Brisk,< 3 seconds   Peripheral Pulses: +2 palpable, equal bilaterally       Labs:   Recent Labs     11/23/22  0402 11/24/22  0509 11/25/22  0535   WBC 6.7 14.4* 14.2*   HGB 13.0* 12.7* 14.0   HCT 37.8* 38.0* 42.0    213 269       Recent Labs     11/23/22  0402 11/24/22  0509 11/25/22  0535    139 138   K 4.7 3.7 3.3*   CL 99 96* 93*   CO2 25 35* 35*   BUN 21* 23* 27*   CREATININE 1.2 1.0 1.0   CALCIUM 8.8 8.6 8.5       Recent Labs     11/22/22  1013   AST 41*   ALT 30   BILITOT 1.0   ALKPHOS 59       No results for input(s): INR in the last 72 hours.   Recent Labs     11/22/22  1013 11/23/22  0402 11/23/22  0939   TROPONINI 0.05* 0.03* 0.03*         Urinalysis:      Lab Results   Component Value Date/Time    NITRU Negative 05/22/2020 09:31 AM    WBCUA 6-10 07/28/2017 08:50 AM    BACTERIA 1+ 07/28/2017 08:50 AM    RBCUA  07/28/2017 08:50 AM    BLOODU Negative 05/22/2020 09:31 AM    SPECGRAV 1.015 05/22/2020 09:31 AM    GLUCOSEU Negative 05/22/2020 09:31 AM       Consults:    IP CONSULT TO PULMONOLOGY  IP CONSULT TO CARDIOLOGY      Assessment/Plan:    Active Hospital Problems    Diagnosis     Chronic obstructive pulmonary disease ([facility] [J44.9]      Priority: Medium    Acute diastolic heart failure (HCC) [I50.31]      Priority: Medium    Class 4 congestive heart failure ([facility] [I50.9]      Priority: Medium    Uncontrolled type 2 diabetes mellitus with hyperglycemia ([facility] [E11.65]      Priority: Medium    Elevated troponin [R77.8]      Priority: Medium    Diaphragm paralysis [J98.6]     Acute respiratory failure with hypoxia (HCC) [J96.01]     COPD exacerbation ([facility]) [J44.1]     Class 2 obesity in adult [E66.9]     Restrictive lung disease [J98.4]     Suspected sleep apnea [Q14.365]     Diastolic dysfunction [X43.27]     Former smoker [Z87.891]     Simple chronic bronchitis ([facility] [J41.0]     Paroxysmal atrial fibrillation (HCC) [I48.0]     Acute on chronic diastolic congestive heart failure (HCC) [I50.33]     SOB (shortness of breath) [R06.02]     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 In addition, Solumedrol and Antibiotics have been prescribed. The Solumedrol will be tapered as the patient improves. Patient will be monitored closely, and deep breathing and coughing will be encouraged while awake. Followed outpt by Dr. Scot Blanchard. Pulmonology consulted and appreciated. CHF - Acute on chronic diastolic dysfunction. A 2D Echocardiogram on 04/16/2020 shows an EF of 55%  and grade II diastolic dysfunction. A follow up Echocardiogram has been ordered to reassess cardiac function. Diurese with IV Lasix - changed to gtt per Cardiology - net negative >9L. Monitor strict I&Os and daily weights. A low sodium fluid restricted diet has been ordered. Followed outpt by Dr. Tavia Esparza. Cardiology consulted and appreciated. HypoKalemia - likely in part due to Lasix gtt. Follow serial labs and replace PRN - ordered aggressively TID PO 25 Nov.  Reviewed and documented as above.     Pneumonia suspected based upon symptoms, not apparent on

## 2022-11-25 NOTE — PROGRESS NOTES
Patient did walking trial without oxygen and O2 sat dropped to 84% RA. Put back on [email or web address] and sat up to [email or web address]. Expressed feeling tired from not having been walking but overall NAD. Patient lying in bed with  at bedside, remains on Lasix drip.

## 2022-11-26 LAB
ALBUMIN SERPL-MCNC: 4 G/DL (ref 3.4–5)
ANION GAP SERPL CALCULATED.3IONS-SCNC: 9 MMOL/L (ref 3–16)
BUN BLDV-MCNC: 31 MG/DL (ref 7–20)
CALCIUM SERPL-MCNC: 8.9 MG/DL (ref 8.3–10.6)
CHLORIDE BLD-SCNC: 91 MMOL/L (ref 99–110)
CO2: 39 MMOL/L (ref 21–32)
CREAT SERPL-MCNC: 1 MG/DL (ref 0.8–1.3)
GFR SERPL CREATININE-BSD FRML MDRD: >60 ML/MIN/{1.73_M2}
GLUCOSE BLD-MCNC: 161 MG/DL (ref 70–99)
GLUCOSE BLD-MCNC: 190 MG/DL (ref 70–99)
GLUCOSE BLD-MCNC: 191 MG/DL (ref 70–99)
GLUCOSE BLD-MCNC: 202 MG/DL (ref 70–99)
GLUCOSE BLD-MCNC: 244 MG/DL (ref 70–99)
HCT VFR BLD CALC: 43.5 % (ref 40.5–52.5)
HEMOGLOBIN: 14.3 G/DL (ref 13.5–17.5)
MAGNESIUM: 2 MG/DL (ref 1.8–2.4)
MCH RBC QN AUTO: 29.9 PG (ref 26–34)
MCHC RBC AUTO-ENTMCNC: 33 G/DL (ref 31–36)
MCV RBC AUTO: 90.8 FL (ref 80–100)
PDW BLD-RTO: 13.7 % (ref 12.4–15.4)
PERFORMED ON: ABNORMAL
PHOSPHORUS: 4.5 MG/DL (ref 2.5–4.9)
PLATELET # BLD: 266 K/UL (ref 135–450)
PMV BLD AUTO: 8.2 FL (ref 5–10.5)
POTASSIUM SERPL-SCNC: 3.9 MMOL/L (ref 3.5–5.1)
RBC # BLD: 4.79 M/UL (ref 4.2–5.9)
SODIUM BLD-SCNC: 139 MMOL/L (ref 136–145)
WBC # BLD: 12.5 K/UL (ref 4–11)

## 2022-11-26 PROCEDURE — 80069 RENAL FUNCTION PANEL: CPT

## 2022-11-26 PROCEDURE — 94761 N-INVAS EAR/PLS OXIMETRY MLT: CPT

## 2022-11-26 PROCEDURE — 6360000002 HC RX W HCPCS: Performed by: INTERNAL MEDICINE

## 2022-11-26 PROCEDURE — 36415 COLL VENOUS BLD VENIPUNCTURE: CPT

## 2022-11-26 PROCEDURE — 2580000003 HC RX 258: Performed by: INTERNAL MEDICINE

## 2022-11-26 PROCEDURE — 6370000000 HC RX 637 (ALT 250 FOR IP): Performed by: INTERNAL MEDICINE

## 2022-11-26 PROCEDURE — 83735 ASSAY OF MAGNESIUM: CPT

## 2022-11-26 PROCEDURE — 2700000000 HC OXYGEN THERAPY PER DAY

## 2022-11-26 PROCEDURE — 1200000000 HC SEMI PRIVATE

## 2022-11-26 PROCEDURE — 94640 AIRWAY INHALATION TREATMENT: CPT

## 2022-11-26 PROCEDURE — 85027 COMPLETE CBC AUTOMATED: CPT

## 2022-11-26 PROCEDURE — 93306 TTE W/DOPPLER COMPLETE: CPT

## 2022-11-26 PROCEDURE — 99233 SBSQ HOSP IP/OBS HIGH 50: CPT | Performed by: NURSE PRACTITIONER

## 2022-11-26 PROCEDURE — 99232 SBSQ HOSP IP/OBS MODERATE 35: CPT | Performed by: INTERNAL MEDICINE

## 2022-11-26 RX ORDER — POTASSIUM CHLORIDE 750 MG/1
10 TABLET, EXTENDED RELEASE ORAL 3 TIMES DAILY
Status: COMPLETED | OUTPATIENT
Start: 2022-11-26 | End: 2022-11-26

## 2022-11-26 RX ORDER — PREDNISONE 20 MG/1
40 TABLET ORAL DAILY
Status: DISCONTINUED | OUTPATIENT
Start: 2022-11-26 | End: 2022-11-29 | Stop reason: HOSPADM

## 2022-11-26 RX ADMIN — TRAZODONE HYDROCHLORIDE 50 MG: 50 TABLET ORAL at 21:48

## 2022-11-26 RX ADMIN — PANTOPRAZOLE SODIUM 40 MG: 40 TABLET, DELAYED RELEASE ORAL at 06:20

## 2022-11-26 RX ADMIN — POTASSIUM CHLORIDE 10 MEQ: 10 TABLET, EXTENDED RELEASE ORAL at 16:37

## 2022-11-26 RX ADMIN — PREDNISONE 40 MG: 20 TABLET ORAL at 12:53

## 2022-11-26 RX ADMIN — SODIUM CHLORIDE, PRESERVATIVE FREE 10 ML: 5 INJECTION INTRAVENOUS at 21:48

## 2022-11-26 RX ADMIN — BENZONATATE 200 MG: 100 CAPSULE ORAL at 21:48

## 2022-11-26 RX ADMIN — APIXABAN 5 MG: 5 TABLET, FILM COATED ORAL at 21:48

## 2022-11-26 RX ADMIN — DOXYCYCLINE HYCLATE 100 MG: 100 TABLET, COATED ORAL at 08:47

## 2022-11-26 RX ADMIN — ATORVASTATIN CALCIUM 80 MG: 80 TABLET, FILM COATED ORAL at 08:47

## 2022-11-26 RX ADMIN — METHYLPREDNISOLONE SODIUM SUCCINATE 40 MG: 40 INJECTION, POWDER, LYOPHILIZED, FOR SOLUTION INTRAMUSCULAR; INTRAVENOUS at 03:08

## 2022-11-26 RX ADMIN — POTASSIUM CHLORIDE 10 MEQ: 10 TABLET, EXTENDED RELEASE ORAL at 10:29

## 2022-11-26 RX ADMIN — METOPROLOL SUCCINATE 50 MG: 50 TABLET, EXTENDED RELEASE ORAL at 08:47

## 2022-11-26 RX ADMIN — INSULIN LISPRO 2 UNITS: 100 INJECTION, SOLUTION INTRAVENOUS; SUBCUTANEOUS at 16:38

## 2022-11-26 RX ADMIN — APIXABAN 5 MG: 5 TABLET, FILM COATED ORAL at 08:47

## 2022-11-26 RX ADMIN — DOXYCYCLINE HYCLATE 100 MG: 100 TABLET, COATED ORAL at 21:48

## 2022-11-26 RX ADMIN — LOSARTAN POTASSIUM 100 MG: 100 TABLET, FILM COATED ORAL at 08:47

## 2022-11-26 RX ADMIN — ALBUTEROL SULFATE 2.5 MG: 2.5 SOLUTION RESPIRATORY (INHALATION) at 20:03

## 2022-11-26 RX ADMIN — ASPIRIN 81 MG: 81 TABLET, COATED ORAL at 21:48

## 2022-11-26 ASSESSMENT — PAIN DESCRIPTION - PAIN TYPE: TYPE: ACUTE PAIN

## 2022-11-26 ASSESSMENT — PAIN DESCRIPTION - ORIENTATION: ORIENTATION: MID

## 2022-11-26 ASSESSMENT — PAIN DESCRIPTION - LOCATION: LOCATION: CHEST;RIB CAGE

## 2022-11-26 ASSESSMENT — PAIN DESCRIPTION - DESCRIPTORS: DESCRIPTORS: DISCOMFORT

## 2022-11-26 ASSESSMENT — PAIN - FUNCTIONAL ASSESSMENT: PAIN_FUNCTIONAL_ASSESSMENT: ACTIVITIES ARE NOT PREVENTED

## 2022-11-26 ASSESSMENT — PAIN SCALES - GENERAL: PAINLEVEL_OUTOF10: 2

## 2022-11-26 NOTE — PLAN OF CARE
Problem: Safety - Adult  Goal: Free from fall injury  11/26/2022 1053 by Alyssa Navas RN  Outcome: Progressing     Problem: Pain  Goal: Verbalizes/displays adequate comfort level or baseline comfort level  11/26/2022 1053 by Alyssa Navas RN  Outcome: Progressing

## 2022-11-26 NOTE — PLAN OF CARE
Problem: Safety - Adult  Goal: Free from fall injury  11/25/2022 2239 by Harry Baker RN  Outcome: Progressing     Instruct family/caregiver on patient safety       Problem: Chronic Conditions and Co-morbidities  Goal: Patient's chronic conditions and co-morbidity symptoms are monitored and maintained or improved  11/25/2022 2239 by Harry Baker RN  Outcome: Progressing  Flowsheets (Taken 11/25/2022 2030)  Care Plan - Patient's Chronic Conditions and Co-Morbidity Symptoms are Monitored and Maintained or Improved:   Monitor and assess patient's chronic conditions and comorbid symptoms for stability, deterioration, or improvement   Collaborate with multidisciplinary team to address chronic and comorbid conditions and prevent exacerbation or deterioration   Update acute care plan with appropriate goals if chronic or comorbid symptoms are exacerbated and prevent overall improvement and discharge  1

## 2022-11-26 NOTE — PROGRESS NOTES
Patient: Nyasia Sky MRN: [id number]  Date of  Admission: 11/22/2022   YOB: 1945  Age: 78 y.o. Sex: male    Unit: [place] [address] TELE/MED/ONC  Room/Bed: [facility]0325- Admitting Physician: Larisa Caballero    Attending Physician:  Laura Duarte MD         Pulmonary Service Note      29-year-old gentleman who was admitted on 11/22/2022 because of shortness of breath cough and fevers. Patient has a past medical history of hypertension, COPD, CHF, coronary artery disease status post CABG x3, atrial fibrillation. Patient was having increasing shortness of breath and weakness over 3 to 4 days prior to admission.     SUBJECTIVE:    Lasix drip was stopped today  No chest pains or palpitations  No nausea vomiting  Feeling better  Less shortness of breath  Still on oxygen    ROS:  A comprehensive review of systems was negative except for: Above    OBJECTIVE    Medications    Continuous Infusions:   [Held by provider] furosemide (LASIX) 1mg/ml infusion 10 mg/hr (11/26/22 0619)    dextrose      sodium chloride         Scheduled Meds:   potassium chloride  10 mEq Oral TID    albuterol  2.5 mg Nebulization TID    methylPREDNISolone  40 mg IntraVENous Q12H    pantoprazole  40 mg Oral QAM AC    doxycycline hyclate  100 mg Oral 2 times per day    metoprolol succinate  50 mg Oral Daily    losartan  100 mg Oral Daily    fluticasone-umeclidin-vilant  1 puff Inhalation Daily    atorvastatin  80 mg Oral Daily    aspirin EC  81 mg Oral Nightly    apixaban  5 mg Oral BID    sodium chloride flush  10 mL IntraVENous 2 times per day    insulin lispro  0-8 Units SubCUTAneous TID WC    insulin lispro  0-4 Units SubCUTAneous Nightly       PRN Meds:  traZODone, glucose, dextrose bolus **OR** dextrose bolus, glucagon (rDNA), dextrose, sodium chloride flush, sodium chloride, ondansetron, polyethylene glycol, acetaminophen **OR** acetaminophen, ipratropium-albuterol, benzonatate    Physical    Patient Vitals for the past 24 hrs:   BP Temp Temp src Pulse Resp SpO2 Weight   11/26/22 0845 111/68 97.6 °F (36.4 °C) Oral 91 18 92 % --   11/26/22 0614 -- -- -- -- -- -- 253 lb 14.4 oz (115.2 kg)   11/26/22 0346 120/75 97.6 °F (36.4 °C) Oral 80 16 96 % --   11/26/22 0312 128/79 97.5 °F (36.4 °C) Oral 80 18 95 % --   11/25/22 2349 118/79 97.8 °F (36.6 °C) Oral 83 17 92 % --   11/25/22 2348 -- -- Oral -- (!) 7 -- --   11/25/22 2009 116/74 97.7 °F (36.5 °C) Oral 97 18 92 % --   11/25/22 1214 -- -- -- 96 18 93 % --          Physical Exam  Vitals and nursing note reviewed. Constitutional:       General: He is not in acute distress. Appearance: Normal appearance. He is obese. HENT:      Head: Normocephalic and atraumatic. Right Ear: External ear normal.      Left Ear: External ear normal.      Nose: Nose normal.      Mouth/Throat:      Mouth: Mucous membranes are moist.      Pharynx: Oropharynx is clear. Comments: Mallampati class III throat  Eyes:      General:         Right eye: No discharge. Left eye: No discharge. Extraocular Movements: Extraocular movements intact. Conjunctiva/sclera: Conjunctivae normal.      Pupils: Pupils are equal, round, and reactive to light. Cardiovascular:      Rate and Rhythm: Normal rate and regular rhythm. Pulses: Normal pulses. Heart sounds: No murmur heard. Pulmonary:      Effort: No respiratory distress. Breath sounds: No stridor. Comments: Decreased breath sounds at the bases bilaterally  Abdominal:      General: Bowel sounds are normal. There is no distension. Palpations: Abdomen is soft. There is no mass. Tenderness: There is no abdominal tenderness. Hernia: No hernia is present. Musculoskeletal:         General: No swelling. Normal range of motion. Cervical back: Normal range of motion. No rigidity. Right lower leg: Edema present. Left lower leg: Edema present. Skin:     General: Skin is warm and dry.       Coloration: Skin is not jaundiced or pale. Neurological:      General: No focal deficit present. Mental Status: He is alert and oriented to person, place, and time. Mental status is at baseline. Cranial Nerves: No cranial nerve deficit. Sensory: No sensory deficit. Psychiatric:         Mood and Affect: Mood normal.         Behavior: Behavior normal.         Thought Content: Thought content normal.            Weight  Weight change: -3 lb 3.2 oz (-1.452 kg)      Labs:   Recent Labs     11/24/22  0509 11/25/22  0535 11/26/22  0443   WBC 14.4* 14.2* 12.5*   HGB 12.7* 14.0 14.3   HCT 38.0* 42.0 43.5    269 266        Recent Labs     11/24/22  0509 11/25/22  0535 11/26/22  0443    138 139   K 3.7 3.3* 3.9   CL 96* 93* 91*   CO2 35* 35* 39*   BUN 23* 27* 31*   GLUCOSE 155* 134* 161*         Additional labs      Radiology, images personally reviewed. Narrative   EXAMINATION:   ONE XRAY VIEW OF THE CHEST       11/22/2022 11:35 am       COMPARISON:   Chest x-ray dated 08/16/2022. HISTORY:   ORDERING SYSTEM PROVIDED HISTORY: shortness of breath   TECHNOLOGIST PROVIDED HISTORY:   Reason for exam:->shortness of breath   Reason for Exam: cough,congestion,sob       FINDINGS:   HEART/MEDIASTINUM: The cardiomediastinal silhouette is within normal limits. PLEURA/LUNGS: There is elevation of the left hemidiaphragm with chronic left   basilar atelectasis. .  There is no appreciable pneumothorax. BONES/SOFT TISSUE: No acute abnormality. Impression   No radiographic evidence of acute pulmonary disease.              Assessment:     Principal Problem:    COPD exacerbation (HCC)  Active Problems:    Elevated troponin    Class 4 congestive heart failure (HCC)    Uncontrolled type 2 diabetes mellitus with hyperglycemia (HCC)    Chronic obstructive pulmonary disease (HCC)    Acute diastolic heart failure (HCC)    Essential hypertension    Hyperlipidemia    CAD (coronary artery disease)    S/P CABG x 3 SOB (shortness of breath)    Acute on chronic diastolic congestive heart failure (HCC)    Paroxysmal atrial fibrillation (HCC)    Simple chronic bronchitis (HCC)    Restrictive lung disease    Class 2 obesity in adult    Suspected sleep apnea    Diastolic dysfunction    Former smoker    Acute respiratory failure with hypoxia ([address]    Diaphragm paralysis  Resolved Problems:    * No resolved hospital problems. *      Discussion /Plan:   COPD exacerbation  At home-was on Trelegy 100, with albuterol nebulized and MDI  Last chest x-ray done 11/22/2022-no acute pulmonary disease  Procalcitonin negative x2  Continue with  Trelegy 100  1 puff daily  Solu-Medrol 40 IV every 12 hours-we will stop this and start on prednisone    Doxycycline 100 mg every 12 hours        Congestive heart failure  BNP elevated to 1785    Last echo done 4/16/2020  Conclusions      Summary   Technically difficult examination secondary to habitus and COPD. Left ventricular systolic function is normal with ejection fraction   estimated at 55%. No regional wall motion abnormalities. There is moderate to severe concentric left ventricular hypertrophy with   sigmoid septum (2.21 cm.). Grade II diastolic dysfunction with elevated left ventricular filling   pressure. The left atrium is mildly dilated. Echo pending    Continue with  Lasix drip-put on hold  Continue with aggressive diuresis      Paralyzed left diaphragm  Confirmed with sniff test in 2/7/2020  Impression   Left hemidiaphragm paralysis           Restrictive lung disease  PFT done 6/24/2022  DATE OF PROCEDURE:  06/24/2022     PFT     Spirometry showed FVC 2.21 L, 48% predicted, FEV1 1.67 L, 50% predicted,  with FEV1/FVC ratio of 76%. There was a response to bronchodilator by  FVC. TLC 60% predicted, ERV 2% predicted. DLCO was 98% predicted.      IMPRESSION:  PFT shows a moderate restrictive defect, a co-existing  obstructive defect is not ruled out due to the response to  bronchodilator by FVC. Clinical correlation is recommended. Low ERV  is likely due to body habitus. When compared to the prior study from  07/13/2017, there has been a decline in FVC and FEV1, total lung  capacity is mildly decreased. Diffusion capacity is actually increased. Clinical correlation is recommended          MD Gabriel Harman Pulmonary, Critical Care and Sleep Medicine  [phone]      Please note that some or all of this record was generated using voice recognition software. If there are any questions about the content of this document, please contact the author as some errors in transcription may have occurred.

## 2022-11-26 NOTE — PROGRESS NOTES
Shift assessment completed and charted. A/O x4. VSS. 02 @ 1L NC. Remains on 1500ml fluid restriction and doing well with it. Respirations unlabored. Lung sounds with fine crackles R mid and lower lobes. Lasix gtt continues. Voiding without difficulty. 2 to 3+ pitting edema BLE. No redness, warmth, or pain. Miralax administered d/t complaints of constipations. Abdomen round, soft with positive BS x4. Denies any needs at this time.

## 2022-11-26 NOTE — PROGRESS NOTES
Hospitalist Progress Note      PCP: Radha Lopez MD    Date of Admission: 11/22/2022    Chief Complaint: shortness of breath, productive cough, fevers, myalgias, arthralgias, headache, poor appetite, nausea, vomiting and diarrhea    Subjective: no new c/o. Medications:  Reviewed    Infusion Medications    [Held by provider] furosemide (LASIX) 1mg/ml infusion 10 mg/hr (11/26/22 0619)    dextrose      sodium chloride       Scheduled Medications    albuterol  2.5 mg Nebulization TID    methylPREDNISolone  40 mg IntraVENous Q12H    pantoprazole  40 mg Oral QAM AC    doxycycline hyclate  100 mg Oral 2 times per day    metoprolol succinate  50 mg Oral Daily    losartan  100 mg Oral Daily    fluticasone-umeclidin-vilant  1 puff Inhalation Daily    atorvastatin  80 mg Oral Daily    aspirin EC  81 mg Oral Nightly    apixaban  5 mg Oral BID    sodium chloride flush  10 mL IntraVENous 2 times per day    insulin lispro  0-8 Units SubCUTAneous TID WC    insulin lispro  0-4 Units SubCUTAneous Nightly     PRN Meds: traZODone, glucose, dextrose bolus **OR** dextrose bolus, glucagon (rDNA), dextrose, sodium chloride flush, sodium chloride, ondansetron, polyethylene glycol, acetaminophen **OR** acetaminophen, ipratropium-albuterol, benzonatate      Intake/Output Summary (Last 24 hours) at 11/26/2022 0906  Last data filed at 11/26/2022 3855  Gross per 24 hour   Intake 1820.65 ml   Output 3800 ml   Net -1979.35 ml         Physical Exam Performed:    /68   Pulse 91   Temp 97.6 °F (36.4 °C) (Oral)   Resp 18   Ht 6' (1.829 m)   Wt 253 lb 14.4 oz (115.2 kg)   SpO2 92%   BMI 34.44 kg/m²     General appearance: No apparent distress, appears stated age and cooperative. HEENT: Pupils equal, round, and reactive to light. Conjunctivae/corneas clear. Neck: Supple, with full range of motion. No jugular venous distention. Trachea midline. Respiratory:  Normal respiratory effort.  Clear to auscultation, bilaterally without Rales/Wheezes/Rhonchi. Cardiovascular: Regular rate and rhythm with normal S1/S2 without murmurs, rubs or gallops. Abdomen: Soft, non-tender, non-distended with normal bowel sounds. Musculoskeletal: No clubbing, cyanosis or edema bilaterally. Full range of motion without deformity. Skin: Skin color, texture, turgor normal.  No rashes or lesions. Neurologic:  Neurovascularly intact without any focal sensory/motor deficits. Cranial nerves: II-XII intact, grossly non-focal.  Psychiatric: Alert and oriented, thought content appropriate, normal insight  Capillary Refill: Brisk,< 3 seconds   Peripheral Pulses: +2 palpable, equal bilaterally       Labs:   Recent Labs     11/24/22  0509 11/25/22  0535 11/26/22  0443   WBC 14.4* 14.2* 12.5*   HGB 12.7* 14.0 14.3   HCT 38.0* 42.0 43.5    269 266       Recent Labs     11/24/22  0509 11/25/22  0535 11/26/22  0443    138 139   K 3.7 3.3* 3.9   CL 96* 93* 91*   CO2 35* 35* 39*   BUN 23* 27* 31*   CREATININE 1.0 1.0 1.0   CALCIUM 8.6 8.5 8.9   PHOS  --   --  4.5       No results for input(s): AST, ALT, BILIDIR, BILITOT, ALKPHOS in the last 72 hours. No results for input(s): INR in the last 72 hours.   Recent Labs     11/23/22  0939   TROPONINI 0.03*         Urinalysis:      Lab Results   Component Value Date/Time    NITRU Negative 05/22/2020 09:31 AM    WBCUA 6-10 07/28/2017 08:50 AM    BACTERIA 1+ 07/28/2017 08:50 AM    RBCUA  07/28/2017 08:50 AM    BLOODU Negative 05/22/2020 09:31 AM    SPECGRAV 1.015 05/22/2020 09:31 AM    GLUCOSEU Negative 05/22/2020 09:31 AM       Consults:    IP CONSULT TO PULMONOLOGY  IP CONSULT TO CARDIOLOGY      Assessment/Plan:    Active Hospital Problems    Diagnosis     Chronic obstructive pulmonary disease ([facility] [J44.9]      Priority: Medium    Acute diastolic heart failure (HCC) [I50.31]      Priority: Medium    Class 4 congestive heart failure ([facility] [I50.9]      Priority: Medium    Uncontrolled type 2 diabetes mellitus with hyperglycemia ([facility] [E11.65]      Priority: Medium    Elevated troponin [R77.8]      Priority: Medium    Diaphragm paralysis [J98.6]     Acute respiratory failure with hypoxia ([facility]) [J96.01]     COPD exacerbation ([facility]) [J44.1]     Class 2 obesity in adult [E66.9]     Restrictive lung disease [J98.4]     Suspected sleep apnea [K12.868]     Diastolic dysfunction [C98.40]     Former smoker [Z87.891]     Simple chronic bronchitis ([facility] [J41.0]     Paroxysmal atrial fibrillation (HCC) [I48.0]     Acute on chronic diastolic congestive heart failure (HCC) [I50.33]     SOB (shortness of breath) [R06.02]     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 In addition, Solumedrol and Antibiotics have been prescribed. The Solumedrol will be tapered as the patient improves. Patient will be monitored closely, and deep breathing and coughing will be encouraged while awake. Followed outpt by Dr. Mirian Jennings. Pulmonology consulted and appreciated. CHF - Acute on chronic diastolic dysfunction. A 2D Echocardiogram on 04/16/2020 shows an EF of 55%  and grade II diastolic dysfunction. A follow up Echocardiogram has been ordered to reassess cardiac function. Diurese with IV Lasix - changed to gtt per Cardiology - net negative >11L. Monitor strict I&Os and daily weights. .  Followed outpt by Dr. Chantel Poole. Cardiology consulted and appreciated. HypoKalemia - likely in part due to Lasix gtt. Follow serial labs and replace PRN - ordered aggressively TID PO 25 Nov and again BID PO 26 Nov.  Reviewed and documented as above.     Pneumonia suspected based upon symptoms, not apparent on imaging - Pt has been started on Rocephin and Azithromycin  - Blood cultures x 2 ordered  - Respiratory culture ordered     Acute on chronic respiratory failure with hypoxia - as evidenced by an oxygen saturation of less than 90% on room air  - Due to COPD, CHF exacerbations and possibly pneumonia  -We will provide oxygen as necessary to maintain an oxygen saturation of 92% or higher      Elevated troponin - patient's troponin is chronically elevated but currently slightly higher than his baseline. He denies any specific chest pain. We will monitor him on telemetry and follow serial cardiac enzymes. HTN/CAD - w/ known CAD s/p prior CABG x 3,  but no evidence of active signs/sxs of ischemia/failure. Currently controlled on home meds w/ vitals reviewed and documented as above. HyperLipidemia - controlled on home Statin. Continue, w/ f/u and med adjustment w/ PCP    Afib - chronic paroxysmal of unspecified and clinically unable to determine etiology. Normally rate controlled on BBlocker - continued. Anticoagulated at baseline on home Eliquis due to secondary hypercoaguable state due to Afib - continued. Monitored on tele. Obesity -  With Body mass index is 34.44 kg/m². Complicating assessment and treatment. Placing patient at risk for multiple co-morbidities as well as early death and contributing to the patient's presentation. Counseled on weight loss. DVT Prophylaxis: Eliquis     Recent Labs     11/24/22  0509 11/25/22  0535 11/26/22  0443    269 266       Diet: ADULT DIET; Regular; Low Sodium (2 gm); 1500 ml  Code Status: Full Code      PT/OT Eval Status: not yet ordered. Dispo - Patient is likely to remain in-house at least until Monday 28 Nov pending clinical course and subspecialty recs.   Girlfriend and daughter present at bedside when seen 23 Nov.       Silke Preston MD

## 2022-11-27 LAB
ALBUMIN SERPL-MCNC: 3.6 G/DL (ref 3.4–5)
ANION GAP SERPL CALCULATED.3IONS-SCNC: 4 MMOL/L (ref 3–16)
BLOOD CULTURE, ROUTINE: NORMAL
BUN BLDV-MCNC: 35 MG/DL (ref 7–20)
CALCIUM SERPL-MCNC: 9.2 MG/DL (ref 8.3–10.6)
CHLORIDE BLD-SCNC: 94 MMOL/L (ref 99–110)
CO2: 41 MMOL/L (ref 21–32)
CREAT SERPL-MCNC: 1.1 MG/DL (ref 0.8–1.3)
CULTURE, BLOOD 2: NORMAL
CULTURE, RESPIRATORY: ABNORMAL
GFR SERPL CREATININE-BSD FRML MDRD: >60 ML/MIN/{1.73_M2}
GLUCOSE BLD-MCNC: 143 MG/DL (ref 70–99)
GLUCOSE BLD-MCNC: 151 MG/DL (ref 70–99)
GLUCOSE BLD-MCNC: 153 MG/DL (ref 70–99)
GLUCOSE BLD-MCNC: 226 MG/DL (ref 70–99)
GLUCOSE BLD-MCNC: 289 MG/DL (ref 70–99)
GRAM STAIN RESULT: ABNORMAL
HCT VFR BLD CALC: 41.6 % (ref 40.5–52.5)
HEMOGLOBIN: 14 G/DL (ref 13.5–17.5)
MAGNESIUM: 2.2 MG/DL (ref 1.8–2.4)
MCH RBC QN AUTO: 30.2 PG (ref 26–34)
MCHC RBC AUTO-ENTMCNC: 33.5 G/DL (ref 31–36)
MCV RBC AUTO: 90.1 FL (ref 80–100)
ORGANISM: ABNORMAL
PDW BLD-RTO: 13.6 % (ref 12.4–15.4)
PERFORMED ON: ABNORMAL
PHOSPHORUS: 3.6 MG/DL (ref 2.5–4.9)
PLATELET # BLD: 252 K/UL (ref 135–450)
PMV BLD AUTO: 8.2 FL (ref 5–10.5)
POTASSIUM SERPL-SCNC: 4.2 MMOL/L (ref 3.5–5.1)
PROTEIN PROTEIN: 0.01 G/DL
PROTEIN PROTEIN: 10 MG/DL
RBC # BLD: 4.62 M/UL (ref 4.2–5.9)
SODIUM BLD-SCNC: 139 MMOL/L (ref 136–145)
WBC # BLD: 12.7 K/UL (ref 4–11)

## 2022-11-27 PROCEDURE — 83883 ASSAY NEPHELOMETRY NOT SPEC: CPT

## 2022-11-27 PROCEDURE — 1200000000 HC SEMI PRIVATE

## 2022-11-27 PROCEDURE — 80069 RENAL FUNCTION PANEL: CPT

## 2022-11-27 PROCEDURE — 2700000000 HC OXYGEN THERAPY PER DAY

## 2022-11-27 PROCEDURE — 83735 ASSAY OF MAGNESIUM: CPT

## 2022-11-27 PROCEDURE — 6370000000 HC RX 637 (ALT 250 FOR IP): Performed by: INTERNAL MEDICINE

## 2022-11-27 PROCEDURE — 85027 COMPLETE CBC AUTOMATED: CPT

## 2022-11-27 PROCEDURE — 6360000002 HC RX W HCPCS: Performed by: NURSE PRACTITIONER

## 2022-11-27 PROCEDURE — 2580000003 HC RX 258: Performed by: INTERNAL MEDICINE

## 2022-11-27 PROCEDURE — 36415 COLL VENOUS BLD VENIPUNCTURE: CPT

## 2022-11-27 PROCEDURE — 84166 PROTEIN E-PHORESIS/URINE/CSF: CPT

## 2022-11-27 PROCEDURE — 2580000003 HC RX 258: Performed by: NURSE PRACTITIONER

## 2022-11-27 PROCEDURE — 99233 SBSQ HOSP IP/OBS HIGH 50: CPT | Performed by: NURSE PRACTITIONER

## 2022-11-27 PROCEDURE — 84156 ASSAY OF PROTEIN URINE: CPT

## 2022-11-27 PROCEDURE — 94640 AIRWAY INHALATION TREATMENT: CPT

## 2022-11-27 PROCEDURE — 84155 ASSAY OF PROTEIN SERUM: CPT

## 2022-11-27 PROCEDURE — 6360000002 HC RX W HCPCS: Performed by: INTERNAL MEDICINE

## 2022-11-27 PROCEDURE — 94761 N-INVAS EAR/PLS OXIMETRY MLT: CPT

## 2022-11-27 PROCEDURE — 84165 PROTEIN E-PHORESIS SERUM: CPT

## 2022-11-27 RX ADMIN — INSULIN LISPRO 2 UNITS: 100 INJECTION, SOLUTION INTRAVENOUS; SUBCUTANEOUS at 16:40

## 2022-11-27 RX ADMIN — ATORVASTATIN CALCIUM 80 MG: 80 TABLET, FILM COATED ORAL at 09:35

## 2022-11-27 RX ADMIN — APIXABAN 5 MG: 5 TABLET, FILM COATED ORAL at 20:34

## 2022-11-27 RX ADMIN — LOSARTAN POTASSIUM 100 MG: 100 TABLET, FILM COATED ORAL at 09:35

## 2022-11-27 RX ADMIN — BENZONATATE 200 MG: 100 CAPSULE ORAL at 22:52

## 2022-11-27 RX ADMIN — PANTOPRAZOLE SODIUM 40 MG: 40 TABLET, DELAYED RELEASE ORAL at 09:35

## 2022-11-27 RX ADMIN — METOPROLOL SUCCINATE 50 MG: 50 TABLET, EXTENDED RELEASE ORAL at 09:36

## 2022-11-27 RX ADMIN — SODIUM CHLORIDE, PRESERVATIVE FREE 10 ML: 5 INJECTION INTRAVENOUS at 09:36

## 2022-11-27 RX ADMIN — ASPIRIN 81 MG: 81 TABLET, COATED ORAL at 20:34

## 2022-11-27 RX ADMIN — DOXYCYCLINE HYCLATE 100 MG: 100 TABLET, COATED ORAL at 20:34

## 2022-11-27 RX ADMIN — APIXABAN 5 MG: 5 TABLET, FILM COATED ORAL at 09:35

## 2022-11-27 RX ADMIN — ALBUTEROL SULFATE 2.5 MG: 2.5 SOLUTION RESPIRATORY (INHALATION) at 08:35

## 2022-11-27 RX ADMIN — PREDNISONE 40 MG: 20 TABLET ORAL at 09:35

## 2022-11-27 RX ADMIN — DOXYCYCLINE HYCLATE 100 MG: 100 TABLET, COATED ORAL at 09:35

## 2022-11-27 RX ADMIN — ALBUTEROL SULFATE 2.5 MG: 2.5 SOLUTION RESPIRATORY (INHALATION) at 12:00

## 2022-11-27 RX ADMIN — TRAZODONE HYDROCHLORIDE 50 MG: 50 TABLET ORAL at 22:52

## 2022-11-27 RX ADMIN — FUROSEMIDE 5 MG/HR: 10 INJECTION, SOLUTION INTRAMUSCULAR; INTRAVENOUS at 14:27

## 2022-11-27 RX ADMIN — ALBUTEROL SULFATE 2.5 MG: 2.5 SOLUTION RESPIRATORY (INHALATION) at 20:50

## 2022-11-27 NOTE — PROGRESS NOTES
[facility]   Daily Progress Note    Admit Date:  11/22/2022  HPI:  No chief complaint on file. Interval history: Kina Poole is being followed for acute heart failure    Subjective:  Mr. Filipe Esparza no pain today. Continues with shortness of breath. Requiring oxygen with activity still.    Continues with edema bilateral lower extremities -elevating leg wraps    Objective:   BP (!) 91/56   Pulse 71   Temp 98 °F (36.7 °C) (Oral)   Resp 16   Ht 6' (1.829 m)   Wt 254 lb 14.4 oz (115.6 kg)   SpO2 92%   BMI 34.57 kg/m²   No intake or output data in the 24 hours ending 11/27/22 0835      NYHA: IV    Physical Exam:  General:  Awake, alert, NAD  Skin:  Warm and dry  Neck:  JVD elevated   Chest:  + rales in the bases   Telemetry: NSR with 1st deg avb  Cardiovascular:  RRR S1S2, no m/r/g   Abdomen:  Soft, nontender, +bowel sounds  Extremities:  ++ bilateral lower extremity edema- improving, + wrinkling noted mid shin     Medications:    predniSONE  40 mg Oral Daily    albuterol  2.5 mg Nebulization TID    pantoprazole  40 mg Oral QAM AC    doxycycline hyclate  100 mg Oral 2 times per day    metoprolol succinate  50 mg Oral Daily    losartan  100 mg Oral Daily    fluticasone-umeclidin-vilant  1 puff Inhalation Daily    atorvastatin  80 mg Oral Daily    aspirin EC  81 mg Oral Nightly    apixaban  5 mg Oral BID    sodium chloride flush  10 mL IntraVENous 2 times per day    insulin lispro  0-8 Units SubCUTAneous TID WC    insulin lispro  0-4 Units SubCUTAneous Nightly      [Held by provider] furosemide (LASIX) 1mg/ml infusion 10 mg/hr (11/26/22 0619)    dextrose      sodium chloride         Lab Data:  CBC:   Recent Labs     11/25/22  0535 11/26/22 0443 11/27/22 0524   WBC 14.2* 12.5* 12.7*   HGB 14.0 14.3 14.0    266 252     BMP:    Recent Labs     11/25/22  0535 11/26/22 0443 11/27/22 0524    139 139   K 3.3* 3.9 4.2   CO2 35* 39* 41*   BUN 27* 31* 35*   CREATININE 1.0 1.0 1.1     INR:  No results for input(s): INR in the last 72 hours. BNP:  No results for input(s): PROBNP in the last 72 hours. Lab Results   Component Value Date    LVEF 55 04/16/2020       Testing:       TTE 4/10/20:  Conclusions   Summary   Technically difficult examination secondary to habitus and COPD. Left ventricular systolic function is normal with ejection fraction   estimated at 55%. No regional wall motion abnormalities. There is moderate to severe concentric left ventricular hypertrophy with   sigmoid septum (2.21 cm.). Grade II diastolic dysfunction with elevated left ventricular filling   pressure. The left atrium is mildly dilated. Echo 11/27/22   Summary   Normal left ventricle systolic function with an estimated ejection fraction   of 55%. Left ventricular size is decreased. There is severe concentric left ventricular hypertrophy. No outflow tract   obstruction. No regional wall motion abnormalities are seen. Left ventricular diastolic filling pressure are indeterminate. The right ventricle is normal in size with decreased function. The right atrium is mildly dilated. Aortic valve appears sclerotic but opens adequately. Trace aortic and mitral regurgitation. Ascending aorta is dilated at 3.78cm. Echocardiogram is consistent with hypertrophic cardiomyopathy or   infiltrative cardiomyopathy such as Amyloidosis heart. recommend Cardiac MRI and amyloid work up.     LV Septum Diastolic: 7.25 cm   LV PW Diastolic: 4.28 cm             11/27/22 0734 254 lb 14.4 oz (115.6 kg) Standing scale      11/26/22 0614 253 lb 14.4 oz (115.2 kg) Standing scale     11/25/22 0445 257 lb 1.6 oz (116.6 kg) Standing scale     11/24/22 0345 263 lb 8 oz (119.5 kg) Standing scale     11/22/22 2130 273 lb 8 oz (124.1 kg) --       Principal Problem:    COPD exacerbation (HCC)  Active Problems:    Elevated troponin    Class 4 congestive heart failure (HCC)    Uncontrolled type 2 diabetes mellitus with hyperglycemia (HCC)    Chronic obstructive pulmonary disease (HCC)    Acute diastolic heart failure (HCC)    Essential hypertension    Hyperlipidemia    CAD (coronary artery disease)    S/P CABG x 3    SOB (shortness of breath)    Acute on chronic diastolic congestive heart failure (HCC)    Paroxysmal atrial fibrillation (HCC)    Simple chronic bronchitis (HCC)    Restrictive lung disease    Class 2 obesity in adult    Suspected sleep apnea    Diastolic dysfunction    Former smoker    Acute respiratory failure with hypoxia ([address]    Diaphragm paralysis  Resolved Problems:    * No resolved hospital problems. *      Assessment:  Acute on chronic heart failure with preserved EF  Severe LVH  Hypertrophic cardiomyopathy   Acute respiratory failure   Elevated troponin  CAD s/p CABG  AFib  CAP- pseudomonas in resp culture   COPD  HTn  HLD  DM  Left hemidiaphragm paralysis   Hx of carpel tunnel bilaterally       Plan:  Daily weights  Restart lasix infusion 5mg/hr for now - legs re-wrapped by me   Repeat BMP daily  Wean oxygen   Mobilize  Would recommend further work-up of hypertrophy cardiomyopathy, we will send off UPEP/SPEP. Given his history of bilateral carpal tunnel syndrome and severe LVH, amyloidosis is a differential.    Cardiac MRI as outpatient. - discussed with Patient   Will need PYP scan as well- will need to discuss with nuc med department; ? If able to do test Monday. Education provided today Disease process and medications discussed. Questions answered fully.   Total Time spent educating today 10 minutes     QIAN Odonnell - CNP,  11/27/2022, 9:37 AM

## 2022-11-27 NOTE — PROGRESS NOTES
Shift assessment completed. Pt A&O x4, VSS. Pt on 1 liter of O2 via nasal cannula, at 92%. Pt's O2 does drop with ambulation. Non skid socks on, pt independent with ambulation. Bilateral legs ace wrap changed by cardio NP. Denies any needs at this time. Bed locked and in lowest position. Call light and bedside table within reach. Will continue to monitor.

## 2022-11-27 NOTE — PLAN OF CARE
Problem: Safety - Adult  Goal: Free from fall injury  11/27/2022 0719 by Quinn Arora RN  Outcome: Progressing  Problem: Pain  Goal: Verbalizes/displays adequate comfort level or baseline comfort level  11/27/2022 0719 by Quinn Arora RN  Outcome: Progressing

## 2022-11-27 NOTE — PROGRESS NOTES
Hospitalist Progress Note      PCP: Ramon Sanford MD    Date of Admission: 11/22/2022    Chief Complaint: shortness of breath, productive cough, fevers, myalgias, arthralgias, headache, poor appetite, nausea, vomiting and diarrhea    Subjective: no new c/o. Medications:  Reviewed    Infusion Medications    [Held by provider] furosemide (LASIX) 1mg/ml infusion 10 mg/hr (11/26/22 0619)    dextrose      sodium chloride       Scheduled Medications    predniSONE  40 mg Oral Daily    albuterol  2.5 mg Nebulization TID    pantoprazole  40 mg Oral QAM AC    doxycycline hyclate  100 mg Oral 2 times per day    metoprolol succinate  50 mg Oral Daily    losartan  100 mg Oral Daily    fluticasone-umeclidin-vilant  1 puff Inhalation Daily    atorvastatin  80 mg Oral Daily    aspirin EC  81 mg Oral Nightly    apixaban  5 mg Oral BID    sodium chloride flush  10 mL IntraVENous 2 times per day    insulin lispro  0-8 Units SubCUTAneous TID WC    insulin lispro  0-4 Units SubCUTAneous Nightly     PRN Meds: traZODone, glucose, dextrose bolus **OR** dextrose bolus, glucagon (rDNA), dextrose, sodium chloride flush, sodium chloride, ondansetron, polyethylene glycol, acetaminophen **OR** acetaminophen, ipratropium-albuterol, benzonatate      Intake/Output Summary (Last 24 hours) at 11/27/2022 0950  Last data filed at 11/27/2022 0947  Gross per 24 hour   Intake --   Output 1875 ml   Net -1875 ml         Physical Exam Performed:    /69   Pulse 59   Temp 97.7 °F (36.5 °C) (Oral)   Resp 16   Ht 6' (1.829 m)   Wt 254 lb 14.4 oz (115.6 kg)   SpO2 92%   BMI 34.57 kg/m²     General appearance: No apparent distress, appears stated age and cooperative. HEENT: Pupils equal, round, and reactive to light. Conjunctivae/corneas clear. Neck: Supple, with full range of motion. No jugular venous distention. Trachea midline. Respiratory:  Normal respiratory effort.  Clear to auscultation, bilaterally without Rales/Wheezes/Rhonchi. Cardiovascular: Regular rate and rhythm with normal S1/S2 without murmurs, rubs or gallops. Abdomen: Soft, non-tender, non-distended with normal bowel sounds. Musculoskeletal: No clubbing, cyanosis or edema bilaterally. Full range of motion without deformity. Skin: Skin color, texture, turgor normal.  No rashes or lesions. Neurologic:  Neurovascularly intact without any focal sensory/motor deficits. Cranial nerves: II-XII intact, grossly non-focal.  Psychiatric: Alert and oriented, thought content appropriate, normal insight  Capillary Refill: Brisk,< 3 seconds   Peripheral Pulses: +2 palpable, equal bilaterally       Labs:   Recent Labs     11/25/22  0535 11/26/22  0443 11/27/22  0524   WBC 14.2* 12.5* 12.7*   HGB 14.0 14.3 14.0   HCT 42.0 43.5 41.6    266 252       Recent Labs     11/25/22  0535 11/26/22 0443 11/27/22  0524    139 139   K 3.3* 3.9 4.2   CL 93* 91* 94*   CO2 35* 39* 41*   BUN 27* 31* 35*   CREATININE 1.0 1.0 1.1   CALCIUM 8.5 8.9 9.2   PHOS  --  4.5 3.6       No results for input(s): AST, ALT, BILIDIR, BILITOT, ALKPHOS in the last 72 hours. No results for input(s): INR in the last 72 hours. No results for input(s): [place] in the last 72 hours.       Urinalysis:      Lab Results   Component Value Date/Time    NITRU Negative 05/22/2020 09:31 AM    WBCUA 6-10 07/28/2017 08:50 AM    BACTERIA 1+ 07/28/2017 08:50 AM    RBCUA  07/28/2017 08:50 AM    BLOODU Negative 05/22/2020 09:31 AM    SPECGRAV 1.015 05/22/2020 09:31 AM    GLUCOSEU Negative 05/22/2020 09:31 AM       Consults:    IP CONSULT TO PULMONOLOGY  IP CONSULT TO CARDIOLOGY      Assessment/Plan:    Active Hospital Problems    Diagnosis     Chronic obstructive pulmonary disease ([facility] [J44.9]      Priority: Medium    Acute diastolic heart failure (HCC) [I50.31]      Priority: Medium    Class 4 congestive heart failure ([facility] [I50.9]      Priority: Medium    Uncontrolled type 2 diabetes mellitus with hyperglycemia ([facility] [E11.65]      Priority: Medium    Elevated troponin [R77.8]      Priority: Medium    Diaphragm paralysis [J98.6]     Acute respiratory failure with hypoxia ([facility]) [J96.01]     COPD exacerbation ([facility]) [J44.1]     Class 2 obesity in adult [E66.9]     Restrictive lung disease [J98.4]     Suspected sleep apnea [C54.903]     Diastolic dysfunction [E71.56]     Former smoker [Z87.891]     Simple chronic bronchitis ([facility] [J41.0]     Paroxysmal atrial fibrillation (HCC) [I48.0]     Acute on chronic diastolic congestive heart failure (HCC) [I50.33]     SOB (shortness of breath) [R06.02]     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 In addition, Solumedrol and Antibiotics have been prescribed. The Solumedrol will be tapered as the patient improves. Patient will be monitored closely, and deep breathing and coughing will be encouraged while awake. Followed outpt by Dr. Harika Middleton. Pulmonology consulted and appreciated. CHF - Acute on chronic diastolic dysfunction. A 2D Echocardiogram on 04/16/2020 shows an EF of 55% and grade II diastolic dysfunction. Repeat echo this admission w/ preserved EF. Diurese with IV Lasix - changed to gtt per Cardiology - net negative >11L and now on Lasix [place]. Monitor strict I&Os and daily weights. Followed outpt by Dr. Heaven Smith. Cardiology consulted and appreciated. HypoKalemia - likely in part due to Lasix gtt. Follow serial labs and replace PRN - ordered aggressively TID PO 25 Nov and again BID PO 26 Nov.  Reviewed and documented as above.     Pneumonia - suspected based upon symptoms, not apparent on imaging - Pt was started on Rocephin and Azithromycin - changed to Doxy and completed inpatient w/ no further ABX at discharge. Acute on chronic respiratory failure with hypoxia - as evidenced by an oxygen saturation of less than 90% on room air  - Due to COPD, CHF exacerbations and possibly pneumonia  -We will provide oxygen as necessary to maintain an oxygen saturation of 92% or higher      Elevated troponin - patient's troponin is chronically elevated but currently slightly higher than his baseline. He denies any specific chest pain. We will monitor him on telemetry and follow serial cardiac enzymes. HTN/CAD - w/ known CAD s/p prior CABG x 3,  but no evidence of active signs/sxs of ischemia/failure. Currently controlled on home meds w/ vitals reviewed and documented as above. HyperLipidemia - controlled on home Statin. Continue, w/ f/u and med adjustment w/ PCP    Afib - chronic paroxysmal of unspecified and clinically unable to determine etiology. Normally rate controlled on BBlocker - continued. Anticoagulated at baseline on home Eliquis due to secondary hypercoaguable state due to Afib - continued. Monitored on tele. Obesity -  With Body mass index is 34.57 kg/m². Complicating assessment and treatment. Placing patient at risk for multiple co-morbidities as well as early death and contributing to the patient's presentation. Counseled on weight loss. DVT Prophylaxis: Eliquis     Recent Labs     11/25/22  0535 11/26/22  0443 11/27/22  0524    266 252       Diet: ADULT DIET; Regular; Low Sodium (2 gm); 1500 ml  Code Status: Full Code      PT/OT Eval Status: not yet ordered. Dispo - Patient is likely to remain in-house at least until Monday 28 Nov pending clinical course and subspecialty recs.   Girlfriend and daughter present at bedside when seen 23 Nov.       Dianne Varma MD

## 2022-11-27 NOTE — PLAN OF CARE
Problem: Safety - Adult  Goal: Free from fall injury  Outcome: Progressing  Flowsheets (Taken 11/27/2022 0446)  Free From Fall Injury:   Instruct family/caregiver on patient safety   Based on caregiver fall risk screen, instruct family/caregiver to ask for assistance with transferring infant if caregiver noted to have fall risk factors     Problem: Pain  Goal: Verbalizes/displays adequate comfort level or baseline comfort level  Outcome: Progressing  Flowsheets (Taken 11/27/2022 0446)  Verbalizes/displays adequate comfort level or baseline comfort level:   Encourage patient to monitor pain and request assistance   Assess pain using appropriate pain scale   Administer analgesics based on type and severity of pain and evaluate response   Implement non-pharmacological measures as appropriate and evaluate response     Problem: Respiratory - Adult  Goal: Achieves optimal ventilation and oxygenation  Outcome: Progressing  Flowsheets (Taken 11/27/2022 0446)  Achieves optimal ventilation and oxygenation:   Assess for changes in respiratory status   Assess for changes in mentation and behavior   Position to facilitate oxygenation and minimize respiratory effort   Oxygen supplementation based on oxygen saturation or arterial blood gases

## 2022-11-27 NOTE — PROGRESS NOTES
Pt assessment completed and charted. VSS. Pt a/o. Pt SpO2 94% - 1L/NC. Pt desats w/ ambulation. Pt c/o persistent cough and lower chest/rib pain from coughing. PRN medication administered per MAR. Pt ambulates independently. Bed in lowest position and wheels locked. Call light within reach. Bedside table within reach. Non-skid footwear in place. Pt denies any other needs at this time. Pt calls out appropriately. Will continue to monitor.

## 2022-11-28 PROBLEM — I50.31 ACUTE DIASTOLIC CHF (CONGESTIVE HEART FAILURE) (HCC): Status: ACTIVE | Noted: 2022-11-28

## 2022-11-28 PROBLEM — I50.21 ACUTE SYSTOLIC CONGESTIVE HEART FAILURE (HCC): Status: ACTIVE | Noted: 2022-11-28

## 2022-11-28 LAB
ALBUMIN SERPL-MCNC: 2.8 G/DL (ref 3.1–4.9)
ALBUMIN SERPL-MCNC: 3.7 G/DL (ref 3.4–5)
ALPHA-1-GLOBULIN: 0.3 G/DL (ref 0.2–0.4)
ALPHA-2-GLOBULIN: 1.1 G/DL (ref 0.4–1.1)
ANION GAP SERPL CALCULATED.3IONS-SCNC: 5 MMOL/L (ref 3–16)
BETA GLOBULIN: 1 G/DL (ref 0.9–1.6)
BUN BLDV-MCNC: 29 MG/DL (ref 7–20)
CALCIUM SERPL-MCNC: 8.7 MG/DL (ref 8.3–10.6)
CHLORIDE BLD-SCNC: 93 MMOL/L (ref 99–110)
CO2: 39 MMOL/L (ref 21–32)
CREAT SERPL-MCNC: 1 MG/DL (ref 0.8–1.3)
GAMMA GLOBULIN: 1.3 G/DL (ref 0.6–1.8)
GFR SERPL CREATININE-BSD FRML MDRD: >60 ML/MIN/{1.73_M2}
GLUCOSE BLD-MCNC: 139 MG/DL (ref 70–99)
GLUCOSE BLD-MCNC: 182 MG/DL (ref 70–99)
GLUCOSE BLD-MCNC: 197 MG/DL (ref 70–99)
GLUCOSE BLD-MCNC: 237 MG/DL (ref 70–99)
HCT VFR BLD CALC: 42.9 % (ref 40.5–52.5)
HEMOGLOBIN: 14.8 G/DL (ref 13.5–17.5)
MCH RBC QN AUTO: 30.9 PG (ref 26–34)
MCHC RBC AUTO-ENTMCNC: 34.6 G/DL (ref 31–36)
MCV RBC AUTO: 89.3 FL (ref 80–100)
PDW BLD-RTO: 13.4 % (ref 12.4–15.4)
PERFORMED ON: ABNORMAL
PHOSPHORUS: 3.6 MG/DL (ref 2.5–4.9)
PLATELET # BLD: 276 K/UL (ref 135–450)
PMV BLD AUTO: 7.9 FL (ref 5–10.5)
POTASSIUM SERPL-SCNC: 3.7 MMOL/L (ref 3.5–5.1)
RBC # BLD: 4.8 M/UL (ref 4.2–5.9)
SODIUM BLD-SCNC: 137 MMOL/L (ref 136–145)
SPE/IFE INTERPRETATION: NORMAL
TOTAL PROTEIN: 6.4 G/DL (ref 6.4–8.2)
WBC # BLD: 10.9 K/UL (ref 4–11)

## 2022-11-28 PROCEDURE — 2580000003 HC RX 258: Performed by: INTERNAL MEDICINE

## 2022-11-28 PROCEDURE — 1200000000 HC SEMI PRIVATE

## 2022-11-28 PROCEDURE — 6360000002 HC RX W HCPCS: Performed by: NURSE PRACTITIONER

## 2022-11-28 PROCEDURE — 99233 SBSQ HOSP IP/OBS HIGH 50: CPT | Performed by: INTERNAL MEDICINE

## 2022-11-28 PROCEDURE — 94640 AIRWAY INHALATION TREATMENT: CPT

## 2022-11-28 PROCEDURE — 36415 COLL VENOUS BLD VENIPUNCTURE: CPT

## 2022-11-28 PROCEDURE — 6370000000 HC RX 637 (ALT 250 FOR IP): Performed by: INTERNAL MEDICINE

## 2022-11-28 PROCEDURE — 99233 SBSQ HOSP IP/OBS HIGH 50: CPT | Performed by: NURSE PRACTITIONER

## 2022-11-28 PROCEDURE — 2580000003 HC RX 258: Performed by: NURSE PRACTITIONER

## 2022-11-28 PROCEDURE — 94761 N-INVAS EAR/PLS OXIMETRY MLT: CPT

## 2022-11-28 PROCEDURE — 85027 COMPLETE CBC AUTOMATED: CPT

## 2022-11-28 PROCEDURE — 2700000000 HC OXYGEN THERAPY PER DAY

## 2022-11-28 PROCEDURE — 80069 RENAL FUNCTION PANEL: CPT

## 2022-11-28 PROCEDURE — 6360000002 HC RX W HCPCS: Performed by: INTERNAL MEDICINE

## 2022-11-28 RX ORDER — TORSEMIDE 20 MG/1
20 TABLET ORAL DAILY
Status: DISCONTINUED | OUTPATIENT
Start: 2022-11-29 | End: 2022-11-29 | Stop reason: HOSPADM

## 2022-11-28 RX ADMIN — PREDNISONE 40 MG: 20 TABLET ORAL at 10:28

## 2022-11-28 RX ADMIN — INSULIN LISPRO 2 UNITS: 100 INJECTION, SOLUTION INTRAVENOUS; SUBCUTANEOUS at 16:55

## 2022-11-28 RX ADMIN — ALBUTEROL SULFATE 2.5 MG: 2.5 SOLUTION RESPIRATORY (INHALATION) at 08:03

## 2022-11-28 RX ADMIN — METOPROLOL SUCCINATE 50 MG: 50 TABLET, EXTENDED RELEASE ORAL at 10:28

## 2022-11-28 RX ADMIN — ASPIRIN 81 MG: 81 TABLET, COATED ORAL at 21:05

## 2022-11-28 RX ADMIN — PANTOPRAZOLE SODIUM 40 MG: 40 TABLET, DELAYED RELEASE ORAL at 05:31

## 2022-11-28 RX ADMIN — DOXYCYCLINE HYCLATE 100 MG: 100 TABLET, COATED ORAL at 10:28

## 2022-11-28 RX ADMIN — ATORVASTATIN CALCIUM 80 MG: 80 TABLET, FILM COATED ORAL at 10:28

## 2022-11-28 RX ADMIN — LOSARTAN POTASSIUM 100 MG: 100 TABLET, FILM COATED ORAL at 10:28

## 2022-11-28 RX ADMIN — TRAZODONE HYDROCHLORIDE 50 MG: 50 TABLET ORAL at 21:04

## 2022-11-28 RX ADMIN — SODIUM CHLORIDE, PRESERVATIVE FREE 10 ML: 5 INJECTION INTRAVENOUS at 21:04

## 2022-11-28 RX ADMIN — ALBUTEROL SULFATE 2.5 MG: 2.5 SOLUTION RESPIRATORY (INHALATION) at 12:15

## 2022-11-28 RX ADMIN — FUROSEMIDE 5 MG/HR: 10 INJECTION, SOLUTION INTRAMUSCULAR; INTRAVENOUS at 11:37

## 2022-11-28 RX ADMIN — ALBUTEROL SULFATE 2.5 MG: 2.5 SOLUTION RESPIRATORY (INHALATION) at 20:41

## 2022-11-28 RX ADMIN — APIXABAN 5 MG: 5 TABLET, FILM COATED ORAL at 21:05

## 2022-11-28 RX ADMIN — BENZONATATE 200 MG: 100 CAPSULE ORAL at 21:05

## 2022-11-28 RX ADMIN — APIXABAN 5 MG: 5 TABLET, FILM COATED ORAL at 10:28

## 2022-11-28 NOTE — PROGRESS NOTES
CMU strip interpreted cardiac rhythm as SA with IVCD. Writer interpreted it as Fransico Chen called and agrees appears to be margarita I. Was 1* AV block earlier. PM NP notified to see if he'd like EKG. Awaiting return call. Pt remains asymptomatic.

## 2022-11-28 NOTE — PROGRESS NOTES
INPATIENT PULMONARY CRITICAL CARE PROGRESS NOTE      Reason for visit: Acute hypoxemic respiratory failure, COPD, CHF. Has diaphragmatic paralysis status post plication, restrictive lung disease, diastolic dysfunction, PAF. Has CAD status post CABG    Presented with  shortness of breath, productive cough, fevers, myalgias, arthralgias, headache, poor appetite, nausea, vomiting and diarrhea. Followed by cardiology, being diuresed for diastolic CHF. Has severe LVH, amyloidosis being considered in the differential.    SUBJECTIVE: Remains afebrile and hemodynamically stable, on oxygen at 1 LPM with SaO2 97%. I/O 5511/36899 mL. The patient says he feels better, but his shortness of breath fluctuates. He had marked leg edema on presentation which has improved. His wife is at bedside, denies history of snoring or obvious apneas. His appetite is fair, he denies GI or  complaints. Physical Exam:  Blood pressure 102/64, pulse 57, temperature 97.8 °F (36.6 °C), temperature source Oral, resp. rate 18, height 6' (1.829 m), weight 252 lb 14.4 oz (114.7 kg), SpO2 97 %.'   General: He is not in acute distress. Appearance: He is well-developed. He is not ill-appearing, toxic-appearing or diaphoretic. Comments: Pleasant, tall, well-built, overweight   HENT:      Head: Normocephalic and atraumatic. Nose: Nose normal.      Mouth/Throat:      Mouth: Mucous membranes are moist.      Pharynx: Oropharynx is clear. No oropharyngeal exudate or posterior oropharyngeal erythema. Comments: Class III airway, dental repair  Eyes:      General: No scleral icterus. Right eye: No discharge. Left eye: No discharge. Conjunctiva/sclera: Conjunctivae normal.      Neck:      Thyroid: No thyromegaly. Vascular: No JVD. Cardiovascular:      Rate and Rhythm: Normal rate and regular rhythm. Heart sounds: Normal heart sounds. No murmur heard. No friction rub. No gallop.    Pulmonary: Effort: Pulmonary effort is normal. No respiratory distress. Breath sounds: Mildly reduced air entry. No rales or crackles. No stridor. No wheezing or rhonchi. Comments: Reduced air entry left base  Abdominal:         Comments: Protuberant   Musculoskeletal:      Right lower leg: No edema present. Left lower leg: No edema present. Lymphadenopathy:      Cervical: No cervical adenopathy. Skin:     General: Skin is warm and dry. Coloration: Skin is not jaundiced or pale. Findings: No bruising, erythema, lesion or rash. Neurological:      Mental Status: He is alert and oriented to person, place, and time. Gait: Gait normal.  No obvious neurologic deficit, detailed exam not performed    Psychiatric:         Mood and Affect: Mood normal.         Behavior: Behavior normal.         Results:  CBC:   Recent Labs     11/26/22 0443 11/27/22 0524 11/28/22  0549   WBC 12.5* 12.7* 10.9   HGB 14.3 14.0 14.8   HCT 43.5 41.6 42.9   MCV 90.8 90.1 89.3    252 276     BMP:   Recent Labs     11/26/22 0443 11/27/22 0524 11/28/22  0549    139 137   K 3.9 4.2 3.7   CL 91* 94* 93*   CO2 39* 41* 39*   PHOS 4.5 3.6 3.6   BUN 31* 35* 29*   CREATININE 1.0 1.1 1.0       Imaging:  I have reviewed radiology images personally. No orders to display     XR CHEST PORTABLE    Result Date: 11/22/2022  EXAMINATION: ONE XRAY VIEW OF THE CHEST 11/22/2022 11:35 am COMPARISON: Chest x-ray dated 08/16/2022. HISTORY: ORDERING SYSTEM PROVIDED HISTORY: shortness of breath TECHNOLOGIST PROVIDED HISTORY: Reason for exam:->shortness of breath Reason for Exam: cough,congestion,sob FINDINGS: HEART/MEDIASTINUM: The cardiomediastinal silhouette is within normal limits. PLEURA/LUNGS: There is elevation of the left hemidiaphragm with chronic left basilar atelectasis. .  There is no appreciable pneumothorax. BONES/SOFT TISSUE: No acute abnormality. No radiographic evidence of acute pulmonary disease. Assessment and plan:  Acute respiratory failure, hypoxemic. Oxygen to keep SaO2 >90%. Possibly can be weaned off oxygen, but apparently desaturates at night. Class IV diastolic CHF, elevated troponin. Closely followed by cardiology, being diuresed. Needs further work-up for possible amyloidosis. Severe COPD, possible exacerbation. FEV1 50% predicted. On bronchodilator, steroid. Procalcitonin negative, but sputum grew Acinetobacter baumanii. Treated initially with azithromycin and ceftriaxone, later doxycycline. Was resistant to ceftriaxone, sensitive to cefepime, ciprofloxacin, Bactrim  Paralyzed left hemidiaphragm. Status post plication, elevated on CT  HTN, DM 2, HLD, CAD, PAF. Per IM and cardiology  Suspected RHONDA. Patient had declined sleep study at present. With decompensated CHF, this should probably be done earlier rather than later. Discussed with the patient and his wife  DVT prophylaxis. Eliquis.       Discussed with patient, nursing      Electronically signed by:  Michael Benoit MD    11/28/2022    10:00 AM.

## 2022-11-28 NOTE — CARE COORDINATION
Hospital day 6: Patient on C3 re COPD exacerbation care managed by IM, Pulmonology, and Cardiology. Patient IPTA ambulating halls, on IV lasix.  Patient weaned to room air this am. .AZUL Ceron

## 2022-11-28 NOTE — PROGRESS NOTES
[address]   Daily Progress Note    Admit Date:  11/22/2022  HPI:  No chief complaint on file. Interval history: Izzy Soares is being followed for acute heart failure    Subjective:  Mr. Young Quijano edema is improving. Tolerating leg wraps. Weaning oxygen. No pain no chest pain.      Objective:   /69   Pulse 92   Temp 97.4 °F (36.3 °C) (Axillary)   Resp 18   Ht 6' (1.829 m)   Wt 252 lb 14.4 oz (114.7 kg)   SpO2 95%   BMI 34.30 kg/m²     Intake/Output Summary (Last 24 hours) at 11/28/2022 1258  Last data filed at 11/28/2022 0533  Gross per 24 hour   Intake 719.32 ml   Output 2450 ml   Net -1730.68 ml         NYHA: IV    Physical Exam:  General:  Awake, alert, NAD  Skin:  Warm and dry  Neck:  JVD less   Chest:  + less  rales in the bases   Telemetry: NSR with 1st deg avb  Cardiovascular:  RRR S1S2, no m/r/g   Abdomen:  Soft, nontender, +bowel sounds  Extremities:  + bilateral lower extremity edema in the feet, edema improved, + legs wrapped with ace wraps by me     Medications:    predniSONE  40 mg Oral Daily    albuterol  2.5 mg Nebulization TID    pantoprazole  40 mg Oral QAM AC    metoprolol succinate  50 mg Oral Daily    losartan  100 mg Oral Daily    fluticasone-umeclidin-vilant  1 puff Inhalation Daily    atorvastatin  80 mg Oral Daily    aspirin EC  81 mg Oral Nightly    apixaban  5 mg Oral BID    sodium chloride flush  10 mL IntraVENous 2 times per day    insulin lispro  0-8 Units SubCUTAneous TID     insulin lispro  0-4 Units SubCUTAneous Nightly      furosemide (LASIX) 1mg/ml infusion 5 mg/hr (11/28/22 1137)    dextrose      sodium chloride         Lab Data:  CBC:   Recent Labs     11/26/22 0443 11/27/22  0524 11/28/22  0549   WBC 12.5* 12.7* 10.9   HGB 14.3 14.0 14.8    252 276     BMP:    Recent Labs     11/26/22 0443 11/27/22 0524 11/28/22  0549    139 137   K 3.9 4.2 3.7   CO2 39* 41* 39*   BUN 31* 35* 29*   CREATININE 1.0 1.1 1.0     INR:  No results for input(s): INR in the last 72 hours. BNP:  No results for input(s): PROBNP in the last 72 hours. Lab Results   Component Value Date    LVEF 55 04/16/2020       Testing:       TTE 4/10/20:  Conclusions   Summary   Technically difficult examination secondary to habitus and COPD. Left ventricular systolic function is normal with ejection fraction   estimated at 55%. No regional wall motion abnormalities. There is moderate to severe concentric left ventricular hypertrophy with   sigmoid septum (2.21 cm.). Grade II diastolic dysfunction with elevated left ventricular filling   pressure. The left atrium is mildly dilated. Echo 11/27/22   Summary   Normal left ventricle systolic function with an estimated ejection fraction   of 55%. Left ventricular size is decreased. There is severe concentric left ventricular hypertrophy. No outflow tract   obstruction. No regional wall motion abnormalities are seen. Left ventricular diastolic filling pressure are indeterminate. The right ventricle is normal in size with decreased function. The right atrium is mildly dilated. Aortic valve appears sclerotic but opens adequately. Trace aortic and mitral regurgitation. Ascending aorta is dilated at 3.78cm. Echocardiogram is consistent with hypertrophic cardiomyopathy or   infiltrative cardiomyopathy such as Amyloidosis heart. recommend Cardiac MRI and amyloid work up.     LV Septum Diastolic: 0.14 cm   LV PW Diastolic: 8.74 cm            11/28/22 0531 252 lb 14.4 oz (114.7 kg) Actual;Standing scale     11/27/22 0734 254 lb 14.4 oz (115.6 kg) Standing scale     11/26/22 0614 253 lb 14.4 oz (115.2 kg) Standing scale     11/25/22 0445 257 lb 1.6 oz (116.6 kg) Standing scale     11/24/22 0345 263 lb 8 oz (119.5 kg) Standing scale     11/22/22 2130 273 lb 8 oz (124.1 kg) --     Principal Problem:    COPD exacerbation (HCC)  Active Problems:    Elevated troponin    Class 4 congestive heart failure (HCC) Uncontrolled type 2 diabetes mellitus with hyperglycemia (HCC)    Chronic obstructive pulmonary disease (HCC)    Acute diastolic heart failure (HCC)    Essential hypertension    Hyperlipidemia    CAD (coronary artery disease)    S/P CABG x 3    SOB (shortness of breath)    Acute on chronic diastolic congestive heart failure (HCC)    Paroxysmal atrial fibrillation (HCC)    Simple chronic bronchitis (HCC)    Restrictive lung disease    Class 2 obesity in adult    Suspected sleep apnea    Diastolic dysfunction    Former smoker    Acute respiratory failure with hypoxia ([address]    Diaphragm paralysis  Resolved Problems:    * No resolved hospital problems. *      Assessment:  Acute on chronic heart failure with preserved EF  Severe LVH  Hypertrophic cardiomyopathy   Acute respiratory failure   Elevated troponin  CAD s/p CABG  AFib  CAP- pseudomonas in resp culture   COPD  HTn  HLD  DM  Left hemidiaphragm paralysis   Hx of carpel tunnel bilaterally       Plan:  Daily weights  Continue lasix infusion until midnight and then stop. Will start torsemide 20mg daily starting tomorrow   Will consider adding entresto tomorrow pending blood pressures   Repeat BMP daily  Wean oxygen   Mobilize  Kappa/ashlyn- pending   PYP scan ordered- discussed with nuc med   Cardiac MRI as outpatient. - discussed with Patient     Discharge planning for tomorrow     Education provided today Disease process and medications discussed. Questions answered fully.   Total Time spent educating today 10 minutes     QIAN Westfall - CECY,  11/28/2022, 1:00 PM

## 2022-11-28 NOTE — PROGRESS NOTES
Hospitalist Progress Note      PCP: Brittney Singh MD    Date of Admission: 11/22/2022    Chief Complaint: shortness of breath, productive cough, fevers, myalgias, arthralgias, headache, poor appetite, nausea, vomiting and diarrhea    Subjective: no new c/o. Medications:  Reviewed    Infusion Medications    furosemide (LASIX) 1mg/ml infusion 5 mg/hr (11/28/22 0530)    dextrose      sodium chloride       Scheduled Medications    predniSONE  40 mg Oral Daily    albuterol  2.5 mg Nebulization TID    pantoprazole  40 mg Oral QAM AC    doxycycline hyclate  100 mg Oral 2 times per day    metoprolol succinate  50 mg Oral Daily    losartan  100 mg Oral Daily    fluticasone-umeclidin-vilant  1 puff Inhalation Daily    atorvastatin  80 mg Oral Daily    aspirin EC  81 mg Oral Nightly    apixaban  5 mg Oral BID    sodium chloride flush  10 mL IntraVENous 2 times per day    insulin lispro  0-8 Units SubCUTAneous TID WC    insulin lispro  0-4 Units SubCUTAneous Nightly     PRN Meds: traZODone, glucose, dextrose bolus **OR** dextrose bolus, glucagon (rDNA), dextrose, sodium chloride flush, sodium chloride, ondansetron, polyethylene glycol, acetaminophen **OR** acetaminophen, ipratropium-albuterol, benzonatate      Intake/Output Summary (Last 24 hours) at 11/28/2022 0952  Last data filed at 11/28/2022 0533  Gross per 24 hour   Intake 719.32 ml   Output 2650 ml   Net -1930.68 ml         Physical Exam Performed:    /64   Pulse 57   Temp 97.8 °F (36.6 °C) (Oral)   Resp 18   Ht 6' (1.829 m)   Wt 252 lb 14.4 oz (114.7 kg)   SpO2 97%   BMI 34.30 kg/m²     General appearance: No apparent distress, appears stated age and cooperative. HEENT: Pupils equal, round, and reactive to light. Conjunctivae/corneas clear. Neck: Supple, with full range of motion. No jugular venous distention. Trachea midline. Respiratory:  Normal respiratory effort.  Clear to auscultation, bilaterally without Rales/Wheezes/Rhonchi. Cardiovascular: Regular rate and rhythm with normal S1/S2 without murmurs, rubs or gallops. Abdomen: Soft, non-tender, non-distended with normal bowel sounds. Musculoskeletal: No clubbing, cyanosis or edema bilaterally. Full range of motion without deformity. Skin: Skin color, texture, turgor normal.  No rashes or lesions. Neurologic:  Neurovascularly intact without any focal sensory/motor deficits. Cranial nerves: II-XII intact, grossly non-focal.  Psychiatric: Alert and oriented, thought content appropriate, normal insight  Capillary Refill: Brisk,< 3 seconds   Peripheral Pulses: +2 palpable, equal bilaterally       Labs:   Recent Labs     11/26/22  0443 11/27/22  0524 11/28/22  0549   WBC 12.5* 12.7* 10.9   HGB 14.3 14.0 14.8   HCT 43.5 41.6 42.9    252 276       Recent Labs     11/26/22 0443 11/27/22  0524 11/28/22  0549    139 137   K 3.9 4.2 3.7   CL 91* 94* 93*   CO2 39* 41* 39*   BUN 31* 35* 29*   CREATININE 1.0 1.1 1.0   CALCIUM 8.9 9.2 8.7   PHOS 4.5 3.6 3.6       No results for input(s): AST, ALT, BILIDIR, BILITOT, ALKPHOS in the last 72 hours. No results for input(s): INR in the last 72 hours. No results for input(s): [place] in the last 72 hours.       Urinalysis:      Lab Results   Component Value Date/Time    NITRU Negative 05/22/2020 09:31 AM    WBCUA 6-10 07/28/2017 08:50 AM    BACTERIA 1+ 07/28/2017 08:50 AM    RBCUA  07/28/2017 08:50 AM    BLOODU Negative 05/22/2020 09:31 AM    SPECGRAV 1.015 05/22/2020 09:31 AM    GLUCOSEU Negative 05/22/2020 09:31 AM       Consults:    IP CONSULT TO PULMONOLOGY  IP CONSULT TO CARDIOLOGY      Assessment/Plan:    Active Hospital Problems    Diagnosis     Chronic obstructive pulmonary disease ([facility] [J44.9]      Priority: Medium    Acute diastolic heart failure (HCC) [I50.31]      Priority: Medium    Class 4 congestive heart failure ([facility] [I50.9]      Priority: Medium    Uncontrolled type 2 diabetes mellitus with hyperglycemia ([facility] [E11.65]      Priority: Medium    Elevated troponin [R77.8]      Priority: Medium    Diaphragm paralysis [J98.6]     Acute respiratory failure with hypoxia (HCC) [J96.01]     COPD exacerbation ([facility]) [J44.1]     Class 2 obesity in adult [E66.9]     Restrictive lung disease [J98.4]     Suspected sleep apnea [K18.420]     Diastolic dysfunction [H00.64]     Former smoker [Z87.891]     Simple chronic bronchitis ([facility] [J41.0]     Paroxysmal atrial fibrillation (HCC) [I48.0]     Acute on chronic diastolic congestive heart failure (HCC) [I50.33]     SOB (shortness of breath) [R06.02]     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 In addition, Solumedrol and Antibiotics have been prescribed. The Solumedrol will be tapered as the patient improves. Patient will be monitored closely, and deep breathing and coughing will be encouraged while awake. Followed outpt by Dr. Juan Jovel. Pulmonology consulted and appreciated. CHF - Acute on chronic diastolic dysfunction. A 2D Echocardiogram on 04/16/2020 shows an EF of 55% and grade II diastolic dysfunction. Repeat echo this admission w/ preserved EF. Diurese with IV Lasix - changed to gtt per Cardiology - net negative >15L and now off Lasix Holiday w/ gtt resumed 27 Nov. Monitor strict I&Os and daily weights. Followed outpt by Dr. Zoran Blackburn. Cardiology consulted and appreciated. HypoKalemia - likely in part due to Lasix gtt. Follow serial labs and replace PRN - ordered aggressively TID PO 25 Nov and again BID PO 26 Nov.  Reviewed and documented as above.     Pneumonia - suspected based upon symptoms, not apparent on imaging - Pt was started on Rocephin and Azithromycin - changed to Doxy and completed inpatient w/ no further ABX at discharge. Acute Respiratory Failure - w/ hypoxia, POArrival.  Presence of clinical respiratory distress w/ tachypnea/dyspnea/SOB and wheezing w/ use of accessory muscles to breath. Supplemental O2 and wean as tolerated. Troponin elevation - of unclear clinical significance w/ etiology clinically unable to determine, w/out signs/sxs of active ischemia. Follow serial troponins. Reviewed and documented as above. HTN/CAD - w/ known CAD s/p prior CABG x 3,  but no evidence of active signs/sxs of ischemia/failure. Currently controlled on home meds w/ vitals reviewed and documented as above. HyperLipidemia - controlled on home Statin. Continue, w/ f/u and med adjustment w/ PCP    Afib - chronic paroxysmal of unspecified and clinically unable to determine etiology. Normally rate controlled on BBlocker - continued. Anticoagulated at baseline on home Eliquis due to secondary hypercoaguable state due to Afib - continued. Monitored on tele. Obesity -  With Body mass index is 34.3 kg/m². Complicating assessment and treatment. Placing patient at risk for multiple co-morbidities as well as early death and contributing to the patient's presentation. Counseled on weight loss. DVT Prophylaxis: Eliquis     Recent Labs     11/26/22  0443 11/27/22  0524 11/28/22  0549    252 276       Diet: ADULT DIET; Regular; Low Sodium (2 gm); 1500 ml  Code Status: Full Code      PT/OT Eval Status: not yet ordered. Dispo - Patient is likely to remain in-house at least until Tues/Wed 29/30 Nov pending clinical course and subspecialty recs.   Girlfriend and daughter present at bedside when seen 23 Nov. GF present most days      Gia Angulo MD

## 2022-11-28 NOTE — PROGRESS NOTES
Transfer to [facility] from [facility]. Report given to A1 RN. Upon transferring patient to ordered level of care, patients medications were gathered from the following locations and given to receiving nurse during bedside report:    Lock Box in room :     [x] Yes   [] No   Pyxis Bin:       [x] Yes   [] No      Pyxis Refrigerator:      [x] Yes   [] No   Tube System:       [x] Yes   [] No   LDA's documented:  [x] Yes   [] No          The following paperwork was transferred with patient:    12 hour chart check:       [x] Yes   [] No   Patient belongings:       [x] Yes   [] No      Continuous pulse ox:   [] Yes   [] No      [x] N/A      Tele monitor assigned to patient and placed on patient prior to transfer. CMU Notified at Transfer: [x] Yes   [] No    Family notified of transfer:    [x] Yes   [] No    Spoke with: Wife at bedside.

## 2022-11-28 NOTE — PLAN OF CARE
Problem: Safety - Adult  Goal: Free from fall injury  Outcome: Progressing  Flowsheets (Taken 11/28/2022 1041)  Free From Fall Injury: Based on caregiver fall risk screen, instruct family/caregiver to ask for assistance with transferring infant if caregiver noted to have fall risk factors

## 2022-11-29 ENCOUNTER — APPOINTMENT (OUTPATIENT)
Dept: NUCLEAR MEDICINE | Age: 77
DRG: 545 | End: 2022-11-29
Attending: INTERNAL MEDICINE
Payer: MEDICARE

## 2022-11-29 VITALS
RESPIRATION RATE: 20 BRPM | HEART RATE: 67 BPM | HEIGHT: 72 IN | TEMPERATURE: 97.8 F | DIASTOLIC BLOOD PRESSURE: 66 MMHG | WEIGHT: 253.3 LBS | SYSTOLIC BLOOD PRESSURE: 112 MMHG | OXYGEN SATURATION: 91 % | BODY MASS INDEX: 34.31 KG/M2

## 2022-11-29 PROBLEM — E85.82 WILD-TYPE TRANSTHYRETIN-RELATED (ATTR) AMYLOIDOSIS (HCC): Status: ACTIVE | Noted: 2022-11-29

## 2022-11-29 PROBLEM — I43 CARDIAC AMYLOIDOSIS (HCC): Status: ACTIVE | Noted: 2017-07-06

## 2022-11-29 PROBLEM — E85.4 CARDIAC AMYLOIDOSIS (HCC): Status: ACTIVE | Noted: 2017-07-06

## 2022-11-29 LAB
ALBUMIN SERPL-MCNC: 4.1 G/DL (ref 3.4–5)
ANION GAP SERPL CALCULATED.3IONS-SCNC: 6 MMOL/L (ref 3–16)
BUN BLDV-MCNC: 31 MG/DL (ref 7–20)
CALCIUM SERPL-MCNC: 9.4 MG/DL (ref 8.3–10.6)
CHLORIDE BLD-SCNC: 93 MMOL/L (ref 99–110)
CO2: 40 MMOL/L (ref 21–32)
CREAT SERPL-MCNC: 1 MG/DL (ref 0.8–1.3)
GFR SERPL CREATININE-BSD FRML MDRD: >60 ML/MIN/{1.73_M2}
GLUCOSE BLD-MCNC: 116 MG/DL (ref 70–99)
GLUCOSE BLD-MCNC: 133 MG/DL (ref 70–99)
GLUCOSE BLD-MCNC: 243 MG/DL (ref 70–99)
HCT VFR BLD CALC: 44.9 % (ref 40.5–52.5)
HEMOGLOBIN: 14.9 G/DL (ref 13.5–17.5)
KAPPA, FREE LIGHT CHAINS, SERUM: 14.05 MG/L (ref 3.3–19.4)
KAPPA/LAMBDA RATIO: 1.14 (ref 0.26–1.65)
KAPPA/LAMBDA TEST COMMENT: NORMAL
LAMBDA, FREE LIGHT CHAINS, SERUM: 12.3 MG/L (ref 5.71–26.3)
MCH RBC QN AUTO: 30 PG (ref 26–34)
MCHC RBC AUTO-ENTMCNC: 33.1 G/DL (ref 31–36)
MCV RBC AUTO: 90.5 FL (ref 80–100)
PDW BLD-RTO: 13.6 % (ref 12.4–15.4)
PERFORMED ON: ABNORMAL
PERFORMED ON: ABNORMAL
PHOSPHORUS: 4 MG/DL (ref 2.5–4.9)
PLATELET # BLD: 296 K/UL (ref 135–450)
PMV BLD AUTO: 7.8 FL (ref 5–10.5)
POTASSIUM SERPL-SCNC: 4.3 MMOL/L (ref 3.5–5.1)
RBC # BLD: 4.96 M/UL (ref 4.2–5.9)
SODIUM BLD-SCNC: 139 MMOL/L (ref 136–145)
WBC # BLD: 10.9 K/UL (ref 4–11)

## 2022-11-29 PROCEDURE — 6370000000 HC RX 637 (ALT 250 FOR IP): Performed by: INTERNAL MEDICINE

## 2022-11-29 PROCEDURE — 3430000000 HC RX DIAGNOSTIC RADIOPHARMACEUTICAL: Performed by: NURSE PRACTITIONER

## 2022-11-29 PROCEDURE — 80069 RENAL FUNCTION PANEL: CPT

## 2022-11-29 PROCEDURE — 99231 SBSQ HOSP IP/OBS SF/LOW 25: CPT | Performed by: INTERNAL MEDICINE

## 2022-11-29 PROCEDURE — A9538 TC99M PYROPHOSPHATE: HCPCS | Performed by: NURSE PRACTITIONER

## 2022-11-29 PROCEDURE — 6370000000 HC RX 637 (ALT 250 FOR IP): Performed by: NURSE PRACTITIONER

## 2022-11-29 PROCEDURE — 85027 COMPLETE CBC AUTOMATED: CPT

## 2022-11-29 PROCEDURE — 36415 COLL VENOUS BLD VENIPUNCTURE: CPT

## 2022-11-29 PROCEDURE — 2580000003 HC RX 258: Performed by: INTERNAL MEDICINE

## 2022-11-29 PROCEDURE — 6360000002 HC RX W HCPCS: Performed by: INTERNAL MEDICINE

## 2022-11-29 PROCEDURE — 99232 SBSQ HOSP IP/OBS MODERATE 35: CPT | Performed by: NURSE PRACTITIONER

## 2022-11-29 PROCEDURE — 94640 AIRWAY INHALATION TREATMENT: CPT

## 2022-11-29 PROCEDURE — 78803 RP LOCLZJ TUM SPECT 1 AREA: CPT

## 2022-11-29 RX ORDER — TORSEMIDE 20 MG/1
TABLET ORAL
Qty: 60 TABLET | Refills: 3 | Status: SHIPPED | OUTPATIENT
Start: 2022-11-29

## 2022-11-29 RX ORDER — PREDNISONE 20 MG/1
40 TABLET ORAL DAILY
Qty: 10 TABLET | Refills: 0 | Status: SHIPPED | OUTPATIENT
Start: 2022-11-30 | End: 2022-12-02

## 2022-11-29 RX ORDER — PANTOPRAZOLE SODIUM 40 MG/1
40 TABLET, DELAYED RELEASE ORAL
Qty: 30 TABLET | Refills: 0 | Status: SHIPPED | OUTPATIENT
Start: 2022-11-30 | End: 2022-12-30

## 2022-11-29 RX ADMIN — ALBUTEROL SULFATE 2.5 MG: 2.5 SOLUTION RESPIRATORY (INHALATION) at 08:48

## 2022-11-29 RX ADMIN — METOPROLOL SUCCINATE 50 MG: 50 TABLET, EXTENDED RELEASE ORAL at 10:53

## 2022-11-29 RX ADMIN — APIXABAN 5 MG: 5 TABLET, FILM COATED ORAL at 10:53

## 2022-11-29 RX ADMIN — PREDNISONE 40 MG: 20 TABLET ORAL at 10:52

## 2022-11-29 RX ADMIN — Medication 15 MILLICURIE: at 11:16

## 2022-11-29 RX ADMIN — ATORVASTATIN CALCIUM 80 MG: 80 TABLET, FILM COATED ORAL at 10:53

## 2022-11-29 RX ADMIN — SODIUM CHLORIDE, PRESERVATIVE FREE 10 ML: 5 INJECTION INTRAVENOUS at 10:53

## 2022-11-29 RX ADMIN — TORSEMIDE 20 MG: 20 TABLET ORAL at 10:53

## 2022-11-29 RX ADMIN — PANTOPRAZOLE SODIUM 40 MG: 40 TABLET, DELAYED RELEASE ORAL at 10:53

## 2022-11-29 RX ADMIN — LOSARTAN POTASSIUM 100 MG: 100 TABLET, FILM COATED ORAL at 10:53

## 2022-11-29 ASSESSMENT — PAIN SCALES - GENERAL: PAINLEVEL_OUTOF10: 0

## 2022-11-29 NOTE — PROGRESS NOTES
INPATIENT PULMONARY CRITICAL CARE PROGRESS NOTE      Reason for visit: Acute hypoxemic respiratory failure, COPD, CHF. Has diaphragmatic paralysis status post plication, restrictive lung disease, diastolic dysfunction, PAF. Has CAD status post CABG    Presented with  shortness of breath, productive cough, fevers, myalgias, arthralgias, headache, poor appetite, nausea, vomiting and diarrhea. Followed by cardiology, being diuresed for diastolic CHF. Has severe LVH, amyloidosis being considered in the differential.    SUBJECTIVE: Remains afebrile and hemodynamically stable, weaned off oxygen. He is due to get a scan today, following which he is likely to get discharged. The patient says he feels much better. He had marked leg edema on presentation which has improved. His wife is at bedside. His appetite is fair, he denies GI or  complaints. Physical Exam:  Blood pressure 99/71, pulse 77, temperature 97.3 °F (36.3 °C), temperature source Oral, resp. rate 18, height 6' (1.829 m), weight 253 lb 4.8 oz (114.9 kg), SpO2 96 %.'   General: He is not in acute distress. Appearance: He is well-developed. He is not ill-appearing, toxic-appearing or diaphoretic. Comments: Pleasant, tall, well-built, overweight   HENT:      Head: Normocephalic and atraumatic. Nose: Nose normal.      Mouth/Throat:      Mouth: Mucous membranes are moist.      Pharynx: Oropharynx is clear. No oropharyngeal exudate or posterior oropharyngeal erythema. Comments: Class III airway, dental repair  Eyes:      General: No scleral icterus. Right eye: No discharge. Left eye: No discharge. Conjunctiva/sclera: Conjunctivae normal.      Neck:      Thyroid: No thyromegaly. Vascular: No JVD. Cardiovascular:      Rate and Rhythm: Normal rate and regular rhythm. Heart sounds: Normal heart sounds. No murmur heard. No friction rub. No gallop.    Pulmonary:      Effort: Pulmonary effort is normal. No respiratory distress. Breath sounds: Mildly reduced air entry. No rales or crackles. No stridor. No wheezing or rhonchi. Comments: Reduced air entry left base  Abdominal:         Not examined  Musculoskeletal:      Right lower leg: No edema present. Left lower leg: No edema present. Lymphadenopathy:      Not examined  Skin:     General: Skin is warm and dry. Coloration: Skin is not jaundiced or pale. Findings: No bruising, erythema, lesion or rash. Neurological:      Mental Status: He is alert and oriented to person, place, and time. Gait: Gait normal.  No obvious neurologic deficit, detailed exam not performed    Psychiatric:         Mood and Affect: Mood normal.         Behavior: Behavior normal.         Results:  CBC:   Recent Labs     11/27/22 0524 11/28/22 0549 11/29/22  0751   WBC 12.7* 10.9 10.9   HGB 14.0 14.8 14.9   HCT 41.6 42.9 44.9   MCV 90.1 89.3 90.5    276 296       BMP:   Recent Labs     11/27/22 0524 11/28/22 0549 11/29/22  0751    137 139   K 4.2 3.7 4.3   CL 94* 93* 93*   CO2 41* 39* 40*   PHOS 3.6 3.6 4.0   BUN 35* 29* 31*   CREATININE 1.1 1.0 1.0         Imaging:  I have reviewed radiology images personally. NM CARDIAC AMYLOIDOSIS    (Results Pending)     XR CHEST PORTABLE    Result Date: 11/22/2022  EXAMINATION: ONE XRAY VIEW OF THE CHEST 11/22/2022 11:35 am COMPARISON: Chest x-ray dated 08/16/2022. HISTORY: ORDERING SYSTEM PROVIDED HISTORY: shortness of breath TECHNOLOGIST PROVIDED HISTORY: Reason for exam:->shortness of breath Reason for Exam: cough,congestion,sob FINDINGS: HEART/MEDIASTINUM: The cardiomediastinal silhouette is within normal limits. PLEURA/LUNGS: There is elevation of the left hemidiaphragm with chronic left basilar atelectasis. .  There is no appreciable pneumothorax. BONES/SOFT TISSUE: No acute abnormality. No radiographic evidence of acute pulmonary disease.        Assessment and plan:  Acute respiratory failure, hypoxemic. Resolved  Class IV diastolic CHF, elevated troponin. Undergoing nuclear scan for possible amyloidosis. Severe COPD, possible exacerbation. FEV1 50% predicted. On bronchodilator, steroid. Procalcitonin negative, but sputum grew Acinetobacter baumanii. Treated initially with azithromycin and ceftriaxone, later doxycycline. Was resistant to ceftriaxone, sensitive to cefepime, ciprofloxacin, Bactrim. No evidence of infection clinically  Paralyzed left hemidiaphragm. Status post plication, elevated on CT  HTN, DM 2, HLD, CAD, PAF. 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 Patient had declined sleep study at present. With decompensated CHF, this should probably be done earlier rather than later. Discussed with the patient and his wife  DVT prophylaxis. Eliquis. Discussed with patient, his wife and nursing. Okay to discharge from the pulmonary standpoint. Recommend he complete his cardiac work-up, does not need pulmonary follow-up in the near future. He will call our office for follow-up appointment.       Electronically signed by:  Monroe Silva MD    11/29/2022    8:52 AM.

## 2022-11-29 NOTE — PROGRESS NOTES
Hospitalist Progress Note      PCP: Gabriela Agustin MD    Date of Admission: 11/22/2022    Chief Complaint: shortness of breath, productive cough, fevers, myalgias, arthralgias, headache, poor appetite, nausea, vomiting and diarrhea    Subjective: no new c/o. Medications:  Reviewed    Infusion Medications    dextrose      sodium chloride       Scheduled Medications    torsemide  20 mg Oral Daily    predniSONE  40 mg Oral Daily    albuterol  2.5 mg Nebulization TID    pantoprazole  40 mg Oral QAM AC    metoprolol succinate  50 mg Oral Daily    losartan  100 mg Oral Daily    fluticasone-umeclidin-vilant  1 puff Inhalation Daily    atorvastatin  80 mg Oral Daily    aspirin EC  81 mg Oral Nightly    apixaban  5 mg Oral BID    sodium chloride flush  10 mL IntraVENous 2 times per day    insulin lispro  0-8 Units SubCUTAneous TID WC    insulin lispro  0-4 Units SubCUTAneous Nightly     PRN Meds: traZODone, glucose, dextrose bolus **OR** dextrose bolus, glucagon (rDNA), dextrose, sodium chloride flush, sodium chloride, ondansetron, polyethylene glycol, acetaminophen **OR** acetaminophen, ipratropium-albuterol, benzonatate      Intake/Output Summary (Last 24 hours) at 11/29/2022 0953  Last data filed at 11/28/2022 2104  Gross per 24 hour   Intake 250 ml   Output --   Net 250 ml         Physical Exam Performed:    BP 99/71   Pulse 77   Temp 97.3 °F (36.3 °C) (Oral)   Resp 18   Ht 6' (1.829 m)   Wt 253 lb 4.8 oz (114.9 kg)   SpO2 96%   BMI 34.35 kg/m²     General appearance: No apparent distress, appears stated age and cooperative. HEENT: Pupils equal, round, and reactive to light. Conjunctivae/corneas clear. Neck: Supple, with full range of motion. No jugular venous distention. Trachea midline. Respiratory:  Normal respiratory effort. Clear to auscultation, bilaterally without Rales/Wheezes/Rhonchi. Cardiovascular: Regular rate and rhythm with normal S1/S2 without murmurs, rubs or gallops.   Abdomen: Soft, non-tender, non-distended with normal bowel sounds. Musculoskeletal: No clubbing, cyanosis or edema bilaterally. Full range of motion without deformity. Skin: Skin color, texture, turgor normal.  No rashes or lesions. Neurologic:  Neurovascularly intact without any focal sensory/motor deficits. Cranial nerves: II-XII intact, grossly non-focal.  Psychiatric: Alert and oriented, thought content appropriate, normal insight  Capillary Refill: Brisk,< 3 seconds   Peripheral Pulses: +2 palpable, equal bilaterally       Labs:   Recent Labs     11/27/22 0524 11/28/22 0549 11/29/22  0751   WBC 12.7* 10.9 10.9   HGB 14.0 14.8 14.9   HCT 41.6 42.9 44.9    276 296       Recent Labs     11/27/22 0524 11/28/22 0549 11/29/22  0751    137 139   K 4.2 3.7 4.3   CL 94* 93* 93*   CO2 41* 39* 40*   BUN 35* 29* 31*   CREATININE 1.1 1.0 1.0   CALCIUM 9.2 8.7 9.4   PHOS 3.6 3.6 4.0       No results for input(s): AST, ALT, BILIDIR, BILITOT, ALKPHOS in the last 72 hours. No results for input(s): INR in the last 72 hours. No results for input(s): Othelia Nutley in the last 72 hours.       Urinalysis:      Lab Results   Component Value Date/Time    NITRU Negative 05/22/2020 09:31 AM    WBCUA 6-10 07/28/2017 08:50 AM    BACTERIA 1+ 07/28/2017 08:50 AM    RBCUA  07/28/2017 08:50 AM    BLOODU Negative 05/22/2020 09:31 AM    SPECGRAV 1.015 05/22/2020 09:31 AM    GLUCOSEU Negative 05/22/2020 09:31 AM       Consults:    IP CONSULT TO PULMONOLOGY  IP CONSULT TO CARDIOLOGY      Assessment/Plan:    Active Hospital Problems    Diagnosis     Acute systolic congestive heart failure ([facility] [I50.21]      Priority: Medium    Acute diastolic CHF (congestive heart failure) (HCC) [I50.31]      Priority: Medium    Chronic obstructive pulmonary disease ([facility] [J44.9]      Priority: Medium    Acute diastolic heart failure (HCC) [I50.31]      Priority: Medium    Class 4 congestive heart failure ([facility] [I50.9]      Priority: - ordered aggressively TID PO 25 Nov and again BID PO 26 Nov.  Reviewed and documented as above. Pneumonia - suspected based upon symptoms, not apparent on imaging - Pt was started on Rocephin and Azithromycin - changed to Doxy and completed inpatient w/ no further ABX at discharge. Acute Respiratory Failure - w/ hypoxia, POArrival.  Presence of clinical respiratory distress w/ tachypnea/dyspnea/SOB and wheezing w/ use of accessory muscles to breath. Supplemental O2 and wean as tolerated. Troponin elevation - of unclear clinical significance w/ etiology clinically unable to determine, w/out signs/sxs of active ischemia. Follow serial troponins. Reviewed and documented as above. HTN/CAD - w/ known CAD s/p prior CABG x 3,  but no evidence of active signs/sxs of ischemia/failure. Currently controlled on home meds w/ vitals reviewed and documented as above. HyperLipidemia - controlled on home Statin. Continue, w/ f/u and med adjustment w/ PCP    Afib - chronic paroxysmal of unspecified and clinically unable to determine etiology. Normally rate controlled on BBlocker - continued. Anticoagulated at baseline on home Eliquis due to secondary hypercoaguable state due to Afib - continued. Monitored on tele. Obesity -  With Body mass index is 34.35 kg/m². Complicating assessment and treatment. Placing patient at risk for multiple co-morbidities as well as early death and contributing to the patient's presentation. Counseled on weight loss. DVT Prophylaxis: Eliquis     Recent Labs     11/27/22  0524 11/28/22  0549 11/29/22  0751    276 296       Diet: ADULT DIET; Regular; Low Sodium (2 gm); 1500 ml  Code Status: Full Code      PT/OT Eval Status: not yet ordered. Dispo - Patient is likely to remain in-house at least until Tues/Wed 29/30 Nov pending clinical course and subspecialty recs.   Girlfriend and daughter present at bedside when seen 23 Nov. GF present most days      John Grace Saji No MD

## 2022-11-29 NOTE — PLAN OF CARE
Problem: Safety - Adult  Goal: Free from fall injury  11/28/2022 2019 by Damien Manley RN  Outcome: Progressing     Problem: Pain  Goal: Verbalizes/displays adequate comfort level or baseline comfort level  Outcome: Progressing     Problem: Chronic Conditions and Co-morbidities  Goal: Patient's chronic conditions and co-morbidity symptoms are monitored and maintained or improved  Outcome: Progressing     Problem: Respiratory - Adult  Goal: Achieves optimal ventilation and oxygenation  Outcome: Progressing     Problem: Cardiovascular - Adult  Goal: Maintains optimal cardiac output and hemodynamic stability  Outcome: Progressing     Problem: Cardiovascular - Adult  Goal: Absence of cardiac dysrhythmias or at baseline  Outcome: Progressing     Problem: Discharge Planning  Goal: Discharge to home or other facility with appropriate resources  Outcome: Progressing

## 2022-11-29 NOTE — PROGRESS NOTES
Perfect served Cardiology group, ie Sarah, in regards to More contacting [facility]. They stated that the patient was going into A-fib. Per strip they sent it doesn't look like true A-fib. Awaiting call back.

## 2022-11-29 NOTE — DISCHARGE INSTRUCTIONS
FOLLOW-UP APPOINTMENTS    [place] OFFICE - Follow-up appointment on 12/23/22 at 11:30 PM with [facility] NP. Please arrive 15 minutes early to complete necessary paperwork. [facility] Office [address]  [address]:  [phone]. If you are unable to make this appointment, please call to reschedule [phone]. To get to the office go to the side of the hospital at [facility], enter at the [facility], entrance that faces [address]. Take the elevator to the 3rd floor turn right off elevator then take hallway to the right. Go down the zimmer and office will be on your right [address]. Your provider has ordered MRI cardiac for further evaluation. To schedule outpatient testing, contact Central Scheduling by [facility] ([phone]). Testing to be completed at [address]:  ~ Weigh yourself every morning and call your doctor if you gain 3 lb gain in a day -or- 5 lb gain in a week. ~Follow a No Added Salt/Low Sodium diet of 3000 mg sodium daily  ~Follow a Fluid Limitation of 2 liters (64 ounces daily) on consistent basis  ~Call your doctor if you notice : worsening shortness of breath especially with usual activities or when lying down flat, increased swelling, decreased urination, weight gain of 3 lb overnight or 5 lb gain in a week, increased coughing, or chest pressure or dizziness upon discharge from hospital.   ~Go to all scheduled follow up appointments after hospital stay  ~Take all prescribed medications. Do not stop any-without calling your doctor first.  ~Heart Failure Non Urgent Resource phone number [phone]      Continue daily weights; Target weight 253-255lbs  If weight goes above 255lbs then increase torsemide 40mg daily (2 tabs of the 20mg tablet). If weight not back at target despite increase in dose, please call the office [phone].

## 2022-11-29 NOTE — PROGRESS NOTES
Comprehensive Nutrition Assessment    Type and Reason for Visit:  Initial    Nutrition Recommendations/Plan:   Continue low sodium diet - FR per MD  Encourage po intakes   Monitor po intakes, nutrition adequacy, weights, pertinent labs, BMs     Malnutrition Assessment:  Malnutrition Status:  No malnutrition (11/29/22 1309)      Nutrition Assessment:    LOS assessment. Pt admitted with several week history of increased weakness and shortness of breath which has become acutely worse. Currently on a low sodium diet. Pt endorses good appetite. PO intakes % per EMR. Pt reports that his appetite is usually good. Weight has trended down d/t fluid shifts (-15 L this admission) Discussed low sodium diet and fluid restriction. Will provide handout on CHF in AVS. Will monitor. Nutrition Related Findings:    +2 pitting BLE edema Wound Type: None       Current Nutrition Intake & Therapies:    Average Meal Intake: %  Average Supplements Intake: None Ordered  ADULT DIET; Regular; Low Sodium (2 gm); 1500 ml    Anthropometric Measures:  Height: 6' (182.9 cm)  Ideal Body Weight (IBW): 178 lbs (81 kg)       Current Body Weight: 253 lb 4.8 oz (114.9 kg),   IBW. Weight Source: Standing Scale  Current BMI (kg/m2): 34.3                          BMI Categories: Obese Class 1 (BMI 30.0-34. 9)    Nutrition Diagnosis:   No nutrition diagnosis at this time     Nutrition Interventions:   Food and/or Nutrient Delivery: Continue Current Diet  Nutrition Education/Counseling: Education initiated  Coordination of Nutrition Care: Continue to monitor while inpatient       Goals:     Goals: PO intake 50% or greater, prior to discharge       Nutrition Monitoring and Evaluation:   Behavioral-Environmental Outcomes: None Identified  Food/Nutrient Intake Outcomes: Food and Nutrient Intake  Physical Signs/Symptoms Outcomes: Biochemical Data, Weight    Discharge Planning:    Continue current diet     Adalberto Easley RD, LD  Contact: [phone]

## 2022-11-29 NOTE — PROGRESS NOTES
Pt d/c'd home. Removed peripheral IV and stopped bleeding. Catheter intact. Pt tolerated well. No redness noted at site. Notified [facility] and removed tele box. Reviewed d/c instructions, home meds, and  f/u information utilizing teach-back method. Scripts for new medications called into pharm in [facility]. Patient verbalized understanding.

## 2022-11-29 NOTE — DISCHARGE INSTR - DIET
Good nutrition is important when healing from an illness, injury, or surgery. Follow any nutrition recommendations given to you during your hospital stay. If you were given an oral nutrition supplement while in the hospital, continue to take this supplement at home. You can take it with meals, in-between meals, and/or before bedtime. These supplements can be purchased at most local grocery stores, pharmacies, and chain super-stores. If you have any questions about your diet or nutrition, call the hospital and ask for the dietitian. Heart Failure Nutrition Therapy  This nutrition therapy will help you feel better and support your heart. This plan focuses on:   Limiting sodium in your diet. Salt (sodium) makes your body hold water. When your body holds too much water, you can feel shortness of breath and swelling. You can prevent these symptoms by eating less salt. Limiting fluid in your diet. For some patients, drinking too much fluid can make heart failure worse. It can cause symptoms such as shortness of breath and swelling. Limiting fluids can help relieve some of your symptoms. Managing your weight. Your registered dietitian nutritionist (RDN) can help you choose a healthy weight for your body type. You can achieve these goals by:  Reading food labels to keep track of how much sodium is in the foods you eat. Limiting foods that are high in sodium. Checking your weight to make sure you're not retaining too much fluid. Reading the Food Label: How Much Sodium Is Too Much? The nutrition plan for heart failure usually limits the sodium you get from food and drinks to 2,000 milligrams per day. Salt is the main source of sodium. Read the nutrition label to find out how much sodium is in 1 serving of a food. Select foods with 140 milligrams of sodium or less per serving. Foods with more than 300 milligrams of sodium per serving may not fit into a reduced-sodium meal plan. Check serving sizes.  If you eat more than 1 serving, you will get more sodium than the amount listed. Cutting Back on Sodium  Avoid processed foods. Eat more fresh foods. Fresh and frozen fruits and vegetables without added juices or sauces are naturally low in sodium. Fresh meats are lower in sodium than processed meats, such as lloyd, sausage, and hot dogs. Read the nutrition label or ask your  to help you find a fresh meat that is low in sodium. Eat less salt, at the table and when cooking. Just 1 teaspoon of table salt has 2,300 milligrams of sodium. Leave the salt out of recipes for pasta, casseroles, and soups. Ask your RDN how to cook your favorite recipes without sodium. Be a smart . Look for food packages that say salt-free or sodium-free.  These items contain less than 5 milligrams of sodium per serving. Very-low-sodium products contain less than 35 milligrams of sodium per serving. Low-sodium products contain less than 140 milligrams of sodium per serving. Unsalted or no added salt products may still be high in sodium. Check the nutrition label. Add flavors to your food without adding sodium. Try lemon juice, lime juice, fruit juice, or vinegar. Dry or fresh herbs add flavor. Try basil, bay leaf, dill, rosemary, parsley, jameel, dry mustard, nutmeg, thyme, and paprika. Pepper, red pepper flakes, and cayenne pepper can add spice to your meals without adding sodium. Hot sauce contains sodium, but if you use just a drop or two, it will not add up to much. Buy a sodium-free seasoning blend or make your own at home. Use caution when you eat outside your home. Restaurant foods can be very high in sodium. Ask for nutrition information. Many restaurants provide nutrition facts on their menus or websites. Let your  know that you want your food to be cooked without salt. Ask for your salad dressing and sauces to come on the side.       Fluid Restriction  Your doctor may ask you to follow a fluid restriction in addition to taking diuretics (water pills). Foods that are liquid at room temperature are considered a fluid, such as popsicles, soup, ice cream, and Jell-O. Here are some common conversions that will help you measure your fluid intake every day:  1,000 milliliters = 1 liter or 4 cups 1 fluid ounce = 30 milliliters   1 cup = 240 milliliters 2,000 milliliters = 2 liters or 8 cups   1,500 milliliters = 1½ liters or 6 cups       Limit your fluid intake to 48 - 64 ounces of fluid per day (1500-2000ml). Ask your doctor how much fluid you can have. Any item that is liquid at room temperature counts! Weight Monitoring  Weigh yourself each day. Sudden weight gain is a sign that fluid is building up in your body. Follow these guidelines:  Weigh yourself every morning. If you gain 3 or more pounds in 1-2 days or 5 or more pounds within 1 week, call your doctor. Your doctor may adjust your medicine to get rid of the extra fluid. Talk with your doctor or RDN about what a healthy weight is for you. Talk with your doctor to find out what type of physical activity is best for you.             Foods Recommended  Food Group Recommended Foods   Grains Bread with less than 80 milligrams sodium per slice (yeast breads usually have less sodium than those made with baking soda)  Homemade bread made with reduced-sodium baking soda  Many cold cereals, especially shredded wheat and puffed rice  Oats, grits, or cream of wheat  Dry pastas, noodles, quinoa, and rice   Vegetables Fresh and frozen vegetables without added sauces, salt, or sodium  Homemade soups (salt free or low sodium)  Low-sodium or sodium-free canned vegetables and soups   Fruits Fresh and canned fruits  Dried fruits, such as raisins, cranberries, and prunes   Dairy (Milk and Milk Products) Milk or milk powder  Rice milk and soy milk  Yogurt, including Greek yogurt  Small amounts of natural, block cheese or reduced-sodium cheese (Swiss, ricotta, and fresh mozzarella are lower in sodium than others)  Regular or soft cream cheese and low-sodium cottage cheese   Protein Foods (Meat, Poultry, Fish, Beans) Fresh meats and fish  [place] ([facility]) lloyd (except if packaged in a sodium solution)  Canned or packed tuna (no more than 4 ounces at 1 serving)  Dried beans and peas; edamame (fresh soybeans)  Eggs or egg beaters (if  less than 200 mg per serving)  Unsalted nuts or peanut butter   Desserts and Snacks Fresh fruit or applesauce  Abdiel food cake  Granola bars  Unsalted pretzels, popcorn, or nuts  Pudding or Jell-O with Cool-Whip topping  Homemade rice-crispy treats  Vanilla wafers  Frozen fruit bars   Fats Tub or liquid margarine  Unsaturated fat oils (canola, olive, corn, sunflower, safflower, peanut)   Condiments Fresh or dried herbs; low-sodium ketchup; vinegar; lemon or lime juice; pepper; salt-free seasoning mixes and marinades (Mrs. Johny Muñoz or Ian's salt-free blend); simple salad dressings (vinegar and oil); salt-free sauces       Foods Not Recommended  Food Group Foods Not Recommended   Grains Breads or crackers topped with salt  Cereals (hot/cold) with more than 300 milligrams sodium per serving  Biscuits, cornbread, and other quick breads prepared with baking soda  Prepackaged bread crumbs  Self-rising flours   Vegetables Canned vegetables (unless they are salt free or low sodium)  Frozen vegetables with seasoning and sauces  Sauerkraut and pickled vegetables  Canned or dried soups (unless they are salt free or low  sodium)  Western Sridevi fries and onion rings   Fruits Dried fruits preserved with sodium-containing additives   Dairy (Milk and Milk Products) Buttermilk  Processed cheeses such as Cheese Whiz, Velveeta, and [employer] (unless a low-sodium variety)  Feta cheese; shredded cheese (has more sodium than block cheese); singles slices and string cheese   Protein Foods (Meat, Poultry, Fish, Beans) Cured meats: lloyd, ham, sausage, pepperoni, and hot dogs  Canned meats: chili, Lisa sausage, sardines, and Spam  Smoked fish and meats  Frozen meals that have more than 600 milligrams sodium   Fats Salted butter or margarine   Condiments Salt, sea salt, kosher salt, onion salt, and garlic salt  Seasoning mixes containing salt (Lemon Pepper or Bouillon cubes)  Catsup or ketchup, BBQ sauce, Worcestershire and soy sauce  Salsa, pickles, olives, relish  Salad dressings: ranch, blue cheese, [place], and Western Sridevi    Alcohol Check with your doctor.      Heart Failure Sample 1-Day Menu   Breakfast 1 cup regular oatmeal made with water or milk  1 cup reduced-fat (2%) milk  1 medium banana  1 slice whole wheat bread   1 tablespoon salt-free peanut butter   Morning Snack 1/2 cup dried cranberries   Lunch 3 ounces grilled chicken breast  1 cup salad greens   Olive oil and vinegar dressing (for greens)  5 unsalted or low-sodium crackers  Fruit plate with 1/4 cup strawberries  1/2 sliced orange (for fruit plate)  1 peach half (for fruit plate)   Afternoon Snack 1 ounce low-sodium turkey   1 piece whole wheat bread   Evening Meal 3 ounces herb-baked fish  1 baked potato  2 teaspoons soft margarine (trans fat-free) (for potato)  Sliced tomatoes  1/2 cup steamed spinach drizzled with lemon juice  3-inch square of trisha food cake  Fresh strawberries (2) (for cake)   Evening Snack 2 tablespoons salt-free peanut butter  5 low-sodium crackers       Call the [facility]) dietitians at [phone]   with any nutrition questions

## 2022-11-29 NOTE — CARE COORDINATION
CASE MANAGEMENT DISCHARGE SUMMARY      Discharge to: home with girlfriend, denies needs    Precertification completed: NA  Hospital Exemption Notification (HENS) completed: NA    IMM given: 11/29/22     New Durable Medical Equipment ordered/agency: NA    Transportation:    Family/car: family       Confirmed discharge plan with:     Patient: yes     Family:  yes, per patient         RN, name: Agustin Wiley    Note: Discharging nurse to complete TAMI, reconcile AVS, and place final copy with patient's discharge packet. RN to ensure that written prescriptions for  Level II medications are sent with patient to the facility as per protocol.

## 2022-11-29 NOTE — PROGRESS NOTES
[address]   Daily Progress Note    Admit Date:  11/22/2022  HPI:  No chief complaint on file. Interval history: Jen Gamez is being followed for acute heart failure    Subjective:  Mr. Radha Fuller remains off of the oxygen 24 hours later. Edema is much better. No chest pain. Objective:   BP 99/71   Pulse 77   Temp 97.3 °F (36.3 °C) (Oral)   Resp 18   Ht 6' (1.829 m)   Wt 253 lb 4.8 oz (114.9 kg)   SpO2 96%   BMI 34.35 kg/m²     Intake/Output Summary (Last 24 hours) at 11/29/2022 1027  Last data filed at 11/28/2022 2104  Gross per 24 hour   Intake 250 ml   Output --   Net 250 ml         NYHA: IV    Physical Exam:  General:  Awake, alert, NAD  Skin:  Warm and dry  Neck:  JVD less   Chest:  + less  rales in the bases on the right.  Dim left base   Telemetry: NSR with 1st deg avb  Cardiovascular:  RRR S1S2, no m/r/g   Abdomen:  Soft, nontender, +bowel sounds  Extremities:  + bilateral lower extremity edema in the feet, edema improved, + legs wrapped with ace wraps by me     Medications:    torsemide  20 mg Oral Daily    predniSONE  40 mg Oral Daily    albuterol  2.5 mg Nebulization TID    pantoprazole  40 mg Oral QAM AC    metoprolol succinate  50 mg Oral Daily    losartan  100 mg Oral Daily    fluticasone-umeclidin-vilant  1 puff Inhalation Daily    atorvastatin  80 mg Oral Daily    aspirin EC  81 mg Oral Nightly    apixaban  5 mg Oral BID    sodium chloride flush  10 mL IntraVENous 2 times per day    insulin lispro  0-8 Units SubCUTAneous TID WC    insulin lispro  0-4 Units SubCUTAneous Nightly      dextrose      sodium chloride         Lab Data:  CBC:   Recent Labs     11/27/22  0524 11/28/22  0549 11/29/22  0751   WBC 12.7* 10.9 10.9   HGB 14.0 14.8 14.9    276 296     BMP:    Recent Labs     11/27/22  0524 11/28/22  0549 11/29/22  0751    137 139   K 4.2 3.7 4.3   CO2 41* 39* 40*   BUN 35* 29* 31*   CREATININE 1.1 1.0 1.0     INR:  No results for input(s): INR in the last 72 hours.  BNP:  No results for input(s): PROBNP in the last 72 hours. Lab Results   Component Value Date    LVEF 55 04/16/2020       Testing:       TTE 4/10/20:  Conclusions   Summary   Technically difficult examination secondary to habitus and COPD. Left ventricular systolic function is normal with ejection fraction   estimated at 55%. No regional wall motion abnormalities. There is moderate to severe concentric left ventricular hypertrophy with   sigmoid septum (2.21 cm.). Grade II diastolic dysfunction with elevated left ventricular filling   pressure. The left atrium is mildly dilated. Echo 11/27/22   Summary   Normal left ventricle systolic function with an estimated ejection fraction   of 55%. Left ventricular size is decreased. There is severe concentric left ventricular hypertrophy. No outflow tract   obstruction. No regional wall motion abnormalities are seen. Left ventricular diastolic filling pressure are indeterminate. The right ventricle is normal in size with decreased function. The right atrium is mildly dilated. Aortic valve appears sclerotic but opens adequately. Trace aortic and mitral regurgitation. Ascending aorta is dilated at 3.78cm. Echocardiogram is consistent with hypertrophic cardiomyopathy or   infiltrative cardiomyopathy such as Amyloidosis heart. recommend Cardiac MRI and amyloid work up. LV Septum Diastolic: 1.03 cm   LV PW Diastolic: 3.61 cm           11/29/22 0444 253 lb 4.8 oz (114.9 kg) Standing scale; Actual      11/28/22 0531 252 lb 14.4 oz (114.7 kg) Actual;Standing scale     11/27/22 0734 254 lb 14.4 oz (115.6 kg) Standing scale     11/26/22 0614 253 lb 14.4 oz (115.2 kg) Standing scale     11/25/22 0445 257 lb 1.6 oz (116.6 kg) Standing scale     11/24/22 0345 263 lb 8 oz (119.5 kg) Standing scale     11/22/22 2130 273 lb 8 oz (124.1 kg) --             Principal Problem:    COPD exacerbation (HCC)  Active Problems:    Elevated troponin Class 4 congestive heart failure (HCC)    Uncontrolled type 2 diabetes mellitus with hyperglycemia (HCC)    Chronic obstructive pulmonary disease (HCC)    Acute diastolic heart failure (HCC)    Acute systolic congestive heart failure (HCC)    Acute diastolic CHF (congestive heart failure) (HCC)    Essential hypertension    Hyperlipidemia    CAD (coronary artery disease)    S/P CABG x 3    SOB (shortness of breath)    Acute on chronic diastolic congestive heart failure (HCC)    Paroxysmal atrial fibrillation (HCC)    Simple chronic bronchitis (HCC)    Restrictive lung disease    Class 2 obesity in adult    Suspected sleep apnea    Diastolic dysfunction    Former smoker    Acute respiratory failure with hypoxia ([address]    Paralyzed hemidiaphragm  Resolved Problems:    * No resolved hospital problems. *      Assessment:  Acute on chronic heart failure with preserved EF  Severe LVH  Hypertrophic cardiomyopathy    - no monoclonal band seen on serum protein electrophoresis. Acute respiratory failure   Elevated troponin  CAD s/p CABG  AFIB  CAP- pseudomonas in resp culture   COPD  HTn  HLD  DM  Left hemidiaphragm paralysis   Hx of carpel tunnel bilaterally       Plan:  Daily weights  Torsemide daily 20mg   Repeat BMP daily  Wean oxygen   Mobilize  Continue losartan and toprol 50mg daily     PYP scan in process   Cardiac MRI as outpatient. - discussed with Patient     OKay for discharge after scan completed- do no need to wait for results     Cassius Jones, QIAN - CNP,  11/29/2022, 12:05 PM    Addendum:    PYP scan:   FINDINGS:   Quality of the exam is good. There is mildly reduced uptake within the ribs. Planar images show cardiac uptake is significantly greater than rib uptake. Grade 3. The heart to contralateral lung ratio (H:CL) is 1.70. SPECT images show diffusely increased activity throughout the left   ventricular myocardium. Elevated left hemidiaphragm with left basilar atelectasis. Postsurgical   changes from CABG. Cholelithiasis. Left renal cysts. 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 Discussed with Dr. Esau Guzmán. Will start Paper work for tafamidis 61 mg daily. Patient assistance forms started.    Stacy Patiño CNP, 11/29/2022, 3:41 PM

## 2022-11-30 LAB — URINE ELECTROPHORESIS INTERP: NORMAL

## 2022-11-30 NOTE — PROGRESS NOTES
Physician Progress Note      PATIENT:               Victorino Naranjo  CSN #:                  [id number]  :                       1945  ADMIT DATE:       2022 9:03 PM  [facility] DATE:        2022 4:31 PM  RESPONDING  PROVIDER #:        Jessie Ibrahim MD          QUERY TEXT:    Patient admitted with SOB/cough, noted to have NM cardiac amyloidosis scan   done on day of discharge. Results are \"Strongly suggestive of ATTR   amyloidosis\". If possible, please document in progress notes and discharge   summary if you are evaluating and/or treating any of the following: The medical record reflects the following:  Risk Factors: CHF, hypertrophic CM, left hemidiaphragm paralysis, COPD, PNA,   Severe LVH, RLD  Clinical Indicators: per DCS -\"workup for Amyloidosis in progress\"  per cariology PN -\"PYP scan in process. [facility] Cardiac MRI as outpatient. 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 \"  Treatment: NM cardiac scan, cardiology consult, diuresis, outpatient FU w/   cardiology for cardiac MRI of amyloidosis    Thank you, Marlin Abrams RN, BSN  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    Query created by: Jolynn Nieves on 2022 10:02 AM      Electronically signed by:  Jessie Ibrahim MD 2022 10:37 AM

## 2022-12-01 ENCOUNTER — TELEPHONE (OUTPATIENT)
Dept: CARDIOLOGY CLINIC | Age: 77
End: 2022-12-01

## 2022-12-01 DIAGNOSIS — R05.4 COUGH SYNCOPE: Primary | ICD-10-CM

## 2022-12-01 DIAGNOSIS — R55 COUGH SYNCOPE: Primary | ICD-10-CM

## 2022-12-01 NOTE — TELEPHONE ENCOUNTER
Pt wants to speak with NPRB. All he would say was he needed the medication he received while in hospital for cough. Said his cough is so bad he can't catch his breath. Tried to get more information but he only wants to talk to NPRB.  TY

## 2022-12-02 RX ORDER — BENZONATATE 200 MG/1
200 CAPSULE ORAL 3 TIMES DAILY PRN
Qty: 90 CAPSULE | Refills: 0 | Status: SHIPPED | OUTPATIENT
Start: 2022-12-02

## 2022-12-02 RX ORDER — GUAIFENESIN AND CODEINE PHOSPHATE 100; 10 MG/5ML; MG/5ML
5 SOLUTION ORAL 3 TIMES DAILY PRN
Qty: 75 ML | Refills: 0 | OUTPATIENT
Start: 2022-12-02 | End: 2022-12-07

## 2022-12-02 RX ORDER — PREDNISONE 20 MG/1
40 TABLET ORAL DAILY
Qty: 10 TABLET | Refills: 0 | Status: SHIPPED | OUTPATIENT
Start: 2022-12-02 | End: 2022-12-07

## 2022-12-02 NOTE — TELEPHONE ENCOUNTER
I called patient. Cough was so bad yesterday, feels like he is going to pass out with the severe coughing. He had so much phlegm yesterday. White phlegm with few dark spots     He was able to sleep more with taking 2 tabs of trazadone. Weight 252.5lbs this am  B/p running 127/79  HR 60-70's  RA 93%   Edema is controlled   Watching fluid intake     Did not receive script for prednisone at discharge. Plan: Will send in tessalon pearles PRN  Prednisone script for 40mg daily for 5 days. If cough not improving with above, I phoned in script for cough syrup with codeine given high risk for cough syncope.      Alec Abdi CNP, 12/2/2022, 12:36 PM

## 2022-12-06 ENCOUNTER — TELEPHONE (OUTPATIENT)
Dept: CARDIOLOGY CLINIC | Age: 77
End: 2022-12-06

## 2022-12-06 NOTE — TELEPHONE ENCOUNTER
Umberto Crowe called from [employer] because she needs a prior authorization for [employer]. Please call [phone]. Please call [employer] at [phone] to see if they will share Benji's out of pocket co-pay.   sarah

## 2022-12-12 ENCOUNTER — TELEPHONE (OUTPATIENT)
Dept: CARDIOLOGY CLINIC | Age: 77
End: 2022-12-12

## 2022-12-12 NOTE — TELEPHONE ENCOUNTER
Called patient, he is asking about the next steps to get medication after approval.     Will reach out to rep.

## 2022-12-12 NOTE — TELEPHONE ENCOUNTER
12/12-[facility] has approved Vyndamax 61 MG cap, drug only. Pt has question for NPRB. Please contact and advise.

## 2022-12-21 NOTE — TELEPHONE ENCOUNTER
11/22/22 VSP consult in the hosp.    10/6/22 MG ( looks to be a [address] pt)  Has hosp follow up on 12/23/22 NPRB @ [facility] [employer]

## 2022-12-22 ENCOUNTER — TELEPHONE (OUTPATIENT)
Dept: PULMONOLOGY | Age: 77
End: 2022-12-22

## 2022-12-22 DIAGNOSIS — J41.0 SIMPLE CHRONIC BRONCHITIS (HCC): ICD-10-CM

## 2022-12-22 PROBLEM — R77.8 ELEVATED TROPONIN: Status: RESOLVED | Noted: 2022-11-22 | Resolved: 2022-12-22

## 2022-12-22 PROBLEM — R79.89 ELEVATED TROPONIN: Status: RESOLVED | Noted: 2022-11-22 | Resolved: 2022-12-22

## 2022-12-22 RX ORDER — FLUTICASONE FUROATE, UMECLIDINIUM BROMIDE AND VILANTEROL TRIFENATATE 100; 62.5; 25 UG/1; UG/1; UG/1
1 POWDER RESPIRATORY (INHALATION) DAILY
Qty: 1 EACH | Refills: 3 | Status: SHIPPED | OUTPATIENT
Start: 2022-12-22

## 2022-12-22 RX ORDER — FLUTICASONE FUROATE, UMECLIDINIUM BROMIDE AND VILANTEROL TRIFENATATE 100; 62.5; 25 UG/1; UG/1; UG/1
POWDER RESPIRATORY (INHALATION)
Qty: 60 EACH | Refills: 3 | OUTPATIENT
Start: 2022-12-22

## 2022-12-22 RX ORDER — BENZONATATE 200 MG/1
CAPSULE ORAL
Qty: 90 CAPSULE | Refills: 0 | Status: SHIPPED | OUTPATIENT
Start: 2022-12-22

## 2022-12-22 RX ORDER — TORSEMIDE 20 MG/1
TABLET ORAL
Qty: 60 TABLET | Refills: 3 | Status: SHIPPED | OUTPATIENT
Start: 2022-12-22

## 2022-12-22 NOTE — TELEPHONE ENCOUNTER
Patient calling stating that he will be out of trelogy 12.23.2022 and needs another prescription called in ASAP

## 2022-12-22 NOTE — PROGRESS NOTES
2022    TELEHEALTH EVALUATION -- Audio     HPI:    Fariba Dolan (:  1945) has requested an audio evaluation for the following concern(s):    History of Present Illness:  I had the pleasure of seeing Fariba Dolan in follow up for hospital follow up. Admitted 22-22 with shortness of breath, treated for acute on chronic diastolic heart failure with lasix infusion admission weight 273lbs dishcarge weight down to 253lbs. His cough is improved with the tesselon pearles. Breathing is improved. He has fatigue. - main compliant  Home b/p   B/p 119/69  112/61  110/59    Some dizziness intermittently   Continues with fatigue. Taking torsemide 2 tabs daily. If he misses a dose in the evening. Most days    Prior to Visit Medications    Medication Sig Taking?  Authorizing Provider   Tafamidis 61 MG CAPS Take 61 mg by mouth daily Yes Mansoor Fruit, APRN - CNP   torsemide (DEMADEX) 20 MG tablet TAKE 1 TABLET BY MOUTH EVERY DAY OKAY TO TAKE 2ND TAB DAILY IF WEIGHT GAIN 3LBS IN DAY Yes Mansoor Fruit, APRN - CNP   benzonatate (TESSALON) 200 MG capsule TAKE 1 CAPSULE BY MOUTH THREE TIMES A DAY AS NEEDED FOR COUGH Yes Mansoor Fruit, APRN - CNP   fluticasone-umeclidin-vilant (TRELEGY ELLIPTA) 100-62.5-25 MCG/ACT AEPB inhaler Inhale 1 puff into the lungs daily Yes Gwendlyn Gowers, APRN - CNP   traZODone (DESYREL) 50 MG tablet Take 1 tablet by mouth nightly as needed Yes Historical Provider, MD   metoprolol succinate (TOPROL XL) 50 MG extended release tablet Take one tablet by mouth daily Yes Lina Gastelum MD   apixaban (ELIQUIS) 5 MG TABS tablet TAKE 1 TABLET BY MOUTH TWICE A DAY Yes Lina Gastelum MD   losartan (COZAAR) 100 MG tablet TAKE 1 TABLET BY MOUTH EVERY DAY Yes Lina Gastelum MD   ipratropium-albuterol (DUONEB) 0.5-2.5 (3) MG/3ML SOLN nebulizer solution INHALE 3 MLS BY MOUTH 4 TIMES DAILY Yes Alexa Tijerina MD   albuterol sulfate  (90 Base) MCG/ACT inhaler Inhale 2 puffs into the lungs 4 times daily as needed for Wheezing Yes Kriss Garza MD   atorvastatin (LIPITOR) 80 MG tablet Take 1 tablet by mouth daily Yes Rachel Huston MD   UNABLE TO FIND VESIVEST TWICE DAILY FOR 20 MINUTES - lung congestion Yes Historical Provider, MD   Spacer/Aero-Holding Orbert Twila 1 Device by Does not apply route daily as needed (sob) Yes Jennifer Montero DO   metFORMIN (GLUCOPHAGE) 500 MG tablet Take 500 mg by mouth daily (with breakfast) Yes Historical Provider, MD   aspirin EC 81 MG EC tablet Take 1 tablet by mouth daily  Patient taking differently: Take 81 mg by mouth nightly Yes Rachel Huston MD   Glucosamine-Chondroitin (OSTEO BI-FLEX REGULAR STRENGTH PO) Take 1 capsule by mouth daily  Yes Historical Provider, MD   GuaiFENesin (MUCINEX PO) Take 1,200 mg by mouth 2 times daily Yes Historical Provider, MD   Probiotic Product (PROBIOTIC DAILY PO) Take by mouth daily  Yes Historical Provider, MD   fish oil-omega-3 fatty acids 1000 MG capsule Take 1 g by mouth nightly  Yes Historical Provider, MD   Multiple Vitamin (MULTI-VITAMIN PO) Take 1 tablet by mouth daily  Yes Historical Provider, MD   Lysine 1000 MG TABS Take 1 tablet by mouth Daily  Yes Historical Provider, MD   cetirizine (ZYRTEC) 10 MG tablet Take 10 mg by mouth daily.    Yes Historical Provider, MD   pantoprazole (PROTONIX) 40 MG tablet Take 1 tablet by mouth every morning (before breakfast)  Patient not taking: Reported on 12/23/2022  Klarissa Stone MD       PHYSICAL EXAMINATION:  Vital Signs: (As obtained by patient/caregiver or practitioner observation)  Patient-Reported Vitals 12/23/2022   Patient-Reported Weight 254 lbs   Patient-Reported Height 6 0   Patient-Reported Systolic 212   Patient-Reported Diastolic 61   Patient-Reported Pulse 65   Patient-Reported SpO2 94      Constitutional: [] Appears well-developed and well-nourished [] No apparent distress      [] Abnormal-   Mental status  [] Alert and awake  [x] Oriented to person/place/time [x]Able to follow commands      Eyes:  EOM    []  Normal  [] Abnormal-  Sclera  []  Normal  [] Abnormal -         Discharge []  None visible  [] Abnormal -    HENT:   [] Normocephalic, atraumatic. [] Abnormal   [] Mouth/Throat: Mucous membranes are moist.     External Ears [] Normal  [] Abnormal-     Neck: [] No visualized mass     Pulmonary/Chest: [] Respiratory effort normal.  [x] No auditory  signs of difficulty breathing or respiratory distress        [] Abnormal-      Musculoskeletal:   [] Normal gait with no signs of ataxia         [] Normal range of motion of neck        [] Abnormal-       Neurological:        [] No Facial Asymmetry (Cranial nerve 7 motor function) (limited exam to video visit)          [] No gaze palsy        [] Abnormal-         Skin:        [] No significant exanthematous lesions or discoloration noted on facial skin         [] Abnormal-            Psychiatric:       [] Normal Affect [] No Hallucinations        [] Abnormal-     Other pertinent observable physical exam findings-     Assessment:  HFpEF LVEF >= 50%  Severe LVH  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 Ischemic cardiomyopathy  Basic Metabolic Panel    Brain Natriuretic Peptide      2. Cardiac amyloidosis [facility])  Basic Metabolic Panel    Brain Natriuretic Peptide      3. Chronic diastolic heart failure (HCC)  Basic Metabolic Panel    Brain Natriuretic Peptide        Plan:  Continue daily weights  Monitor b/p at home   Adjust toprol to 25 mg daily given fatigue  Continue losartan 100mg daily   Continue torsemide 1-2 tabs daily- controlling symptoms   Repeat BMP and BNP in about 1 week  Tafamidis has been approved per [facility], unfortunately the specialty pharmacy hasn't received his enrollment.  I called the specialty pharmacy myself and completed the enrollment. Informed they will call the patient to further complete the benefits/eligibility along with process. Will hold off on genetic testing- he has 2 adopted adult children     Specialty pharmacy number [phone]    Eil Colon is a 78 y.o. male being evaluated by a audio Visit (audio visit) encounter to address concerns as mentioned above. A caregiver was present when appropriate. Due to this being a TeleHealth encounter  evaluation of the following organ systems was limited:Vitals/Constitutional/EENT/Resp/CV/GI//MS/Neuro/Skin/Heme-Lymph-Imm. Patient identification was verified at the start of the visit: Yes    Total time spent on this encounter:  48 minutes coordinating care     Services were provided through a video synchronous discussion virtually to substitute for in-person clinic visit. Patient was located at their individual homes. Provider was in the office    --QIAN Ferro CNP on 12/23/2022 at 10:31 AM    An electronic signature was used to authenticate this note.     Lab Data reviewed and analyzed   CBC:   WBC   Date Value Ref Range Status   11/29/2022 10.9 4.0 - 11.0 K/uL Final   11/28/2022 10.9 4.0 - 11.0 K/uL Final   11/27/2022 12.7 (H) 4.0 - 11.0 K/uL Final     RBC   Date Value Ref Range Status   11/29/2022 4.96 4.20 - 5.90 M/uL Final   11/28/2022 4.80 4.20 - 5.90 M/uL Final   11/27/2022 4.62 4.20 - 5.90 M/uL Final     Hemoglobin   Date Value Ref Range Status   11/29/2022 14.9 13.5 - 17.5 g/dL Final   11/28/2022 14.8 13.5 - 17.5 g/dL Final   11/27/2022 14.0 13.5 - 17.5 g/dL Final     Hematocrit   Date Value Ref Range Status   11/29/2022 44.9 40.5 - 52.5 % Final   11/28/2022 42.9 40.5 - 52.5 % Final   11/27/2022 41.6 40.5 - 52.5 % Final     MCV   Date Value Ref Range Status   11/29/2022 90.5 80.0 - 100.0 fL Final   11/28/2022 89.3 80.0 - 100.0 fL Final   11/27/2022 90.1 80.0 - 100.0 fL Final     RDW   Date Value Ref Range Status   11/29/2022 13.6 12.4 - 15.4 % Final   11/28/2022 13.4 12.4 - 15.4 % Final   11/27/2022 13.6 12.4 - 15.4 % Final     Platelets   Date Value Ref Range Status   11/29/2022 296 135 - 450 K/uL Final   11/28/2022 276 135 - 450 K/uL Final   11/27/2022 252 135 - 450 K/uL Final     Iron:  No results found for: IRON, TIBC, FERRITIN  BMP:   Sodium   Date Value Ref Range Status   11/29/2022 139 136 - 145 mmol/L Final   11/28/2022 137 136 - 145 mmol/L Final   11/27/2022 139 136 - 145 mmol/L Final     Potassium   Date Value Ref Range Status   11/29/2022 4.3 3.5 - 5.1 mmol/L Final   11/28/2022 3.7 3.5 - 5.1 mmol/L Final   11/27/2022 4.2 3.5 - 5.1 mmol/L Final     Chloride   Date Value Ref Range Status   11/29/2022 93 (L) 99 - 110 mmol/L Final   11/28/2022 93 (L) 99 - 110 mmol/L Final   11/27/2022 94 (L) 99 - 110 mmol/L Final     CO2   Date Value Ref Range Status   11/29/2022 40 (H) 21 - 32 mmol/L Final   11/28/2022 39 (H) 21 - 32 mmol/L Final   11/27/2022 41 (H) 21 - 32 mmol/L Final     Phosphorus   Date Value Ref Range Status   11/29/2022 4.0 2.5 - 4.9 mg/dL Final   11/28/2022 3.6 2.5 - 4.9 mg/dL Final   11/27/2022 3.6 2.5 - 4.9 mg/dL Final     BUN   Date Value Ref Range Status   11/29/2022 31 (H) 7 - 20 mg/dL Final   11/28/2022 29 (H) 7 - 20 mg/dL Final   11/27/2022 35 (H) 7 - 20 mg/dL Final     Creatinine   Date Value Ref Range Status   11/29/2022 1.0 0.8 - 1.3 mg/dL Final   11/28/2022 1.0 0.8 - 1.3 mg/dL Final   11/27/2022 1.1 0.8 - 1.3 mg/dL Final     BNP:   Lab Results   Component Value Date    PROBNP 1,785 (H) 11/22/2022    PROBNP 193 (H) 04/29/2020    PROBNP 385 (H) 04/10/2020     Lipids: No components found for: T                Triglycerides   Date Value Ref Range Status   05/19/2018 71 0 - 150 mg/dL Final                   HDL   Date Value Ref Range Status   05/19/2018 39 (L) 40 - 60 mg/dL Final                   LDL Calculated   Date Value Ref Range Status   05/19/2018 47 <100 mg/dL Final                   VLDL Cholesterol Calculated   Date Value Ref Range Status   05/19/2018 14 Not Established mg/dL Final                 No results found for: ADELINE    EF:   Lab Results   Component Value Date/Time    LVEF 55 04/16/2020 09:39 AM    LVEF 58 04/16/2019 12:49 PM       Testing reviewed:   TTE 4/10/20:  Conclusions   Summary   Technically difficult examination secondary to habitus and COPD. Left ventricular systolic function is normal with ejection fraction   estimated at 55%. No regional wall motion abnormalities. There is moderate to severe concentric left ventricular hypertrophy with   sigmoid septum (2.21 cm.). Grade II diastolic dysfunction with elevated left ventricular filling   pressure. The left atrium is mildly dilated. Echo 11/27/22   Summary   Normal left ventricle systolic function with an estimated ejection fraction   of 55%. Left ventricular size is decreased. There is severe concentric left ventricular hypertrophy. No outflow tract   obstruction. No regional wall motion abnormalities are seen. Left ventricular diastolic filling pressure are indeterminate. The right ventricle is normal in size with decreased function. The right atrium is mildly dilated. Aortic valve appears sclerotic but opens adequately. Trace aortic and mitral regurgitation. Ascending aorta is dilated at 3.78cm. Echocardiogram is consistent with hypertrophic cardiomyopathy or   infiltrative cardiomyopathy such as Amyloidosis heart. recommend Cardiac MRI and amyloid work up. LV Septum Diastolic: 9.13 cm   LV PW Diastolic: 8.49 cm          PYP scan: 11/2022  FINDINGS:   Quality of the exam is good. There is mildly reduced uptake within the ribs. Planar images show cardiac uptake is significantly greater than rib uptake. Grade 3. The heart to contralateral lung ratio (H:CL) is 1.70. SPECT images show diffusely increased activity throughout the left   ventricular myocardium.        Elevated left hemidiaphragm with left basilar atelectasis. Postsurgical   changes from CABG. Cholelithiasis. Left renal cysts. 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 NYHA:                        Class II:  Slight limitation of physical activity.   ACC/ AHA Stage:      Stage C:  Structural heart disease with prior or current symptoms of HF

## 2022-12-23 ENCOUNTER — TELEMEDICINE (OUTPATIENT)
Dept: CARDIOLOGY CLINIC | Age: 77
End: 2022-12-23
Payer: MEDICARE

## 2022-12-23 DIAGNOSIS — I50.32 CHRONIC DIASTOLIC HEART FAILURE (HCC): ICD-10-CM

## 2022-12-23 DIAGNOSIS — Z09 HOSPITAL DISCHARGE FOLLOW-UP: ICD-10-CM

## 2022-12-23 DIAGNOSIS — I43 CARDIAC AMYLOIDOSIS (HCC): ICD-10-CM

## 2022-12-23 DIAGNOSIS — E85.4 CARDIAC AMYLOIDOSIS (HCC): ICD-10-CM

## 2022-12-23 DIAGNOSIS — I25.5 ISCHEMIC CARDIOMYOPATHY: Primary | ICD-10-CM

## 2022-12-23 PROCEDURE — 1111F DSCHRG MED/CURRENT MED MERGE: CPT | Performed by: NURSE PRACTITIONER

## 2022-12-23 PROCEDURE — 99443 PR PHYS/QHP TELEPHONE EVALUATION 21-30 MIN: CPT | Performed by: NURSE PRACTITIONER

## 2022-12-23 NOTE — PATIENT INSTRUCTIONS
Continue daily weights  Monitor b/p at home   Adjust toprol to 25 mg daily given fatigue  Continue losartan 100mg daily   Continue torsemide 1-2 tabs daily- controlling symptoms   Repeat BMP and BNP in about 1 week  Tafamidis has been approved per [facility], unfortunately the specialty pharmacy hasn't received his enrollment. I called the specialty pharmacy myself and completed the enrollment. Informed they will call the patient to further complete the benefits/eligibility along with process.    Will hold off on genetic testing    Specialty pharmacy number [phone]

## 2022-12-27 ENCOUNTER — HOSPITAL ENCOUNTER (OUTPATIENT)
Age: 77
Discharge: HOME | End: 2022-12-27
Payer: MEDICARE

## 2022-12-27 ENCOUNTER — TELEPHONE (OUTPATIENT)
Dept: CARDIOLOGY CLINIC | Age: 77
End: 2022-12-27

## 2022-12-27 DIAGNOSIS — I25.5 ISCHEMIC CARDIOMYOPATHY: ICD-10-CM

## 2022-12-27 DIAGNOSIS — E85.4 CARDIAC AMYLOIDOSIS (HCC): ICD-10-CM

## 2022-12-27 DIAGNOSIS — I43 CARDIAC AMYLOIDOSIS (HCC): ICD-10-CM

## 2022-12-27 DIAGNOSIS — I50.32 CHRONIC DIASTOLIC HEART FAILURE (HCC): ICD-10-CM

## 2022-12-27 LAB
ANION GAP SERPL CALCULATED.3IONS-SCNC: 12 MMOL/L (ref 3–16)
BUN BLDV-MCNC: 15 MG/DL (ref 7–20)
CALCIUM SERPL-MCNC: 9.8 MG/DL (ref 8.3–10.6)
CHLORIDE BLD-SCNC: 99 MMOL/L (ref 99–110)
CO2: 31 MMOL/L (ref 21–32)
CREAT SERPL-MCNC: 0.9 MG/DL (ref 0.8–1.3)
GFR SERPL CREATININE-BSD FRML MDRD: >60 ML/MIN/{1.73_M2}
GLUCOSE BLD-MCNC: 136 MG/DL (ref 70–99)
POTASSIUM SERPL-SCNC: 3.9 MMOL/L (ref 3.5–5.1)
PRO-BNP: 2332 PG/ML (ref 0–449)
SODIUM BLD-SCNC: 142 MMOL/L (ref 136–145)

## 2022-12-27 PROCEDURE — 83880 ASSAY OF NATRIURETIC PEPTIDE: CPT

## 2022-12-27 PROCEDURE — 80048 BASIC METABOLIC PNL TOTAL CA: CPT

## 2022-12-27 PROCEDURE — 36415 COLL VENOUS BLD VENIPUNCTURE: CPT

## 2022-12-27 NOTE — TELEPHONE ENCOUNTER
----- Message from QIAN Ramsey CNP sent at 12/27/2022  3:07 PM EST -----  Covering for Sarah  Please let him know that kidney function is stable but fluid number slightly increased from prior. Obtain update on weights, symptoms, BP. Thanks!

## 2022-12-27 NOTE — TELEPHONE ENCOUNTER
Thank you. Take 1 extra torsemide due to weight gain then resume usual dosing. Call if worsening symptoms and follow up as planned.

## 2022-12-27 NOTE — TELEPHONE ENCOUNTER
BERTRAM CASSIDY    Spoke with pt and he reports his weight was up 3 pounds in the past two days but he said he is blaming that on eating too much for [employer] and eating foods he doesn't normally eat. His weight is stable from 251-254 otherwise daily. He reports swelling in right leg is almost completely gone. Left leg still swells some during the day but it goes down over night. He denies and SOB and states he is feeling great.

## 2023-01-03 ENCOUNTER — TELEPHONE (OUTPATIENT)
Dept: CARDIOLOGY CLINIC | Age: 78
End: 2023-01-03

## 2023-01-03 NOTE — TELEPHONE ENCOUNTER
Spoke with pt he says he spoke with [employer] and is going to pay the $1200 for the medication. I tried to explain to him that I have a phone number for him to call and tell the company he can not afford this. Pt started yelling at this RN \"I need the medication now\".  Informed pt to let us now if he changes his mind if he wants the phone number

## 2023-01-03 NOTE — TELEPHONE ENCOUNTER
LVM for pt to call [employer] at [phone] to let them know he can not afford the medication so we can go forward with the process for getting him [employer].  Will call back later to confirm he received this information

## 2023-01-15 ASSESSMENT — ENCOUNTER SYMPTOMS: SHORTNESS OF BREATH: 1

## 2023-01-15 NOTE — PROGRESS NOTES
Pulmonary Outpatient Note   Hilda Collazo MD       1/17/2023    1. Simple chronic bronchitis ([address]    2. Diaphragmatic paralysis    3. Shortness of breath    4. Tracheobronchomalacia    5. Ischemic cardiomyopathy    6. Cardiac amyloidosis (HCC)    7. Obesity (BMI 35.0-39.9 without comorbidity)            ASSESSMENT/PLAN:  Shortness of breath. Likely due to restrictive effect of diaphragmatic paralysis, CHF, obesity, deconditioning. I have asked him to talk to cardiology about rehab. He did do rehab after his bypass and was benefited. Simple chronic bronchitis, chronic cough, tracheobronchomalacia. On Trelegy, DuoNeb, as needed albuterol inhaler. He is using Tessalon twice a day. With this combination, he is well controlled  Diaphragmatic paralysis. Status post plication. Likely to be chronic atelectasis related to diaphragmatic architecture rather than true endobronchial disease. RHONDA. The patient says he never slept well as he usually sleeps on a foam mattress on his side. He was told to lay on his back and the mattress was too soft. He says he hardly got any sleep in the lab. The lab did record about 2-1/2 hours of sleep with evidence of moderate sleep apnea. May need to consider a home sleep study if he prefers and if the insurance agrees to pay, due to his CHF. He should discuss this with cardiology as well. HFpEF, ischemic cardiomyopathy, cardiac amyloidosis. Closely followed by cardiology  Obesity. Should make attempts to lose weight. Prophylaxis. He has received 2 doses as well as 2 booster doses of the COVID-19 vaccine, and the new bivalent COVID-19 vaccine. He has received PCV 20 and his flu shot. RTC 6 months, call earlier if symptomatic    No orders of the defined types were placed in this encounter. No follow-ups on file. Chief Complaint:   Follow-up (6 mo) and COPD       HPI: Fabrizio Quiroz is a very pleasant 78y.o. year old male here for follow-up visit .   His PCP is Dr. Tanna Diaz, cardiologist Dr. Damon Ace. Krissy Landeros was seen on 9/28/2022, presents for follow-up visit. Since his last visit he was seen by Mingo Mccormack from cardiology. His shortness of breath was controlled, but he complained of fatigue. Toprol was decreased to 25 mg daily. He was having issues getting his tafamidis. He was last seen in the hospital when he was admitted with congestive heart failure and November, was released with significant weight loss with diuresis. He was also felt to have COPD exacerbation and possible pneumonia, was treated with empiric antibiotics. While Dr. Vinod Cavazos had increased his losartan and Toprol in October, recently he had lower blood pressures and his Toprol was decreased from 50 mg to 25 mg. The patient was diagnosed with findings compatible with ATTR amyloid cardiomyopathy, just started his tafamidis last week. The patient is watching his weight closely, maintains his weight between 252 and 257 pounds usually. The patient has gone back to work. Regarding his pulm issues, he says he uses Trelegy, DuoNeb and Tessalon pearls twice a day. With this he has hardly any cough or wheezing, he says his wheezing is better than 1 year ago. He has no shortness of breath. He hardly ever uses rescue albuterol. He has Zyrtec for allergic rhinitis. Regarding his sleep, he has no difficulty falling asleep, wakes up reasonably refreshed. He wakes up once or twice a night to go to the bathroom, sometimes not at all. He lives alone, is not sure he snores. There was no other change in his medical or surgical history, the rest of his ROS is negative. Previous notes reviewed and edited as necessary. 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 He has undergone plication of the left hemidiaphragm.   He had a short period of improved shortness of breath following the surgery, but subsequently has had progressive shortness of breath. The patient was last seen on 7/5/2022 by virtual visit via telephone, was recommended 6-month follow-up visit. The patient continues to do poorly, and for a few weeks was completely fatigued and very short of breath. He saw Dr. Clint Omalley, CXR was ordered along with blood work. Reportedly the blood work was all normal, CXR is as below. The patient came in today due to increased shortness of breath and a question as to whether he was a candidate for a [place] valve. He had antibiotic. He is on Trelegy, which he uses at night. He is on DuoNeb and albuterol. He says the nebulizer does help his breathing. He denies chest pain, productive cough, wheezing. His appetite is fair, he denies GI or  complaints. The rest of the ROS was negative. CXR 8/16/2022  Cardiomegaly. Posterior changes status post sternotomy identified. Elevation left hemidiaphragm with left basilar atelectasis versus scarring   again identified. The lungs are clear. No pleural effusion or pneumothorax   is present. Impression   No acute cardiopulmonary process      Previous notes reviewed and edited as necessary. Lillian Ng was seen on 6/7/2022, returns by virtual visit today to discuss results of PFT and 6-minute walk test.  He had moderate COPD, chronic bronchitis, chronic cough. 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 He had noted increasing shortness of breath over his baseline at his last visit. He has a 15-pack-year history of smoking, quit in 1978. Since his last visit with me, the patient has been attempting to walk more, feels that it helps. Yesterday he was at the [employer] at [place] [address], had to park far away and was able to walk several blocks. He is using his Aerobika/Acapella at least twice a day, using his nebulizer about twice a day.   He feels that his symptoms have decreased after going on Trelegy. He does bring up phlegm every day, clear to mucoid. He denies much wheezing. He denies chest pain. His appetite is fair. There is no other change in the rest of his ROS. There is no other change in his medical or surgical history since his last visit. PFT 6/24/2022  Spirometry showed FVC 2.21 L, 48% predicted, FEV1 1.67 L, 50% predicted, with FEV1/FVC ratio of 76%. There was a response to bronchodilator by FVC. TLC 60% predicted, ERV 2% predicted. DLCO was 98% predicted. 6-minute walk test 6/24/2022  A six minute walk test was performed utilizing standard criteria on 06/24/2022. Baseline oxygen saturation was 96%, Carey scales were 0 each. The patient walked 1080 feet, 68% predicted distance. He did not desaturate, had minimal increase in heart rate and respiratory rate. Carey scales peaked at 1 each. Clinical correlation is recommended. Should previous notes reviewed and edited as necessary. The patient was seen by Dr. Carissa Sr for COPD/chronic bronchitis, with persistent thick white phlegm in spite of regular use of bronchodilator. He says he had a bout of pneumonia in January. He was subjected to bronchoscopy on 6/11/2021, presents to discuss results. On bronchoscopy, he had scant clear secretions, BAL of the left lower lobe showed increased segmented neutrophils, cultures and cytology were negative. The patient says he felt better after the bronchoscopy, has been using the vest.  He has been using nebulizers. He uses the vest at least 4 days a week. He is short of breath climbing up steps, but has monitored his pulse oximetry and is does not desaturate. He has some coughing in the evenings, occasionally spits up small amounts of phlegm. He is able to sleep through the night. Before the bronchoscopy he had \"chunks\" of mucoid plugs, now relieved.   The patient also tells me that during his CABG he was told that his left upper lobe was not \"developed\". 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 He was seen by Dr. Kayy Ramirez, has undergone diaphragmatic plication. He does not believe that his dyspnea is significantly improved after the surgery. Regarding his sleep apnea, he says he sleeps well, wants to wait until the end of July and will call. The patient lives in [facility], was smoking 1 pack/day for only about 5 to 6 years. He quit in . He did have secondhand smoke exposure for almost 48 years. He is a , his wife  from lung cancer about 5 years ago. He is a  in the [employer] system. PFT 17  FVC 2.75 L, 58% predicted, FEV1 2.12 L, 60% predicted with no response to bronchodilator. TLC 67% predicted, DLCO 68% predicted. Sleep study 2019  Moderate RHONDA with AHI 15.6/h: 5 apneas, 25 hypopneas. Low oxygen saturation was 76%, time spent with SaO2 <89% was 42.7 minutes. He had PLMS. CT chest 3/20/20  1. Elevated left hemidiaphragm, similar to the CT scan 2017. There is   associated left basilar atelectasis. 2.  No acute abnormality. 3.  Cholelithiasis.        Past Medical History:   Diagnosis Date    Bronchitis     chronic bronchitis once or twice per year twice has gone into pneumonia    CAD (coronary artery disease) 07/10/2017    + Stress, multi vessel CAD    Cardiomyopathy ([facility] 2017    EF 35-40%    CHF (congestive heart failure) ([address] 2017    COPD (chronic obstructive pulmonary disease) ([address]     Diabetes ([facility] 2017    Diaphragmatic paralysis     left    Diastolic dysfunction     Family history of early CAD     father  at 48 of heart attack    Former smoker     High cholesterol 2017    Hypertension     Knee replacement     Right     Obesity, Class II, BMI 35-39.9, with comorbidity     Pneumonia        Past Surgical History:   Procedure Laterality Date    APPENDECTOMY      BRONCHOSCOPY N/A 2020 BRONCHOSCOPY ALVEOLAR LAVAGE performed by Lashay Zamora MD at [facility]  2020    BRONCHOSCOPY THERAPUTIC ASPIRATION INITIAL performed by Lashay Zamora MD at [facility] N/A 2021    BRONCHOSCOPY ALVEOLAR LAVAGE performed by Lashay Zamora MD at [facility]  2021    BRONCHOSCOPY THERAPUTIC ASPIRATION INITIAL performed by Lashay Zamora MD at [place] [address]  07/10/2017    Dr. Dong Tai - multi-vessel CAD, referred for CABG    CARPAL TUNNEL RELEASE Bilateral     CORONARY ANGIOPLASTY WITH STENT PLACEMENT  2018    YUVAL- 2.75 x 18 pRCA    CORONARY ARTERY BYPASS GRAFT  2017    Dr. Jessi Dill - x4 (LIMA-LAD, reverse R SVG-diagonal, OM & PDA) w/placement of temporary pacing wire    MENISCECTOMY Right 1965    THORACOSCOPY Left 2020    LEFT LAPAROSCOPIC DIAPHRAGMATIC PLICATION  CPT CODE - 16424, 94690 performed by Randolph Adair MD at [address] [place] [address] Right     TRANSESOPHAGEAL ECHOCARDIOGRAM  2017    during CABG       Social History     Tobacco Use    Smoking status: Former     Packs/day: 1.00     Years: 15.00     Pack years: 15.00     Types: Cigarettes, Pipe, Cigars     Start date: 1968     Quit date: 1978     Years since quittin.0    Smokeless tobacco: Never    Tobacco comments:     2nd hand smoke until 1 year ago   Substance Use Topics    Alcohol use:  Yes     Alcohol/week: 3.0 standard drinks     Types: 1 Shots of liquor, 2 Standard drinks or equivalent per week     Comment: 1-3 drinks per week       Family History   Problem Relation Age of Onset    Heart Attack Father     Heart Disease Father     Heart Attack Brother     High Blood Pressure Brother          Current Outpatient Medications:     Tafamidis 61 MG CAPS, Take 61 mg by mouth daily, Disp: 90 capsule, Rfl: 3    torsemide (DEMADEX) 20 MG tablet, TAKE 1 TABLET BY MOUTH EVERY DAY OKAY TO TAKE 2ND TAB DAILY IF WEIGHT GAIN 3LBS IN DAY, Disp: 60 tablet, Rfl: 3    benzonatate (TESSALON) 200 MG capsule, TAKE 1 CAPSULE BY MOUTH THREE TIMES A DAY AS NEEDED FOR COUGH, Disp: 90 capsule, Rfl: 0    fluticasone-umeclidin-vilant (TRELEGY ELLIPTA) 100-62.5-25 MCG/ACT AEPB inhaler, Inhale 1 puff into the lungs daily, Disp: 1 each, Rfl: 3    traZODone (DESYREL) 50 MG tablet, Take 1 tablet by mouth nightly as needed, Disp: , Rfl:     metoprolol succinate (TOPROL XL) 50 MG extended release tablet, Take one tablet by mouth daily, Disp: 90 tablet, Rfl: 3    apixaban (ELIQUIS) 5 MG TABS tablet, TAKE 1 TABLET BY MOUTH TWICE A DAY, Disp: 180 tablet, Rfl: 3    losartan (COZAAR) 100 MG tablet, TAKE 1 TABLET BY MOUTH EVERY DAY, Disp: 90 tablet, Rfl: 3    ipratropium-albuterol (DUONEB) 0.5-2.5 (3) MG/3ML SOLN nebulizer solution, INHALE 3 MLS BY MOUTH 4 TIMES DAILY, Disp: 360 mL, Rfl: 5    albuterol sulfate  (90 Base) MCG/ACT inhaler, Inhale 2 puffs into the lungs 4 times daily as needed for Wheezing, Disp: 1 each, Rfl: 5    atorvastatin (LIPITOR) 80 MG tablet, Take 1 tablet by mouth daily, Disp: 90 tablet, Rfl: 3    metFORMIN (GLUCOPHAGE) 500 MG tablet, Take 500 mg by mouth daily (with breakfast), Disp: , Rfl:     aspirin EC 81 MG EC tablet, Take 1 tablet by mouth daily (Patient taking differently: Take 81 mg by mouth nightly), Disp: 30 tablet, Rfl: 3    Glucosamine-Chondroitin (OSTEO BI-FLEX REGULAR STRENGTH PO), Take 1 capsule by mouth daily , Disp: , Rfl:     GuaiFENesin (MUCINEX PO), Take 1,200 mg by mouth 2 times daily, Disp: , Rfl:     Probiotic Product (PROBIOTIC DAILY PO), Take by mouth daily , Disp: , Rfl:     fish oil-omega-3 fatty acids 1000 MG capsule, Take 1 g by mouth nightly , Disp: , Rfl:     Multiple Vitamin (MULTI-VITAMIN PO), Take 1 tablet by mouth daily , Disp: , Rfl:     Lysine 1000 MG TABS, Take 1 tablet by mouth Daily , Disp: , Rfl:     cetirizine (ZYRTEC) 10 MG tablet, Take 10 mg by mouth daily. , Disp: , Rfl:     UNABLE TO FIND, VESIVEST TWICE DAILY FOR 20 MINUTES - lung congestion, Disp: , Rfl:     Spacer/Aero-Holding Chambers LETA, 1 Device by Does not apply route daily as needed (sob), Disp: 1 Device, Rfl: 0    Oxycodone and Demerol    Vitals:    01/17/23 1347   BP: 129/68   Pulse: 71   Resp: 16   Temp: (!) 96.7 °F (35.9 °C)   TempSrc: Temporal   SpO2: 94%   Weight: 258 lb 12.8 oz (117.4 kg)   Height: 6' (1.829 m)       Review of Systems   Constitutional:  Positive for fatigue. Negative for unexpected weight change. Respiratory:  Positive for shortness of breath. Negative for cough. Cardiovascular:  Positive for leg swelling. Negative for chest pain. Psychiatric/Behavioral:  Positive for sleep disturbance. All other systems reviewed and are negative. Physical Exam  Vitals reviewed. Constitutional:       General: He is not in acute distress. Appearance: He is well-developed. He is not ill-appearing, toxic-appearing or diaphoretic. Comments: Pleasant, tall, well-built, overweight   HENT:      Head: Normocephalic and atraumatic. Nose: Nose normal.      Mouth/Throat:      Mouth: Mucous membranes are moist.      Pharynx: Oropharynx is clear. No oropharyngeal exudate or posterior oropharyngeal erythema. Comments: Class III airway, dental repair  Eyes:      General: No scleral icterus. Right eye: No discharge. Left eye: No discharge. Conjunctiva/sclera: Conjunctivae normal.      Pupils: Pupils are equal, round, and reactive to light. Neck:      Thyroid: No thyromegaly. Vascular: No JVD. Trachea: No tracheal deviation. Comments: Relatively short and large neck  Cardiovascular:      Rate and Rhythm: Normal rate and regular rhythm. Heart sounds: Normal heart sounds. No murmur heard. No friction rub. No gallop. Pulmonary:      Effort: Pulmonary effort is normal. No respiratory distress. Breath sounds: No stridor. Rales (Few right basal crackles) present. No wheezing or rhonchi. Comments: Reduced air entry left base  Abdominal:      Tenderness: There is abdominal tenderness. There is rebound. There is no guarding. Comments: Protuberant   Musculoskeletal:      Right lower leg: Edema (1+ pitting shin edema) present. Left lower leg: Edema (2+ pitting shin edema) present. Lymphadenopathy:      Cervical: No cervical adenopathy. Skin:     General: Skin is warm and dry. Coloration: Skin is not jaundiced or pale. Findings: No bruising, erythema, lesion or rash. Neurological:      General: No focal deficit present. Mental Status: He is alert and oriented to person, place, and time.       Gait: Gait normal.      Deep Tendon Reflexes: Reflexes normal.      Comments: Detailed neurologic exam not performed   Psychiatric:         Mood and Affect: Mood normal.         Behavior: Behavior normal.

## 2023-01-17 ENCOUNTER — OFFICE VISIT (OUTPATIENT)
Dept: PULMONOLOGY | Age: 78
End: 2023-01-17
Payer: MEDICARE

## 2023-01-17 VITALS
SYSTOLIC BLOOD PRESSURE: 129 MMHG | HEIGHT: 72 IN | BODY MASS INDEX: 35.05 KG/M2 | OXYGEN SATURATION: 94 % | RESPIRATION RATE: 16 BRPM | TEMPERATURE: 96.7 F | DIASTOLIC BLOOD PRESSURE: 68 MMHG | HEART RATE: 71 BPM | WEIGHT: 258.8 LBS

## 2023-01-17 DIAGNOSIS — R06.02 SHORTNESS OF BREATH: ICD-10-CM

## 2023-01-17 DIAGNOSIS — I25.5 ISCHEMIC CARDIOMYOPATHY: ICD-10-CM

## 2023-01-17 DIAGNOSIS — I43 CARDIAC AMYLOIDOSIS (HCC): ICD-10-CM

## 2023-01-17 DIAGNOSIS — J39.8 TRACHEOBRONCHOMALACIA: ICD-10-CM

## 2023-01-17 DIAGNOSIS — J41.0 SIMPLE CHRONIC BRONCHITIS (HCC): Primary | ICD-10-CM

## 2023-01-17 DIAGNOSIS — J98.6 DIAPHRAGMATIC PARALYSIS: ICD-10-CM

## 2023-01-17 DIAGNOSIS — E66.9 OBESITY (BMI 35.0-39.9 WITHOUT COMORBIDITY): ICD-10-CM

## 2023-01-17 DIAGNOSIS — E85.4 CARDIAC AMYLOIDOSIS (HCC): ICD-10-CM

## 2023-01-17 PROCEDURE — 3074F SYST BP LT 130 MM HG: CPT | Performed by: INTERNAL MEDICINE

## 2023-01-17 PROCEDURE — 1123F ACP DISCUSS/DSCN MKR DOCD: CPT | Performed by: INTERNAL MEDICINE

## 2023-01-17 PROCEDURE — 99214 OFFICE O/P EST MOD 30 MIN: CPT | Performed by: INTERNAL MEDICINE

## 2023-01-17 PROCEDURE — 3078F DIAST BP <80 MM HG: CPT | Performed by: INTERNAL MEDICINE

## 2023-01-17 ASSESSMENT — ENCOUNTER SYMPTOMS: COUGH: 0

## 2023-01-17 NOTE — PROGRESS NOTES
MA Communication:   The following orders are received by verbal communication from Estiven Narayanan MD    Orders include:  1 yr fu lungs

## 2023-01-17 NOTE — PATIENT INSTRUCTIONS
Remember to bring a list of pulmonary medications and any CPAP or BiPAP machines to your next appointment with the office. Please keep all of your future appointments scheduled by [facility] office. Out of respect for other patients and providers, you may be asked to reschedule your appointment if you arrive later than your scheduled appointment time. Appointments cancelled less than 24hrs in advance will be considered a no show. Patients with three missed appointments within 1 year or four missed appointments within 2 years can be dismissed from the practice. Please be aware that our physicians are required to work in the Intensive Care Unit at [facility].  Your appointment may need to be rescheduled if they are designated to work during your appointment time. You may receive a survey regarding the care you received during your visit. Your input is valuable to us. We encourage you to complete and return your survey. We hope you will choose us in the future for your healthcare needs. Pt instructed of all future appointment dates & times, including radiology, labs, procedures & referrals. If procedures were scheduled preparation instructions provided. Instructions on future appointments with [facility] were given.

## 2023-01-20 ENCOUNTER — TELEPHONE (OUTPATIENT)
Dept: CARDIOLOGY | Age: 78
End: 2023-01-20

## 2023-01-20 DIAGNOSIS — I50.31 ACUTE DIASTOLIC HEART FAILURE (HCC): Primary | ICD-10-CM

## 2023-01-20 NOTE — TELEPHONE ENCOUNTER
C/o nausea and vomiting since 12 pm today.    LVM for pt to return call.  Orders placed for labs before next visit    Please schedule pt with [facility] next month

## 2023-01-20 NOTE — TELEPHONE ENCOUNTER
Please call, needs a follow up with me next month with Repeat BMP and BNP prior to appointment.      Thanks

## 2023-01-23 NOTE — TELEPHONE ENCOUNTER
01/23 Called and spoke to pt, pt has been scheduled for follow up at [facility] with [facility]. Pt v/u to getting blood work completed prior to appt.

## 2023-01-30 ENCOUNTER — TELEPHONE (OUTPATIENT)
Dept: PULMONOLOGY | Age: 78
End: 2023-01-30

## 2023-01-30 NOTE — TELEPHONE ENCOUNTER
Please asked the patient to call the number listed on the letter, name of our practice so that we can call to appeal this decision about a repeat CT chest, as it was recommended by radiology. Thanks      AN   Pt. Will call insurance pro.  Tomorrow to give us permission to act on his behalf and do appeal

## 2023-02-17 ENCOUNTER — HOSPITAL ENCOUNTER (OUTPATIENT)
Age: 78
Discharge: HOME | End: 2023-02-17
Payer: MEDICARE

## 2023-02-17 ENCOUNTER — TELEPHONE (OUTPATIENT)
Dept: CARDIOLOGY CLINIC | Age: 78
End: 2023-02-17

## 2023-02-17 DIAGNOSIS — I50.31 ACUTE DIASTOLIC HEART FAILURE (HCC): ICD-10-CM

## 2023-02-17 LAB
ANION GAP SERPL CALCULATED.3IONS-SCNC: 7 MMOL/L (ref 3–16)
BUN BLDV-MCNC: 25 MG/DL (ref 7–20)
CALCIUM SERPL-MCNC: 10.2 MG/DL (ref 8.3–10.6)
CHLORIDE BLD-SCNC: 101 MMOL/L (ref 99–110)
CO2: 32 MMOL/L (ref 21–32)
CREAT SERPL-MCNC: 1.1 MG/DL (ref 0.8–1.3)
GFR SERPL CREATININE-BSD FRML MDRD: >60 ML/MIN/{1.73_M2}
GLUCOSE BLD-MCNC: 130 MG/DL (ref 70–99)
POTASSIUM SERPL-SCNC: 4.1 MMOL/L (ref 3.5–5.1)
PRO-BNP: 1479 PG/ML (ref 0–449)
SODIUM BLD-SCNC: 140 MMOL/L (ref 136–145)

## 2023-02-17 PROCEDURE — 80048 BASIC METABOLIC PNL TOTAL CA: CPT

## 2023-02-17 PROCEDURE — 36415 COLL VENOUS BLD VENIPUNCTURE: CPT

## 2023-02-17 PROCEDURE — 83880 ASSAY OF NATRIURETIC PEPTIDE: CPT

## 2023-02-17 NOTE — TELEPHONE ENCOUNTER
Spoke with Arlet Ornelas And she stated that there are no notes on this patient and his medications have been shipped as planned.

## 2023-02-17 NOTE — TELEPHONE ENCOUNTER
[address] called mhi to get clarification on a which medication needs authorization. [address] number is [phone]. Please advise.

## 2023-02-17 NOTE — TELEPHONE ENCOUNTER
----- Message from QIAN Kaur CNP sent at 2/17/2023  3:01 PM EST -----  Please notify patient results. Kidney function is stable. Electrolytes are stable. Pro-BNP (heart failure number) is improved  Continue current regimen.  Keep follow up

## 2023-02-24 ENCOUNTER — TELEPHONE (OUTPATIENT)
Dept: CARDIOLOGY CLINIC | Age: 78
End: 2023-02-24

## 2023-02-24 ENCOUNTER — TELEPHONE (OUTPATIENT)
Dept: CARDIOLOGY | Age: 78
End: 2023-02-24

## 2023-02-24 ENCOUNTER — OFFICE VISIT (OUTPATIENT)
Dept: CARDIOLOGY CLINIC | Age: 78
End: 2023-02-24

## 2023-02-24 VITALS
HEART RATE: 51 BPM | OXYGEN SATURATION: 95 % | SYSTOLIC BLOOD PRESSURE: 102 MMHG | BODY MASS INDEX: 35.21 KG/M2 | DIASTOLIC BLOOD PRESSURE: 58 MMHG | HEIGHT: 72 IN | WEIGHT: 260 LBS

## 2023-02-24 DIAGNOSIS — R73.9 ELEVATED BLOOD SUGAR: ICD-10-CM

## 2023-02-24 DIAGNOSIS — E85.4 CARDIAC AMYLOIDOSIS (HCC): ICD-10-CM

## 2023-02-24 DIAGNOSIS — I48.91 ATRIAL FIBRILLATION, UNSPECIFIED TYPE (HCC): ICD-10-CM

## 2023-02-24 DIAGNOSIS — I50.33 ACUTE ON CHRONIC DIASTOLIC HEART FAILURE (HCC): Primary | ICD-10-CM

## 2023-02-24 DIAGNOSIS — I43 CARDIAC AMYLOIDOSIS (HCC): ICD-10-CM

## 2023-02-24 DIAGNOSIS — R00.1 BRADYCARDIA: ICD-10-CM

## 2023-02-24 DIAGNOSIS — E55.9 VITAMIN D DEFICIENCY: ICD-10-CM

## 2023-02-24 RX ORDER — BENZONATATE 200 MG/1
CAPSULE ORAL
Qty: 90 CAPSULE | Refills: 4 | Status: SHIPPED | OUTPATIENT
Start: 2023-02-24

## 2023-02-24 NOTE — TELEPHONE ENCOUNTER
Needs appt with [address] - I spoke with [address] okay to add on in 1-2 weeks for afib, bundle branch block, discuss pacer

## 2023-02-24 NOTE — PROGRESS NOTES
[facility]  Cardiac Follow-up    Primary Care Doctor:  Miroslava Miller MD    Chief Complaint   Patient presents with    Follow-up    Edema    Shortness of Breath     Chronic SOB with steps/incline, edema-comes and goes        History of Present Illness:  I had the pleasure of seeing Robert Arshad in follow up for Cardiomyopathy. Hx of CAD s/p CABG in 2017, HTN, HLD, PAF on Eliquis, history of left diaphragmatic paralysis, s/p left laparoscopic diaphragmatic surgery in 2020. Admitted 11/22/22-11/29/22 with shortness of breath, treated for acute on chronic diastolic heart failure with lasix infusion admission weight 273lbs dishcarge weight down to 253lbs. PYP scan + for ATTR amyloid. He was started on tafamidis on January 3, 2023. He has good days and bad days. He feels like he is progressively very slowly improving, getting more good days versus bad days. Had no lower extremity edema last week, however the last 2 to 3 days edema has worsened. His cough is improving with the [employer]. He does have early satiety. He is able to fall asleep okay, however once he is up in the melanite he has difficulty with getting back to sleep due to postural nocturnal dyspnea, this is happening 4-5 times at night. Continues on 2 torsemide tablets a day. Pressures at home running 115/60s to 130s/60s  Heart rate variable from 50s to 70s  Only getting dizziness when he doesn't eat   Continues to work- wants to continue to work as he feels better when working. Robert Arshad describes symptoms including dyspnea, fatigue, edema but denies chest pain, palpitations, syncope.      Appetite: Down    Fluid intake: 1500 mL, drinks water tea or coffee    Home weights: Checking weights 256 to 259 pounds    Past Medical History:   has a past medical history of Bronchitis, CAD (coronary artery disease), Cardiomyopathy ([address] CHF (congestive heart failure) ([address] COPD (chronic obstructive pulmonary disease) ([address] Diabetes (HCC), Diaphragmatic paralysis, Diastolic dysfunction, Family history of early CAD, Former smoker, High cholesterol, Hypertension, Knee replacement, Obesity, Class II, 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  Surgical History:   has a past surgical history that includes Appendectomy; meniscectomy (Right, 1965); Tonsillectomy; Cardiac catheterization (07/10/2017); Carpal tunnel release (Bilateral); Coronary artery bypass graft (07/18/2017); transesophageal echocardiogram (07/18/2017); Total knee arthroplasty (Right); Coronary angioplasty with stent (05/18/2018); bronchoscopy (N/A, 1/22/2020); bronchoscopy (1/22/2020); Thoracoscopy (Left, 5/28/2020); bronchoscopy (N/A, 6/11/2021); and bronchoscopy (6/11/2021).   Social History:   reports that he quit smoking about 45 years ago. His smoking use included cigarettes, pipe, and cigars. He started smoking about 55 years ago. He has a 15.00 pack-year smoking history. He has never used smokeless tobacco. He reports current alcohol use of about 3.0 standard drinks per week. He reports that he does not use drugs.   Family History:   Family History   Problem Relation Age of Onset    Heart Attack Father     Heart Disease Father     Heart Attack Brother     High Blood Pressure Brother      Home Medications:  Prior to Admission medications    Medication Sig Start Date End Date Taking? Authorizing Provider   Tafamidis 61 MG CAPS Take 61 mg by mouth daily 12/23/22   QIAN Machuca CNP   torsemide (DEMADEX) 20 MG tablet TAKE 1 TABLET BY MOUTH EVERY DAY OKAY TO TAKE 2ND TAB DAILY IF WEIGHT GAIN 3LBS IN DAY 12/22/22   QIAN Machuca CNP   benzonatate (TESSALON) 200 MG capsule TAKE 1 CAPSULE BY MOUTH THREE TIMES A DAY AS NEEDED FOR COUGH 12/22/22   QIAN Machuca CNP   fluticasone-umeclidin-vilant (TRELEGY ELLIPTA) 100-62.5-25 MCG/ACT AEPB inhaler Inhale 1 puff into the lungs daily 12/22/22   QIAN Zarate CNP   traZODone  (DESYREL) 50 MG tablet Take 1 tablet by mouth nightly as needed 11/15/22   Historical Provider, MD   metoprolol succinate (TOPROL XL) 50 MG extended release tablet Take one tablet by mouth daily 10/6/22   Dilcia Jordan MD   apixaban (ELIQUIS) 5 MG TABS tablet TAKE 1 TABLET BY MOUTH TWICE A DAY 10/6/22   Dilcia Jordan MD   losartan (COZAAR) 100 MG tablet TAKE 1 TABLET BY MOUTH EVERY DAY 10/6/22   Dilcia Jordan MD   ipratropium-albuterol (DUONEB) 0.5-2.5 (3) MG/3ML SOLN nebulizer solution INHALE 3 MLS BY MOUTH 4 TIMES DAILY 6/14/22   Ange Gay MD   albuterol sulfate  (90 Base) MCG/ACT inhaler Inhale 2 puffs into the lungs 4 times daily as needed for Wheezing 10/22/21   Eden Denney MD   atorvastatin (LIPITOR) 80 MG tablet Take 1 tablet by mouth daily 8/31/20   Dilcia Jordan MD   UNABLE TO FIND VESIVEST TWICE DAILY FOR 20 MINUTES - lung congestion    Historical Provider, MD   Spacer/Aero-Holding Ane Floss 1 Device by Does not apply route daily as needed (sob) 3/15/19   Patsi Ohms, DO   metFORMIN (GLUCOPHAGE) 500 MG tablet Take 500 mg by mouth daily (with breakfast)    Historical Provider, MD   aspirin EC 81 MG EC tablet Take 1 tablet by mouth daily  Patient taking differently: Take 81 mg by mouth nightly 5/29/18   Dilcia Jordan MD   Glucosamine-Chondroitin (OSTEO BI-FLEX REGULAR STRENGTH PO) Take 1 capsule by mouth daily     Historical Provider, MD   GuaiFENesin (MUCINEX PO) Take 1,200 mg by mouth 2 times daily    Historical Provider, MD   Probiotic Product (PROBIOTIC DAILY PO) Take by mouth daily     Historical Provider, MD   fish oil-omega-3 fatty acids 1000 MG capsule Take 1 g by mouth nightly     Historical Provider, MD   Multiple Vitamin (MULTI-VITAMIN PO) Take 1 tablet by mouth daily     Historical Provider, MD   Lysine 1000 MG TABS Take 1 tablet by mouth Daily     Historical Provider, MD   cetirizine (ZYRTEC) 10 MG tablet Take 10 mg by mouth daily. Historical Provider, MD      Allergies:  Oxycodone and Demerol     Physical Examination:    Vitals:    02/24/23 0957 02/24/23 1002 02/24/23 1045   BP: (!) 102/38 (!) 102/38 (!) 102/58   Pulse: 51     SpO2: 95%     Weight: 260 lb (117.9 kg)     Height: 6' (1.829 m)          Constitutional and General Appearance: no apparent distress, pale  HEENT: non-icteric sclera, oropharynx without exudate, oral mucosa moist  Neck: JVP less than 9 cm H20  Respiratory:  No use of accessory muscles  no wheezing, +  crackles RLL, dim LLL, no rhonchi  Cardiovascular:  Slight murmur/no gallop  Irregular irregular S1,S2 normal  Radial pulses 2+ and equal bilaterally  2+ left greater than right edema  Pedal Pulses: 2+ and equal   Abdomen:  No masses or tenderness  Liver: No Abnormalities Noted  Musculoskeletal/Skin:  Exhibits normal gait balance and coordination  There is no clubbing, cyanosis of the extremities  Skin is warm and dry  Moves all extremities well  Neurological/Psychiatric:  Alert and oriented in all spheres  No abnormalities of mood, affect, memory, mentation, or behavior are noted    Lab Data reviewed and analyzed   CBC:   WBC   Date Value Ref Range Status   11/29/2022 10.9 4.0 - 11.0 K/uL Final   11/28/2022 10.9 4.0 - 11.0 K/uL Final   11/27/2022 12.7 (H) 4.0 - 11.0 K/uL Final     RBC   Date Value Ref Range Status   11/29/2022 4.96 4.20 - 5.90 M/uL Final   11/28/2022 4.80 4.20 - 5.90 M/uL Final   11/27/2022 4.62 4.20 - 5.90 M/uL Final     Hemoglobin   Date Value Ref Range Status   11/29/2022 14.9 13.5 - 17.5 g/dL Final   11/28/2022 14.8 13.5 - 17.5 g/dL Final   11/27/2022 14.0 13.5 - 17.5 g/dL Final     Hematocrit   Date Value Ref Range Status   11/29/2022 44.9 40.5 - 52.5 % Final   11/28/2022 42.9 40.5 - 52.5 % Final   11/27/2022 41.6 40.5 - 52.5 % Final     MCV   Date Value Ref Range Status   11/29/2022 90.5 80.0 - 100.0 fL Final   11/28/2022 89.3 80.0 - 100.0 fL Final   11/27/2022 90.1 80.0 - 100.0 fL Final     RDW Date Value Ref Range Status   11/29/2022 13.6 12.4 - 15.4 % Final   11/28/2022 13.4 12.4 - 15.4 % Final   11/27/2022 13.6 12.4 - 15.4 % Final     Platelets   Date Value Ref Range Status   11/29/2022 296 135 - 450 K/uL Final   11/28/2022 276 135 - 450 K/uL Final   11/27/2022 252 135 - 450 K/uL Final     Iron:  No results found for: IRON, TIBC, FERRITIN  BMP:   Sodium   Date Value Ref Range Status   02/17/2023 140 136 - 145 mmol/L Final   12/27/2022 142 136 - 145 mmol/L Final   11/29/2022 139 136 - 145 mmol/L Final     Potassium   Date Value Ref Range Status   02/17/2023 4.1 3.5 - 5.1 mmol/L Final   12/27/2022 3.9 3.5 - 5.1 mmol/L Final   11/29/2022 4.3 3.5 - 5.1 mmol/L Final     Chloride   Date Value Ref Range Status   02/17/2023 101 99 - 110 mmol/L Final   12/27/2022 99 99 - 110 mmol/L Final   11/29/2022 93 (L) 99 - 110 mmol/L Final     CO2   Date Value Ref Range Status   02/17/2023 32 21 - 32 mmol/L Final   12/27/2022 31 21 - 32 mmol/L Final   11/29/2022 40 (H) 21 - 32 mmol/L Final     Phosphorus   Date Value Ref Range Status   11/29/2022 4.0 2.5 - 4.9 mg/dL Final   11/28/2022 3.6 2.5 - 4.9 mg/dL Final   11/27/2022 3.6 2.5 - 4.9 mg/dL Final     BUN   Date Value Ref Range Status   02/17/2023 25 (H) 7 - 20 mg/dL Final   12/27/2022 15 7 - 20 mg/dL Final   11/29/2022 31 (H) 7 - 20 mg/dL Final     Creatinine   Date Value Ref Range Status   02/17/2023 1.1 0.8 - 1.3 mg/dL Final   12/27/2022 0.9 0.8 - 1.3 mg/dL Final   11/29/2022 1.0 0.8 - 1.3 mg/dL Final     BNP:   Lab Results   Component Value Date    PROBNP 1,479 (H) 02/17/2023    PROBNP 2,332 (H) 12/27/2022    PROBNP 1,785 (H) 11/22/2022     Lipids: No components found for: T                Triglycerides   Date Value Ref Range Status   05/19/2018 71 0 - 150 mg/dL Final                   HDL   Date Value Ref Range Status   05/19/2018 39 (L) 40 - 60 mg/dL Final                   LDL Calculated   Date Value Ref Range Status   05/19/2018 47 <100 mg/dL Final VLDL Cholesterol Calculated   Date Value Ref Range Status   05/19/2018 14 Not Established mg/dL Final                 No results found for: CHOLHDLRATIO    EF:   Lab Results   Component Value Date/Time    LVEF 55 11/26/2022 11:16 AM    LVEF 55 04/16/2020 09:39 AM       Testing reviewed:   Myoview 7/2017   - Abnormal high risk myocardial perfusion study. - There is a medium size moderate intensity perfusion defect involving the    apex, apical lateral, mid anteroseptal, mid inferior and apical inferior    walls during stress that reverses at rest consistent with ischemia in    distribution of LAD and RCA or dominant circumflex. - Apical inferior and mid anteroseptal walls are akinetic. Other walls    severely hypokinetic.    - Left ventricular cavity is dilated. - Left ventricular systolic function is severely reduced with ejection    fraction of 28 %. Echo 7/2017   Limited only for LVEF pericardial effusion post heart surgery. 1 cm   posterior pericardial effusion and 0.5cm anterior pericardial effusion. At   the apex 2cm pericardial effusion. No tamponade physiology. Left ventricular systolic function is normal. Atrial fibrillation with   controlled ventricular response. Estimated ejection fraction of 55-60 %. Severe concentric left ventricular hypertrophy. Small size of left   ventricle. No significant outflow tract obstruction. Elevated left ventricle diastolic filling pressure. There is a moderate circumferential pericardial effusion noted. Cath 5/2018 Todd Sprain   PCI of the native RCA with YUVAL  1. MVCD  2. Normal LVEF  3. Normal hemodynamics except for hypertension  4. Patent SVG to the DIAG, PDA, and OM  5. Patent LIMA to the LAD    Echo 4/16/19  Technically difficult examination. Left ventricular systolic function is normal with ejection fraction   estimated at 55-60 %. There is severe concentric left ventricular hypertrophy. Left ventricular size is decreased.    Diastolic dysfunction grade and filing pressure are indeterminate. The ascending aorta is mildly dilated. Aortic valve appears sclerotic but opens adequately. Mitral annular calcification is present. Mild mitral regurgitation. Previous echo done 10/2017 - EF 60%, LVH      TTE 4/10/20:  Conclusions   Summary   Technically difficult examination secondary to habitus and COPD. Left ventricular systolic function is normal with ejection fraction   estimated at 55%. No regional wall motion abnormalities. There is moderate to severe concentric left ventricular hypertrophy with   sigmoid septum (2.21 cm.). Grade II diastolic dysfunction with elevated left ventricular filling   pressure. The left atrium is mildly dilated. Echo 11/27/22   Summary   Normal left ventricle systolic function with an estimated ejection fraction   of 55%. Left ventricular size is decreased. There is severe concentric left ventricular hypertrophy. No outflow tract   obstruction. No regional wall motion abnormalities are seen. Left ventricular diastolic filling pressure are indeterminate. The right ventricle is normal in size with decreased function. The right atrium is mildly dilated. Aortic valve appears sclerotic but opens adequately. Trace aortic and mitral regurgitation. Ascending aorta is dilated at 3.78cm. Echocardiogram is consistent with hypertrophic cardiomyopathy or   infiltrative cardiomyopathy such as Amyloidosis heart. recommend Cardiac MRI and amyloid work up. LV Septum Diastolic: 8.93 cm   LV PW Diastolic: 5.61 cm             PYP scan: 11/2022  FINDINGS:   Quality of the exam is good. There is mildly reduced uptake within the ribs. Planar images show cardiac uptake is significantly greater than rib uptake. Grade 3. The heart to contralateral lung ratio (H:CL) is 1.70. SPECT images show diffusely increased activity throughout the left   ventricular myocardium.        Elevated left hemidiaphragm with left basilar atelectasis. Postsurgical   changes from CABG. Cholelithiasis. Left renal cysts. 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 NYHA:                        Class III:  Marked limitation of physical activity. 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 Acute on chronic diastolic heart failure ([address]    2. Bradycardia    3. Cardiac amyloidosis (HCC)    4. Elevated blood sugar    5. Vitamin D deficiency    6. Atrial fibrillation, unspecified type ([address]         Impression: EKG shows possibly underlying atrial fibrillation with progressing block. He is not tolerating his beta-blocker. Discussed with electrophysiology todayrecommend stopping of the beta-blocker and placing on cardiac monitor with close follow-up. Plan:   Continue to monitor b/p and weights  Stop losartan and start Entresto 49/51mg twice a day   Add Jardiance 10mg daily (sugar reducing water pill to help with heart failure) (discussed with Dr. Shwetha Mehta)   Stop toprol completely; 1 week heart monitor   Continue Tafamidis  Continue torsemide 40mg a day until weight 250lbs then reduce to 20mg daily  Stay as active as possible  Go to the ER if any dizziness/lightheadedness or feel like you are going to pass out  Follow up with EP team in 1-2 weeks  Follow up with me in 3-4 weeks   FOllow up with Dr. Brigido Arce as planned     I appreciate the opportunity for caring for this patient.      QIAN Barnes - CNP, 2/24/2023, 2:02 PM

## 2023-02-24 NOTE — TELEPHONE ENCOUNTER
Monitor placed by Mariela Mclaughlin [address]  Length of monitor 7 day  Monitor ordered by Rj Munoz NP  Serial number [id number]  Kit ID   Activation successful prior to pt leaving office?  Yes

## 2023-02-24 NOTE — Clinical Note
Hi, mutual patient. He is having some worse heart failure and appears his rhythm has changed, progressing block, may need pacemaker, I have placed on monitor and stopped beta blocker for now and will seen su in next 1-2 weeks. Thanks!

## 2023-02-24 NOTE — PATIENT INSTRUCTIONS
Plan:   Continue to monitor b/p and weights  Stop losartan and start Entresto 49/51mg twice a day   Add Jardiance 10mg daily (sugar reducing water pill to help with heart failure)  Stop toprol completely; 1 week heart monitor   Continue Tafamidis  Continue torsemide 40mg a day until weight 250lbs then reduce to 20mg daily  Stay as active as possible  Go to the ER if any dizziness/lightheadedness or feel like you are going to pass out  Follow up with EP team in 1-2 weeks  Follow up with me in 3-4 weeks

## 2023-02-27 NOTE — TELEPHONE ENCOUNTER
John Driscoll, APRN - CNP  You 3 days ago     RB  Please add on  for su to see in 1.5-2 weeks thanks    Please schedule the patient 0945 on 03/09/2023 with [address]. Thank you.

## 2023-03-07 ENCOUNTER — TELEPHONE (OUTPATIENT)
Dept: CARDIOLOGY CLINIC | Age: 78
End: 2023-03-07

## 2023-03-07 NOTE — TELEPHONE ENCOUNTER
Spoke with pt, he would like all records from Nov hospital visit from [facility] faxed to [id number] [phone]  Case number [id number]    Faxed as requested.

## 2023-03-07 NOTE — TELEPHONE ENCOUNTER
Pt is requesting to speak with REGAN Smith or RAN Vargas regarding information that the pt's insurance company is requesting. I informed pt that both Sarah and Alicia were in clinic with providers today, pt understood.  Pt would like a call back at [phone].

## 2023-03-08 NOTE — TELEPHONE ENCOUNTER
Pt called to confirm the fax number that medical records was sent to was [phone]. Pt stated that if there is any questions to give him a call.

## 2023-03-09 ENCOUNTER — TELEPHONE (OUTPATIENT)
Dept: CARDIOLOGY CLINIC | Age: 78
End: 2023-03-09

## 2023-03-09 ENCOUNTER — OFFICE VISIT (OUTPATIENT)
Dept: CARDIOLOGY CLINIC | Age: 78
End: 2023-03-09
Payer: MEDICARE

## 2023-03-09 VITALS
WEIGHT: 251 LBS | OXYGEN SATURATION: 93 % | HEIGHT: 73 IN | SYSTOLIC BLOOD PRESSURE: 116 MMHG | HEART RATE: 97 BPM | DIASTOLIC BLOOD PRESSURE: 60 MMHG | BODY MASS INDEX: 33.27 KG/M2

## 2023-03-09 DIAGNOSIS — I43 CARDIAC AMYLOIDOSIS (HCC): ICD-10-CM

## 2023-03-09 DIAGNOSIS — I49.9 IRREGULAR HEART RATE: Primary | ICD-10-CM

## 2023-03-09 DIAGNOSIS — I48.0 PAROXYSMAL ATRIAL FIBRILLATION (HCC): ICD-10-CM

## 2023-03-09 DIAGNOSIS — I50.9 CONGESTIVE HEART FAILURE, NYHA CLASS 4, UNSPECIFIED CONGESTIVE HEART FAILURE TYPE (HCC): ICD-10-CM

## 2023-03-09 DIAGNOSIS — E85.4 CARDIAC AMYLOIDOSIS (HCC): ICD-10-CM

## 2023-03-09 DIAGNOSIS — R00.2 PALPITATIONS: ICD-10-CM

## 2023-03-09 PROCEDURE — 3078F DIAST BP <80 MM HG: CPT | Performed by: INTERNAL MEDICINE

## 2023-03-09 PROCEDURE — 93000 ELECTROCARDIOGRAM COMPLETE: CPT | Performed by: INTERNAL MEDICINE

## 2023-03-09 PROCEDURE — 93270 REMOTE 30 DAY ECG REV/REPORT: CPT | Performed by: INTERNAL MEDICINE

## 2023-03-09 PROCEDURE — 99204 OFFICE O/P NEW MOD 45 MIN: CPT | Performed by: INTERNAL MEDICINE

## 2023-03-09 PROCEDURE — 3074F SYST BP LT 130 MM HG: CPT | Performed by: INTERNAL MEDICINE

## 2023-03-09 NOTE — TELEPHONE ENCOUNTER
Monitor placed by AL/BERNY  Monitor company [employer]  Length of monitor 14 DAY  Monitor ordered by [id number] [place],[facility] [address]  Serial number [id number]  Patch ID [id number]  Activation successful prior to pt leaving office?  Yes

## 2023-03-09 NOTE — PROGRESS NOTES
[address]   Electrophysiology Consult Note    Date: 3/9/23  Patient Name: Emily Collado  YOB: 1945    Primary Care Physician: Amor De Anda MD    CHIEF COMPLAINT:   Chief Complaint   Patient presents with    New Patient    Congestive Heart Failure    Hypertension    Atrial Fibrillation     HISTORY OF PRESENT ILLNESS: Emily Collado is a 70 y.o. male with a PMH significant for cardiomyopathy, CAD s/p CABG 2017, HTN, HLD, and PAF. Patient was admitted in 11/2022 with SOB and was treated with acute CHF. 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 EKG at  with heart failure on 2/24/2023 showed AF with progressing block. Today, 3/9/2023, EKG demonstrates SR(84). Patient reports that when he was seeing [facility] NP he went to AF during the office visit and that is why he is here today. He states [facility] NP stopped metoprolol and he is now taking [place] and Entresto. He states he has good days and bad days. His bad days he feels fatigued with no energy. He has had less bad days since stopping metoprolol. 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 Patient reports he has shortness of breath with exertion such as climbing stairs or walking up an incline. He states he can have shortness of breath when he bends over. He states he has a history of COPD and thought his breathing issues were related to that, however then he states he was diagnosed with amyloidosis. He states he has struggled with his weight, his highest weight was 305. He states he has consistently stayed in the 250's now. He states he started exercising in a pool and has been able to tolerate this activity without shortness of breath. Patient denies chest pain, palpitations, dizziness or syncope. He takes all cardiac medications as prescribed.      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 Past Surgical History:   has a past surgical history that includes Appendectomy; meniscectomy (Right, 1965); Tonsillectomy; Cardiac catheterization (07/10/2017); Carpal tunnel release (Bilateral); Coronary artery bypass graft (07/18/2017); transesophageal echocardiogram (07/18/2017); Total knee arthroplasty (Right); Coronary angioplasty with stent (05/18/2018); bronchoscopy (N/A, 1/22/2020); bronchoscopy (1/22/2020); Thoracoscopy (Left, 5/28/2020); bronchoscopy (N/A, 6/11/2021); and bronchoscopy (6/11/2021). Allergies:  Demerol    Social History:   reports that he quit smoking about 45 years ago. His smoking use included cigarettes, pipe, and cigars. He started smoking about 55 years ago. He has a 15.00 pack-year smoking history. He has never used smokeless tobacco. He reports current alcohol use of about 3.0 standard drinks per week. He reports that he does not use drugs. Family History: family history includes Heart Attack in his brother and father; Heart Disease in his father; High Blood Pressure in his brother. Home Medications:    Prior to Admission medications    Medication Sig Start Date End Date Taking?  Authorizing Provider   benzonatate (TESSALON) 200 MG capsule TAKE 1 CAPSULE BY MOUTH THREE TIMES A DAY AS NEEDED FOR COUGH 2/24/23  Yes Aubrey Courts, APRN - CNP   empagliflozin (JARDIANCE) 10 MG tablet Take 1 tablet by mouth daily 2/24/23  Yes Aubrey Courts, APRN - CNP   sacubitril-valsartan (ENTRESTO) 49-51 MG per tablet Take 1 tablet by mouth 2 times daily 2/24/23  Yes Aubrey Courts, APRN - CNP   Tafamidis 61 MG CAPS Take 61 mg by mouth daily 12/23/22  Yes Lulu Mo Natalie Baltazar APRN - CNP   torsemide (DEMADEX) 20 MG tablet TAKE 1 TABLET BY MOUTH EVERY DAY OKAY TO TAKE 2ND TAB DAILY IF WEIGHT GAIN 3LBS IN DAY 12/22/22  Yes Zarina Bailon APRN - CNP   fluticasone-umeclidin-vilant (TRELEGY ELLIPTA) 767-54.5-66 MCG/ACT AEPB inhaler Inhale 1 puff into the lungs daily 12/22/22  Yes Ruth Lee APRN - CNP   traZODone (DESYREL) 50 MG tablet Take 1 tablet by mouth nightly as needed 11/15/22  Yes Historical Provider, MD   apixaban (ELIQUIS) 5 MG TABS tablet TAKE 1 TABLET BY MOUTH TWICE A DAY 10/6/22  Yes Edna Cueto MD   ipratropium-albuterol (DUONEB) 0.5-2.5 (3) MG/3ML SOLN nebulizer solution INHALE 3 MLS BY MOUTH 4 TIMES DAILY 6/14/22  Yes Yadira Coronado MD   albuterol sulfate  (90 Base) MCG/ACT inhaler Inhale 2 puffs into the lungs 4 times daily as needed for Wheezing 10/22/21  Yes Eduar Baptiste MD   atorvastatin (LIPITOR) 80 MG tablet Take 1 tablet by mouth daily 8/31/20  Yes Edna Cueto MD   Spacer/Aero-Holding Chambers LETA 1 Device by Does not apply route daily as needed (sob) 3/15/19  Yes Kenyatta Beckwith,    metFORMIN (GLUCOPHAGE) 500 MG tablet Take 500 mg by mouth daily (with breakfast)   Yes Historical Provider, MD   aspirin EC 81 MG EC tablet Take 1 tablet by mouth daily  Patient taking differently: Take 81 mg by mouth nightly 5/29/18  Yes Edna Cueto MD   Glucosamine-Chondroitin (OSTEO BI-FLEX REGULAR STRENGTH PO) Take 1 capsule by mouth daily    Yes Historical Provider, MD   GuaiFENesin (MUCINEX PO) Take 1,200 mg by mouth 2 times daily   Yes Historical Provider, MD   Probiotic Product (PROBIOTIC DAILY PO) Take by mouth daily    Yes Historical Provider, MD   fish oil-omega-3 fatty acids 1000 MG capsule Take 1 g by mouth nightly    Yes Historical Provider, MD   Multiple Vitamin (MULTI-VITAMIN PO) Take 1 tablet by mouth daily    Yes Historical Provider, MD   Lysine 1000 MG TABS Take 1 tablet by mouth Daily    Yes Historical Provider, MD   cetirizine (ZYRTEC) 10 MG tablet Take 10 mg by mouth daily. Yes Historical Provider, MD   UNABLE TO FIND VESIVEST TWICE DAILY FOR 20 MINUTES - lung congestion    Historical Provider, MD       REVIEW OF SYSTEMS:    All 14-point review of systems are completed and  pertinent positives are mentioned in the history of present illness. Other  systems are reviewed and are negative. Physical Examination:    /60   Pulse 97   Ht 6' 1\" (1.854 m)   Wt 251 lb (113.9 kg)   SpO2 93%   BMI 33.12 kg/m²      Constitutional and General Appearance:    alert, cooperative, no distress, and appears stated age  [de-identified]:    PERRLA, no cervical lymphadenopathy. No masses palpable. Normal oral  mucosa  Respiratory:  Normal excursion and expansion without use of accessory muscles  Resp Auscultation: Normal breath sounds without dullness or wheezing  Cardiovascular:  RRR S1S2 w/o M/G/R  Abdomen:  No masses or tenderness  Bowel sounds present  Extremities:   No Cyanosis or Clubbing   Lower extremity edema: No  Skin: Warm and dry  Neurological:  Alert and oriented. Moves all extremities well  No abnormalities of mood, affect, memory, mentation, or behavior are noted    DATA:    ECG 3/9/23: Personally reviewed. Echo 11/2022   Summary   Normal left ventricle systolic function with an estimated ejection fraction   of 55%. Left ventricular size is decreased. There is severe concentric left ventricular hypertrophy. No outflow tract   obstruction. No regional wall motion abnormalities are seen. Left ventricular diastolic filling pressure are indeterminate. The right ventricle is normal in size with decreased function. The right atrium is mildly dilated. Aortic valve appears sclerotic but opens adequately. Trace aortic and mitral regurgitation. Ascending aorta is dilated at 3.78cm. Echocardiogram is consistent with hypertrophic cardiomyopathy or   infiltrative cardiomyopathy such as Amyloidosis heart. recommend Cardiac MRI and amyloid work up. IMPRESSION:    New onset atrial fibrillation ([id number] score 5). 3/9/2023  Patient is a pleasant 70year old male with a medical history significant for ischemic cardiomyopathy status post CABG, COPD, RHONDA, diabetes mellitus type II, amyloidosis with cardiac involvement, hypertension, hyperlipidemia, obesity, and heart failure with preserved ejection fraction who presents from home to [facility] care. Patient's EKG from 02/24/2023 showed atrial fibrillation with slow ventricular response and LBBB. He reports some symptoms however no monitor to confirm atrial fibrillation involvement. Given his LBBB and his slow ventricular response to atrial fibrillation I'm concerned about bradycardia in patient. He is on [facility] currently. We discussed anticoagulation (NOAC and warfarin), rate control, rhythm control, AVN + pacemaker, and atrial fibrillation ablation. We reviewed risks and benefits of each approach. We discussed side effects of class IC, class II, class III, and class IV antiarrhythmics. All questions were answered. I would recommend wearing a 2 week cardiac monitor followed by ILR should we not get sufficient data. - Continue apixaban 5 mg BID. - Two week cardiac monitor.  - Follow up with me in 4-6 weeks to review. RECOMMENDATIONS:  Recommend 2 week cardiac event monitor   Continue current cardiac medications as prescribed  Follow up with me in 4-6 weeks    QUALITY MEASURES  1. Tobacco Cessation Counseling: NA  2. Retake of BP if >140/90:   NA  3. Documentation to PCP/referring for new patient:  Sent to PCP at close of office visit  4. CAD patient on anti-platelet: NA  5. CAD patient on STATIN therapy:  NA  6. Patient with CHF and aFib on anticoagulation:  Eliquis     All questions and concerns were addressed to the patient/family. Alternatives to my treatment were discussed. Dr. Faisal Carrillo MD  Electrophysiology  [facility].   [address]. [place], [address]  Phone: [phone]  Fax: [phone]     NOTE: This report was transcribed using voice recognition software. Every effort was made to ensure accuracy, however, inadvertent computerized transcription errors may be present. Theresa Alcazar RN, am scribing for and in the presence of Dr. Jackie Montes. 03/09/23 10:46 AM   Anand Steel RN    I reviewed with the resident the medical history and the resident's findings on the physical examination. I discussed with the resident the patient's diagnosis and concur with the plan.

## 2023-03-09 NOTE — PATIENT INSTRUCTIONS
RECOMMENDATIONS:  Recommend 2 week cardiac event monitor   Continue current cardiac medications as prescribed  Follow up with me in 4-6 weeks

## 2023-03-20 ENCOUNTER — HOSPITAL ENCOUNTER (OUTPATIENT)
Age: 78
Discharge: HOME | End: 2023-03-20
Payer: MEDICARE

## 2023-03-20 ENCOUNTER — TELEPHONE (OUTPATIENT)
Dept: CARDIOLOGY CLINIC | Age: 78
End: 2023-03-20

## 2023-03-20 DIAGNOSIS — E85.4 CARDIAC AMYLOIDOSIS (HCC): ICD-10-CM

## 2023-03-20 DIAGNOSIS — R73.9 ELEVATED BLOOD SUGAR: ICD-10-CM

## 2023-03-20 DIAGNOSIS — I43 CARDIAC AMYLOIDOSIS (HCC): ICD-10-CM

## 2023-03-20 DIAGNOSIS — E55.9 VITAMIN D DEFICIENCY: ICD-10-CM

## 2023-03-20 DIAGNOSIS — R00.1 BRADYCARDIA: ICD-10-CM

## 2023-03-20 DIAGNOSIS — I50.33 ACUTE ON CHRONIC DIASTOLIC HEART FAILURE (HCC): ICD-10-CM

## 2023-03-20 LAB
ANION GAP SERPL CALCULATED.3IONS-SCNC: 11 MMOL/L (ref 3–16)
BUN SERPL-MCNC: 23 MG/DL (ref 7–20)
CALCIUM SERPL-MCNC: 10.2 MG/DL (ref 8.3–10.6)
CHLORIDE SERPL-SCNC: 100 MMOL/L (ref 99–110)
CO2 SERPL-SCNC: 30 MMOL/L (ref 21–32)
CREAT SERPL-MCNC: 1.1 MG/DL (ref 0.8–1.3)
DEPRECATED RDW RBC AUTO: 13.9 % (ref 12.4–15.4)
GFR SERPLBLD CREATININE-BSD FMLA CKD-EPI: >60 ML/MIN/{1.73_M2}
GLUCOSE SERPL-MCNC: 77 MG/DL (ref 70–99)
HCT VFR BLD AUTO: 42.7 % (ref 40.5–52.5)
HGB BLD-MCNC: 14.4 G/DL (ref 13.5–17.5)
MCH RBC QN AUTO: 30.4 PG (ref 26–34)
MCHC RBC AUTO-ENTMCNC: 33.6 G/DL (ref 31–36)
MCV RBC AUTO: 90.3 FL (ref 80–100)
NT-PROBNP SERPL-MCNC: 881 PG/ML (ref 0–449)
PLATELET # BLD AUTO: 245 K/UL (ref 135–450)
PMV BLD AUTO: 7.7 FL (ref 5–10.5)
POTASSIUM SERPL-SCNC: 3.8 MMOL/L (ref 3.5–5.1)
RBC # BLD AUTO: 4.73 M/UL (ref 4.2–5.9)
SODIUM SERPL-SCNC: 141 MMOL/L (ref 136–145)
WBC # BLD AUTO: 10.6 K/UL (ref 4–11)

## 2023-03-20 PROCEDURE — 83540 ASSAY OF IRON: CPT

## 2023-03-20 PROCEDURE — 80048 BASIC METABOLIC PNL TOTAL CA: CPT

## 2023-03-20 PROCEDURE — 85027 COMPLETE CBC AUTOMATED: CPT

## 2023-03-20 PROCEDURE — 36415 COLL VENOUS BLD VENIPUNCTURE: CPT

## 2023-03-20 PROCEDURE — 82728 ASSAY OF FERRITIN: CPT

## 2023-03-20 PROCEDURE — 82306 VITAMIN D 25 HYDROXY: CPT

## 2023-03-20 PROCEDURE — 83880 ASSAY OF NATRIURETIC PEPTIDE: CPT

## 2023-03-20 PROCEDURE — 83036 HEMOGLOBIN GLYCOSYLATED A1C: CPT

## 2023-03-20 PROCEDURE — 83550 IRON BINDING TEST: CPT

## 2023-03-20 NOTE — TELEPHONE ENCOUNTER
----- Message from QIAN Trevino CNP sent at 3/20/2023  4:03 PM EDT -----  Please notify patient results. Kidney function is stable. Electrolytes are stable. Pro-BNP (heart failure number) is significantly improved. Other labs are pending   Continue current regimen.

## 2023-03-21 ENCOUNTER — TELEPHONE (OUTPATIENT)
Dept: CARDIOLOGY CLINIC | Age: 78
End: 2023-03-21

## 2023-03-21 LAB
25(OH)D3 SERPL-MCNC: 66.4 NG/ML
EST. AVERAGE GLUCOSE BLD GHB EST-MCNC: 134.1 MG/DL
FERRITIN SERPL IA-MCNC: 109.3 NG/ML (ref 30–400)
HBA1C MFR BLD: 6.3 %
IRON SATN MFR SERPL: 24 % (ref 20–50)
IRON SERPL-MCNC: 65 UG/DL (ref 59–158)
TIBC SERPL-MCNC: 274 UG/DL (ref 260–445)

## 2023-03-21 NOTE — TELEPHONE ENCOUNTER
Created telephone encounter. Spoke with Lori Cortes relayed message per [facility] regarding labs. Pt verbalized understanding. He confirmed [facility]B appt for Friday.

## 2023-03-21 NOTE — TELEPHONE ENCOUNTER
----- Message from QIAN Bello CNP sent at 3/21/2023  9:28 AM EDT -----  Please notify patient results. Kidney function is stable. Electrolytes are stable. Pro-BNP (heart failure number) is improved  Iron levels are normal and vit D is normal.   Keep follow up as planned.

## 2023-03-24 ENCOUNTER — OFFICE VISIT (OUTPATIENT)
Dept: CARDIOLOGY CLINIC | Age: 78
End: 2023-03-24
Payer: MEDICARE

## 2023-03-24 VITALS
OXYGEN SATURATION: 95 % | HEART RATE: 92 BPM | DIASTOLIC BLOOD PRESSURE: 65 MMHG | WEIGHT: 248.4 LBS | HEIGHT: 72 IN | BODY MASS INDEX: 33.64 KG/M2 | TEMPERATURE: 98.6 F | SYSTOLIC BLOOD PRESSURE: 112 MMHG

## 2023-03-24 DIAGNOSIS — R00.1 BRADYCARDIA: ICD-10-CM

## 2023-03-24 DIAGNOSIS — I48.0 PAROXYSMAL ATRIAL FIBRILLATION (HCC): ICD-10-CM

## 2023-03-24 DIAGNOSIS — E85.4 CARDIAC AMYLOIDOSIS (HCC): ICD-10-CM

## 2023-03-24 DIAGNOSIS — I43 CARDIAC AMYLOIDOSIS (HCC): ICD-10-CM

## 2023-03-24 DIAGNOSIS — I50.32 CHRONIC HEART FAILURE WITH PRESERVED EJECTION FRACTION (HCC): Primary | ICD-10-CM

## 2023-03-24 PROBLEM — J96.01 ACUTE RESPIRATORY FAILURE WITH HYPOXIA (HCC): Status: RESOLVED | Noted: 2020-01-20 | Resolved: 2023-03-24

## 2023-03-24 PROCEDURE — 3078F DIAST BP <80 MM HG: CPT | Performed by: NURSE PRACTITIONER

## 2023-03-24 PROCEDURE — 1123F ACP DISCUSS/DSCN MKR DOCD: CPT | Performed by: NURSE PRACTITIONER

## 2023-03-24 PROCEDURE — 3074F SYST BP LT 130 MM HG: CPT | Performed by: NURSE PRACTITIONER

## 2023-03-24 PROCEDURE — 99214 OFFICE O/P EST MOD 30 MIN: CPT | Performed by: NURSE PRACTITIONER

## 2023-03-24 RX ORDER — TORSEMIDE 20 MG/1
20 TABLET ORAL DAILY
Qty: 60 TABLET | Refills: 0
Start: 2023-03-24

## 2023-03-24 NOTE — Clinical Note
Hi, mutual patient. Question, did you feel like the cardiac MRi would be helpful at this point as related to his heart rhythm? Thanks!

## 2023-03-24 NOTE — PROGRESS NOTES
100.0 fL Final   11/28/2022 89.3 80.0 - 100.0 fL Final     RDW   Date Value Ref Range Status   03/20/2023 13.9 12.4 - 15.4 % Final   11/29/2022 13.6 12.4 - 15.4 % Final   11/28/2022 13.4 12.4 - 15.4 % Final     Platelets   Date Value Ref Range Status   03/20/2023 245 135 - 450 K/uL Final   11/29/2022 296 135 - 450 K/uL Final   11/28/2022 276 135 - 450 K/uL Final     Iron:    Iron   Date Value Ref Range Status   03/20/2023 65 59 - 158 ug/dL Final     TIBC   Date Value Ref Range Status   03/20/2023 274 260 - 445 ug/dL Final     Ferritin   Date Value Ref Range Status   03/20/2023 109.3 30.0 - 400.0 ng/mL Final     BMP:   Sodium   Date Value Ref Range Status   03/20/2023 141 136 - 145 mmol/L Final   02/17/2023 140 136 - 145 mmol/L Final   12/27/2022 142 136 - 145 mmol/L Final     Potassium   Date Value Ref Range Status   03/20/2023 3.8 3.5 - 5.1 mmol/L Final   02/17/2023 4.1 3.5 - 5.1 mmol/L Final   12/27/2022 3.9 3.5 - 5.1 mmol/L Final     Chloride   Date Value Ref Range Status   03/20/2023 100 99 - 110 mmol/L Final   02/17/2023 101 99 - 110 mmol/L Final   12/27/2022 99 99 - 110 mmol/L Final     CO2   Date Value Ref Range Status   03/20/2023 30 21 - 32 mmol/L Final   02/17/2023 32 21 - 32 mmol/L Final   12/27/2022 31 21 - 32 mmol/L Final     Phosphorus   Date Value Ref Range Status   11/29/2022 4.0 2.5 - 4.9 mg/dL Final   11/28/2022 3.6 2.5 - 4.9 mg/dL Final   11/27/2022 3.6 2.5 - 4.9 mg/dL Final     BUN   Date Value Ref Range Status   03/20/2023 23 (H) 7 - 20 mg/dL Final   02/17/2023 25 (H) 7 - 20 mg/dL Final   12/27/2022 15 7 - 20 mg/dL Final     Creatinine   Date Value Ref Range Status   03/20/2023 1.1 0.8 - 1.3 mg/dL Final   02/17/2023 1.1 0.8 - 1.3 mg/dL Final   12/27/2022 0.9 0.8 - 1.3 mg/dL Final     BNP:   Lab Results   Component Value Date    PROBNP 881 (H) 03/20/2023    PROBNP 1,479 (H) 02/17/2023    PROBNP 2,332 (H) 12/27/2022     Lipids: No components found for: T                Triglycerides   Date Value

## 2023-03-24 NOTE — PATIENT INSTRUCTIONS
Continue to monitor b/p and weights  Entresto 49/51mg twice a day   Jardiance 10mg daily  Continue Tafamidis  Adjust torsemide to 20mg daily for now  Try sugar free candies, ice chips, frozen grapes to help curb the dry mouth.     Ice chips would count toward your fluid restriction (equates to 1/2 of the volume of the cup)  Stay as active as possible  Follow-up with the EP as planned  FOllow up with Dr. Bulmaro Hendrix as planned

## 2023-03-26 DIAGNOSIS — R00.1 BRADYCARDIA: ICD-10-CM

## 2023-03-26 DIAGNOSIS — I48.91 ATRIAL FIBRILLATION, UNSPECIFIED TYPE (HCC): ICD-10-CM

## 2023-04-06 ENCOUNTER — OFFICE VISIT (OUTPATIENT)
Dept: CARDIOLOGY CLINIC | Age: 78
End: 2023-04-06
Payer: MEDICARE

## 2023-04-06 VITALS
OXYGEN SATURATION: 97 % | WEIGHT: 242.8 LBS | HEIGHT: 72 IN | BODY MASS INDEX: 32.89 KG/M2 | HEART RATE: 90 BPM | SYSTOLIC BLOOD PRESSURE: 128 MMHG | DIASTOLIC BLOOD PRESSURE: 73 MMHG

## 2023-04-06 DIAGNOSIS — E78.00 PURE HYPERCHOLESTEROLEMIA: ICD-10-CM

## 2023-04-06 DIAGNOSIS — R06.02 SOB (SHORTNESS OF BREATH): ICD-10-CM

## 2023-04-06 DIAGNOSIS — Z95.1 S/P CABG X 3: ICD-10-CM

## 2023-04-06 DIAGNOSIS — E85.4 CARDIAC AMYLOIDOSIS (HCC): ICD-10-CM

## 2023-04-06 DIAGNOSIS — I10 ESSENTIAL HYPERTENSION: Primary | ICD-10-CM

## 2023-04-06 DIAGNOSIS — I43 CARDIAC AMYLOIDOSIS (HCC): ICD-10-CM

## 2023-04-06 PROCEDURE — 3078F DIAST BP <80 MM HG: CPT | Performed by: INTERNAL MEDICINE

## 2023-04-06 PROCEDURE — 1123F ACP DISCUSS/DSCN MKR DOCD: CPT | Performed by: INTERNAL MEDICINE

## 2023-04-06 PROCEDURE — 99214 OFFICE O/P EST MOD 30 MIN: CPT | Performed by: INTERNAL MEDICINE

## 2023-04-06 PROCEDURE — 3074F SYST BP LT 130 MM HG: CPT | Performed by: INTERNAL MEDICINE

## 2023-04-06 RX ORDER — CHOLECALCIFEROL (VITAMIN D3) 50 MCG
2000 TABLET ORAL DAILY
COMMUNITY

## 2023-04-06 NOTE — PATIENT INSTRUCTIONS
Plan:  ~Hold torsemide until after you hear from us regarding blood work results   ~Stop Jardiance due to possibly causing side effects   ~Labs- CMP, CBC and BNP   ~Chest X-Ray   ~Recommend a stress test- Lexiscan Myoview to evaluate shortness of breath. Patient is not able to walk on a treadmil  Cardiac medications reviewed including indications and pertinent side effects. Medication list updated at this visit. Check blood pressure and heart rate at home a few times per week- keep a log with dates and times and bring to office visit   Regular exercise and following a healthy diet encouraged   Follow up with me in 1 month       Your provider has ordered testing for further evaluation. An order/prescription has been included in your paper work. To schedule outpatient testing, contact Central Scheduling by calling [employer] ([phone]).

## 2023-04-06 NOTE — PROGRESS NOTES
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 Surgical History:   has a past surgical history that includes Appendectomy; meniscectomy (Right, 1965); Tonsillectomy; Cardiac catheterization (07/10/2017); Carpal tunnel release (Bilateral); Coronary artery bypass graft (2017); transesophageal echocardiogram (2017); Total knee arthroplasty (Right); Coronary angioplasty with stent (2018); bronchoscopy (N/A, 2020); bronchoscopy (2020); Thoracoscopy (Left, 2020); bronchoscopy (N/A, 2021); and bronchoscopy (2021). Social History:   reports that he quit smoking about 45 years ago. His smoking use included cigarettes, pipe, and cigars. He started smoking about 55 years ago. He has a 15.00 pack-year smoking history. He has never used smokeless tobacco. He reports current alcohol use of about 3.0 standard drinks per week. He reports that he does not use drugs. Family History:  Father  at age 48 from \"angina\"    Home Medications:  Prior to Admission medications    Medication Sig Start Date End Date Taking?  Authorizing Provider   vitamin D (CHOLECALCIFEROL) 50 MCG (2000) TABS tablet Take 1 tablet by mouth daily   Yes Historical Provider, MD   torsemide (DEMADEX) 20 MG tablet Take 1 tablet by mouth daily 3/24/23  Yes QIAN Odonnell CNP   empagliflozin (JARDIANCE) 10 MG tablet Take 1 tablet by mouth daily 3/24/23  Yes QIAN Odonnell CNP   sacubitril-valsartan (ENTRESTO) 49-51 MG per tablet Take 1 tablet by mouth 2 times daily 3/24/23  Yes QIAN Odonnell CNP   benzonatate (TESSALON) 200 MG

## 2023-04-07 ENCOUNTER — HOSPITAL ENCOUNTER (OUTPATIENT)
Age: 78
Discharge: HOME | End: 2023-04-07
Payer: MEDICARE

## 2023-04-07 ENCOUNTER — TELEPHONE (OUTPATIENT)
Dept: CARDIOLOGY CLINIC | Age: 78
End: 2023-04-07

## 2023-04-07 ENCOUNTER — HOSPITAL ENCOUNTER (OUTPATIENT)
Dept: GENERAL RADIOLOGY | Age: 78
Discharge: HOME | End: 2023-04-07
Payer: MEDICARE

## 2023-04-07 DIAGNOSIS — R06.02 SOB (SHORTNESS OF BREATH): ICD-10-CM

## 2023-04-07 LAB
ALBUMIN SERPL-MCNC: 3.9 G/DL (ref 3.4–5)
ALBUMIN/GLOB SERPL: 1.3 {RATIO} (ref 1.1–2.2)
ALP SERPL-CCNC: 51 U/L (ref 40–129)
ALT SERPL-CCNC: 12 U/L (ref 10–40)
ANION GAP SERPL CALCULATED.3IONS-SCNC: 12 MMOL/L (ref 3–16)
AST SERPL-CCNC: 24 U/L (ref 15–37)
BILIRUB SERPL-MCNC: 0.7 MG/DL (ref 0–1)
BUN SERPL-MCNC: 16 MG/DL (ref 7–20)
CALCIUM SERPL-MCNC: 9.7 MG/DL (ref 8.3–10.6)
CHLORIDE SERPL-SCNC: 100 MMOL/L (ref 99–110)
CO2 SERPL-SCNC: 24 MMOL/L (ref 21–32)
CREAT SERPL-MCNC: 0.9 MG/DL (ref 0.8–1.3)
DEPRECATED RDW RBC AUTO: 13.1 % (ref 12.4–15.4)
GFR SERPLBLD CREATININE-BSD FMLA CKD-EPI: >60 ML/MIN/{1.73_M2}
GLUCOSE SERPL-MCNC: 140 MG/DL (ref 70–99)
HCT VFR BLD AUTO: 39.8 % (ref 40.5–52.5)
HGB BLD-MCNC: 13.5 G/DL (ref 13.5–17.5)
MCH RBC QN AUTO: 30.5 PG (ref 26–34)
MCHC RBC AUTO-ENTMCNC: 33.8 G/DL (ref 31–36)
MCV RBC AUTO: 90.3 FL (ref 80–100)
NT-PROBNP SERPL-MCNC: 1199 PG/ML (ref 0–449)
PLATELET # BLD AUTO: 284 K/UL (ref 135–450)
PMV BLD AUTO: 7.4 FL (ref 5–10.5)
POTASSIUM SERPL-SCNC: 3.7 MMOL/L (ref 3.5–5.1)
PROT SERPL-MCNC: 7 G/DL (ref 6.4–8.2)
RBC # BLD AUTO: 4.41 M/UL (ref 4.2–5.9)
SODIUM SERPL-SCNC: 136 MMOL/L (ref 136–145)
WBC # BLD AUTO: 9.7 K/UL (ref 4–11)

## 2023-04-07 PROCEDURE — 85027 COMPLETE CBC AUTOMATED: CPT

## 2023-04-07 PROCEDURE — 83880 ASSAY OF NATRIURETIC PEPTIDE: CPT

## 2023-04-07 PROCEDURE — 80053 COMPREHEN METABOLIC PANEL: CPT

## 2023-04-07 PROCEDURE — 36415 COLL VENOUS BLD VENIPUNCTURE: CPT

## 2023-04-07 PROCEDURE — 71046 X-RAY EXAM CHEST 2 VIEWS: CPT

## 2023-04-07 NOTE — TELEPHONE ENCOUNTER
----- Message from Patrizia Mo MD sent at 4/7/2023  3:00 PM EDT -----  I called pt. Discussed labs (CXR still pending)  Feels a little better today  Remain off Jardiance. May all be side effect of this med  Remain 20 mg torsemide. Will reassess in few days.

## 2023-04-09 PROCEDURE — 93272 ECG/REVIEW INTERPRET ONLY: CPT | Performed by: INTERNAL MEDICINE

## 2023-04-10 ENCOUNTER — TELEPHONE (OUTPATIENT)
Dept: CARDIOLOGY CLINIC | Age: 78
End: 2023-04-10

## 2023-04-18 ENCOUNTER — OFFICE VISIT (OUTPATIENT)
Dept: CARDIOLOGY CLINIC | Age: 78
End: 2023-04-18
Payer: MEDICARE

## 2023-04-18 VITALS
HEART RATE: 85 BPM | DIASTOLIC BLOOD PRESSURE: 62 MMHG | OXYGEN SATURATION: 98 % | BODY MASS INDEX: 32.51 KG/M2 | WEIGHT: 240 LBS | SYSTOLIC BLOOD PRESSURE: 122 MMHG | HEIGHT: 72 IN

## 2023-04-18 DIAGNOSIS — I48.0 PAROXYSMAL ATRIAL FIBRILLATION (HCC): Primary | ICD-10-CM

## 2023-04-18 PROCEDURE — 3078F DIAST BP <80 MM HG: CPT | Performed by: INTERNAL MEDICINE

## 2023-04-18 PROCEDURE — 1123F ACP DISCUSS/DSCN MKR DOCD: CPT | Performed by: INTERNAL MEDICINE

## 2023-04-18 PROCEDURE — 93000 ELECTROCARDIOGRAM COMPLETE: CPT | Performed by: INTERNAL MEDICINE

## 2023-04-18 PROCEDURE — 3074F SYST BP LT 130 MM HG: CPT | Performed by: INTERNAL MEDICINE

## 2023-04-18 NOTE — PATIENT INSTRUCTIONS
RECOMMENDATIONS:  Discussed risks and benefits implantable loop monitor for long term rhythm monitoring. Patient would like to proceed with this option. We will call you to set this up. Follow up after procedure.

## 2023-04-18 NOTE — PROGRESS NOTES
torsemide (DEMADEX) 20 MG tablet Take 1 tablet by mouth daily 3/24/23  Yes QIAN Haley CNP   sacubitril-valsartan (ENTRESTO) 49-51 MG per tablet Take 1 tablet by mouth 2 times daily 3/24/23  Yes QIAN Haley CNP   benzonatate (TESSALON) 200 MG capsule TAKE 1 CAPSULE BY MOUTH THREE TIMES A DAY AS NEEDED FOR COUGH  Patient taking differently: 1 capsule 2 times daily as needed TAKE 1 CAPSULE BY MOUTH THREE TIMES A DAY AS NEEDED FOR COUGH 2/24/23  Yes QIAN Haley CNP   Tafamidis 61 MG CAPS Take 61 mg by mouth daily 12/23/22  Yes QIAN Haley CNP   fluticasone-umeclidin-vilant (TRELEGY ELLIPTA) 058-74.5-24 MCG/ACT AEPB inhaler Inhale 1 puff into the lungs daily 12/22/22  Yes QIAN Riggins CNP   traZODone (DESYREL) 50 MG tablet Take 1 tablet by mouth nightly as needed 11/15/22  Yes Historical Provider, MD   apixaban (ELIQUIS) 5 MG TABS tablet TAKE 1 TABLET BY MOUTH TWICE A DAY 10/6/22  Yes Miroslava Glover MD   ipratropium-albuterol (DUONEB) 0.5-2.5 (3) MG/3ML SOLN nebulizer solution INHALE 3 MLS BY MOUTH 4 TIMES DAILY 6/14/22  Yes Dave Ledezma MD   albuterol sulfate  (90 Base) MCG/ACT inhaler Inhale 2 puffs into the lungs 4 times daily as needed for Wheezing 10/22/21  Yes Prasanth Aly MD   atorvastatin (LIPITOR) 80 MG tablet Take 1 tablet by mouth daily  Patient taking differently: Take 0.5 tablets by mouth daily 8/31/20  Yes Miroslava Glover MD   UNABLE TO FIND VESIVEST TWICE DAILY FOR 20 MINUTES - lung congestion   Yes Historical Provider, MD   metFORMIN (GLUCOPHAGE) 500 MG tablet Take 1 tablet by mouth daily (with breakfast)   Yes Historical Provider, MD   aspirin EC 81 MG EC tablet Take 1 tablet by mouth daily  Patient taking differently: Take 1 tablet by mouth nightly 5/29/18  Yes Miroslava Glover MD   Glucosamine-Chondroitin (OSTEO BI-FLEX REGULAR STRENGTH PO) Take 1 capsule by mouth daily    Yes Historical Provider, MD

## 2023-04-19 ENCOUNTER — TELEPHONE (OUTPATIENT)
Dept: CARDIOLOGY CLINIC | Age: 78
End: 2023-04-19

## 2023-04-24 ENCOUNTER — TELEPHONE (OUTPATIENT)
Dept: CARDIOLOGY CLINIC | Age: 78
End: 2023-04-24

## 2023-04-24 RX ORDER — FLUTICASONE FUROATE, UMECLIDINIUM BROMIDE AND VILANTEROL TRIFENATATE 100; 62.5; 25 UG/1; UG/1; UG/1
POWDER RESPIRATORY (INHALATION)
Qty: 60 EACH | Refills: 3 | Status: SHIPPED | OUTPATIENT
Start: 2023-04-24

## 2023-04-24 NOTE — TELEPHONE ENCOUNTER
Last office visit 1/17/2023     Last written 12/22/2022     Next office visit scheduled 7/25/2023    Requested Prescriptions     Pending Prescriptions Disp [id number] 100-62.5-25 MCG/ACT AEPB inhaler [Pharmacy Med Name: Gurwinder Schneider 100-62.5-25] 60 each 3     Sig: TAKE 1 PUFF BY MOUTH EVERY DAY

## 2023-04-24 NOTE — TELEPHONE ENCOUNTER
Lvm for pt to return call. Pt needs to schedule a time to come into the office when [facility] is available to complete vyndamax paperwork.  Pt needs to bring prescription drug card if he has one

## 2023-04-26 RX ORDER — TORSEMIDE 20 MG/1
TABLET ORAL
Qty: 180 TABLET | Refills: 1 | Status: SHIPPED | OUTPATIENT
Start: 2023-04-26

## 2023-05-02 DIAGNOSIS — R00.2 PALPITATIONS: ICD-10-CM

## 2023-05-08 ENCOUNTER — TELEPHONE (OUTPATIENT)
Dept: CARDIOLOGY CLINIC | Age: 78
End: 2023-05-08

## 2023-05-09 ENCOUNTER — TELEPHONE (OUTPATIENT)
Dept: CARDIOLOGY CLINIC | Age: 78
End: 2023-05-09

## 2023-05-09 NOTE — TELEPHONE ENCOUNTER
Called pt regarding vyndamax PA. Informed him it was denied. If income changed we can resubmit.  Pt V/U.

## 2023-05-11 ENCOUNTER — OFFICE VISIT (OUTPATIENT)
Dept: CARDIOLOGY CLINIC | Age: 78
End: 2023-05-11
Payer: MEDICARE

## 2023-05-11 VITALS
DIASTOLIC BLOOD PRESSURE: 62 MMHG | BODY MASS INDEX: 32.64 KG/M2 | WEIGHT: 241 LBS | HEIGHT: 72 IN | SYSTOLIC BLOOD PRESSURE: 110 MMHG | HEART RATE: 59 BPM | OXYGEN SATURATION: 95 %

## 2023-05-11 DIAGNOSIS — I50.32 CHF (CONGESTIVE HEART FAILURE), NYHA CLASS III, CHRONIC, DIASTOLIC (HCC): Primary | ICD-10-CM

## 2023-05-11 PROCEDURE — 3074F SYST BP LT 130 MM HG: CPT | Performed by: INTERNAL MEDICINE

## 2023-05-11 PROCEDURE — 3078F DIAST BP <80 MM HG: CPT | Performed by: INTERNAL MEDICINE

## 2023-05-11 PROCEDURE — 1123F ACP DISCUSS/DSCN MKR DOCD: CPT | Performed by: INTERNAL MEDICINE

## 2023-05-11 PROCEDURE — 99214 OFFICE O/P EST MOD 30 MIN: CPT | Performed by: INTERNAL MEDICINE

## 2023-05-11 NOTE — PROGRESS NOTES
[facility]   Cardiac Follow up     Referring Provider:  Hilda Jiang MD     Chief Complaint   Patient presents with    Follow-up     Discuss Dr. Deepika Cerda procedure    Congestive Heart Failure    Hypertension    Hyperlipidemia    Atrial Fibrillation    Shortness of Breath     Steps, incline, bending over     Mahogany Porter   1945    History of Present Illness:   Mahogany Porter is a 70 y.o. 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 His echocardiogram from 4/2020 showed an EF of 55%, (EF 28% by stress test 2017). Exposed to second hand smoke for years. Admitted 11/22/22-11/29/22 with shortness of breath, treated for acute on chronic diastolic heart failure with lasix infusion admission weight 273lbs dishcarge weight down to 253 lbs. PYP scan + for ATTR amyloid. Started on Tafamidis, Jardiacne and entresto. Last visit he reported having a few episodes of feeling like he may pass out while shopping. Had another episode when he got home of breaking out into a sweat and nearly passed out. States he had to lay down for awhile. His weight is down to 243 lbs, 246 lbs a few weeks ago. Leg swelling has improved with compression stockings. He has been feeling weakness, dizziness, and SOB. He has been having SOB with even just standing up. Chest X-Ray 4/7/2023 showed no acute process. BNP 1,199. Stress test 4/13/2023- Mild to moderately reduced calculated LVEF 45%, Inferolateral hypokinesis/scar with minimal familia-infarct ischemia. Plan is for patient to have loop recorder placement- denied by insurance, now under apeal..     Today he states he is now feeling better and is back to walking 2 miles daily. Seems stopping Jardiance made the difference. Overall, less bad days.  States he is

## 2023-05-11 NOTE — PATIENT INSTRUCTIONS
Plan:  ~Continue torsemide 20 mg daily, you can take an extra dose for increased shortness of breath, swelling, or weight gain   Cardiac medications reviewed including indications and pertinent side effects. Medication list updated at this visit.    Check blood pressure and heart rate at home a few times per week- keep a log with dates and times and bring to office visit   Regular exercise and following a healthy diet encouraged   Follow up with me in 3 months   We will check with EP team regarding appeal for loop recorder

## 2023-05-18 ENCOUNTER — TELEPHONE (OUTPATIENT)
Dept: CARDIOLOGY CLINIC | Age: 78
End: 2023-05-18

## 2023-05-18 NOTE — TELEPHONE ENCOUNTER
Omie Mortimer from [facility] health appeals department called and stated that the appeal does not meet the criteria to expedite it. The appeal can take longer than 72 hours. If any questions Omie Mortimer can be reached at [phone].

## 2023-05-19 NOTE — TELEPHONE ENCOUNTER
Nahun Hooker from [facility] [phone], needs confirm the  CPT code that was denied for Loop recorder was 73429.  Ty

## 2023-07-14 ENCOUNTER — HOSPITAL ENCOUNTER (OUTPATIENT)
Dept: CARDIAC CATH/INVASIVE PROCEDURES | Age: 78
Discharge: HOME | End: 2023-07-14
Attending: INTERNAL MEDICINE | Admitting: INTERNAL MEDICINE
Payer: MEDICARE

## 2023-07-14 ENCOUNTER — NURSE ONLY (OUTPATIENT)
Dept: CARDIOLOGY CLINIC | Age: 78
End: 2023-07-14

## 2023-07-14 VITALS
OXYGEN SATURATION: 94 % | DIASTOLIC BLOOD PRESSURE: 73 MMHG | HEIGHT: 72 IN | WEIGHT: 242 LBS | SYSTOLIC BLOOD PRESSURE: 126 MMHG | BODY MASS INDEX: 32.78 KG/M2

## 2023-07-14 DIAGNOSIS — I48.0 AF (PAROXYSMAL ATRIAL FIBRILLATION) (HCC): Primary | ICD-10-CM

## 2023-07-14 DIAGNOSIS — Z45.09 ENCOUNTER FOR LOOP RECORDER CHECK: ICD-10-CM

## 2023-07-14 PROCEDURE — 33285 INSJ SUBQ CAR RHYTHM MNTR: CPT

## 2023-07-14 PROCEDURE — 33285 INSJ SUBQ CAR RHYTHM MNTR: CPT | Performed by: INTERNAL MEDICINE

## 2023-07-14 PROCEDURE — 2709999900 HC NON-CHARGEABLE SUPPLY: Performed by: INTERNAL MEDICINE

## 2023-07-14 PROCEDURE — C1764 EVENT RECORDER, CARDIAC: HCPCS | Performed by: INTERNAL MEDICINE

## 2023-07-14 RX ORDER — SODIUM CHLORIDE 9 MG/ML
INJECTION, SOLUTION INTRAVENOUS CONTINUOUS
Status: DISCONTINUED | OUTPATIENT
Start: 2023-07-14 | End: 2023-07-14 | Stop reason: HOSPADM

## 2023-07-14 RX ORDER — SODIUM CHLORIDE 0.9 % (FLUSH) 0.9 %
5-40 SYRINGE (ML) INJECTION EVERY 12 HOURS SCHEDULED
Status: DISCONTINUED | OUTPATIENT
Start: 2023-07-14 | End: 2023-07-14 | Stop reason: HOSPADM

## 2023-07-14 RX ORDER — SODIUM CHLORIDE 0.9 % (FLUSH) 0.9 %
5-40 SYRINGE (ML) INJECTION PRN
Status: DISCONTINUED | OUTPATIENT
Start: 2023-07-14 | End: 2023-07-14 | Stop reason: HOSPADM

## 2023-07-14 RX ORDER — LORAZEPAM 0.5 MG/1
0.5 TABLET ORAL
Status: DISCONTINUED | OUTPATIENT
Start: 2023-07-14 | End: 2023-07-14 | Stop reason: HOSPADM

## 2023-07-14 RX ORDER — SODIUM CHLORIDE 0.9 % (FLUSH) 0.9 %
10 SYRINGE (ML) INJECTION EVERY 12 HOURS SCHEDULED
Status: DISCONTINUED | OUTPATIENT
Start: 2023-07-14 | End: 2023-07-14 | Stop reason: HOSPADM

## 2023-07-14 RX ORDER — SODIUM CHLORIDE 0.9 % (FLUSH) 0.9 %
10 SYRINGE (ML) INJECTION PRN
Status: DISCONTINUED | OUTPATIENT
Start: 2023-07-14 | End: 2023-07-14 | Stop reason: HOSPADM

## 2023-07-14 RX ORDER — SODIUM CHLORIDE 9 MG/ML
INJECTION, SOLUTION INTRAVENOUS PRN
Status: DISCONTINUED | OUTPATIENT
Start: 2023-07-14 | End: 2023-07-14 | Stop reason: HOSPADM

## 2023-07-14 NOTE — PROGRESS NOTES
CATH LAB PROCEDURE LOG - LOOP RECORDER     PRE Procedure:    ARRIVAL TIME: 1000     Pt arrived to Cath Lab. Plan of Care: Hemodynamics and cardiac rhythm will remain stable. Comfort level will be maintained. Respiratory function will remain adequate. Pt/family will verbalize understanding of the procedure. Procedure will be tolerated without complications. Patient will recover from procedure without complications. ID armband on patient and identification verified. Informed consent obtained. Non invasive blood pressure cuff applied, monitoring initiated. Chlorhexidine Gluconate 2% - three minute scrub to left anterior chest.        FOR LOOP IMPLANT:    Timeout and fire safety completed. TIMEOUT TIME: 1107    CORRECT PATIENT VERIFIED. MEMBERS OF THE SURGICAL TEAM/VISITORS INTRODUCED. ALLERGIES ANNOUNCED. CORRECT PROCEDURE VERIFIED. CORRECT PROCEDURAL SITE VERIFIED. CONSENTS VERIFIED. IMPLANT EQUIPMENT, ADDITIONAL SERVICES, SPECIAL REQUIREMENTS AVAILABLE. MEDICATIONS LABELED AND AVAILABLE. APPROPRIATE PRE MEDS HAVE BEEN ADMINISTERED. FIRE SAFETY: ALCOHOL PREP SOLUTION HAD SUFFICIENT TIME TO DISSIPATE IF USED. FIRE SAFETY: SURGICAL SITE OR INCISION ABOVE THE XIPHOID. YES=1, NO=0.   FIRE SAFETY: OPEN OXYGEN SOURCE. YES=1, NO=0.   FIRE SAFETY: AVAILABLE IGNITION SOURCE. YES=1, NO=0.   FIRE SAFETY: SCORE TOTAL = 1. A [employer] educational video was shared with the patient to ensure understanding of the procedure and the process after implant. Chest prepped with chloraprep and draped in sterile fashion. Local anesthetic was administered. Incision made with chevron scalpel. Insertion tool placed into incision. Device deployed. Incision sutured. Liquid skin adhesive to incision. Device interrogated with . Dry sterile dressing applied. Loop recorder paired with patient care monitor. Site Status - Left Chest - No bleeding or hematoma, monitoring site.    MODEL

## 2023-07-14 NOTE — H&P
input(s): BNP in the last 72 hours. PT/INR: No results for input(s): PROTIME, INR in the last 72 hours. APTT:No results for input(s): APTT in the last 72 hours. CARDIAC ENZYMES:No results for input(s): CKMB, CKMBINDEX, TROPONINI in the last 72 hours. Invalid input(s): CKTOTAL;3  FASTING LIPID PANEL:  Lab Results   Component Value Date/Time    HDL 39 05/19/2018 05:55 AM    LDLCALC 47 05/19/2018 05:55 AM    TRIG 71 05/19/2018 05:55 AM     LIVER PROFILE:No results for input(s): AST, ALT, ALB in the last 72 hours.     IMPRESSION:    Patient Active Problem List   Diagnosis    Essential hypertension    Hyperlipidemia    Allergic rhinitis    Chest pain    Abnormal stress test    Cardiac amyloidosis (HCC)    ASHD (arteriosclerotic heart disease)    S/P CABG x 3    Hx of tobacco use, presenting hazards to health    Palpitations    SOB (shortness of breath)    Acute on chronic diastolic congestive heart failure (HCC)    Acid reflux    History of total knee replacement    Vasovagal syncope    Paroxysmal atrial fibrillation (HCC)    Pericardial effusion    Acute pericarditis    Panic disorder    Morbid obesity due to excess calories (HCC)    NSTEMI (non-ST elevated myocardial infarction) ([address])    Simple chronic bronchitis (HCC)    Restrictive lung disease    Class 2 obesity in adult    Suspected sleep apnea    LVH (left ventricular hypertrophy)    Diastolic dysfunction    Former smoker    COPD exacerbation (HCC)    RHONDA (obstructive sleep apnea)    Paralyzed hemidiaphragm    Pneumonia, unspecified organism    Class 4 congestive heart failure ([address])    Uncontrolled type 2 diabetes mellitus with hyperglycemia (HCC)    Chronic obstructive pulmonary disease (HCC)    Acute diastolic heart failure (HCC)    Acute systolic congestive heart failure (HCC)    Acute diastolic CHF (congestive heart failure) (HCC)    Wild-type transthyretin-related (ATTR) amyloidosis ([address])    Encounter for loop recorder check     RECOMMENDATIONS:  ILR

## 2023-07-14 NOTE — PROGRESS NOTES
Report given to patients nurse. Pt transferred to room. CMU called and monitor is on and verified. Site looks unremarkable.

## 2023-07-14 NOTE — PROCEDURES
Electrophysiology Procedure Report    Attending MD John Miller MD    Procedure Loop recorder implant  Indication   Paroxysmal atrial fibrillation  Complication None  EBL  <8PF    Description of Procedure  The patient was brought to the electrophysiology laboratory after informed consent was obtained. The patient was placed in the supine position and the left chest was prepared and draped in a sterile fashion. The electrocardiogram, blood pressure, and oxygenation were monitored intra- and post-procedure. After using buffered lidocaine 1% with epinephrine (1/100,000) for local anesthesia to the left parasternal subcutaneous area, the Medtronic Linq insertion tool was used to make a 1cm incision. The subcutaneous tissues were dissected using the Linq insertion tool and the device was deployed using the insertion tool. Poly-cyanoacrylate solution was applied to the site and a sterile dressing was applied. The patient tolerated the procedure well and there were no complications. The attending physician, John Miller MD, was present for the entire procedure and for interpretation of data.     Device Information  Monitor Model # Manfacturer Serial # Location   Reveal Linq [id number] Tequ[employer] Mobiletronic [id number] left pectoral, subcutaneous     Settings  Zone Detection ECG Recording Interval (rate) Duration   Tachy On On 370 ms (162 bpm) 16 beats   Asystole On On n/a 3 sec   Wally On On 2000 ms (30 bpm) 4 beats     Sensitivity: 0.61    Contrast  0 cc  Fluoro time  0 minutes

## 2023-07-14 NOTE — DISCHARGE INSTRUCTIONS
FOLLOW-UP APPOINTMENTS    [facility] - Appointment on July 24th at 1:30pm with for a device check, [address] [facility]. [facility],  MOB 2, [address] [facility] 2 [facility]. Office #: [phone]. If you are unable to make this appointment, please call to reschedule. [facility] OFFICE - Appointment on November 1st at [address] [facility] with for a device check and office visit with Shahbaz Hensley, [address] [facility] , [address] [facility] [address]. [facility],  MOB 2, [address], 2 [facility]. Office #: [phone]. If you are unable to make this appointment, please call to reschedule. Directions to [facility] [address]  [facility] towards [place]. [address] exit. Right off exit. Cross over [employer]. Right on [address]. Left into hospital. Follow the signs to the emergency room ( turn left toward the Emergency room). Go right at the first stop sign. Just past the Emergency room at the second stop sign turn right and go up the ramp and park on the top level if possible. Go in the glass doors of the [facility] we on the top level of the garage [address]. As soon as you get in the door turn left and our office is the one with the glass doors. Cath Labs at [address] [facility] Insertion Discharge Instructions     7/14/2023  Lacey Fuentes   Date of Birth 1945       You will have a temporary ID card to keep in your wallet until you get a permanent card in the mail in 3 weeks. This is to use  when you go through metal detectors in [address] [facility] or airports or if a provider ask if you have an implantable device. Patient Manual: is used for a reference. If you have any questions you can look at the manual or call the phone number provided on the manual     Patient Activator Remote:   Keep the remote near you at all times. If you have symptoms press the heart button and place the remote over the device.   You will hear a beep and see

## 2023-07-24 ENCOUNTER — NURSE ONLY (OUTPATIENT)
Dept: CARDIOLOGY CLINIC | Age: 78
End: 2023-07-24

## 2023-07-24 NOTE — PATIENT INSTRUCTIONS
(recurring appointments that run parallel to in office checks). You do not need to initiate a transmission or be awake at the appointment time listed - this is solely for office purposes. Our office utilizes the \"no news is good news\" narrative regarding remote monitoring. A Device Specialist or RN will contact you with any critical findings from your remote monitoring. Going forward, if you experience issues with your in-home monitor, please verify that it is plugged in to the wall. If issues persist, please contact the Customer Service numbers provided below, as they can troubleshoot issues that may be happening with the monitor itself. The [employer] works closely with the remote monitoring websites - if we notice there is a disconnection we will contact you to inform you and ask you to contact the  of your device.     [employer])  [phone]

## 2023-07-24 NOTE — PROGRESS NOTES
Patient presents to the [address] [place] [address] today for 1 week site check s/p Linq II placement. Patient's device was implanted on 7/14 by Leslie Carrillo. Incision is closed, clean, and dry with all dressings/steri strips removed. Site left open to the air. Incision well approximated. No s/s of infection.

## 2023-09-05 RX ORDER — FLUTICASONE FUROATE, UMECLIDINIUM BROMIDE AND VILANTEROL TRIFENATATE 100; 62.5; 25 UG/1; UG/1; UG/1
POWDER RESPIRATORY (INHALATION)
Qty: 60 EACH | Refills: 3 | OUTPATIENT
Start: 2023-09-05

## 2023-10-10 ENCOUNTER — TELEPHONE (OUTPATIENT)
Dept: CARDIOLOGY CLINIC | Age: 78
End: 2023-10-10

## 2023-10-10 NOTE — TELEPHONE ENCOUNTER
Please schedule pt with NPRB for f/u and to discuss vyndamax prior to the end of the year . Okay to use HSFU slots per NPRB.  Thanks

## 2023-10-11 NOTE — TELEPHONE ENCOUNTER
10/11-Called ([phone]) spoke to pt, pt has been scheduled in [facility] ok per NPRB 10/31/2023 at 9 am.

## 2023-10-25 PROCEDURE — 93298 REM INTERROG DEV EVAL SCRMS: CPT | Performed by: INTERNAL MEDICINE

## 2023-10-25 PROCEDURE — G2066 INTER DEVC REMOTE 30D: HCPCS | Performed by: INTERNAL MEDICINE

## 2023-10-26 NOTE — PROGRESS NOTES
obesity in adult    Suspected sleep apnea    LVH (left ventricular hypertrophy)    Diastolic dysfunction    Former smoker    COPD exacerbation (HCC)    RHONDA (obstructive sleep apnea)    Paralyzed hemidiaphragm    Pneumonia, unspecified organism    Class 4 congestive heart failure ([address])    Uncontrolled type 2 diabetes mellitus with hyperglycemia (HCC)    Chronic obstructive pulmonary disease (HCC)    Acute diastolic heart failure (HCC)    Acute systolic congestive heart failure (HCC)    Acute diastolic CHF (congestive heart failure) (HCC)    Wild-type transthyretin-related (ATTR) amyloidosis ([address])    Encounter for loop recorder check       Assessment:   Paroxysmal atrial fibrillation   -[id number]-OCRs score 5   -IRL 7/2023  Ischemic cardiomyopathy  CAD   -CABG 2017  PYP scan + for ATTR amyloid. Started on tafamidis on 1/3/2023  PVCs  -12% burden on event monitor-3/16/2023  LBBB  Diabetes  Hyperlipidemia  COPD  RHONDA    Plan:   1. Continue Eliquis 5 mg BID for stroke risk reduction  2. Continue Lipitor 80 mg daily & ASA 81 mg daily  3. Continue Entresto 24-26 mg twice daily  4. Continue Demadex 20 mg daily  5. Will touch base with HF team regarding timing of repeat echo  6.  Follow up with EP in March 2024      COSME Jacobs APRN-CNP  [facility]  [phone]

## 2023-10-31 ENCOUNTER — OFFICE VISIT (OUTPATIENT)
Dept: CARDIOLOGY CLINIC | Age: 78
End: 2023-10-31
Payer: MEDICARE

## 2023-10-31 VITALS
OXYGEN SATURATION: 96 % | SYSTOLIC BLOOD PRESSURE: 100 MMHG | HEIGHT: 72 IN | BODY MASS INDEX: 33.25 KG/M2 | HEART RATE: 75 BPM | WEIGHT: 245.5 LBS | DIASTOLIC BLOOD PRESSURE: 64 MMHG

## 2023-10-31 DIAGNOSIS — E85.4 CARDIAC AMYLOIDOSIS (HCC): ICD-10-CM

## 2023-10-31 DIAGNOSIS — I43 CARDIAC AMYLOIDOSIS (HCC): ICD-10-CM

## 2023-10-31 DIAGNOSIS — I48.0 PAROXYSMAL ATRIAL FIBRILLATION (HCC): ICD-10-CM

## 2023-10-31 DIAGNOSIS — I50.32 CHRONIC DIASTOLIC HEART FAILURE (HCC): Primary | ICD-10-CM

## 2023-10-31 PROCEDURE — 3078F DIAST BP <80 MM HG: CPT | Performed by: NURSE PRACTITIONER

## 2023-10-31 PROCEDURE — 3074F SYST BP LT 130 MM HG: CPT | Performed by: NURSE PRACTITIONER

## 2023-10-31 PROCEDURE — 1123F ACP DISCUSS/DSCN MKR DOCD: CPT | Performed by: NURSE PRACTITIONER

## 2023-10-31 PROCEDURE — 99214 OFFICE O/P EST MOD 30 MIN: CPT | Performed by: NURSE PRACTITIONER

## 2023-10-31 NOTE — PROGRESS NOTES
[address] [facility]  Cardiac Follow-up    Primary Care Doctor:  Nicolasa Pearl MD    Chief Complaint   Patient presents with    Follow-up    Congestive Heart Failure    Shortness of Breath       History of Present Illness:  I had the pleasure of seeing Ginna Sanabria in follow up for Cardiomyopathy. Hx of CAD s/p CABG in 2017, HTN, HLD, PAF on Eliquis, history of left diaphragmatic paralysis, s/p left laparoscopic diaphragmatic surgery in 2020. Admitted 11/22/22-11/29/22 with shortness of breath, treated for acute on chronic diastolic heart failure with lasix infusion admission weight 273lbs dishcarge weight down to 253lbs. PYP scan + for ATTR amyloid. He was started on tafamidis on January 3, 2023. Since last visit, torsemide adjusted but did not tolerate. Taking 2 tabs of the torsemide most days of the week. He had loop recorder placed   Not sleeping well, mind racing. He was up to 2 miles a day over the summer. He had COVID back in Aug - treated with paxlovid. Worse general weakness after covid   Having issues with calf strain,   Worse knee swelling of the left knee after walking. He is wanting to do more activity. He has his vyndamax 3 month supply. Still having some shortness of breath with steps. Occasionally worse. Coughing improved with the tesselon pearles   Some shortness of breath for a few seconds- improved.    Dizziness overall improved compared to last visit with me  Continues to wear compression socks     Home weights: 241.5 lbs   Taking medications as prescribed: Yes  Able to afford medications: Yes    Past Medical History:   has a past medical history of Bronchitis, CAD (coronary artery disease), Cardiomyopathy ([address]), CHF (congestive heart failure) ([address]), COPD (chronic obstructive pulmonary disease) ([address]), Diabetes ([address]), Diaphragmatic paralysis, Diastolic dysfunction, Family history of early CAD, Former smoker, High cholesterol, Hypertension, Knee replacement, Obesity,

## 2023-10-31 NOTE — PATIENT INSTRUCTIONS
Plan:   Continue to monitor daily weights   Check BNP, BMP and CBC   Entresto 49/51mg twice a day   Continue Tafamidis- will plan for renewal 12/1/2023  Continue torsemide 40mg daily   Follow-up with the EP as planned  Follow up with Dr. Jeny Burgess as planned

## 2023-11-01 ENCOUNTER — NURSE ONLY (OUTPATIENT)
Dept: CARDIOLOGY CLINIC | Age: 78
End: 2023-11-01

## 2023-11-01 ENCOUNTER — HOSPITAL ENCOUNTER (OUTPATIENT)
Age: 78
Discharge: HOME | End: 2023-11-01
Payer: MEDICARE

## 2023-11-01 ENCOUNTER — OFFICE VISIT (OUTPATIENT)
Dept: CARDIOLOGY CLINIC | Age: 78
End: 2023-11-01
Payer: MEDICARE

## 2023-11-01 VITALS
WEIGHT: 243.2 LBS | HEART RATE: 73 BPM | OXYGEN SATURATION: 96 % | SYSTOLIC BLOOD PRESSURE: 112 MMHG | BODY MASS INDEX: 32.98 KG/M2 | DIASTOLIC BLOOD PRESSURE: 60 MMHG

## 2023-11-01 DIAGNOSIS — Z45.09 ENCOUNTER FOR LOOP RECORDER CHECK: Primary | ICD-10-CM

## 2023-11-01 DIAGNOSIS — I48.0 PAROXYSMAL ATRIAL FIBRILLATION (HCC): Primary | ICD-10-CM

## 2023-11-01 DIAGNOSIS — I50.32 CHRONIC DIASTOLIC HEART FAILURE (HCC): ICD-10-CM

## 2023-11-01 DIAGNOSIS — R00.2 PALPITATIONS: ICD-10-CM

## 2023-11-01 DIAGNOSIS — I43 CARDIAC AMYLOIDOSIS (HCC): ICD-10-CM

## 2023-11-01 DIAGNOSIS — E85.4 CARDIAC AMYLOIDOSIS (HCC): ICD-10-CM

## 2023-11-01 LAB
ANION GAP SERPL CALCULATED.3IONS-SCNC: 12 MMOL/L (ref 3–16)
BUN SERPL-MCNC: 16 MG/DL (ref 7–20)
CALCIUM SERPL-MCNC: 9.4 MG/DL (ref 8.3–10.6)
CHLORIDE SERPL-SCNC: 101 MMOL/L (ref 99–110)
CO2 SERPL-SCNC: 28 MMOL/L (ref 21–32)
CREAT SERPL-MCNC: 1 MG/DL (ref 0.8–1.3)
DEPRECATED RDW RBC AUTO: 14.3 % (ref 12.4–15.4)
GFR SERPLBLD CREATININE-BSD FMLA CKD-EPI: >60 ML/MIN/{1.73_M2}
GLUCOSE SERPL-MCNC: 113 MG/DL (ref 70–99)
HCT VFR BLD AUTO: 41.1 % (ref 40.5–52.5)
HGB BLD-MCNC: 13.8 G/DL (ref 13.5–17.5)
MCH RBC QN AUTO: 30.9 PG (ref 26–34)
MCHC RBC AUTO-ENTMCNC: 33.6 G/DL (ref 31–36)
MCV RBC AUTO: 92 FL (ref 80–100)
NT-PROBNP SERPL-MCNC: 754 PG/ML (ref 0–449)
PLATELET # BLD AUTO: 223 K/UL (ref 135–450)
PMV BLD AUTO: 8.4 FL (ref 5–10.5)
POTASSIUM SERPL-SCNC: 3.7 MMOL/L (ref 3.5–5.1)
RBC # BLD AUTO: 4.46 M/UL (ref 4.2–5.9)
SODIUM SERPL-SCNC: 141 MMOL/L (ref 136–145)
WBC # BLD AUTO: 7.6 K/UL (ref 4–11)

## 2023-11-01 PROCEDURE — 1123F ACP DISCUSS/DSCN MKR DOCD: CPT

## 2023-11-01 PROCEDURE — 3078F DIAST BP <80 MM HG: CPT

## 2023-11-01 PROCEDURE — 80048 BASIC METABOLIC PNL TOTAL CA: CPT

## 2023-11-01 PROCEDURE — 99214 OFFICE O/P EST MOD 30 MIN: CPT

## 2023-11-01 PROCEDURE — 83880 ASSAY OF NATRIURETIC PEPTIDE: CPT

## 2023-11-01 PROCEDURE — 93000 ELECTROCARDIOGRAM COMPLETE: CPT

## 2023-11-01 PROCEDURE — 3074F SYST BP LT 130 MM HG: CPT

## 2023-11-01 PROCEDURE — 85027 COMPLETE CBC AUTOMATED: CPT

## 2023-11-01 PROCEDURE — 36415 COLL VENOUS BLD VENIPUNCTURE: CPT

## 2023-11-01 NOTE — PATIENT INSTRUCTIONS
1. Continue Eliquis 5 mg BID for stroke risk reduction  2. Continue Lipitor 80 mg daily & ASA 81 mg daily  3. Continue Entresto 24-26 mg twice daily  4. Will touch base with HF team regarding timing of repeat echo  5.  Follow up with EP in March 2024

## 2023-11-02 ENCOUNTER — TELEPHONE (OUTPATIENT)
Dept: CARDIOLOGY CLINIC | Age: 78
End: 2023-11-02

## 2023-11-02 NOTE — TELEPHONE ENCOUNTER
----- Message from QIAN Lim CNP sent at 11/2/2023 10:48 AM EDT -----  Please notify patient results. Labs look Yi!! Kidney function is stable. Electrolytes are stable. Pro-BNP (heart failure number) is significantly improved  Continue current regimen.

## 2023-11-04 NOTE — TELEPHONE ENCOUNTER
Last OV: 23  Last labs: 23  Last EK23  Appt scheduled :23                torsemide (DEMADEX) 20 MG tablet 180 tablet 1 2023     Sig: TAKE 1 TABLET BY MOUTH EVERY DAY OKAY TO TAKE 2ND TAB DAILY IF WEIGHT GAIN 3LBS IN DAY    Sent to pharmacy as:  Torsemide 20 MG Oral Tablet [facility])    E-Prescribing Status: Receipt confirmed by pharmacy (2023  1:24 PM EDT)

## 2023-11-05 RX ORDER — TORSEMIDE 20 MG/1
TABLET ORAL
Qty: 180 TABLET | Refills: 1 | Status: SHIPPED | OUTPATIENT
Start: 2023-11-05

## 2023-11-09 ENCOUNTER — OFFICE VISIT (OUTPATIENT)
Dept: CARDIOLOGY CLINIC | Age: 78
End: 2023-11-09
Payer: MEDICARE

## 2023-11-09 VITALS
DIASTOLIC BLOOD PRESSURE: 64 MMHG | BODY MASS INDEX: 32.91 KG/M2 | HEART RATE: 77 BPM | HEIGHT: 72 IN | SYSTOLIC BLOOD PRESSURE: 116 MMHG | WEIGHT: 243 LBS | OXYGEN SATURATION: 98 %

## 2023-11-09 DIAGNOSIS — Z95.1 S/P CABG X 3: Primary | ICD-10-CM

## 2023-11-09 DIAGNOSIS — E78.00 PURE HYPERCHOLESTEROLEMIA: ICD-10-CM

## 2023-11-09 DIAGNOSIS — I10 ESSENTIAL HYPERTENSION: ICD-10-CM

## 2023-11-09 PROCEDURE — 99214 OFFICE O/P EST MOD 30 MIN: CPT | Performed by: INTERNAL MEDICINE

## 2023-11-09 PROCEDURE — 3074F SYST BP LT 130 MM HG: CPT | Performed by: INTERNAL MEDICINE

## 2023-11-09 PROCEDURE — 3078F DIAST BP <80 MM HG: CPT | Performed by: INTERNAL MEDICINE

## 2023-11-09 PROCEDURE — 1123F ACP DISCUSS/DSCN MKR DOCD: CPT | Performed by: INTERNAL MEDICINE

## 2023-11-09 NOTE — PATIENT INSTRUCTIONS
Plan:  Cardiac medications reviewed including indications and pertinent side effects. Medication list updated at this visit.    Check blood pressure and heart rate at home a few times per week- keep a log with dates and times and bring to office visit   Regular exercise and following a healthy diet encouraged   Follow up with Sarah in 6 months and me 1 year

## 2023-11-09 NOTE — PROGRESS NOTES
[address] [facility]   Cardiac Follow up     Referring Provider:  Robert Wolfe MD     Chief Complaint   Patient presents with    Congestive Heart Failure    Hyperlipidemia    Atrial Fibrillation    3 Month Follow-Up     Roe Durant   1945    History of Present Illness:   Roe Durant is a 70 y.o. 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 His echocardiogram from 4/2020 showed an EF of 55%, (EF 28% by stress test 2017). Exposed to second hand smoke for years. Admitted 11/22/22-11/29/22 with shortness of breath, treated for acute on chronic diastolic heart failure with lasix infusion admission weight 273lbs dishcarge weight down to 253 lbs. PYP scan + for ATTR amyloid. Started on Tafamidis, Jardiacne and entresto. EKG at  with heart failure on 2/24/2023 showed AF with progressing block. Chest X-Ray 4/7/2023 showed no acute process. BNP 1,199. Stress test 4/13/2023- Mild to moderately reduced calculated LVEF 45%, Inferolateral hypokinesis/scar with minimal familia-infarct ischemia. Eliot David He underwent ILR placement on 7/14/20233. Patient is not on a Beta blocker due to concerns of conduction system. Today he states he has been feeling well since his last visit he is tolerating his medications and is taking them as prescribed. He states he has been having some calf pain while attempting to exercise. He plans to start doing water exercise. He has SOB with exertion like stairs. He has had less episodes of near syncope with coughing. Patient currently denies any weight gain  palpitations, chest pain, dizziness, and syncope.         Past Medical History:   has a past medical history of Bronchitis, CAD (coronary artery disease), Cardiomyopathy ([address]), CHF (congestive heart failure) ([address]),

## 2023-11-15 ENCOUNTER — TELEPHONE (OUTPATIENT)
Dept: CARDIOLOGY CLINIC | Age: 78
End: 2023-11-15

## 2023-11-15 NOTE — TELEPHONE ENCOUNTER
Submitted PA for [facility]  Via CM Key: [id number] STATUS: APPROVED FROM 8/16/23-11/14/24    Follow up done daily; if no response in three days we will refax for status check. If another three days goes by with no response we will call the insurance for status.

## 2023-12-14 DIAGNOSIS — E85.4 CARDIAC AMYLOIDOSIS (HCC): ICD-10-CM

## 2023-12-14 DIAGNOSIS — I43 CARDIAC AMYLOIDOSIS (HCC): ICD-10-CM

## 2023-12-14 RX ORDER — TAFAMIDIS 61 MG/1
CAPSULE, LIQUID FILLED ORAL
Qty: 90 CAPSULE | Refills: 3 | Status: SHIPPED | OUTPATIENT
Start: 2023-12-14

## 2024-01-09 PROCEDURE — 93298 REM INTERROG DEV EVAL SCRMS: CPT | Performed by: INTERNAL MEDICINE

## 2024-03-09 ENCOUNTER — APPOINTMENT (OUTPATIENT)
Dept: GENERAL RADIOLOGY | Age: 79
End: 2024-03-09
Payer: MEDICARE

## 2024-03-09 ENCOUNTER — ANCILLARY PROCEDURE (OUTPATIENT)
Dept: EMERGENCY DEPT | Age: 79
End: 2024-03-09
Attending: EMERGENCY MEDICINE
Payer: MEDICARE

## 2024-03-09 ENCOUNTER — APPOINTMENT (OUTPATIENT)
Dept: CT IMAGING | Age: 79
End: 2024-03-09
Payer: MEDICARE

## 2024-03-09 ENCOUNTER — HOSPITAL ENCOUNTER (INPATIENT)
Age: 79
LOS: 7 days | Discharge: HOME HEALTH | End: 2024-03-16
Attending: EMERGENCY MEDICINE | Admitting: STUDENT IN AN ORGANIZED HEALTH CARE EDUCATION/TRAINING PROGRAM
Payer: MEDICARE

## 2024-03-09 DIAGNOSIS — R60.9 PERIPHERAL EDEMA: ICD-10-CM

## 2024-03-09 DIAGNOSIS — R09.02 HYPOXIA: ICD-10-CM

## 2024-03-09 DIAGNOSIS — J44.1 COPD EXACERBATION (HCC): Primary | ICD-10-CM

## 2024-03-09 DIAGNOSIS — I50.9 CONGESTIVE HEART FAILURE, UNSPECIFIED HF CHRONICITY, UNSPECIFIED HEART FAILURE TYPE (HCC): ICD-10-CM

## 2024-03-09 DIAGNOSIS — J18.9 MULTIFOCAL PNEUMONIA: ICD-10-CM

## 2024-03-09 DIAGNOSIS — R06.89 DYSPNEA AND RESPIRATORY ABNORMALITIES: ICD-10-CM

## 2024-03-09 DIAGNOSIS — R06.00 DYSPNEA AND RESPIRATORY ABNORMALITIES: ICD-10-CM

## 2024-03-09 LAB
ANION GAP SERPL CALCULATED.3IONS-SCNC: 11 MMOL/L (ref 3–16)
BACTERIA URNS QL MICRO: ABNORMAL /HPF
BASE EXCESS BLDV CALC-SCNC: 3.3 MMOL/L (ref -3–3)
BASOPHILS # BLD: 0.1 K/UL (ref 0–0.2)
BASOPHILS NFR BLD: 0.7 %
BILIRUB UR QL STRIP.AUTO: NEGATIVE
BUN SERPL-MCNC: 15 MG/DL (ref 7–20)
CALCIUM SERPL-MCNC: 8.8 MG/DL (ref 8.3–10.6)
CHLORIDE SERPL-SCNC: 93 MMOL/L (ref 99–110)
CLARITY UR: CLEAR
CO2 BLDV-SCNC: 28 MMOL/L
CO2 SERPL-SCNC: 28 MMOL/L (ref 21–32)
COHGB MFR BLDV: 2.6 % (ref 0–1.5)
COLOR UR: ABNORMAL
CREAT SERPL-MCNC: 1 MG/DL (ref 0.8–1.3)
DEPRECATED RDW RBC AUTO: 14.6 % (ref 12.4–15.4)
EKG ATRIAL RATE: 91 BPM
EKG DIAGNOSIS: NORMAL
EKG Q-T INTERVAL: 442 MS
EKG QRS DURATION: 146 MS
EKG QTC CALCULATION (BAZETT): 506 MS
EKG R AXIS: -72 DEGREES
EKG T AXIS: 108 DEGREES
EKG VENTRICULAR RATE: 79 BPM
EOSINOPHIL # BLD: 0 K/UL (ref 0–0.6)
EOSINOPHIL NFR BLD: 0 %
EPI CELLS #/AREA URNS HPF: ABNORMAL /HPF (ref 0–5)
FLUAV RNA RESP QL NAA+PROBE: NOT DETECTED
FLUBV RNA RESP QL NAA+PROBE: NOT DETECTED
GFR SERPLBLD CREATININE-BSD FMLA CKD-EPI: >60 ML/MIN/{1.73_M2}
GLUCOSE BLD-MCNC: 143 MG/DL (ref 70–99)
GLUCOSE BLD-MCNC: 248 MG/DL (ref 70–99)
GLUCOSE SERPL-MCNC: 128 MG/DL (ref 70–99)
GLUCOSE UR STRIP.AUTO-MCNC: NEGATIVE MG/DL
HCO3 BLDV-SCNC: 26.9 MMOL/L (ref 23–29)
HCT VFR BLD AUTO: 38.9 % (ref 40.5–52.5)
HGB BLD-MCNC: 12.9 G/DL (ref 13.5–17.5)
HGB UR QL STRIP.AUTO: ABNORMAL
HYALINE CASTS #/AREA URNS LPF: ABNORMAL /LPF (ref 0–2)
KETONES UR STRIP.AUTO-MCNC: NEGATIVE MG/DL
LACTATE BLDV-SCNC: 1.5 MMOL/L (ref 0.4–1.9)
LACTATE BLDV-SCNC: 2.9 MMOL/L (ref 0.4–1.9)
LEUKOCYTE ESTERASE UR QL STRIP.AUTO: ABNORMAL
LYMPHOCYTES # BLD: 1 K/UL (ref 1–5.1)
LYMPHOCYTES NFR BLD: 11.8 %
MCH RBC QN AUTO: 30.5 PG (ref 26–34)
MCHC RBC AUTO-ENTMCNC: 33.2 G/DL (ref 31–36)
MCV RBC AUTO: 91.8 FL (ref 80–100)
METHGB MFR BLDV: 0.1 %
MONOCYTES # BLD: 0.7 K/UL (ref 0–1.3)
MONOCYTES NFR BLD: 7.7 %
MUCOUS THREADS #/AREA URNS LPF: ABNORMAL /LPF
NEUTROPHILS # BLD: 6.8 K/UL (ref 1.7–7.7)
NEUTROPHILS NFR BLD: 79.8 %
NITRITE UR QL STRIP.AUTO: NEGATIVE
NT-PROBNP SERPL-MCNC: 2214 PG/ML (ref 0–449)
O2 THERAPY: ABNORMAL
PCO2 BLDV: 37.7 MMHG (ref 40–50)
PERFORMED ON: ABNORMAL
PERFORMED ON: ABNORMAL
PH BLDV: 7.47 [PH] (ref 7.35–7.45)
PH UR STRIP.AUTO: 7 [PH] (ref 5–8)
PLATELET # BLD AUTO: 201 K/UL (ref 135–450)
PMV BLD AUTO: 8 FL (ref 5–10.5)
PO2 BLDV: 40 MMHG (ref 25–40)
POTASSIUM SERPL-SCNC: 3.8 MMOL/L (ref 3.5–5.1)
PROT UR STRIP.AUTO-MCNC: NEGATIVE MG/DL
RBC # BLD AUTO: 4.24 M/UL (ref 4.2–5.9)
RBC #/AREA URNS HPF: ABNORMAL /HPF (ref 0–4)
SAO2 % BLDV: 77 %
SARS-COV-2 RNA RESP QL NAA+PROBE: NOT DETECTED
SODIUM SERPL-SCNC: 132 MMOL/L (ref 136–145)
SP GR UR STRIP.AUTO: 1.01 (ref 1–1.03)
TROPONIN, HIGH SENSITIVITY: 80 NG/L (ref 0–22)
TROPONIN, HIGH SENSITIVITY: 85 NG/L (ref 0–22)
TROPONIN, HIGH SENSITIVITY: 85 NG/L (ref 0–22)
TROPONIN, HIGH SENSITIVITY: 87 NG/L (ref 0–22)
UA DIPSTICK W REFLEX MICRO PNL UR: YES
URN SPEC COLLECT METH UR: ABNORMAL
UROBILINOGEN UR STRIP-ACNC: 1 E.U./DL
WBC # BLD AUTO: 8.5 K/UL (ref 4–11)
WBC #/AREA URNS HPF: ABNORMAL /HPF (ref 0–5)

## 2024-03-09 PROCEDURE — 81001 URINALYSIS AUTO W/SCOPE: CPT

## 2024-03-09 PROCEDURE — 71045 X-RAY EXAM CHEST 1 VIEW: CPT

## 2024-03-09 PROCEDURE — 83880 ASSAY OF NATRIURETIC PEPTIDE: CPT

## 2024-03-09 PROCEDURE — 93308 TTE F-UP OR LMTD: CPT

## 2024-03-09 PROCEDURE — 1200000000 HC SEMI PRIVATE

## 2024-03-09 PROCEDURE — 6370000000 HC RX 637 (ALT 250 FOR IP): Performed by: STUDENT IN AN ORGANIZED HEALTH CARE EDUCATION/TRAINING PROGRAM

## 2024-03-09 PROCEDURE — 80061 LIPID PANEL: CPT

## 2024-03-09 PROCEDURE — 93010 ELECTROCARDIOGRAM REPORT: CPT | Performed by: INTERNAL MEDICINE

## 2024-03-09 PROCEDURE — 96365 THER/PROPH/DIAG IV INF INIT: CPT

## 2024-03-09 PROCEDURE — 36415 COLL VENOUS BLD VENIPUNCTURE: CPT

## 2024-03-09 PROCEDURE — 2580000003 HC RX 258: Performed by: STUDENT IN AN ORGANIZED HEALTH CARE EDUCATION/TRAINING PROGRAM

## 2024-03-09 PROCEDURE — 82803 BLOOD GASES ANY COMBINATION: CPT

## 2024-03-09 PROCEDURE — 94640 AIRWAY INHALATION TREATMENT: CPT

## 2024-03-09 PROCEDURE — 99285 EMERGENCY DEPT VISIT HI MDM: CPT

## 2024-03-09 PROCEDURE — 83605 ASSAY OF LACTIC ACID: CPT

## 2024-03-09 PROCEDURE — 94761 N-INVAS EAR/PLS OXIMETRY MLT: CPT

## 2024-03-09 PROCEDURE — 87040 BLOOD CULTURE FOR BACTERIA: CPT

## 2024-03-09 PROCEDURE — 96375 TX/PRO/DX INJ NEW DRUG ADDON: CPT

## 2024-03-09 PROCEDURE — 6360000002 HC RX W HCPCS: Performed by: STUDENT IN AN ORGANIZED HEALTH CARE EDUCATION/TRAINING PROGRAM

## 2024-03-09 PROCEDURE — 6370000000 HC RX 637 (ALT 250 FOR IP): Performed by: NURSE PRACTITIONER

## 2024-03-09 PROCEDURE — 80048 BASIC METABOLIC PNL TOTAL CA: CPT

## 2024-03-09 PROCEDURE — 2580000003 HC RX 258: Performed by: EMERGENCY MEDICINE

## 2024-03-09 PROCEDURE — 87086 URINE CULTURE/COLONY COUNT: CPT

## 2024-03-09 PROCEDURE — 6360000002 HC RX W HCPCS: Performed by: EMERGENCY MEDICINE

## 2024-03-09 PROCEDURE — 87636 SARSCOV2 & INF A&B AMP PRB: CPT

## 2024-03-09 PROCEDURE — 93005 ELECTROCARDIOGRAM TRACING: CPT | Performed by: EMERGENCY MEDICINE

## 2024-03-09 PROCEDURE — 83036 HEMOGLOBIN GLYCOSYLATED A1C: CPT

## 2024-03-09 PROCEDURE — 71260 CT THORAX DX C+: CPT

## 2024-03-09 PROCEDURE — 84484 ASSAY OF TROPONIN QUANT: CPT

## 2024-03-09 PROCEDURE — 6360000004 HC RX CONTRAST MEDICATION: Performed by: EMERGENCY MEDICINE

## 2024-03-09 PROCEDURE — 2700000000 HC OXYGEN THERAPY PER DAY

## 2024-03-09 PROCEDURE — 85025 COMPLETE CBC W/AUTO DIFF WBC: CPT

## 2024-03-09 RX ORDER — MECOBALAMIN 5000 MCG
10 TABLET,DISINTEGRATING ORAL NIGHTLY
Status: DISCONTINUED | OUTPATIENT
Start: 2024-03-09 | End: 2024-03-16 | Stop reason: HOSPADM

## 2024-03-09 RX ORDER — POLYETHYLENE GLYCOL 3350 17 G/17G
17 POWDER, FOR SOLUTION ORAL DAILY PRN
Status: DISCONTINUED | OUTPATIENT
Start: 2024-03-09 | End: 2024-03-16 | Stop reason: HOSPADM

## 2024-03-09 RX ORDER — MAGNESIUM 30 MG
30 TABLET ORAL DAILY
COMMUNITY

## 2024-03-09 RX ORDER — DEXTROSE MONOHYDRATE 100 MG/ML
INJECTION, SOLUTION INTRAVENOUS CONTINUOUS PRN
Status: DISCONTINUED | OUTPATIENT
Start: 2024-03-09 | End: 2024-03-16 | Stop reason: HOSPADM

## 2024-03-09 RX ORDER — ALBUTEROL SULFATE 90 UG/1
2 AEROSOL, METERED RESPIRATORY (INHALATION) 4 TIMES DAILY PRN
Status: DISCONTINUED | OUTPATIENT
Start: 2024-03-09 | End: 2024-03-16 | Stop reason: HOSPADM

## 2024-03-09 RX ORDER — ONDANSETRON 4 MG/1
4 TABLET, ORALLY DISINTEGRATING ORAL EVERY 8 HOURS PRN
Status: DISCONTINUED | OUTPATIENT
Start: 2024-03-09 | End: 2024-03-16 | Stop reason: HOSPADM

## 2024-03-09 RX ORDER — IPRATROPIUM BROMIDE AND ALBUTEROL SULFATE 2.5; .5 MG/3ML; MG/3ML
1 SOLUTION RESPIRATORY (INHALATION)
Status: DISCONTINUED | OUTPATIENT
Start: 2024-03-09 | End: 2024-03-11

## 2024-03-09 RX ORDER — POTASSIUM CHLORIDE 20 MEQ/1
40 TABLET, EXTENDED RELEASE ORAL PRN
Status: DISCONTINUED | OUTPATIENT
Start: 2024-03-09 | End: 2024-03-15

## 2024-03-09 RX ORDER — SODIUM CHLORIDE 9 MG/ML
INJECTION, SOLUTION INTRAVENOUS PRN
Status: DISCONTINUED | OUTPATIENT
Start: 2024-03-09 | End: 2024-03-16 | Stop reason: HOSPADM

## 2024-03-09 RX ORDER — ONDANSETRON 2 MG/ML
4 INJECTION INTRAMUSCULAR; INTRAVENOUS EVERY 6 HOURS PRN
Status: DISCONTINUED | OUTPATIENT
Start: 2024-03-09 | End: 2024-03-16 | Stop reason: HOSPADM

## 2024-03-09 RX ORDER — POTASSIUM CHLORIDE 7.45 MG/ML
10 INJECTION INTRAVENOUS PRN
Status: DISCONTINUED | OUTPATIENT
Start: 2024-03-09 | End: 2024-03-15

## 2024-03-09 RX ORDER — FUROSEMIDE 10 MG/ML
40 INJECTION INTRAMUSCULAR; INTRAVENOUS ONCE
Status: COMPLETED | OUTPATIENT
Start: 2024-03-09 | End: 2024-03-09

## 2024-03-09 RX ORDER — ENOXAPARIN SODIUM 100 MG/ML
30 INJECTION SUBCUTANEOUS 2 TIMES DAILY
Status: DISCONTINUED | OUTPATIENT
Start: 2024-03-09 | End: 2024-03-09

## 2024-03-09 RX ORDER — GLUCAGON 1 MG/ML
1 KIT INJECTION PRN
Status: DISCONTINUED | OUTPATIENT
Start: 2024-03-09 | End: 2024-03-16 | Stop reason: HOSPADM

## 2024-03-09 RX ORDER — ATORVASTATIN CALCIUM 80 MG/1
80 TABLET, FILM COATED ORAL DAILY
Status: DISCONTINUED | OUTPATIENT
Start: 2024-03-09 | End: 2024-03-16 | Stop reason: HOSPADM

## 2024-03-09 RX ORDER — SODIUM CHLORIDE 0.9 % (FLUSH) 0.9 %
5-40 SYRINGE (ML) INJECTION EVERY 12 HOURS SCHEDULED
Status: DISCONTINUED | OUTPATIENT
Start: 2024-03-09 | End: 2024-03-16 | Stop reason: HOSPADM

## 2024-03-09 RX ORDER — MAGNESIUM SULFATE IN WATER 40 MG/ML
2000 INJECTION, SOLUTION INTRAVENOUS PRN
Status: DISCONTINUED | OUTPATIENT
Start: 2024-03-09 | End: 2024-03-16 | Stop reason: HOSPADM

## 2024-03-09 RX ORDER — INSULIN LISPRO 100 [IU]/ML
0-4 INJECTION, SOLUTION INTRAVENOUS; SUBCUTANEOUS NIGHTLY
Status: DISCONTINUED | OUTPATIENT
Start: 2024-03-09 | End: 2024-03-16 | Stop reason: HOSPADM

## 2024-03-09 RX ORDER — GUAIFENESIN 600 MG/1
600 TABLET, EXTENDED RELEASE ORAL 2 TIMES DAILY
Status: DISCONTINUED | OUTPATIENT
Start: 2024-03-09 | End: 2024-03-11

## 2024-03-09 RX ORDER — HYDROCODONE BITARTRATE AND HOMATROPINE METHYLBROMIDE ORAL SOLUTION 5; 1.5 MG/5ML; MG/5ML
5 LIQUID ORAL EVERY 4 HOURS PRN
Status: DISCONTINUED | OUTPATIENT
Start: 2024-03-09 | End: 2024-03-16 | Stop reason: HOSPADM

## 2024-03-09 RX ORDER — ACETAMINOPHEN 650 MG/1
650 SUPPOSITORY RECTAL EVERY 6 HOURS PRN
Status: DISCONTINUED | OUTPATIENT
Start: 2024-03-09 | End: 2024-03-16 | Stop reason: HOSPADM

## 2024-03-09 RX ORDER — SODIUM CHLORIDE 0.9 % (FLUSH) 0.9 %
5-40 SYRINGE (ML) INJECTION PRN
Status: DISCONTINUED | OUTPATIENT
Start: 2024-03-09 | End: 2024-03-16 | Stop reason: HOSPADM

## 2024-03-09 RX ORDER — ACETAMINOPHEN 325 MG/1
650 TABLET ORAL EVERY 6 HOURS PRN
Status: DISCONTINUED | OUTPATIENT
Start: 2024-03-09 | End: 2024-03-16 | Stop reason: HOSPADM

## 2024-03-09 RX ORDER — TRAZODONE HYDROCHLORIDE 50 MG/1
50 TABLET ORAL NIGHTLY PRN
Status: DISCONTINUED | OUTPATIENT
Start: 2024-03-09 | End: 2024-03-16 | Stop reason: HOSPADM

## 2024-03-09 RX ORDER — INSULIN LISPRO 100 [IU]/ML
0-4 INJECTION, SOLUTION INTRAVENOUS; SUBCUTANEOUS
Status: DISCONTINUED | OUTPATIENT
Start: 2024-03-09 | End: 2024-03-16 | Stop reason: HOSPADM

## 2024-03-09 RX ADMIN — ATORVASTATIN CALCIUM 80 MG: 80 TABLET, FILM COATED ORAL at 17:28

## 2024-03-09 RX ADMIN — IPRATROPIUM BROMIDE AND ALBUTEROL SULFATE 1 DOSE: 2.5; .5 SOLUTION RESPIRATORY (INHALATION) at 15:47

## 2024-03-09 RX ADMIN — GUAIFENESIN 600 MG: 600 TABLET ORAL at 20:03

## 2024-03-09 RX ADMIN — IOPAMIDOL 75 ML: 755 INJECTION, SOLUTION INTRAVENOUS at 12:11

## 2024-03-09 RX ADMIN — METHYLPREDNISOLONE SODIUM SUCCINATE 40 MG: 40 INJECTION INTRAMUSCULAR; INTRAVENOUS at 17:27

## 2024-03-09 RX ADMIN — Medication 2 PUFF: at 19:51

## 2024-03-09 RX ADMIN — CEFEPIME 2000 MG: 2 INJECTION, POWDER, FOR SOLUTION INTRAVENOUS at 18:31

## 2024-03-09 RX ADMIN — HYDROCODONE BITARTRATE AND HOMATROPINE METHYLBROMIDE 5 ML: 5; 1.5 SOLUTION ORAL at 20:03

## 2024-03-09 RX ADMIN — Medication 10 MG: at 20:03

## 2024-03-09 RX ADMIN — APIXABAN 5 MG: 5 TABLET, FILM COATED ORAL at 20:03

## 2024-03-09 RX ADMIN — ACETAMINOPHEN 650 MG: 325 TABLET ORAL at 20:03

## 2024-03-09 RX ADMIN — CEFTRIAXONE SODIUM 1000 MG: 1 INJECTION, POWDER, FOR SOLUTION INTRAMUSCULAR; INTRAVENOUS at 13:16

## 2024-03-09 RX ADMIN — AZITHROMYCIN MONOHYDRATE 500 MG: 500 INJECTION, POWDER, LYOPHILIZED, FOR SOLUTION INTRAVENOUS at 14:01

## 2024-03-09 RX ADMIN — IPRATROPIUM BROMIDE AND ALBUTEROL SULFATE 1 DOSE: 2.5; .5 SOLUTION RESPIRATORY (INHALATION) at 19:51

## 2024-03-09 RX ADMIN — VANCOMYCIN HYDROCHLORIDE 2000 MG: 10 INJECTION, POWDER, LYOPHILIZED, FOR SOLUTION INTRAVENOUS at 18:32

## 2024-03-09 RX ADMIN — SODIUM CHLORIDE 25 ML: 9 INJECTION, SOLUTION INTRAVENOUS at 18:30

## 2024-03-09 RX ADMIN — TRAZODONE HYDROCHLORIDE 50 MG: 50 TABLET ORAL at 20:03

## 2024-03-09 RX ADMIN — FUROSEMIDE 40 MG: 10 INJECTION, SOLUTION INTRAMUSCULAR; INTRAVENOUS at 13:13

## 2024-03-09 ASSESSMENT — PAIN DESCRIPTION - LOCATION: LOCATION: GENERALIZED

## 2024-03-09 ASSESSMENT — LIFESTYLE VARIABLES
HOW MANY STANDARD DRINKS CONTAINING ALCOHOL DO YOU HAVE ON A TYPICAL DAY: 3 OR 4
HOW OFTEN DO YOU HAVE A DRINK CONTAINING ALCOHOL: 2-4 TIMES A MONTH

## 2024-03-09 ASSESSMENT — PAIN - FUNCTIONAL ASSESSMENT: PAIN_FUNCTIONAL_ASSESSMENT: 0-10

## 2024-03-09 ASSESSMENT — PAIN SCALES - GENERAL
PAINLEVEL_OUTOF10: 0
PAINLEVEL_OUTOF10: 5

## 2024-03-09 NOTE — CONSULTS
Consult Placed     Who: CARDIOLOGY, [facility]   Date: 03/09/24  Time: 3:42 PM       Electronically signed by Danny Lindsey on 3/9/2024 at 3:42 PM

## 2024-03-09 NOTE — ED PROVIDER NOTES
[facility]  ED  EMERGENCY DEPARTMENT ENCOUNTER        Patient Name: Benji Israel  MRN: [id number]  Birthdate 1945  Date of evaluation: 3/9/2024  Provider: Richie Pizarro MD  PCP: Raysa Eli MD  Note Started: 10:42 AM EST 3/9/24    CHIEF COMPLAINT       Shortness of Breath (Sx for 2 days with hx of copd)      HISTORY OF PRESENT ILLNESS: 1 or more Elements     History from : Patient    Limitations to history : None    Benji Israel is a 79 y.o. male who presents for evaluation of shortness of breath.  Patient states that he has had worsening shortness of breath over the past several days.  He does have history of COPD.  He does not typically require oxygen.  He reports history of cardiac amyloidosis.  He states that he has had increased bilateral lower extremity swelling.  He states he typically has swelling to the left lower extremity as this is where the vein was grafted for cardiac bypass however he has had worsened swelling.  No specific chest pain.  He has had cough productive of brown sputum.  No fevers.  He is on anticoagulation with Eliquis.    Nursing Notes were all reviewed and agreed with or any disagreements were addressed in the HPI.    REVIEW OF SYSTEMS :      Review of Systems    Positives and Pertinent negatives as per HPI.     SURGICAL HISTORY     Past Surgical History:   Procedure Laterality Date    APPENDECTOMY      BRONCHOSCOPY N/A 1/22/2020    BRONCHOSCOPY ALVEOLAR LAVAGE performed by Lila Carreon MD at [facility] ENDOSCOPY    BRONCHOSCOPY  1/22/2020    BRONCHOSCOPY THERAPUTIC ASPIRATION INITIAL performed by Lila Carreon MD at [facility] ENDOSCOPY    BRONCHOSCOPY N/A 6/11/2021    BRONCHOSCOPY ALVEOLAR LAVAGE performed by Lila Carreon MD at [facility] ENDOSCOPY    BRONCHOSCOPY  6/11/2021    BRONCHOSCOPY THERAPUTIC ASPIRATION INITIAL performed by Lila Carreon MD at [facility] ENDOSCOPY    CARDIAC CATHETERIZATION  07/10/2017    Dr. Gill - multi-vessel CAD, referred for      DISPOSITION/PLAN     DISPOSITION Decision To Admit 03/09/2024 01:04:56 PM      PATIENT REFERRED TO:  No follow-up provider specified.    DISCHARGE MEDICATIONS:  Patient was given scripts for the following medications. I counseled patient how to take these medications:  New Prescriptions    No medications on file       DISCONTINUED MEDICATIONS:  Discontinued Medications    No medications on file              (This chart was generated in part by using Dragon Dictation system and may contain errors related to that system including errors in grammar, punctuation, and spelling, as well as words and phrases that may be inappropriate. If there are any questions or concerns please feel free to contact the dictating provider for clarification.)    MD Juarez Rios Benjamin, MD  03/09/24 [id number]

## 2024-03-09 NOTE — H&P
V2.0  History and Physical      Name:  Benji Israel /Age/Sex: 1945  (79 y.o. male)   MRN & CSN:  [id number] & [id number] Encounter Date/Time: 3/9/2024 1:40 PM EST   Location:   PCP: Raysa Eli MD       Hospital Day: 1    Assessment and Plan:   Benji Israel is a 79 y.o. male with a pmh of Non-insulin-dependent type II mellitus type II hypertension hyperlipidemia chronic diastolic heart failure with preserved ejection fraction CAD former smoker COPD who presents with Multifocal pneumonia    Hospital Problems             Last Modified POA    * (Principal) Multifocal pneumonia 3/9/2024 Yes       Acute hypoxic respiratory failure with underlying multifocal pneumonia and suspected COPD exacerbation started on vancomycin and cefepime cultures were sent de-escalate based on culture results.  Will also start the patient on Solu-Medrol and DuoNebs  Nonspecific ST changes with mildly elevated troponins and atypical chest pain: ACS ruled out cardiology consulted telemetry placed  History of pericarditis with pericardial effusion history  CAD with history of CABG  Non-insulin-dependent diabetes mellitus type 2 start the patient on sliding scale insulin monitor for hypoglycemia  Atrial fibrillation on Eliquis chronic diastolic heart failure with preserved ejection fraction  Tobacco use disorder history  COPD start the patient on DuoNebs   hyperlipidemia on statin    Goals status was discussed in detail with patient.  Verbalized understanding of different options of CODE STATUS.  Patient opted for full code.    Comment: Please note this report has been produced using speech recognition software and may contain errors related to that system including errors in grammar, punctuation, and spelling, as well as words and phrases that may be inappropriate. If there are any questions or concerns please feel free to contact the dictating provider for clarification.     Disposition:   Current Living situation:  ONE XRAY VIEW OF THE CHEST 3/9/2024 10:46 am COMPARISON: April 7, 2023 HISTORY: ORDERING SYSTEM PROVIDED HISTORY: Chest Pain TECHNOLOGIST PROVIDED HISTORY: Reason for exam:->Chest Pain Reason for Exam: SOB for the past 2 days. History of COPD FINDINGS: The cardiomediastinal silhouette is mildly enlarged.  Stable post sternotomy changes.  Vascular congestive changes are noted.  There is chronic asymmetric elevation of the left diaphragm. Patchy bilateral ground-glass infiltrates concerning for an evolving atypical pneumonia. No pneumothorax.  No large pleural effusion. No subdiaphragmatic free air or pneumomediastinum is present.     1. Patchy bilateral ground-glass infiltrates concerning for an evolving atypical pneumonia. 2. Vascular congestive changes.     POC US [employer]    Result Date: 3/9/2024  POCUS_Cardiac     Exam Information:          Exam type:  Clinically indicated     Indication(s) for Exam:          The exam was performed with the following indications::  Dyspnea, Concern for effusion     Views Obtained & Images Saved for These Views:          The pericardial sac, myocardium, 4 chambers, and IVC were identified using the following views::          Parasternal long-axis         Parasternal short-axis         Apical 4-chamber     Findings:          Pericardial Effusion:  No pericardial effusion         Cardiac Activity:  Cardiac activity normal         LV function:  Depressed (30-50% EF)     Interpretation:          Diminished LV function         Other::  noted LVH  Electronically signed by Richie Pizarro on Saturday, March 9, 2024 at 11:46 AM : Attending:       Personally reviewed Lab Studies, Imaging    Electronically signed by Jose Luna MD on 3/9/2024 at 1:40 PM

## 2024-03-09 NOTE — ED TRIAGE NOTES
Pt arrived to ed with c/o SOB x2 days. Reports this feels similar to his COPD exacerbations in the past. Denies CP. Denies o2 use at home. Pt 87-90% RA on arrival. Placed on 2 L NC, o2 now 96%.

## 2024-03-09 NOTE — RT PROTOCOL NOTE
RT Inhaler-Nebulizer Bronchodilator Protocol Note    There is a bronchodilator order in the chart from a provider indicating to follow the RT Bronchodilator Protocol and there is an “Initiate RT Inhaler-Nebulizer Bronchodilator Protocol” order as well (see protocol at bottom of note).    CXR Findings:  XR CHEST PORTABLE    Result Date: 3/9/2024  1. Patchy bilateral ground-glass infiltrates concerning for an evolving atypical pneumonia. 2. Vascular congestive changes.       The findings from the last RT Protocol Assessment were as follows:   History Pulmonary Disease: Chronic pulmonary disease  Respiratory Pattern: Regular pattern and RR 12-20 bpm  Breath Sounds: Slightly diminished and/or crackles  Cough: Strong, spontaneous, non-productive  Indication for Bronchodilator Therapy: On home bronchodilators  Bronchodilator Assessment Score: 4    Aerosolized bronchodilator medication orders have been revised according to the RT Inhaler-Nebulizer Bronchodilator Protocol below.    Respiratory Therapist to perform RT Therapy Protocol Assessment initially then follow the protocol.  Repeat RT Therapy Protocol Assessment PRN for score 0-3 or on second treatment, BID, and PRN for scores above 3.    No Indications - adjust the frequency to every 6 hours PRN wheezing or bronchospasm, if no treatments needed after 48 hours then discontinue using Per Protocol order mode.     If indication present, adjust the RT bronchodilator orders based on the Bronchodilator Assessment Score as indicated below.  Use Inhaler orders unless patient has one or more of the following: on home nebulizer, not able to hold breath for 10 seconds, is not alert and oriented, cannot activate and use MDI correctly, or respiratory rate 25 breaths per minute or more, then use the equivalent nebulizer order(s) with same Frequency and PRN reasons based on the score.  If a patient is on this medication at home then do not decrease Frequency below that used at  home.    0-3 - enter or revise RT bronchodilator order(s) to equivalent RT Bronchodilator order with Frequency of every 4 hours PRN for wheezing or increased work of breathing using Per Protocol order mode.        4-6 - enter or revise RT Bronchodilator order(s) to two equivalent RT bronchodilator orders with one order with BID Frequency and one order with Frequency of every 4 hours PRN wheezing or increased work of breathing using Per Protocol order mode.        7-10 - enter or revise RT Bronchodilator order(s) to two equivalent RT bronchodilator orders with one order with TID Frequency and one order with Frequency of every 4 hours PRN wheezing or increased work of breathing using Per Protocol order mode.       11-13 - enter or revise RT Bronchodilator order(s) to one equivalent RT bronchodilator order with QID Frequency and an Albuterol order with Frequency of every 4 hours PRN wheezing or increased work of breathing using Per Protocol order mode.      Greater than 13 - enter or revise RT Bronchodilator order(s) to one equivalent RT bronchodilator order with every 4 hours Frequency and an Albuterol order with Frequency of every 2 hours PRN wheezing or increased work of breathing using Per Protocol order mode.     RT to enter RT Home Evaluation for COPD & MDI Assessment order using Per Protocol order mode.    Electronically signed by Fani Bejarano RCP on 3/9/2024 at 3:34 PM

## 2024-03-10 LAB
ANION GAP SERPL CALCULATED.3IONS-SCNC: 8 MMOL/L (ref 3–16)
BACTERIA UR CULT: NORMAL
BUN SERPL-MCNC: 15 MG/DL (ref 7–20)
CALCIUM SERPL-MCNC: 8.8 MG/DL (ref 8.3–10.6)
CHLORIDE SERPL-SCNC: 97 MMOL/L (ref 99–110)
CHOLEST SERPL-MCNC: 120 MG/DL (ref 0–199)
CO2 SERPL-SCNC: 29 MMOL/L (ref 21–32)
CREAT SERPL-MCNC: 0.8 MG/DL (ref 0.8–1.3)
DEPRECATED RDW RBC AUTO: 14.7 % (ref 12.4–15.4)
GFR SERPLBLD CREATININE-BSD FMLA CKD-EPI: >60 ML/MIN/{1.73_M2}
GLUCOSE BLD-MCNC: 158 MG/DL (ref 70–99)
GLUCOSE BLD-MCNC: 175 MG/DL (ref 70–99)
GLUCOSE BLD-MCNC: 187 MG/DL (ref 70–99)
GLUCOSE BLD-MCNC: 235 MG/DL (ref 70–99)
GLUCOSE SERPL-MCNC: 168 MG/DL (ref 70–99)
HCT VFR BLD AUTO: 35.6 % (ref 40.5–52.5)
HDLC SERPL-MCNC: 45 MG/DL (ref 40–60)
HGB BLD-MCNC: 12 G/DL (ref 13.5–17.5)
LDLC SERPL CALC-MCNC: 66 MG/DL
MAGNESIUM SERPL-MCNC: 2.2 MG/DL (ref 1.8–2.4)
MCH RBC QN AUTO: 30.7 PG (ref 26–34)
MCHC RBC AUTO-ENTMCNC: 33.7 G/DL (ref 31–36)
MCV RBC AUTO: 91.1 FL (ref 80–100)
PERFORMED ON: ABNORMAL
PHOSPHATE SERPL-MCNC: 4.4 MG/DL (ref 2.5–4.9)
PLATELET # BLD AUTO: 170 K/UL (ref 135–450)
PMV BLD AUTO: 7.8 FL (ref 5–10.5)
POTASSIUM SERPL-SCNC: 3.6 MMOL/L (ref 3.5–5.1)
RBC # BLD AUTO: 3.91 M/UL (ref 4.2–5.9)
SODIUM SERPL-SCNC: 134 MMOL/L (ref 136–145)
TRIGL SERPL-MCNC: 43 MG/DL (ref 0–150)
TROPONIN, HIGH SENSITIVITY: 76 NG/L (ref 0–22)
TROPONIN, HIGH SENSITIVITY: 77 NG/L (ref 0–22)
TROPONIN, HIGH SENSITIVITY: 78 NG/L (ref 0–22)
TROPONIN, HIGH SENSITIVITY: 79 NG/L (ref 0–22)
VLDLC SERPL CALC-MCNC: 9 MG/DL
WBC # BLD AUTO: 6.5 K/UL (ref 4–11)

## 2024-03-10 PROCEDURE — 2700000000 HC OXYGEN THERAPY PER DAY

## 2024-03-10 PROCEDURE — 1200000000 HC SEMI PRIVATE

## 2024-03-10 PROCEDURE — 94761 N-INVAS EAR/PLS OXIMETRY MLT: CPT

## 2024-03-10 PROCEDURE — 6360000002 HC RX W HCPCS: Performed by: INTERNAL MEDICINE

## 2024-03-10 PROCEDURE — 83735 ASSAY OF MAGNESIUM: CPT

## 2024-03-10 PROCEDURE — 2580000003 HC RX 258: Performed by: STUDENT IN AN ORGANIZED HEALTH CARE EDUCATION/TRAINING PROGRAM

## 2024-03-10 PROCEDURE — 6370000000 HC RX 637 (ALT 250 FOR IP): Performed by: NURSE PRACTITIONER

## 2024-03-10 PROCEDURE — 85027 COMPLETE CBC AUTOMATED: CPT

## 2024-03-10 PROCEDURE — 84484 ASSAY OF TROPONIN QUANT: CPT

## 2024-03-10 PROCEDURE — 94640 AIRWAY INHALATION TREATMENT: CPT

## 2024-03-10 PROCEDURE — 97535 SELF CARE MNGMENT TRAINING: CPT

## 2024-03-10 PROCEDURE — 6370000000 HC RX 637 (ALT 250 FOR IP): Performed by: STUDENT IN AN ORGANIZED HEALTH CARE EDUCATION/TRAINING PROGRAM

## 2024-03-10 PROCEDURE — 97110 THERAPEUTIC EXERCISES: CPT

## 2024-03-10 PROCEDURE — 6360000002 HC RX W HCPCS: Performed by: STUDENT IN AN ORGANIZED HEALTH CARE EDUCATION/TRAINING PROGRAM

## 2024-03-10 PROCEDURE — 84100 ASSAY OF PHOSPHORUS: CPT

## 2024-03-10 PROCEDURE — 97530 THERAPEUTIC ACTIVITIES: CPT

## 2024-03-10 PROCEDURE — 80048 BASIC METABOLIC PNL TOTAL CA: CPT

## 2024-03-10 PROCEDURE — 97116 GAIT TRAINING THERAPY: CPT

## 2024-03-10 PROCEDURE — 97161 PT EVAL LOW COMPLEX 20 MIN: CPT

## 2024-03-10 PROCEDURE — 99223 1ST HOSP IP/OBS HIGH 75: CPT | Performed by: INTERNAL MEDICINE

## 2024-03-10 PROCEDURE — 36415 COLL VENOUS BLD VENIPUNCTURE: CPT

## 2024-03-10 PROCEDURE — 97166 OT EVAL MOD COMPLEX 45 MIN: CPT

## 2024-03-10 RX ORDER — FUROSEMIDE 10 MG/ML
40 INJECTION INTRAMUSCULAR; INTRAVENOUS 2 TIMES DAILY
Status: DISCONTINUED | OUTPATIENT
Start: 2024-03-10 | End: 2024-03-12

## 2024-03-10 RX ADMIN — TRAZODONE HYDROCHLORIDE 50 MG: 50 TABLET ORAL at 22:57

## 2024-03-10 RX ADMIN — VANCOMYCIN HYDROCHLORIDE 1500 MG: 10 INJECTION, POWDER, LYOPHILIZED, FOR SOLUTION INTRAVENOUS at 12:21

## 2024-03-10 RX ADMIN — Medication 2 PUFF: at 08:38

## 2024-03-10 RX ADMIN — SODIUM CHLORIDE, PRESERVATIVE FREE 10 ML: 5 INJECTION INTRAVENOUS at 09:07

## 2024-03-10 RX ADMIN — METHYLPREDNISOLONE SODIUM SUCCINATE 40 MG: 40 INJECTION INTRAMUSCULAR; INTRAVENOUS at 09:12

## 2024-03-10 RX ADMIN — CEFEPIME 2000 MG: 2 INJECTION, POWDER, FOR SOLUTION INTRAVENOUS at 00:40

## 2024-03-10 RX ADMIN — IPRATROPIUM BROMIDE AND ALBUTEROL SULFATE 1 DOSE: 2.5; .5 SOLUTION RESPIRATORY (INHALATION) at 08:37

## 2024-03-10 RX ADMIN — IPRATROPIUM BROMIDE AND ALBUTEROL SULFATE 1 DOSE: 2.5; .5 SOLUTION RESPIRATORY (INHALATION) at 20:22

## 2024-03-10 RX ADMIN — HYDROCODONE BITARTRATE AND HOMATROPINE METHYLBROMIDE 5 ML: 5; 1.5 SOLUTION ORAL at 23:00

## 2024-03-10 RX ADMIN — APIXABAN 5 MG: 5 TABLET, FILM COATED ORAL at 20:37

## 2024-03-10 RX ADMIN — TIOTROPIUM BROMIDE INHALATION SPRAY 2 PUFF: 3.12 SPRAY, METERED RESPIRATORY (INHALATION) at 08:38

## 2024-03-10 RX ADMIN — Medication 2 PUFF: at 20:23

## 2024-03-10 RX ADMIN — Medication 10 MG: at 22:57

## 2024-03-10 RX ADMIN — GUAIFENESIN 600 MG: 600 TABLET ORAL at 20:37

## 2024-03-10 RX ADMIN — GUAIFENESIN 600 MG: 600 TABLET ORAL at 09:06

## 2024-03-10 RX ADMIN — FUROSEMIDE 40 MG: 10 INJECTION, SOLUTION INTRAMUSCULAR; INTRAVENOUS at 20:37

## 2024-03-10 RX ADMIN — CEFEPIME 2000 MG: 2 INJECTION, POWDER, FOR SOLUTION INTRAVENOUS at 16:43

## 2024-03-10 RX ADMIN — IPRATROPIUM BROMIDE AND ALBUTEROL SULFATE 1 DOSE: 2.5; .5 SOLUTION RESPIRATORY (INHALATION) at 15:35

## 2024-03-10 RX ADMIN — HYDROCODONE BITARTRATE AND HOMATROPINE METHYLBROMIDE 5 ML: 5; 1.5 SOLUTION ORAL at 16:54

## 2024-03-10 RX ADMIN — ATORVASTATIN CALCIUM 80 MG: 80 TABLET, FILM COATED ORAL at 09:06

## 2024-03-10 RX ADMIN — FUROSEMIDE 40 MG: 10 INJECTION, SOLUTION INTRAMUSCULAR; INTRAVENOUS at 14:23

## 2024-03-10 RX ADMIN — CEFEPIME 2000 MG: 2 INJECTION, POWDER, FOR SOLUTION INTRAVENOUS at 09:03

## 2024-03-10 RX ADMIN — CEFEPIME 2000 MG: 2 INJECTION, POWDER, FOR SOLUTION INTRAVENOUS at 23:02

## 2024-03-10 RX ADMIN — APIXABAN 5 MG: 5 TABLET, FILM COATED ORAL at 09:06

## 2024-03-10 NOTE — PROGRESS NOTES
This RN assumes pt care. Pt sitting in the chair, call light within reach, states understanding to call out for assist. VSS    Donna Forrester RN

## 2024-03-10 NOTE — PROGRESS NOTES
Occupational Therapy  Facility/Department: [address] SURG  Occupational Therapy Initial Assessment/Treatment    Name: Benji Israel  : 1945  MRN: [id number]  Date of Service: 3/10/2024    Discharge Recommendations:  Home with assist PRN          Patient Diagnosis(es): The primary encounter diagnosis was COPD exacerbation (HCC). Diagnoses of Dyspnea and respiratory abnormalities, Hypoxia, Congestive heart failure, unspecified HF chronicity, unspecified heart failure type (HCC), Peripheral edema, and Multifocal pneumonia were also pertinent to this visit.  Past Medical History:  has a past medical history of Bronchitis, CAD (coronary artery disease), Cardiomyopathy (HCC), CHF (congestive heart failure) (HCC), COPD (chronic obstructive pulmonary disease) (HCC), Diabetes (HCC), Diaphragmatic paralysis, Diastolic dysfunction, Family history of early CAD, Former smoker, High cholesterol, Hypertension, Knee replacement, Obesity, Class II, 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  Past Surgical History:  has a past surgical history that includes Appendectomy; meniscectomy (Right, 1965); Tonsillectomy; Cardiac catheterization (07/10/2017); Carpal tunnel release (Bilateral); Coronary artery bypass graft (2017); transesophageal echocardiogram (2017); Total knee arthroplasty (Right); Coronary angioplasty with stent (2018); bronchoscopy (N/A, 2020); bronchoscopy (2020); Thoracoscopy (Left, 2020); bronchoscopy (N/A, 2021); and bronchoscopy (2021).           Assessment   Performance deficits / Impairments: Decreased functional mobility ;Decreased endurance;Decreased ADL status;Decreased high-level IADLs  Patient admitted from home  with CHF and peripheral edema. Patient educated on CHF OT education and verbalized understanding. SBA to temi shoes seated EOB. CGA for functional mobility and transfers w/o device. Pt O2 sats dropped to 86% on RA, with PLB rebounded to 90*.

## 2024-03-10 NOTE — CONSULTS
Pharmacy Note  Vancomycin Consult    Benji Israel is a 79 y.o. male started on vancomycin for CAP. Pharmacy consult received from Dr. Luna to manage vancomycin therapy. Patient is also receiving the following antibiotics: cefepime.    Allergies: Oxycodone, Jardiance [empagliflozin], and Demerol     Tmax: 100.9 F    Micro: blood and urine cultures ordered    Recent Labs     03/09/24  1051   CREATININE 1.0   WBC 8.5     Estimated Creatinine Clearance: 77 mL/min (based on SCr of 1 mg/dL).    Intake/Output Summary (Last 24 hours) at 3/9/2024 2001  Last data filed at 3/9/2024 1734  Gross per 24 hour   Intake --   Output 1675 ml   Net -1675 ml     Wt Readings from Last 1 Encounters:   03/09/24 103.9 kg (229 lb)       Body mass index is 31.06 kg/m².    Loading dose (critically ill or in ICU, require dialysis or renal replacement therapy): Vancomycin 25 mg/kg IVPB x 1 (maximum 2500 mg).  Maintenance dose: 10-20 mg/kg (maximum: 2000 mg/dose and 4500 mg/day) starting at the next dosing interval determined by renal function  Pulse dose: fluctuating renal function, ANA, ESRD  CRRT: 7.5-10 mg/kg q12h   Goal Vancomycin trough: 15-20 mcg/mL   Goal Vancomycin AUC: 400-600     Assessment/Plan:  Vancomycin was initiated with a one time loading dose of 2000 mg (20 mg/kg).  Will continue with vancomycin 1500 mg (15 mg/kg) IV every 18 hours.   Calculated vancomycin AUC = 519 mg/L*h with an estimated steady-state vancomycin trough = 14.2 mcg/mL.   Vancomycin trough ordered for 3/11 at 05:30.   Labs: BMP and CBC tomorrow morning  Future dosing adjustments and levels will be determined based on culture results, renal function, and clinical response.   Pharmacy will continue to monitor.     Thank you for the consult!  Elvia Schwartz, PharmD, BCCCP       Vancomycin Day of Therapy 2/5  Indication: CAP  Micro: BC and urine cultures in process   Current Dosing Method: Bayesian-Guided AUC Dosing  Therapeutic Goal: -600 mg/L*hr  Recent

## 2024-03-10 NOTE — PROGRESS NOTES
[place]2.0  [facility] [place]      Name:  Benji Israel /Age/Sex: 1945  (79 y.o. male)   MRN & CSN:  [id number] & [id number] Encounter Date/Time: 3/10/2024 1:50 PM EDT    Location:  [facility]/0364-01 PCP: Raysa Eli MD       Hospital Day: 2    Assessment and Plan:   Benji Israel is a 79 y.o. male with a pmh of Non-insulin-dependent type II mellitus type II hypertension hyperlipidemia chronic diastolic heart failure with preserved ejection fraction CAD former smoker COPD who presents with Multifocal pneumonia     Hospital Problems               Last Modified POA     * (Principal) Multifocal pneumonia 3/9/2024 Yes         Acute hypoxic respiratory failure with underlying multifocal pneumonia and suspected COPD exacerbation started on vancomycin and cefepime cultures were sent de-escalate based on culture results.  Will also start the patient on Solu-Medrol and DuoNebs  Nonspecific ST changes with mildly elevated troponins and atypical chest pain: ACS ruled out cardiology consulted telemetry placed.  Cardiology is plan to resume Entresto tomorrow.  Lasix 40 mg IV twice daily to be started  History of pericarditis with pericardial effusion history  CAD with history of CABG 2017  Elevated troponin  Non-insulin-dependent diabetes mellitus type 2 start the patient on sliding scale insulin monitor for hypoglycemia  Atrial fibrillation on Eliquis cardiology is a plan to interrogate the loop recorder tomorrow his last burden of A-fib was significant as of February.  EP consulted by cardiology  chronic diastolic heart failure with preserved ejection fraction  Tobacco use disorder history  COPD start the patient on DuoNebs   hyperlipidemia on statin    Comment: Please note this report has been produced using speech recognition software and may contain errors related to that system including errors in grammar, punctuation, and spelling, as well as words and phrases that may be inappropriate. If there are any  ectasia of the ascending aorta.  There is coronary atherosclerosis.  There is chronic left ventricular hypertrophy.  There are chronic post CABG changes evident. Lungs/pleura: The central airways are patent.  There is a small left pleural effusion.  There is stable chronic elevation of the left hemidiaphragm with chronic compressive atelectasis within lingula and left lower lobe, similar to 10/05/2022.  However, there are new extensive ground-glass nodular opacities throughout both lungs, new from 10/05/2022, which were likely infectious or inflammatory in etiology, to include atypical multifocal pneumonia.  Diffuse ground-glass opacity throughout both lungs may be in part secondary to pulmonary edema.  No additional focus of airspace consolidation is identified.  There is no evidence of a pneumothorax.  No dominant pulmonary mass is evident. Upper Abdomen: There is a partially visualized 3.6 cm cyst within the left kidney upper pole.  The remainder of the upper abdomen is grossly unremarkable. Soft Tissues/Bones: There is diffuse idiopathic skeletal hyperostosis.  There is no acute skeletal abnormality.  There are chronic degenerative changes at the right shoulder joint.     1. No evidence of pulmonary embolism. 2. New extensive ground-glass nodular opacities throughout both lungs, new from 10/05/2022, which are likely infectious or inflammatory in etiology, to include atypical multifocal pneumonia. 3. Diffuse ground-glass opacity throughout both lungs may be in part be secondary to pulmonary edema. 4. Small left pleural effusion. 5. Mild mediastinal and bilateral hilar lymphadenopathy, most likely benign and reactive in etiology given the patient's underlying lung findings. 6. Atherosclerotic disease of the intrathoracic aorta, with an approximate 4.2 cm fusiform ectasia of the ascending aorta. 7. Chronic left ventricular hypertrophy. Chronic post CABG changes. 8. Additional findings, as above.     POC US

## 2024-03-10 NOTE — PROGRESS NOTES
patient verbalizes understanding of employing green zone, yellow zone and red zone to seek provider input and evaluation.  Educated patient in :  []Supine    Level 1: Bed Exercise 10-15 reps, 2x/day  []Ankle Pumps  []Heel Slides  []Hip Abduction  []Buttocks Squeeze  []Diaphragmatic Breathing with TA set  []Shoulder Shrugs  []Bicep Curl  []Hands open/close                          [x]Sitting    Level 2: Seated Exercises 10-15 reps, 2x/day  [x]Toe raise/heel raise  [x]Long Arc Quad  [x]Seated March  [x]Seated Clamshell  [x]Diaphragmatic Breathing with TA set and BUE ER and IR  [x]Shoulder Shrugs  [x]Bicep Curls  [x]Hands open/close                         []Standing   Level 3: Standing Exercises 10-15 reps, 2x/day     []Sit to/from stand  []Standing march  []Standing side steps  []Standing bilateral heel raise  []Diaphragmatic breathing with TA set and BUE flex/ext  []Shoulder Shrugs  []Bicep Curls  []Hands open/close                        []WalkingLevel 1: Educated patient in initiating walking when in the Green zone and to Start with 2-5 minutes daily and work up to 10 minutes per day. Walk at a slow, comfortable pace with your exertion level Very Light (BECKIE 9) to Light (BECKIE 11)   You should be able to comfortably hold a conversation while walking.        4)Daily Weight check/Stepping on Scale  [x]Pt educated in importance of checking body weight daily and []demonstrate/[x]verbalizes safety in:stepping up to weigh self and complete downward gaze/head nod to read scale on standard scale  and safety stepping off scale.  []Barriers to completion of Daily weight check:       5)Pt voices and demonstrates appropriate teach back of Heart Failure Education Program with : use of the   [x]1. Identifying Appropriate Zone from HF Zone Self-Check Plan                          [x]2. Rating self on BECKIE.  [x]3.Therapeutic exercise/walking program      [x]4. Importance of taking daily weight measurement             [x]5. Safely  stepping on and off scale    []Pt will benefit from reinforcement of education due to   []Readiness to learn                               []Decreased cognition  []Language barrier                                  []Decreased vision/hearing  []First introduction to new information    []Requires intermittent cues  []Other:    Education provided via:  [x]Oral instruction  [x]Demonstration  [x]Written handout            Therapy Time   Individual Concurrent Group Co-treatment   Time In       1305   Time Out       1359   Minutes       54   Timed Code Treatment Minutes: 39 Minutes, 15 minutes for initial evaluation.       Aram Rondon, PT, DPT  If pt is unable to be seen after this session, please let this note serve as discharge summary.  Please see case management note for discharge disposition.  Thank you.

## 2024-03-10 NOTE — CONSULTS
CARDIOLOGY CONSULTATION        Patient Name: Benji Israel  Date of admission: 3/9/2024 10:39 AM  Admission Dx: Peripheral edema [R60.9]  Dyspnea and respiratory abnormalities [R06.00, R06.89]  Hypoxia [R09.02]  COPD exacerbation (HCC) [J44.1]  Multifocal pneumonia [J18.9]  Congestive heart failure, unspecified HF chronicity, unspecified heart failure type (HCC) [I50.9]  Requesting Physician: Jose Luna MD  Primary Care physician: Raysa Eli MD    Reason for Consultation/Chief Complaint: edema , shortness of breath     History of Present Illness:     Benji Israel is a 79 y.o. 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 Saleh in office, who presented to the hospital with complaints of worsening shortness of breath and edema.     Stress test 4/13/2023- Mild to moderately reduced calculated LVEF 45%, Inferolateral hypokinesis/scar with minimal familia-infarct ischemia.           He underwent ILR placement on 7/14/2023 with concerns for atrial fibrillation with progressive block. No BB for this reason.     LOV 11/9/23 at which time no sig changes made.     ED course/Vital signs on presentation: EKG showed rate control atrial fibrillation with IVCD. Noted atrial fibrillation by office EKG November. Prior to this sinus with marked 1st deg AVB, PVCs 4/2023. Labs HS trop 87, 85, 85.  BNP 2214. CT PE protocol showed no PE, extensive GGOs with possible atypical multifocal PNA per read. Small left pleural effusion. Viral studies neg.     Evaluated today. He states 2 days of nocturnal cough. Productive of clear sputum initially, now bloody. He notes from there began to swell, noted worsening hypoxia and orthopnea. He did have couple episodes of  5/28/2020         We will follow.        Acute/severe symptoms.  High risk for decompensation/life-threatening disease.  High MDM.      Patient Active Problem List   Diagnosis    Essential hypertension    Hyperlipidemia    Allergic rhinitis    Chest pain    Abnormal stress test    Cardiac amyloidosis (HCC)    ASHD (arteriosclerotic heart disease)    S/P CABG x 3    Hx of tobacco use, presenting hazards to health    Palpitations    SOB (shortness of breath)    Acute on chronic diastolic congestive heart failure (HCC)    Acid reflux    History of total knee replacement    Vasovagal syncope    Paroxysmal atrial fibrillation (HCC)    Pericardial effusion    Acute pericarditis    Panic disorder    Morbid obesity due to excess calories (HCC)    NSTEMI (non-ST elevated myocardial infarction) (HCC)    Simple chronic bronchitis (HCC)    Restrictive lung disease    Class 2 obesity in adult    Suspected sleep apnea    LVH (left ventricular hypertrophy)    Diastolic dysfunction    Former smoker    COPD exacerbation (HCC)    RHONDA (obstructive sleep apnea)    Paralyzed hemidiaphragm    Pneumonia, unspecified organism    Class 4 congestive heart failure (HCC)    Uncontrolled type 2 diabetes mellitus with hyperglycemia (HCC)    Chronic obstructive pulmonary disease (HCC)    Acute diastolic heart failure (HCC)    Acute systolic congestive heart failure (HCC)    Acute diastolic CHF (congestive heart failure) (HCC)    Wild-type transthyretin-related (ATTR) amyloidosis (HCC)    Encounter for loop recorder check    Multifocal pneumonia         I will address the patient's cardiac risk factors and adjusted pharmacologic treatment as needed. In addition, I have reinforced the need for patient directed risk factor modification.  All questions and concerns were addressed to the patient/family. Alternatives to my treatment were discussed.     Thank you for allowing us to participate in the care of Benji Israel. Please call me with any

## 2024-03-10 NOTE — PLAN OF CARE
Problem: Respiratory - Adult  Goal: Achieves optimal ventilation and oxygenation  Outcome: Progressing  Flowsheets (Taken 3/9/2024 2216)  Achieves optimal ventilation and oxygenation:   Assess for changes in respiratory status   Assess for changes in mentation and behavior   Position to facilitate oxygenation and minimize respiratory effort   Oxygen supplementation based on oxygen saturation or arterial blood gases   Encourage broncho-pulmonary hygiene including cough, deep breathe, incentive spirometry   Assess and instruct to report shortness of breath or any respiratory difficulty   Respiratory therapy support as indicated     Problem: Cardiovascular - Adult  Goal: Maintains optimal cardiac output and hemodynamic stability  Outcome: Progressing  Flowsheets (Taken 3/9/2024 2216)  Maintains optimal cardiac output and hemodynamic stability:   Monitor blood pressure and heart rate   Monitor urine output and notify Licensed Independent Practitioner for values outside of normal range   Assess for signs of decreased cardiac output     Problem: Metabolic/Fluid and Electrolytes - Adult  Goal: Electrolytes maintained within normal limits  Outcome: Progressing  Flowsheets (Taken 3/9/2024 2216)  Electrolytes maintained within normal limits:   Monitor labs and assess patient for signs and symptoms of electrolyte imbalances   Administer electrolyte replacement as ordered   Monitor response to electrolyte replacements, including repeat lab results as appropriate   Fluid restriction as ordered   Instruct patient on fluid and nutrition restrictions as appropriate

## 2024-03-11 ENCOUNTER — APPOINTMENT (OUTPATIENT)
Dept: GENERAL RADIOLOGY | Age: 79
End: 2024-03-11
Payer: MEDICARE

## 2024-03-11 PROBLEM — R04.2 HEMOPTYSIS: Status: ACTIVE | Noted: 2024-03-11

## 2024-03-11 PROBLEM — R09.89 CHEST CONGESTION: Status: ACTIVE | Noted: 2024-03-11

## 2024-03-11 PROBLEM — R59.0 MEDIASTINAL ADENOPATHY: Status: ACTIVE | Noted: 2024-03-11

## 2024-03-11 LAB
ANION GAP SERPL CALCULATED.3IONS-SCNC: 10 MMOL/L (ref 3–16)
BUN SERPL-MCNC: 20 MG/DL (ref 7–20)
CALCIUM SERPL-MCNC: 8.7 MG/DL (ref 8.3–10.6)
CHLORIDE SERPL-SCNC: 94 MMOL/L (ref 99–110)
CO2 SERPL-SCNC: 28 MMOL/L (ref 21–32)
CREAT SERPL-MCNC: 0.9 MG/DL (ref 0.8–1.3)
DEPRECATED RDW RBC AUTO: 14.8 % (ref 12.4–15.4)
EST. AVERAGE GLUCOSE BLD GHB EST-MCNC: 125.5 MG/DL
GFR SERPLBLD CREATININE-BSD FMLA CKD-EPI: >60 ML/MIN/{1.73_M2}
GLUCOSE BLD-MCNC: 108 MG/DL (ref 70–99)
GLUCOSE BLD-MCNC: 123 MG/DL (ref 70–99)
GLUCOSE BLD-MCNC: 162 MG/DL (ref 70–99)
GLUCOSE BLD-MCNC: 324 MG/DL (ref 70–99)
GLUCOSE SERPL-MCNC: 110 MG/DL (ref 70–99)
HBA1C MFR BLD: 6 %
HCT VFR BLD AUTO: 35 % (ref 40.5–52.5)
HGB BLD-MCNC: 11.8 G/DL (ref 13.5–17.5)
MAGNESIUM SERPL-MCNC: 2.2 MG/DL (ref 1.8–2.4)
MCH RBC QN AUTO: 30.6 PG (ref 26–34)
MCHC RBC AUTO-ENTMCNC: 33.7 G/DL (ref 31–36)
MCV RBC AUTO: 91 FL (ref 80–100)
PERFORMED ON: ABNORMAL
PHOSPHATE SERPL-MCNC: 3.8 MG/DL (ref 2.5–4.9)
PLATELET # BLD AUTO: 173 K/UL (ref 135–450)
PMV BLD AUTO: 8.2 FL (ref 5–10.5)
POTASSIUM SERPL-SCNC: 3.5 MMOL/L (ref 3.5–5.1)
RBC # BLD AUTO: 3.85 M/UL (ref 4.2–5.9)
SODIUM SERPL-SCNC: 132 MMOL/L (ref 136–145)
TROPONIN, HIGH SENSITIVITY: 83 NG/L (ref 0–22)
TROPONIN, HIGH SENSITIVITY: 83 NG/L (ref 0–22)
VANCOMYCIN TROUGH SERPL-MCNC: 9.5 UG/ML (ref 10–20)
WBC # BLD AUTO: 9.6 K/UL (ref 4–11)

## 2024-03-11 PROCEDURE — 6360000002 HC RX W HCPCS: Performed by: STUDENT IN AN ORGANIZED HEALTH CARE EDUCATION/TRAINING PROGRAM

## 2024-03-11 PROCEDURE — 80202 ASSAY OF VANCOMYCIN: CPT

## 2024-03-11 PROCEDURE — 85027 COMPLETE CBC AUTOMATED: CPT

## 2024-03-11 PROCEDURE — 2580000003 HC RX 258: Performed by: INTERNAL MEDICINE

## 2024-03-11 PROCEDURE — 71045 X-RAY EXAM CHEST 1 VIEW: CPT

## 2024-03-11 PROCEDURE — 99233 SBSQ HOSP IP/OBS HIGH 50: CPT | Performed by: NURSE PRACTITIONER

## 2024-03-11 PROCEDURE — 6360000002 HC RX W HCPCS: Performed by: INTERNAL MEDICINE

## 2024-03-11 PROCEDURE — 80048 BASIC METABOLIC PNL TOTAL CA: CPT

## 2024-03-11 PROCEDURE — 2700000000 HC OXYGEN THERAPY PER DAY

## 2024-03-11 PROCEDURE — 84100 ASSAY OF PHOSPHORUS: CPT

## 2024-03-11 PROCEDURE — 6370000000 HC RX 637 (ALT 250 FOR IP): Performed by: STUDENT IN AN ORGANIZED HEALTH CARE EDUCATION/TRAINING PROGRAM

## 2024-03-11 PROCEDURE — 2580000003 HC RX 258: Performed by: STUDENT IN AN ORGANIZED HEALTH CARE EDUCATION/TRAINING PROGRAM

## 2024-03-11 PROCEDURE — 6370000000 HC RX 637 (ALT 250 FOR IP): Performed by: INTERNAL MEDICINE

## 2024-03-11 PROCEDURE — 83735 ASSAY OF MAGNESIUM: CPT

## 2024-03-11 PROCEDURE — 94761 N-INVAS EAR/PLS OXIMETRY MLT: CPT

## 2024-03-11 PROCEDURE — 87641 MR-STAPH DNA AMP PROBE: CPT

## 2024-03-11 PROCEDURE — 94640 AIRWAY INHALATION TREATMENT: CPT

## 2024-03-11 PROCEDURE — 1200000000 HC SEMI PRIVATE

## 2024-03-11 PROCEDURE — 84484 ASSAY OF TROPONIN QUANT: CPT

## 2024-03-11 PROCEDURE — 99223 1ST HOSP IP/OBS HIGH 75: CPT | Performed by: INTERNAL MEDICINE

## 2024-03-11 PROCEDURE — 6370000000 HC RX 637 (ALT 250 FOR IP): Performed by: NURSE PRACTITIONER

## 2024-03-11 RX ORDER — PREDNISONE 10 MG/1
10 TABLET ORAL DAILY
Status: DISCONTINUED | OUTPATIENT
Start: 2024-03-11 | End: 2024-03-11

## 2024-03-11 RX ORDER — LORAZEPAM 0.5 MG/1
0.5 TABLET ORAL 2 TIMES DAILY PRN
Status: DISCONTINUED | OUTPATIENT
Start: 2024-03-11 | End: 2024-03-16 | Stop reason: HOSPADM

## 2024-03-11 RX ORDER — IPRATROPIUM BROMIDE AND ALBUTEROL SULFATE 2.5; .5 MG/3ML; MG/3ML
1 SOLUTION RESPIRATORY (INHALATION) EVERY 4 HOURS PRN
Status: DISCONTINUED | OUTPATIENT
Start: 2024-03-11 | End: 2024-03-16 | Stop reason: HOSPADM

## 2024-03-11 RX ORDER — GUAIFENESIN/DEXTROMETHORPHAN 100-10MG/5
10 SYRUP ORAL EVERY 8 HOURS SCHEDULED
Status: DISPENSED | OUTPATIENT
Start: 2024-03-11 | End: 2024-03-13

## 2024-03-11 RX ADMIN — WATER 125 MG: 1 INJECTION INTRAMUSCULAR; INTRAVENOUS; SUBCUTANEOUS at 12:28

## 2024-03-11 RX ADMIN — GUAIFENESIN AND DEXTROMETHORPHAN 10 ML: 100; 10 SYRUP ORAL at 12:28

## 2024-03-11 RX ADMIN — APIXABAN 5 MG: 5 TABLET, FILM COATED ORAL at 09:30

## 2024-03-11 RX ADMIN — LORAZEPAM 0.5 MG: 0.5 TABLET ORAL at 21:00

## 2024-03-11 RX ADMIN — SODIUM CHLORIDE 25 ML: 9 INJECTION, SOLUTION INTRAVENOUS at 09:46

## 2024-03-11 RX ADMIN — ACETAMINOPHEN 650 MG: 325 TABLET ORAL at 15:57

## 2024-03-11 RX ADMIN — IPRATROPIUM BROMIDE AND ALBUTEROL SULFATE 1 DOSE: 2.5; .5 SOLUTION RESPIRATORY (INHALATION) at 08:40

## 2024-03-11 RX ADMIN — CEFEPIME 2000 MG: 2 INJECTION, POWDER, FOR SOLUTION INTRAVENOUS at 09:46

## 2024-03-11 RX ADMIN — SODIUM CHLORIDE 25 ML: 9 INJECTION, SOLUTION INTRAVENOUS at 15:53

## 2024-03-11 RX ADMIN — TIOTROPIUM BROMIDE INHALATION SPRAY 2 PUFF: 3.12 SPRAY, METERED RESPIRATORY (INHALATION) at 08:40

## 2024-03-11 RX ADMIN — WATER 40 MG: 1 INJECTION INTRAMUSCULAR; INTRAVENOUS; SUBCUTANEOUS at 20:38

## 2024-03-11 RX ADMIN — Medication 2 PUFF: at 20:07

## 2024-03-11 RX ADMIN — SODIUM CHLORIDE, PRESERVATIVE FREE 10 ML: 5 INJECTION INTRAVENOUS at 20:39

## 2024-03-11 RX ADMIN — PREDNISONE 10 MG: 10 TABLET ORAL at 09:30

## 2024-03-11 RX ADMIN — INSULIN LISPRO 4 UNITS: 100 INJECTION, SOLUTION INTRAVENOUS; SUBCUTANEOUS at 20:58

## 2024-03-11 RX ADMIN — HYDROCODONE BITARTRATE AND HOMATROPINE METHYLBROMIDE 5 ML: 5; 1.5 SOLUTION ORAL at 08:04

## 2024-03-11 RX ADMIN — VANCOMYCIN HYDROCHLORIDE 1500 MG: 10 INJECTION, POWDER, LYOPHILIZED, FOR SOLUTION INTRAVENOUS at 06:01

## 2024-03-11 RX ADMIN — ATORVASTATIN CALCIUM 80 MG: 80 TABLET, FILM COATED ORAL at 09:30

## 2024-03-11 RX ADMIN — CEFEPIME 2000 MG: 2 INJECTION, POWDER, FOR SOLUTION INTRAVENOUS at 15:54

## 2024-03-11 RX ADMIN — GUAIFENESIN AND DEXTROMETHORPHAN 10 ML: 100; 10 SYRUP ORAL at 20:38

## 2024-03-11 RX ADMIN — FUROSEMIDE 40 MG: 10 INJECTION, SOLUTION INTRAMUSCULAR; INTRAVENOUS at 09:30

## 2024-03-11 RX ADMIN — Medication 10 MG: at 20:38

## 2024-03-11 RX ADMIN — GUAIFENESIN 600 MG: 600 TABLET ORAL at 09:30

## 2024-03-11 RX ADMIN — LORAZEPAM 0.5 MG: 0.5 TABLET ORAL at 09:30

## 2024-03-11 RX ADMIN — FUROSEMIDE 40 MG: 10 INJECTION, SOLUTION INTRAMUSCULAR; INTRAVENOUS at 18:08

## 2024-03-11 RX ADMIN — Medication 2 PUFF: at 08:40

## 2024-03-11 RX ADMIN — SODIUM CHLORIDE, PRESERVATIVE FREE 10 ML: 5 INJECTION INTRAVENOUS at 09:31

## 2024-03-11 ASSESSMENT — PAIN DESCRIPTION - LOCATION: LOCATION: GENERALIZED

## 2024-03-11 ASSESSMENT — PAIN SCALES - GENERAL: PAINLEVEL_OUTOF10: 3

## 2024-03-11 NOTE — CONSULTS
Consult Placed     Who: LUZ MARINA US   Date:3/11/2024  Time:0955     Electronically signed by Juan J Baugh on 3/11/2024 at 9:55 AM

## 2024-03-11 NOTE — PROGRESS NOTES
V2.0    [facility] [place] Progress Note      Name:  Benji Israel /Age/Sex: 1945  (79 y.o. male)   MRN & CSN:  [id number] & [id number] Encounter Date/Time: 3/11/2024 8:08 AM EDT   Location:  [facility]/0364-01 PCP: Raysa Eli MD     Attending:Neymar Morillo MD       Hospital Day: 3    Assessment and Recommendations   Benji Israel is a 79 y.o. male with pmh of Non-insulin-dependent type II mellitus type II hypertension hyperlipidemia chronic diastolic heart failure with preserved ejection fraction CAD former smoker COPD  who presents with Multifocal pneumonia.    Plan:     Acute hypoxic respiratory failure with underlying multifocal pneumonia and suspected COPD exacerbation  -Chest x-ray on 3/9 showed patchy bilateral groundglass infiltrates concerning for an evolving atypical pneumonia, vascular congestive changes.  -Chest CT on 3/9 showed no evidence of pulmonary embolism  -On 2 L NC  -Continue vancomycin and cefepime  -MRSA PCR pending, may discontinue vancomycin pending results  -Preliminary blood cultures negative x 2  -Continue DuoNebs  -Switch from IV Solu-Medrol to oral prednisone daily    Nonspecific ST changes with mildly elevated troponins and atypical chest pain  Acute on chronic diastolic heart failure with preserved ejection fraction  ATTR amyloid  -Cardiology consulted   -Continue Tifamidis   -Possibly resume Entresto today   -Continue IV Lasix 40 twice daily  -Continue telemetry  -Hold off on restarting home Entresto due to soft BP    Diabetes  -Continue SSI    Atrial fibrillation  -Continue Eliquis  -EP consulted to interrogate loop recorder    Hyperlipidemia  -Continue atorvastatin    Anxiety  -Start Ativan 0.5 mg q2h PRN    Hemoptysis  -CXR on 3/11: Worsening multifocal airspace opacities which may reflect developing pneumonitis.  Pulmonary edema in association with congestive heart failure may also be contributing  -Pulmonology consulted  -Will hold Eliquis    Diet ADULT DIET; Regular; 5 carb    Component Value Date/Time    NITRU Negative 03/09/2024 04:00 PM    COLORU Straw 03/09/2024 04:00 PM    PHUR 7.0 03/09/2024 04:00 PM    WBCUA 3-5 03/09/2024 04:00 PM    RBCUA 3-4 03/09/2024 04:00 PM    MUCUS Rare 03/09/2024 04:00 PM    BACTERIA Rare 03/09/2024 04:00 PM    CLARITYU Clear 03/09/2024 04:00 PM    SPECGRAV 1.010 03/09/2024 04:00 PM    LEUKOCYTESUR TRACE 03/09/2024 04:00 PM    UROBILINOGEN 1.0 03/09/2024 04:00 PM    BILIRUBINUR Negative 03/09/2024 04:00 PM    BLOODU TRACE-INTACT 03/09/2024 04:00 PM    GLUCOSEU Negative 03/09/2024 04:00 PM    KETUA Negative 03/09/2024 04:00 PM     Urine Cultures:   Lab Results   Component Value Date/Time    LABURIN  03/09/2024 04:00 PM     <50,000 CFU/ml mixed skin/urogenital yanick. No further workup     Blood Cultures:   Lab Results   Component Value Date/Time    BC  03/09/2024 03:35 PM     No Growth to date.  Any change in status will be called.     Lab Results   Component Value Date/Time    BLOODCULT2  03/09/2024 03:35 PM     No Growth to date.  Any change in status will be called.     Organism:   Lab Results   Component Value Date/Time    ORG Acinetobacter baumannii/haemolyticus 11/23/2022 03:42 PM     Zac Tomlinson DO, PGY-1  [facility]

## 2024-03-11 NOTE — PROGRESS NOTES
Patient states that he felt very anxious last night, unable to sleep. Patient stated that he had thoughts of hitting his bed with an axe, and that he is very thirsty. MD notified.

## 2024-03-11 NOTE — PLAN OF CARE
Problem: Discharge Planning  Goal: Discharge to home or other facility with appropriate resources  Outcome: Progressing  Flowsheets (Taken 3/11/2024 0950)  Discharge to home or other facility with appropriate resources: Identify barriers to discharge with patient and caregiver     Problem: Safety - Adult  Goal: Free from fall injury  Outcome: Progressing     Problem: ABCDS Injury Assessment  Goal: Absence of physical injury  Outcome: Progressing     Problem: Pain  Goal: Verbalizes/displays adequate comfort level or baseline comfort level  Outcome: Progressing     Problem: Respiratory - Adult  Goal: Achieves optimal ventilation and oxygenation  3/11/2024 1136 by Concha Peoples RN  Outcome: Progressing  Flowsheets (Taken 3/11/2024 0950)  Achieves optimal ventilation and oxygenation: Assess for changes in respiratory status  3/10/2024 2246 by Derek Dye RN  Outcome: Progressing  Flowsheets (Taken 3/9/2024 2216)  Achieves optimal ventilation and oxygenation:   Assess for changes in respiratory status   Assess for changes in mentation and behavior   Position to facilitate oxygenation and minimize respiratory effort   Oxygen supplementation based on oxygen saturation or arterial blood gases   Encourage broncho-pulmonary hygiene including cough, deep breathe, incentive spirometry   Assess and instruct to report shortness of breath or any respiratory difficulty   Respiratory therapy support as indicated     Problem: Cardiovascular - Adult  Goal: Maintains optimal cardiac output and hemodynamic stability  3/11/2024 1136 by Concha Peoples RN  Outcome: Progressing  Flowsheets (Taken 3/11/2024 0950)  Maintains optimal cardiac output and hemodynamic stability: Monitor urine output and notify Licensed Independent Practitioner for values outside of normal range  3/10/2024 2246 by Derek Dye RN  Outcome: Progressing  Flowsheets (Taken 3/9/2024 2216)  Maintains optimal cardiac output and hemodynamic stability:

## 2024-03-11 NOTE — CARE COORDINATION
Case Management Assessment  Initial Evaluation    Date/Time of Evaluation: 3/11/2024 1:51 PM  Assessment Completed by: Janiya Figueroa RN    If patient is discharged prior to next notation, then this note serves as note for discharge by case management.    Patient Name: Benji Israel                   YOB: 1945  Diagnosis: Peripheral edema [R60.9]  Dyspnea and respiratory abnormalities [R06.00, R06.89]  Hypoxia [R09.02]  COPD exacerbation (HCC) [J44.1]  Multifocal pneumonia [J18.9]  Congestive heart failure, unspecified HF chronicity, unspecified heart failure type (HCC) [I50.9]                   Date / Time: 3/9/2024 10:39 AM    Patient Admission Status: Inpatient   Readmission Risk (Low < 19, Mod (19-27), High > 27): Readmission Risk Score: 10.5    Current PCP: Raysa Eli MD  PCP verified by CM?      Chart Reviewed: Yes      History Provided by: Patient  Patient Orientation: Alert and Oriented, Person, Place, Situation    Patient Cognition: Alert    Hospitalization in the last 30 days (Readmission):  No    If yes, Readmission Assessment in CM Navigator will be completed.    Advance Directives:      Code Status: Limited   Patient's Primary Decision Maker is:      Primary Decision Maker: Yamile Desir [facility] - [phone]    Discharge Planning:    Patient lives with: Children Type of Home: House  Primary Care Giver: Self  Patient Support Systems include: Children   Current Financial resources:    Current community resources:    Current services prior to admission: None            Current DME:              Type of Home Care services:  None    ADLS  Prior functional level:    Current functional level:      PT AM-PAC: 23 /24  OT AM-PAC: 20 /24    Family can provide assistance at DC:    Would you like Case Management to discuss the discharge plan with any other family members/significant others, and if so, who?    Plans to Return to Present Housing:    Other Identified Issues/Barriers to

## 2024-03-11 NOTE — PROGRESS NOTES
Physical Therapy  Pt refusing PT services today, states he has been coughing up blood and feeling on edge all day.  He furthermore states he has no energy or strength.  PT will con't to follow per POC.   Alondra Goodman, PTA#[id number]

## 2024-03-11 NOTE — CONSULTS
INPATIENT PULMONARY CRITICAL CARE CONSULT NOTE      Chief Complaint/Referring Provider:  Patient is being seen at the request of Dr. Tomlinson  for a consultation for hemoptysis     Presenting HPI: Patient came to the hospital because of increasing cough congestion shortness of breath and hypoxemia    As per the ER provider-Benji Israel is a 79 y.o. male who presents for evaluation of shortness of breath.  Patient states that he has had worsening shortness of breath over the past several days.  He does have history of COPD.  He does not typically require oxygen.  He reports history of cardiac amyloidosis.  He states that he has had increased bilateral lower extremity swelling.  He states he typically has swelling to the left lower extremity as this is where the vein was grafted for cardiac bypass however he has had worsened swelling.  No specific chest pain.  He has had cough productive of brown sputum.  No fevers.  He is on anticoagulation with Eliquis.     Patient when seen this morning states that he was in his usual state of health till Thursday evening when he started having some coughing and congestion, patient states that his symptoms progressed and patient states Saturday he was having more coughing shortness of breath and chest congestion, patient's phlegm was mucoid at that time, patient was also having some wheezing, patient states that he he became more hypoxemic which brought him to the hospital, patient has started coughing up blood which is bright red in color, patient has been having shortness of breath and wheezing, patient also has some pleuritic chest pain when he takes a deep breath or coughs, patient did not have any epistaxis, patient does not have any difficulty in swallowing, no coughing or choking when eating, no odynophagia or dysphagia per se, patient also had some feverish feeling on Saturday, patient did not have any significant abdominal pain nausea vomiting hematemesis or any  cefepime  Patient may not require any vancomycin  Patient has significant bronchospasm at this time  Patient's p.o. prednisone was changed to IV Solu-Medrol-monitor for any psychosis-there was some question that patient had some increased agitation with IV steroids last night as per nursing  Patient also has been having leg edema and patient Demadex has been changed to IV Lasix which can be continued  Monitor input output and BMP  Correct electrolytes on whenever necessary basis  Patient is Mucinex was changed to Robitussin DM.  Will stop time  Patient is not stable for any bronchoscopy at this time  Bronchodilators to continue  No need for DuoNeb on a regular basis with Dulera and Spiriva  DuoNeb was changed to every 4 on an nebulously basis  Patient medication for cardiac amyloid to continue  Monitor for any hyperglycemia  Blood glucose monitoring with sliding scale insulin as per primary team  PUD and DVT prophylaxis as per primary team    Case discussed with patient and nursing        Electronically signed by:  LUZ MARINA US MD    3/11/2024    10:49 AM.

## 2024-03-11 NOTE — PLAN OF CARE
Problem: Respiratory - Adult  Goal: Achieves optimal ventilation and oxygenation  Outcome: Progressing  Flowsheets (Taken 3/9/2024 2216)  Achieves optimal ventilation and oxygenation:   Assess for changes in respiratory status   Assess for changes in mentation and behavior   Position to facilitate oxygenation and minimize respiratory effort   Oxygen supplementation based on oxygen saturation or arterial blood gases   Encourage broncho-pulmonary hygiene including cough, deep breathe, incentive spirometry   Assess and instruct to report shortness of breath or any respiratory difficulty   Respiratory therapy support as indicated     Problem: Cardiovascular - Adult  Goal: Maintains optimal cardiac output and hemodynamic stability  Outcome: Progressing  Flowsheets (Taken 3/9/2024 2216)  Maintains optimal cardiac output and hemodynamic stability:   Monitor blood pressure and heart rate   Monitor urine output and notify Licensed Independent Practitioner for values outside of normal range   Assess for signs of decreased cardiac output

## 2024-03-11 NOTE — ACP (ADVANCE CARE PLANNING)
Advance Care Planning     General Advance Care Planning (ACP) Conversation    Date of Conversation: 3/9/2024  Conducted with: Patient with Decision Making Capacity   Healthcare Decision Maker: Named in Advance Directive or Healthcare Power of  (name) daughter Yamile    Healthcare Decision Maker:    Primary Decision Maker: Yamile Desir [facility] - [phone]  Click here to complete Healthcare Decision Makers including selection of the Healthcare Decision Maker Relationship (ie \"Primary\").  Today we documented Decision Maker(s) consistent with ACP documents on file.    Content/Action Overview:  Has ACP document(s) on file - reflects the patient's care preferences  Reviewed DNR/DNI and patient confirms current DNR status - completed forms on file (place new order if needed)      Length of Voluntary ACP Conversation in minutes:  <16 minutes (Non-Billable)    Janiya Figueroa RN

## 2024-03-11 NOTE — PROGRESS NOTES
[facility]   Heart Failure Daily Progress Note    Admit Date:  3/9/2024  HPI:    Chief Complaint   Patient presents with    Shortness of Breath     Sx for 2 days with hx of copd      Benji Israel is being followed for CHF  Followed by me for ATTR amyloid  Started with cold like symptoms about 1.5 weeks ago; treated with OTC alkaseltzer improved in 1 day, then developed cough this past Thursday, followed by lower oxygen levels by Saturday.   Being treated for Acute on chronic CHF, pneumonia.     Interval history:   Prior 2 weeks weight was down to 223lbs   Saturday morning he was 229.5lb    Subjective:  Mr. Israel feels worse today. Had some delirium last night.   Up in the chair.    Coughing up blood  Short of breath  On 3L NC.     Objective:   /62   Pulse 67   Temp 99.6 °F (37.6 °C) (Oral)   Resp 20   Ht 1.829 m (6')   Wt 103.9 kg (229 lb)   SpO2 94%   BMI 31.06 kg/m²     Intake/Output Summary (Last 24 hours) at 3/11/2024 1229  Last data filed at 3/11/2024 1030  Gross per 24 hour   Intake 1498 ml   Output 2175 ml   Net -677 ml       NYHA: IV    Physical Exam:  General:  Awake, alert, NAD, up in the chair, ill appearing   Skin:  Warm and dry  Chest: rhonchi throughout   Cardiovascular:  RRR S1S2, no m/r/g   Abdomen:  Soft, nontender, +bowel sounds  Extremities:  ++ bilateral lower extremity edema    Medications:    methylPREDNISolone  125 mg IntraVENous Once    methylPREDNISolone  40 mg IntraVENous 3 times per day    guaiFENesin-dextromethorphan  10 mL Oral 3 times per day    Tafamidis  1 capsule Oral Daily    furosemide  40 mg IntraVENous BID    insulin lispro  0-4 Units SubCUTAneous TID     insulin lispro  0-4 Units SubCUTAneous Nightly    [Held by provider] apixaban  5 mg Oral BID    atorvastatin  80 mg Oral Daily    cefepime  2,000 mg IntraVENous q8h    sodium chloride flush  5-40 mL IntraVENous 2 times per day    melatonin  10 mg Oral Nightly    mometasone-formoterol  2 puff  amyloidosis.      Stress test 4/13/2023   Summary   Mild to moderately reduced calculated LVEF 45%   Inferolateral hypokinesis/scar with minimal familia-infarct ischemia   Overall, this would be considered an abnormal, intermediate risk, study    CTPA 3/9/2024  Impression:        1. No evidence of pulmonary embolism.  2. New extensive ground-glass nodular opacities throughout both lungs, new  from 10/05/2022, which are likely infectious or inflammatory in etiology, to  include atypical multifocal pneumonia.  3. Diffuse ground-glass opacity throughout both lungs may be in part be  secondary to pulmonary edema.  4. Small left pleural effusion.  5. Mild mediastinal and bilateral hilar lymphadenopathy, most likely benign  and reactive in etiology given the patient's underlying lung findings.  6. Atherosclerotic disease of the intrathoracic aorta, with an approximate  4.2 cm fusiform ectasia of the ascending aorta.  7. Chronic left ventricular hypertrophy. Chronic post CABG changes.  8. Additional findings, as above.       Telemetry independently reviewed and interpreted by me and shows: normal sinus rhythm    Principal Problem:    Multifocal pneumonia  Active Problems:    Cardiac amyloidosis (HCC)    SOB (shortness of breath)    Acute on chronic diastolic congestive heart failure (HCC)    Former smoker    COPD exacerbation (HCC)    RHONDA (obstructive sleep apnea)    Hemoptysis    Chest congestion    Mediastinal adenopathy  Resolved Problems:    * No resolved hospital problems. 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

## 2024-03-12 ENCOUNTER — PREP FOR PROCEDURE (OUTPATIENT)
Dept: PULMONOLOGY | Age: 79
End: 2024-03-12

## 2024-03-12 LAB
ANION GAP SERPL CALCULATED.3IONS-SCNC: 11 MMOL/L (ref 3–16)
BUN SERPL-MCNC: 20 MG/DL (ref 7–20)
CALCIUM SERPL-MCNC: 9.3 MG/DL (ref 8.3–10.6)
CHLORIDE SERPL-SCNC: 94 MMOL/L (ref 99–110)
CO2 SERPL-SCNC: 27 MMOL/L (ref 21–32)
CREAT SERPL-MCNC: 0.8 MG/DL (ref 0.8–1.3)
DEPRECATED RDW RBC AUTO: 14.8 % (ref 12.4–15.4)
GFR SERPLBLD CREATININE-BSD FMLA CKD-EPI: >60 ML/MIN/{1.73_M2}
GLUCOSE BLD-MCNC: 150 MG/DL (ref 70–99)
GLUCOSE BLD-MCNC: 154 MG/DL (ref 70–99)
GLUCOSE BLD-MCNC: 186 MG/DL (ref 70–99)
GLUCOSE BLD-MCNC: 198 MG/DL (ref 70–99)
GLUCOSE SERPL-MCNC: 175 MG/DL (ref 70–99)
HCT VFR BLD AUTO: 36.9 % (ref 40.5–52.5)
HGB BLD-MCNC: 12.5 G/DL (ref 13.5–17.5)
MAGNESIUM SERPL-MCNC: 2.3 MG/DL (ref 1.8–2.4)
MCH RBC QN AUTO: 30.6 PG (ref 26–34)
MCHC RBC AUTO-ENTMCNC: 33.9 G/DL (ref 31–36)
MCV RBC AUTO: 90.1 FL (ref 80–100)
MRSA DNA SPEC QL NAA+PROBE: NORMAL
PERFORMED ON: ABNORMAL
PHOSPHATE SERPL-MCNC: 3.9 MG/DL (ref 2.5–4.9)
PLATELET # BLD AUTO: 218 K/UL (ref 135–450)
PMV BLD AUTO: 8.4 FL (ref 5–10.5)
POTASSIUM SERPL-SCNC: 4 MMOL/L (ref 3.5–5.1)
RBC # BLD AUTO: 4.1 M/UL (ref 4.2–5.9)
SODIUM SERPL-SCNC: 132 MMOL/L (ref 136–145)
WBC # BLD AUTO: 12.9 K/UL (ref 4–11)

## 2024-03-12 PROCEDURE — 94640 AIRWAY INHALATION TREATMENT: CPT

## 2024-03-12 PROCEDURE — 2580000003 HC RX 258: Performed by: INTERNAL MEDICINE

## 2024-03-12 PROCEDURE — 80048 BASIC METABOLIC PNL TOTAL CA: CPT

## 2024-03-12 PROCEDURE — 97530 THERAPEUTIC ACTIVITIES: CPT

## 2024-03-12 PROCEDURE — 99233 SBSQ HOSP IP/OBS HIGH 50: CPT | Performed by: NURSE PRACTITIONER

## 2024-03-12 PROCEDURE — 87070 CULTURE OTHR SPECIMN AEROBIC: CPT

## 2024-03-12 PROCEDURE — 6370000000 HC RX 637 (ALT 250 FOR IP): Performed by: STUDENT IN AN ORGANIZED HEALTH CARE EDUCATION/TRAINING PROGRAM

## 2024-03-12 PROCEDURE — 6360000002 HC RX W HCPCS: Performed by: NURSE PRACTITIONER

## 2024-03-12 PROCEDURE — 87205 SMEAR GRAM STAIN: CPT

## 2024-03-12 PROCEDURE — 93306 TTE W/DOPPLER COMPLETE: CPT

## 2024-03-12 PROCEDURE — 1200000000 HC SEMI PRIVATE

## 2024-03-12 PROCEDURE — 84100 ASSAY OF PHOSPHORUS: CPT

## 2024-03-12 PROCEDURE — 6370000000 HC RX 637 (ALT 250 FOR IP): Performed by: NURSE PRACTITIONER

## 2024-03-12 PROCEDURE — 99233 SBSQ HOSP IP/OBS HIGH 50: CPT | Performed by: INTERNAL MEDICINE

## 2024-03-12 PROCEDURE — 97110 THERAPEUTIC EXERCISES: CPT

## 2024-03-12 PROCEDURE — 2580000003 HC RX 258: Performed by: STUDENT IN AN ORGANIZED HEALTH CARE EDUCATION/TRAINING PROGRAM

## 2024-03-12 PROCEDURE — 83735 ASSAY OF MAGNESIUM: CPT

## 2024-03-12 PROCEDURE — 97116 GAIT TRAINING THERAPY: CPT

## 2024-03-12 PROCEDURE — 36415 COLL VENOUS BLD VENIPUNCTURE: CPT

## 2024-03-12 PROCEDURE — 94761 N-INVAS EAR/PLS OXIMETRY MLT: CPT

## 2024-03-12 PROCEDURE — 85027 COMPLETE CBC AUTOMATED: CPT

## 2024-03-12 PROCEDURE — 6360000002 HC RX W HCPCS: Performed by: INTERNAL MEDICINE

## 2024-03-12 PROCEDURE — 87449 NOS EACH ORGANISM AG IA: CPT

## 2024-03-12 PROCEDURE — 6370000000 HC RX 637 (ALT 250 FOR IP): Performed by: INTERNAL MEDICINE

## 2024-03-12 PROCEDURE — 6360000002 HC RX W HCPCS: Performed by: STUDENT IN AN ORGANIZED HEALTH CARE EDUCATION/TRAINING PROGRAM

## 2024-03-12 PROCEDURE — 87633 RESP VIRUS 12-25 TARGETS: CPT

## 2024-03-12 PROCEDURE — 99223 1ST HOSP IP/OBS HIGH 75: CPT | Performed by: INTERNAL MEDICINE

## 2024-03-12 PROCEDURE — 2700000000 HC OXYGEN THERAPY PER DAY

## 2024-03-12 RX ORDER — MIDAZOLAM HYDROCHLORIDE 5 MG/ML
5 INJECTION, SOLUTION INTRAMUSCULAR; INTRAVENOUS ONCE
Status: DISCONTINUED | OUTPATIENT
Start: 2024-03-12 | End: 2024-03-13 | Stop reason: HOSPADM

## 2024-03-12 RX ORDER — FENTANYL CITRATE 50 UG/ML
100 INJECTION, SOLUTION INTRAMUSCULAR; INTRAVENOUS ONCE
Status: CANCELLED | OUTPATIENT
Start: 2024-03-12 | End: 2024-03-12

## 2024-03-12 RX ORDER — SODIUM CHLORIDE 0.9 % (FLUSH) 0.9 %
5-40 SYRINGE (ML) INJECTION PRN
Status: CANCELLED | OUTPATIENT
Start: 2024-03-12

## 2024-03-12 RX ORDER — SODIUM CHLORIDE 9 MG/ML
INJECTION, SOLUTION INTRAVENOUS PRN
Status: DISCONTINUED | OUTPATIENT
Start: 2024-03-12 | End: 2024-03-13 | Stop reason: HOSPADM

## 2024-03-12 RX ORDER — MIDAZOLAM HYDROCHLORIDE 1 MG/ML
5 INJECTION INTRAMUSCULAR; INTRAVENOUS ONCE
Status: CANCELLED | OUTPATIENT
Start: 2024-03-12 | End: 2024-03-12

## 2024-03-12 RX ORDER — LIDOCAINE HYDROCHLORIDE 20 MG/ML
15 SOLUTION OROPHARYNGEAL ONCE
Status: DISCONTINUED | OUTPATIENT
Start: 2024-03-12 | End: 2024-03-13 | Stop reason: HOSPADM

## 2024-03-12 RX ORDER — SODIUM CHLORIDE 9 MG/ML
INJECTION, SOLUTION INTRAVENOUS PRN
Status: CANCELLED | OUTPATIENT
Start: 2024-03-12

## 2024-03-12 RX ORDER — SODIUM CHLORIDE 0.9 % (FLUSH) 0.9 %
5-40 SYRINGE (ML) INJECTION EVERY 12 HOURS SCHEDULED
Status: CANCELLED | OUTPATIENT
Start: 2024-03-12

## 2024-03-12 RX ORDER — SODIUM CHLORIDE 0.9 % (FLUSH) 0.9 %
5-40 SYRINGE (ML) INJECTION PRN
Status: DISCONTINUED | OUTPATIENT
Start: 2024-03-12 | End: 2024-03-13 | Stop reason: HOSPADM

## 2024-03-12 RX ORDER — FENTANYL CITRATE 50 UG/ML
100 INJECTION, SOLUTION INTRAMUSCULAR; INTRAVENOUS ONCE
Status: DISCONTINUED | OUTPATIENT
Start: 2024-03-12 | End: 2024-03-13 | Stop reason: HOSPADM

## 2024-03-12 RX ORDER — SODIUM CHLORIDE 0.9 % (FLUSH) 0.9 %
5-40 SYRINGE (ML) INJECTION EVERY 12 HOURS SCHEDULED
Status: DISCONTINUED | OUTPATIENT
Start: 2024-03-12 | End: 2024-03-13 | Stop reason: HOSPADM

## 2024-03-12 RX ORDER — LIDOCAINE HYDROCHLORIDE 20 MG/ML
15 SOLUTION OROPHARYNGEAL ONCE
Status: CANCELLED | OUTPATIENT
Start: 2024-03-12 | End: 2024-03-12

## 2024-03-12 RX ORDER — FUROSEMIDE 10 MG/ML
40 INJECTION INTRAMUSCULAR; INTRAVENOUS 3 TIMES DAILY
Status: DISCONTINUED | OUTPATIENT
Start: 2024-03-12 | End: 2024-03-16 | Stop reason: HOSPADM

## 2024-03-12 RX ADMIN — GUAIFENESIN AND DEXTROMETHORPHAN 10 ML: 100; 10 SYRUP ORAL at 06:05

## 2024-03-12 RX ADMIN — CEFEPIME 2000 MG: 2 INJECTION, POWDER, FOR SOLUTION INTRAVENOUS at 16:23

## 2024-03-12 RX ADMIN — Medication 2 PUFF: at 20:14

## 2024-03-12 RX ADMIN — Medication 2 PUFF: at 12:56

## 2024-03-12 RX ADMIN — VANCOMYCIN HYDROCHLORIDE 1500 MG: 10 INJECTION, POWDER, LYOPHILIZED, FOR SOLUTION INTRAVENOUS at 00:19

## 2024-03-12 RX ADMIN — SODIUM CHLORIDE 25 ML: 9 INJECTION, SOLUTION INTRAVENOUS at 09:59

## 2024-03-12 RX ADMIN — SODIUM CHLORIDE 25 ML: 9 INJECTION, SOLUTION INTRAVENOUS at 16:23

## 2024-03-12 RX ADMIN — TIOTROPIUM BROMIDE INHALATION SPRAY 2 PUFF: 3.12 SPRAY, METERED RESPIRATORY (INHALATION) at 08:08

## 2024-03-12 RX ADMIN — HYDROCODONE BITARTRATE AND HOMATROPINE METHYLBROMIDE 5 ML: 5; 1.5 SOLUTION ORAL at 04:55

## 2024-03-12 RX ADMIN — SODIUM CHLORIDE: 9 INJECTION, SOLUTION INTRAVENOUS at 00:20

## 2024-03-12 RX ADMIN — CEFEPIME 2000 MG: 2 INJECTION, POWDER, FOR SOLUTION INTRAVENOUS at 00:22

## 2024-03-12 RX ADMIN — WATER 40 MG: 1 INJECTION INTRAMUSCULAR; INTRAVENOUS; SUBCUTANEOUS at 06:05

## 2024-03-12 RX ADMIN — CEFEPIME 2000 MG: 2 INJECTION, POWDER, FOR SOLUTION INTRAVENOUS at 23:57

## 2024-03-12 RX ADMIN — FUROSEMIDE 40 MG: 10 INJECTION, SOLUTION INTRAMUSCULAR; INTRAVENOUS at 09:43

## 2024-03-12 RX ADMIN — SODIUM CHLORIDE, PRESERVATIVE FREE 10 ML: 5 INJECTION INTRAVENOUS at 21:09

## 2024-03-12 RX ADMIN — WATER 40 MG: 1 INJECTION INTRAMUSCULAR; INTRAVENOUS; SUBCUTANEOUS at 21:08

## 2024-03-12 RX ADMIN — LORAZEPAM 0.5 MG: 0.5 TABLET ORAL at 09:42

## 2024-03-12 RX ADMIN — ATORVASTATIN CALCIUM 80 MG: 80 TABLET, FILM COATED ORAL at 09:42

## 2024-03-12 RX ADMIN — GUAIFENESIN AND DEXTROMETHORPHAN 10 ML: 100; 10 SYRUP ORAL at 14:24

## 2024-03-12 RX ADMIN — SODIUM CHLORIDE: 9 INJECTION, SOLUTION INTRAVENOUS at 23:56

## 2024-03-12 RX ADMIN — Medication 10 MG: at 21:08

## 2024-03-12 RX ADMIN — Medication 2 PUFF: at 08:11

## 2024-03-12 RX ADMIN — WATER 40 MG: 1 INJECTION INTRAMUSCULAR; INTRAVENOUS; SUBCUTANEOUS at 14:24

## 2024-03-12 RX ADMIN — SODIUM CHLORIDE: 9 INJECTION, SOLUTION INTRAVENOUS at 00:18

## 2024-03-12 RX ADMIN — HYDROCODONE BITARTRATE AND HOMATROPINE METHYLBROMIDE 5 ML: 5; 1.5 SOLUTION ORAL at 23:47

## 2024-03-12 RX ADMIN — LORAZEPAM 0.5 MG: 0.5 TABLET ORAL at 21:08

## 2024-03-12 RX ADMIN — FUROSEMIDE 40 MG: 10 INJECTION, SOLUTION INTRAMUSCULAR; INTRAVENOUS at 21:09

## 2024-03-12 RX ADMIN — FUROSEMIDE 40 MG: 10 INJECTION, SOLUTION INTRAMUSCULAR; INTRAVENOUS at 14:24

## 2024-03-12 RX ADMIN — Medication 2 PUFF: at 08:08

## 2024-03-12 RX ADMIN — CEFEPIME 2000 MG: 2 INJECTION, POWDER, FOR SOLUTION INTRAVENOUS at 10:01

## 2024-03-12 RX ADMIN — GUAIFENESIN AND DEXTROMETHORPHAN 10 ML: 100; 10 SYRUP ORAL at 21:08

## 2024-03-12 RX ADMIN — Medication 2 PUFF: at 20:11

## 2024-03-12 RX ADMIN — SODIUM CHLORIDE, PRESERVATIVE FREE 10 ML: 5 INJECTION INTRAVENOUS at 10:01

## 2024-03-12 NOTE — PLAN OF CARE
Problem: Discharge Planning  Goal: Discharge to home or other facility with appropriate resources  3/12/2024 1246 by Concha Peoples RN  Outcome: Progressing  Flowsheets (Taken 3/12/2024 1001)  Discharge to home or other facility with appropriate resources: Identify barriers to discharge with patient and caregiver  3/11/2024 2348 by Berna Pereira RN  Outcome: Progressing     Problem: Safety - Adult  Goal: Free from fall injury  3/12/2024 1246 by Concha Peoples RN  Outcome: Progressing  3/11/2024 2348 by Berna Pereira RN  Outcome: Progressing     Problem: ABCDS Injury Assessment  Goal: Absence of physical injury  3/12/2024 1246 by Concha Peoples RN  Outcome: Progressing  3/11/2024 2348 by Berna Pereira RN  Outcome: Progressing     Problem: Pain  Goal: Verbalizes/displays adequate comfort level or baseline comfort level  3/12/2024 1246 by Concha Peoples RN  Outcome: Progressing  Flowsheets (Taken 3/12/2024 0748)  Verbalizes/displays adequate comfort level or baseline comfort level: Encourage patient to monitor pain and request assistance  3/11/2024 2348 by Berna Pereira RN  Outcome: Progressing  Flowsheets (Taken 3/11/2024 1550 by Concha Peoples RN)  Verbalizes/displays adequate comfort level or baseline comfort level: Encourage patient to monitor pain and request assistance     Problem: Respiratory - Adult  Goal: Achieves optimal ventilation and oxygenation  3/12/2024 1246 by Concha Peoples RN  Outcome: Progressing  Flowsheets (Taken 3/12/2024 1001)  Achieves optimal ventilation and oxygenation: Assess for changes in respiratory status  3/11/2024 2348 by Berna Pereira RN  Outcome: Progressing     Problem: Cardiovascular - Adult  Goal: Maintains optimal cardiac output and hemodynamic stability  3/12/2024 1246 by Concha Peoples RN  Outcome: Progressing  Flowsheets (Taken 3/12/2024 1001)  Maintains optimal cardiac output and hemodynamic stability: Monitor blood pressure and heart rate  3/11/2024 2348 by Berna Pereira RN  Outcome:  3/12/2024 1001)  Care Plan - Patient's Chronic Conditions and Co-Morbidity Symptoms are Monitored and Maintained or Improved: Monitor and assess patient's chronic conditions and comorbid symptoms for stability, deterioration, or improvement  3/11/2024 2348 by Berna Pereira, RN  Outcome: Progressing

## 2024-03-12 NOTE — PLAN OF CARE
Problem: Safety - Adult  Goal: Free from fall injury  3/11/2024 2348 by Berna Pereira RN  Outcome: Progressing  3/11/2024 1136 by Concha Peoples RN  Outcome: Progressing     Problem: ABCDS Injury Assessment  Goal: Absence of physical injury  3/11/2024 2348 by Berna Pereira RN  Outcome: Progressing  3/11/2024 1136 by Concha Peoples RN  Outcome: Progressing     Problem: Pain  Goal: Verbalizes/displays adequate comfort level or baseline comfort level  3/11/2024 2348 by Berna Pereira RN  Outcome: Progressing  Flowsheets (Taken 3/11/2024 1550 by Concha Peoples RN)  Verbalizes/displays adequate comfort level or baseline comfort level: Encourage patient to monitor pain and request assistance  3/11/2024 1136 by Concha Peoples RN  Outcome: Progressing     Problem: Cardiovascular - Adult  Goal: Maintains optimal cardiac output and hemodynamic stability  3/11/2024 2348 by Berna Pereira RN  Outcome: Progressing  3/11/2024 1136 by Concha Peoples RN  Outcome: Progressing  Flowsheets (Taken 3/11/2024 0950)  Maintains optimal cardiac output and hemodynamic stability: Monitor urine output and notify Licensed Independent Practitioner for values outside of normal range     Problem: Respiratory - Adult  Goal: Achieves optimal ventilation and oxygenation  3/11/2024 2348 by Berna Pereira RN  Outcome: Progressing  3/11/2024 1136 by Concha Peoples RN  Outcome: Progressing  Flowsheets (Taken 3/11/2024 0950)  Achieves optimal ventilation and oxygenation: Assess for changes in respiratory status     Problem: Skin/Tissue Integrity - Adult  Goal: Skin integrity remains intact  3/11/2024 2348 by Berna Pereira RN  Outcome: Progressing  3/11/2024 1136 by Concha Peoples RN  Outcome: Progressing  Flowsheets  Taken 3/11/2024 1136  Skin Integrity Remains Intact: Monitor for areas of redness and/or skin breakdown  Taken 3/11/2024 0950  Skin Integrity Remains Intact: Monitor for areas of redness and/or skin breakdown

## 2024-03-12 NOTE — PROGRESS NOTES
Physician Progress Note      PATIENT:               CASSY CROFT  CSN #:                  [id number]  :                       1945  ADMIT DATE:       3/9/2024 10:39 AM  DISCH DATE:  RESPONDING  PROVIDER #:        GERSON MILLER          QUERY TEXT:    Pt admitted with Acute hypoxic respiratory failure with underlying multifocal   pneumonia?and suspected COPD exacerbation/Acute on chronic diastolic heart   failure. If possible, please document in the progress notes and discharge   summary if you are evaluating and/or treating any of the following:    Note: CAP and HCAP indicate where the pneumonia was acquired, not a specific   type.    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  Pulmonary- Multifocal pneumonia, bilateral lung infiltrates.    Acinetobacter pneumonia previously  Treatment: Continue cefepime-Vancomycin discontinued due to negative MRSA PCR  -Preliminary blood cultures negative x 2-Continue DuoNebs  -Continue IV Solu-Medrol-Pulmonology following -Plan for bronchoscopy tomorrow    Thank-You, Cally Montero RN, BSN, CCDS  Options provided:  -- Suspected Gram negative pneumonia with treatment  -- Other - I will add my own diagnosis  -- Disagree - Not applicable / Not valid  -- Disagree - Clinically unable to determine / Unknown  -- Refer to Clinical Documentation Reviewer    PROVIDER RESPONSE TEXT:    This patient has suspected gram negative pneumonia with treatment.    Query created by: Samuel Montero on 3/12/2024 1:54 PM      Electronically signed by:  GERSON MILLER 3/12/2024 2:00 PM

## 2024-03-12 NOTE — CARE COORDINATION
Chart reviewed day 3. Care provided by pulm, EP, cards and IM. Pt is from home alone, but his son is currently staying with him. He states he is IPTA. He is scheduled for a bronch tomorrow. Pt continues with hemoptysis. Pt is on IVABX and lasix IV. He diuresed 3575, but no weight was documented yesterday. Will continue to follow course for needs. Janiya Figueroa RN

## 2024-03-12 NOTE — PROGRESS NOTES
Physical Therapy  Facility/Department: [facility] B3 - MED SURG  Daily Treatment Note  NAME: Bejni Israel  : 1945  MRN: [id number]    Date of Service: 3/12/2024    Discharge Recommendations:  24 hour supervision or assist   PT Equipment Recommendations  Equipment Needed: No  Other: do not anticipate any DME needs at this time.    Patient Diagnosis(es): The primary encounter diagnosis was COPD exacerbation (HCC). Diagnoses of Dyspnea and respiratory abnormalities, Hypoxia, Congestive heart failure, unspecified HF chronicity, unspecified heart failure type (HCC), Peripheral edema, and Multifocal pneumonia were also pertinent to this visit.    Assessment   Assessment: Pt pleasant and agreeable, ambulated and perfomred BLE and UEs per CHF protocol.  O2 sats dropped on 2L during amb to 85% and with rest and cued breathing recovered in ~1 minute. Pt is recommended for con't skilled PT and home with A PRN at D/C.  Activity Tolerance: Patient tolerated evaluation without incident;Patient tolerated treatment well  Equipment Needed: No  Other: do not anticipate any DME needs at this time.     Plan    Physical Therapy Plan  General Plan: 3-5 times per week  Current Treatment Recommendations: Strengthening;Balance training;Functional mobility training;Transfer training;Endurance training;Gait training;Stair training;Neuromuscular re-education;Home exercise program;Safety education & training;Patient/Caregiver education & training;Equipment evaluation, education, & procurement;Therapeutic activities     Restrictions  Restrictions/Precautions  Restrictions/Precautions: Fall Risk  Position Activity Restriction  Other position/activity restrictions: IV lines, 1L O2, telemetry     Subjective    Subjective  Subjective: Pt agreeable, slept pretty good  Pain: minor pain, states it is his baseline.  Orientation  Overall Orientation Status: Within Functional Limits  Orientation Level: Oriented to place;Oriented to time;Oriented to

## 2024-03-12 NOTE — PROGRESS NOTES
PULMONARY AND CRITICAL CARE INPATIENT NOTE        Benji Israel   : 1945  MRN: [id number]     Admitting Physician: Jose Luna MD  Attending Physician: Neymar Morillo MD  PCP: Raysa Eli MD    Admission: 3/9/2024   Date of Service: 3/12/2024    Chief Complaint   Patient presents with    Shortness of Breath     Sx for 2 days with hx of copd           ASSESSMENT & PLAN       79 y.o. pleasant  male patient with:    Hospital Problems             Last Modified POA    * (Principal) Multifocal pneumonia 3/9/2024 Yes    Cardiac amyloidosis (HCC) 3/11/2024 Yes    SOB (shortness of breath) 3/11/2024 Yes    Acute on chronic diastolic congestive heart failure (HCC) 3/11/2024 Yes    Former smoker 3/11/2024 Yes    COPD exacerbation ([facility]) 3/11/2024 Yes    RHONDA (obstructive sleep apnea) 3/11/2024 Yes    Acute hypoxemic respiratory failure (HCC) 3/11/2024 Yes    Hemoptysis 3/11/2024 Yes    Chest congestion 3/11/2024 Yes    Mediastinal adenopathy 3/11/2024 Yes       # Acute on chronic hypoxic respiratory failure.  No PE.  On 1 L of oxygen at baseline  # Multifocal pneumonia, bilateral lung infiltrates.  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 for bronchoscopy tomorrow  Pulmonary rehab referral at discharge if patient qualifies.  No interest in PAP therapy at bedtime or sleep apnea evaluation  Will keep blood  Use  Authorization (EUA) only.    Patient Fact Sheet:  https://www.fda.gov/media/132720/download  Provider Fact Sheet: https://www.fda.gov/media/133384/download  EUA: https://www.fda.gov/media/886966/download  IFU: https://www.fda.gov/media/917221/download    Methodology:  RT-PCR          INFLUENZA A NOT DETECTED     INFLUENZA B NOT DETECTED               ________________  Vitals:    03/12/24 0445 03/12/24 0600 03/12/24 0748 03/12/24 0811   BP: 132/77  127/81    Pulse: 74  74    Resp: 18  18    Temp: 97.7 °F (36.5 °C)  98 °F (36.7 °C)    TempSrc: Oral  Oral    SpO2: 92%  97% 96%   Weight:  105.1 kg (231 lb 9.6 oz)     Height:              Intake/Output Summary (Last 24 hours) at 3/12/2024 0913  Last data filed at 3/12/2024 0600  Gross per 24 hour   Intake 1268 ml   Output 3575 ml   Net -2307 ml     Net IO Since Admission: -5,172 mL [03/12/24 0913]        Physical Exam:  General appearance: In no apparent distress.  HEENT: Moist mucus membranes.  Cardiac: Normal S1 and S2.  Lungs: Decreased air entry bilaterally with rhonchi and crackles  Abdomen: Soft.  Back & Extremities: Symmetric pulses with good perfusion.  Pitting lower extremity edema  Neurological: No focal deficit..       ________________________________________________________  Electronically signed by:  Brittnee Alberts MD,FACP    3/12/2024    9:13 AM.     [facility]  [address], [place] [address]   Phone (office): [phone]

## 2024-03-12 NOTE — PROGRESS NOTES
[facility]   Heart Failure Daily Progress Note    Admit Date:  3/9/2024  HPI:    Chief Complaint   Patient presents with    Shortness of Breath     Sx for 2 days with hx of copd      Benji Israel is being followed for CHF  Followed by me for ATTR amyloid  Started with cold like symptoms about 1.5 weeks ago; treated with OTC alkaseltzer improved in 1 day, then developed cough this past Thursday, followed by lower oxygen levels by Saturday.   Being treated for Acute on chronic CHF, pneumonia.     Interval history:   Prior 2 weeks weight was down to 223lbs   Saturday morning he was 229.5lb  Weight is up to 231lbs today     Subjective:  Mr. Israel was able to sleep overnight. Mild improvement in edema. Hemoptysis is clearing.     Objective:   /81   Pulse 74   Temp 98 °F (36.7 °C) (Oral)   Resp 18   Ht 1.829 m (6')   Wt 105.1 kg (231 lb 9.6 oz)   SpO2 96%   BMI 31.41 kg/m²     Intake/Output Summary (Last 24 hours) at 3/12/2024 0941  Last data filed at 3/12/2024 0600  Gross per 24 hour   Intake 970 ml   Output 3575 ml   Net -2605 ml       NYHA: IV    Physical Exam:  General:  Awake, alert, NAD, up in the chair, ill appearing   Skin:  Warm and dry  Chest: rhonchi throughout   Cardiovascular:  RRR S1S2, no m/r/g   Abdomen:  Soft, nontender, +bowel sounds  Extremities:  ++ bilateral lower extremity edema    Medications:    methylPREDNISolone  40 mg IntraVENous 3 times per day    guaiFENesin-dextromethorphan  10 mL Oral 3 times per day    Tafamidis  1 capsule Oral Daily    furosemide  40 mg IntraVENous BID    insulin lispro  0-4 Units SubCUTAneous TID     insulin lispro  0-4 Units SubCUTAneous Nightly    [Held by provider] apixaban  5 mg Oral BID    atorvastatin  80 mg Oral Daily    cefepime  2,000 mg IntraVENous q8h    sodium chloride flush  5-40 mL IntraVENous 2 times per day    melatonin  10 mg Oral Nightly    mometasone-formoterol  2 puff Inhalation BID RT    And    tiotropium  2 puff  hemoptysis  Monitor h/h  Adjust lasix to 40mg IV TID for today- repeat BMP in the am.     Discussed with nursing   Discussed with IM     Education provided today Disease process and medications discussed. Questions answered fully.  Time spent educating today 10 min    MDM: high risk     QIAN Machuca - CNP,  3/12/2024, 10:19 AM

## 2024-03-12 NOTE — CONSULTS
Electrophysiology Consultation   Date: 3/12/2024  Admit Date:  3/9/2024  Reason for Consultation: Paroxysmal atrial fibrillation  Consult Requesting Physician: Neymar Morillo MD     Chief Complaint   Patient presents with    Shortness of Breath     Sx for 2 days with hx of copd     HPI:   Mr. Israel is a pleasant 79 year old male with a medical history significant for paroxysmal atrial fibrillation, ischemic cardiomyopathy status post CABG (2017), hypertension, diabetes mellitus type II, hyperlipidemia, amyloidosis, heart failure with preserved ejection fraction, and left diaphragmatic paralysis who presents from home with pneumonia.  According to patient he was in his usual state of health until approximate 3 weeks ago when he began to suffer from a upper respiratory infection.  This resolved however 1 week ago his came back with a vengeance.  Patient therefore presented to [facility] for further evaluation and care.    Patient reports chest pain with coughing only.    Patient denies fevers, orthopnea, PND, lower extremity edema, abdominal swelling, visual changes, headaches, sore throat, cough, abdominal pain, nausea, vomiting, bleeding, bruising, dysuria, muscle/joint pain, confusion, depression, anxiety, skin lesions, etc.    Emergency Room/Hospital Course:  Patient presented to [facility] on 03/09/2024.  His chest CT for new extensive groundglass thought to be secondary to atypical multifocal pneumonia.  He was found to be in atrial fibrillation with controlled rates.  He was seen by cardiology for possible ACS however this was ruled out.  Electrophysiology was consulted given patient's ongoing paroxysmal atrial fibrillation.    Current rhythm: Atrial fibrillation  Known history of atrial fibrillation: yes -paroxysmal  Valvular disease: Yes  Associated symptoms:  Unclear  Heart rate is  controlled  Previous cardioversion and/or ablation: no  History of CAD: Yes  History of sleep apnea:

## 2024-03-12 NOTE — PROGRESS NOTES
supple  Cardiovascular: Regular rate.  Respiratory: Wheezing bilateral bases improved from yesterday. On 3 L NC  Gastrointestinal: Soft, non tender  Genitourinary: no suprapubic tenderness  Musculoskeletal: No edema  Skin: warm, dry  Neuro: Alert.  Psych: Normal mood    Medications:   Medications:    methylPREDNISolone  40 mg IntraVENous 3 times per day    guaiFENesin-dextromethorphan  10 mL Oral 3 times per day    Tafamidis  1 capsule Oral Daily    furosemide  40 mg IntraVENous BID    insulin lispro  0-4 Units SubCUTAneous TID WC    insulin lispro  0-4 Units SubCUTAneous Nightly    [Held by provider] apixaban  5 mg Oral BID    atorvastatin  80 mg Oral Daily    cefepime  2,000 mg IntraVENous q8h    sodium chloride flush  5-40 mL IntraVENous 2 times per day    melatonin  10 mg Oral Nightly    mometasone-formoterol  2 puff Inhalation BID RT    And    tiotropium  2 puff Inhalation Daily RT      Infusions:    dextrose      sodium chloride 10 mL/hr at 03/12/24 0020     PRN Meds: LORazepam, 0.5 mg, BID PRN  ipratropium 0.5 mg-albuterol 2.5 mg, 1 Dose, Q4H PRN  traZODone, 50 mg, Nightly PRN  glucose, 4 tablet, PRN  dextrose bolus, 125 mL, PRN   Or  dextrose bolus, 250 mL, PRN  glucagon (rDNA), 1 mg, PRN  dextrose, , Continuous PRN  albuterol sulfate HFA, 2 puff, 4x Daily PRN  sodium chloride flush, 5-40 mL, PRN  sodium chloride, , PRN  potassium chloride, 40 mEq, PRN   Or  potassium alternative oral replacement, 40 mEq, PRN   Or  potassium chloride, 10 mEq, PRN  magnesium sulfate, 2,000 mg, PRN  ondansetron, 4 mg, Q8H PRN   Or  ondansetron, 4 mg, Q6H PRN  polyethylene glycol, 17 g, Daily PRN  acetaminophen, 650 mg, Q6H PRN   Or  acetaminophen, 650 mg, Q6H PRN  HYDROcodone homatropine, 5 mL, Q4H PRN        Labs and Imaging   XR CHEST PORTABLE    Result Date: 3/9/2024  EXAMINATION: ONE XRAY VIEW OF THE CHEST 3/9/2024 10:46 am COMPARISON: April 7, 2023 HISTORY: ORDERING SYSTEM PROVIDED HISTORY: Chest Pain TECHNOLOGIST  Straw 03/09/2024 04:00 PM    PHUR 7.0 03/09/2024 04:00 PM    WBCUA 3-5 03/09/2024 04:00 PM    RBCUA 3-4 03/09/2024 04:00 PM    MUCUS Rare 03/09/2024 04:00 PM    BACTERIA Rare 03/09/2024 04:00 PM    CLARITYU Clear 03/09/2024 04:00 PM    SPECGRAV 1.010 03/09/2024 04:00 PM    LEUKOCYTESUR TRACE 03/09/2024 04:00 PM    UROBILINOGEN 1.0 03/09/2024 04:00 PM    BILIRUBINUR Negative 03/09/2024 04:00 PM    BLOODU TRACE-INTACT 03/09/2024 04:00 PM    GLUCOSEU Negative 03/09/2024 04:00 PM    KETUA Negative 03/09/2024 04:00 PM     Urine Cultures:   Lab Results   Component Value Date/Time    LABURIN  03/09/2024 04:00 PM     <50,000 CFU/ml mixed skin/urogenital yanick. No further workup     Blood Cultures:   Lab Results   Component Value Date/Time    BC  03/09/2024 03:35 PM     No Growth to date.  Any change in status will be called.     Lab Results   Component Value Date/Time    BLOODCULT2  03/09/2024 03:35 PM     No Growth to date.  Any change in status will be called.     Organism:   Lab Results   Component Value Date/Time    ORG Acinetobacter baumannii/haemolyticus 11/23/2022 03:42 PM     Zac Tomlinson DO, PGY-1  [facility]

## 2024-03-12 NOTE — PROGRESS NOTES
Occupational Therapy  Facility/Department: [facility] B3 - MED SURG  Daily Treatment Note  NAME: Benji Israel  : 1945  MRN: [id number]    Date of Service: 3/12/2024    Discharge Recommendations:  Home with assist PRN  OT Equipment Recommendations  Equipment Needed: No    If pt is unable to be seen after this session, please let this note serve as discharge summary.  Please see case management note for discharge disposition.  Thank you.    Patient Diagnosis(es): The primary encounter diagnosis was COPD exacerbation (HCC). Diagnoses of Dyspnea and respiratory abnormalities, Hypoxia, Congestive heart failure, unspecified HF chronicity, unspecified heart failure type (HCC), Peripheral edema, and Multifocal pneumonia were also pertinent to this visit.     Assessment    Assessment: Pt tolerated OT session well. He performs bed mobility independently, requires SBA for funcitonal transfers, and requires SBA for mobility with no AD. Pt's SpO2 remained above 90% this date on 2L O2. He performed BUE and BLE CHF exercises seated in chair. He is functioning below his baseline d/t decreased endurance and strength and will benefit from continued skilled acute OT services to maximize safety and IND with ADLs. Home with PRN assist is recommended at d/c.  Activity Tolerance: Patient tolerated treatment well  Discharge Recommendations: Home with assist PRN  Equipment Needed: No      Plan   Occupational Therapy Plan  Times Per Week: 2-3x's a week while in acute care  Current Treatment Recommendations: Strengthening;Balance training;Functional mobility training;Endurance training;Safety education & training;Equipment evaluation, education, & procurement;Self-Care / ADL     Restrictions  Restrictions/Precautions  Restrictions/Precautions: Fall Risk  Position Activity Restriction  Other position/activity restrictions: IV lines, 2L O2, telemetry    Subjective   Subjective  Subjective: Pt resting in bed upon OT arrival, agreeable to  treatment  Pain: Pt denies pain  Orientation  Overall Orientation Status: Within Functional Limits  Orientation Level: Oriented to place;Oriented to time;Oriented to situation;Oriented to person  Pain: minor pain, states it is his baseline.  Cognition  Overall Cognitive Status: WFL        Objective    Vitals  Vitals:    03/12/24    BP: 138/77   Pulse: 90   Resp:    Temp:    SpO2: 95%        Bed Mobility Training  Bed Mobility Training: Yes  Supine to Sit: Independent  Sit to Supine: Other (comment) (in chair at end of session)  Balance  Sitting: Intact  Standing: Impaired  Standing - Static: Good  Standing - Dynamic: Fair  Transfer Training  Transfer Training: Yes  Interventions: Safety awareness training;Verbal cues  Sit to Stand: Stand-by assistance  Stand to Sit: Stand-by assistance       ADL  Functional Mobility: Stand by assistance  Functional Mobility Skilled Clinical Factors: Pt ambulated within room with no AD on 2L O2. SpO2 >90% t/o ambulation  Additional Comments: Pt declined all ADL needs    OT Exercises  Exercise Treatment: x15 reps of BUE and BLE CHF therex       Safety Devices  Type of Devices: Left in chair;Call light within reach;Gait belt;Nurse notified;Chair alarm in place  Restraints  Restraints Initially in Place: No     Patient Education  Education Given To: Patient  Education Provided: Role of Therapy;Plan of Care;ADL Adaptive Strategies;Transfer Training;Home Exercise Program;Precautions  Education Provided Comments: PLB  Education Method: Demonstration;Verbal  Barriers to Learning: None  Education Outcome: Verbalized understanding;Demonstrated understanding  Disease Specific Education: Pt educated on importance of OOB mobility, prevention of complications of bedrest, and general safety during hospitalization. Pt verbalized understanding      Goals  Short Term Goals  Time Frame for Short Term Goals: 1 week 3/17 - onging 3/12  Short Term Goal 1: Pt to voice understanding with teach-back of 1/3

## 2024-03-13 LAB
ANION GAP SERPL CALCULATED.3IONS-SCNC: 9 MMOL/L (ref 3–16)
BACTERIA BLD CULT ORG #2: NORMAL
BACTERIA BLD CULT: NORMAL
BASOPHILS # BLD: 0 K/UL (ref 0–0.2)
BASOPHILS NFR BLD: 0.1 %
BUN SERPL-MCNC: 28 MG/DL (ref 7–20)
CALCIUM SERPL-MCNC: 9.4 MG/DL (ref 8.3–10.6)
CHLORIDE SERPL-SCNC: 96 MMOL/L (ref 99–110)
CO2 SERPL-SCNC: 29 MMOL/L (ref 21–32)
CREAT SERPL-MCNC: 0.8 MG/DL (ref 0.8–1.3)
DEPRECATED RDW RBC AUTO: 14.4 % (ref 12.4–15.4)
EOSINOPHIL # BLD: 0 K/UL (ref 0–0.6)
EOSINOPHIL NFR BLD: 0 %
GFR SERPLBLD CREATININE-BSD FMLA CKD-EPI: >60 ML/MIN/{1.73_M2}
GLUCOSE BLD-MCNC: 162 MG/DL (ref 70–99)
GLUCOSE BLD-MCNC: 171 MG/DL (ref 70–99)
GLUCOSE BLD-MCNC: 190 MG/DL (ref 70–99)
GLUCOSE BLD-MCNC: 218 MG/DL (ref 70–99)
GLUCOSE SERPL-MCNC: 174 MG/DL (ref 70–99)
HCT VFR BLD AUTO: 34.2 % (ref 40.5–52.5)
HGB BLD-MCNC: 11.6 G/DL (ref 13.5–17.5)
LEGIONELLA AG UR QL: NORMAL
LYMPHOCYTES # BLD: 0.6 K/UL (ref 1–5.1)
LYMPHOCYTES NFR BLD: 4.5 %
MCH RBC QN AUTO: 30.5 PG (ref 26–34)
MCHC RBC AUTO-ENTMCNC: 34 G/DL (ref 31–36)
MCV RBC AUTO: 89.8 FL (ref 80–100)
MONOCYTES # BLD: 0.7 K/UL (ref 0–1.3)
MONOCYTES NFR BLD: 5.4 %
NEUTROPHILS # BLD: 11.5 K/UL (ref 1.7–7.7)
NEUTROPHILS NFR BLD: 90 %
ORGANISM: ABNORMAL
PERFORMED ON: ABNORMAL
PLATELET # BLD AUTO: 198 K/UL (ref 135–450)
PMV BLD AUTO: 8.6 FL (ref 5–10.5)
POTASSIUM SERPL-SCNC: 3.6 MMOL/L (ref 3.5–5.1)
RBC # BLD AUTO: 3.81 M/UL (ref 4.2–5.9)
REPORT: NORMAL
RESP PATH DNA+RNA PNL L RESP NAA+NON-PRB: ABNORMAL
S PNEUM AG UR QL: NORMAL
SODIUM SERPL-SCNC: 134 MMOL/L (ref 136–145)
WBC # BLD AUTO: 12.8 K/UL (ref 4–11)

## 2024-03-13 PROCEDURE — 6370000000 HC RX 637 (ALT 250 FOR IP): Performed by: STUDENT IN AN ORGANIZED HEALTH CARE EDUCATION/TRAINING PROGRAM

## 2024-03-13 PROCEDURE — 7100000010 HC PHASE II RECOVERY - FIRST 15 MIN: Performed by: INTERNAL MEDICINE

## 2024-03-13 PROCEDURE — 7100000011 HC PHASE II RECOVERY - ADDTL 15 MIN: Performed by: INTERNAL MEDICINE

## 2024-03-13 PROCEDURE — 2700000000 HC OXYGEN THERAPY PER DAY

## 2024-03-13 PROCEDURE — 1200000000 HC SEMI PRIVATE

## 2024-03-13 PROCEDURE — 6370000000 HC RX 637 (ALT 250 FOR IP): Performed by: INTERNAL MEDICINE

## 2024-03-13 PROCEDURE — 2709999900 HC NON-CHARGEABLE SUPPLY: Performed by: INTERNAL MEDICINE

## 2024-03-13 PROCEDURE — 87205 SMEAR GRAM STAIN: CPT

## 2024-03-13 PROCEDURE — 87070 CULTURE OTHR SPECIMN AEROBIC: CPT

## 2024-03-13 PROCEDURE — 2580000003 HC RX 258: Performed by: INTERNAL MEDICINE

## 2024-03-13 PROCEDURE — 6360000002 HC RX W HCPCS: Performed by: INTERNAL MEDICINE

## 2024-03-13 PROCEDURE — 94669 MECHANICAL CHEST WALL OSCILL: CPT

## 2024-03-13 PROCEDURE — 0BCB8ZZ EXTIRPATION OF MATTER FROM LEFT LOWER LOBE BRONCHUS, VIA NATURAL OR ARTIFICIAL OPENING ENDOSCOPIC: ICD-10-PCS | Performed by: INTERNAL MEDICINE

## 2024-03-13 PROCEDURE — 80048 BASIC METABOLIC PNL TOTAL CA: CPT

## 2024-03-13 PROCEDURE — 0BC68ZZ EXTIRPATION OF MATTER FROM RIGHT LOWER LOBE BRONCHUS, VIA NATURAL OR ARTIFICIAL OPENING ENDOSCOPIC: ICD-10-PCS | Performed by: INTERNAL MEDICINE

## 2024-03-13 PROCEDURE — 6370000000 HC RX 637 (ALT 250 FOR IP): Performed by: NURSE PRACTITIONER

## 2024-03-13 PROCEDURE — 2580000003 HC RX 258: Performed by: STUDENT IN AN ORGANIZED HEALTH CARE EDUCATION/TRAINING PROGRAM

## 2024-03-13 PROCEDURE — 99152 MOD SED SAME PHYS/QHP 5/>YRS: CPT | Performed by: INTERNAL MEDICINE

## 2024-03-13 PROCEDURE — 99232 SBSQ HOSP IP/OBS MODERATE 35: CPT | Performed by: INTERNAL MEDICINE

## 2024-03-13 PROCEDURE — 94761 N-INVAS EAR/PLS OXIMETRY MLT: CPT

## 2024-03-13 PROCEDURE — 94640 AIRWAY INHALATION TREATMENT: CPT

## 2024-03-13 PROCEDURE — A4217 STERILE WATER/SALINE, 500 ML: HCPCS | Performed by: INTERNAL MEDICINE

## 2024-03-13 PROCEDURE — 31645 BRNCHSC W/THER ASPIR 1ST: CPT | Performed by: INTERNAL MEDICINE

## 2024-03-13 PROCEDURE — 36415 COLL VENOUS BLD VENIPUNCTURE: CPT

## 2024-03-13 PROCEDURE — 3609027000 HC BRONCHOSCOPY: Performed by: INTERNAL MEDICINE

## 2024-03-13 PROCEDURE — 2500000003 HC RX 250 WO HCPCS: Performed by: INTERNAL MEDICINE

## 2024-03-13 PROCEDURE — 6360000002 HC RX W HCPCS: Performed by: NURSE PRACTITIONER

## 2024-03-13 PROCEDURE — 99232 SBSQ HOSP IP/OBS MODERATE 35: CPT | Performed by: NURSE PRACTITIONER

## 2024-03-13 PROCEDURE — 85025 COMPLETE CBC W/AUTO DIFF WBC: CPT

## 2024-03-13 RX ORDER — LIDOCAINE HYDROCHLORIDE 20 MG/ML
INJECTION, SOLUTION EPIDURAL; INFILTRATION; INTRACAUDAL; PERINEURAL PRN
Status: DISCONTINUED | OUTPATIENT
Start: 2024-03-13 | End: 2024-03-13 | Stop reason: ALTCHOICE

## 2024-03-13 RX ORDER — ACETYLCYSTEINE 200 MG/ML
600 SOLUTION ORAL; RESPIRATORY (INHALATION)
Status: DISCONTINUED | OUTPATIENT
Start: 2024-03-13 | End: 2024-03-16

## 2024-03-13 RX ORDER — MAGNESIUM HYDROXIDE 1200 MG/15ML
LIQUID ORAL CONTINUOUS PRN
Status: COMPLETED | OUTPATIENT
Start: 2024-03-13 | End: 2024-03-13

## 2024-03-13 RX ORDER — FENTANYL CITRATE 50 UG/ML
INJECTION, SOLUTION INTRAMUSCULAR; INTRAVENOUS PRN
Status: DISCONTINUED | OUTPATIENT
Start: 2024-03-13 | End: 2024-03-13 | Stop reason: HOSPADM

## 2024-03-13 RX ORDER — MIDAZOLAM HYDROCHLORIDE 5 MG/ML
INJECTION INTRAMUSCULAR; INTRAVENOUS PRN
Status: DISCONTINUED | OUTPATIENT
Start: 2024-03-13 | End: 2024-03-13 | Stop reason: HOSPADM

## 2024-03-13 RX ADMIN — ACETYLCYSTEINE 600 MG: 200 INHALANT RESPIRATORY (INHALATION) at 19:51

## 2024-03-13 RX ADMIN — Medication 10 ML: at 08:23

## 2024-03-13 RX ADMIN — HYDROCODONE BITARTRATE AND HOMATROPINE METHYLBROMIDE 5 ML: 5; 1.5 SOLUTION ORAL at 14:42

## 2024-03-13 RX ADMIN — Medication 10 MG: at 20:25

## 2024-03-13 RX ADMIN — FUROSEMIDE 40 MG: 10 INJECTION, SOLUTION INTRAMUSCULAR; INTRAVENOUS at 13:00

## 2024-03-13 RX ADMIN — IPRATROPIUM BROMIDE AND ALBUTEROL SULFATE 1 DOSE: .5; 2.5 SOLUTION RESPIRATORY (INHALATION) at 19:51

## 2024-03-13 RX ADMIN — WATER 40 MG: 1 INJECTION INTRAMUSCULAR; INTRAVENOUS; SUBCUTANEOUS at 06:21

## 2024-03-13 RX ADMIN — Medication 2 PUFF: at 20:04

## 2024-03-13 RX ADMIN — WATER 40 MG: 1 INJECTION INTRAMUSCULAR; INTRAVENOUS; SUBCUTANEOUS at 14:42

## 2024-03-13 RX ADMIN — CEFEPIME 2000 MG: 2 INJECTION, POWDER, FOR SOLUTION INTRAVENOUS at 08:22

## 2024-03-13 RX ADMIN — LORAZEPAM 0.5 MG: 0.5 TABLET ORAL at 14:45

## 2024-03-13 RX ADMIN — ACETAMINOPHEN 650 MG: 325 TABLET ORAL at 14:42

## 2024-03-13 RX ADMIN — ATORVASTATIN CALCIUM 80 MG: 80 TABLET, FILM COATED ORAL at 08:25

## 2024-03-13 RX ADMIN — WATER 40 MG: 1 INJECTION INTRAMUSCULAR; INTRAVENOUS; SUBCUTANEOUS at 20:25

## 2024-03-13 RX ADMIN — SODIUM CHLORIDE, PRESERVATIVE FREE 10 ML: 5 INJECTION INTRAVENOUS at 08:24

## 2024-03-13 RX ADMIN — FUROSEMIDE 40 MG: 10 INJECTION, SOLUTION INTRAMUSCULAR; INTRAVENOUS at 20:24

## 2024-03-13 RX ADMIN — CEFEPIME 2000 MG: 2 INJECTION, POWDER, FOR SOLUTION INTRAVENOUS at 17:18

## 2024-03-13 ASSESSMENT — PAIN SCALES - GENERAL
PAINLEVEL_OUTOF10: 0

## 2024-03-13 ASSESSMENT — PAIN - FUNCTIONAL ASSESSMENT: PAIN_FUNCTIONAL_ASSESSMENT: 0-10

## 2024-03-13 NOTE — PROGRESS NOTES
[facility]   Heart Failure Daily Progress Note    Admit Date:  3/9/2024  HPI:    Chief Complaint   Patient presents with    Shortness of Breath     Sx for 2 days with hx of copd      Benji Israel is being followed for CHF  Followed by me for ATTR amyloid  Started with cold like symptoms about 1.5 weeks ago; treated with OTC alkaseltzer improved in 1 day, then developed cough this past Thursday, followed by lower oxygen levels by Saturday.   Being treated for Acute on chronic CHF, pneumonia.     Interval history:   Prior 2 weeks weight was down to 223lbs   Saturday morning he was 229.5lb  Weight is up to 231lbs   S/p bronch on 3/12/2024    Subjective:  Mr. Israel  is short of breath. Still with hemoptysis this afternoon. On 2L NC.   Having anxiety- ativan helping     Objective:   BP (!) 141/83   Pulse 66   Temp 97.9 °F (36.6 °C) (Oral)   Resp 19   Ht 1.829 m (6')   Wt 104.9 kg (231 lb 3.2 oz)   SpO2 94%   BMI 31.36 kg/m²     Intake/Output Summary (Last 24 hours) at 3/13/2024 0625  Last data filed at 3/13/2024 0457  Gross per 24 hour   Intake 960 ml   Output 1775 ml   Net -815 ml       NYHA: IV    Physical Exam:  General:  Awake, alert, NAD, up in the chair, ill appearing   Skin:  Warm and dry  Chest: rhonchi throughout   Cardiovascular:  irregular  S1S2, no m/r/g   Abdomen:  Soft, nontender, +bowel sounds  Extremities:  ++ bilateral lower extremity edema    Medications:    furosemide  40 mg IntraVENous TID    sodium chloride flush  5-40 mL IntraVENous 2 times per day    lidocaine viscous hcl  15 mL Mouth/Throat Once    midazolam  5 mg IntraVENous Once    fentanNYL  100 mcg IntraVENous Once    methylPREDNISolone  40 mg IntraVENous 3 times per day    guaiFENesin-dextromethorphan  10 mL Oral 3 times per day    Tafamidis  1 capsule Oral Daily    insulin lispro  0-4 Units SubCUTAneous TID WC    insulin lispro  0-4 Units SubCUTAneous Nightly    [Held by provider] apixaban  5 mg Oral BID    atorvastatin

## 2024-03-13 NOTE — PLAN OF CARE
Problem: Discharge Planning  Goal: Discharge to home or other facility with appropriate resources  Outcome: Progressing     Problem: Safety - Adult  Goal: Free from fall injury  Outcome: Progressing     Problem: ABCDS Injury Assessment  Goal: Absence of physical injury  Outcome: Progressing     Problem: Pain  Goal: Verbalizes/displays adequate comfort level or baseline comfort level  Outcome: Progressing     Problem: Respiratory - Adult  Goal: Achieves optimal ventilation and oxygenation  Outcome: Progressing     Problem: Cardiovascular - Adult  Goal: Maintains optimal cardiac output and hemodynamic stability  Outcome: Progressing  Goal: Absence of cardiac dysrhythmias or at baseline  Outcome: Progressing     Problem: Skin/Tissue Integrity - Adult  Goal: Skin integrity remains intact  Outcome: Progressing     Problem: Metabolic/Fluid and Electrolytes - Adult  Goal: Electrolytes maintained within normal limits  Outcome: Progressing  Goal: Hemodynamic stability and optimal renal function maintained  Outcome: Progressing  Goal: Glucose maintained within prescribed range  Outcome: Progressing     Problem: Chronic Conditions and Co-morbidities  Goal: Patient's chronic conditions and co-morbidity symptoms are monitored and maintained or improved  Outcome: Progressing

## 2024-03-13 NOTE — PROGRESS NOTES
Endo report received. Pt responds to voice.  Scattered rhonchi. Encouraged to cough and take deep breathes.  O2 @ 3l N/C

## 2024-03-13 NOTE — PROGRESS NOTES
PULMONARY AND CRITICAL CARE INPATIENT NOTE        Benji Israel   : 1945  MRN: [id number]     Admitting Physician: Jose Luna MD  Attending Physician: Neymar Morillo MD  PCP: Raysa Eli MD    Admission: 3/9/2024   Date of Service: 3/13/2024    Chief Complaint   Patient presents with    Shortness of Breath     Sx for 2 days with hx of copd           ASSESSMENT & PLAN       79 y.o. pleasant  male patient with:    Hospital Problems             Last Modified POA    * (Principal) Multifocal pneumonia 3/9/2024 Yes    Cardiac amyloidosis (HCC) 3/11/2024 Yes    SOB (shortness of breath) 3/11/2024 Yes    Acute on chronic diastolic congestive heart failure (HCC) 3/11/2024 Yes    Former smoker 3/11/2024 Yes    COPD exacerbation ([facility]) 3/11/2024 Yes    RHONDA (obstructive sleep apnea) 3/11/2024 Yes    Acute hypoxemic respiratory failure (HCC) 3/11/2024 Yes    Hemoptysis 3/11/2024 Yes    Chest congestion 3/11/2024 Yes    Mediastinal adenopathy 3/11/2024 Yes       # Acute on chronic hypoxic respiratory failure.  No PE.  On 1 L of oxygen at baseline  # Multifocal pneumonia, bilateral lung infiltrates.  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 for bronchoscopy today  Pulmonary rehab referral at discharge if patient qualifies.  No interest in PAP therapy at bedtime or sleep apnea evaluation  Will keep blood sugar  Collected: 03/11/24 1237    Order Status: Completed Specimen: Nares Updated: 03/12/24 0646     MRSA SCREEN RT-PCR --     Negative  MRSA DNA not detected.  Normal Range: Not detected      Narrative:      ORDER#: [id number]                          ORDERED BY: GERSON MILLER  SOURCE: Nares                              COLLECTED:  03/11/24 12:37  ANTIBIOTICS AT [place].:                      RECEIVED :  03/11/24 15:34    Culture, Urine [[id number]] Collected: 03/09/24 1600    Order Status: Completed Specimen: Urine voided Updated: 03/10/24 1222     Urine Culture, Routine <50,000 CFU/ml mixed skin/urogenital yanick. No further workup    Narrative:      ORDER#: [id number]                          ORDERED BY: CHANDRA PELAEZ  SOURCE: Urine Voided                       COLLECTED:  03/09/24 16:00  ANTIBIOTICS AT [place].:                      RECEIVED :  03/10/24 03:41    Culture, Blood 1 [[id number]] Collected: 03/09/24 1535    Order Status: Completed Specimen: Blood Updated: 03/10/24 1615     Blood Culture, Routine No Growth to date.  Any change in status will be called.    Narrative:      ORDER#: [id number]                          ORDERED BY: CHANDRA PELAEZ  SOURCE: Blood Antecubital-Rig              COLLECTED:  03/09/24 15:35  ANTIBIOTICS AT [place].:                      RECEIVED :  03/10/24 03:11  If child <=2 yrs old please draw pediatric bottle.~Blood Culture 1    Culture, Blood 2 [[id number]] Collected: 03/09/24 1535    Order Status: Completed Specimen: Blood Updated: 03/10/24 1615     Culture, Blood 2 No Growth to date.  Any change in status will be called.    Narrative:      ORDER#: [id number]                          ORDERED BY: CHANDRA PELAEZ  SOURCE: Blood Hand, Right                  COLLECTED:  03/09/24 15:35  ANTIBIOTICS AT [place].:                      RECEIVED :  03/10/24 03:11  If child <=2 yrs old please draw pediatric bottle.~Blood Culture #2    COVID-19 & Influenza Combo [[id number]] Collected: 03/09/24 1229    Order

## 2024-03-13 NOTE — CARE COORDINATION
Chart reviewed day 4. Care provided by pulm, elizabeth and IM. Pt is from home alone but his son is staying with him for the time being. He had a bronch today and tolerated it well. Pt is on IVABX and IV lasix. He diuresed 1775 and his weight is unchanged. Na 134, BUN 28 and WBC 12.8. Will continue to follow course for needs. Janiya Figueroa RN

## 2024-03-13 NOTE — PROGRESS NOTES
Arrived from inpatient hospital room via gurney accompanied by transport staff  Pt alert and oriented x4  Calm/appropriate  VSS  Iv site assessed with evidence of infiltration on arrival - removed intact / bleeding stopped  Respiration  easy unlabored - auscultated -clear bilaterally anterior and posterior fields  Abd soft nondistended : BS+x4 quadrants : no nausea   Oxygen tank checked for  adequate volume before transport

## 2024-03-13 NOTE — PLAN OF CARE
Problem: Discharge Planning  Goal: Discharge to home or other facility with appropriate resources  3/12/2024 2311 by Berna Pereira RN  Outcome: Progressing  3/12/2024 1246 by Concha Peoples RN  Outcome: Progressing  Flowsheets (Taken 3/12/2024 1001)  Discharge to home or other facility with appropriate resources: Identify barriers to discharge with patient and caregiver     Problem: Safety - Adult  Goal: Free from fall injury  3/12/2024 2311 by Berna Pereira RN  Outcome: Progressing  3/12/2024 1246 by Concha Peoples RN  Outcome: Progressing     Problem: ABCDS Injury Assessment  Goal: Absence of physical injury  3/12/2024 2311 by Berna Pereira RN  Outcome: Progressing  3/12/2024 1246 by Concha Peoples RN  Outcome: Progressing     Problem: Respiratory - Adult  Goal: Achieves optimal ventilation and oxygenation  3/12/2024 2311 by Berna Pereira RN  Outcome: Progressing     Problem: Cardiovascular - Adult  Goal: Maintains optimal cardiac output and hemodynamic stability  3/12/2024 2311 by Berna Pereira RN  Outcome: Progressing  3/12/2024 1246 by Concha Peoples RN  Outcome: Progressing  Flowsheets (Taken 3/12/2024 1001)  Maintains optimal cardiac output and hemodynamic stability: Monitor blood pressure and heart rate     Problem: Cardiovascular - Adult  Goal: Absence of cardiac dysrhythmias or at baseline  3/12/2024 1246 by Concha Peoples RN  Outcome: Progressing  Flowsheets (Taken 3/12/2024 1001)  Absence of cardiac dysrhythmias or at baseline: Monitor cardiac rate and rhythm     Problem: Cardiovascular - Adult  Goal: Absence of cardiac dysrhythmias or at baseline  3/12/2024 1246 by Concha Peoples RN  Outcome: Progressing  Flowsheets (Taken 3/12/2024 1001)  Absence of cardiac dysrhythmias or at baseline: Monitor cardiac rate and rhythm     Problem: Skin/Tissue Integrity - Adult  Goal: Skin integrity remains intact  3/12/2024 2311 by Berna Pereira RN  Outcome: Progressing  3/12/2024 1246 by Concha Peoples RN  Outcome:

## 2024-03-13 NOTE — PLAN OF CARE
Problem: Safety - Adult  Goal: Free from fall injury  3/13/2024 0304 by Berna Pereira RN  Outcome: Progressing  3/12/2024 2311 by Berna Pereira RN  Outcome: Progressing     Problem: Discharge Planning  Goal: Discharge to home or other facility with appropriate resources  3/13/2024 0304 by Berna Pereira RN  Outcome: Progressing  3/12/2024 2311 by Berna Pereira RN  Outcome: Progressing     Problem: ABCDS Injury Assessment  Goal: Absence of physical injury  3/13/2024 0304 by Berna Pereira RN  Outcome: Progressing  3/12/2024 2311 by Berna Pereira RN  Outcome: Progressing     Problem: Pain  Goal: Verbalizes/displays adequate comfort level or baseline comfort level  3/13/2024 0304 by Berna Pereira RN  Outcome: Progressing  3/12/2024 2311 by Berna Pereira RN  Outcome: Progressing     Problem: Chronic Conditions and Co-morbidities  Goal: Patient's chronic conditions and co-morbidity symptoms are monitored and maintained or improved  3/13/2024 0304 by Berna Pereira RN  Outcome: Progressing  3/12/2024 2311 by Berna Pereira RN  Outcome: Progressing     Problem: Metabolic/Fluid and Electrolytes - Adult  Goal: Glucose maintained within prescribed range  3/13/2024 0304 by Berna Pereira RN  Outcome: Progressing  3/12/2024 2311 by Berna Pereira RN  Outcome: Progressing     Problem: Metabolic/Fluid and Electrolytes - Adult  Goal: Hemodynamic stability and optimal renal function maintained  3/13/2024 0304 by Berna Pereira RN  Outcome: Progressing  3/12/2024 2311 by Berna Pereira RN  Outcome: Progressing

## 2024-03-13 NOTE — PROGRESS NOTES
Transferred to inpatient hospital room 364 via gurney accompanied by [facility] staff  Pt alert   Calm/appropriate  VSS  Iv site assessed without evidence of infiltration on arrival  Respiration  easy unlabored -on O2 @ 3l N/C  Abd soft nondistended : BS+x4 quadrants : no nausea   Oxygen tank checked for  adequate volume before transport   [facility] notified

## 2024-03-13 NOTE — PROCEDURES
PRE-PROCEDURE  DIAGNOSIS: Bilateral lung infiltrate  POST-PROCEDURE  DIAGNOSIS: Mucous plugging    PRE-PROCEDURE ASSESSMENT AND CONSENT:   A full history and physical was performed. The patient is 79-year-old male patient who presented with respiratory failure and bilateral lung infiltrates concerning for possible pneumonia versus congestive heart failure versus a combination.  The patient's medications, allergies and sensitivities have been reviewed. The risks and benefits of the procedure were discussed with the patient. All questions were answered and informed consent was obtained.     PHYSICAL EXAM PRIOR TO PROCEDURE:  Airway Examination: Mallampati Class 3.  Good air entry bilaterally. Normal S1/S2. Soft lax abdomen. ASA Grade Assessment: 4.     After reviewing the risks and benefits, the patient was deemed in satisfactory condition to undergo the procedure (benefits: more diagnostic info, risks: bleeding, infection, pneumothorax, death). The anesthesia plan was to use moderate sedation.     Immediately prior to administration of medications, the patient was re-assessed for adequacy to receive sedatives. The heart rate, respiratory rate, oxygen saturations, blood pressure, adequacy of pulmonary ventilation, and response to care were monitored throughout the procedure.     MEDICATIONS:   Midazolam 4 mg, Fentanyl 25 mcg,  Lidocaine 2%, 10 ml, Diluted epinephrine 0 cc.  Endobronchial normal saline: 80 cc.  Lidocaine viscous 2% 5cc.  Moderate sedation time between 9:30 AM and 9:49 AM     PROCEDURE AND FINDINGS:  A time-out was called verifying the patient's identification immediately before the procedure, then, the scope was advanced through the oropharyngeal route to the trachea and lower airways.  Several splashes of lidocaine to the lower airways.     The vocal cords looked normal. Trachea was normal in caliber. The carlotta was sharp. The tracheobronchial tree of the bilateral lungs was examined.   No concerning

## 2024-03-13 NOTE — PROGRESS NOTES
V2.0    [facility] Progress Note      Name:  Benji Israel /Age/Sex: 1945  (79 y.o. male)   MRN & CSN:  [id number] & [id number] Encounter Date/Time: 3/13/2024 8:08 AM EDT   Location:  [facility] PCP: Raysa Eli MD     Attending:Neymar Morillo MD       Hospital Day: 5    Assessment and Recommendations   Benji Israel is a 79 y.o. male with pmh of Non-insulin-dependent type II mellitus type II hypertension hyperlipidemia chronic diastolic heart failure with preserved ejection fraction CAD former smoker COPD  who presents with Multifocal pneumonia.    Plan:     Acute hypoxic respiratory failure with underlying multifocal pneumonia and suspected COPD exacerbation  -Chest x-ray on 3/9 showed patchy bilateral groundglass infiltrates concerning for an evolving atypical pneumonia, vascular congestive changes.  -Chest CT on 3/9 showed no evidence of pulmonary embolism  -On 2 L NC  -Continue cefepime  -Vancomycin discontinued due to negative MRSA PCR  -Preliminary blood cultures negative x 2  -Continue DuoNebs  -Continue IV Solu-Medrol  -Pulmonology following  -Bronchoscopy today showed normal bronchial tree anatomy and no concerning endobronchial lesions.  Therapeutic aspiration of mucous plugs from both lower lobes was performed and endobronchial washings were collected    Nonspecific ST changes with mildly elevated troponins and atypical chest pain  Acute on chronic diastolic heart failure with preserved ejection fraction  ATTR amyloid  -Cardiology consulted   -Continue Tifamidis   -Continue IV Lasix 40 twice daily   -Hold off on restarting home Entresto while diuresing  -Continue telemetry    Diabetes  -Continue SSI    Atrial fibrillation  -Hold Eliquis  -EP consulted to interrogate loop recorder   -No plan for restoration of normal sinus rhythm, signed off    Hyperlipidemia  -Continue atorvastatin    Anxiety  -Continue Ativan 0.5 mg q2h PRN    Hemoptysis  -CXR on 3/11: Worsening multifocal  CHEST 3/9/2024 10:46 am COMPARISON: April 7, 2023 HISTORY: ORDERING SYSTEM PROVIDED HISTORY: Chest Pain TECHNOLOGIST PROVIDED HISTORY: Reason for exam:->Chest Pain Reason for Exam: SOB for the past 2 days. History of COPD FINDINGS: The cardiomediastinal silhouette is mildly enlarged.  Stable post sternotomy changes.  Vascular congestive changes are noted.  There is chronic asymmetric elevation of the left diaphragm. Patchy bilateral ground-glass infiltrates concerning for an evolving atypical pneumonia. No pneumothorax.  No large pleural effusion. No subdiaphragmatic free air or pneumomediastinum is present.     1. Patchy bilateral ground-glass infiltrates concerning for an evolving atypical pneumonia. 2. Vascular congestive changes.     CT CHEST PULMONARY EMBOLISM W CONTRAST    Result Date: 3/9/2024  EXAMINATION: CTA OF THE CHEST 3/9/2024 12:01 pm TECHNIQUE: CTA of the chest was performed after the administration of intravenous contrast.  Multiplanar reformatted images are provided for review.  MIP images are provided for review. Automated exposure control, iterative reconstruction, and/or weight based adjustment of the mA/kV was utilized to reduce the radiation dose to as low as reasonably achievable. COMPARISON: 10/05/2022, 07/28/2017 HISTORY: ORDERING SYSTEM PROVIDED HISTORY: rule out E, SOB TECHNOLOGIST PROVIDED HISTORY: Reason for exam:->rule out E, SOB Additional Contrast?->1 Reason for Exam: shortness of breath,cough Additional signs and symptoms: COPD,CHF Relevant Medical/Surgical History: cardiac stents FINDINGS: Pulmonary Arteries: Pulmonary arteries are adequately opacified for evaluation.  No evidence of intraluminal filling defect to suggest pulmonary embolism.  Main pulmonary artery is normal in caliber. Mediastinum: The visualized portion of the thyroid is unremarkable.  There is no pathologic supraclavicular or axillary adenopathy.  There is mild mediastinal and bilateral hilar lymphadenopathy, most

## 2024-03-14 LAB
ANION GAP SERPL CALCULATED.3IONS-SCNC: 11 MMOL/L (ref 3–16)
BASOPHILS # BLD: 0 K/UL (ref 0–0.2)
BASOPHILS NFR BLD: 0 %
BUN SERPL-MCNC: 32 MG/DL (ref 7–20)
CALCIUM SERPL-MCNC: 9.4 MG/DL (ref 8.3–10.6)
CHLORIDE SERPL-SCNC: 95 MMOL/L (ref 99–110)
CO2 SERPL-SCNC: 27 MMOL/L (ref 21–32)
CREAT SERPL-MCNC: 1 MG/DL (ref 0.8–1.3)
DEPRECATED RDW RBC AUTO: 14.8 % (ref 12.4–15.4)
EOSINOPHIL # BLD: 0 K/UL (ref 0–0.6)
EOSINOPHIL NFR BLD: 0 %
GFR SERPLBLD CREATININE-BSD FMLA CKD-EPI: >60 ML/MIN/{1.73_M2}
GLUCOSE BLD-MCNC: 200 MG/DL (ref 70–99)
GLUCOSE BLD-MCNC: 204 MG/DL (ref 70–99)
GLUCOSE BLD-MCNC: 227 MG/DL (ref 70–99)
GLUCOSE BLD-MCNC: 234 MG/DL (ref 70–99)
GLUCOSE SERPL-MCNC: 165 MG/DL (ref 70–99)
HCT VFR BLD AUTO: 37.8 % (ref 40.5–52.5)
HGB BLD-MCNC: 12.6 G/DL (ref 13.5–17.5)
LYMPHOCYTES # BLD: 0.7 K/UL (ref 1–5.1)
LYMPHOCYTES NFR BLD: 4.2 %
MCH RBC QN AUTO: 30.1 PG (ref 26–34)
MCHC RBC AUTO-ENTMCNC: 33.4 G/DL (ref 31–36)
MCV RBC AUTO: 90.3 FL (ref 80–100)
MONOCYTES # BLD: 0.8 K/UL (ref 0–1.3)
MONOCYTES NFR BLD: 5 %
NEUTROPHILS # BLD: 14.9 K/UL (ref 1.7–7.7)
NEUTROPHILS NFR BLD: 90.8 %
PERFORMED ON: ABNORMAL
PLATELET # BLD AUTO: 231 K/UL (ref 135–450)
PMV BLD AUTO: 8.1 FL (ref 5–10.5)
POTASSIUM SERPL-SCNC: 4.1 MMOL/L (ref 3.5–5.1)
RBC # BLD AUTO: 4.19 M/UL (ref 4.2–5.9)
SODIUM SERPL-SCNC: 133 MMOL/L (ref 136–145)
WBC # BLD AUTO: 16.4 K/UL (ref 4–11)

## 2024-03-14 PROCEDURE — 94761 N-INVAS EAR/PLS OXIMETRY MLT: CPT

## 2024-03-14 PROCEDURE — 6370000000 HC RX 637 (ALT 250 FOR IP): Performed by: INTERNAL MEDICINE

## 2024-03-14 PROCEDURE — 6360000002 HC RX W HCPCS: Performed by: NURSE PRACTITIONER

## 2024-03-14 PROCEDURE — 2700000000 HC OXYGEN THERAPY PER DAY

## 2024-03-14 PROCEDURE — 2580000003 HC RX 258: Performed by: INTERNAL MEDICINE

## 2024-03-14 PROCEDURE — 97530 THERAPEUTIC ACTIVITIES: CPT

## 2024-03-14 PROCEDURE — 97116 GAIT TRAINING THERAPY: CPT

## 2024-03-14 PROCEDURE — 6360000002 HC RX W HCPCS: Performed by: INTERNAL MEDICINE

## 2024-03-14 PROCEDURE — 36415 COLL VENOUS BLD VENIPUNCTURE: CPT

## 2024-03-14 PROCEDURE — 1200000000 HC SEMI PRIVATE

## 2024-03-14 PROCEDURE — 6370000000 HC RX 637 (ALT 250 FOR IP): Performed by: NURSE PRACTITIONER

## 2024-03-14 PROCEDURE — 80048 BASIC METABOLIC PNL TOTAL CA: CPT

## 2024-03-14 PROCEDURE — 94667 MNPJ CHEST WALL 1ST: CPT

## 2024-03-14 PROCEDURE — 85025 COMPLETE CBC W/AUTO DIFF WBC: CPT

## 2024-03-14 PROCEDURE — 2580000003 HC RX 258: Performed by: STUDENT IN AN ORGANIZED HEALTH CARE EDUCATION/TRAINING PROGRAM

## 2024-03-14 PROCEDURE — 94669 MECHANICAL CHEST WALL OSCILL: CPT

## 2024-03-14 PROCEDURE — 6370000000 HC RX 637 (ALT 250 FOR IP): Performed by: STUDENT IN AN ORGANIZED HEALTH CARE EDUCATION/TRAINING PROGRAM

## 2024-03-14 PROCEDURE — 97110 THERAPEUTIC EXERCISES: CPT

## 2024-03-14 PROCEDURE — 99232 SBSQ HOSP IP/OBS MODERATE 35: CPT | Performed by: INTERNAL MEDICINE

## 2024-03-14 PROCEDURE — 94640 AIRWAY INHALATION TREATMENT: CPT

## 2024-03-14 PROCEDURE — 99232 SBSQ HOSP IP/OBS MODERATE 35: CPT | Performed by: NURSE PRACTITIONER

## 2024-03-14 RX ORDER — PREDNISONE 20 MG/1
40 TABLET ORAL DAILY
Status: DISCONTINUED | OUTPATIENT
Start: 2024-03-14 | End: 2024-03-16 | Stop reason: HOSPADM

## 2024-03-14 RX ORDER — TRANEXAMIC ACID 100 MG/ML
500 INJECTION, SOLUTION INTRAVENOUS EVERY 6 HOURS PRN
Status: DISCONTINUED | OUTPATIENT
Start: 2024-03-14 | End: 2024-03-16 | Stop reason: HOSPADM

## 2024-03-14 RX ADMIN — ACETYLCYSTEINE 600 MG: 200 INHALANT RESPIRATORY (INHALATION) at 08:05

## 2024-03-14 RX ADMIN — FUROSEMIDE 40 MG: 10 INJECTION, SOLUTION INTRAMUSCULAR; INTRAVENOUS at 13:21

## 2024-03-14 RX ADMIN — FUROSEMIDE 40 MG: 10 INJECTION, SOLUTION INTRAMUSCULAR; INTRAVENOUS at 08:37

## 2024-03-14 RX ADMIN — SODIUM CHLORIDE, PRESERVATIVE FREE 10 ML: 5 INJECTION INTRAVENOUS at 08:40

## 2024-03-14 RX ADMIN — TRAZODONE HYDROCHLORIDE 50 MG: 50 TABLET ORAL at 20:46

## 2024-03-14 RX ADMIN — IPRATROPIUM BROMIDE AND ALBUTEROL SULFATE 1 DOSE: .5; 2.5 SOLUTION RESPIRATORY (INHALATION) at 08:05

## 2024-03-14 RX ADMIN — TIOTROPIUM BROMIDE INHALATION SPRAY 2 PUFF: 3.12 SPRAY, METERED RESPIRATORY (INHALATION) at 08:07

## 2024-03-14 RX ADMIN — SODIUM CHLORIDE, PRESERVATIVE FREE 10 ML: 5 INJECTION INTRAVENOUS at 20:42

## 2024-03-14 RX ADMIN — Medication 10 MG: at 20:41

## 2024-03-14 RX ADMIN — PREDNISONE 40 MG: 20 TABLET ORAL at 10:11

## 2024-03-14 RX ADMIN — ATORVASTATIN CALCIUM 80 MG: 80 TABLET, FILM COATED ORAL at 08:39

## 2024-03-14 RX ADMIN — INSULIN LISPRO 1 UNITS: 100 INJECTION, SOLUTION INTRAVENOUS; SUBCUTANEOUS at 10:11

## 2024-03-14 RX ADMIN — IPRATROPIUM BROMIDE AND ALBUTEROL SULFATE 1 DOSE: .5; 2.5 SOLUTION RESPIRATORY (INHALATION) at 21:04

## 2024-03-14 RX ADMIN — SACUBITRIL AND VALSARTAN 1 TABLET: 24; 26 TABLET, FILM COATED ORAL at 12:08

## 2024-03-14 RX ADMIN — ACETYLCYSTEINE 600 MG: 200 INHALANT RESPIRATORY (INHALATION) at 21:04

## 2024-03-14 RX ADMIN — FUROSEMIDE 40 MG: 10 INJECTION, SOLUTION INTRAMUSCULAR; INTRAVENOUS at 20:41

## 2024-03-14 RX ADMIN — SODIUM CHLORIDE 25 ML: 9 INJECTION, SOLUTION INTRAVENOUS at 00:30

## 2024-03-14 RX ADMIN — INSULIN LISPRO 1 UNITS: 100 INJECTION, SOLUTION INTRAVENOUS; SUBCUTANEOUS at 13:22

## 2024-03-14 RX ADMIN — LORAZEPAM 0.5 MG: 0.5 TABLET ORAL at 12:08

## 2024-03-14 RX ADMIN — LORAZEPAM 0.5 MG: 0.5 TABLET ORAL at 23:50

## 2024-03-14 RX ADMIN — Medication 2 PUFF: at 21:04

## 2024-03-14 RX ADMIN — WATER 40 MG: 1 INJECTION INTRAMUSCULAR; INTRAVENOUS; SUBCUTANEOUS at 04:47

## 2024-03-14 RX ADMIN — Medication 2 PUFF: at 08:06

## 2024-03-14 RX ADMIN — SACUBITRIL AND VALSARTAN 1 TABLET: 24; 26 TABLET, FILM COATED ORAL at 20:41

## 2024-03-14 RX ADMIN — CEFEPIME 2000 MG: 2 INJECTION, POWDER, FOR SOLUTION INTRAVENOUS at 08:48

## 2024-03-14 RX ADMIN — INSULIN LISPRO 1 UNITS: 100 INJECTION, SOLUTION INTRAVENOUS; SUBCUTANEOUS at 18:36

## 2024-03-14 RX ADMIN — LORAZEPAM 0.5 MG: 0.5 TABLET ORAL at 00:28

## 2024-03-14 RX ADMIN — CEFEPIME 2000 MG: 2 INJECTION, POWDER, FOR SOLUTION INTRAVENOUS at 00:30

## 2024-03-14 ASSESSMENT — PAIN SCALES - GENERAL: PAINLEVEL_OUTOF10: 0

## 2024-03-14 NOTE — PLAN OF CARE
Problem: Discharge Planning  Goal: Discharge to home or other facility with appropriate resources  3/13/2024 2050 by Maddison Jackson RN  Outcome: Progressing  Flowsheets (Taken 3/13/2024 2045)  Discharge to home or other facility with appropriate resources: Identify barriers to discharge with patient and caregiver    Problem: Safety - Adult  Goal: Free from fall injury  3/13/2024 2050 by Maddison Jackson RN  Outcome: Progressing     Problem: ABCDS Injury Assessment  Goal: Absence of physical injury  3/13/2024 2050 by Maddison Jackson RN  Outcome: Progressing    Problem: Pain  Goal: Verbalizes/displays adequate comfort level or baseline comfort level  3/13/2024 2050 by Maddison Jackson RN  Outcome: Progressing  Flowsheets (Taken 3/13/2024 1925)  Verbalizes/displays adequate comfort level or baseline comfort level: Encourage patient to monitor pain and request assistance     Problem: Respiratory - Adult  Goal: Achieves optimal ventilation and oxygenation  3/13/2024 2050 by Maddison Jackson RN  Outcome: Progressing  Flowsheets (Taken 3/13/2024 2045)  Achieves optimal ventilation and oxygenation: Assess for changes in respiratory status     Problem: Cardiovascular - Adult  Goal: Maintains optimal cardiac output and hemodynamic stability  3/13/2024 2050 by Maddison Jackson RN  Outcome: Progressing  Flowsheets (Taken 3/13/2024 2045)  Maintains optimal cardiac output and hemodynamic stability: Monitor blood pressure and heart rate  Goal: Absence of cardiac dysrhythmias or at baseline  3/13/2024 2050 by Maddison Jackson RN  Outcome: Progressing  Flowsheets (Taken 3/13/2024 2045)  Absence of cardiac dysrhythmias or at baseline: Monitor cardiac rate and rhythm     Problem: Skin/Tissue Integrity - Adult  Goal: Skin integrity remains intact  3/13/2024 2050 by Maddison Jackson RN  Outcome: Progressing  Flowsheets  Taken 3/13/2024 2048  Skin Integrity Remains Intact: Monitor for areas of redness and/or skin breakdown  Taken 3/13/2024  2045  Skin Integrity Remains Intact: Monitor for areas of redness and/or skin breakdown     Problem: Metabolic/Fluid and Electrolytes - Adult  Goal: Electrolytes maintained within normal limits  3/13/2024 2050 by Maddison Jackson RN  Outcome: Progressing  Flowsheets (Taken 3/13/2024 2045)  Electrolytes maintained within normal limits: Monitor labs and assess patient for signs and symptoms of electrolyte imbalances  Goal: Hemodynamic stability and optimal renal function maintained  3/13/2024 2050 by Maddison Jackson RN  Outcome: Progressing  Flowsheets (Taken 3/13/2024 2045)  Hemodynamic stability and optimal renal function maintained: Monitor labs and assess for signs and symptoms of volume excess or deficit  Goal: Glucose maintained within prescribed range  3/13/2024 2050 by Maddison Jackson RN  Outcome: Progressing  Flowsheets (Taken 3/13/2024 2045)  Glucose maintained within prescribed range: Monitor blood glucose as ordered     Problem: Chronic Conditions and Co-morbidities  Goal: Patient's chronic conditions and co-morbidity symptoms are monitored and maintained or improved  3/13/2024 2050 by Maddison Jackson RN  Outcome: Progressing  Flowsheets (Taken 3/13/2024 2045)  Care Plan - Patient's Chronic Conditions and Co-Morbidity Symptoms are Monitored and Maintained or Improved: Monitor and assess patient's chronic conditions and comorbid symptoms for stability, deterioration, or improvement

## 2024-03-14 NOTE — PROGRESS NOTES
Physical Therapy  Facility/Department: [facility] B[facility] - MED SURG  Daily Treatment Note  NAME: Benji Israel  : 1945  MRN: [id number]    Date of Service: 3/14/2024    Discharge Recommendations:  24 hour supervision or assist   PT Equipment Recommendations  Equipment Needed: No  Other: do not anticipate any DME needs at this time.    Patient Diagnosis(es): The primary encounter diagnosis was COPD exacerbation (HCC). Diagnoses of Dyspnea and respiratory abnormalities, Hypoxia, Congestive heart failure, unspecified HF chronicity, unspecified heart failure type (HCC), Peripheral edema, and Multifocal pneumonia were also pertinent to this visit.    Assessment   Assessment: Pt pleasant and agreeable, ambulated and perfomred BLE and UEs per CHF protocol.  O2 sats dropped on 2L during amb to 86% and with rest and cued breathing recovered in ~2 minute. Pt is recommended for con't skilled PT and home with A PRN at D/C.  Activity Tolerance: Patient tolerated treatment well  Equipment Needed: No  Other: do not anticipate any DME needs at this time.     Plan    Physical Therapy Plan  General Plan: 3-5 times per week  Current Treatment Recommendations: Strengthening;Balance training;Functional mobility training;Transfer training;Endurance training;Gait training;Stair training;Neuromuscular re-education;Home exercise program;Safety education & training;Patient/Caregiver education & training;Equipment evaluation, education, & procurement;Therapeutic activities     Restrictions  Restrictions/Precautions  Restrictions/Precautions: Fall Risk  Position Activity Restriction  Other position/activity restrictions: IV lines, 2L O2, telemetry     Subjective    Subjective  Subjective: Pt agreeable, feels weaker than yesterday  Pain: R knee, not rated  Orientation  Overall Orientation Status: Within Functional Limits  Orientation Level: Oriented to place;Oriented to time;Oriented to situation;Oriented to person  Cognition  Overall Cognitive  demonstrates good understanding and carry over.  Education Method: Demonstration;Verbal;Printed Information/Hand-outs  Barriers to Learning: None  Education Outcome: Verbalized understanding;Demonstrated understanding    AM-PAC - Mobility    AM-PAC Basic Mobility - Inpatient   How much help is needed turning from your back to your side while in a flat bed without using bedrails?: None  How much help is needed moving from lying on your back to sitting on the side of a flat bed without using bedrails?: None  How much help is needed moving to and from a bed to a chair?: None  How much help is needed standing up from a chair using your arms?: None  How much help is needed walking in hospital room?: A Little  How much help is needed climbing 3-5 steps with a railing?: A Little  AM-PAC Inpatient Mobility Raw Score : 22  AM-PAC Inpatient T-Scale Score : 53.28  Mobility Inpatient CMS 0-100% Score: 20.91  Mobility Inpatient CMS G-Code Modifier : CJ         Therapy Time   Individual Concurrent Group Co-treatment   Time In 0940         Time Out 1018         Minutes 38                 Alondra Goodman, [id number]

## 2024-03-14 NOTE — PROGRESS NOTES
V2.0    [facility] Progress Note      Name:  Benji Israel /Age/Sex: 1945  (79 y.o. male)   MRN & CSN:  [id number] & [id number] Encounter Date/Time: 3/14/2024 8:08 AM EDT   Location:  [facility]0364[facility]01 PCP: Raysa Eli MD     Attending:Neymar Morillo MD       Hospital Day: 6    Assessment and Recommendations   Benji Israel is a 79 y.o. male with pmh of Non-insulin-dependent type II mellitus type II hypertension hyperlipidemia chronic diastolic heart failure with preserved ejection fraction CAD former smoker COPD  who presents with Multifocal pneumonia.    Plan:     Acute hypoxic respiratory failure with underlying multifocal pneumonia and suspected COPD exacerbation  -Chest x-ray on 3/9 showed patchy bilateral groundglass infiltrates concerning for an evolving atypical pneumonia, vascular congestive changes.  -Chest CT on 3/9 showed no evidence of pulmonary embolism  -On 2 L NC  -Continue cefepime  -Vancomycin discontinued due to negative MRSA PCR  -Preliminary blood cultures negative x 2  -Continue DuoNebs  -Pulmonology following  -Bronchoscopy showed normal bronchial tree anatomy and no concerning endobronchial lesions.  Therapeutic aspiration of mucous plugs from both lower lobes was performed and endobronchial washings were collected  -Switch IV Solu-Medrol to oral prednisone  -TXA nebs for hemoptysis  -Bronchial hygiene and Mucinex    Hemoptysis  -CXR on 3/11: Worsening multifocal airspace opacities which may reflect developing pneumonitis.  Pulmonary edema in association with congestive heart failure may also be contributing  -Pulmonology following   -Start TXA nebs  -Will hold Eliquis    Hypertension  -Restart home Entresto    Nonspecific ST changes with mildly elevated troponins and atypical chest pain  Acute on chronic diastolic heart failure with preserved ejection fraction  ATTR amyloid  -Cardiology consulted   -Continue Tifamidis   -Continue IV Lasix 40 twice daily   -Hold off on restarting  is unremarkable.  There is no pathologic supraclavicular or axillary adenopathy.  There is mild mediastinal and bilateral hilar lymphadenopathy, most likely benign and reactive in etiology given the patient's underlying lung findings.  There is atherosclerotic disease of the intrathoracic aorta, with an approximate 4.2 cm fusiform ectasia of the ascending aorta.  There is coronary atherosclerosis.  There is chronic left ventricular hypertrophy.  There are chronic post CABG changes evident. Lungs/pleura: The central airways are patent.  There is a small left pleural effusion.  There is stable chronic elevation of the left hemidiaphragm with chronic compressive atelectasis within lingula and left lower lobe, similar to 10/05/2022.  However, there are new extensive ground-glass nodular opacities throughout both lungs, new from 10/05/2022, which were likely infectious or inflammatory in etiology, to include atypical multifocal pneumonia.  Diffuse ground-glass opacity throughout both lungs may be in part secondary to pulmonary edema.  No additional focus of airspace consolidation is identified.  There is no evidence of a pneumothorax.  No dominant pulmonary mass is evident. Upper Abdomen: There is a partially visualized 3.6 cm cyst within the left kidney upper pole.  The remainder of the upper abdomen is grossly unremarkable. Soft Tissues/Bones: There is diffuse idiopathic skeletal hyperostosis.  There is no acute skeletal abnormality.  There are chronic degenerative changes at the right shoulder joint.     1. No evidence of pulmonary embolism. 2. New extensive ground-glass nodular opacities throughout both lungs, new from 10/05/2022, which are likely infectious or inflammatory in etiology, to include atypical multifocal pneumonia. 3. Diffuse ground-glass opacity throughout both lungs may be in part be secondary to pulmonary edema. 4. Small left pleural effusion. 5. Mild mediastinal and bilateral hilar lymphadenopathy,

## 2024-03-14 NOTE — CARE COORDINATION
Chart reviewed day 5. Care provided by pulm, cards and IM. Pt is from home alone but his son is currently staying with him. IVABX are completed. Pt is on IV Lasix, diuresed 2250 but is up 1LB. Pt is not on oxygen at home but is on 2L now. Na 133, BUN 32, WBC 16.4. Will continue to follow course for needs. Janiya Figueroa RN

## 2024-03-14 NOTE — PROGRESS NOTES
PULMONARY AND CRITICAL CARE INPATIENT NOTE        Benji Israel   : 1945  MRN: [id number]     Admitting Physician: Jose Luna MD  Attending Physician: Neymar Morillo MD  PCP: Raysa Eli MD    Admission: 3/9/2024   Date of Service: 3/14/2024    Chief Complaint   Patient presents with    Shortness of Breath     Sx for 2 days with hx of copd           ASSESSMENT & PLAN       79 y.o. pleasant  male patient with:    Hospital Problems             Last Modified POA    * (Principal) Multifocal pneumonia 3/9/2024 Yes    Cardiac amyloidosis (HCC) 3/11/2024 Yes    SOB (shortness of breath) 3/11/2024 Yes    Acute on chronic diastolic congestive heart failure (HCC) 3/11/2024 Yes    Former smoker 3/11/2024 Yes    COPD exacerbation ([facility]) 3/11/2024 Yes    RHONDA (obstructive sleep apnea) 3/11/2024 Yes    Acute hypoxemic respiratory failure (HCC) 3/11/2024 Yes    Hemoptysis 3/11/2024 Yes    Chest congestion 3/11/2024 Yes    Mediastinal adenopathy 3/11/2024 Yes       # Acute on chronic hypoxic respiratory failure.  No PE.  On 1 L of oxygen at baseline  # Multifocal pneumonia, bilateral lung infiltrates.  Human metapneumovirus positive.  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  # Tobacco abuse history  # HFpEF/amyloid with exacerbation  # CAD/CABG   # Suspected RHONDA, invalid previous sleep study due to inability to sleep, no interest in repeating evaluation or PAP therapy  Finish empiric antibiotics  Follow-up on micro workup/bronchoscopy  Will switch Solu-Medrol to oral prednisone  Agreed with holding apixaban temporarily with hemoptysis  Will add TXA nebs as needed for hemoptysis  Dulera and Spiriva with as needed nebs  Diuresis as tolerated  Bronchial hygiene measures/Mucinex home dose resumed  Aspiration precautions  Heart failure management as per cardiology  Rest and exercise  [facility]  [address], [place] [address]   Phone (office): [phone]

## 2024-03-14 NOTE — PROGRESS NOTES
1 Occupational Therapy  Facility/Department: [facility] B3 - MED SURG  Daily Treatment Note  NAME: Benji Israel  : 1945  MRN: [id number]    Date of Service: 3/14/2024    Discharge Recommendations:  Home with assist PRN  OT Equipment Recommendations  Equipment Needed: No      Patient Diagnosis(es): The primary encounter diagnosis was COPD exacerbation (HCC). Diagnoses of Dyspnea and respiratory abnormalities, Hypoxia, Congestive heart failure, unspecified HF chronicity, unspecified heart failure type (HCC), Peripheral edema, and Multifocal pneumonia were also pertinent to this visit.     Assessment     Pt tolerated session well on 2L O2. O2 88% after standing therex, but recovered to 90% within 1 min. Pt's O2 @ 89% after bout of long distance ambulation. Recommending pt d/c home with PRN assist. Continue POC.    Activity Tolerance: Patient tolerated treatment well  Discharge Recommendations: Home with assist PRN  Equipment Needed: No      Plan   Occupational Therapy Plan  Times Per Week: 2-3x's a week while in acute care  Current Treatment Recommendations: Strengthening;Balance training;Functional mobility training;Endurance training;Safety education & training;Equipment evaluation, education, & procurement;Self-Care / ADL     Restrictions   Restrictions/Precautions  Restrictions/Precautions: Fall Risk  Position Activity Restriction  Other position/activity restrictions: IV lines, 2L O2, telemetry    Subjective   Subjective  Subjective: Pt seated in recliner upon OT arrival. Reports feeling fatigued but agreeable to tx.  Pain: Pt denies pain  Orientation  Overall Orientation Status: Within Functional Limits  Orientation Level: Oriented to place;Oriented to time;Oriented to situation;Oriented to person  Pain: R knee, not rated  Cognition  Overall Cognitive Status: WFL        Objective    Vitals     Vitals:    24 1200   BP: 137/81   Pulse: 73   Resp: 20   Temp: 97.9 °F (36.6 °C)   SpO2: 93%       Bed Mobility

## 2024-03-14 NOTE — PROGRESS NOTES
Echocardiogram is consistent with hypertrophic cardiomyopathy or   infiltrative cardiomyopathy such as Amyloidosis heart.   recommend Cardiac MRI and amyloid work up.    LV Septum Diastolic: 1.47 cm   LV PW Diastolic: 1.88 cm     Echocardiogram 4/16/2020  Summary   Technically difficult examination secondary to habitus and COPD. Left ventricular systolic function is normal with ejection fraction estimated at 55%.   No regional wall motion abnormalities.   There is moderate to severe concentric left ventricular hypertrophy with   sigmoid septum (2.21 cm.).   Grade II diastolic dysfunction with elevated left ventricular filling pressure.   The left atrium is mildly dilated.  Findings are Strongly suggestive of ATTR amyloidosis.      Stress test 4/13/2023   Summary   Mild to moderately reduced calculated LVEF 45%   Inferolateral hypokinesis/scar with minimal familia-infarct ischemia   Overall, this would be considered an abnormal, intermediate risk, study    CTPA 3/9/2024  Impression:        1. No evidence of pulmonary embolism.  2. New extensive ground-glass nodular opacities throughout both lungs, new  from 10/05/2022, which are likely infectious or inflammatory in etiology, to  include atypical multifocal pneumonia.  3. Diffuse ground-glass opacity throughout both lungs may be in part be  secondary to pulmonary edema.  4. Small left pleural effusion.  5. Mild mediastinal and bilateral hilar lymphadenopathy, most likely benign  and reactive in etiology given the patient's underlying lung findings.  6. Atherosclerotic disease of the intrathoracic aorta, with an approximate  4.2 cm fusiform ectasia of the ascending aorta.  7. Chronic left ventricular hypertrophy. Chronic post CABG changes.  8. Additional findings, as above.     03/14/24 0451 105.2 kg (232 lb) Actual;Standing scale      03/13/24 0457 104.9 kg (231 lb 3.2 oz) Actual;Standing scale     03/12/24 0600 105.1 kg (231 lb 9.6 oz) Actual;Standing scale     03/09/24  1042 103.9 kg (229 lb) Stated         Telemetry independently reviewed and interpreted by me today and shows: atrial fibrillation      Principal Problem:    Multifocal pneumonia  Active Problems:    Cardiac amyloidosis (HCC)    SOB (shortness of breath)    Acute on chronic diastolic congestive heart failure (HCC)    Former smoker    COPD exacerbation (HCC)    RHONDA (obstructive sleep apnea)    Acute hypoxemic respiratory failure (HCC)    Hemoptysis    Chest congestion    Mediastinal adenopathy  Resolved Problems:    * No resolved hospital problems. 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

## 2024-03-14 NOTE — PLAN OF CARE
CHF Care Plan      Patient's EF (Ejection Fraction) is greater than 40%    Heart Failure Medications:  Diuretics:: Furosemide    (One of the following REQUIRED for EF </= 40%/SYSTOLIC FAILURE but MAY be used in EF% >40%/DIASTOLIC FAILURE)        ACE:: None        ARB:: None         ARNI:: None    (Beta Blockers)  NON- Evidenced Based Beta Blocker (for EF% >40%/DIASTOLIC FAILURE): None    Evidenced Based Beta Blocker::(REQUIRED for EF% <40%/SYSTOLIC FAILURE) None  ...................................................................................................................................................    Failed to redirect to the Timeline version of the [employer] SmartLink.      Patient's weights and intake/output reviewed    Daily Weight log at bedside, patient/family participation in use of log: \"yes    Patient's current weight today shows a difference of 1 lbs more than last documented weight.      Intake/Output Summary (Last 24 hours) at 3/14/2024 0455  Last data filed at 3/14/2024 0053  Gross per 24 hour   Intake 1020 ml   Output 2400 ml   Net -1380 ml       Education Booklet Provided: yes    Comorbidities Reviewed Yes    Patient has a past medical history of Bronchitis, CAD (coronary artery disease), Cardiomyopathy (HCC), CHF (congestive heart failure) (HCC), COPD (chronic obstructive pulmonary disease) (HCC), Diabetes (HCC), Diaphragmatic paralysis, Diastolic dysfunction, Family history of early CAD, Former smoker, High cholesterol, Hypertension, Knee replacement, Obesity, Class II, 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     >>For CHF and Comorbidity documentation on Education Time and Topics, please see Education Tab      Pt resting in bed at this time on  3 L O2. Pt with complaints of shortness of breath. Pt with pitting lower extremity edema.     Patient and/or Family's stated Goal of Care this Admission: reduce shortness of breath, increase activity tolerance, better understand heart failure and

## 2024-03-15 LAB
ANION GAP SERPL CALCULATED.3IONS-SCNC: 10 MMOL/L (ref 3–16)
BACTERIA SPEC RESP CULT: NORMAL
BACTERIA SPEC RESP CULT: NORMAL
BASOPHILS # BLD: 0 K/UL (ref 0–0.2)
BASOPHILS NFR BLD: 0.2 %
BUN SERPL-MCNC: 30 MG/DL (ref 7–20)
CALCIUM SERPL-MCNC: 9.3 MG/DL (ref 8.3–10.6)
CHLORIDE SERPL-SCNC: 97 MMOL/L (ref 99–110)
CO2 SERPL-SCNC: 29 MMOL/L (ref 21–32)
CREAT SERPL-MCNC: 0.8 MG/DL (ref 0.8–1.3)
DEPRECATED RDW RBC AUTO: 14.2 % (ref 12.4–15.4)
EOSINOPHIL # BLD: 0 K/UL (ref 0–0.6)
EOSINOPHIL NFR BLD: 0 %
GFR SERPLBLD CREATININE-BSD FMLA CKD-EPI: >60 ML/MIN/{1.73_M2}
GLUCOSE BLD-MCNC: 125 MG/DL (ref 70–99)
GLUCOSE BLD-MCNC: 168 MG/DL (ref 70–99)
GLUCOSE BLD-MCNC: 207 MG/DL (ref 70–99)
GLUCOSE BLD-MCNC: 246 MG/DL (ref 70–99)
GLUCOSE SERPL-MCNC: 128 MG/DL (ref 70–99)
GRAM STN SPEC: NORMAL
GRAM STN SPEC: NORMAL
HCT VFR BLD AUTO: 36.2 % (ref 40.5–52.5)
HGB BLD-MCNC: 12.1 G/DL (ref 13.5–17.5)
LYMPHOCYTES # BLD: 1.5 K/UL (ref 1–5.1)
LYMPHOCYTES NFR BLD: 11.5 %
MAGNESIUM SERPL-MCNC: 2.3 MG/DL (ref 1.8–2.4)
MCH RBC QN AUTO: 30 PG (ref 26–34)
MCHC RBC AUTO-ENTMCNC: 33.6 G/DL (ref 31–36)
MCV RBC AUTO: 89.3 FL (ref 80–100)
MONOCYTES # BLD: 1.2 K/UL (ref 0–1.3)
MONOCYTES NFR BLD: 9.5 %
NEUTROPHILS # BLD: 10.1 K/UL (ref 1.7–7.7)
NEUTROPHILS NFR BLD: 78.8 %
PERFORMED ON: ABNORMAL
PLATELET # BLD AUTO: 233 K/UL (ref 135–450)
PMV BLD AUTO: 8.2 FL (ref 5–10.5)
POTASSIUM SERPL-SCNC: 3.3 MMOL/L (ref 3.5–5.1)
PROCALCITONIN SERPL IA-MCNC: 0.05 NG/ML (ref 0–0.15)
RBC # BLD AUTO: 4.05 M/UL (ref 4.2–5.9)
SODIUM SERPL-SCNC: 136 MMOL/L (ref 136–145)
WBC # BLD AUTO: 12.9 K/UL (ref 4–11)

## 2024-03-15 PROCEDURE — 80048 BASIC METABOLIC PNL TOTAL CA: CPT

## 2024-03-15 PROCEDURE — 85025 COMPLETE CBC W/AUTO DIFF WBC: CPT

## 2024-03-15 PROCEDURE — 97110 THERAPEUTIC EXERCISES: CPT

## 2024-03-15 PROCEDURE — 94761 N-INVAS EAR/PLS OXIMETRY MLT: CPT

## 2024-03-15 PROCEDURE — 84145 PROCALCITONIN (PCT): CPT

## 2024-03-15 PROCEDURE — 2700000000 HC OXYGEN THERAPY PER DAY

## 2024-03-15 PROCEDURE — 6370000000 HC RX 637 (ALT 250 FOR IP): Performed by: STUDENT IN AN ORGANIZED HEALTH CARE EDUCATION/TRAINING PROGRAM

## 2024-03-15 PROCEDURE — 1200000000 HC SEMI PRIVATE

## 2024-03-15 PROCEDURE — 94640 AIRWAY INHALATION TREATMENT: CPT

## 2024-03-15 PROCEDURE — 6370000000 HC RX 637 (ALT 250 FOR IP): Performed by: INTERNAL MEDICINE

## 2024-03-15 PROCEDURE — 97116 GAIT TRAINING THERAPY: CPT

## 2024-03-15 PROCEDURE — 94669 MECHANICAL CHEST WALL OSCILL: CPT

## 2024-03-15 PROCEDURE — 99233 SBSQ HOSP IP/OBS HIGH 50: CPT | Performed by: INTERNAL MEDICINE

## 2024-03-15 PROCEDURE — 36415 COLL VENOUS BLD VENIPUNCTURE: CPT

## 2024-03-15 PROCEDURE — 6370000000 HC RX 637 (ALT 250 FOR IP): Performed by: NURSE PRACTITIONER

## 2024-03-15 PROCEDURE — 83735 ASSAY OF MAGNESIUM: CPT

## 2024-03-15 PROCEDURE — 2580000003 HC RX 258: Performed by: STUDENT IN AN ORGANIZED HEALTH CARE EDUCATION/TRAINING PROGRAM

## 2024-03-15 PROCEDURE — 6360000002 HC RX W HCPCS: Performed by: NURSE PRACTITIONER

## 2024-03-15 PROCEDURE — 99232 SBSQ HOSP IP/OBS MODERATE 35: CPT | Performed by: NURSE PRACTITIONER

## 2024-03-15 PROCEDURE — 6370000000 HC RX 637 (ALT 250 FOR IP)

## 2024-03-15 RX ORDER — POTASSIUM CHLORIDE 20 MEQ/1
20 TABLET, EXTENDED RELEASE ORAL ONCE
Status: COMPLETED | OUTPATIENT
Start: 2024-03-15 | End: 2024-03-15

## 2024-03-15 RX ADMIN — SODIUM CHLORIDE, PRESERVATIVE FREE 10 ML: 5 INJECTION INTRAVENOUS at 09:17

## 2024-03-15 RX ADMIN — SODIUM CHLORIDE, PRESERVATIVE FREE 10 ML: 5 INJECTION INTRAVENOUS at 20:35

## 2024-03-15 RX ADMIN — SACUBITRIL AND VALSARTAN 1 TABLET: 24; 26 TABLET, FILM COATED ORAL at 20:31

## 2024-03-15 RX ADMIN — SACUBITRIL AND VALSARTAN 1 TABLET: 24; 26 TABLET, FILM COATED ORAL at 09:17

## 2024-03-15 RX ADMIN — Medication 2 PUFF: at 08:26

## 2024-03-15 RX ADMIN — ATORVASTATIN CALCIUM 80 MG: 80 TABLET, FILM COATED ORAL at 09:17

## 2024-03-15 RX ADMIN — INSULIN LISPRO 1 UNITS: 100 INJECTION, SOLUTION INTRAVENOUS; SUBCUTANEOUS at 18:14

## 2024-03-15 RX ADMIN — IPRATROPIUM BROMIDE AND ALBUTEROL SULFATE 1 DOSE: .5; 2.5 SOLUTION RESPIRATORY (INHALATION) at 08:25

## 2024-03-15 RX ADMIN — IPRATROPIUM BROMIDE AND ALBUTEROL SULFATE 1 DOSE: .5; 2.5 SOLUTION RESPIRATORY (INHALATION) at 20:01

## 2024-03-15 RX ADMIN — FUROSEMIDE 40 MG: 10 INJECTION, SOLUTION INTRAMUSCULAR; INTRAVENOUS at 20:31

## 2024-03-15 RX ADMIN — HYDROCODONE BITARTRATE AND HOMATROPINE METHYLBROMIDE 5 ML: 5; 1.5 SOLUTION ORAL at 20:35

## 2024-03-15 RX ADMIN — ACETYLCYSTEINE 600 MG: 200 INHALANT RESPIRATORY (INHALATION) at 08:25

## 2024-03-15 RX ADMIN — POTASSIUM CHLORIDE 20 MEQ: 1500 TABLET, EXTENDED RELEASE ORAL at 09:17

## 2024-03-15 RX ADMIN — PREDNISONE 40 MG: 20 TABLET ORAL at 09:17

## 2024-03-15 RX ADMIN — Medication 10 MG: at 20:31

## 2024-03-15 RX ADMIN — HYDROCODONE BITARTRATE AND HOMATROPINE METHYLBROMIDE 5 ML: 5; 1.5 SOLUTION ORAL at 00:31

## 2024-03-15 RX ADMIN — ACETYLCYSTEINE 600 MG: 200 INHALANT RESPIRATORY (INHALATION) at 20:01

## 2024-03-15 RX ADMIN — TRAZODONE HYDROCHLORIDE 50 MG: 50 TABLET ORAL at 20:35

## 2024-03-15 RX ADMIN — FUROSEMIDE 40 MG: 10 INJECTION, SOLUTION INTRAMUSCULAR; INTRAVENOUS at 09:17

## 2024-03-15 RX ADMIN — Medication 2 PUFF: at 20:01

## 2024-03-15 RX ADMIN — TIOTROPIUM BROMIDE INHALATION SPRAY 2 PUFF: 3.12 SPRAY, METERED RESPIRATORY (INHALATION) at 08:25

## 2024-03-15 RX ADMIN — FUROSEMIDE 40 MG: 10 INJECTION, SOLUTION INTRAMUSCULAR; INTRAVENOUS at 12:31

## 2024-03-15 RX ADMIN — LORAZEPAM 0.5 MG: 0.5 TABLET ORAL at 12:31

## 2024-03-15 NOTE — PLAN OF CARE
Problem: Discharge Planning  Goal: Discharge to home or other facility with appropriate resources  3/15/2024 1044 by Concha Peoples RN  Outcome: Progressing  Flowsheets (Taken 3/15/2024 0923)  Discharge to home or other facility with appropriate resources: Identify barriers to discharge with patient and caregiver  3/15/2024 0426 by Linda Amaya RN  Outcome: Progressing     Problem: Safety - Adult  Goal: Free from fall injury  3/15/2024 1044 by Concha Peoples RN  Outcome: Progressing  3/15/2024 0426 by Linda Amaya RN  Outcome: Progressing     Problem: ABCDS Injury Assessment  Goal: Absence of physical injury  Outcome: Progressing     Problem: Pain  Goal: Verbalizes/displays adequate comfort level or baseline comfort level  Outcome: Progressing     Problem: Respiratory - Adult  Goal: Achieves optimal ventilation and oxygenation  3/15/2024 1044 by Concha Peoples RN  Outcome: Progressing  Flowsheets (Taken 3/15/2024 0923)  Achieves optimal ventilation and oxygenation: Assess for changes in respiratory status  3/15/2024 0426 by Linda Amaya RN  Outcome: Progressing     Problem: Cardiovascular - Adult  Goal: Maintains optimal cardiac output and hemodynamic stability  Outcome: Progressing  Flowsheets (Taken 3/15/2024 0923)  Maintains optimal cardiac output and hemodynamic stability: Monitor blood pressure and heart rate  Goal: Absence of cardiac dysrhythmias or at baseline  Outcome: Progressing  Flowsheets (Taken 3/15/2024 0923)  Absence of cardiac dysrhythmias or at baseline: Monitor cardiac rate and rhythm     Problem: Skin/Tissue Integrity - Adult  Goal: Skin integrity remains intact  Outcome: Progressing  Flowsheets  Taken 3/15/2024 1043  Skin Integrity Remains Intact: Monitor for areas of redness and/or skin breakdown  Taken 3/15/2024 0923  Skin Integrity Remains Intact: Monitor for areas of redness and/or skin breakdown     Problem: Metabolic/Fluid and Electrolytes - Adult  Goal: Electrolytes maintained

## 2024-03-15 NOTE — CARE COORDINATION
[facility]    Referral received from  to follow for home care services.     [facility] unable to accept; do not accept pcp    Patient has no preference in agency    Referral sent to [facility]  Accepted by sonja; office will call patient and pull referral when notified of discharge    Lora Kwong RN, BSN -[phone]  [facility]   [phone] fax [phone]  [facility] -[phone]  [facility] -[phone]

## 2024-03-15 NOTE — PROGRESS NOTES
Physical Therapy  Facility/Department: [facility] B3 - MED SURG  Daily Treatment Note  NAME: Benji Israel  : 1945  MRN: [id number]    Date of Service: 3/15/2024    Discharge Recommendations:  24 hour supervision or assist        Patient Diagnosis(es): The primary encounter diagnosis was COPD exacerbation (HCC). Diagnoses of Dyspnea and respiratory abnormalities, Hypoxia, Congestive heart failure, unspecified HF chronicity, unspecified heart failure type (HCC), Peripheral edema, and Multifocal pneumonia were also pertinent to this visit.    Assessment   Assessment: Pt pleasant and agreeable, ambulated and perfomred BLE and UEs per CHF protocol with introduction of standing CHF HEP exercises this date.  Note brief de-sat at end of standing exercises to 88% on 1LO2 but recovered within 1 min of standing rest and focused PLB.  Pt was able to manage 5 steps with light touch B HR and CGA (to try to simulate door frame support available at home for his 2 DOUG).  Pt requiring primiarily SBA for ambulation without AD (light occasional CGA at end of distance due to fatigue) and pt notes that he is less steady than his baseline during ambulation this date.  Pt clarified that he plans to head home with son who will stay with him but he works outside of home 12 hours a day.  Inquired/confirmed about recs for home PT and pt agreeable.  Recommended pt return to using either cane or RW (both of which he owns and are familiar with previously using) until further notice from home PT.  Notified  of pt's disposition for home care and home PT.  Recommending home with PRN assist and HHPT.     Plan    Physical Therapy Plan  General Plan: 3-5 times per week  Current Treatment Recommendations: Strengthening;Balance training;Functional mobility training;Transfer training;Endurance training;Gait training;Stair training;Neuromuscular re-education;Home exercise program;Safety education & training;Patient/Caregiver education &

## 2024-03-15 NOTE — PROGRESS NOTES
V2.0    [facility] [place] [facility] Progress Note      Name:  Benji Israel /Age/Sex: 1945  (79 y.o. male)   MRN & CSN:  [id number] & [id number] Encounter Date/Time: 3/15/2024 8:08 AM EDT   Location:  [facility]4/0364[facility]01 PCP: Raysa Eli MD     Attending:Neymar Morillo MD       Hospital Day: 7    Assessment and Recommendations   Benji Israel is a 79 y.o. male with pmh of Non-insulin-dependent type II mellitus type II hypertension hyperlipidemia chronic diastolic heart failure with preserved ejection fraction CAD former smoker COPD  who presents with Multifocal pneumonia.    Plan:     Acute hypoxic respiratory failure with underlying multifocal pneumonia and suspected COPD exacerbation  -Chest x-ray on 3/9 showed patchy bilateral groundglass infiltrates concerning for an evolving atypical pneumonia, vascular congestive changes.  -Chest CT on 3/9 showed no evidence of pulmonary embolism  -On 2 L NC  -Continue cefepime  -Vancomycin discontinued due to negative MRSA PCR  -Preliminary blood cultures negative x 2  -Continue DuoNebs  -Pulmonology following  -Bronchoscopy showed normal bronchial tree anatomy and no concerning endobronchial lesions.  Therapeutic aspiration of mucous plugs from both lower lobes was performed and endobronchial washings were collected  -Switch IV Solu-Medrol to oral prednisone  -TXA nebs for hemoptysis  -Bronchial hygiene and Mucinex  -Wean O2    Hemoptysis  -CXR on 3/11: Worsening multifocal airspace opacities which may reflect developing pneumonitis.  Pulmonary edema in association with congestive heart failure may also be contributing  -Pulmonology following   -Continue TXA nebs  -Will hold Eliquis  -May start Xarelto tomorrow pending status of hemoptysis    Hypertension  -Continue Entresto    Nonspecific ST changes with mildly elevated troponins and atypical chest pain  Acute on chronic diastolic heart failure with preserved ejection fraction  ATTR amyloid  -Cardiology consulted   -Continue

## 2024-03-15 NOTE — PROGRESS NOTES
mometasone-formoterol  2 puff Inhalation BID RT    And    tiotropium  2 puff Inhalation Daily RT       Continuous Infusions:   dextrose      sodium chloride Stopped (03/14/24 7686)       PRN Meds:  tranexamic acid, LORazepam, ipratropium 0.5 mg-albuterol 2.5 mg, traZODone, glucose, dextrose bolus **OR** dextrose bolus, glucagon (rDNA), dextrose, albuterol sulfate HFA, sodium chloride flush, sodium chloride, potassium chloride **OR** potassium alternative oral replacement **OR** potassium chloride, magnesium sulfate, ondansetron **OR** ondansetron, polyethylene glycol, acetaminophen **OR** acetaminophen, HYDROcodone homatropine      Objective    Test Results:  Imaging:  I have reviewed radiology images personally.    XR CHEST PORTABLE    Result Date: 3/11/2024  Worsening multifocal airspace opacities which may reflect developing pneumonitis.  Pulmonary edema in association with congestive heart failure may also contribute to appearance.      CT chest March 9, 2024  IMPRESSION:  1. No evidence of pulmonary embolism.  2. New extensive ground-glass nodular opacities throughout both lungs, new  from 10/05/2022, which are likely infectious or inflammatory in etiology, to  include atypical multifocal pneumonia.  3. Diffuse ground-glass opacity throughout both lungs may be in part be  secondary to pulmonary edema.  4. Small left pleural effusion.  5. Mild mediastinal and bilateral hilar lymphadenopathy, most likely benign  and reactive in etiology given the patient's underlying lung findings.  6. Atherosclerotic disease of the intrathoracic aorta, with an approximate  4.2 cm fusiform ectasia of the ascending aorta.  7. Chronic left ventricular hypertrophy. Chronic post CABG changes.  8. Additional findings, as above.        PFTS:  NA      Cardiology:  Lab Results   Component Value Date    LVEF 45 04/13/2023         Sleep:  NA      Labs:   No results for input(s): \"PHART\", \"FKH3UXD\", \"PO2ART\" in the last 72 hours.    Recent  (226 lb 14.4 oz)    Height:              Intake/Output Summary (Last 24 hours) at 3/15/2024 0821  Last data filed at 3/15/2024 0515  Gross per 24 hour   Intake 1500 ml   Output 3130 ml   Net -1630 ml     Net IO Since Admission: -8,607 mL [03/15/24 0821]        Physical Exam:  General appearance: In no apparent distress.  HEENT: Moist mucus membranes.  Cardiac: Normal S1 and S2.  Lungs: Decreased air entry bilaterally with rhonchi and crackles  Abdomen: Soft.  Back & Extremities: Symmetric pulses with good perfusion.  Pitting lower extremity edema  Neurological: No focal deficit..       ________________________________________________________  Electronically signed by:  Brittnee Alberts MD,FACP    3/15/2024    8:21 AM.     [facility]  [address], [place] [address]   Phone (office): [phone]

## 2024-03-15 NOTE — PROGRESS NOTES
Comprehensive Nutrition Assessment    Type and Reason for Visit:  Initial, RD Nutrition Re-Screen/LOS    Nutrition Recommendations/Plan:   Continue CC5 diet and encourage PO intake   Monitor for need for ONS  Monitor nutrition adequacy, pertinent labs, bowel habits, wt changes, and clinical progress     Malnutrition Assessment:  Malnutrition Status:  At risk for malnutrition (Comment) (03/15/24 6197)    Context:  Acute Illness       Nutrition Assessment:    LOS assessment: 79 y.o. m w/ PMH of Non-insulin-dependent DM2, HTN, hyperlipidemia, CHF, CAD, COPD admitted w/ Multifocal pneumonia. Pulm following, s/p bronch. On CC5 diet. Po intakes % of meals in EMR. Wt trending down in EMR, no significant wt loss. Will provide diet education when appropriate. Continue to encourage PO intake, will continue to monitor.    Nutrition Related Findings:    -234 x 24 hours. K+ 3.3. BLE + 2 pitting edema. -8 L since admission. + BM 3/13. Wound Type: None       Current Nutrition Intake & Therapies:    Average Meal Intake: %, 51-75%  Average Supplements Intake: None Ordered  ADULT DIET; Regular; 5 carb choices (75 gm/meal)    Anthropometric Measures:  Height: 182.9 cm (6')  Ideal Body Weight (IBW): 178 lbs (81 kg)       Current Body Weight: 114.8 kg (253 lb), 142.1 % IBW. Weight Source: Standing Scale  Current BMI (kg/m2): 34.3        Weight Adjustment For: No Adjustment                 BMI Categories: Obese Class 1 (BMI 30.0-34.9)    Estimated Daily Nutrient Needs:  Energy Requirements Based On: Kcal/kg (25-30 kcals/kg)  Weight Used for Energy Requirements: Ideal (81 kg)  Energy (kcal/day): 8648-5743  Weight Used for Protein Requirements: Ideal (1-1.2 g/kg)  Protein (g/day): 81-97 g  Method Used for Fluid Requirements: 1 ml/kcal  Fluid (ml/day):      Nutrition Diagnosis:   Increased nutrient needs related to increase demand for energy/nutrients as evidenced by weight loss    Nutrition Interventions:   Food and/or

## 2024-03-15 NOTE — PROGRESS NOTES
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 He is being treated for acute on chronic CHF and PNA.    Interval History:  Pt. seen and examined; records reviewed  BP improved and stable  Net diuresis -8.6L since admit; -1.6L last 24 hours  Remains on O2 @ 1L  ? Accuracy of weights (wt today 226#)  + SOB and cough continue to improve  Denies chest pain, palpitations or dizziness  K+ 3.3: replacement given  No further hemoptysis    Subjective:  Pt with no acute overnight cardiac events.   A 14 point review of systems was completed. Pertinent positives were identified in the HPI/Interval history. All other review of symptoms negative unless otherwise noted below.    Objective:   /77   Pulse 74   Temp 97.7 °F (36.5 °C) (Oral)   Resp 18   Ht 1.829 m (6')   Wt 102.9 kg (226 lb 14.4 oz)   SpO2 96%   BMI 30.77 kg/m²     Intake/Output Summary (Last 24 hours) at 3/15/2024 0934  Last data filed at 3/15/2024 0924  Gross per 24 hour   Intake 1860 ml   Output 3530 ml   Net -1670 ml     Wt Readings from Last 3 Encounters:   03/15/24 102.9 kg (226 lb 14.4 oz)   11/09/23 110.2 kg (243 lb)   11/01/23 110.3 kg (243 lb 3.2 oz)       Physical Exam:  General: Resting in bed and dyspneic with talking.  O2 in place. Awake, alert, and oriented X4.   Skin:  Warm and dry.    Neck:  Supple. No JVD  appreciated.  Chest: Lungs with wheezes and rhonchi  Cardiovascular:  irreg; normal S1 and S2; soft systolic murmur  Abdomen:  Soft, nontender, +bowel sounds.   Extremities:  2+ bilateral lower leg edema    Medications:    predniSONE  40 mg Oral Daily     infiltrative cardiomyopathy such as Amyloidosis heart. recommend Cardiac MRI and amyloid work up.    Echocardiogram 4/16/2020  Summary   Technically difficult examination secondary to habitus and COPD. Left ventricular systolic function is normal with ejection fraction estimated at 55%.   No regional wall motion abnormalities.   There is moderate to severe concentric left ventricular hypertrophy with  sigmoid septum (2.21 cm.).   Grade II diastolic dysfunction with elevated left ventricular filling pressure.   The left atrium is mildly dilated.  Findings are Strongly suggestive of ATTR amyloidosis.       Lexiscan-Myoview 4/2023:  Summary Mild to moderately reduced calculated LVEF 45% Inferolateral hypokinesis/scar with minimal familia-infarct ischemia Overall, this would be considered an abnormal, intermediate risk, study    Telemetry:Atrial fib with controlled VR  (Personally reviewed)    Assessment/Plan:    1) Acute hypoxemic respiratory failure d/t PNA  -continue antibiotics   -+ hemoptysis (improved and no longer noted)  -his Eliquis has been on hold d/t hemoptysis (plan to resume in the AM)    2) Acute on chronic HFpEF  -ATTR amyloid on tafamidis  -continue IV lasix  -continue Entresto    3) Elevated troponin    4) CAD  -prior CABG  -continue statin  -no angina    5) Chronic atrial fib  -on Eliquis    6) RHONDA    Hopefully home once he is off O2.    Electronically signed by DIANE M ENZWEILER, APRN - CNP on 3/15/2024 at 9:34 AM

## 2024-03-15 NOTE — PLAN OF CARE
heart failure and disease management, be more comfortable, and reduce lower extremity edema prior to discharge        :

## 2024-03-15 NOTE — PLAN OF CARE
Problem: Discharge Planning  Goal: Discharge to home or other facility with appropriate resources  Outcome: Progressing     Problem: Safety - Adult  Goal: Free from fall injury  Outcome: Progressing     Problem: Respiratory - Adult  Goal: Achieves optimal ventilation and oxygenation  Outcome: Progressing     Problem: Metabolic/Fluid and Electrolytes - Adult  Goal: Glucose maintained within prescribed range  Outcome: Progressing

## 2024-03-15 NOTE — CARE COORDINATION
Chart reviewed day 6. Care provided by cards, pulm and IM. Pt is from home with his son. He is on O2 at 1L per NC. Pt is getting IV lasix TID. He diuresed 3130 and is down 6 LBS. K is 3.3, BUN is 30 and WBC is improving at 12.9. Pt has recs for [facility]. Referral was made to [facility]. Will continue to follow course for needs Janiya Figueroa RN

## 2024-03-16 VITALS
WEIGHT: 223.3 LBS | RESPIRATION RATE: 22 BRPM | HEART RATE: 85 BPM | HEIGHT: 72 IN | BODY MASS INDEX: 30.25 KG/M2 | DIASTOLIC BLOOD PRESSURE: 58 MMHG | TEMPERATURE: 97.5 F | OXYGEN SATURATION: 93 % | SYSTOLIC BLOOD PRESSURE: 104 MMHG

## 2024-03-16 LAB
ANION GAP SERPL CALCULATED.3IONS-SCNC: 13 MMOL/L (ref 3–16)
BASOPHILS # BLD: 0.1 K/UL (ref 0–0.2)
BASOPHILS NFR BLD: 0.8 %
BUN SERPL-MCNC: 26 MG/DL (ref 7–20)
CALCIUM SERPL-MCNC: 8.8 MG/DL (ref 8.3–10.6)
CHLORIDE SERPL-SCNC: 95 MMOL/L (ref 99–110)
CO2 SERPL-SCNC: 26 MMOL/L (ref 21–32)
CREAT SERPL-MCNC: 0.7 MG/DL (ref 0.8–1.3)
DEPRECATED RDW RBC AUTO: 14.5 % (ref 12.4–15.4)
EOSINOPHIL # BLD: 0 K/UL (ref 0–0.6)
EOSINOPHIL NFR BLD: 0.2 %
GFR SERPLBLD CREATININE-BSD FMLA CKD-EPI: >60 ML/MIN/{1.73_M2}
GLUCOSE BLD-MCNC: 113 MG/DL (ref 70–99)
GLUCOSE BLD-MCNC: 156 MG/DL (ref 70–99)
GLUCOSE SERPL-MCNC: 108 MG/DL (ref 70–99)
HCT VFR BLD AUTO: 41.3 % (ref 40.5–52.5)
HGB BLD-MCNC: 13.8 G/DL (ref 13.5–17.5)
LYMPHOCYTES # BLD: 2.1 K/UL (ref 1–5.1)
LYMPHOCYTES NFR BLD: 18.8 %
MAGNESIUM SERPL-MCNC: 2.3 MG/DL (ref 1.8–2.4)
MCH RBC QN AUTO: 29.7 PG (ref 26–34)
MCHC RBC AUTO-ENTMCNC: 33.3 G/DL (ref 31–36)
MCV RBC AUTO: 89.3 FL (ref 80–100)
MONOCYTES # BLD: 1.1 K/UL (ref 0–1.3)
MONOCYTES NFR BLD: 9.8 %
NEUTROPHILS # BLD: 8 K/UL (ref 1.7–7.7)
NEUTROPHILS NFR BLD: 70.4 %
PERFORMED ON: ABNORMAL
PERFORMED ON: ABNORMAL
PLATELET # BLD AUTO: 299 K/UL (ref 135–450)
PMV BLD AUTO: 8.5 FL (ref 5–10.5)
POTASSIUM SERPL-SCNC: 3.4 MMOL/L (ref 3.5–5.1)
RBC # BLD AUTO: 4.63 M/UL (ref 4.2–5.9)
SODIUM SERPL-SCNC: 134 MMOL/L (ref 136–145)
WBC # BLD AUTO: 11.3 K/UL (ref 4–11)

## 2024-03-16 PROCEDURE — 2580000003 HC RX 258: Performed by: STUDENT IN AN ORGANIZED HEALTH CARE EDUCATION/TRAINING PROGRAM

## 2024-03-16 PROCEDURE — 94669 MECHANICAL CHEST WALL OSCILL: CPT

## 2024-03-16 PROCEDURE — 80048 BASIC METABOLIC PNL TOTAL CA: CPT

## 2024-03-16 PROCEDURE — 6370000000 HC RX 637 (ALT 250 FOR IP)

## 2024-03-16 PROCEDURE — 85025 COMPLETE CBC W/AUTO DIFF WBC: CPT

## 2024-03-16 PROCEDURE — 6370000000 HC RX 637 (ALT 250 FOR IP): Performed by: NURSE PRACTITIONER

## 2024-03-16 PROCEDURE — 6370000000 HC RX 637 (ALT 250 FOR IP): Performed by: INTERNAL MEDICINE

## 2024-03-16 PROCEDURE — 36415 COLL VENOUS BLD VENIPUNCTURE: CPT

## 2024-03-16 PROCEDURE — 83735 ASSAY OF MAGNESIUM: CPT

## 2024-03-16 PROCEDURE — 6370000000 HC RX 637 (ALT 250 FOR IP): Performed by: STUDENT IN AN ORGANIZED HEALTH CARE EDUCATION/TRAINING PROGRAM

## 2024-03-16 PROCEDURE — 6360000002 HC RX W HCPCS: Performed by: NURSE PRACTITIONER

## 2024-03-16 PROCEDURE — 99232 SBSQ HOSP IP/OBS MODERATE 35: CPT | Performed by: INTERNAL MEDICINE

## 2024-03-16 PROCEDURE — 94640 AIRWAY INHALATION TREATMENT: CPT

## 2024-03-16 RX ORDER — POTASSIUM CHLORIDE 20 MEQ/1
20 TABLET, EXTENDED RELEASE ORAL ONCE
Status: COMPLETED | OUTPATIENT
Start: 2024-03-16 | End: 2024-03-16

## 2024-03-16 RX ORDER — PREDNISONE 20 MG/1
40 TABLET ORAL DAILY
Qty: 4 TABLET | Refills: 0 | Status: SHIPPED | OUTPATIENT
Start: 2024-03-16 | End: 2024-03-18

## 2024-03-16 RX ADMIN — POTASSIUM CHLORIDE 20 MEQ: 1500 TABLET, EXTENDED RELEASE ORAL at 09:21

## 2024-03-16 RX ADMIN — SODIUM CHLORIDE, PRESERVATIVE FREE 10 ML: 5 INJECTION INTRAVENOUS at 08:20

## 2024-03-16 RX ADMIN — ACETYLCYSTEINE 600 MG: 200 INHALANT RESPIRATORY (INHALATION) at 07:45

## 2024-03-16 RX ADMIN — IPRATROPIUM BROMIDE AND ALBUTEROL SULFATE 1 DOSE: .5; 2.5 SOLUTION RESPIRATORY (INHALATION) at 07:45

## 2024-03-16 RX ADMIN — RIVAROXABAN 20 MG: 20 TABLET, FILM COATED ORAL at 09:21

## 2024-03-16 RX ADMIN — SACUBITRIL AND VALSARTAN 1 TABLET: 24; 26 TABLET, FILM COATED ORAL at 08:20

## 2024-03-16 RX ADMIN — HYDROCODONE BITARTRATE AND HOMATROPINE METHYLBROMIDE 5 ML: 5; 1.5 SOLUTION ORAL at 10:20

## 2024-03-16 RX ADMIN — PREDNISONE 40 MG: 20 TABLET ORAL at 08:20

## 2024-03-16 RX ADMIN — FUROSEMIDE 40 MG: 10 INJECTION, SOLUTION INTRAMUSCULAR; INTRAVENOUS at 08:20

## 2024-03-16 RX ADMIN — ATORVASTATIN CALCIUM 80 MG: 80 TABLET, FILM COATED ORAL at 08:20

## 2024-03-16 RX ADMIN — LORAZEPAM 0.5 MG: 0.5 TABLET ORAL at 00:07

## 2024-03-16 RX ADMIN — Medication 2 PUFF: at 07:45

## 2024-03-16 RX ADMIN — TIOTROPIUM BROMIDE INHALATION SPRAY 2 PUFF: 3.12 SPRAY, METERED RESPIRATORY (INHALATION) at 07:45

## 2024-03-16 NOTE — PATIENT INSTRUCTIONS
Sleep Hygiene. .. Tips for better sleep. .. Avoid naps. This will ensure you are sleepy at bedtime. If you have to take a nap, sleep less than 1 hour, before 3 pm.  Sleep only when sleepy; this reduces the time you are awake in bed. Regular exercise is recommended to help you deepen your sleep, but not within 4-6 hours of your bedtime. Timing of exercise is important, aim to exercise early in the morning or early afternoon. A light snack may help you fall asleep. Warm milk and foods high in the amino acid tryptophan, such as bananas, may help you to sleep  Be sure to avoid heavy, spicy or sugary foods 4-6 hours before bedtime and avoid at snack time. Stay away from stimulants such as caffeine and nicotine for at least 4-6 hours before bed. Stimulants can interfere with your ability to fall asleep. Caffeine is found in tea, cola, coffee, cocoa and chocolate and is best avoided at bedtime. Nicotine is found in tobacco products. Avoid alcohol 4-6 hours before bedtime. Alcohol has an immediate sleep-inducing effect, after a few hours when alcohol levels fall there is a stimulant or wake-up effect and will cause fragmented sleep. Sleep rituals are important. Give your body clues it is time to slow down and sleep. Examples include; yoga, deep breathing, listen to relaxing music, a hot bath or a few minutes of reading. Have a fixed bedtime and awakening time, Even on weekends! You will feel better keeping a regular sleep cycle, even if you are retired or not working. Get into your favorite sleep position. If not asleep in 30 minutes, get up and do something boring until you feel sleepy. Remember not to expose yourself to bright lights such as TV, phone or tablet screens. Only use your bed for sleeping. Do not use your bed as an office, workroom or recreation room. Use comfortable bedding. Uncomfortable bedding can prevent good sleep. Ensure your bedroom is quiet and comfortable.  A cooler room along with
You can access the FollowMyHealth Patient Portal offered by [facility] by registering at the following website: [email or web address]. By joining [facility]’s FollowMyHealth portal, you will also be able to view your health information using other applications (apps) compatible with our system.

## 2024-03-16 NOTE — PLAN OF CARE
CHF Care Plan      Patient's EF (Ejection Fraction) is greater than 40%    Heart Failure Medications:  Diuretics:: Furosemide    (One of the following REQUIRED for EF </= 40%/SYSTOLIC FAILURE but MAY be used in EF% >40%/DIASTOLIC FAILURE)        ACE:: None        ARB:: None         ARNI:: Sacubitril/Valsartan-Entresto    (Beta Blockers)  NON- Evidenced Based Beta Blocker (for EF% >40%/DIASTOLIC FAILURE): None    Evidenced Based Beta Blocker::(REQUIRED for EF% <40%/SYSTOLIC FAILURE) None  ...................................................................................................................................................    Failed to redirect to the Timeline version of the [employer] SmartLink.      Patient's weights and intake/output reviewed    Daily Weight log at bedside, patient/family participation in use of log: \"yes    Patient's current weight today shows a difference of 3.6 lbs less than last documented weight.      Intake/Output Summary (Last 24 hours) at 3/16/2024 0531  Last data filed at 3/16/2024 0525  Gross per 24 hour   Intake 1200 ml   Output 3080 ml   Net -1880 ml       Education Booklet Provided: yes    Comorbidities Reviewed Yes    Patient has a past medical history of Bronchitis, CAD (coronary artery disease), Cardiomyopathy (HCC), CHF (congestive heart failure) (HCC), COPD (chronic obstructive pulmonary disease) (HCC), Diabetes (HCC), Diaphragmatic paralysis, Diastolic dysfunction, Family history of early CAD, Former smoker, High cholesterol, Hypertension, Knee replacement, Obesity, Class II, 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     >>For CHF and Comorbidity documentation on Education Time and Topics, please see Education Tab      Pt resting in bed at this time on room air. Pt denies shortness of breath. Pt with pitting lower extremity edema.     Patient and/or Family's stated Goal of Care this Admission: reduce shortness of breath, increase activity tolerance, better understand  heart failure and disease management, be more comfortable, and reduce lower extremity edema prior to discharge        :

## 2024-03-16 NOTE — PROGRESS NOTES
Patient: Benji Israel MRN: [id number]  Date of  Admission: 3/9/2024   YOB: 1945  Age: 79 y.o.  Sex: male    Unit: [address] SURG  Room/Bed: 0364/0364-01 Admitting Physician: CHANDRA PELAEZ    Attending Physician:  Neymar Morillo MD         Pulmonary Service Note      SUBJECTIVE:    No issues overnight  Hemoptysis has resolved  Now just coughing up some mucus  Bronchoscopy seem to help during the hospitalization  No chest pains or palpitations  Wants to go home    ROS:  A comprehensive review of systems was negative except for: Above    OBJECTIVE    Medications    Continuous Infusions:   dextrose      sodium chloride Stopped (03/14/24 0448)       Scheduled Meds:   rivaroxaban  20 mg Oral Daily    predniSONE  40 mg Oral Daily    sacubitril-valsartan  1 tablet Oral BID    acetylcysteine  600 mg Inhalation BID RT    furosemide  40 mg IntraVENous TID    Tafamidis  1 capsule Oral Daily    insulin lispro  0-4 Units SubCUTAneous TID WC    insulin lispro  0-4 Units SubCUTAneous Nightly    atorvastatin  80 mg Oral Daily    sodium chloride flush  5-40 mL IntraVENous 2 times per day    melatonin  10 mg Oral Nightly    mometasone-formoterol  2 puff Inhalation BID RT    And    tiotropium  2 puff Inhalation Daily RT       PRN Meds:  tranexamic acid, LORazepam, ipratropium 0.5 mg-albuterol 2.5 mg, traZODone, glucose, dextrose bolus **OR** dextrose bolus, glucagon (rDNA), dextrose, albuterol sulfate HFA, sodium chloride flush, sodium chloride, magnesium sulfate, ondansetron **OR** ondansetron, polyethylene glycol, acetaminophen **OR** acetaminophen, HYDROcodone homatropine    Physical    Patient Vitals for the past 24 hrs:   BP Temp Temp src Pulse Resp SpO2 Height Weight   03/16/24 0800 127/72 97 °F (36.1 °C) Oral 86 22 94 % -- --   03/16/24 0749 -- -- -- -- -- 92 % -- --   03/16/24 0525 130/84 97.8 °F (36.6 °C) Oral 83 18 92 % -- 101.3 kg (223 lb 4.8 oz)   03/16/24 0000 109/63 97.6 °F (36.4 °C) Oral 74 18 93 %  Additional findings, as above.       Bronchoscopy during hospitalization seem to help  Okay for follow-up with primary pulmonologist at [facility]  Would suggest a follow-up with primary pulmonologist within the next 1 to 2 weeks    Mild hemoptysis  Most likely secondary to pneumonitis and not being on blood thinners      Okay to discharge today      DIANA RIZVI MD    [facility] Pulmonary, Critical Care and Sleep Medicine  [phone]      Please note that some or all of this record was generated using voice recognition software. If there are any questions about the content of this document, please contact the author as some errors in transcription may have occurred.

## 2024-03-16 NOTE — PROGRESS NOTES
Oxygen documentation:    O2 saturation at REST on ROOM AIR = __92____%    If saturation is 89% or above please proceed with steps 2 and 3……….    O2 saturation with AMBULATION of __150___ feet on ROOM AIR = __90___%      DCP notified: __Yes: Martin____

## 2024-03-16 NOTE — DISCHARGE INSTR - COC
Continuity of Care Form    Patient Name: Benji Israel   :  1945  MRN:  [id number]    Admit date:  3/9/2024  Discharge date:  3/16    Code Status Order: Limited   Advance Directives:   Advance Care Flowsheet Documentation       Date/Time Healthcare Directive Type of Healthcare Directive Copy in Chart Healthcare Agent Appointed Healthcare Agent's Name Healthcare Agent's Phone Number    [phone] Yes, patient has an advance directive for healthcare treatment Living will -- -- -- --            Admitting Physician:  Jose Luna MD  PCP: Raysa Eli MD    Discharging Nurse: Yamile  [facility] Unit[facility]Room[facility] 0364/0364-01  Discharging Unit Phone Number:     Emergency Contact:   Extended Emergency Contact Information  Primary Emergency Contact: Yamile Desir   [facility]  Home Phone: [phone]  Mobile Phone: [phone]  Relation: Child  Secondary Emergency Contact: Jack Israel   [facility]  Home Phone: [phone]  Mobile Phone: [phone]  Relation: Child    Past Surgical History:  Past Surgical History:   Procedure Laterality Date    APPENDECTOMY      BRONCHOSCOPY N/A 2020    BRONCHOSCOPY ALVEOLAR LAVAGE performed by Lila Carreon MD at [facility] ENDOSCOPY    BRONCHOSCOPY  2020    BRONCHOSCOPY THERAPUTIC ASPIRATION INITIAL performed by Lila Carreon MD at [facility] ENDOSCOPY    BRONCHOSCOPY N/A 2021    BRONCHOSCOPY ALVEOLAR LAVAGE performed by Lila Carreon MD at [facility] ENDOSCOPY    BRONCHOSCOPY  2021    BRONCHOSCOPY THERAPUTIC ASPIRATION INITIAL performed by Lial Carreon MD at [facility] ENDOSCOPY    BRONCHOSCOPY N/A 3/13/2024    BRONCHOSCOPY DIAGNOSTIC OR CELL WASH ONLY performed by Brittnee Alberts MD at [facility] ENDOSCOPY    CARDIAC CATHETERIZATION  07/10/2017    Dr. Gill - multi-vessel CAD, referred for CABG    CARPAL TUNNEL RELEASE Bilateral     CORONARY ANGIOPLASTY WITH STENT PLACEMENT  2018    YUVAL- 2.75 x 18    At Risk for Falls    Impairments/Disabilities:      None    Nutrition Therapy:  Current Nutrition Therapy:   - Oral Diet:  Low Sodium (2gm)    Routes of Feeding: Oral  Liquids: No Restrictions  Daily Fluid Restriction: yes - amount 1500 mL  Last Modified Barium Swallow with Video (Video Swallowing Test): not done    Treatments at the Time of Hospital Discharge:   Respiratory Treatments Given IP  Oxygen Therapy:  is not on home oxygen therapy.  Ventilator:    - No ventilator support    Rehab Therapies: Physical Therapy and Occupational Therapy  Weight Bearing Status/Restrictions: No weight bearing restrictions  Other Medical Equipment (for information only, NOT a DME order):  bath bench, bedside commode, and hospital bed  Other Treatments: NA    Patient's personal belongings (please select all that are sent with patient):  None    RN SIGNATURE:  Electronically signed by Yamile Choudhary RN on 3/16/24 at 12:00 PM EDT    CASE MANAGEMENT/SOCIAL WORK SECTION    Inpatient Status Date: 3/9    Readmission Risk Assessment Score:  Readmission Risk              Risk of Unplanned Readmission:  18           Discharging to Facility/ Agency   Name: [facility]  Address:  Phone: [phone]  Fax:    Dialysis Facility (if applicable)   Name:  Address:  Dialysis Schedule:  Phone:  Fax:    / signature: Electronically signed by Elizabeth Walters RN on 3/16/24 at 11:36 AM EDT    PHYSICIAN SECTION    Prognosis: Good    Condition at Discharge: Stable    Rehab Potential (if transferring to Rehab): N/A    Recommended Labs or Other Treatments After Discharge: none    Physician Certification: I certify the above information and transfer of Benji Israel  is necessary for the continuing treatment of the diagnosis listed and that he requires Home Care for greater 30 days.     Update Admission H&P: No change in H&P    PHYSICIAN SIGNATURE:  Electronically signed by Zac Tomlinson DO on 3/16/24 at 8:10 AM EDT

## 2024-03-16 NOTE — CARE COORDINATION
CASE MANAGEMENT DISCHARGE SUMMARY      Discharge to: Home with [facility]    Transportation:    Family/car: yes      Confirmed discharge plan with:     Patient: yes per RN     Family:  no per pt     RN, name: Yamile SPEARS    Note: Discharging nurse to complete TAMI, reconcile AVS, and place final copy with patient's discharge packet. RN to ensure that written prescriptions for  Level II medications are sent with patient to the facility as per protocol.

## 2024-03-16 NOTE — DISCHARGE SUMMARY
V2.0  Discharge Summary    Name:  Benji Israel /Age/Sex: 1945 (79 y.o. male)   Admit Date: 3/9/2024  Discharge Date: 3/16/24    MRN & CSN:  [id number] & [id number] Encounter Date and Time 3/16/24 7:30 AM EDT    Attending:  Neymar Morillo MD Discharging Provider: Zac Tomlinson DO       Hospital Course:     Brief HPI: Benji Israel is a 79 y.o. male with pmh of Non-insulin-dependent type II mellitus type II hypertension hyperlipidemia chronic diastolic heart failure with preserved ejection fraction CAD former smoker COPD  who presents with Multifocal pneumonia.     Brief Problem Based Course:     Acute hypoxic respiratory failure with underlying multifocal pneumonia and suspected COPD exacerbation  -Chest x-ray on 3/9 showed patchy bilateral groundglass infiltrates concerning for an evolving atypical pneumonia, vascular congestive changes.  -Chest CT on 3/9 showed no evidence of pulmonary embolism  -Vancomycin discontinued due to negative MRSA PCR.  Received cefepime during admission  -Preliminary blood cultures negative x 2  -Received DuoNebs  -Pulmonology consulted  -Bronchoscopy showed normal bronchial tree anatomy and no concerning endobronchial lesions.  Therapeutic aspiration of mucous plugs from both lower lobes was performed and endobronchial washings were collected  -Switched IV Solu-Medrol to oral prednisone  -TXA nebs for hemoptysis  -Bronchial hygiene and Mucinex  -Oxygen was weaned down to room air on day of discharge  -Will discharge with remaining doses of oral prednisone     Hemoptysis (resolved)  -CXR on 3/11: Worsening multifocal airspace opacities which may reflect developing pneumonitis.  Pulmonary edema in association with congestive heart failure may also be contributing  -Pulmonology following              -Continue TXA nebs  -Eliquis was held  -Started on Xarelto once hemoptysis resolved     Hypertension  -Discharged with Entresto     Nonspecific ST changes with mildly  LIPITOR  Take 1 tablet by mouth daily     cetirizine 10 MG tablet  Commonly known as: ZYRTEC     fish oil-omega-3 fatty acids 1000 MG capsule     ipratropium 0.5 mg-albuterol 2.5 mg 0.5-2.5 (3) MG/3ML Soln nebulizer solution  Commonly known as: DUONEB  INHALE 3 MLS BY MOUTH 4 TIMES DAILY     Lysine 1000 MG Tabs     magnesium 30 MG tablet     metFORMIN 500 MG tablet  Commonly known as: GLUCOPHAGE     MUCINEX PO     MULTI-VITAMIN PO     OSTEO BI-FLEX REGULAR STRENGTH PO     PREVAGEN PO     PROBIOTIC DAILY PO     sacubitril-valsartan 49-51 MG per tablet  Commonly known as: ENTRESTO  Take 1 tablet by mouth 2 times daily     Spacer/Aero-Holding Chambers Elise  1 Device by Does not apply route daily as needed (sob)     torsemide 20 MG tablet  Commonly known as: DEMADEX  TAKE 1 TABLET BY MOUTH EVERY DAY OKAY TO TAKE 2ND TAB DAILY IF WEIGHT GAIN 3LBS IN DAY     traZODone 50 MG tablet  Commonly known as: DESYREL     Trelegy Ellipta 100-62.5-25 MCG/ACT Aepb inhaler  Generic drug: fluticasone-umeclidin-vilant  TAKE 1 PUFF BY MOUTH EVERY DAY     UNABLE TO FIND     vitamin D 50 MCG (2000 UT) Tabs tablet  Commonly known as: CHOLECALCIFEROL     Vyndamax 61 MG Caps  Generic drug: Tafamidis  TAKE 1 CAPSULE BY MOUTH 1 TIME A DAY.            STOP taking these medications      apixaban 5 MG Tabs tablet  Commonly known as: Eliquis     benzonatate 200 MG capsule  Commonly known as: TESSALON               Where to Get Your Medications        These medications were sent to [facility] - [address] - P [phone] - F [phone]  [address] [place] [id number]      Phone: [phone]   predniSONE 20 MG tablet  rivaroxaban 20 MG Tabs tablet        Objective Findings at Discharge:   /72   Pulse 86   Temp 97 °F (36.1 °C) (Oral)   Resp 22   Ht 1.829 m (6')   Wt 101.3 kg (223 lb 4.8 oz)   SpO2 94%   BMI 30.28 kg/m²       Physical Exam:     General: NAD  Eyes: EOMI  ENT: neck

## 2024-03-16 NOTE — PROGRESS NOTES
Discharged with documented belongings. IV removed without complication. Tele removed and returned. [facility] aware. Aware of updated medication list. Prescriptions retrieved from pharmacy. Awaiting ride.

## 2024-03-16 NOTE — PLAN OF CARE
Problem: Discharge Planning  Goal: Discharge to home or other facility with appropriate resources  Outcome: Progressing     Problem: Safety - Adult  Goal: Free from fall injury  Outcome: Progressing     Problem: Pain  Goal: Verbalizes/displays adequate comfort level or baseline comfort level  Outcome: Progressing  Flowsheets (Taken 3/15/2024 1610 by Concha Peoples RN)  Verbalizes/displays adequate comfort level or baseline comfort level: Encourage patient to monitor pain and request assistance     Problem: Respiratory - Adult  Goal: Achieves optimal ventilation and oxygenation  Outcome: Progressing

## 2024-03-18 ENCOUNTER — TELEPHONE (OUTPATIENT)
Dept: PULMONOLOGY | Age: 79
End: 2024-03-18

## 2024-03-18 ENCOUNTER — TELEPHONE (OUTPATIENT)
Dept: CARDIOLOGY CLINIC | Age: 79
End: 2024-03-18

## 2024-03-18 DIAGNOSIS — I50.32 CHRONIC DIASTOLIC HEART FAILURE (HCC): ICD-10-CM

## 2024-03-18 DIAGNOSIS — I43 CARDIAC AMYLOIDOSIS (HCC): Primary | ICD-10-CM

## 2024-03-18 DIAGNOSIS — E85.4 CARDIAC AMYLOIDOSIS (HCC): Primary | ICD-10-CM

## 2024-03-18 NOTE — TELEPHONE ENCOUNTER
Called back.    Having Left leg swelling  Weight was 221.5lbs this am.  Oxygen level running 90-93%.   He has been doing the exercises.   Hemoptysis is improved.     He is concerned about the andrés lubin- was d/c'd at discharge   He had a lot of anxiety at the hospital.     Plan:  Take 2 tabs of the Tosremide tomorrow then 1 tab daily   Recommend follow up with primary care regarding anxiety

## 2024-03-18 NOTE — TELEPHONE ENCOUNTER
Pt states he needs to speak with NPRB. Pt states he is having problems and has to speak with NPRB. Pt refused to say what the problems were and that NPRB knows what is going on. Pt best phone number is [phone].  Please advise.

## 2024-03-18 NOTE — TELEPHONE ENCOUNTER
[facility] and [facility] nursing. Advised to call PCP. She stated that all of his DX are pulm that's why they called us. Please advise. [phone]

## 2024-03-25 ENCOUNTER — TELEPHONE (OUTPATIENT)
Dept: PULMONOLOGY | Age: 79
End: 2024-03-25

## 2024-03-25 NOTE — TELEPHONE ENCOUNTER
Left vm asking pt to call back to schedule HFU from discharge referral.     Ext OV-pulm-COPD exacerbation, dyspnea & resp abnormalities, hypoxia, pneumonia, CHF-R/B Neymar Morillo-prior Nathalie, saw Ateeli in hosp-ref in media    Discarched 3.16.24. Was looking to schedule with Ateeli on 4.15.24.

## 2024-03-28 NOTE — PROGRESS NOTES
[facility]   Electrophysiology Outpatient Note              Date:  March 28, 2024  Patient name: Benji Israel  YOB: 1945    Primary Care physician: Raysa Eli MD    HISTORY OF PRESENT ILLNESS: The patient is a 79 y.o.  male with a history of ischemic cardiomyopathy, CAD s/p CABG 2017, HTN, HLD, and PAF and amyloid    He was admitted in 11/2022 with SOB and was treated with acute CHF. 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 On 2/24/2023 ECG revealed AF.  PYP scan + for ATTR amyloid.  He was started on tafamidis on 1/3/2023    On 3/9/2023 ECG revealed SR. Patient sees Sarah Fox CNP for HF. His metoprolol was discontinued and he was started on Jardiance and Entresto. Patient reported feeling somewhat better after stopping the metoprolol. 7/14/2023 IRL implantation    On 11/1/2023 he was seen for AF management. EKG shows atrial fibrillation with a HR of 70 . Patient complains of feeling short of breath at times. He is able to exercise on flat surface without issue but he gets short of breath with steps or incline. IRL revealed AF with controlled rate at 0006 last evening. We made changes to his device alerts today since the AF alert was not on.   Today he is being seen for his history of paroxysmal atrial fibrillation. ILR check reveals atrial fibrillation.  ECG reveals atrial fibrillation rate 66. Patient was recently admitted with pneumonia for 1 week. Patient denies chest pain, shortness of breath and palpitations.  Patient was switched from Eliquis to Xarelto by the hospitalist team recently because they thought his hemoptysis was related to the Eliquis and Xarelto would be a better choice.  Long discussion with patient and states he is hemoptysis was likely not related to that he was admitted for pneumonia.  If he has any continued bleeding we will switch back to Eliquis and discontinue aspirin. Patient is agreeable. He denies chest pain, palpitations, and shortness

## 2024-03-29 ENCOUNTER — OFFICE VISIT (OUTPATIENT)
Dept: CARDIOLOGY CLINIC | Age: 79
End: 2024-03-29

## 2024-03-29 ENCOUNTER — NURSE ONLY (OUTPATIENT)
Dept: CARDIOLOGY CLINIC | Age: 79
End: 2024-03-29

## 2024-03-29 VITALS
BODY MASS INDEX: 30.37 KG/M2 | HEART RATE: 66 BPM | WEIGHT: 224.2 LBS | DIASTOLIC BLOOD PRESSURE: 56 MMHG | SYSTOLIC BLOOD PRESSURE: 114 MMHG | OXYGEN SATURATION: 98 % | HEIGHT: 72 IN

## 2024-03-29 DIAGNOSIS — I48.0 AF (PAROXYSMAL ATRIAL FIBRILLATION) (HCC): ICD-10-CM

## 2024-03-29 DIAGNOSIS — Z45.09 ENCOUNTER FOR LOOP RECORDER CHECK: ICD-10-CM

## 2024-03-29 DIAGNOSIS — I48.0 PAROXYSMAL ATRIAL FIBRILLATION (HCC): Primary | ICD-10-CM

## 2024-03-29 RX ORDER — SACUBITRIL AND VALSARTAN 49; 51 MG/1; MG/1
1 TABLET, FILM COATED ORAL 2 TIMES DAILY
Qty: 180 TABLET | Refills: 3 | Status: SHIPPED | OUTPATIENT
Start: 2024-03-29

## 2024-03-29 NOTE — PROGRESS NOTES
OV NPKK/  [facility] transmission.      See MURJ report under cardiology tab once EP reviews.

## 2024-03-29 NOTE — PATIENT INSTRUCTIONS
1. Continue Xarelto 20 mg daily for stroke risk reduction  2. Continue Lipitor 80 mg daily   3. Continue Entresto 49-51 mg twice daily  4. Continue Demadex 20 mg daily  5.  Continue aspirin 81 mg daily  6. Follow up Sarah SAM May 10, 2024  7. Follow up in October 2024 Jaz SAM

## 2024-04-05 ENCOUNTER — OFFICE VISIT (OUTPATIENT)
Dept: PULMONOLOGY | Age: 79
End: 2024-04-05

## 2024-04-05 VITALS
SYSTOLIC BLOOD PRESSURE: 110 MMHG | RESPIRATION RATE: 16 BRPM | DIASTOLIC BLOOD PRESSURE: 58 MMHG | OXYGEN SATURATION: 97 % | BODY MASS INDEX: 30.12 KG/M2 | HEART RATE: 68 BPM | HEIGHT: 72 IN | TEMPERATURE: 97.3 F | WEIGHT: 222.4 LBS

## 2024-04-05 DIAGNOSIS — J42 CHRONIC BRONCHITIS, UNSPECIFIED CHRONIC BRONCHITIS TYPE (HCC): Primary | ICD-10-CM

## 2024-04-05 NOTE — PROGRESS NOTES
MA Communication:  The following orders are received by verbal communication from Brittnee Alberts MD    Orders include:    LDCT  Follow up 3 month    
radiology images personally.    CT chest March 9, 2024  IMPRESSION:  1. No evidence of pulmonary embolism.  2. New extensive ground-glass nodular opacities throughout both lungs, new  from 10/05/2022, which are likely infectious or inflammatory in etiology, to  include atypical multifocal pneumonia.  3. Diffuse ground-glass opacity throughout both lungs may be in part be  secondary to pulmonary edema.  4. Small left pleural effusion.  5. Mild mediastinal and bilateral hilar lymphadenopathy, most likely benign  and reactive in etiology given the patient's underlying lung findings.  6. Atherosclerotic disease of the intrathoracic aorta, with an approximate  4.2 cm fusiform ectasia of the ascending aorta.  7. Chronic left ventricular hypertrophy. Chronic post CABG changes.  8. Additional findings, as above.                  PFTs/Oxygen testing:  NA      Sleep:  NA      Cardiology   Echo  N/A        Labs:   Hemoglobin (g/dL)   Date Value   03/16/2024 13.8     Eosinophils Absolute (K/uL)   Date Value   03/16/2024 0.0     Platelets (K/uL)   Date Value   03/16/2024 299     INR (no units)   Date Value   05/17/2018 1.27 (H)     Creatinine (mg/dL)   Date Value   03/16/2024 0.7 (L)     Pro-BNP (pg/mL)   Date Value   03/09/2024 2,214 (H)     D-Dimer, Quant (ng/mL DDU)   Date Value   07/28/2017 2713 (H)          ________________________________________________________________________________________________________________________________________________________  Vital Signs  Vitals:    04/05/24 1012   BP: (!) 110/58   Pulse: 68   Resp: 16   Temp: 97.3 °F (36.3 °C)   SpO2: 97%            No data to display                Physical Exam:  General appearance: In no apparent distress.  HEENT: Moist mucus membranes.  Cardiac: Normal S1 and S2.  Lungs: Good air entry bilaterally.  Intermittent dry crackles  Abdomen: Soft.  Back & Extremities: Symmetric pulses with good perfusion.  Minimal pitting edema  Neurological: No focal

## 2024-04-05 NOTE — PATIENT INSTRUCTIONS
Continue Trelegy Ellipta inhaler with mouth washing after use.  Inhaler education provided  Continue PT exercises at home.  Will consider pulmonary rehab referral in the future if patient is not feeling back to baseline and there is PT OT close to where the patient lives.  The CT scan of the chest in 3 months to follow-up on the lung infiltrates  Follow-up in 3 months 1 week after the CT      Health Maintenance/Preventive measures:        >>  Avoid exposure to tobacco, irritants, allergens as possible as well as contact with patients with infectious respiratory illness.        >>  Stay up-to-date with influenza & pneumonia vaccines, RSV, & COVID-19 vaccine as recommended by the Advisory Committee on Immunization Practices (ACIP)        >>  Healthy diet and activity as able.        >>  Acid reflux precautions: Head of bed elevation, avoiding tight clothes, avoiding big meals or snacking 3 hours before bedtime, targeting healthy weight.        >>  Practice sleep hygiene measures. Avoid driving or operating heavy machines if tired or sleepy.     Remember to bring a list of pulmonary medications and any CPAP or BiPAP machines to your next appointment with the office.     Please keep all of your future appointments scheduled by [facility] office. Out of respect for other patients and providers, you may be asked to reschedule your appointment if you arrive later than your scheduled appointment time. Appointments cancelled less than 24hrs in advance will be considered a no show. Patients with three missed appointments within 1 year or four missed appointments within 2 years can be dismissed from the practice.     Please be aware that our physicians are required to work in the Intensive Care Unit at [facility].  Your appointment may need to be rescheduled if they are designated to work during your appointment time.      You may receive a survey regarding the care you received

## 2024-05-09 NOTE — PROGRESS NOTES
fibrillation with   controlled ventricular response. Estimated ejection fraction of 55-60 %.   Severe concentric left ventricular hypertrophy.Small size of left   ventricle.No significant outflow tract obstruction.   Elevated left ventricle diastolic filling pressure.   There is a moderate circumferential pericardial effusion noted.     Cath 5/2018 - [facility]   PCI of the native RCA with YUVAL  1. MVCD  2. Normal LVEF  3. Normal hemodynamics except for hypertension  4. Patent SVG to the DIAG, PDA, and OM  5. Patent LIMA to the LAD    Echo 4/16/19  Technically difficult examination.   Left ventricular systolic function is normal with ejection fraction   estimated at 55-60 %.   There is severe concentric left ventricular hypertrophy.   Left ventricular size is decreased.   Diastolic dysfunction grade and filing pressure are indeterminate.   The ascending aorta is mildly dilated.   Aortic valve appears sclerotic but opens adequately.   Mitral annular calcification is present.   Mild mitral regurgitation.   Previous echo done 10/2017 - EF 60%, LVH      TTE 4/10/20:  Conclusions   Summary   Technically difficult examination secondary to habitus and COPD.   Left ventricular systolic function is normal with ejection fraction   estimated at 55%.   No regional wall motion abnormalities.   There is moderate to severe concentric left ventricular hypertrophy with   sigmoid septum (2.21 cm.).   Grade II diastolic dysfunction with elevated left ventricular filling   pressure.   The left atrium is mildly dilated.     Echo 11/27/22   Summary   Normal left ventricle systolic function with an estimated ejection fraction   of 55%.   Left ventricular size is decreased.   There is severe concentric left ventricular hypertrophy. No outflow tract   obstruction.   No regional wall motion abnormalities are seen.   Left ventricular diastolic filling pressure are indeterminate.   The right ventricle is normal in size with decreased function.   The

## 2024-05-10 ENCOUNTER — OFFICE VISIT (OUTPATIENT)
Dept: CARDIOLOGY CLINIC | Age: 79
End: 2024-05-10
Payer: MEDICARE

## 2024-05-10 VITALS
OXYGEN SATURATION: 95 % | SYSTOLIC BLOOD PRESSURE: 100 MMHG | DIASTOLIC BLOOD PRESSURE: 58 MMHG | BODY MASS INDEX: 30.72 KG/M2 | WEIGHT: 226.8 LBS | HEIGHT: 72 IN | HEART RATE: 76 BPM

## 2024-05-10 DIAGNOSIS — I43 CARDIAC AMYLOIDOSIS (HCC): ICD-10-CM

## 2024-05-10 DIAGNOSIS — R06.02 SHORTNESS OF BREATH: ICD-10-CM

## 2024-05-10 DIAGNOSIS — I50.32 CHRONIC DIASTOLIC HEART FAILURE (HCC): Primary | ICD-10-CM

## 2024-05-10 DIAGNOSIS — E85.4 CARDIAC AMYLOIDOSIS (HCC): ICD-10-CM

## 2024-05-10 PROCEDURE — 3074F SYST BP LT 130 MM HG: CPT | Performed by: NURSE PRACTITIONER

## 2024-05-10 PROCEDURE — 99214 OFFICE O/P EST MOD 30 MIN: CPT | Performed by: NURSE PRACTITIONER

## 2024-05-10 PROCEDURE — 3078F DIAST BP <80 MM HG: CPT | Performed by: NURSE PRACTITIONER

## 2024-05-10 PROCEDURE — 1123F ACP DISCUSS/DSCN MKR DOCD: CPT | Performed by: NURSE PRACTITIONER

## 2024-05-10 RX ORDER — TORSEMIDE 20 MG/1
TABLET ORAL
Qty: 180 TABLET | Refills: 3 | Status: SHIPPED | OUTPATIENT
Start: 2024-05-10

## 2024-05-10 RX ORDER — LORAZEPAM 0.5 MG/1
0.5 TABLET ORAL 2 TIMES DAILY PRN
COMMUNITY
Start: 2024-04-12

## 2024-05-10 NOTE — PATIENT INSTRUCTIONS
Plan:   Continue to monitor daily weights   Entresto 49/51mg twice a day   Continue Tafamidis  Continue torsemide 40mg daily in the am and take an extra 20mg in the afternoons for the weekend and then go to 2 tabs daily   Repeat BMP, BNP next week   CXR next week   Follow-up with the EP as planned  Follow up 3 months or sooner if needed   Follow up with DR. Saleh in 6 months

## 2024-05-17 ENCOUNTER — TELEPHONE (OUTPATIENT)
Dept: CARDIOLOGY CLINIC | Age: 79
End: 2024-05-17

## 2024-05-17 ENCOUNTER — HOSPITAL ENCOUNTER (OUTPATIENT)
Age: 79
Discharge: HOME | End: 2024-05-17
Payer: MEDICARE

## 2024-05-17 ENCOUNTER — HOSPITAL ENCOUNTER (OUTPATIENT)
Dept: GENERAL RADIOLOGY | Age: 79
Discharge: HOME | End: 2024-05-17
Payer: MEDICARE

## 2024-05-17 DIAGNOSIS — R06.02 SHORTNESS OF BREATH: ICD-10-CM

## 2024-05-17 DIAGNOSIS — I43 CARDIAC AMYLOIDOSIS (HCC): ICD-10-CM

## 2024-05-17 DIAGNOSIS — I50.32 CHRONIC DIASTOLIC HEART FAILURE (HCC): ICD-10-CM

## 2024-05-17 DIAGNOSIS — E85.4 CARDIAC AMYLOIDOSIS (HCC): ICD-10-CM

## 2024-05-17 LAB
ANION GAP SERPL CALCULATED.3IONS-SCNC: 9 MMOL/L (ref 3–16)
BUN SERPL-MCNC: 22 MG/DL (ref 7–20)
CALCIUM SERPL-MCNC: 9.2 MG/DL (ref 8.3–10.6)
CHLORIDE SERPL-SCNC: 102 MMOL/L (ref 99–110)
CO2 SERPL-SCNC: 29 MMOL/L (ref 21–32)
CREAT SERPL-MCNC: 1 MG/DL (ref 0.8–1.3)
GFR SERPLBLD CREATININE-BSD FMLA CKD-EPI: 77 ML/MIN/{1.73_M2}
GLUCOSE SERPL-MCNC: 122 MG/DL (ref 70–99)
NT-PROBNP SERPL-MCNC: 707 PG/ML (ref 0–449)
POTASSIUM SERPL-SCNC: 3.8 MMOL/L (ref 3.5–5.1)
SODIUM SERPL-SCNC: 140 MMOL/L (ref 136–145)

## 2024-05-17 PROCEDURE — 80048 BASIC METABOLIC PNL TOTAL CA: CPT

## 2024-05-17 PROCEDURE — 36415 COLL VENOUS BLD VENIPUNCTURE: CPT

## 2024-05-17 PROCEDURE — 83880 ASSAY OF NATRIURETIC PEPTIDE: CPT

## 2024-05-17 PROCEDURE — 71046 X-RAY EXAM CHEST 2 VIEWS: CPT

## 2024-05-17 NOTE — TELEPHONE ENCOUNTER
----- Message from QIAN Machuca CNP sent at 5/17/2024 12:51 PM EDT -----  Please notify patient results. Kidney function is stable. Electrolytes are stable.   Pro-BNP (heart failure number) is significantly improved.   Chest XRay also improved.   Continue current regimen.

## 2024-07-05 ENCOUNTER — HOSPITAL ENCOUNTER (OUTPATIENT)
Dept: CT IMAGING | Age: 79
Discharge: HOME | End: 2024-07-05
Payer: MEDICARE

## 2024-07-05 DIAGNOSIS — J42 CHRONIC BRONCHITIS, UNSPECIFIED CHRONIC BRONCHITIS TYPE (HCC): ICD-10-CM

## 2024-07-05 PROCEDURE — 71250 CT THORAX DX C-: CPT

## 2024-07-12 ENCOUNTER — OFFICE VISIT (OUTPATIENT)
Dept: PULMONOLOGY | Age: 79
End: 2024-07-12

## 2024-07-12 VITALS
OXYGEN SATURATION: 96 % | BODY MASS INDEX: 29.29 KG/M2 | RESPIRATION RATE: 16 BRPM | DIASTOLIC BLOOD PRESSURE: 65 MMHG | HEIGHT: 72 IN | SYSTOLIC BLOOD PRESSURE: 114 MMHG | WEIGHT: 216.25 LBS | HEART RATE: 62 BPM | TEMPERATURE: 96.8 F

## 2024-07-12 DIAGNOSIS — J98.6 DIAPHRAGMATIC PARALYSIS: ICD-10-CM

## 2024-07-12 DIAGNOSIS — R91.8 BILATERAL PULMONARY INFILTRATES ON CHEST X-RAY: Primary | ICD-10-CM

## 2024-07-12 DIAGNOSIS — J44.9 CHRONIC OBSTRUCTIVE PULMONARY DISEASE, UNSPECIFIED COPD TYPE (HCC): ICD-10-CM

## 2024-07-12 RX ORDER — IPRATROPIUM BROMIDE AND ALBUTEROL SULFATE 2.5; .5 MG/3ML; MG/3ML
1 SOLUTION RESPIRATORY (INHALATION) EVERY 6 HOURS PRN
Qty: 360 ML | Refills: 11 | Status: SHIPPED | OUTPATIENT
Start: 2024-07-12

## 2024-07-12 RX ORDER — METFORMIN HYDROCHLORIDE 500 MG/1
500 TABLET, EXTENDED RELEASE ORAL DAILY
COMMUNITY
Start: 2024-07-05

## 2024-07-12 RX ORDER — ALBUTEROL SULFATE 90 UG/1
2 AEROSOL, METERED RESPIRATORY (INHALATION) 4 TIMES DAILY PRN
Qty: 1 EACH | Refills: 11 | Status: SHIPPED | OUTPATIENT
Start: 2024-07-12

## 2024-07-12 RX ORDER — FLUTICASONE FUROATE, UMECLIDINIUM BROMIDE AND VILANTEROL TRIFENATATE 100; 62.5; 25 UG/1; UG/1; UG/1
1 POWDER RESPIRATORY (INHALATION) DAILY
Qty: 60 EACH | Refills: 11 | Status: SHIPPED | OUTPATIENT
Start: 2024-07-12

## 2024-07-12 NOTE — PATIENT INSTRUCTIONS
Reviewed CAT scan findings with the patient and reassured him.  No need for follow-up imaging if clinically remains stable.  Refills will be renewed on Trelegy, albuterol rescue inhaler as well as DuoNebs  Patient would like to wait on pulmonary rehab referral until he comes back from [place] in late March next year  Practice respiratory exercises that will be provided today meanwhile  Follow-up in late March 2024      Health Maintenance/Preventive measures:        >>  Avoid exposure to tobacco, irritants, allergens as possible as well as contact with patients with infectious respiratory illness.        >>  Stay up-to-date with influenza & pneumonia vaccines, RSV, & COVID-19 vaccine as recommended by the Advisory Committee on Immunization Practices (ACIP)        >>  Healthy diet and activity as able.        >>  Acid reflux precautions: Head of bed elevation, avoiding tight clothes, avoiding big meals or snacking 3 hours before bedtime, targeting healthy weight.        >>  Practice sleep hygiene measures. Avoid driving or operating heavy machines if tired or sleepy.

## 2024-07-12 NOTE — PROGRESS NOTES
MA Communication:  The following orders are received by verbal communication from Brittnee Alberts MD    Orders include:    Follow up in late May 2025    
Apoaequorin (PREVAGEN PO), Take by mouth, Disp: , Rfl:     vitamin D (CHOLECALCIFEROL) 50 MCG (2000 UT) TABS tablet, Take 1 tablet by mouth daily, Disp: , Rfl:     traZODone (DESYREL) 50 MG tablet, Take 1 tablet by mouth nightly as needed, Disp: , Rfl:     atorvastatin (LIPITOR) 80 MG tablet, Take 1 tablet by mouth daily, Disp: 90 tablet, Rfl: 3    UNABLE TO FIND, VESIVEST TWICE DAILY FOR 20 MINUTES - lung congestion, Disp: , Rfl:     metFORMIN (GLUCOPHAGE) 500 MG tablet, Take 1 tablet by mouth daily (with breakfast), Disp: , Rfl:     aspirin EC 81 MG EC tablet, Take 1 tablet by mouth daily (Patient taking differently: Take 1 tablet by mouth nightly), Disp: 30 tablet, Rfl: 3    Glucosamine-Chondroitin (OSTEO BI-FLEX REGULAR STRENGTH PO), Take 1 capsule by mouth daily , Disp: , Rfl:     GuaiFENesin (MUCINEX PO), Take 1,200 mg by mouth 2 times daily, Disp: , Rfl:     Probiotic Product (PROBIOTIC DAILY PO), Take by mouth daily , Disp: , Rfl:     fish oil-omega-3 fatty acids 1000 MG capsule, Take 1 capsule by mouth nightly, Disp: , Rfl:     Multiple Vitamin (MULTI-VITAMIN PO), Take 1 tablet by mouth daily , Disp: , Rfl:     Lysine 1000 MG TABS, Take 1 tablet by mouth Daily , Disp: , Rfl:     cetirizine (ZYRTEC) 10 MG tablet, Take 1 tablet by mouth daily, Disp: , Rfl:     Spacer/Aero-Holding Chambers LETA, 1 Device by Does not apply route daily as needed (sob) (Patient not taking: Reported on 4/5/2024), Disp: 1 Device, Rfl: 0       Test Results:  Radiology:  I have reviewed radiology images personally.    CT chest July 5, 2024  IMPRESSION:  Stable emphysema with no suspicious pulmonary nodule     Near complete resolved airspace disease both lungs with minimal atelectasis  or infiltrate both lung bases greater on the left.     LUNG RADS:  Lung-RADS 1S - Negative, Significant or Potentially Significant Incidental  Findings (v2022)     Management:  12 month screening LDCT    March 24 images left, July 24 images

## 2024-08-09 PROCEDURE — 93298 REM INTERROG DEV EVAL SCRMS: CPT | Performed by: INTERNAL MEDICINE

## 2024-08-16 ENCOUNTER — OFFICE VISIT (OUTPATIENT)
Dept: CARDIOLOGY CLINIC | Age: 79
End: 2024-08-16
Payer: MEDICARE

## 2024-08-16 VITALS
DIASTOLIC BLOOD PRESSURE: 62 MMHG | HEART RATE: 70 BPM | SYSTOLIC BLOOD PRESSURE: 130 MMHG | BODY MASS INDEX: 28.61 KG/M2 | OXYGEN SATURATION: 97 % | HEIGHT: 72 IN | WEIGHT: 211.2 LBS

## 2024-08-16 DIAGNOSIS — I43 CARDIAC AMYLOIDOSIS (HCC): ICD-10-CM

## 2024-08-16 DIAGNOSIS — E85.4 CARDIAC AMYLOIDOSIS (HCC): ICD-10-CM

## 2024-08-16 DIAGNOSIS — I10 ESSENTIAL HYPERTENSION: ICD-10-CM

## 2024-08-16 DIAGNOSIS — I50.32 CHRONIC DIASTOLIC HEART FAILURE (HCC): Primary | ICD-10-CM

## 2024-08-16 DIAGNOSIS — Z95.1 S/P CABG X 3: ICD-10-CM

## 2024-08-16 PROCEDURE — 3075F SYST BP GE 130 - 139MM HG: CPT | Performed by: NURSE PRACTITIONER

## 2024-08-16 PROCEDURE — 3078F DIAST BP <80 MM HG: CPT | Performed by: NURSE PRACTITIONER

## 2024-08-16 PROCEDURE — 99214 OFFICE O/P EST MOD 30 MIN: CPT | Performed by: NURSE PRACTITIONER

## 2024-08-16 PROCEDURE — 1123F ACP DISCUSS/DSCN MKR DOCD: CPT | Performed by: NURSE PRACTITIONER

## 2024-08-16 NOTE — PATIENT INSTRUCTIONS
Plan:   Continue to monitor daily weights   Entresto 49/51mg twice a day   Check BMP and BNP, CBC next month   Continue Tafamidis  Continue torsemide 20mg daily  Okay to increase to 2 tabs (40mg) when you travel and then reduce back down   Follow-up with the EP as planned  Follow up with DR. Saleh as planned   Follow up with me in Feb-Mar

## 2024-09-17 ENCOUNTER — HOSPITAL ENCOUNTER (OUTPATIENT)
Age: 79
Discharge: HOME | End: 2024-09-17
Payer: MEDICARE

## 2024-09-17 DIAGNOSIS — E85.4 CARDIAC AMYLOIDOSIS (HCC): ICD-10-CM

## 2024-09-17 DIAGNOSIS — I43 CARDIAC AMYLOIDOSIS (HCC): ICD-10-CM

## 2024-09-17 DIAGNOSIS — I50.32 CHRONIC DIASTOLIC HEART FAILURE (HCC): ICD-10-CM

## 2024-09-17 LAB
ANION GAP SERPL CALCULATED.3IONS-SCNC: 9 MMOL/L (ref 3–16)
BUN SERPL-MCNC: 22 MG/DL (ref 7–20)
CALCIUM SERPL-MCNC: 9.5 MG/DL (ref 8.3–10.6)
CHLORIDE SERPL-SCNC: 100 MMOL/L (ref 99–110)
CO2 SERPL-SCNC: 29 MMOL/L (ref 21–32)
CREAT SERPL-MCNC: 1 MG/DL (ref 0.8–1.3)
DEPRECATED RDW RBC AUTO: 15.7 % (ref 12.4–15.4)
GFR SERPLBLD CREATININE-BSD FMLA CKD-EPI: 77 ML/MIN/{1.73_M2}
GLUCOSE SERPL-MCNC: 139 MG/DL (ref 70–99)
HCT VFR BLD AUTO: 39.2 % (ref 40.5–52.5)
HGB BLD-MCNC: 12.8 G/DL (ref 13.5–17.5)
MCH RBC QN AUTO: 29.6 PG (ref 26–34)
MCHC RBC AUTO-ENTMCNC: 32.6 G/DL (ref 31–36)
MCV RBC AUTO: 90.9 FL (ref 80–100)
NT-PROBNP SERPL-MCNC: 1044 PG/ML (ref 0–449)
PLATELET # BLD AUTO: 232 K/UL (ref 135–450)
PMV BLD AUTO: 7.7 FL (ref 5–10.5)
POTASSIUM SERPL-SCNC: 3.7 MMOL/L (ref 3.5–5.1)
RBC # BLD AUTO: 4.31 M/UL (ref 4.2–5.9)
SODIUM SERPL-SCNC: 138 MMOL/L (ref 136–145)
WBC # BLD AUTO: 8.8 K/UL (ref 4–11)

## 2024-09-17 PROCEDURE — 36415 COLL VENOUS BLD VENIPUNCTURE: CPT

## 2024-09-17 PROCEDURE — 85027 COMPLETE CBC AUTOMATED: CPT

## 2024-09-17 PROCEDURE — 80048 BASIC METABOLIC PNL TOTAL CA: CPT

## 2024-09-17 PROCEDURE — 83880 ASSAY OF NATRIURETIC PEPTIDE: CPT

## 2024-09-23 RX ORDER — RIVAROXABAN 20 MG/1
20 TABLET, FILM COATED ORAL DAILY
Qty: 90 TABLET | Refills: 1 | Status: SHIPPED | OUTPATIENT
Start: 2024-09-23

## 2024-10-21 ENCOUNTER — OFFICE VISIT (OUTPATIENT)
Dept: CARDIOLOGY CLINIC | Age: 79
End: 2024-10-21
Payer: MEDICARE

## 2024-10-21 ENCOUNTER — NURSE ONLY (OUTPATIENT)
Dept: CARDIOLOGY CLINIC | Age: 79
End: 2024-10-21

## 2024-10-21 VITALS
HEIGHT: 72 IN | WEIGHT: 209.8 LBS | SYSTOLIC BLOOD PRESSURE: 118 MMHG | HEART RATE: 49 BPM | OXYGEN SATURATION: 98 % | DIASTOLIC BLOOD PRESSURE: 70 MMHG | BODY MASS INDEX: 28.42 KG/M2

## 2024-10-21 DIAGNOSIS — Z45.09 ENCOUNTER FOR LOOP RECORDER CHECK: Primary | ICD-10-CM

## 2024-10-21 DIAGNOSIS — R00.1 BRADYCARDIA: ICD-10-CM

## 2024-10-21 DIAGNOSIS — I48.0 PAROXYSMAL ATRIAL FIBRILLATION (HCC): Primary | ICD-10-CM

## 2024-10-21 PROCEDURE — 3078F DIAST BP <80 MM HG: CPT

## 2024-10-21 PROCEDURE — 1123F ACP DISCUSS/DSCN MKR DOCD: CPT

## 2024-10-21 PROCEDURE — 3074F SYST BP LT 130 MM HG: CPT

## 2024-10-21 PROCEDURE — 93000 ELECTROCARDIOGRAM COMPLETE: CPT

## 2024-10-21 PROCEDURE — 99214 OFFICE O/P EST MOD 30 MIN: CPT

## 2024-10-21 NOTE — PATIENT INSTRUCTIONS
Continue Xarelto 20 mg daily for stroke risk reduction  Continue Lipitor 80 mg daily   Continue Entresto 49-51 mg twice daily  Continue Demadex 20 mg twice daily  Continue aspirin 81 mg daily  Check BP when dizzy/lightheaded  Slow with position changes  Follow up with Dr Saleh 11/14/2024  Follow up in Sarah Fox CNP Dec 2024--schedule today  Follow up with Dr Torres in 4-8 weeks

## 2024-10-23 PROCEDURE — 93298 REM INTERROG DEV EVAL SCRMS: CPT | Performed by: INTERNAL MEDICINE

## 2024-10-24 ENCOUNTER — TELEPHONE (OUTPATIENT)
Dept: CARDIOLOGY CLINIC | Age: 79
End: 2024-10-24

## 2024-10-24 NOTE — TELEPHONE ENCOUNTER
Called and spoke with pt, pt sts he can switch insurance to save money. He wanted to check if medical mutual will cover VYNDAMAX. Relayed to pt to contact insurance. He v/u.

## 2024-10-24 NOTE — TELEPHONE ENCOUNTER
Pt contacted office requesting to speak w/ [employer] in regards to VYNDAMAX , pt states he is switching insurances. Informed pt RJM prescribed medication. Pt does not want so speak w/ RJM and states [employer] handles everything. Pt would not go into depth on what he wanted to speak about.

## 2024-11-12 NOTE — PROGRESS NOTES
[facility]   Cardiac Follow up     Referring Provider:  Raysa Eli MD     Chief Complaint   Patient presents with    6 Month Follow-Up    Congestive Heart Failure    Hypertension    Hyperlipidemia    Atrial Fibrillation    Dizziness     \"Lightheaded\"     Benji Israel   1945    History of Present Illness:   Benji Israel is a 79 y.o. 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 His echocardiogram from 4/2020 showed an EF of 55%, (EF 28% by stress test 2017).  Exposed to second hand smoke for years. Admitted 11/22/22-11/29/22 with shortness of breath, treated for acute on chronic diastolic heart failure with lasix infusion admission weight 273lbs dishcarge weight down to 253 lbs. PYP scan + for ATTR amyloid. Started on Tafamidis, Jardiacne and entresto.   EKG at  with heart failure on 2/24/2023 showed AF with progressing block. Chest X-Ray 4/7/2023 showed no acute process. BNP 1,199. Stress test 4/13/2023- Mild to moderately reduced calculated LVEF 45%, Inferolateral hypokinesis/scar with minimal familia-infarct ischemia.   .   He underwent ILR placement on 7/14/20233. Patient is not on a Beta blocker due to concerns of conduction system. Admitted 3/9/2024 with multifocal severe pneumonia, CHF, and respiratory failure.    Today he states he has gotten marries since his last visit. He states he has lost weight and is eating one meal daily. He likes to walk in the water at the pool for exercise. He occasionally walks outside for exercise, limited by arthritis pain. No recent chest pains. He has some SOB that he attributes to his COPD.  He uses his inhaler which helps with his SOB. He has left leg swelling which was his vein havest site for CABG surgery. He occasionally feels

## 2024-11-14 ENCOUNTER — OFFICE VISIT (OUTPATIENT)
Age: 79
End: 2024-11-14
Payer: MEDICARE

## 2024-11-14 VITALS
DIASTOLIC BLOOD PRESSURE: 62 MMHG | WEIGHT: 213 LBS | OXYGEN SATURATION: 95 % | HEIGHT: 72 IN | SYSTOLIC BLOOD PRESSURE: 106 MMHG | BODY MASS INDEX: 28.85 KG/M2 | HEART RATE: 66 BPM

## 2024-11-14 DIAGNOSIS — E85.4 CARDIAC AMYLOIDOSIS (HCC): ICD-10-CM

## 2024-11-14 DIAGNOSIS — Z95.1 S/P CABG X 3: ICD-10-CM

## 2024-11-14 DIAGNOSIS — I10 ESSENTIAL HYPERTENSION: ICD-10-CM

## 2024-11-14 DIAGNOSIS — I51.89 DIASTOLIC DYSFUNCTION: ICD-10-CM

## 2024-11-14 DIAGNOSIS — I25.10 ASHD (ARTERIOSCLEROTIC HEART DISEASE): Primary | ICD-10-CM

## 2024-11-14 DIAGNOSIS — I43 CARDIAC AMYLOIDOSIS (HCC): ICD-10-CM

## 2024-11-14 DIAGNOSIS — E85.82 WILD-TYPE TRANSTHYRETIN-RELATED (ATTR) AMYLOIDOSIS (HCC): ICD-10-CM

## 2024-11-14 PROCEDURE — 1159F MED LIST DOCD IN RCRD: CPT | Performed by: INTERNAL MEDICINE

## 2024-11-14 PROCEDURE — 1123F ACP DISCUSS/DSCN MKR DOCD: CPT | Performed by: INTERNAL MEDICINE

## 2024-11-14 PROCEDURE — 3078F DIAST BP <80 MM HG: CPT | Performed by: INTERNAL MEDICINE

## 2024-11-14 PROCEDURE — 3074F SYST BP LT 130 MM HG: CPT | Performed by: INTERNAL MEDICINE

## 2024-11-14 PROCEDURE — 99214 OFFICE O/P EST MOD 30 MIN: CPT | Performed by: INTERNAL MEDICINE

## 2024-11-14 RX ORDER — SACUBITRIL AND VALSARTAN 49; 51 MG/1; MG/1
1 TABLET, FILM COATED ORAL 2 TIMES DAILY
Qty: 180 TABLET | Refills: 3 | Status: SHIPPED | OUTPATIENT
Start: 2024-11-14

## 2024-11-14 RX ORDER — ATORVASTATIN CALCIUM 80 MG/1
80 TABLET, FILM COATED ORAL DAILY
Qty: 90 TABLET | Refills: 3 | Status: SHIPPED | OUTPATIENT
Start: 2024-11-14

## 2024-11-14 RX ORDER — TORSEMIDE 20 MG/1
TABLET ORAL
Qty: 180 TABLET | Refills: 3 | Status: SHIPPED | OUTPATIENT
Start: 2024-11-14

## 2024-11-14 RX ORDER — TAFAMIDIS 61 MG/1
61 CAPSULE, LIQUID FILLED ORAL DAILY
Qty: 90 CAPSULE | Refills: 3 | Status: SHIPPED | OUTPATIENT
Start: 2024-11-14

## 2024-11-14 NOTE — PATIENT INSTRUCTIONS
Plan:  ~Call if you start to have an increase in feeling light headed or blood pressure is running less than 100. May consider decreasing entresto     Cardiac medications reviewed including indications and pertinent side effects. Medication list updated at this visit.   Patient verbalizes understanding of the need for treatment and education has been provided at today's visit. Additional education material will be provided in after visit summary.    Check blood pressure and heart rate at home a few times per week- keep a log with dates and times and bring to office visit   Regular exercise and following a healthy diet encouraged   Follow up with me In March or April in [place]   Medication refills have been provided for one year to your preferred pharmacy

## 2024-11-15 ENCOUNTER — TELEPHONE (OUTPATIENT)
Dept: CARDIOLOGY CLINIC | Age: 79
End: 2024-11-15

## 2024-12-03 ENCOUNTER — OFFICE VISIT (OUTPATIENT)
Dept: CARDIOLOGY CLINIC | Age: 79
End: 2024-12-03

## 2024-12-03 ENCOUNTER — NURSE ONLY (OUTPATIENT)
Dept: CARDIOLOGY CLINIC | Age: 79
End: 2024-12-03

## 2024-12-03 VITALS
OXYGEN SATURATION: 99 % | WEIGHT: 207.2 LBS | SYSTOLIC BLOOD PRESSURE: 118 MMHG | HEART RATE: 55 BPM | BODY MASS INDEX: 28.06 KG/M2 | DIASTOLIC BLOOD PRESSURE: 60 MMHG | HEIGHT: 72 IN

## 2024-12-03 VITALS — HEIGHT: 72 IN | WEIGHT: 207.3 LBS | BODY MASS INDEX: 28.08 KG/M2

## 2024-12-03 DIAGNOSIS — R73.9 ELEVATED BLOOD SUGAR: ICD-10-CM

## 2024-12-03 DIAGNOSIS — Z95.1 S/P CABG X 3: ICD-10-CM

## 2024-12-03 DIAGNOSIS — R00.2 PALPITATIONS: ICD-10-CM

## 2024-12-03 DIAGNOSIS — Z45.09 ENCOUNTER FOR LOOP RECORDER CHECK: Primary | ICD-10-CM

## 2024-12-03 DIAGNOSIS — I48.0 PAROXYSMAL ATRIAL FIBRILLATION (HCC): Primary | ICD-10-CM

## 2024-12-03 DIAGNOSIS — I50.32 CHRONIC DIASTOLIC HEART FAILURE (HCC): Primary | ICD-10-CM

## 2024-12-03 NOTE — PROGRESS NOTES
[facility]  Cardiac Follow-up    Primary Care Doctor:  Raysa Eli MD    Chief Complaint   Patient presents with    Follow-up    Congestive Heart Failure    Hypertension    Hyperlipidemia    Atrial Fibrillation     History of Present Illness:  I had the pleasure of seeing Benji Israel in follow up for Cardiomyopathy.   Hx of CAD s/p CABG in 2017, HTN, HLD, PAF on Eliquis, history of left diaphragmatic paralysis, s/p left laparoscopic diaphragmatic surgery in 2020.    Admitted 11/22/22-11/29/22 with shortness of breath, treated for acute on chronic diastolic heart failure with lasix infusion admission weight 273lbs dishcarge weight down to 253lbs. PYP scan + for ATTR amyloid.   He was started on tafamidis on January 3, 2023. He had loop recorder placed.  He was up to 2 miles a day summer 2023. He had COVID back in Aug - treated with paxlovid.    admitted 3/9/2024 with multifocal severe pneumonia and respiratory failure.     Since last visit,   Not having any shortness of breath .   Some dizziness with quick position changes.     No chest pain or chest tightness. Not coughing. Some shortness of breath with max exertion.   Taking torsemide 2 tabs daily until weight was down to 210lbs. Now only taking 1 torsemide daily with weight at 207-208lbs    Here with wife.     Home weights: 207 lbs  Taking medications as prescribed: Yes  Able to afford medications: Yes    Past Medical History:   has a past medical history of Bronchitis, CAD (coronary artery disease), Cardiomyopathy (HCC), CHF (congestive heart failure) (HCC), COPD (chronic obstructive pulmonary disease) (HCC), Diabetes (HCC), Diaphragmatic paralysis, Diastolic dysfunction, Family history of early CAD, Former smoker, High cholesterol, Hypertension, Knee replacement, Obesity, Class II, 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  Surgical History:   has a past surgical history that includes Appendectomy; meniscectomy (Right, 1965); Tonsillectomy; 
fair balance

## 2024-12-03 NOTE — PATIENT INSTRUCTIONS
Plan:   Continue to monitor daily weights   Entresto 49/51mg twice a day   Check BMP, BNP magnesium   Continue Tafamidis  Continue torsemide 20mg daily unless goes above 210lbs then take 2 tabs daily   Call the office with your magnesium dose when you get home   Follow-up with the EP as planned- with plans for EP study  Follow up with Dr. Saleh in March

## 2024-12-03 NOTE — PATIENT INSTRUCTIONS
RECOMMENDATIONS:  Discussed risks and benefits of EP study to see if you have the propensity to go in to VT.    -If VT is sustained: We will plan on moving forward with ICD.   -If no sustained VT: We will plan on moving forward with a pacemaker.   2. Follow up after procedure.

## 2024-12-03 NOTE — PROGRESS NOTES
[facility]   Electrophysiology Consult Note    Date: 12/3/24  Patient Name: Benji Israel  YOB: 1945    Primary Care Physician: Raysa Eli MD    CHIEF COMPLAINT:   Chief Complaint   Patient presents with    Follow-up     4mon    Atrial Fibrillation    Device Check     HISTORY OF PRESENT ILLNESS: Benji Israel is a 79 y.o. male with a PMH significant for cardiomyopathy, CAD s/p CABG 2017, HTN, HLD, and PAF. Patient was admitted in 11/2022 with SOB and was treated with acute CHF. 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 EKG at  with heart failure on 2/24/2023 showed AF with progressing block.    On 3/9/2023, EKG demonstrates SR(84). Patient reports that when he was seeing ARLINE Smith he went to AF during the office visit and that is why he is here today. He states ARLINE Smith stopped metoprolol and he is now taking Jardiance and Entresto. He states he has good days and bad days. His bad days he feels fatigued with no energy. He has had less bad days since stopping metoprolol. 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 Patient reports he has shortness of breath with exertion such as climbing stairs or walking up an incline. He states he can have shortness of breath when he bends over. He states he has a history of COPD and thought his breathing issues were related to that, however then he states he was diagnosed with amyloidosis. He states he has struggled with his weight, his highest weight was 305. He states he has consistently stayed in the 250's now.      Patient wore a cardiac event monitor from 3/9/2023 to 3/16/2023 which demonstrated predominately SR with an average HR of 80 (). 19% AF burden. PVC burden 12%.    On 4/18/2023, EKG demonstrated SR 85 BPM. He reports that he is feeling well today. He is feeling better since jardiance was stopped by cardiology.   Interval History since last office visit:

## 2024-12-04 ENCOUNTER — TELEPHONE (OUTPATIENT)
Dept: CARDIOLOGY CLINIC | Age: 79
End: 2024-12-04

## 2024-12-04 DIAGNOSIS — I44.7 LEFT BUNDLE BRANCH BLOCK: Primary | ICD-10-CM

## 2024-12-04 DIAGNOSIS — I49.3 PVC (PREMATURE VENTRICULAR CONTRACTION): ICD-10-CM

## 2024-12-04 DIAGNOSIS — I44.0 1ST DEGREE AV BLOCK: ICD-10-CM

## 2024-12-04 DIAGNOSIS — I42.9 CARDIOMYOPATHY, UNSPECIFIED TYPE (HCC): ICD-10-CM

## 2024-12-04 NOTE — TELEPHONE ENCOUNTER
KATHERINE saw patient in office 12/3/2024.     \"Discussed risks and benefits of EP study for risk stratification to see if patient has the propensity to go into VT.               -If VT is sustained: We will plan on moving forward with ICD.              -If no sustained VT: We will plan on moving forward with a pacemaker.\"    Case request created. Please call patient to schedule (Would like done prior to the end of the year if possible).     MEDICATION INSTRUCTIONS:   HOLD metformin, torsemide, xarelto, all OTC vitamins and supplements(vitamin D, fish oil, osteo biflex, lysine, magnesium, multivitamin, probiotic) the morning of procedure. Do not take ativan the morning of procedure.

## 2024-12-05 PROBLEM — I44.7 LEFT BUNDLE BRANCH BLOCK: Status: ACTIVE | Noted: 2024-12-04

## 2024-12-05 PROBLEM — I42.9 CARDIOMYOPATHY (HCC): Status: ACTIVE | Noted: 2024-12-04

## 2024-12-05 PROBLEM — I49.3 PVC (PREMATURE VENTRICULAR CONTRACTION): Status: ACTIVE | Noted: 2024-12-04

## 2024-12-05 PROBLEM — I44.0 1ST DEGREE AV BLOCK: Status: ACTIVE | Noted: 2024-12-04

## 2024-12-05 NOTE — TELEPHONE ENCOUNTER
Procedure:  EPS/device implant (TBD)  Doctor:  Dr. Torres  Date:  12/11/24  Time:  9am  Arrival:  7:30am  Reps:  Lavon/Rsos  Anesthesia:  Yes      Spoke with patient. Please have patient arrive to the main entrance of [facility] ([address] [place] [id number]) and check in with the registration desk.  They will be directed to the Cath Lab.  Remind patient to be NPO after midnight (8 hours prior). Do not apply lotions/creams on skin the day of procedure.    Instructions emailed.

## 2024-12-09 ENCOUNTER — HOSPITAL ENCOUNTER (OUTPATIENT)
Age: 79
Discharge: HOME | End: 2024-12-09
Payer: MEDICARE

## 2024-12-09 DIAGNOSIS — Z95.1 S/P CABG X 3: ICD-10-CM

## 2024-12-09 DIAGNOSIS — I50.32 CHRONIC DIASTOLIC HEART FAILURE (HCC): ICD-10-CM

## 2024-12-09 DIAGNOSIS — R73.9 ELEVATED BLOOD SUGAR: ICD-10-CM

## 2024-12-09 LAB
ANION GAP SERPL CALCULATED.3IONS-SCNC: 9 MMOL/L (ref 3–16)
BUN SERPL-MCNC: 22 MG/DL (ref 7–20)
CALCIUM SERPL-MCNC: 9.2 MG/DL (ref 8.3–10.6)
CHLORIDE SERPL-SCNC: 103 MMOL/L (ref 99–110)
CO2 SERPL-SCNC: 26 MMOL/L (ref 21–32)
CREAT SERPL-MCNC: 0.9 MG/DL (ref 0.8–1.3)
GFR SERPLBLD CREATININE-BSD FMLA CKD-EPI: 87 ML/MIN/{1.73_M2}
GLUCOSE SERPL-MCNC: 94 MG/DL (ref 70–99)
MAGNESIUM SERPL-MCNC: 2 MG/DL (ref 1.8–2.4)
POTASSIUM SERPL-SCNC: 3.5 MMOL/L (ref 3.5–5.1)
SODIUM SERPL-SCNC: 138 MMOL/L (ref 136–145)

## 2024-12-09 PROCEDURE — 36415 COLL VENOUS BLD VENIPUNCTURE: CPT

## 2024-12-09 PROCEDURE — 83735 ASSAY OF MAGNESIUM: CPT

## 2024-12-09 PROCEDURE — 83036 HEMOGLOBIN GLYCOSYLATED A1C: CPT

## 2024-12-09 PROCEDURE — 80048 BASIC METABOLIC PNL TOTAL CA: CPT

## 2024-12-10 LAB
EST. AVERAGE GLUCOSE BLD GHB EST-MCNC: 116.9 MG/DL
HBA1C MFR BLD: 5.7 %

## 2024-12-11 ENCOUNTER — ANESTHESIA (OUTPATIENT)
Dept: CARDIAC CATH/INVASIVE PROCEDURES | Age: 79
End: 2024-12-11
Payer: MEDICARE

## 2024-12-11 ENCOUNTER — HOSPITAL ENCOUNTER (OUTPATIENT)
Age: 79
Setting detail: OBSERVATION
Discharge: HOME | End: 2024-12-13
Attending: INTERNAL MEDICINE | Admitting: INTERNAL MEDICINE
Payer: MEDICARE

## 2024-12-11 ENCOUNTER — ANESTHESIA EVENT (OUTPATIENT)
Dept: CARDIAC CATH/INVASIVE PROCEDURES | Age: 79
End: 2024-12-11
Payer: MEDICARE

## 2024-12-11 ENCOUNTER — NURSE ONLY (OUTPATIENT)
Dept: CARDIOLOGY CLINIC | Age: 79
End: 2024-12-11

## 2024-12-11 DIAGNOSIS — I49.9 ARRHYTHMIA: ICD-10-CM

## 2024-12-11 DIAGNOSIS — Z95.810 PRESENCE OF CARDIAC RESYNCHRONIZATION THERAPY DEFIBRILLATOR (CRT-D): Primary | ICD-10-CM

## 2024-12-11 DIAGNOSIS — I49.3 PVC (PREMATURE VENTRICULAR CONTRACTION): ICD-10-CM

## 2024-12-11 DIAGNOSIS — I44.0 1ST DEGREE AV BLOCK: ICD-10-CM

## 2024-12-11 DIAGNOSIS — I44.7 LEFT BUNDLE BRANCH BLOCK: ICD-10-CM

## 2024-12-11 DIAGNOSIS — I42.9 CARDIOMYOPATHY, UNSPECIFIED TYPE (HCC): ICD-10-CM

## 2024-12-11 DIAGNOSIS — I42.9 CARDIOMYOPATHY (HCC): ICD-10-CM

## 2024-12-11 PROBLEM — I47.20 VT (VENTRICULAR TACHYCARDIA) (HCC): Status: ACTIVE | Noted: 2024-12-11

## 2024-12-11 LAB
ANION GAP SERPL CALCULATED.3IONS-SCNC: 15 MMOL/L (ref 3–16)
BUN SERPL-MCNC: 19 MG/DL (ref 7–20)
CALCIUM SERPL-MCNC: 9.1 MG/DL (ref 8.3–10.6)
CHLORIDE SERPL-SCNC: 104 MMOL/L (ref 99–110)
CO2 SERPL-SCNC: 23 MMOL/L (ref 21–32)
CREAT SERPL-MCNC: 1.1 MG/DL (ref 0.8–1.3)
DEPRECATED RDW RBC AUTO: 15.2 % (ref 12.4–15.4)
ECHO BSA: 2.2 M2
EKG DIAGNOSIS: NORMAL
EKG Q-T INTERVAL: 496 MS
EKG QRS DURATION: 152 MS
EKG QTC CALCULATION (BAZETT): 478 MS
EKG R AXIS: -83 DEGREES
EKG T AXIS: 113 DEGREES
EKG VENTRICULAR RATE: 56 BPM
GFR SERPLBLD CREATININE-BSD FMLA CKD-EPI: 69 ML/MIN/{1.73_M2}
GLUCOSE SERPL-MCNC: 110 MG/DL (ref 70–99)
HCT VFR BLD AUTO: 35 % (ref 40.5–52.5)
HGB BLD-MCNC: 11.8 G/DL (ref 13.5–17.5)
MCH RBC QN AUTO: 30.6 PG (ref 26–34)
MCHC RBC AUTO-ENTMCNC: 33.6 G/DL (ref 31–36)
MCV RBC AUTO: 91 FL (ref 80–100)
PLATELET # BLD AUTO: 200 K/UL (ref 135–450)
PMV BLD AUTO: 7.7 FL (ref 5–10.5)
POTASSIUM SERPL-SCNC: 3.4 MMOL/L (ref 3.5–5.1)
RBC # BLD AUTO: 3.85 M/UL (ref 4.2–5.9)
SODIUM SERPL-SCNC: 142 MMOL/L (ref 136–145)
WBC # BLD AUTO: 7 K/UL (ref 4–11)

## 2024-12-11 PROCEDURE — 85027 COMPLETE CBC AUTOMATED: CPT

## 2024-12-11 PROCEDURE — C1769 GUIDE WIRE: HCPCS | Performed by: INTERNAL MEDICINE

## 2024-12-11 PROCEDURE — 92960 CARDIOVERSION ELECTRIC EXT: CPT | Performed by: INTERNAL MEDICINE

## 2024-12-11 PROCEDURE — C1892 INTRO/SHEATH,FIXED,PEEL-AWAY: HCPCS | Performed by: INTERNAL MEDICINE

## 2024-12-11 PROCEDURE — G0378 HOSPITAL OBSERVATION PER HR: HCPCS

## 2024-12-11 PROCEDURE — 2580000003 HC RX 258: Performed by: NURSE ANESTHETIST, CERTIFIED REGISTERED

## 2024-12-11 PROCEDURE — 94640 AIRWAY INHALATION TREATMENT: CPT

## 2024-12-11 PROCEDURE — 93005 ELECTROCARDIOGRAM TRACING: CPT | Performed by: INTERNAL MEDICINE

## 2024-12-11 PROCEDURE — C1900 LEAD, CORONARY VENOUS: HCPCS | Performed by: INTERNAL MEDICINE

## 2024-12-11 PROCEDURE — 7100000010 HC PHASE II RECOVERY - FIRST 15 MIN: Performed by: INTERNAL MEDICINE

## 2024-12-11 PROCEDURE — 6360000002 HC RX W HCPCS: Performed by: NURSE ANESTHETIST, CERTIFIED REGISTERED

## 2024-12-11 PROCEDURE — 93620 COMP EP EVL R AT VEN PAC&REC: CPT | Performed by: INTERNAL MEDICINE

## 2024-12-11 PROCEDURE — C1887 CATHETER, GUIDING: HCPCS | Performed by: INTERNAL MEDICINE

## 2024-12-11 PROCEDURE — C1889 IMPLANT/INSERT DEVICE, NOC: HCPCS | Performed by: INTERNAL MEDICINE

## 2024-12-11 PROCEDURE — 6370000000 HC RX 637 (ALT 250 FOR IP): Performed by: INTERNAL MEDICINE

## 2024-12-11 PROCEDURE — 33249 INSJ/RPLCMT DEFIB W/LEAD(S): CPT | Performed by: INTERNAL MEDICINE

## 2024-12-11 PROCEDURE — 3700000000 HC ANESTHESIA ATTENDED CARE: Performed by: INTERNAL MEDICINE

## 2024-12-11 PROCEDURE — 6360000004 HC RX CONTRAST MEDICATION: Performed by: INTERNAL MEDICINE

## 2024-12-11 PROCEDURE — C1894 INTRO/SHEATH, NON-LASER: HCPCS | Performed by: INTERNAL MEDICINE

## 2024-12-11 PROCEDURE — 7100000011 HC PHASE II RECOVERY - ADDTL 15 MIN: Performed by: INTERNAL MEDICINE

## 2024-12-11 PROCEDURE — 80048 BASIC METABOLIC PNL TOTAL CA: CPT

## 2024-12-11 PROCEDURE — 2700000000 HC OXYGEN THERAPY PER DAY

## 2024-12-11 PROCEDURE — 3700000001 HC ADD 15 MINUTES (ANESTHESIA): Performed by: INTERNAL MEDICINE

## 2024-12-11 PROCEDURE — 6360000002 HC RX W HCPCS: Performed by: INTERNAL MEDICINE

## 2024-12-11 PROCEDURE — 2709999900 HC NON-CHARGEABLE SUPPLY: Performed by: INTERNAL MEDICINE

## 2024-12-11 PROCEDURE — 33225 L VENTRIC PACING LEAD ADD-ON: CPT | Performed by: INTERNAL MEDICINE

## 2024-12-11 PROCEDURE — C1730 CATH, EP, 19 OR FEW ELECT: HCPCS | Performed by: INTERNAL MEDICINE

## 2024-12-11 PROCEDURE — C1777 LEAD, AICD, ENDO SINGLE COIL: HCPCS | Performed by: INTERNAL MEDICINE

## 2024-12-11 PROCEDURE — C1882 AICD, OTHER THAN SING/DUAL: HCPCS | Performed by: INTERNAL MEDICINE

## 2024-12-11 PROCEDURE — 93010 ELECTROCARDIOGRAM REPORT: CPT | Performed by: INTERNAL MEDICINE

## 2024-12-11 PROCEDURE — C1898 LEAD, PMKR, OTHER THAN TRANS: HCPCS | Performed by: INTERNAL MEDICINE

## 2024-12-11 DEVICE — PACE/SENSE LEAD
Type: IMPLANTABLE DEVICE | Site: HEART | Status: FUNCTIONAL
Brand: INGEVITY™+

## 2024-12-11 DEVICE — CARDIAC RESYNCHRONIZATION THERAPY DEFIBRILLATOR
Type: IMPLANTABLE DEVICE | Site: CHEST | Status: FUNCTIONAL
Brand: VIGILANT™ X4 CRT-D

## 2024-12-11 DEVICE — INTEGRATED BIPOLAR PACE/SENSE AND DEFIBRILLATION LEAD
Type: IMPLANTABLE DEVICE | Site: HEART | Status: FUNCTIONAL
Brand: RELIANCE 4-FRONT™

## 2024-12-11 DEVICE — ENVELOPE CMRM6133 ABSORB LRG MR
Type: IMPLANTABLE DEVICE | Site: CHEST | Status: FUNCTIONAL
Brand: TYRX™

## 2024-12-11 DEVICE — PACE/SENSE LEAD
Type: IMPLANTABLE DEVICE | Site: HEART | Status: FUNCTIONAL
Brand: ACUITY™ X4 STRAIGHT

## 2024-12-11 RX ORDER — TRAMADOL HYDROCHLORIDE 50 MG/1
100 TABLET ORAL EVERY 6 HOURS PRN
Status: DISCONTINUED | OUTPATIENT
Start: 2024-12-11 | End: 2024-12-13 | Stop reason: HOSPADM

## 2024-12-11 RX ORDER — BUDESONIDE AND FORMOTEROL FUMARATE DIHYDRATE 160; 4.5 UG/1; UG/1
2 AEROSOL RESPIRATORY (INHALATION)
Status: DISCONTINUED | OUTPATIENT
Start: 2024-12-11 | End: 2024-12-13 | Stop reason: HOSPADM

## 2024-12-11 RX ORDER — LABETALOL HYDROCHLORIDE 5 MG/ML
5 INJECTION, SOLUTION INTRAVENOUS EVERY 10 MIN PRN
Status: DISCONTINUED | OUTPATIENT
Start: 2024-12-11 | End: 2024-12-11

## 2024-12-11 RX ORDER — LORAZEPAM 0.5 MG/1
0.5 TABLET ORAL 2 TIMES DAILY PRN
Status: DISCONTINUED | OUTPATIENT
Start: 2024-12-11 | End: 2024-12-13 | Stop reason: HOSPADM

## 2024-12-11 RX ORDER — PROPOFOL 10 MG/ML
INJECTION, EMULSION INTRAVENOUS
Status: DISCONTINUED | OUTPATIENT
Start: 2024-12-11 | End: 2024-12-11 | Stop reason: SDUPTHER

## 2024-12-11 RX ORDER — SODIUM CHLORIDE 0.9 % (FLUSH) 0.9 %
5-40 SYRINGE (ML) INJECTION PRN
Status: DISCONTINUED | OUTPATIENT
Start: 2024-12-11 | End: 2024-12-11

## 2024-12-11 RX ORDER — SODIUM CHLORIDE 0.9 % (FLUSH) 0.9 %
5-40 SYRINGE (ML) INJECTION EVERY 12 HOURS SCHEDULED
Status: DISCONTINUED | OUTPATIENT
Start: 2024-12-11 | End: 2024-12-11 | Stop reason: HOSPADM

## 2024-12-11 RX ORDER — TRAZODONE HYDROCHLORIDE 50 MG/1
50 TABLET, FILM COATED ORAL NIGHTLY PRN
Status: DISCONTINUED | OUTPATIENT
Start: 2024-12-11 | End: 2024-12-13 | Stop reason: HOSPADM

## 2024-12-11 RX ORDER — LIDOCAINE HYDROCHLORIDE AND EPINEPHRINE 10; 10 MG/ML; UG/ML
INJECTION, SOLUTION INFILTRATION; PERINEURAL PRN
Status: DISCONTINUED | OUTPATIENT
Start: 2024-12-11 | End: 2024-12-11 | Stop reason: HOSPADM

## 2024-12-11 RX ORDER — SODIUM CHLORIDE 9 MG/ML
INJECTION, SOLUTION INTRAVENOUS
Status: DISCONTINUED | OUTPATIENT
Start: 2024-12-11 | End: 2024-12-11 | Stop reason: SDUPTHER

## 2024-12-11 RX ORDER — MAGNESIUM 30 MG
30 TABLET ORAL DAILY
Status: DISCONTINUED | OUTPATIENT
Start: 2024-12-11 | End: 2024-12-13 | Stop reason: HOSPADM

## 2024-12-11 RX ORDER — VITAMIN B COMPLEX
2000 TABLET ORAL DAILY
Status: DISCONTINUED | OUTPATIENT
Start: 2024-12-11 | End: 2024-12-13 | Stop reason: HOSPADM

## 2024-12-11 RX ORDER — LIDOCAINE HYDROCHLORIDE AND EPINEPHRINE 5; 5 MG/ML; UG/ML
INJECTION, SOLUTION INFILTRATION; PERINEURAL PRN
Status: DISCONTINUED | OUTPATIENT
Start: 2024-12-11 | End: 2024-12-11 | Stop reason: HOSPADM

## 2024-12-11 RX ORDER — SODIUM CHLORIDE 0.9 % (FLUSH) 0.9 %
5-40 SYRINGE (ML) INJECTION EVERY 12 HOURS SCHEDULED
Status: DISCONTINUED | OUTPATIENT
Start: 2024-12-11 | End: 2024-12-13 | Stop reason: HOSPADM

## 2024-12-11 RX ORDER — CETIRIZINE HYDROCHLORIDE 10 MG/1
10 TABLET ORAL DAILY
Status: DISCONTINUED | OUTPATIENT
Start: 2024-12-11 | End: 2024-12-13 | Stop reason: HOSPADM

## 2024-12-11 RX ORDER — TRAMADOL HYDROCHLORIDE 50 MG/1
50 TABLET ORAL EVERY 6 HOURS PRN
Status: DISCONTINUED | OUTPATIENT
Start: 2024-12-11 | End: 2024-12-13 | Stop reason: HOSPADM

## 2024-12-11 RX ORDER — ASPIRIN 81 MG/1
81 TABLET ORAL NIGHTLY
Status: DISCONTINUED | OUTPATIENT
Start: 2024-12-11 | End: 2024-12-13 | Stop reason: HOSPADM

## 2024-12-11 RX ORDER — SODIUM CHLORIDE 9 MG/ML
INJECTION, SOLUTION INTRAVENOUS PRN
Status: DISCONTINUED | OUTPATIENT
Start: 2024-12-11 | End: 2024-12-11

## 2024-12-11 RX ORDER — SODIUM CHLORIDE 0.9 % (FLUSH) 0.9 %
5-40 SYRINGE (ML) INJECTION PRN
Status: DISCONTINUED | OUTPATIENT
Start: 2024-12-11 | End: 2024-12-11 | Stop reason: HOSPADM

## 2024-12-11 RX ORDER — SODIUM CHLORIDE 9 MG/ML
INJECTION, SOLUTION INTRAVENOUS PRN
Status: DISCONTINUED | OUTPATIENT
Start: 2024-12-11 | End: 2024-12-13 | Stop reason: HOSPADM

## 2024-12-11 RX ORDER — ONDANSETRON 2 MG/ML
4 INJECTION INTRAMUSCULAR; INTRAVENOUS
Status: DISCONTINUED | OUTPATIENT
Start: 2024-12-11 | End: 2024-12-11

## 2024-12-11 RX ORDER — ALBUTEROL SULFATE 90 UG/1
2 INHALANT RESPIRATORY (INHALATION) EVERY 4 HOURS PRN
Status: DISCONTINUED | OUTPATIENT
Start: 2024-12-11 | End: 2024-12-13 | Stop reason: HOSPADM

## 2024-12-11 RX ORDER — IOPAMIDOL 755 MG/ML
INJECTION, SOLUTION INTRAVASCULAR PRN
Status: DISCONTINUED | OUTPATIENT
Start: 2024-12-11 | End: 2024-12-11 | Stop reason: HOSPADM

## 2024-12-11 RX ORDER — MEPERIDINE HYDROCHLORIDE 50 MG/ML
12.5 INJECTION INTRAMUSCULAR; INTRAVENOUS; SUBCUTANEOUS EVERY 5 MIN PRN
Status: DISCONTINUED | OUTPATIENT
Start: 2024-12-11 | End: 2024-12-11

## 2024-12-11 RX ORDER — SODIUM CHLORIDE 9 MG/ML
INJECTION, SOLUTION INTRAVENOUS PRN
Status: DISCONTINUED | OUTPATIENT
Start: 2024-12-11 | End: 2024-12-11 | Stop reason: HOSPADM

## 2024-12-11 RX ORDER — GUAIFENESIN 600 MG/1
1200 TABLET, EXTENDED RELEASE ORAL 2 TIMES DAILY
Status: DISCONTINUED | OUTPATIENT
Start: 2024-12-11 | End: 2024-12-13 | Stop reason: HOSPADM

## 2024-12-11 RX ORDER — SODIUM CHLORIDE 0.9 % (FLUSH) 0.9 %
5-40 SYRINGE (ML) INJECTION PRN
Status: DISCONTINUED | OUTPATIENT
Start: 2024-12-11 | End: 2024-12-13 | Stop reason: HOSPADM

## 2024-12-11 RX ORDER — METFORMIN HYDROCHLORIDE 500 MG/1
500 TABLET, EXTENDED RELEASE ORAL DAILY
Status: DISCONTINUED | OUTPATIENT
Start: 2024-12-13 | End: 2024-12-13 | Stop reason: HOSPADM

## 2024-12-11 RX ORDER — CEFAZOLIN SODIUM 1 G/3ML
INJECTION, POWDER, FOR SOLUTION INTRAMUSCULAR; INTRAVENOUS
Status: DISCONTINUED | OUTPATIENT
Start: 2024-12-11 | End: 2024-12-11 | Stop reason: SDUPTHER

## 2024-12-11 RX ORDER — OMEGA-3-ACID ETHYL ESTERS 1 G/1
1 CAPSULE, LIQUID FILLED ORAL NIGHTLY
Status: DISCONTINUED | OUTPATIENT
Start: 2024-12-11 | End: 2024-12-13 | Stop reason: HOSPADM

## 2024-12-11 RX ORDER — ONDANSETRON 2 MG/ML
4 INJECTION INTRAMUSCULAR; INTRAVENOUS EVERY 6 HOURS PRN
Status: DISCONTINUED | OUTPATIENT
Start: 2024-12-11 | End: 2024-12-11 | Stop reason: SDUPTHER

## 2024-12-11 RX ORDER — LORAZEPAM 0.5 MG/1
0.5 TABLET ORAL
Status: DISCONTINUED | OUTPATIENT
Start: 2024-12-11 | End: 2024-12-11

## 2024-12-11 RX ORDER — ONDANSETRON 2 MG/ML
4 INJECTION INTRAMUSCULAR; INTRAVENOUS EVERY 6 HOURS PRN
Status: DISCONTINUED | OUTPATIENT
Start: 2024-12-11 | End: 2024-12-13 | Stop reason: HOSPADM

## 2024-12-11 RX ORDER — DIPHENHYDRAMINE HYDROCHLORIDE 50 MG/ML
12.5 INJECTION INTRAMUSCULAR; INTRAVENOUS
Status: DISCONTINUED | OUTPATIENT
Start: 2024-12-11 | End: 2024-12-11

## 2024-12-11 RX ORDER — NALOXONE HYDROCHLORIDE 0.4 MG/ML
INJECTION, SOLUTION INTRAMUSCULAR; INTRAVENOUS; SUBCUTANEOUS PRN
Status: DISCONTINUED | OUTPATIENT
Start: 2024-12-11 | End: 2024-12-11

## 2024-12-11 RX ORDER — IPRATROPIUM BROMIDE AND ALBUTEROL SULFATE 2.5; .5 MG/3ML; MG/3ML
1 SOLUTION RESPIRATORY (INHALATION) EVERY 4 HOURS PRN
Status: DISCONTINUED | OUTPATIENT
Start: 2024-12-11 | End: 2024-12-13 | Stop reason: HOSPADM

## 2024-12-11 RX ORDER — ATORVASTATIN CALCIUM 80 MG/1
80 TABLET, FILM COATED ORAL DAILY
Status: DISCONTINUED | OUTPATIENT
Start: 2024-12-11 | End: 2024-12-13 | Stop reason: HOSPADM

## 2024-12-11 RX ORDER — SODIUM CHLORIDE 0.9 % (FLUSH) 0.9 %
5-40 SYRINGE (ML) INJECTION EVERY 12 HOURS SCHEDULED
Status: DISCONTINUED | OUTPATIENT
Start: 2024-12-11 | End: 2024-12-11

## 2024-12-11 RX ADMIN — TRAMADOL HYDROCHLORIDE 50 MG: 50 TABLET, COATED ORAL at 18:35

## 2024-12-11 RX ADMIN — CEFAZOLIN 2 G: 1 INJECTION, POWDER, FOR SOLUTION INTRAMUSCULAR; INTRAVENOUS at 09:07

## 2024-12-11 RX ADMIN — PROPOFOL 30 MG: 10 INJECTION, EMULSION INTRAVENOUS at 09:05

## 2024-12-11 RX ADMIN — CETIRIZINE HYDROCHLORIDE 10 MG: 10 TABLET, FILM COATED ORAL at 13:42

## 2024-12-11 RX ADMIN — ATORVASTATIN CALCIUM 80 MG: 80 TABLET, FILM COATED ORAL at 13:42

## 2024-12-11 RX ADMIN — SODIUM CHLORIDE: 9 INJECTION, SOLUTION INTRAVENOUS at 08:52

## 2024-12-11 RX ADMIN — GUAIFENESIN 1200 MG: 600 TABLET, EXTENDED RELEASE ORAL at 22:08

## 2024-12-11 RX ADMIN — OMEGA-3-ACID ETHYL ESTERS CAPSULES 1 G: 1 CAPSULE, LIQUID FILLED ORAL at 22:08

## 2024-12-11 RX ADMIN — PROPOFOL 75 MCG/KG/MIN: 10 INJECTION, EMULSION INTRAVENOUS at 09:03

## 2024-12-11 RX ADMIN — PROPOFOL 50 MG: 10 INJECTION, EMULSION INTRAVENOUS at 09:03

## 2024-12-11 RX ADMIN — SACUBITRIL AND VALSARTAN 1 TABLET: 49; 51 TABLET, FILM COATED ORAL at 22:08

## 2024-12-11 RX ADMIN — LORAZEPAM 0.5 MG: 0.5 TABLET ORAL at 22:08

## 2024-12-11 RX ADMIN — Medication 2 PUFF: at 20:13

## 2024-12-11 RX ADMIN — GUAIFENESIN 1200 MG: 600 TABLET, EXTENDED RELEASE ORAL at 13:42

## 2024-12-11 RX ADMIN — Medication 2000 UNITS: at 13:42

## 2024-12-11 RX ADMIN — ASPIRIN 81 MG: 81 TABLET, COATED ORAL at 22:07

## 2024-12-11 ASSESSMENT — PAIN SCALES - GENERAL
PAINLEVEL_OUTOF10: 7
PAINLEVEL_OUTOF10: 7
PAINLEVEL_OUTOF10: 5

## 2024-12-11 ASSESSMENT — PAIN DESCRIPTION - ORIENTATION
ORIENTATION: RIGHT
ORIENTATION: RIGHT

## 2024-12-11 ASSESSMENT — ENCOUNTER SYMPTOMS: SHORTNESS OF BREATH: 1

## 2024-12-11 ASSESSMENT — PAIN DESCRIPTION - LOCATION
LOCATION: BACK;SHOULDER
LOCATION: BACK;SHOULDER

## 2024-12-11 NOTE — DISCHARGE INSTRUCTIONS
Cath Labs at  [facility]    Pacemaker Discharge Instructions    12/11/2024  Benji Israel   Date of Birth 1945     Activity:  You can ride in a car, but no driving for 24 hours.  Do not raise your left arm above your heart for 2 weeks.  Resume regular activities in 24 hours.  Return to work/ school in 24 hours.    Diet:    Resume previous diet.    Dressing:  Keep site clean and dry until cleared by your physician.  Remove the outer dressing in 48 hours, leave steri strips intact until seen in office.  Ice pack to pacer site as needed for pain next 24 hours.    Special Instructions:  Report any of the following to physician or 911:  Any difficulty breathing, change in heart rhythm, change in level or consciousness or alertness, fever or chills.  Small amount of bruising and drainage can be expected.  Any questions or concerns please contact your doctor.  If you received contrast during your new pacemaker placement, and you are currently taking Metformin or Metformin combination medications for Diabetes, hold your dose for 48 hours after your procedure.  If you have any questions, please call your doctor.  If you cannot reach your Doctor then call the Emergency Department at  [phone].  Explain to the Emergency Department the procedure you had performed and they will be able to assist you.  If you seek Emergency Care, bring this form with you.      Sedation Discharge Instructions:  For the next 24 hours do not drive a car, operate machinery, power tools or kitchen appliances.    Do not drink alcohol; including beer or wine.    Do not make any important decisions or sign any important papers.  For the next 24 hours you can expect drowsiness, light-headed or dizziness, nausea/ vomiting, inability to concentrate, fatigue and desire to sleep.  We strongly suggest that a responsible adult be with for the next 24 hours, for your protection and safety.  You are not allowed to

## 2024-12-11 NOTE — ANESTHESIA POSTPROCEDURE EVALUATION
Department of Anesthesiology  Postprocedure Note    Patient: Benji Israel  MRN: [id number]  YOB: 1945  Date of evaluation: 12/11/2024    Procedure Summary       Date: 12/11/24 Room / Location: [facility] CATH/EP LAB 4 / [facility] CARDIAC CATH LAB    Anesthesia Start: 0852 Anesthesia Stop: 1147    Procedures:       Ep study complete      Ep cardioversion      Insert PPM biv multi (Left: Chest) Diagnosis:       Left bundle branch block      1st degree AV block      Cardiomyopathy (HCC)      PVC (premature ventricular contraction)      (Left bundle branch block [I44.7])      (1st degree AV block [I44.0])      (Cardiomyopathy (HCC) [I42.9])      (PVC (premature ventricular contraction) [I49.3])    Providers: IDALIA Torres Jr., MD Responsible Provider: Jose M Kendrick MD    Anesthesia Type: general ASA Status: 3            Anesthesia Type: No value filed.    Kamran Phase I:      Kamran Phase II:      Anesthesia Post Evaluation    Comments: Postoperative Anesthesia Note    Name:    Benji Israel  MRN:      [id number]    Patient Vitals in the past 12 hrs:  12/11/24 1315, BP:135/79, Temp src:Oral, Pulse:68, Resp:18, SpO2:92 %  12/11/24 1302, BP:121/66, Pulse:79, Resp:17, SpO2:96 %  12/11/24 1245, BP:123/67, Pulse:83, Resp:17, SpO2:96 %  12/11/24 1230, BP:132/80, Pulse:76, Resp:16, SpO2:99 %  12/11/24 1215, BP:134/76, Pulse:79, Resp:16, SpO2:97 %  12/11/24 1200, BP:130/66, Pulse:83, Resp:19, SpO2:98 %  12/11/24 1147, BP:118/84, Pulse:91, Resp:21, SpO2:99 %  12/11/24 1145, BP:134/76, Resp:21, SpO2:98 %  12/11/24 1129, BP:(!) 118/56, Pulse:94, Resp:12, SpO2:99 %  12/11/24 0745, Height:1.829 m (6'), Weight:95.3 kg (210 lb)     LABS:    CBC  Lab Results       Component                Value               Date/Time                  WBC                      7.0                 12/11/2024 08:10 AM        HGB                      11.8 (L)            12/11/2024 08:10 AM        HCT                      35.0 (L)            12/11/2024

## 2024-12-11 NOTE — ANESTHESIA PRE PROCEDURE
Department of Anesthesiology  Preprocedure Note       Name:  Benji Israel   Age:  79 y.o.  :  1945                                          MRN:  [id number]         Date:  2024      Surgeon: Surgeon(s):  IDALIA Torres Jr., MD    Procedure: Procedure(s):  Ep study complete    Medications prior to admission:   Prior to Admission medications    Medication Sig Start Date End Date Taking? Authorizing Provider   atorvastatin (LIPITOR) 80 MG tablet Take 1 tablet by mouth daily 24   Nixon Saleh MD   sacubitril-valsartan (ENTRESTO) 49-51 MG per tablet Take 1 tablet by mouth 2 times daily 24   Nixon Saleh MD   torsemide (DEMADEX) 20 MG tablet TAKE 1 TABLET BY MOUTH EVERY DAY OKAY TO TAKE 2ND TAB DAILY IF WEIGHT GAIN 3LBS IN DAY 24   Nixon Saleh MD   VYNDAMAX 61 MG CAPS Take 61 mg by mouth daily 24   Nixon Saleh MD   XARELTO 20 MG TABS tablet TAKE 1 TABLET BY MOUTH EVERY DAY 24   Jennifer Fox, APRN - CNP   metFORMIN (GLUCOPHAGE-XR) 500 MG extended release tablet Take 1 tablet by mouth daily 24   Provider, MD Giuseppe   albuterol sulfate HFA (PROVENTIL;VENTOLIN;PROAIR) 108 (90 Base) MCG/ACT inhaler Inhale 2 puffs into the lungs 4 times daily as needed for Wheezing 24   Brittnee Alberts MD   ipratropium 0.5 mg-albuterol 2.5 mg (DUONEB) 0.5-2.5 (3) MG/3ML SOLN nebulizer solution Take 3 mLs by nebulization every 6 hours as needed for Shortness of Breath 24   Brittnee Alberts MD   fluticasone-umeclidin-vilant (TRELEGY ELLIPTA) 100-62.5-25 MCG/ACT AEPB inhaler Inhale 1 puff into the lungs daily 24   Brittnee Alberts MD   LORazepam (ATIVAN) 0.5 MG tablet Take 1 tablet by mouth 2 times daily as needed. As needed 24   Giuseppe Ferguson MD   magnesium 30 MG tablet Take 1 tablet by mouth daily    Giuseppe Ferguson MD   Apoaequorin (PREVAGEN PO) Take by mouth  Patient not taking: Reported on 10/21/2024    Provider

## 2024-12-11 NOTE — PROGRESS NOTES
Report given to patients nurse Gretchen SPEARS. Pt transferred to room 220. CMU called and monitor is on and verified.

## 2024-12-11 NOTE — PROGRESS NOTES
12/11/24 1317   RT Protocol   History Pulmonary Disease 2   Respiratory pattern 0   Breath sounds 0   Cough 0   Indications for Bronchodilator Therapy None   Bronchodilator Assessment Score 2

## 2024-12-11 NOTE — RT PROTOCOL NOTE
RT Inhaler-Nebulizer Bronchodilator Protocol Note    There is a bronchodilator order in the chart from a provider indicating to follow the RT Bronchodilator Protocol and there is an “Initiate RT Inhaler-Nebulizer Bronchodilator Protocol” order as well (see protocol at bottom of note).    CXR Findings:  No results found.    The findings from the last RT Protocol Assessment were as follows:   History Pulmonary Disease: Chronic pulmonary disease  Respiratory Pattern: Regular pattern and RR 12-20 bpm  Breath Sounds: Clear breath sounds  Cough: Strong, spontaneous, non-productive  Indication for Bronchodilator Therapy: None  Bronchodilator Assessment Score: 2    Aerosolized bronchodilator medication orders have been revised according to the RT Inhaler-Nebulizer Bronchodilator Protocol below.    Respiratory Therapist to perform RT Therapy Protocol Assessment initially then follow the protocol.  Repeat RT Therapy Protocol Assessment PRN for score 0-3 or on second treatment, BID, and PRN for scores above 3.    No Indications - adjust the frequency to every 6 hours PRN wheezing or bronchospasm, if no treatments needed after 48 hours then discontinue using Per Protocol order mode.     If indication present, adjust the RT bronchodilator orders based on the Bronchodilator Assessment Score as indicated below.  Use Inhaler orders unless patient has one or more of the following: on home nebulizer, not able to hold breath for 10 seconds, is not alert and oriented, cannot activate and use MDI correctly, or respiratory rate 25 breaths per minute or more, then use the equivalent nebulizer order(s) with same Frequency and PRN reasons based on the score.  If a patient is on this medication at home then do not decrease Frequency below that used at home.    0-3 - enter or revise RT bronchodilator order(s) to equivalent RT Bronchodilator order with Frequency of every 4 hours PRN for wheezing or increased work of breathing using Per  Protocol order mode.        4-6 - enter or revise RT Bronchodilator order(s) to two equivalent RT bronchodilator orders with one order with BID Frequency and one order with Frequency of every 4 hours PRN wheezing or increased work of breathing using Per Protocol order mode.        7-10 - enter or revise RT Bronchodilator order(s) to two equivalent RT bronchodilator orders with one order with TID Frequency and one order with Frequency of every 4 hours PRN wheezing or increased work of breathing using Per Protocol order mode.       11-13 - enter or revise RT Bronchodilator order(s) to one equivalent RT bronchodilator order with QID Frequency and an Albuterol order with Frequency of every 4 hours PRN wheezing or increased work of breathing using Per Protocol order mode.      Greater than 13 - enter or revise RT Bronchodilator order(s) to one equivalent RT bronchodilator order with every 4 hours Frequency and an Albuterol order with Frequency of every 2 hours PRN wheezing or increased work of breathing using Per Protocol order mode.     RT to enter RT Home Evaluation for COPD & MDI Assessment order using Per Protocol order mode.    Electronically signed by Ttaiana Walden RCP on 12/11/2024 at 1:17 PM

## 2024-12-12 ENCOUNTER — ANESTHESIA EVENT (OUTPATIENT)
Dept: CARDIAC CATH/INVASIVE PROCEDURES | Age: 79
End: 2024-12-12
Payer: MEDICARE

## 2024-12-12 ENCOUNTER — ANESTHESIA (OUTPATIENT)
Dept: CARDIAC CATH/INVASIVE PROCEDURES | Age: 79
End: 2024-12-12
Payer: MEDICARE

## 2024-12-12 ENCOUNTER — APPOINTMENT (OUTPATIENT)
Dept: GENERAL RADIOLOGY | Age: 79
End: 2024-12-12
Attending: INTERNAL MEDICINE
Payer: MEDICARE

## 2024-12-12 PROBLEM — Z95.810 PRESENCE OF CARDIAC RESYNCHRONIZATION THERAPY DEFIBRILLATOR (CRT-D): Status: ACTIVE | Noted: 2024-12-12

## 2024-12-12 PROBLEM — I49.9 ARRHYTHMIA: Status: ACTIVE | Noted: 2024-12-05

## 2024-12-12 PROBLEM — Z45.09 ENCOUNTER FOR LOOP RECORDER CHECK: Status: RESOLVED | Noted: 2023-07-14 | Resolved: 2024-12-12

## 2024-12-12 LAB
ANION GAP SERPL CALCULATED.3IONS-SCNC: 10 MMOL/L (ref 3–16)
BUN SERPL-MCNC: 13 MG/DL (ref 7–20)
CALCIUM SERPL-MCNC: 9.3 MG/DL (ref 8.3–10.6)
CHLORIDE SERPL-SCNC: 102 MMOL/L (ref 99–110)
CO2 SERPL-SCNC: 25 MMOL/L (ref 21–32)
CREAT SERPL-MCNC: 0.8 MG/DL (ref 0.8–1.3)
DEPRECATED RDW RBC AUTO: 15.4 % (ref 12.4–15.4)
ECHO BSA: 2.2 M2
GFR SERPLBLD CREATININE-BSD FMLA CKD-EPI: >90 ML/MIN/{1.73_M2}
GLUCOSE BLD-MCNC: 140 MG/DL (ref 70–99)
GLUCOSE SERPL-MCNC: 117 MG/DL (ref 70–99)
HCT VFR BLD AUTO: 36.1 % (ref 40.5–52.5)
HGB BLD-MCNC: 11.9 G/DL (ref 13.5–17.5)
MAGNESIUM SERPL-MCNC: 2.14 MG/DL (ref 1.8–2.4)
MCH RBC QN AUTO: 30.1 PG (ref 26–34)
MCHC RBC AUTO-ENTMCNC: 32.9 G/DL (ref 31–36)
MCV RBC AUTO: 91.4 FL (ref 80–100)
PERFORMED ON: ABNORMAL
PLATELET # BLD AUTO: 185 K/UL (ref 135–450)
PMV BLD AUTO: 8.2 FL (ref 5–10.5)
POTASSIUM SERPL-SCNC: 3.8 MMOL/L (ref 3.5–5.1)
RBC # BLD AUTO: 3.95 M/UL (ref 4.2–5.9)
SODIUM SERPL-SCNC: 137 MMOL/L (ref 136–145)
WBC # BLD AUTO: 7.9 K/UL (ref 4–11)

## 2024-12-12 PROCEDURE — 85027 COMPLETE CBC AUTOMATED: CPT

## 2024-12-12 PROCEDURE — 36415 COLL VENOUS BLD VENIPUNCTURE: CPT

## 2024-12-12 PROCEDURE — 2580000003 HC RX 258: Performed by: INTERNAL MEDICINE

## 2024-12-12 PROCEDURE — 71046 X-RAY EXAM CHEST 2 VIEWS: CPT

## 2024-12-12 PROCEDURE — 7100000011 HC PHASE II RECOVERY - ADDTL 15 MIN: Performed by: INTERNAL MEDICINE

## 2024-12-12 PROCEDURE — 2720000010 HC SURG SUPPLY STERILE: Performed by: INTERNAL MEDICINE

## 2024-12-12 PROCEDURE — 33215 REPOSITION PACING-DEFIB LEAD: CPT | Performed by: INTERNAL MEDICINE

## 2024-12-12 PROCEDURE — 2709999900 HC NON-CHARGEABLE SUPPLY: Performed by: INTERNAL MEDICINE

## 2024-12-12 PROCEDURE — 80048 BASIC METABOLIC PNL TOTAL CA: CPT

## 2024-12-12 PROCEDURE — 3700000001 HC ADD 15 MINUTES (ANESTHESIA): Performed by: INTERNAL MEDICINE

## 2024-12-12 PROCEDURE — 6370000000 HC RX 637 (ALT 250 FOR IP)

## 2024-12-12 PROCEDURE — 6360000002 HC RX W HCPCS: Performed by: INTERNAL MEDICINE

## 2024-12-12 PROCEDURE — 6370000000 HC RX 637 (ALT 250 FOR IP): Performed by: INTERNAL MEDICINE

## 2024-12-12 PROCEDURE — G0378 HOSPITAL OBSERVATION PER HR: HCPCS

## 2024-12-12 PROCEDURE — 99232 SBSQ HOSP IP/OBS MODERATE 35: CPT

## 2024-12-12 PROCEDURE — 2580000003 HC RX 258: Performed by: NURSE ANESTHETIST, CERTIFIED REGISTERED

## 2024-12-12 PROCEDURE — 96374 THER/PROPH/DIAG INJ IV PUSH: CPT

## 2024-12-12 PROCEDURE — 94640 AIRWAY INHALATION TREATMENT: CPT

## 2024-12-12 PROCEDURE — 3700000000 HC ANESTHESIA ATTENDED CARE: Performed by: INTERNAL MEDICINE

## 2024-12-12 PROCEDURE — 6360000002 HC RX W HCPCS: Performed by: NURSE ANESTHETIST, CERTIFIED REGISTERED

## 2024-12-12 PROCEDURE — 83735 ASSAY OF MAGNESIUM: CPT

## 2024-12-12 PROCEDURE — 7100000010 HC PHASE II RECOVERY - FIRST 15 MIN: Performed by: INTERNAL MEDICINE

## 2024-12-12 RX ORDER — VANCOMYCIN HYDROCHLORIDE 1 G/20ML
INJECTION, POWDER, LYOPHILIZED, FOR SOLUTION INTRAVENOUS
Status: DISCONTINUED | OUTPATIENT
Start: 2024-12-12 | End: 2024-12-12 | Stop reason: SDUPTHER

## 2024-12-12 RX ORDER — NALOXONE HYDROCHLORIDE 0.4 MG/ML
INJECTION, SOLUTION INTRAMUSCULAR; INTRAVENOUS; SUBCUTANEOUS PRN
Status: CANCELLED | OUTPATIENT
Start: 2024-12-12

## 2024-12-12 RX ORDER — HYDROCODONE BITARTRATE AND ACETAMINOPHEN 5; 325 MG/1; MG/1
1 TABLET ORAL EVERY 6 HOURS PRN
Status: DISCONTINUED | OUTPATIENT
Start: 2024-12-12 | End: 2024-12-13 | Stop reason: HOSPADM

## 2024-12-12 RX ORDER — DOXYCYCLINE HYCLATE 100 MG
100 TABLET ORAL EVERY 12 HOURS SCHEDULED
Status: DISCONTINUED | OUTPATIENT
Start: 2024-12-12 | End: 2024-12-13 | Stop reason: HOSPADM

## 2024-12-12 RX ORDER — ONDANSETRON 2 MG/ML
4 INJECTION INTRAMUSCULAR; INTRAVENOUS ONCE
Status: CANCELLED | OUTPATIENT
Start: 2024-12-12 | End: 2024-12-12

## 2024-12-12 RX ORDER — SODIUM CHLORIDE 9 MG/ML
INJECTION, SOLUTION INTRAVENOUS PRN
Status: DISCONTINUED | OUTPATIENT
Start: 2024-12-12 | End: 2024-12-12

## 2024-12-12 RX ORDER — SODIUM CHLORIDE 0.9 % (FLUSH) 0.9 %
5-40 SYRINGE (ML) INJECTION PRN
Status: CANCELLED | OUTPATIENT
Start: 2024-12-12

## 2024-12-12 RX ORDER — SODIUM CHLORIDE 0.9 % (FLUSH) 0.9 %
5-40 SYRINGE (ML) INJECTION EVERY 12 HOURS SCHEDULED
Status: DISCONTINUED | OUTPATIENT
Start: 2024-12-12 | End: 2024-12-12

## 2024-12-12 RX ORDER — FENTANYL CITRATE 50 UG/ML
INJECTION, SOLUTION INTRAMUSCULAR; INTRAVENOUS
Status: DISCONTINUED | OUTPATIENT
Start: 2024-12-12 | End: 2024-12-12 | Stop reason: SDUPTHER

## 2024-12-12 RX ORDER — SODIUM CHLORIDE 9 MG/ML
INJECTION, SOLUTION INTRAVENOUS PRN
Status: CANCELLED | OUTPATIENT
Start: 2024-12-12

## 2024-12-12 RX ORDER — SODIUM CHLORIDE 0.9 % (FLUSH) 0.9 %
5-40 SYRINGE (ML) INJECTION PRN
Status: DISCONTINUED | OUTPATIENT
Start: 2024-12-12 | End: 2024-12-12

## 2024-12-12 RX ORDER — SODIUM CHLORIDE 0.9 % (FLUSH) 0.9 %
5-40 SYRINGE (ML) INJECTION EVERY 12 HOURS SCHEDULED
Status: CANCELLED | OUTPATIENT
Start: 2024-12-12

## 2024-12-12 RX ORDER — BENZONATATE 100 MG/1
100 CAPSULE ORAL 3 TIMES DAILY PRN
Status: DISCONTINUED | OUTPATIENT
Start: 2024-12-12 | End: 2024-12-13 | Stop reason: HOSPADM

## 2024-12-12 RX ORDER — SODIUM CHLORIDE 0.9 % (FLUSH) 0.9 %
5-40 SYRINGE (ML) INJECTION PRN
Status: DISCONTINUED | OUTPATIENT
Start: 2024-12-12 | End: 2024-12-13 | Stop reason: HOSPADM

## 2024-12-12 RX ORDER — MEPERIDINE HYDROCHLORIDE 50 MG/ML
12.5 INJECTION INTRAMUSCULAR; INTRAVENOUS; SUBCUTANEOUS EVERY 5 MIN PRN
Status: CANCELLED | OUTPATIENT
Start: 2024-12-12

## 2024-12-12 RX ORDER — SODIUM CHLORIDE 9 MG/ML
INJECTION, SOLUTION INTRAVENOUS PRN
Status: DISCONTINUED | OUTPATIENT
Start: 2024-12-12 | End: 2024-12-13 | Stop reason: HOSPADM

## 2024-12-12 RX ORDER — LIDOCAINE HYDROCHLORIDE AND EPINEPHRINE 10; 10 MG/ML; UG/ML
INJECTION, SOLUTION INFILTRATION; PERINEURAL PRN
Status: DISCONTINUED | OUTPATIENT
Start: 2024-12-12 | End: 2024-12-12 | Stop reason: HOSPADM

## 2024-12-12 RX ORDER — SODIUM CHLORIDE 0.9 % (FLUSH) 0.9 %
5-40 SYRINGE (ML) INJECTION EVERY 12 HOURS SCHEDULED
Status: DISCONTINUED | OUTPATIENT
Start: 2024-12-12 | End: 2024-12-13 | Stop reason: HOSPADM

## 2024-12-12 RX ORDER — DIPHENHYDRAMINE HYDROCHLORIDE 50 MG/ML
6.25 INJECTION INTRAMUSCULAR; INTRAVENOUS
Status: CANCELLED | OUTPATIENT
Start: 2024-12-12 | End: 2024-12-13

## 2024-12-12 RX ORDER — MIDAZOLAM HYDROCHLORIDE 1 MG/ML
2 INJECTION, SOLUTION INTRAMUSCULAR; INTRAVENOUS
Status: CANCELLED | OUTPATIENT
Start: 2024-12-12 | End: 2024-12-13

## 2024-12-12 RX ORDER — SODIUM CHLORIDE 9 MG/ML
INJECTION, SOLUTION INTRAVENOUS
Status: DISCONTINUED | OUTPATIENT
Start: 2024-12-12 | End: 2024-12-12 | Stop reason: SDUPTHER

## 2024-12-12 RX ORDER — PROPOFOL 10 MG/ML
INJECTION, EMULSION INTRAVENOUS
Status: DISCONTINUED | OUTPATIENT
Start: 2024-12-12 | End: 2024-12-12 | Stop reason: SDUPTHER

## 2024-12-12 RX ADMIN — SODIUM CHLORIDE, PRESERVATIVE FREE 10 ML: 5 INJECTION INTRAVENOUS at 01:15

## 2024-12-12 RX ADMIN — HYDROMORPHONE HYDROCHLORIDE 0.5 MG: 1 INJECTION, SOLUTION INTRAMUSCULAR; INTRAVENOUS; SUBCUTANEOUS at 21:24

## 2024-12-12 RX ADMIN — SODIUM CHLORIDE: 9 INJECTION, SOLUTION INTRAVENOUS at 14:07

## 2024-12-12 RX ADMIN — SACUBITRIL AND VALSARTAN 1 TABLET: 49; 51 TABLET, FILM COATED ORAL at 21:25

## 2024-12-12 RX ADMIN — VANCOMYCIN HYDROCHLORIDE 1500 MG: 1 INJECTION, POWDER, LYOPHILIZED, FOR SOLUTION INTRAVENOUS at 14:19

## 2024-12-12 RX ADMIN — Medication 2 PUFF: at 19:48

## 2024-12-12 RX ADMIN — GUAIFENESIN 1200 MG: 600 TABLET, EXTENDED RELEASE ORAL at 08:13

## 2024-12-12 RX ADMIN — SACUBITRIL AND VALSARTAN 1 TABLET: 49; 51 TABLET, FILM COATED ORAL at 08:13

## 2024-12-12 RX ADMIN — SODIUM CHLORIDE, PRESERVATIVE FREE 10 ML: 5 INJECTION INTRAVENOUS at 21:25

## 2024-12-12 RX ADMIN — GUAIFENESIN 1200 MG: 600 TABLET, EXTENDED RELEASE ORAL at 21:24

## 2024-12-12 RX ADMIN — HYDROCODONE BITARTRATE AND ACETAMINOPHEN 1 TABLET: 5; 325 TABLET ORAL at 18:52

## 2024-12-12 RX ADMIN — SODIUM CHLORIDE, PRESERVATIVE FREE 10 ML: 5 INJECTION INTRAVENOUS at 21:26

## 2024-12-12 RX ADMIN — TRAMADOL HYDROCHLORIDE 50 MG: 50 TABLET, COATED ORAL at 08:25

## 2024-12-12 RX ADMIN — DOXYCYCLINE HYCLATE 100 MG: 100 TABLET, COATED ORAL at 12:09

## 2024-12-12 RX ADMIN — FENTANYL CITRATE 50 MCG: 50 INJECTION, SOLUTION INTRAMUSCULAR; INTRAVENOUS at 14:50

## 2024-12-12 RX ADMIN — DOXYCYCLINE HYCLATE 100 MG: 100 TABLET, COATED ORAL at 21:23

## 2024-12-12 RX ADMIN — PROPOFOL 100 MCG/KG/MIN: 10 INJECTION, EMULSION INTRAVENOUS at 14:14

## 2024-12-12 RX ADMIN — PROPOFOL 50 MG: 10 INJECTION, EMULSION INTRAVENOUS at 14:13

## 2024-12-12 RX ADMIN — ATORVASTATIN CALCIUM 80 MG: 80 TABLET, FILM COATED ORAL at 08:13

## 2024-12-12 RX ADMIN — CETIRIZINE HYDROCHLORIDE 10 MG: 10 TABLET, FILM COATED ORAL at 08:13

## 2024-12-12 RX ADMIN — SODIUM CHLORIDE, PRESERVATIVE FREE 10 ML: 5 INJECTION INTRAVENOUS at 08:16

## 2024-12-12 RX ADMIN — OMEGA-3-ACID ETHYL ESTERS CAPSULES 1 G: 1 CAPSULE, LIQUID FILLED ORAL at 21:25

## 2024-12-12 RX ADMIN — FENTANYL CITRATE 50 MCG: 50 INJECTION, SOLUTION INTRAMUSCULAR; INTRAVENOUS at 14:25

## 2024-12-12 RX ADMIN — Medication 2000 UNITS: at 08:13

## 2024-12-12 RX ADMIN — HYDROCODONE BITARTRATE AND ACETAMINOPHEN 1 TABLET: 5; 325 TABLET ORAL at 01:14

## 2024-12-12 RX ADMIN — RIVAROXABAN 20 MG: 20 TABLET, FILM COATED ORAL at 08:13

## 2024-12-12 ASSESSMENT — PAIN DESCRIPTION - LOCATION
LOCATION: SHOULDER
LOCATION: CHEST
LOCATION: CHEST;SHOULDER

## 2024-12-12 ASSESSMENT — PAIN DESCRIPTION - ORIENTATION
ORIENTATION: RIGHT
ORIENTATION: RIGHT
ORIENTATION: LEFT
ORIENTATION: RIGHT
ORIENTATION: LEFT
ORIENTATION: RIGHT
ORIENTATION: RIGHT

## 2024-12-12 ASSESSMENT — PAIN - FUNCTIONAL ASSESSMENT
PAIN_FUNCTIONAL_ASSESSMENT: PREVENTS OR INTERFERES SOME ACTIVE ACTIVITIES AND ADLS

## 2024-12-12 ASSESSMENT — PAIN SCALES - GENERAL
PAINLEVEL_OUTOF10: 0
PAINLEVEL_OUTOF10: 5
PAINLEVEL_OUTOF10: 0
PAINLEVEL_OUTOF10: 7
PAINLEVEL_OUTOF10: 4
PAINLEVEL_OUTOF10: 4
PAINLEVEL_OUTOF10: 3
PAINLEVEL_OUTOF10: 6
PAINLEVEL_OUTOF10: 4
PAINLEVEL_OUTOF10: 7
PAINLEVEL_OUTOF10: 9

## 2024-12-12 ASSESSMENT — PAIN DESCRIPTION - ONSET
ONSET: ON-GOING
ONSET: ON-GOING

## 2024-12-12 ASSESSMENT — PAIN DESCRIPTION - DESCRIPTORS
DESCRIPTORS: DULL
DESCRIPTORS: ACHING;BURNING
DESCRIPTORS: SHARP
DESCRIPTORS: ACHING
DESCRIPTORS: ACHING
DESCRIPTORS: ACHING;SHARP;STABBING
DESCRIPTORS: SHARP

## 2024-12-12 ASSESSMENT — PAIN DESCRIPTION - PAIN TYPE
TYPE: CHRONIC PAIN
TYPE: CHRONIC PAIN
TYPE: ACUTE PAIN

## 2024-12-12 ASSESSMENT — PAIN DESCRIPTION - FREQUENCY
FREQUENCY: CONTINUOUS
FREQUENCY: CONTINUOUS

## 2024-12-12 ASSESSMENT — ENCOUNTER SYMPTOMS: SHORTNESS OF BREATH: 1

## 2024-12-12 NOTE — PROGRESS NOTES
Pt arrived from with steri strips intact and dressing off pt. No arm sling present. Pt states it is in rm. Complaining of right shoulder pain since procedure from yesterday requiring pain meds. Pt states pain is a 3/10 on arrival. Bilat PIV's flushed okay, good blood return.

## 2024-12-12 NOTE — PLAN OF CARE
Problem: Chronic Conditions and Co-morbidities  Goal: Patient's chronic conditions and co-morbidity symptoms are monitored and maintained or improved  12/12/2024 1503 by Polly Ruiz, RN  Outcome: Progressing     Problem: Discharge Planning  Goal: Discharge to home or other facility with appropriate resources  12/12/2024 1503 by Polly Ruiz, RN  Outcome: Progressing     Problem: Pain  Goal: Verbalizes/displays adequate comfort level or baseline comfort level  Outcome: Progressing

## 2024-12-12 NOTE — ANESTHESIA POSTPROCEDURE EVALUATION
Department of Anesthesiology  Postprocedure Note    Patient: Benji Israel  MRN: [id number]  YOB: 1945  Date of evaluation: 12/12/2024    Procedure Summary       Date: 12/12/24 Room / Location: A CATH/EP LAB  3 / [facility] CARDIAC CATH LAB    Anesthesia Start: 1407 Anesthesia Stop: 1523    Procedure: ICD lead revision Diagnosis:       Arrhythmia      (Arrhythmia [I49.9])    Providers: IDALIA Torres Jr., MD Responsible Provider: Luis Enrique MD    Anesthesia Type: MAC ASA Status: 4            Anesthesia Type: No value filed.    Kamran Phase I:      Kamran Phase II:      Anesthesia Post Evaluation    Comments: Anes Post-op Note    Name:    Benji Israel  MRN:      [id number]    Patient Vitals in the past 12 hrs:  12/12/24 1635, BP:(!) 144/72, Temp:97.5 °F (36.4 °C), Temp src:Oral, Pulse:80, Resp:16, SpO2:91 %  12/12/24 1600, BP:138/77, Pulse:74, Resp:(!) 0, SpO2:92 %  12/12/24 1545, BP:137/77, Pulse:74, Resp:(!) 0, SpO2:92 %  12/12/24 1535, BP:128/70, Pulse:79, Resp:16, SpO2:93 %  12/12/24 1530, Pulse:76, Resp:13, SpO2:95 %  12/12/24 1529, BP:131/76, Pulse:79, Resp:19, SpO2:95 %  12/12/24 1528, Pulse:79, SpO2:98 %  12/12/24 1345, Pulse:89, Resp:21, SpO2:93 %  12/12/24 1330, BP:(!) 138/93, Pulse:85, Resp:20, SpO2:94 %  12/12/24 1315, BP:(!) 142/88, SpO2:95 %  12/12/24 1313, BP:(!) 142/88, Pulse:84, Resp:12  12/12/24 1132, BP:130/89, Temp:98.1 °F (36.7 °C), Temp src:Oral, Pulse:81, Resp:18, SpO2:92 %  12/12/24 0946, BP:(!) 140/79, Pulse:(!) 103, SpO2:94 %  12/12/24 0813, BP:(!) 146/98, Temp:99 °F (37.2 °C), Temp src:Oral, Pulse:98, Resp:20, SpO2:96 %     LABS:    CBC  Lab Results       Component                Value               Date/Time                  WBC                      7.9                 12/12/2024 04:06 AM        HGB                      11.9 (L)            12/12/2024 04:06 AM        HCT                      36.1 (L)            12/12/2024 04:06 AM        PLT                      185

## 2024-12-12 NOTE — ANESTHESIA PRE PROCEDURE
Department of Anesthesiology  Preprocedure Note       Name:  Benji Israel   Age:  79 y.o.  :  1945                                          MRN:  [id number]         Date:  2024      Surgeon: Surgeon(s):  IDALIA Torres Jr., MD    Procedure: Procedure(s):  ICD lead revision    Medications prior to admission:   Prior to Admission medications    Medication Sig Start Date End Date Taking? Authorizing Provider   atorvastatin (LIPITOR) 80 MG tablet Take 1 tablet by mouth daily 24  Yes Nixon Saleh MD   sacubitril-valsartan (ENTRESTO) 49-51 MG per tablet Take 1 tablet by mouth 2 times daily 24  Yes Nixon Saleh MD   torsemide (DEMADEX) 20 MG tablet TAKE 1 TABLET BY MOUTH EVERY DAY OKAY TO TAKE 2ND TAB DAILY IF WEIGHT GAIN 3LBS IN DAY 24  Yes Nixon Saleh MD   VYNDAMAX 61 MG CAPS Take 61 mg by mouth daily 24  Yes Nixon Saleh MD   XARELTO 20 MG TABS tablet TAKE 1 TABLET BY MOUTH EVERY DAY 24  Yes Jennifer Fox, APRN - CNP   metFORMIN (GLUCOPHAGE-XR) 500 MG extended release tablet Take 1 tablet by mouth daily 24  Yes Provider, MD Giuseppe   albuterol sulfate HFA (PROVENTIL;VENTOLIN;PROAIR) 108 (90 Base) MCG/ACT inhaler Inhale 2 puffs into the lungs 4 times daily as needed for Wheezing 24  Yes Brittnee Alberts MD   ipratropium 0.5 mg-albuterol 2.5 mg (DUONEB) 0.5-2.5 (3) MG/3ML SOLN nebulizer solution Take 3 mLs by nebulization every 6 hours as needed for Shortness of Breath 24  Yes Brittnee Alberts MD   fluticasone-umeclidin-vilant (TRELEGY ELLIPTA) 100-62.5-25 MCG/ACT AEPB inhaler Inhale 1 puff into the lungs daily 24  Yes Brittnee Alberts MD   LORazepam (ATIVAN) 0.5 MG tablet Take 1 tablet by mouth 2 times daily as needed. As needed 24  Yes Provider, MD Giuseppe   magnesium 30 MG tablet Take 1 tablet by mouth daily   Yes Provider, MD Giuseppe   vitamin D (CHOLECALCIFEROL) 50 MCG (2000) TABS tablet Take 1

## 2024-12-12 NOTE — PROGRESS NOTES
Report given to patients nurse. Pt transferred to room. CMU called and monitor is on and verified.

## 2024-12-12 NOTE — PLAN OF CARE
Patient did not sleep much during the night. Complained of his right shoulder, opposite of his insertion site, hurting. This is a chronic pain aggravated by positioning during his procedure. His pacer site is CDI. No hematoma or bleeding noted. Remains in paced rhythm

## 2024-12-12 NOTE — PROGRESS NOTES
:EPS-: EPS revealed monomorphic ventricular tachycardia.  Synchronized cardioversion was performed.  A-fib was post cardioversion rhythm.  BiV ICD was implanted.  Device check reveals RV lead issue requiring lead revision.     Latest Device Transmission:None  Patient ID:Device:[id number] VIGILANT X4 CRT-D/[id number] of Birth:Jan 30, 1945    Pt has [employer]  PATIENT NAME  Benji Israel  DEVICE NAME  LINQ II™  DATE OF IMPLANT  14-Jul-2023  REASON  Patient's device was explanted or changed  RETURN KIT  Return kit not ordered

## 2024-12-12 NOTE — PROGRESS NOTES
chloride flush 0.9 % injection 5-40 mL  5-40 mL IntraVENous PRN IDALIA Torres Jr., MD        0.9 % sodium chloride infusion   IntraVENous PRN IDALIA Torres Jr., MD        traMADol (ULTRAM) tablet 50 mg  50 mg Oral Q6H PRN IDALIA Torres Jr., MD   50 mg at 12/12/24 0825    Or    traMADol (ULTRAM) tablet 100 mg  100 mg Oral Q6H PRN IDALIA Torres Jr., MD        HYDROmorphone (DILAUDID) injection 0.25 mg  0.25 mg IntraVENous Q3H PRN IDALIA Torres Jr., MD        Or    HYDROmorphone (DILAUDID) injection 0.5 mg  0.5 mg IntraVENous Q3H PRN IDALIA Torres Jr., MD        ondansetron (ZOFRAN) injection 4 mg  4 mg IntraVENous Q6H PRN IDALIA Torres Jr., MD        sacubitril-valsartan (ENTRESTO) 49-51 MG per tablet 1 tablet  1 tablet Oral BID IDALIA Torres Jr., MD   1 tablet at 12/12/24 0813       Objective:     Telemetry monitor: Telemetry independently reviewed and interpreted by me today and shows: Atrial fibrillation    Physical Exam:  Constitutional and general appearance: alert, cooperative, no distress, and appears stated age  HEENT: PERRL, no cervical lymphadenopathy. No masses palpable. Normal oral mucosa  Respiratory:  Normal excursion and expansion without use of accessory muscles  Resp auscultation: Normal breath sounds without wheezing, rhonchi, and rales  Cardiovascular:  The apical impulse is not displaced  Heart tones are crisp and normal. irregular S1 and S2.  Jugular venous pulsation Normal  The carotid upstroke is normal in amplitude and contour without delay or bruit  Peripheral pulses are symmetrical and full   Abdomen:  No masses or tenderness  Bowel sounds present  Extremities:   No cyanosis or clubbing   No lower extremity edema   Skin: warm and dry  Neurological:  Alert and oriented  Moves all extremities well  No abnormalities of mood, affect, memory, mentation, or behavior are noted    Data    Echo 3/12/2024   Summary   Difficult to accurately assess left ventricular systolic    Component Value Date/Time    CKTOTAL 121 05/17/2018 11:12 AM    TROPONINI 0.03 11/23/2022 09:39 AM     PT/ INR   Lab Results   Component Value Date/Time    INR 1.27 05/17/2018 11:12 AM    INR 1.27 07/28/2017 05:57 AM    INR 1.34 07/18/2017 12:15 PM    PROTIME 14.3 05/17/2018 11:12 AM    PROTIME 14.4 07/28/2017 05:57 AM    PROTIME 15.1 07/18/2017 12:15 PM     PTT No components found for: \"PTT\"   Lab Results   Component Value Date/Time    MG 2.14 12/12/2024 04:06 AM      Lab Results   Component Value Date/Time    TSH 3.910 10/26/2017 08:59 AM       Assessment:  Persistent atrial fibrillation              -[id number] score 5              -IRL 7/2023  NSVT-11 seconds on 11/11/2024 rate over 200 bpm   -EPS revealed monomorphic ventricular tachycardia 12/11/2024  Ischemic cardiomyopathy  CAD              -CABG 2017  PYP scan + for ATTR amyloid. Started on tafamidis on 1/3/2023  PVCs  -12% burden on event monitor-3/16/2023  LBBB  Diabetes  Hypertension  Hyperlipidemia  COPD  RHONDA      Plan:   Continue Xarelto 20 mg daily for stroke risk reduction  Continue Lipitor 80 mg daily   Continue Entresto 49-51 mg twice daily  Continue Demadex 20 mg twice daily  Continue aspirin 81 mg daily  Continue diabetic medical regimen as ordered  N.p.o. for possible lead revision this afternoon  CXR PA & LAT stat  Will adjust medications for better blood pressure control after lead revision today--likely add beta-blocker  Discussed plan with patient at length      Seen outside [employer] device for paroxysmal atrial fibrillation, ischemic cardiomyopathy, and hypertension    Jaz LAUGHLIN-[facility]  [phone]

## 2024-12-12 NOTE — PLAN OF CARE
Patient status post RV lead revision due to dislodgement.  Full note to come.  No immediate complications.  Hold rivaroxaban for 48 hours.

## 2024-12-13 ENCOUNTER — APPOINTMENT (OUTPATIENT)
Dept: GENERAL RADIOLOGY | Age: 79
End: 2024-12-13
Attending: INTERNAL MEDICINE
Payer: MEDICARE

## 2024-12-13 VITALS
TEMPERATURE: 98.8 F | OXYGEN SATURATION: 93 % | HEART RATE: 90 BPM | RESPIRATION RATE: 18 BRPM | WEIGHT: 210 LBS | SYSTOLIC BLOOD PRESSURE: 135 MMHG | BODY MASS INDEX: 28.44 KG/M2 | HEIGHT: 72 IN | DIASTOLIC BLOOD PRESSURE: 80 MMHG

## 2024-12-13 LAB
ANION GAP SERPL CALCULATED.3IONS-SCNC: 9 MMOL/L (ref 3–16)
BUN SERPL-MCNC: 11 MG/DL (ref 7–20)
CALCIUM SERPL-MCNC: 8.8 MG/DL (ref 8.3–10.6)
CHLORIDE SERPL-SCNC: 103 MMOL/L (ref 99–110)
CO2 SERPL-SCNC: 22 MMOL/L (ref 21–32)
CREAT SERPL-MCNC: 0.7 MG/DL (ref 0.8–1.3)
DEPRECATED RDW RBC AUTO: 15.2 % (ref 12.4–15.4)
GFR SERPLBLD CREATININE-BSD FMLA CKD-EPI: >90 ML/MIN/{1.73_M2}
GLUCOSE SERPL-MCNC: 122 MG/DL (ref 70–99)
HCT VFR BLD AUTO: 32.1 % (ref 40.5–52.5)
HGB BLD-MCNC: 10.9 G/DL (ref 13.5–17.5)
MAGNESIUM SERPL-MCNC: 1.98 MG/DL (ref 1.8–2.4)
MCH RBC QN AUTO: 31 PG (ref 26–34)
MCHC RBC AUTO-ENTMCNC: 34 G/DL (ref 31–36)
MCV RBC AUTO: 91.3 FL (ref 80–100)
PLATELET # BLD AUTO: 170 K/UL (ref 135–450)
PMV BLD AUTO: 8 FL (ref 5–10.5)
POTASSIUM SERPL-SCNC: 4.1 MMOL/L (ref 3.5–5.1)
RBC # BLD AUTO: 3.52 M/UL (ref 4.2–5.9)
SODIUM SERPL-SCNC: 134 MMOL/L (ref 136–145)
WBC # BLD AUTO: 9.6 K/UL (ref 4–11)

## 2024-12-13 PROCEDURE — G0378 HOSPITAL OBSERVATION PER HR: HCPCS

## 2024-12-13 PROCEDURE — 80048 BASIC METABOLIC PNL TOTAL CA: CPT

## 2024-12-13 PROCEDURE — 83735 ASSAY OF MAGNESIUM: CPT

## 2024-12-13 PROCEDURE — 6360000002 HC RX W HCPCS: Performed by: INTERNAL MEDICINE

## 2024-12-13 PROCEDURE — 6370000000 HC RX 637 (ALT 250 FOR IP): Performed by: INTERNAL MEDICINE

## 2024-12-13 PROCEDURE — 71046 X-RAY EXAM CHEST 2 VIEWS: CPT

## 2024-12-13 PROCEDURE — 85027 COMPLETE CBC AUTOMATED: CPT

## 2024-12-13 PROCEDURE — 99239 HOSP IP/OBS DSCHRG MGMT >30: CPT

## 2024-12-13 PROCEDURE — 96376 TX/PRO/DX INJ SAME DRUG ADON: CPT

## 2024-12-13 PROCEDURE — 6370000000 HC RX 637 (ALT 250 FOR IP): Performed by: NURSE PRACTITIONER

## 2024-12-13 PROCEDURE — 36415 COLL VENOUS BLD VENIPUNCTURE: CPT

## 2024-12-13 PROCEDURE — 2580000003 HC RX 258: Performed by: INTERNAL MEDICINE

## 2024-12-13 RX ORDER — DOXYCYCLINE HYCLATE 100 MG
100 TABLET ORAL EVERY 12 HOURS SCHEDULED
Qty: 14 TABLET | Refills: 0 | Status: SHIPPED | OUTPATIENT
Start: 2024-12-13 | End: 2024-12-20

## 2024-12-13 RX ORDER — HYDROCODONE BITARTRATE AND ACETAMINOPHEN 5; 325 MG/1; MG/1
1 TABLET ORAL EVERY 6 HOURS PRN
Qty: 12 TABLET | Refills: 0 | Status: ON HOLD | OUTPATIENT
Start: 2024-12-13 | End: 2024-12-18 | Stop reason: HOSPADM

## 2024-12-13 RX ADMIN — BENZONATATE 100 MG: 100 CAPSULE ORAL at 08:27

## 2024-12-13 RX ADMIN — GUAIFENESIN 1200 MG: 600 TABLET, EXTENDED RELEASE ORAL at 08:27

## 2024-12-13 RX ADMIN — TIOTROPIUM BROMIDE INHALATION SPRAY 2 PUFF: 3.12 SPRAY, METERED RESPIRATORY (INHALATION) at 09:03

## 2024-12-13 RX ADMIN — Medication 10 ML: at 00:07

## 2024-12-13 RX ADMIN — SACUBITRIL AND VALSARTAN 1 TABLET: 49; 51 TABLET, FILM COATED ORAL at 08:27

## 2024-12-13 RX ADMIN — SODIUM CHLORIDE, PRESERVATIVE FREE 10 ML: 5 INJECTION INTRAVENOUS at 08:27

## 2024-12-13 RX ADMIN — Medication 2000 UNITS: at 08:27

## 2024-12-13 RX ADMIN — HYDROMORPHONE HYDROCHLORIDE 0.5 MG: 1 INJECTION, SOLUTION INTRAMUSCULAR; INTRAVENOUS; SUBCUTANEOUS at 04:13

## 2024-12-13 RX ADMIN — ATORVASTATIN CALCIUM 80 MG: 80 TABLET, FILM COATED ORAL at 08:27

## 2024-12-13 RX ADMIN — DOXYCYCLINE HYCLATE 100 MG: 100 TABLET, COATED ORAL at 08:27

## 2024-12-13 RX ADMIN — CETIRIZINE HYDROCHLORIDE 10 MG: 10 TABLET, FILM COATED ORAL at 08:27

## 2024-12-13 RX ADMIN — Medication 2 PUFF: at 09:03

## 2024-12-13 RX ADMIN — BENZONATATE 100 MG: 100 CAPSULE ORAL at 00:03

## 2024-12-13 RX ADMIN — Medication 10 ML: at 04:14

## 2024-12-13 RX ADMIN — HYDROMORPHONE HYDROCHLORIDE 0.5 MG: 1 INJECTION, SOLUTION INTRAMUSCULAR; INTRAVENOUS; SUBCUTANEOUS at 00:06

## 2024-12-13 RX ADMIN — HYDROMORPHONE HYDROCHLORIDE 0.25 MG: 1 INJECTION, SOLUTION INTRAMUSCULAR; INTRAVENOUS; SUBCUTANEOUS at 12:11

## 2024-12-13 RX ADMIN — HYDROMORPHONE HYDROCHLORIDE 0.5 MG: 1 INJECTION, SOLUTION INTRAMUSCULAR; INTRAVENOUS; SUBCUTANEOUS at 07:00

## 2024-12-13 ASSESSMENT — PAIN DESCRIPTION - LOCATION
LOCATION: CHEST

## 2024-12-13 ASSESSMENT — PAIN SCALES - GENERAL
PAINLEVEL_OUTOF10: 7
PAINLEVEL_OUTOF10: 8
PAINLEVEL_OUTOF10: 5
PAINLEVEL_OUTOF10: 2
PAINLEVEL_OUTOF10: 5
PAINLEVEL_OUTOF10: 7
PAINLEVEL_OUTOF10: 3
PAINLEVEL_OUTOF10: 5

## 2024-12-13 ASSESSMENT — PAIN DESCRIPTION - ORIENTATION
ORIENTATION: LEFT

## 2024-12-13 ASSESSMENT — PAIN DESCRIPTION - DESCRIPTORS
DESCRIPTORS: ACHING
DESCRIPTORS: DULL;ACHING
DESCRIPTORS: ACHING;SHARP

## 2024-12-13 ASSESSMENT — PAIN DESCRIPTION - PAIN TYPE: TYPE: ACUTE PAIN

## 2024-12-13 ASSESSMENT — PAIN SCALES - WONG BAKER: WONGBAKER_NUMERICALRESPONSE: NO HURT

## 2024-12-13 NOTE — PROGRESS NOTES
Pt d/c'd home.  Removed  IV and stopped bleeding.  Catheter intact. Pt tolerated well. No redness noted at site.  Notified CMU and removed tele box. Reviewed d/c instructions, home meds, and  f/u information utilizing teach-back method.  Scripts obtained from out patient pharmacy for given to patient. Patient verbalized understanding. Patient wheeled to front entrance with all belongings.

## 2024-12-13 NOTE — PROGRESS NOTES
Patient has had a sand bag to his left shoulder and chest all night. Hematoma has decreased but is still hard below the incesion line. Small amount of bleeding noted under the dressing, not saturated thru. patient still having pain with movement and touching. Given pain meds as needed. Remains in sling and swathe. Stood at beside to urinate and tolerated well, no dizziness noted. Patient steady on feet. Monitor remains vent paced

## 2024-12-13 NOTE — DISCHARGE SUMMARY
Patient ID:  Benji Israel  [id number]  79 y.o.  1945    Admit date: 12/11/2024    Discharge date and time: 12/13/2024    Admitting Physician: IDALIA Torres Jr., MD     Discharge NP: Jaz Peters CNP    Admission Diagnoses: Left bundle branch block [I44.7]  1st degree AV block [I44.0]  Cardiomyopathy (HCC) [I42.9]  PVC (premature ventricular contraction) [I49.3]  VT (ventricular tachycardia) (HCC) [I47.20]    Discharge Diagnoses: SAME    Admission Condition: fair    Discharged Condition: good    Hospital Course:  Benji Israel was admitted on 12/11/2024 and had EPS with BiV ICD implantation.  Procedure was complicated by RV lead dislodgment.  12/12/2024 patient underwent lead revision.  Device check was normal and CXR was normal from a device standpoint. Rhythm has been atrial fibrillation.  Patient complains of left upper chest tenderness at device site due to small hematoma post RV lead revision and denies chest pain, shortness of breath and palpitations    Consults: None    Assessment:   Persistent atrial fibrillation              -SOP0FR1-VIJj score 5              -IRL 7/2023  NSVT-11 seconds on 11/11/2024 rate over 200 bpm              -EPS revealed monomorphic ventricular tachycardia 12/11/2024 and [employer] BiVICD implantation   -RV lead revision 12/12/2024  Ischemic cardiomyopathy  CAD              -CABG 2017  PYP scan + for ATTR amyloid. Started on tafamidis on 1/3/2023  PVCs  -12% burden on event monitor-3/16/2023  LBBB  Diabetes  Hypertension  Hyperlipidemia  COPD  RHONDA    Plan:   Continue Xarelto 20 mg daily --DO NOT restart until 12/15/2024  Continue Lipitor 80 mg daily   Continue Entresto 49-51 mg twice daily  Continue Demadex 20 mg every day-okay to take second tablet if weight gain of 3 pounds or more overnight  Continue aspirin 81 mg daily  Continue doxycycline 100 mg twice daily x 7 days--for COPD exacerbation  Continue diabetic medical regimen as ordered  Post procedure instructions  reviewed   Device check on 12/19/2024  Follow up in office on 3/27/2025 NPKK       Seen outside [employer] device for persistent atrial fibrillation, ischemic cardiomyopathy and hypertension      Discharge Exam:  /80   Pulse 90   Temp 98.8 °F (37.1 °C)   Resp 18   Ht 1.829 m (6')   Wt 95.3 kg (210 lb)   SpO2 93%   BMI 28.48 kg/m²     General Appearance:    Alert, cooperative, no distress, appears stated age   Head:    Normocephalic, without obvious abnormality, atraumatic   Eyes:    PERRL, conjunctiva/corneas clear      Ears:    deferred   Nose:   Nares normal, septum midline, mucosa normal, no drainage  or sinus tenderness   Throat:   Lips, mucosa, and tongue normal; teeth and gums normal   Neck:   Supple, symmetrical, trachea midline, no adenopathy;        thyroid:  No enlargement/tenderness/nodules; no carotid    bruit or JVD   Back:     Symmetric, no curvature, ROM normal, no CVA tenderness   Lungs:     Clear to auscultation bilaterally, respirations unlabored   Chest wall:    No tenderness or deformity; left upper chest dressing dry and intact, left shoulder with small hematoma   Heart:    Regular rate and rhythm, S1 and S2 normal, no murmur, rub   or gallop   Abdomen:     Soft, non-tender, bowel sounds active all four quadrants,     no masses, no organomegaly   Genitalia:    deferred   Rectal:    deferred    Extremities:   Extremities normal, atraumatic, no cyanosis or edema   Pulses:   2+ and symmetric all extremities   Skin:   Skin color, texture, turgor normal, no rashes or lesions   Lymph nodes:   Cervical, supraclavicular, and axillary nodes normal   Neurologic:   CNII-XII intact. Normal strength, sensation and reflexes       throughout     Disposition: home    Patient Instructions:      Medication List        START taking these medications      doxycycline hyclate 100 MG tablet  Commonly known as: VIBRA-TABS  Take 1 tablet by mouth every 12 hours for 7 days     HYDROcodone-acetaminophen 5-325 MG

## 2024-12-13 NOTE — PROGRESS NOTES
Report received from day shift nurse and  on face to face evaluation, patient was noted to have a large hematoma down from the incision site into the left chest pectoral area. Day shift nurse was showing bruising when hematoma was noted. Cardiology was notified of the hematoma and a 5 pound and bag was placed over the site. Vital signs are stable with /77 and heart rate of 87 and vpaced on the monitor. Patient had complained of pain at incision site earlier and was given a norco. He states his pain is better but not gone. He has been in a sling and swathe with his left shoulder elevated. Cardiology responded with order to keep pressure on site and continue to monitor.

## 2024-12-15 ENCOUNTER — APPOINTMENT (OUTPATIENT)
Dept: CT IMAGING | Age: 79
DRG: 920 | End: 2024-12-15
Payer: MEDICARE

## 2024-12-15 ENCOUNTER — APPOINTMENT (OUTPATIENT)
Dept: GENERAL RADIOLOGY | Age: 79
DRG: 920 | End: 2024-12-15
Payer: MEDICARE

## 2024-12-15 ENCOUNTER — HOSPITAL ENCOUNTER (INPATIENT)
Age: 79
LOS: 3 days | Discharge: HOME | DRG: 920 | End: 2024-12-18
Attending: EMERGENCY MEDICINE
Payer: MEDICARE

## 2024-12-15 DIAGNOSIS — S20.212A HEMATOMA OF LEFT CHEST WALL, INITIAL ENCOUNTER: Primary | ICD-10-CM

## 2024-12-15 DIAGNOSIS — R79.89 ELEVATED LACTIC ACID LEVEL: ICD-10-CM

## 2024-12-15 DIAGNOSIS — R65.10 SIRS (SYSTEMIC INFLAMMATORY RESPONSE SYNDROME) (HCC): ICD-10-CM

## 2024-12-15 DIAGNOSIS — R79.89 ELEVATED TROPONIN: ICD-10-CM

## 2024-12-15 PROBLEM — L76.32 POSTOPERATIVE HEMATOMA OF SKIN FOLLOWING NON-DERMATOLOGIC PROCEDURE: Status: ACTIVE | Noted: 2024-12-15

## 2024-12-15 LAB
ALBUMIN SERPL-MCNC: 3.9 G/DL (ref 3.4–5)
ALBUMIN/GLOB SERPL: 1.3 {RATIO} (ref 1.1–2.2)
ALP SERPL-CCNC: 86 U/L (ref 40–129)
ALT SERPL-CCNC: 17 U/L (ref 10–40)
ANION GAP SERPL CALCULATED.3IONS-SCNC: 15 MMOL/L (ref 3–16)
AST SERPL-CCNC: 36 U/L (ref 15–37)
BASOPHILS # BLD: 0.1 K/UL (ref 0–0.2)
BASOPHILS NFR BLD: 0.6 %
BILIRUB SERPL-MCNC: 0.9 MG/DL (ref 0–1)
BUN SERPL-MCNC: 21 MG/DL (ref 7–20)
CALCIUM SERPL-MCNC: 9.2 MG/DL (ref 8.3–10.6)
CHLORIDE SERPL-SCNC: 99 MMOL/L (ref 99–110)
CO2 SERPL-SCNC: 22 MMOL/L (ref 21–32)
CREAT SERPL-MCNC: 1 MG/DL (ref 0.8–1.3)
DEPRECATED RDW RBC AUTO: 15.5 % (ref 12.4–15.4)
EKG ATRIAL RATE: 94 BPM
EKG DIAGNOSIS: NORMAL
EKG P AXIS: 44 DEGREES
EKG Q-T INTERVAL: 454 MS
EKG QRS DURATION: 160 MS
EKG QTC CALCULATION (BAZETT): 567 MS
EKG R AXIS: 137 DEGREES
EKG T AXIS: 92 DEGREES
EKG VENTRICULAR RATE: 94 BPM
EOSINOPHIL # BLD: 0.2 K/UL (ref 0–0.6)
EOSINOPHIL NFR BLD: 2 %
GFR SERPLBLD CREATININE-BSD FMLA CKD-EPI: 77 ML/MIN/{1.73_M2}
GLUCOSE BLD-MCNC: 138 MG/DL (ref 70–99)
GLUCOSE SERPL-MCNC: 122 MG/DL (ref 70–99)
HCT VFR BLD AUTO: 35.4 % (ref 40.5–52.5)
HGB BLD-MCNC: 11.6 G/DL (ref 13.5–17.5)
LACTATE BLDV-SCNC: 1.4 MMOL/L (ref 0.4–2)
LACTATE BLDV-SCNC: 2.7 MMOL/L (ref 0.4–2)
LYMPHOCYTES # BLD: 1.9 K/UL (ref 1–5.1)
LYMPHOCYTES NFR BLD: 16 %
MAGNESIUM SERPL-MCNC: 1.59 MG/DL (ref 1.8–2.4)
MCH RBC QN AUTO: 29.9 PG (ref 26–34)
MCHC RBC AUTO-ENTMCNC: 32.9 G/DL (ref 31–36)
MCV RBC AUTO: 90.9 FL (ref 80–100)
MONOCYTES # BLD: 0.9 K/UL (ref 0–1.3)
MONOCYTES NFR BLD: 7.8 %
NEUTROPHILS # BLD: 8.6 K/UL (ref 1.7–7.7)
NEUTROPHILS NFR BLD: 73.6 %
NT-PROBNP SERPL-MCNC: 2112 PG/ML (ref 0–449)
PERFORMED ON: ABNORMAL
PLATELET # BLD AUTO: 254 K/UL (ref 135–450)
PMV BLD AUTO: 7.9 FL (ref 5–10.5)
POTASSIUM SERPL-SCNC: 3.5 MMOL/L (ref 3.5–5.1)
PROT SERPL-MCNC: 6.9 G/DL (ref 6.4–8.2)
RBC # BLD AUTO: 3.89 M/UL (ref 4.2–5.9)
SODIUM SERPL-SCNC: 136 MMOL/L (ref 136–145)
TROPONIN, HIGH SENSITIVITY: 116 NG/L (ref 0–22)
TROPONIN, HIGH SENSITIVITY: 134 NG/L (ref 0–22)
WBC # BLD AUTO: 11.8 K/UL (ref 4–11)

## 2024-12-15 PROCEDURE — 6360000004 HC RX CONTRAST MEDICATION

## 2024-12-15 PROCEDURE — 6370000000 HC RX 637 (ALT 250 FOR IP)

## 2024-12-15 PROCEDURE — 93010 ELECTROCARDIOGRAM REPORT: CPT | Performed by: INTERNAL MEDICINE

## 2024-12-15 PROCEDURE — 99285 EMERGENCY DEPT VISIT HI MDM: CPT

## 2024-12-15 PROCEDURE — 71045 X-RAY EXAM CHEST 1 VIEW: CPT

## 2024-12-15 PROCEDURE — 94640 AIRWAY INHALATION TREATMENT: CPT

## 2024-12-15 PROCEDURE — 84484 ASSAY OF TROPONIN QUANT: CPT

## 2024-12-15 PROCEDURE — 83880 ASSAY OF NATRIURETIC PEPTIDE: CPT

## 2024-12-15 PROCEDURE — 2580000003 HC RX 258

## 2024-12-15 PROCEDURE — 80053 COMPREHEN METABOLIC PANEL: CPT

## 2024-12-15 PROCEDURE — 96365 THER/PROPH/DIAG IV INF INIT: CPT

## 2024-12-15 PROCEDURE — 85025 COMPLETE CBC W/AUTO DIFF WBC: CPT

## 2024-12-15 PROCEDURE — 1200000000 HC SEMI PRIVATE

## 2024-12-15 PROCEDURE — 6360000002 HC RX W HCPCS

## 2024-12-15 PROCEDURE — 83605 ASSAY OF LACTIC ACID: CPT

## 2024-12-15 PROCEDURE — 83735 ASSAY OF MAGNESIUM: CPT

## 2024-12-15 PROCEDURE — 87040 BLOOD CULTURE FOR BACTERIA: CPT

## 2024-12-15 PROCEDURE — 6370000000 HC RX 637 (ALT 250 FOR IP): Performed by: NURSE PRACTITIONER

## 2024-12-15 PROCEDURE — 96367 TX/PROPH/DG ADDL SEQ IV INF: CPT

## 2024-12-15 PROCEDURE — 93005 ELECTROCARDIOGRAM TRACING: CPT | Performed by: EMERGENCY MEDICINE

## 2024-12-15 PROCEDURE — 71260 CT THORAX DX C+: CPT

## 2024-12-15 PROCEDURE — 96368 THER/DIAG CONCURRENT INF: CPT

## 2024-12-15 RX ORDER — POTASSIUM CHLORIDE 1500 MG/1
40 TABLET, EXTENDED RELEASE ORAL PRN
Status: DISCONTINUED | OUTPATIENT
Start: 2024-12-15 | End: 2024-12-18 | Stop reason: HOSPADM

## 2024-12-15 RX ORDER — ALBUTEROL SULFATE 0.83 MG/ML
2.5 SOLUTION RESPIRATORY (INHALATION) EVERY 4 HOURS PRN
Status: DISCONTINUED | OUTPATIENT
Start: 2024-12-15 | End: 2024-12-18 | Stop reason: HOSPADM

## 2024-12-15 RX ORDER — VANCOMYCIN 2 G/400ML
2000 INJECTION, SOLUTION INTRAVENOUS ONCE
Status: COMPLETED | OUTPATIENT
Start: 2024-12-15 | End: 2024-12-15

## 2024-12-15 RX ORDER — SODIUM CHLORIDE 0.9 % (FLUSH) 0.9 %
5-40 SYRINGE (ML) INJECTION PRN
Status: DISCONTINUED | OUTPATIENT
Start: 2024-12-15 | End: 2024-12-18 | Stop reason: HOSPADM

## 2024-12-15 RX ORDER — VANCOMYCIN HYDROCHLORIDE 1.5 G/300ML
1500 INJECTION, SOLUTION INTRAVITREAL EVERY 24 HOURS
Status: DISCONTINUED | OUTPATIENT
Start: 2024-12-16 | End: 2024-12-17

## 2024-12-15 RX ORDER — POTASSIUM CHLORIDE 7.45 MG/ML
10 INJECTION INTRAVENOUS PRN
Status: DISCONTINUED | OUTPATIENT
Start: 2024-12-15 | End: 2024-12-18 | Stop reason: HOSPADM

## 2024-12-15 RX ORDER — IPRATROPIUM BROMIDE AND ALBUTEROL SULFATE 2.5; .5 MG/3ML; MG/3ML
1 SOLUTION RESPIRATORY (INHALATION) EVERY 4 HOURS PRN
Status: DISCONTINUED | OUTPATIENT
Start: 2024-12-15 | End: 2024-12-15

## 2024-12-15 RX ORDER — ACETAMINOPHEN 650 MG/1
650 SUPPOSITORY RECTAL EVERY 6 HOURS PRN
Status: DISCONTINUED | OUTPATIENT
Start: 2024-12-15 | End: 2024-12-18 | Stop reason: HOSPADM

## 2024-12-15 RX ORDER — IOPAMIDOL 755 MG/ML
75 INJECTION, SOLUTION INTRAVASCULAR
Status: COMPLETED | OUTPATIENT
Start: 2024-12-15 | End: 2024-12-15

## 2024-12-15 RX ORDER — ATORVASTATIN CALCIUM 80 MG/1
80 TABLET, FILM COATED ORAL DAILY
Status: DISCONTINUED | OUTPATIENT
Start: 2024-12-15 | End: 2024-12-18 | Stop reason: HOSPADM

## 2024-12-15 RX ORDER — ALBUTEROL SULFATE 0.83 MG/ML
2.5 SOLUTION RESPIRATORY (INHALATION)
Status: DISCONTINUED | OUTPATIENT
Start: 2024-12-15 | End: 2024-12-18 | Stop reason: HOSPADM

## 2024-12-15 RX ORDER — MAGNESIUM SULFATE IN WATER 40 MG/ML
2000 INJECTION, SOLUTION INTRAVENOUS ONCE
Status: COMPLETED | OUTPATIENT
Start: 2024-12-15 | End: 2024-12-15

## 2024-12-15 RX ORDER — TORSEMIDE 20 MG/1
20 TABLET ORAL DAILY
Status: DISCONTINUED | OUTPATIENT
Start: 2024-12-16 | End: 2024-12-18 | Stop reason: HOSPADM

## 2024-12-15 RX ORDER — LORAZEPAM 0.5 MG/1
0.5 TABLET ORAL 2 TIMES DAILY PRN
Status: DISCONTINUED | OUTPATIENT
Start: 2024-12-15 | End: 2024-12-18 | Stop reason: HOSPADM

## 2024-12-15 RX ORDER — MAGNESIUM SULFATE IN WATER 40 MG/ML
2000 INJECTION, SOLUTION INTRAVENOUS PRN
Status: DISCONTINUED | OUTPATIENT
Start: 2024-12-15 | End: 2024-12-18 | Stop reason: HOSPADM

## 2024-12-15 RX ORDER — 0.9 % SODIUM CHLORIDE 0.9 %
500 INTRAVENOUS SOLUTION INTRAVENOUS ONCE
Status: COMPLETED | OUTPATIENT
Start: 2024-12-15 | End: 2024-12-15

## 2024-12-15 RX ORDER — ACETAMINOPHEN 325 MG/1
650 TABLET ORAL EVERY 6 HOURS PRN
Status: DISCONTINUED | OUTPATIENT
Start: 2024-12-15 | End: 2024-12-18 | Stop reason: HOSPADM

## 2024-12-15 RX ORDER — SODIUM CHLORIDE 9 MG/ML
INJECTION, SOLUTION INTRAVENOUS PRN
Status: DISCONTINUED | OUTPATIENT
Start: 2024-12-15 | End: 2024-12-18 | Stop reason: HOSPADM

## 2024-12-15 RX ORDER — HYDROCODONE BITARTRATE AND ACETAMINOPHEN 5; 325 MG/1; MG/1
1 TABLET ORAL EVERY 6 HOURS PRN
Status: DISCONTINUED | OUTPATIENT
Start: 2024-12-15 | End: 2024-12-18 | Stop reason: HOSPADM

## 2024-12-15 RX ORDER — BUDESONIDE AND FORMOTEROL FUMARATE DIHYDRATE 80; 4.5 UG/1; UG/1
2 AEROSOL RESPIRATORY (INHALATION)
Status: DISCONTINUED | OUTPATIENT
Start: 2024-12-15 | End: 2024-12-18 | Stop reason: HOSPADM

## 2024-12-15 RX ORDER — VANCOMYCIN 1 G/200ML
1000 INJECTION, SOLUTION INTRAVENOUS EVERY 24 HOURS
Status: DISCONTINUED | OUTPATIENT
Start: 2024-12-16 | End: 2024-12-15 | Stop reason: DRUGHIGH

## 2024-12-15 RX ORDER — SODIUM CHLORIDE 0.9 % (FLUSH) 0.9 %
5-40 SYRINGE (ML) INJECTION EVERY 12 HOURS SCHEDULED
Status: DISCONTINUED | OUTPATIENT
Start: 2024-12-15 | End: 2024-12-18 | Stop reason: HOSPADM

## 2024-12-15 RX ORDER — POLYETHYLENE GLYCOL 3350 17 G/17G
17 POWDER, FOR SOLUTION ORAL DAILY PRN
Status: DISCONTINUED | OUTPATIENT
Start: 2024-12-15 | End: 2024-12-18 | Stop reason: HOSPADM

## 2024-12-15 RX ADMIN — SACUBITRIL AND VALSARTAN 1 TABLET: 49; 51 TABLET, FILM COATED ORAL at 20:28

## 2024-12-15 RX ADMIN — Medication 2 PUFF: at 19:55

## 2024-12-15 RX ADMIN — IOPAMIDOL 75 ML: 755 INJECTION, SOLUTION INTRAVENOUS at 14:05

## 2024-12-15 RX ADMIN — MAGNESIUM SULFATE HEPTAHYDRATE 2000 MG: 40 INJECTION, SOLUTION INTRAVENOUS at 15:00

## 2024-12-15 RX ADMIN — VANCOMYCIN 2000 MG: 2 INJECTION, SOLUTION INTRAVENOUS at 15:02

## 2024-12-15 RX ADMIN — CEFEPIME 2000 MG: 2 INJECTION, POWDER, FOR SOLUTION INTRAVENOUS at 14:21

## 2024-12-15 RX ADMIN — LORAZEPAM 0.5 MG: 0.5 TABLET ORAL at 20:28

## 2024-12-15 RX ADMIN — SODIUM CHLORIDE 500 ML: 9 INJECTION, SOLUTION INTRAVENOUS at 14:17

## 2024-12-15 RX ADMIN — ATORVASTATIN CALCIUM 80 MG: 80 TABLET, FILM COATED ORAL at 20:28

## 2024-12-15 RX ADMIN — ALBUTEROL SULFATE 2.5 MG: 2.5 SOLUTION RESPIRATORY (INHALATION) at 19:49

## 2024-12-15 RX ADMIN — SODIUM CHLORIDE, PRESERVATIVE FREE 10 ML: 5 INJECTION INTRAVENOUS at 20:29

## 2024-12-15 RX ADMIN — HYDROCODONE BITARTRATE AND ACETAMINOPHEN 1 TABLET: 5; 325 TABLET ORAL at 20:38

## 2024-12-15 ASSESSMENT — PAIN - FUNCTIONAL ASSESSMENT: PAIN_FUNCTIONAL_ASSESSMENT: 0-10

## 2024-12-15 ASSESSMENT — PAIN SCALES - GENERAL
PAINLEVEL_OUTOF10: 5
PAINLEVEL_OUTOF10: 5

## 2024-12-15 ASSESSMENT — PAIN DESCRIPTION - LOCATION
LOCATION: CHEST
LOCATION: CHEST

## 2024-12-15 ASSESSMENT — PAIN DESCRIPTION - ORIENTATION: ORIENTATION: LEFT

## 2024-12-15 NOTE — H&P
VANCOMYCIN  IP CONSULT TO CARDIOLOGY    I personally have obtained, updated and/or reviewed the patient’s medication list on 12/15/2024   --------------------------------------------------------------------------------------------------------------------------------------------------------------------    Imaging:     CT CHEST PULMONARY EMBOLISM W CONTRAST    Result Date: 12/15/2024  EXAMINATION: CTA OF THE CHEST 12/15/2024 1:58 pm TECHNIQUE: CTA of the chest was performed after the administration of intravenous contrast.  Multiplanar reformatted images are provided for review.  MIP images are provided for review. Automated exposure control, iterative reconstruction, and/or weight based adjustment of the mA/kV was utilized to reduce the radiation dose to as low as reasonably achievable. COMPARISON: 7/5/2024, 3/9/2024, 3/20/2020, 7/28/2017 HISTORY: ORDERING SYSTEM PROVIDED HISTORY: SOB, recent ICD device placement 2 days ago, hematoma to chest wall. TECHNOLOGIST PROVIDED HISTORY: Reason for exam:->SOB, recent ICD device placement 2 days ago, hematoma to chest wall. Additional Contrast?->1 Reason for Exam: post-op ICD placement a few days ago,pt now has a hematoma at the site Additional signs and symptoms: some shortness of breath Relevant Medical/Surgical History: hx of bypass surgery,stent placement,COPD and CHF FINDINGS: Pulmonary Arteries: Pulmonary arteries are adequately opacified for evaluation.  No evidence of intraluminal filling defect to suggest pulmonary embolism.  Main pulmonary artery is normal in caliber. Mediastinum: The thyroid is unremarkable.  There is no pathologic supraclavicular or axillary lymphadenopathy.  There is stable mild chronic bilateral hilar lymphadenopathy, unchanged from 03/09/2024, likely benign and reactive in etiology given stability.  No new pathologic lymphadenopathy present within the chest or mediastinum.  There is atherosclerotic disease of the thoracic aorta, without evidence

## 2024-12-15 NOTE — ED NOTES
Benji Israel is a 79 y.o. male admitted for  Principal Problem:    Postoperative hematoma of skin following non-dermatologic procedure  Resolved Problems:    * No resolved hospital problems. *  .   Patient Home via self with   Chief Complaint   Patient presents with    Shortness of Breath     Pt c/o SOB and increased bruising following pacemaker placement.  Surgery done 12/12/24.  Area feels firm to touch and bruising is spreading.  Restarted xarelto today.    Post-op Problem   .  Patient is alert and Person, Place, Time, and Situation  Patient's baseline mobility: Baseline Mobility: Independent   Code Status: Full Code   Cardiac Rhythm:       Is patient on baseline Oxygen: no how many Liters:   Abnormal Assessment Findings: bruising and swelling over L chest - recent pacemaker site    Isolation: None      NIH Score:    C-SSRS: Risk of Suicide: No Risk  Bedside swallow:        Active LDA's:   Peripheral IV 12/15/24 Right Antecubital (Active)   Site Assessment Clean, dry & intact 12/15/24 1307   Line Status Blood return noted;Flushed;Brisk blood return 12/15/24 1307   Phlebitis Assessment No symptoms 12/15/24 1307   Infiltration Assessment 0 12/15/24 1307   Dressing Status New dressing applied;Clean, dry & intact 12/15/24 1307   Dressing Type Transparent 12/15/24 1307   Dressing Intervention New 12/15/24 1307     Patient admitted with a mccormick: no If the mccormick is chronic was it exchanged:  Reason for mccormick:   Patient admitted with Central Line:  NA . PICC line placement confirmed: YES OR NO:[id number]}   Reason for Central line:   Was central line Inserted from an outside facility:        Family/Caregiver Present yes Any Concerns: no   Restraints no  Sitter no         Vitals: MEWS Score: 1    Vitals:    12/15/24 1520 12/15/24 1550 12/15/24 1625 12/15/24 1650   BP: 124/65 127/61 115/72 124/76   Pulse: 87 93 79 93   Resp: 28 20 23 19   Temp:       TempSrc:       SpO2: 97% 96% 94% 97%   Weight:       Height:           Last

## 2024-12-15 NOTE — RT PROTOCOL NOTE
RT Inhaler-Nebulizer Bronchodilator Protocol Note    There is a bronchodilator order in the chart from a provider indicating to follow the RT Bronchodilator Protocol and there is an “Initiate RT Inhaler-Nebulizer Bronchodilator Protocol” order as well (see protocol at bottom of note).    CXR Findings:  XR CHEST PORTABLE    Result Date: 12/15/2024  Hypoventilatory changes in the lung bases, unchanged from prior examination.       The findings from the last RT Protocol Assessment were as follows:   History Pulmonary Disease: Smoker 15 pack years or more  Respiratory Pattern: Dyspnea on exertion or RR 21-25 bpm  Breath Sounds: Slightly diminished and/or crackles  Cough: Strong, spontaneous, non-productive  Indication for Bronchodilator Therapy: On home bronchodilators  Bronchodilator Assessment Score: 5    Aerosolized bronchodilator medication orders have been revised according to the RT Inhaler-Nebulizer Bronchodilator Protocol below.    Respiratory Therapist to perform RT Therapy Protocol Assessment initially then follow the protocol.  Repeat RT Therapy Protocol Assessment PRN for score 0-3 or on second treatment, BID, and PRN for scores above 3.    No Indications - adjust the frequency to every 6 hours PRN wheezing or bronchospasm, if no treatments needed after 48 hours then discontinue using Per Protocol order mode.     If indication present, adjust the RT bronchodilator orders based on the Bronchodilator Assessment Score as indicated below.  Use Inhaler orders unless patient has one or more of the following: on home nebulizer, not able to hold breath for 10 seconds, is not alert and oriented, cannot activate and use MDI correctly, or respiratory rate 25 breaths per minute or more, then use the equivalent nebulizer order(s) with same Frequency and PRN reasons based on the score.  If a patient is on this medication at home then do not decrease Frequency below that used at home.    0-3 - enter or revise RT

## 2024-12-15 NOTE — CONSULTS
Consult Placed     Who: [facility]  Date: 12/15/2024  Time: 1845     Electronically signed by Yamile Choudhary RN on 12/15/2024 at 6:45 PM

## 2024-12-16 DIAGNOSIS — I25.5 ISCHEMIC CARDIOMYOPATHY: ICD-10-CM

## 2024-12-16 DIAGNOSIS — Z95.810 PRESENCE OF CARDIAC RESYNCHRONIZATION THERAPY DEFIBRILLATOR (CRT-D): Primary | ICD-10-CM

## 2024-12-16 LAB
ANION GAP SERPL CALCULATED.3IONS-SCNC: 12 MMOL/L (ref 3–16)
BASOPHILS # BLD: 0 K/UL (ref 0–0.2)
BASOPHILS # BLD: 0.1 K/UL (ref 0–0.2)
BASOPHILS NFR BLD: 0.5 %
BASOPHILS NFR BLD: 0.7 %
BUN SERPL-MCNC: 16 MG/DL (ref 7–20)
CALCIUM SERPL-MCNC: 8.9 MG/DL (ref 8.3–10.6)
CHLORIDE SERPL-SCNC: 103 MMOL/L (ref 99–110)
CO2 SERPL-SCNC: 25 MMOL/L (ref 21–32)
CREAT SERPL-MCNC: 0.7 MG/DL (ref 0.8–1.3)
DEPRECATED RDW RBC AUTO: 15.3 % (ref 12.4–15.4)
DEPRECATED RDW RBC AUTO: 15.3 % (ref 12.4–15.4)
ECHO BSA: 2.2 M2
ECHO BSA: 2.2 M2
EOSINOPHIL # BLD: 0.4 K/UL (ref 0–0.6)
EOSINOPHIL # BLD: 0.4 K/UL (ref 0–0.6)
EOSINOPHIL NFR BLD: 3.9 %
EOSINOPHIL NFR BLD: 3.9 %
GFR SERPLBLD CREATININE-BSD FMLA CKD-EPI: >90 ML/MIN/{1.73_M2}
GLUCOSE BLD-MCNC: 119 MG/DL (ref 70–99)
GLUCOSE BLD-MCNC: 128 MG/DL (ref 70–99)
GLUCOSE BLD-MCNC: 143 MG/DL (ref 70–99)
GLUCOSE SERPL-MCNC: 83 MG/DL (ref 70–99)
HCT VFR BLD AUTO: 31.3 % (ref 40.5–52.5)
HCT VFR BLD AUTO: 33.5 % (ref 40.5–52.5)
HCT VFR BLD AUTO: 33.7 % (ref 40.5–52.5)
HGB BLD-MCNC: 10.7 G/DL (ref 13.5–17.5)
HGB BLD-MCNC: 11.5 G/DL (ref 13.5–17.5)
HGB BLD-MCNC: 11.5 G/DL (ref 13.5–17.5)
LYMPHOCYTES # BLD: 1.5 K/UL (ref 1–5.1)
LYMPHOCYTES # BLD: 2.1 K/UL (ref 1–5.1)
LYMPHOCYTES NFR BLD: 16.3 %
LYMPHOCYTES NFR BLD: 22.2 %
MAGNESIUM SERPL-MCNC: 1.91 MG/DL (ref 1.8–2.4)
MAGNESIUM SERPL-MCNC: 1.98 MG/DL (ref 1.8–2.4)
MCH RBC QN AUTO: 31.1 PG (ref 26–34)
MCH RBC QN AUTO: 31.4 PG (ref 26–34)
MCHC RBC AUTO-ENTMCNC: 34.3 G/DL (ref 31–36)
MCHC RBC AUTO-ENTMCNC: 34.4 G/DL (ref 31–36)
MCV RBC AUTO: 90.8 FL (ref 80–100)
MCV RBC AUTO: 91.4 FL (ref 80–100)
MONOCYTES # BLD: 0.9 K/UL (ref 0–1.3)
MONOCYTES # BLD: 1 K/UL (ref 0–1.3)
MONOCYTES NFR BLD: 10.8 %
MONOCYTES NFR BLD: 9.4 %
NEUTROPHILS # BLD: 6.1 K/UL (ref 1.7–7.7)
NEUTROPHILS # BLD: 6.4 K/UL (ref 1.7–7.7)
NEUTROPHILS NFR BLD: 62.6 %
NEUTROPHILS NFR BLD: 69.7 %
PERFORMED ON: ABNORMAL
PLATELET # BLD AUTO: 237 K/UL (ref 135–450)
PLATELET # BLD AUTO: 272 K/UL (ref 135–450)
PMV BLD AUTO: 7.6 FL (ref 5–10.5)
PMV BLD AUTO: 8.1 FL (ref 5–10.5)
POTASSIUM SERPL-SCNC: 3.4 MMOL/L (ref 3.5–5.1)
RBC # BLD AUTO: 3.44 M/UL (ref 4.2–5.9)
RBC # BLD AUTO: 3.67 M/UL (ref 4.2–5.9)
SODIUM SERPL-SCNC: 140 MMOL/L (ref 136–145)
WBC # BLD AUTO: 9.2 K/UL (ref 4–11)
WBC # BLD AUTO: 9.7 K/UL (ref 4–11)

## 2024-12-16 PROCEDURE — 99223 1ST HOSP IP/OBS HIGH 75: CPT | Performed by: INTERNAL MEDICINE

## 2024-12-16 PROCEDURE — 97165 OT EVAL LOW COMPLEX 30 MIN: CPT

## 2024-12-16 PROCEDURE — 2580000003 HC RX 258

## 2024-12-16 PROCEDURE — 97116 GAIT TRAINING THERAPY: CPT

## 2024-12-16 PROCEDURE — 36415 COLL VENOUS BLD VENIPUNCTURE: CPT

## 2024-12-16 PROCEDURE — 94640 AIRWAY INHALATION TREATMENT: CPT

## 2024-12-16 PROCEDURE — 85018 HEMOGLOBIN: CPT

## 2024-12-16 PROCEDURE — 85025 COMPLETE CBC W/AUTO DIFF WBC: CPT

## 2024-12-16 PROCEDURE — 80048 BASIC METABOLIC PNL TOTAL CA: CPT

## 2024-12-16 PROCEDURE — 6370000000 HC RX 637 (ALT 250 FOR IP): Performed by: INTERNAL MEDICINE

## 2024-12-16 PROCEDURE — 6360000002 HC RX W HCPCS

## 2024-12-16 PROCEDURE — 83735 ASSAY OF MAGNESIUM: CPT

## 2024-12-16 PROCEDURE — 6370000000 HC RX 637 (ALT 250 FOR IP): Performed by: NURSE PRACTITIONER

## 2024-12-16 PROCEDURE — 1200000000 HC SEMI PRIVATE

## 2024-12-16 PROCEDURE — 97530 THERAPEUTIC ACTIVITIES: CPT

## 2024-12-16 PROCEDURE — 97161 PT EVAL LOW COMPLEX 20 MIN: CPT

## 2024-12-16 PROCEDURE — 6370000000 HC RX 637 (ALT 250 FOR IP)

## 2024-12-16 PROCEDURE — 85014 HEMATOCRIT: CPT

## 2024-12-16 RX ORDER — DEXTROSE MONOHYDRATE 100 MG/ML
INJECTION, SOLUTION INTRAVENOUS CONTINUOUS PRN
Status: DISCONTINUED | OUTPATIENT
Start: 2024-12-16 | End: 2024-12-18 | Stop reason: HOSPADM

## 2024-12-16 RX ORDER — GLUCAGON 1 MG/ML
1 KIT INJECTION PRN
Status: DISCONTINUED | OUTPATIENT
Start: 2024-12-16 | End: 2024-12-18 | Stop reason: HOSPADM

## 2024-12-16 RX ORDER — GUAIFENESIN 600 MG/1
600 TABLET, EXTENDED RELEASE ORAL 2 TIMES DAILY
Status: DISCONTINUED | OUTPATIENT
Start: 2024-12-16 | End: 2024-12-18 | Stop reason: HOSPADM

## 2024-12-16 RX ORDER — METOPROLOL SUCCINATE 25 MG/1
12.5 TABLET, EXTENDED RELEASE ORAL DAILY
Status: DISCONTINUED | OUTPATIENT
Start: 2024-12-16 | End: 2024-12-18 | Stop reason: HOSPADM

## 2024-12-16 RX ORDER — BENZONATATE 100 MG/1
100 CAPSULE ORAL 3 TIMES DAILY PRN
Status: DISCONTINUED | OUTPATIENT
Start: 2024-12-16 | End: 2024-12-18 | Stop reason: HOSPADM

## 2024-12-16 RX ORDER — TRAZODONE HYDROCHLORIDE 50 MG/1
50 TABLET, FILM COATED ORAL NIGHTLY PRN
Status: CANCELLED | OUTPATIENT
Start: 2024-12-16

## 2024-12-16 RX ORDER — INSULIN LISPRO 100 [IU]/ML
0-4 INJECTION, SOLUTION INTRAVENOUS; SUBCUTANEOUS
Status: DISCONTINUED | OUTPATIENT
Start: 2024-12-16 | End: 2024-12-18 | Stop reason: HOSPADM

## 2024-12-16 RX ADMIN — POTASSIUM CHLORIDE 40 MEQ: 1500 TABLET, EXTENDED RELEASE ORAL at 10:24

## 2024-12-16 RX ADMIN — ALBUTEROL SULFATE 2.5 MG: 2.5 SOLUTION RESPIRATORY (INHALATION) at 21:20

## 2024-12-16 RX ADMIN — SODIUM CHLORIDE 25 ML: 9 INJECTION, SOLUTION INTRAVENOUS at 15:51

## 2024-12-16 RX ADMIN — LORAZEPAM 0.5 MG: 0.5 TABLET ORAL at 19:48

## 2024-12-16 RX ADMIN — SODIUM CHLORIDE, PRESERVATIVE FREE 10 ML: 5 INJECTION INTRAVENOUS at 09:00

## 2024-12-16 RX ADMIN — ATORVASTATIN CALCIUM 80 MG: 80 TABLET, FILM COATED ORAL at 19:48

## 2024-12-16 RX ADMIN — WATER 1000 MG: 1 INJECTION INTRAMUSCULAR; INTRAVENOUS; SUBCUTANEOUS at 09:00

## 2024-12-16 RX ADMIN — SACUBITRIL AND VALSARTAN 1 TABLET: 49; 51 TABLET, FILM COATED ORAL at 19:48

## 2024-12-16 RX ADMIN — ALBUTEROL SULFATE 2.5 MG: 2.5 SOLUTION RESPIRATORY (INHALATION) at 08:24

## 2024-12-16 RX ADMIN — Medication 2 PUFF: at 08:24

## 2024-12-16 RX ADMIN — METOPROLOL SUCCINATE 12.5 MG: 25 TABLET, FILM COATED, EXTENDED RELEASE ORAL at 10:24

## 2024-12-16 RX ADMIN — SACUBITRIL AND VALSARTAN 1 TABLET: 49; 51 TABLET, FILM COATED ORAL at 09:00

## 2024-12-16 RX ADMIN — TORSEMIDE 20 MG: 20 TABLET ORAL at 09:00

## 2024-12-16 RX ADMIN — Medication 2 PUFF: at 21:25

## 2024-12-16 RX ADMIN — GUAIFENESIN 600 MG: 600 TABLET ORAL at 19:48

## 2024-12-16 RX ADMIN — GUAIFENESIN 600 MG: 600 TABLET ORAL at 10:24

## 2024-12-16 RX ADMIN — VANCOMYCIN HYDROCHLORIDE 1500 MG: 1.5 INJECTION, SOLUTION INTRAVITREAL at 15:52

## 2024-12-16 RX ADMIN — SODIUM CHLORIDE, PRESERVATIVE FREE 10 ML: 5 INJECTION INTRAVENOUS at 19:48

## 2024-12-16 ASSESSMENT — PAIN DESCRIPTION - ORIENTATION: ORIENTATION: LEFT

## 2024-12-16 ASSESSMENT — PAIN DESCRIPTION - LOCATION: LOCATION: CHEST

## 2024-12-16 ASSESSMENT — PAIN SCALES - GENERAL
PAINLEVEL_OUTOF10: 2
PAINLEVEL_OUTOF10: 0

## 2024-12-16 NOTE — CONSULTS
[facility] Continence Nurse  Consult Note       NAME:  Benji Israel  MEDICAL RECORD NUMBER:  [id number]  AGE: 79 y.o.   GENDER: male  : 1945  TODAY'S DATE:  2024    Subjective; OH , you scared me.   Reason for WOCN Evaluation and Assessment:    Hematoma on left chest, post-pacemaker     Dr. Torres, Cardiology following patient.   Has started broad spectrum coverage for 48 hours; continue  monitoring patient for pain control and monitoring given concern for sepsis for 48-72 hours     Wound Care to sign off.  Please re consult if needed.     Benji Israel is a 79 y.o. male referred by:   [] Physician  [x] Nursing  [] Other:     Wound Identification:  Wound Type: non-healing surgical  Contributing Factors: diabetes and poor glucose control    Wound History: 79 y.o. male who presented to [facility] with left chest swelling and bruising.  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 Recently underwent BiV ICD placement on 2024, this was complicated by RV lead dislodgment. Discharged home on 2024,       Current Wound Care Treatment:  steri strips    Patient Goal of Care:  [x] Wound Healing  [] Odor Control  [] Palliative Care  [x] Pain Control   [] Other:         PAST MEDICAL HISTORY        Diagnosis Date    Bronchitis     chronic bronchitis once or twice per year twice has gone into pneumonia    CAD (coronary artery disease) 07/10/2017    + Stress, multi vessel CAD    Cardiomyopathy ([facility]) 2017    EF 35-40%    CHF (congestive heart failure) ([facility]) 2017    COPD (chronic obstructive pulmonary disease) ([facility])     Diabetes ([facility]) 2017    Diaphragmatic paralysis     left    Diastolic dysfunction     Encounter for loop recorder check 2023    Family history of early CAD     father  at 53 of heart attack    Former smoker     High cholesterol 2017

## 2024-12-16 NOTE — PROGRESS NOTES
[facility] Medicine Progress Note  V 10.25      Date of Admission: 12/15/2024    Hospital Day: 2      Chief Admission Complaint:      Shortness of Breath (Pt c/o SOB and increased bruising following pacemaker placement.  Surgery done 12/12/24.  Area feels firm to touch and bruising is spreading.  Restarted xarelto today.) and Post-op Problem       Subjective:    Patient very frustrated over events since having Biv ICD placement. He is very tender  int he area of intervention with ecchymosis and swelling noted. No redness or drainage noted.     Presenting Admission History:       This is a 79 y.o. male who presented to [facility] with left chest swelling and bruising.  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     Recently underwent BiV ICD placement on 12/11/2024, this was complicated by RV lead dislodgment.  Discharged home on 12/13/2024, since that time has been feeling overall okay but endorsing some weakness and pain at the operative site.  Began a worsening bruising, pain and swelling on the left chest where the device was placed.  Called the on-call cardiology service, who recommended patient come to the ED for evaluation.    Assessment/Plan:      Current Principal Problem:  Postoperative hematoma of skin following non-dermatologic procedure    Postoperative hematoma at BiV ICD placement site  Septic on presentation with lactic acidosis and leukocytosis  - CT imaging of the chest shows a 3.8 x 2.5 cm fluid collection by the battery pack, likely hematoma. There is also inflammatory stranding, which may be infectious versus postoperative inflammation.   - Continue Vancomycin and Rocephin.   - Consult cardiology for management of the hematoma  - Xarelto on hold 2/2 hematoma  - Continue monitor on telemetry for now.   - Continue Norco for pain   - Blood cx pending  - Lactic acid

## 2024-12-16 NOTE — CONSULTS
Pharmacy Note  Vancomycin Consult    Benji Israel is a 79 y.o. male started on Vancomycin for SSTI X7 days ; consult received from Dr. Obrien to manage therapy. Also receiving the following antibiotics: .    - Rocephin 1gm q24h     Allergies:  Jardiance [empagliflozin] and Demerol     Tmax: 99    Micro: BC collected     Recent Labs     12/13/24  0450 12/15/24  1308   CREATININE 0.7* 1.0     Recent Labs     12/13/24  0450 12/15/24  1308   WBC 9.6 11.8*     Estimated Creatinine Clearance: 68 mL/min (based on SCr of 1 mg/dL).    Intake/Output Summary (Last 24 hours) at 12/15/2024 1950  Last data filed at 12/15/2024 1800  Gross per 24 hour   Intake 1050 ml   Output 1380 ml   Net -330 ml     Wt Readings from Last 1 Encounters:   12/15/24 92.4 kg (203 lb 12.8 oz)       Body mass index is 27.64 kg/m².    Loading dose (critically ill or in ICU, require dialysis or renal replacement therapy): Vancomycin 25 mg/kg IVPB x 1 (maximum 2500 mg).  Maintenance dose: 10-20 mg/kg (maximum: 2000 mg/dose and 4500 mg/day) starting at the next dosing interval determined by renal function  Pulse dose: fluctuating renal function, ANA, ESRD  CRRT: 7.5-10 mg/kg q12h   Goal Vancomycin trough: 15-20 mcg/mL   Goal Vancomycin AUC: 400-600     Assessment/Plan:  - Pt. Received vanc 2000mg X1 ~15:00 today , recommended vanc 1500mg every 24 h starting 13:00 on 12/16.  [id number]: 428 mg/L.hr  AUC24,ss: 462 mg/L.hr  Probability of AUC24 > 400: 74 %  Ctrough,ss: 11.6 mg/L  . Vancomycin level ordered for 12/17 am. Timing of trough level will be determined based on culture results, renal function, and clinical response.     Thank you for the consult.    ---------------------------------------------------------------------------------  12/16/2024 8:59 AM                                           Vancomycin Progress Note  Day: 2 Indication: Surgical site infection + SSTI Other Antibiotics: ceftriaxone     Recent Labs     12/15/24  1308   CREATININE 1.0

## 2024-12-16 NOTE — H&P
H&P is reflective of office visit from 12/03/2024. No significant changes noted.     Nile Torres MD  Cardiac Electrophysiology  [facility]  [phone] [facility]

## 2024-12-16 NOTE — ACP (ADVANCE CARE PLANNING)
Advance Care Planning   General Advance Care Planning (ACP) Conversation    Date of Conversation: 12/15/2024  Conducted with: Patient with Decision Making Capacity  Other persons present: None    Healthcare Decision Maker:    Primary Decision Maker: Yamile Desir - Child - [phone]    Today we documented Decision Maker(s) consistent with Legal Next of Kin hierarchy.  Content/Action Overview:  Has ACP document(s) on file - do NOT reflect the patient's care preferences, patient to provide new document(s)  Reviewed DNR/DNI and patient elects Full Code (Attempt Resuscitation)      Length of Voluntary ACP Conversation in minutes:  <16 minutes (Non-Billable)    Janiya Figueroa RN

## 2024-12-16 NOTE — PROGRESS NOTES
Occupational Therapy  Facility/Department: [place] [id number] - [facility] SURG  Occupational Therapy Initial Assessment, Treatment, and Discharge    Name: Benji Israel  : 1945  MRN: [id number]  Date of Service: 2024    Discharge Recommendations:  Home with assist PRN  OT Equipment Recommendations  Equipment Needed: No     If pt is unable to be seen after this session, please let this note serve as discharge summary.  Please see case management note for discharge disposition.  Thank you.    Patient Diagnosis(es): The primary encounter diagnosis was Hematoma of left chest wall, initial encounter. Diagnoses of Elevated troponin, Elevated lactic acid level, and SIRS (systemic inflammatory response syndrome) (HCC) were also pertinent to this visit.  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  Past Surgical History:  has a past surgical history that includes Appendectomy; meniscectomy (Right, 1965); Tonsillectomy; Cardiac catheterization (07/10/2017); Carpal tunnel release (Bilateral); Coronary artery bypass graft (2017); transesophageal echocardiogram (2017); Total knee arthroplasty (Right); Coronary angioplasty with stent (2018); bronchoscopy (N/A, 2020); bronchoscopy (2020); Thoracoscopy (Left, 2020); bronchoscopy (N/A, 2021); bronchoscopy (2021); and bronchoscopy (N/A, 3/13/2024).           Assessment  Assessment: Pt is 80 yo male presenting from home w/ wife, c/o pain and bruising around pacemaker incision (had pacemaker placed on ), found to have hematoma.

## 2024-12-16 NOTE — PROGRESS NOTES
Physical Therapy  Facility/Department: [place] [id number] - MED SURG  Physical Therapy Initial Assessment/treatment/DC summary    Name: Benji Israel  : 1945  MRN: [id number]  Date of Service: 2024    Discharge Recommendations:  Outpatient PT, Home with assist PRN   PT Equipment Recommendations  Equipment Needed: No      Patient Diagnosis(es): The primary encounter diagnosis was Hematoma of left chest wall, initial encounter. Diagnoses of Elevated troponin, Elevated lactic acid level, and SIRS (systemic inflammatory response syndrome) (HCC) were also pertinent to this visit.  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  Past Surgical History:  has a past surgical history that includes Appendectomy; meniscectomy (Right, 1965); Tonsillectomy; Cardiac catheterization (07/10/2017); Carpal tunnel release (Bilateral); Coronary artery bypass graft (2017); transesophageal echocardiogram (2017); Total knee arthroplasty (Right); Coronary angioplasty with stent (2018); bronchoscopy (N/A, 2020); bronchoscopy (2020); Thoracoscopy (Left, 2020); bronchoscopy (N/A, 2021); bronchoscopy (2021); and bronchoscopy (N/A, 3/13/2024).    Assessment  Body Structures, Functions, Activity Limitations Requiring Skilled Therapeutic Intervention: Decreased balance;Decreased endurance  Assessment: Pt presents to [facility] for hematoma of skin over pacemaker that was placed . Pt reports baseline of living with spouse with 2 DOUG + curb. Pt presents at baseline for PT eval. Pt tolerated

## 2024-12-16 NOTE — PROGRESS NOTES
4 Eyes Skin Assessment and Patient belongings     The patient is being assess for  Admission    I agree that 2 Nurses have performed a thorough Head to Toe Skin Assessment on the patient. ALL assessment sites listed below have been assessed.       Areas assessed by both nurses:   [x]   Head, Face, and Ears   [x]   Shoulders, Back, and Chest  [x]   Arms, Elbows, and Hands   [x]   Coccyx, Sacrum, and IschIum  [x]   Legs, Feet, and Heels        Does the Patient have Skin Breakdown?   Hematoma on left chest from pacemaker site, see photos below          Jesús Prevention initiated:  No   Wound Care Orders initiated:  Yes      [facility] nurse consulted for Pressure Injury (Stage 3,4, Unstageable, DTI, NWPT, and Complex wounds), New and Established Ostomies:  Yes      I agree that 2 Nurses have reviewed patient belongings with the patient/family and documented in the flowsheet upon admission or transfer to the unit.     Belongings  Dental Appliances: None  Vision - Corrective Lenses: Eyeglasses  Hearing Aid: None  Clothing: Pants, Undergarments, Shirt, Jacket/Coat, Footwear  Jewelry: Ring  Electronic Devices: Cell Phone  Weapons (Notify Protective Services/Security): None  Home Medications: Locked  Valuables Given To: Locker  Provide Name(s) of Who Valuable(s) Were Given To: LETICIA       Nurse 1 eSignature: Electronically signed by Gigi Hamilton RN on 12/15/24 at 8:41 PM EST    **SHARE this note so that the co-signing nurse is able to place an eSignature**    Nurse 2 eSignature: Electronically signed by Maddison Jackson RN on 12/15/24 at 8:43 PM EST

## 2024-12-16 NOTE — ED PROVIDER NOTES
[facility] - MED SURG     EMERGENCY DEPARTMENT ENCOUNTER     Location: [facility] - MED SURG  12/15/2024  Note Started: 10:15 PM EST 12/15/24      Patient Identification  Benji Israel is a 79 y.o. male  Chief Complaint   Patient presents with    Shortness of Breath     Pt c/o SOB and increased bruising following pacemaker placement.  Surgery done 12/12/24.  Area feels firm to touch and bruising is spreading.  Restarted xarelto today.    Post-op Problem       HPI:Benji Israel was evaluated in the Emergency Department for shortness of breath and swelling.  Patient reports that he has increased swelling at his surgical site/pacer pocket.  He states 3 days ago the procedure was performed and there was significant trouble and a lead had to be repositioned and a second surgery.  Following this he did have a significant hematoma that seem to drain into the surrounding tissues causing bruising and he thought things were better until he woke up this morning with increased swelling.  This prompted presentation.  Although initial history and physical exam information was obtained by TAMMY/NPP/MD/DO (who also dictated a record of this visit), I personally saw the patient and performed and made/approved the management plan and take responsibility for the patient management.      PHYSICAL EXAM:  Patient has tenderness and swelling at the surgical site with some fluctuance.  No draining from the wound.  The area is warm and ecchymotic.  Heart regular rate and rhythm.    EKG Interpreted by me  Paced rhythm with rate of 94, rightward axis, QTc 567 ms, no Sgarbossa criteria.  No significant change from EKG dated 11 December 2024    CT CHEST PULMONARY EMBOLISM W CONTRAST   Preliminary Result   1. No evidence of pulmonary embolism or acute pulmonary arterial abnormality.   2. Postsurgical changes within the subcutaneous aspect of the anterior   superior left chest wall status post recent left subclavian AICD placement,   including a 
All other components within normal limits   COMPREHENSIVE METABOLIC PANEL W/ REFLEX TO MG FOR LOW K - Abnormal; Notable for the following components:    Glucose 122 (*)     BUN 21 (*)     All other components within normal limits   TROPONIN - Abnormal; Notable for the following components:    Troponin, High Sensitivity 134 (*)     All other components within normal limits   BRAIN NATRIURETIC PEPTIDE - Abnormal; Notable for the following components:    NT Pro-BNP 2,112 (*)     All other components within normal limits   LACTIC ACID - Abnormal; Notable for the following components:    Lactic Acid 2.7 (*)     All other components within normal limits   MAGNESIUM - Abnormal; Notable for the following components:    Magnesium 1.59 (*)     All other components within normal limits   TROPONIN - Abnormal; Notable for the following components:    Troponin, High Sensitivity 116 (*)     All other components within normal limits   POCT GLUCOSE - Abnormal; Notable for the following components:    POC Glucose 138 (*)     All other components within normal limits   CULTURE, BLOOD 1   CULTURE, BLOOD 2   LACTIC ACID   BASIC METABOLIC PANEL W/ REFLEX TO MG FOR LOW K   CBC WITH AUTO DIFFERENTIAL     When ordered, only abnormal lab results are displayed.  All other labs were within normal range or not returned as of this dictation.     RADIOLOGY  Non-plain film images such as CT, U/S, and MRI are read by the radiologist.  Plain radiographic images are visualized and preliminarily interpreted by the ED Provider with the below findings:    Interpretation per the Radiologist below, if available at the time of this note:  CT CHEST PULMONARY EMBOLISM W CONTRAST   Preliminary Result   1. No evidence of pulmonary embolism or acute pulmonary arterial abnormality.   2. Postsurgical changes within the subcutaneous aspect of the anterior   superior left chest wall status post recent left subclavian AICD placement,   including a battery pack and

## 2024-12-16 NOTE — CONSULTS
Electrophysiology Consultation   Date: 12/16/2024  Admit Date:  12/15/2024  Reason for Consultation: Pocket hematoma  Consult Requesting Physician: Theresa Quiroz MD     Chief Complaint   Patient presents with    Shortness of Breath     Pt c/o SOB and increased bruising following pacemaker placement.  Surgery done 12/12/24.  Area feels firm to touch and bruising is spreading.  Restarted xarelto today.    Post-op Problem     HPI:   Mr. Israel is a pleasant 79 year old male with a medical history significant for ischemic cardiomyopathy status post CABG, paroxysmal atrial fibrillation, COPD/restrictive lung disease, diabetes mellitus type II, cardiac amyloidosis, hypertension, hyperlipidemia, premature ventricular contractions, non-sustained ventricular tachycardia, and LBBB who presents from home with pocket hematoma.  According to patient shortly after he got home he began to have severe left-sided chest pain with bruising down his arm and his left chest wall.  He was unable to sleep and is coming more short of breath.  He called the online provider was directed to the emergency room.    Patient denies fevers, orthopnea, PND, lower extremity edema, abdominal swelling, chills, visual changes, headaches, sore throat, cough, abdominal pain, nausea, vomiting, bleeding, bruising, dysuria, muscle/joint pain, confusion, depression, anxiety, skin lesions, etc.    Emergency Room/Hospital Course:  Patient was evaluated in the ER on 12/15/2024.  His chemistry was notable for hypomagnesemia, borderline hyperglycemia, elevated troponin enzymes, and elevated lactic acid.  His CBC was notable for mild leukocytosis.  His chest CTPE was notable for left pocket hematoma with other non-cardiac abnormalities.  Electrophysiology was consulted.    Past Medical History:   Diagnosis Date    Bronchitis     chronic bronchitis once or twice per year twice has gone into pneumonia    CAD (coronary artery disease) 07/10/2017    + Stress,

## 2024-12-17 PROBLEM — S20.212A HEMATOMA OF LEFT CHEST WALL: Status: ACTIVE | Noted: 2024-12-15

## 2024-12-17 LAB
ANION GAP SERPL CALCULATED.3IONS-SCNC: 9 MMOL/L (ref 3–16)
BUN SERPL-MCNC: 15 MG/DL (ref 7–20)
CALCIUM SERPL-MCNC: 8.8 MG/DL (ref 8.3–10.6)
CHLORIDE SERPL-SCNC: 102 MMOL/L (ref 99–110)
CO2 SERPL-SCNC: 25 MMOL/L (ref 21–32)
CREAT SERPL-MCNC: 0.8 MG/DL (ref 0.8–1.3)
EST. AVERAGE GLUCOSE BLD GHB EST-MCNC: 116.9 MG/DL
GFR SERPLBLD CREATININE-BSD FMLA CKD-EPI: >90 ML/MIN/{1.73_M2}
GLUCOSE BLD-MCNC: 102 MG/DL (ref 70–99)
GLUCOSE BLD-MCNC: 137 MG/DL (ref 70–99)
GLUCOSE BLD-MCNC: 158 MG/DL (ref 70–99)
GLUCOSE BLD-MCNC: 98 MG/DL (ref 70–99)
GLUCOSE SERPL-MCNC: 104 MG/DL (ref 70–99)
HBA1C MFR BLD: 5.7 %
HCT VFR BLD AUTO: 30 % (ref 40.5–52.5)
HCT VFR BLD AUTO: 34.2 % (ref 40.5–52.5)
HGB BLD-MCNC: 10.6 G/DL (ref 13.5–17.5)
HGB BLD-MCNC: 11.7 G/DL (ref 13.5–17.5)
PERFORMED ON: ABNORMAL
PERFORMED ON: NORMAL
POTASSIUM SERPL-SCNC: 3.6 MMOL/L (ref 3.5–5.1)
SODIUM SERPL-SCNC: 136 MMOL/L (ref 136–145)
VANCOMYCIN SERPL-MCNC: 12.6 UG/ML

## 2024-12-17 PROCEDURE — 83036 HEMOGLOBIN GLYCOSYLATED A1C: CPT

## 2024-12-17 PROCEDURE — 99232 SBSQ HOSP IP/OBS MODERATE 35: CPT | Performed by: NURSE PRACTITIONER

## 2024-12-17 PROCEDURE — 6370000000 HC RX 637 (ALT 250 FOR IP): Performed by: NURSE PRACTITIONER

## 2024-12-17 PROCEDURE — 85014 HEMATOCRIT: CPT

## 2024-12-17 PROCEDURE — 6360000002 HC RX W HCPCS

## 2024-12-17 PROCEDURE — 36415 COLL VENOUS BLD VENIPUNCTURE: CPT

## 2024-12-17 PROCEDURE — 6370000000 HC RX 637 (ALT 250 FOR IP): Performed by: INTERNAL MEDICINE

## 2024-12-17 PROCEDURE — 2580000003 HC RX 258

## 2024-12-17 PROCEDURE — 80048 BASIC METABOLIC PNL TOTAL CA: CPT

## 2024-12-17 PROCEDURE — 6370000000 HC RX 637 (ALT 250 FOR IP)

## 2024-12-17 PROCEDURE — 80202 ASSAY OF VANCOMYCIN: CPT

## 2024-12-17 PROCEDURE — 2500000003 HC RX 250 WO HCPCS

## 2024-12-17 PROCEDURE — 1200000000 HC SEMI PRIVATE

## 2024-12-17 PROCEDURE — 85018 HEMOGLOBIN: CPT

## 2024-12-17 PROCEDURE — 6360000002 HC RX W HCPCS: Performed by: INTERNAL MEDICINE

## 2024-12-17 PROCEDURE — 94640 AIRWAY INHALATION TREATMENT: CPT

## 2024-12-17 RX ORDER — VANCOMYCIN 1 G/200ML
1000 INJECTION, SOLUTION INTRAVENOUS EVERY 12 HOURS
Status: DISCONTINUED | OUTPATIENT
Start: 2024-12-17 | End: 2024-12-17

## 2024-12-17 RX ORDER — DOXYCYCLINE HYCLATE 100 MG
100 TABLET ORAL EVERY 12 HOURS SCHEDULED
Status: DISCONTINUED | OUTPATIENT
Start: 2024-12-17 | End: 2024-12-18 | Stop reason: HOSPADM

## 2024-12-17 RX ADMIN — ALBUTEROL SULFATE 2.5 MG: 2.5 SOLUTION RESPIRATORY (INHALATION) at 09:05

## 2024-12-17 RX ADMIN — TIOTROPIUM BROMIDE INHALATION SPRAY 2 PUFF: 3.12 SPRAY, METERED RESPIRATORY (INHALATION) at 09:06

## 2024-12-17 RX ADMIN — ACETAMINOPHEN 650 MG: 325 TABLET ORAL at 08:50

## 2024-12-17 RX ADMIN — VANCOMYCIN 1000 MG: 1 INJECTION, SOLUTION INTRAVENOUS at 04:24

## 2024-12-17 RX ADMIN — VANCOMYCIN 1000 MG: 1 INJECTION, SOLUTION INTRAVENOUS at 15:58

## 2024-12-17 RX ADMIN — BENZONATATE 100 MG: 100 CAPSULE ORAL at 00:00

## 2024-12-17 RX ADMIN — Medication 2 PUFF: at 09:06

## 2024-12-17 RX ADMIN — Medication 2 PUFF: at 20:10

## 2024-12-17 RX ADMIN — TORSEMIDE 20 MG: 20 TABLET ORAL at 08:51

## 2024-12-17 RX ADMIN — DOXYCYCLINE HYCLATE 100 MG: 100 TABLET, COATED ORAL at 20:33

## 2024-12-17 RX ADMIN — WATER 1000 MG: 1 INJECTION INTRAMUSCULAR; INTRAVENOUS; SUBCUTANEOUS at 08:49

## 2024-12-17 RX ADMIN — SODIUM CHLORIDE 25 ML: 9 INJECTION, SOLUTION INTRAVENOUS at 15:57

## 2024-12-17 RX ADMIN — LORAZEPAM 0.5 MG: 0.5 TABLET ORAL at 20:33

## 2024-12-17 RX ADMIN — GUAIFENESIN 600 MG: 600 TABLET ORAL at 08:50

## 2024-12-17 RX ADMIN — ATORVASTATIN CALCIUM 80 MG: 80 TABLET, FILM COATED ORAL at 20:33

## 2024-12-17 RX ADMIN — METOPROLOL SUCCINATE 12.5 MG: 25 TABLET, FILM COATED, EXTENDED RELEASE ORAL at 08:51

## 2024-12-17 RX ADMIN — SODIUM CHLORIDE, PRESERVATIVE FREE 10 ML: 5 INJECTION INTRAVENOUS at 20:33

## 2024-12-17 RX ADMIN — SACUBITRIL AND VALSARTAN 1 TABLET: 49; 51 TABLET, FILM COATED ORAL at 20:33

## 2024-12-17 RX ADMIN — GUAIFENESIN 600 MG: 600 TABLET ORAL at 20:33

## 2024-12-17 RX ADMIN — SODIUM CHLORIDE, PRESERVATIVE FREE 10 ML: 5 INJECTION INTRAVENOUS at 08:52

## 2024-12-17 RX ADMIN — ALBUTEROL SULFATE 2.5 MG: 2.5 SOLUTION RESPIRATORY (INHALATION) at 20:08

## 2024-12-17 RX ADMIN — ACETAMINOPHEN 650 MG: 325 TABLET ORAL at 20:33

## 2024-12-17 RX ADMIN — SACUBITRIL AND VALSARTAN 1 TABLET: 49; 51 TABLET, FILM COATED ORAL at 08:51

## 2024-12-17 ASSESSMENT — PAIN DESCRIPTION - LOCATION
LOCATION: CHEST
LOCATION: CHEST

## 2024-12-17 ASSESSMENT — PAIN SCALES - GENERAL
PAINLEVEL_OUTOF10: 0
PAINLEVEL_OUTOF10: 2
PAINLEVEL_OUTOF10: 3
PAINLEVEL_OUTOF10: 0
PAINLEVEL_OUTOF10: 0

## 2024-12-17 ASSESSMENT — PAIN DESCRIPTION - ORIENTATION
ORIENTATION: LEFT
ORIENTATION: LEFT

## 2024-12-17 NOTE — PROGRESS NOTES
[facility]     Electrophysiology                                     Progress Note    Admission date:  12/15/2024    Reason for follow up visit: pocket hematoma     HPI/CC: Benji Israel was admitted on 12/15/2024 with pocket hematoma s/p BiV ICD implant and subsequent lead revision 2024. Rhythm has been sinus with .     Subjective: He complains of ongoing pain/tenderness at implant site but this is improving. Denies chest pain, palpitations, shortness of breath, and dizziness.       Vitals:  Blood pressure 117/61, pulse 90, temperature 97.6 °F (36.4 °C), temperature source Oral, resp. rate 18, height 1.829 m (6'), weight 92.4 kg (203 lb 12.8 oz), SpO2 96%.  Temp  Av.9 °F (36.6 °C)  Min: 97.5 °F (36.4 °C)  Max: 98.1 °F (36.7 °C)  Pulse  Av.3  Min: 69  Max: 99  BP  Min: 110/70  Max: 126/75  SpO2  Av.9 %  Min: 95 %  Max: 98 %    24 hour I/O    Intake/Output Summary (Last 24 hours) at 2024 1351  Last data filed at 2024 0855  Gross per 24 hour   Intake 1560 ml   Output --   Net 1560 ml     Current Facility-Administered Medications   Medication Dose Route Frequency Provider Last Rate Last Admin    vancomycin (VANCOCIN) 1000 mg in 200 mL IVPB  1,000 mg IntraVENous Q12H Theresa Quiroz MD   Stopped at 24 0608    Tafamidis CAPS 61 mg (Patient Supplied)  61 mg Oral Daily IDALIA Torres Jr., MD   61 mg at 24 0851    glucose chewable tablet 16 g  4 tablet Oral PRN Deb Barrientos APRN - CNP        dextrose bolus 10% 125 mL  125 mL IntraVENous PRN Deb Barrientos APRN - CNP        Or    dextrose bolus 10% 250 mL  250 mL IntraVENous PRN Deb Barrientos APRN - CNP        glucagon injection 1 mg  1 mg SubCUTAneous PRN Deb Barrientos APRN - CNP        dextrose 10 % infusion   IntraVENous Continuous PRN Deb Barrientosindley, APRN - CNP        insulin lispro (HUMALOG,ADMELOG) injection vial 0-4 Units  0-4

## 2024-12-17 NOTE — PROGRESS NOTES
[facility] Medicine Progress Note  V 10.25      Date of Admission: 12/15/2024    Hospital Day: 3      Chief Admission Complaint:      Shortness of Breath (Pt c/o SOB and increased bruising following pacemaker placement.  Surgery done 12/12/24.  Area feels firm to touch and bruising is spreading.  Restarted xarelto today.) and Post-op Problem       Subjective:      Resting in bed, NAD, occasional cough-updated regarding plan of care    Presenting Admission History:       \"This is a 79 y.o. male who presented to [facility] with left chest swelling and bruising.  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     Recently underwent BiV ICD placement on 12/11/2024, this was complicated by RV lead dislodgment.  Discharged home on 12/13/2024, since that time has been feeling overall okay but endorsing some weakness and pain at the operative site.  Began a worsening bruising, pain and swelling on the left chest where the device was placed.  Called the on-call cardiology service, who recommended patient come to the ED for evaluation.\"    Assessment/Plan:      Current Principal Problem:  Postoperative hematoma of skin following non-dermatologic procedure    Postoperative hematoma at BiV ICD placement site  Septic on presentation with lactic acidosis and leukocytosis  - CT imaging of the chest shows a 3.8 x 2.5 cm fluid collection by the battery pack, likely hematoma. There is also inflammatory stranding, which may be infectious versus postoperative inflammation.   - Vancomycin changed to Doxy/Rocephin.   - Consult cardiology for management of the hematoma  - Xarelto on hold 2/2 hematoma  - Continue monitor on telemetry for now.   - Continue Norco for pain   - Blood cx ngtd  - Lactic acid decreased with IVF hydration.  - Cardiology would like 48 hours of Abx coverage and monitoring of site prior to

## 2024-12-18 VITALS
WEIGHT: 203.8 LBS | HEIGHT: 72 IN | TEMPERATURE: 97.6 F | HEART RATE: 62 BPM | DIASTOLIC BLOOD PRESSURE: 67 MMHG | OXYGEN SATURATION: 95 % | SYSTOLIC BLOOD PRESSURE: 102 MMHG | RESPIRATION RATE: 18 BRPM | BODY MASS INDEX: 27.6 KG/M2

## 2024-12-18 LAB
ANION GAP SERPL CALCULATED.3IONS-SCNC: 14 MMOL/L (ref 3–16)
BASOPHILS # BLD: 0.1 K/UL (ref 0–0.2)
BASOPHILS NFR BLD: 0.7 %
BUN SERPL-MCNC: 13 MG/DL (ref 7–20)
CALCIUM SERPL-MCNC: 9.1 MG/DL (ref 8.3–10.6)
CHLORIDE SERPL-SCNC: 103 MMOL/L (ref 99–110)
CO2 SERPL-SCNC: 22 MMOL/L (ref 21–32)
CREAT SERPL-MCNC: 0.8 MG/DL (ref 0.8–1.3)
DEPRECATED RDW RBC AUTO: 15.1 % (ref 12.4–15.4)
EOSINOPHIL # BLD: 0.3 K/UL (ref 0–0.6)
EOSINOPHIL NFR BLD: 3.5 %
GFR SERPLBLD CREATININE-BSD FMLA CKD-EPI: >90 ML/MIN/{1.73_M2}
GLUCOSE BLD-MCNC: 140 MG/DL (ref 70–99)
GLUCOSE BLD-MCNC: 91 MG/DL (ref 70–99)
GLUCOSE SERPL-MCNC: 159 MG/DL (ref 70–99)
HCT VFR BLD AUTO: 33.8 % (ref 40.5–52.5)
HGB BLD-MCNC: 11.6 G/DL (ref 13.5–17.5)
LYMPHOCYTES # BLD: 2.1 K/UL (ref 1–5.1)
LYMPHOCYTES NFR BLD: 22.1 %
MAGNESIUM SERPL-MCNC: 1.88 MG/DL (ref 1.8–2.4)
MCH RBC QN AUTO: 30.9 PG (ref 26–34)
MCHC RBC AUTO-ENTMCNC: 34.2 G/DL (ref 31–36)
MCV RBC AUTO: 90.5 FL (ref 80–100)
MONOCYTES # BLD: 0.9 K/UL (ref 0–1.3)
MONOCYTES NFR BLD: 9.7 %
NEUTROPHILS # BLD: 6 K/UL (ref 1.7–7.7)
NEUTROPHILS NFR BLD: 64 %
PERFORMED ON: ABNORMAL
PERFORMED ON: NORMAL
PLATELET # BLD AUTO: 279 K/UL (ref 135–450)
PMV BLD AUTO: 7.4 FL (ref 5–10.5)
POTASSIUM SERPL-SCNC: 3.5 MMOL/L (ref 3.5–5.1)
PROCALCITONIN SERPL IA-MCNC: 0.04 NG/ML (ref 0–0.15)
RBC # BLD AUTO: 3.74 M/UL (ref 4.2–5.9)
SODIUM SERPL-SCNC: 139 MMOL/L (ref 136–145)
WBC # BLD AUTO: 9.3 K/UL (ref 4–11)

## 2024-12-18 PROCEDURE — 6370000000 HC RX 637 (ALT 250 FOR IP): Performed by: INTERNAL MEDICINE

## 2024-12-18 PROCEDURE — 2500000003 HC RX 250 WO HCPCS

## 2024-12-18 PROCEDURE — 99232 SBSQ HOSP IP/OBS MODERATE 35: CPT | Performed by: NURSE PRACTITIONER

## 2024-12-18 PROCEDURE — 6370000000 HC RX 637 (ALT 250 FOR IP): Performed by: NURSE PRACTITIONER

## 2024-12-18 PROCEDURE — 36415 COLL VENOUS BLD VENIPUNCTURE: CPT

## 2024-12-18 PROCEDURE — 84145 PROCALCITONIN (PCT): CPT

## 2024-12-18 PROCEDURE — 6370000000 HC RX 637 (ALT 250 FOR IP)

## 2024-12-18 PROCEDURE — 85025 COMPLETE CBC W/AUTO DIFF WBC: CPT

## 2024-12-18 PROCEDURE — 94640 AIRWAY INHALATION TREATMENT: CPT

## 2024-12-18 PROCEDURE — 83735 ASSAY OF MAGNESIUM: CPT

## 2024-12-18 PROCEDURE — 6360000002 HC RX W HCPCS

## 2024-12-18 PROCEDURE — 80048 BASIC METABOLIC PNL TOTAL CA: CPT

## 2024-12-18 RX ORDER — METOPROLOL SUCCINATE 25 MG/1
12.5 TABLET, EXTENDED RELEASE ORAL DAILY
Qty: 30 TABLET | Refills: 3 | Status: SHIPPED | OUTPATIENT
Start: 2024-12-19

## 2024-12-18 RX ADMIN — SACUBITRIL AND VALSARTAN 1 TABLET: 49; 51 TABLET, FILM COATED ORAL at 09:08

## 2024-12-18 RX ADMIN — ACETAMINOPHEN 650 MG: 325 TABLET ORAL at 06:37

## 2024-12-18 RX ADMIN — DOXYCYCLINE HYCLATE 100 MG: 100 TABLET, COATED ORAL at 09:09

## 2024-12-18 RX ADMIN — ALBUTEROL SULFATE 2.5 MG: 2.5 SOLUTION RESPIRATORY (INHALATION) at 07:51

## 2024-12-18 RX ADMIN — SODIUM CHLORIDE, PRESERVATIVE FREE 10 ML: 5 INJECTION INTRAVENOUS at 09:10

## 2024-12-18 RX ADMIN — TORSEMIDE 20 MG: 20 TABLET ORAL at 09:09

## 2024-12-18 RX ADMIN — WATER 1000 MG: 1 INJECTION INTRAMUSCULAR; INTRAVENOUS; SUBCUTANEOUS at 09:06

## 2024-12-18 RX ADMIN — LORAZEPAM 0.5 MG: 0.5 TABLET ORAL at 06:40

## 2024-12-18 RX ADMIN — Medication 2 PUFF: at 07:51

## 2024-12-18 RX ADMIN — METOPROLOL SUCCINATE 12.5 MG: 25 TABLET, FILM COATED, EXTENDED RELEASE ORAL at 09:08

## 2024-12-18 RX ADMIN — TIOTROPIUM BROMIDE INHALATION SPRAY 2 PUFF: 3.12 SPRAY, METERED RESPIRATORY (INHALATION) at 07:51

## 2024-12-18 RX ADMIN — GUAIFENESIN 600 MG: 600 TABLET ORAL at 09:09

## 2024-12-18 ASSESSMENT — PAIN SCALES - GENERAL
PAINLEVEL_OUTOF10: 3
PAINLEVEL_OUTOF10: 0

## 2024-12-18 ASSESSMENT — PAIN DESCRIPTION - LOCATION: LOCATION: TEETH

## 2024-12-18 NOTE — DISCHARGE INSTRUCTIONS
Follow up with PCP and Cardiology    Complete Doxycycline    Continue Inhalers    Follow up with Pulmonology

## 2024-12-18 NOTE — PROGRESS NOTES
[facility]     Electrophysiology                                     Progress Note    Admission date:  12/15/2024    Reason for follow up visit: pocket hematoma     HPI/CC: Benji Israel was admitted on 12/15/2024 with pocket hematoma s/p BiV ICD implant and subsequent lead revision 2024. Rhythm has been sinus with .     Subjective: He is feeling well. No longer with pain at implant site. Denies chest pain, palpitations, shortness of breath, and dizziness.         Vitals:  Blood pressure 125/76, pulse 83, temperature 98 °F (36.7 °C), temperature source Oral, resp. rate 18, height 1.829 m (6'), weight 92.4 kg (203 lb 12.8 oz), SpO2 95%.  Temp  Av °F (36.7 °C)  Min: 97.8 °F (36.6 °C)  Max: 98.2 °F (36.8 °C)  Pulse  Av  Min: 73  Max: 100  BP  Min: 110/60  Max: 148/82  SpO2  Av.6 %  Min: 95 %  Max: 98 %    24 hour I/O    Intake/Output Summary (Last 24 hours) at 2024 1226  Last data filed at 2024  Gross per 24 hour   Intake 240 ml   Output --   Net 240 ml     Current Facility-Administered Medications   Medication Dose Route Frequency Provider Last Rate Last Admin    doxycycline hyclate (VIBRA-TABS) tablet 100 mg  100 mg Oral 2 times per day Adithya Kohler APRN - CNP   100 mg at 24 0909    Tafamidis CAPS 61 mg (Patient Supplied)  61 mg Oral Daily IDALIA Torres Jr., MD   61 mg at 24 0909    glucose chewable tablet 16 g  4 tablet Oral PRN Deb Barrientos APRN - CNP        dextrose bolus 10% 125 mL  125 mL IntraVENous PRN Deb Barrientos APRN - CNP        Or    dextrose bolus 10% 250 mL  250 mL IntraVENous PRN Deb Barrientos APRN - CNP        glucagon injection 1 mg  1 mg SubCUTAneous PRN Deb Barrientos APRN - CNP        dextrose 10 % infusion   IntraVENous Continuous PRN Deb Barrientos APRN - CNP        insulin lispro (HUMALOG,ADMELOG) injection vial 0-4 Units  0-4 Units SubCUTAneous 4x

## 2024-12-18 NOTE — PLAN OF CARE
Problem: Chronic Conditions and Co-morbidities  Goal: Patient's chronic conditions and co-morbidity symptoms are monitored and maintained or improved  Outcome: Progressing     Problem: Discharge Planning  Goal: Discharge to home or other facility with appropriate resources  Outcome: Progressing     Problem: Pain  Goal: Verbalizes/displays adequate comfort level or baseline comfort level  Outcome: Progressing     Problem: ABCDS Injury Assessment  Goal: Absence of physical injury  Outcome: Progressing     Problem: Cardiovascular - Adult  Goal: Maintains optimal cardiac output and hemodynamic stability  Outcome: Progressing  Goal: Absence of cardiac dysrhythmias or at baseline  Outcome: Progressing     Problem: Hematologic - Adult  Goal: Maintains hematologic stability  Outcome: Progressing     
  Problem: Chronic Conditions and Co-morbidities  Goal: Patient's chronic conditions and co-morbidity symptoms are monitored and maintained or improved  Outcome: Progressing     Problem: Discharge Planning  Goal: Discharge to home or other facility with appropriate resources  Outcome: Progressing     Problem: Pain  Goal: Verbalizes/displays adequate comfort level or baseline comfort level  Outcome: Progressing     Problem: ABCDS Injury Assessment  Goal: Absence of physical injury  Outcome: Progressing     Problem: Cardiovascular - Adult  Goal: Maintains optimal cardiac output and hemodynamic stability  Outcome: Progressing  Goal: Absence of cardiac dysrhythmias or at baseline  Outcome: Progressing     Problem: Hematologic - Adult  Goal: Maintains hematologic stability  Outcome: Progressing     Problem: Respiratory - Adult  Goal: Achieves optimal ventilation and oxygenation  Outcome: Progressing     Problem: Skin/Tissue Integrity - Adult  Goal: Skin integrity remains intact  Outcome: Progressing  Goal: Incisions, wounds, or drain sites healing without S/S of infection  Outcome: Progressing  Goal: Oral mucous membranes remain intact  Outcome: Progressing     Problem: Safety - Adult  Goal: Free from fall injury  Outcome: Progressing     
  Problem: Chronic Conditions and Co-morbidities  Goal: Patient's chronic conditions and co-morbidity symptoms are monitored and maintained or improved  Outcome: Progressing  Flowsheets (Taken 12/17/2024 0856)  Care Plan - Patient's Chronic Conditions and Co-Morbidity Symptoms are Monitored and Maintained or Improved: Monitor and assess patient's chronic conditions and comorbid symptoms for stability, deterioration, or improvement     Problem: Discharge Planning  Goal: Discharge to home or other facility with appropriate resources  Outcome: Progressing  Flowsheets (Taken 12/17/2024 0856)  Discharge to home or other facility with appropriate resources: Identify barriers to discharge with patient and caregiver     Problem: Pain  Goal: Verbalizes/displays adequate comfort level or baseline comfort level  12/17/2024 1134 by Concha Peoples RN  Outcome: Progressing  Flowsheets (Taken 12/17/2024 0810)  Verbalizes/displays adequate comfort level or baseline comfort level: Encourage patient to monitor pain and request assistance  12/17/2024 0021 by Sanjuana Ramsay RN  Flowsheets (Taken 12/17/2024 0021)  Verbalizes/displays adequate comfort level or baseline comfort level:   Encourage patient to monitor pain and request assistance   Assess pain using appropriate pain scale   Administer analgesics based on type and severity of pain and evaluate response   Implement non-pharmacological measures as appropriate and evaluate response  12/17/2024 0021 by Sanjuana Ramsay RN  Outcome: Progressing  Flowsheets (Taken 12/17/2024 0021)  Verbalizes/displays adequate comfort level or baseline comfort level:   Encourage patient to monitor pain and request assistance   Assess pain using appropriate pain scale   Administer analgesics based on type and severity of pain and evaluate response   Implement non-pharmacological measures as appropriate and evaluate response     Problem: ABCDS Injury Assessment  Goal: Absence of physical 
  Problem: Pain  Goal: Verbalizes/displays adequate comfort level or baseline comfort level  12/17/2024 0021 by Sanjuana Ramsay RN  Flowsheets (Taken 12/17/2024 0021)  Verbalizes/displays adequate comfort level or baseline comfort level:   Encourage patient to monitor pain and request assistance   Assess pain using appropriate pain scale   Administer analgesics based on type and severity of pain and evaluate response   Implement non-pharmacological measures as appropriate and evaluate response   Pt denies pain  
  Problem: Pain  Goal: Verbalizes/displays adequate comfort level or baseline comfort level  12/17/2024 2310 by Sanjuana Ramsay, RN  Outcome: Progressing  Pt c/o L side chest wall pain; PRN Tylenol requested & given per MAR. Pt satisfied.   
Progressing  Flowsheets (Taken 12/16/2024 0903)  Absence of cardiac dysrhythmias or at baseline: Monitor cardiac rate and rhythm  12/15/2024 2235 by Gigi Hamilton RN  Outcome: Progressing     Problem: Hematologic - Adult  Goal: Maintains hematologic stability  12/16/2024 0955 by Concha Peoples RN  Outcome: Progressing  Flowsheets (Taken 12/16/2024 0903)  Maintains hematologic stability: Assess for signs and symptoms of bleeding or hemorrhage  12/15/2024 2235 by Gigi Hamilton RN  Outcome: Progressing     Problem: Respiratory - Adult  Goal: Achieves optimal ventilation and oxygenation  Outcome: Progressing     Problem: Skin/Tissue Integrity - Adult  Goal: Skin integrity remains intact  Outcome: Progressing  Flowsheets (Taken 12/16/2024 0955)  Skin Integrity Remains Intact: Monitor for areas of redness and/or skin breakdown  Goal: Incisions, wounds, or drain sites healing without S/S of infection  Outcome: Progressing  Goal: Oral mucous membranes remain intact  Outcome: Progressing

## 2024-12-18 NOTE — DISCHARGE SUMMARY
Hospital Medicine Discharge Summary    Patient: Benji Israel   : 1945     Admit Date: 12/15/2024   Discharge Date: 2024    Disposition:  [x]Home   []HHC  []SNF  []Acute Rehab  []LTAC  []Hospice  Code status:  [x]Full  []DNR/CCA  []Limited (DNR/CCA with Do Not Intubate)  []DNRCC  Condition at Discharge: Stable  Primary Care Provider: Raysa Eli MD    Admitting Provider: Romario Obrien DO  Discharge Provider: Adithya Kohler APRN - CNP     Discharge Diagnoses:      Active Hospital Problems    Diagnosis     Hematoma of left chest wall [S20.212A]     Ventricular tachyarrhythmia (HCC) [I47.20]     PAF (paroxysmal atrial fibrillation) (HCC) [I48.0]        Presenting Admission History:      79 y.o. male who presented to [facility] with left chest swelling and bruising.  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     Recently underwent BiV ICD placement on 2024, this was complicated by RV lead dislodgment.  Discharged home on 2024, since that time has been feeling overall okay but endorsing some weakness and pain at the operative site.  Began a worsening bruising, pain and swelling on the left chest where the device was placed.  Called the on-call cardiology service, who recommended patient come to the ED for evaluation.     Current Principal Problem:  Postoperative hematoma of skin following non-dermatologic procedure     Postoperative hematoma at BiV ICD placement site  Septic on presentation with lactic acidosis and leukocytosis  - CT imaging of the chest shows a 3.8 x 2.5 cm fluid collection by the battery pack, likely hematoma. There is also inflammatory stranding, which may be infectious versus postoperative inflammation.   - Vancomycin changed to Doxy/Rocephin.   - Consult cardiology for management of the hematoma  - Xarelto on hold  hematoma  - Continue

## 2024-12-18 NOTE — CARE COORDINATION
CASE MANAGEMENT DISCHARGE SUMMARY      Discharge to: Home    IMM given: 12/18/2024    Transportation:    Family/car: personal    Confirmed discharge plan with:     Patient: yes     Family:  yes     RN, name: Nilda Figueroa RN       
discharge plan. Freedom of choice list with basic dialogue that supports the patient's individualized plan of care/goals and shares the quality data associated with the providers was provided to: Patient   Patient Representative Name:       The Patient and/or Patient Representative Agree with the Discharge Plan? Yes    Janiya Figueroa RN  Case Management Department

## 2024-12-19 LAB
BACTERIA BLD CULT ORG #2: NORMAL
BACTERIA BLD CULT: NORMAL

## 2024-12-23 ENCOUNTER — NURSE ONLY (OUTPATIENT)
Dept: CARDIOLOGY CLINIC | Age: 79
End: 2024-12-23

## 2024-12-23 DIAGNOSIS — Z95.810 PRESENCE OF CARDIAC RESYNCHRONIZATION THERAPY DEFIBRILLATOR (CRT-D): Primary | ICD-10-CM

## 2024-12-23 NOTE — PATIENT INSTRUCTIONS
New Cardiac Device Implant (Pacemaker and/or Defibrillator) Post Op Instructions  Bathe with water indirectly hitting the incision site. Water and soap may run over the incision site. Do not scrub. Pat dry with a clean towel after bathing.   Leave incision open to the air; do not apply any dressings, ointments, or bandages to the area. Do not apply lotion, perfume/cologne, or powders to the area until it is completely healed.   Any scabbing or skin glue that is noted will fall off within 1-2 weeks after the post op appointment.   If any oozing, bleeding, or pus drainage occurs, please call the office immediately at [phone].       Patient has movement restrictions in place until 8 weeks post op (to the day of implant) unless otherwise instructed by physician.   Patient may not lift the device side arm above shoulder height.   Do not far reach or stretch across body or behind body with effected side.   Do not use this arm for pushing, pulling, or lifting body.   Do not use cane on the effected side.   Patient may not lift anything heavier than a gallon of milk with the associated arm.     Appointments to expect going forward:  Post operatively the patient will have had a 1-week post op check and a 3 month follow up with NP/MD and the device clinic.       Remote Monitoring Instructions    Within 2-3 weeks of your device being implanted, you will receive a call from the  of your device. Please answer this call as it is to set up remote monitoring for your device. Once you receive your in-home monitor, please follow the instructions provided to sync the home monitor to your implanted device. Once you have paired your home monitor to your implanted device, keep your monitor plugged in within 6 feet of where you sleep. Your monitor will routinely check in with your device during sleep hours and transmit any urgent events to the Device Clinic for review.     Please do not send additional routine

## 2024-12-26 ENCOUNTER — TELEPHONE (OUTPATIENT)
Dept: CARDIOLOGY CLINIC | Age: 79
End: 2024-12-26

## 2024-12-26 ENCOUNTER — NURSE ONLY (OUTPATIENT)
Dept: CARDIOLOGY CLINIC | Age: 79
End: 2024-12-26

## 2024-12-26 DIAGNOSIS — B99.9 INFECTION: Primary | ICD-10-CM

## 2024-12-26 RX ORDER — DOXYCYCLINE HYCLATE 100 MG
100 TABLET ORAL 2 TIMES DAILY
Qty: 20 TABLET | Refills: 0 | Status: SHIPPED | OUTPATIENT
Start: 2024-12-26 | End: 2025-01-05

## 2024-12-26 NOTE — TELEPHONE ENCOUNTER
Over night the pt procedure site started bleeding. Pt cleaned it up and seen a small  hole. Pt had Dee appt last week all was good. Pt concerned about infection. Should he place a Band-Aid or leave open to air. Please advise.

## 2024-12-26 NOTE — PATIENT INSTRUCTIONS
Hold xarelto until Monday 12/30 and restart 12/31. Return to device clinic for site check 12/31 and

## 2024-12-26 NOTE — PROGRESS NOTES
Patient here for site check.  Patient device site has a pin-point hole on that is actively bleeding (trace amount of blood). Site is free from s/s of infection    Per AGK: Hold xarelto until Monday 12/30 and restart 12/31. Prophylactic antibiotic called into preferred pharmacy. Return to device clinic for site check 12/31 (and again 1/2 per AGK if needed before patient leaves for [place]). Patient given sterile gauze per AGK- instructed to keep site clean and dry. Patient V/u.

## 2024-12-31 ENCOUNTER — NURSE ONLY (OUTPATIENT)
Dept: CARDIOLOGY CLINIC | Age: 79
End: 2024-12-31

## 2024-12-31 NOTE — PROGRESS NOTES
Patient presents to the Device Clinic today for a repeat site check per Dr. Torres. Appropriate healing noted. Steri Strips removed, old blood noted on steri strips. Patient will call if he feels he needs incision checked again before going to [place] on Friday. Call with any s/s of infection or concerns. Patient v/u

## 2025-01-16 ENCOUNTER — TELEPHONE (OUTPATIENT)
Dept: CARDIOLOGY CLINIC | Age: 80
End: 2025-01-16

## 2025-01-16 RX ORDER — FLUTICASONE FUROATE, UMECLIDINIUM BROMIDE AND VILANTEROL TRIFENATATE 100; 62.5; 25 UG/1; UG/1; UG/1
1 POWDER RESPIRATORY (INHALATION) DAILY
Qty: 60 EACH | Refills: 3 | Status: SHIPPED | OUTPATIENT
Start: 2025-01-16

## 2025-01-16 RX ORDER — METOPROLOL SUCCINATE 25 MG/1
12.5 TABLET, EXTENDED RELEASE ORAL DAILY
Qty: 45 TABLET | Refills: 1 | Status: SHIPPED | OUTPATIENT
Start: 2025-01-16

## 2025-01-16 RX ORDER — SACUBITRIL AND VALSARTAN 49; 51 MG/1; MG/1
1 TABLET, FILM COATED ORAL 2 TIMES DAILY
Qty: 180 TABLET | Refills: 1 | Status: SHIPPED | OUTPATIENT
Start: 2025-01-16

## 2025-01-16 NOTE — TELEPHONE ENCOUNTER
Pt in [place] and will be calling back w/ pharmacy name and address      Medication Refill    Medication needing refilled: sacubitril-valsartan (ENTRESTO) 49-51 MG per tablet [[id number]]        Dosage of the medication: 49-51mg    How are you taking this medication (QD, BID, TID, QID, PRN):   Sig: Take 1 tablet by mouth 2 times daily              30 or 90 day supply called in: 90    When will you run out of your medication:    Which Pharmacy are we sending the medication to?:                 Medication Refill    Medication needing refilled: XARELTO 20 MG TABS tablet [[id number]]        Dosage of the medication: 20mg    How are you taking this medication (QD, BID, TID, QID, PRN):    30 or 90 day supply called in: 90    When will you run out of your medication:    Which Pharmacy are we sending the medication to?:   Sig: TAKE 1 TABLET BY MOUTH EVERY DAY                  Medication Refill    Medication needing refilled: metoprolol succinate (TOPROL XL) 25 MG extended release tablet [[id number]]        Dosage of the medication: 25mg    How are you taking this medication (QD, BID, TID, QID, PRN):   Sig: Take 0.5 tablets by mouth daily              30 or 90 day supply called in: 90    When will you run out of your medication:    Which Pharmacy are we sending the medication to?:

## 2025-02-05 ENCOUNTER — TELEPHONE (OUTPATIENT)
Dept: CARDIOLOGY CLINIC | Age: 80
End: 2025-02-05

## 2025-02-05 NOTE — TELEPHONE ENCOUNTER
Pt called stating he needs a PA for   VYNDAMAX 61 MG CAPS Through [facility] [place], [place] - [address] - P [phone] - F [phone] [[id number]]     PA # [phone]    Preferred pharmacy   [id number] - [place], [place] - [address] - P [phone] - F [phone] [[id number]]

## 2025-02-07 NOTE — TELEPHONE ENCOUNTER
Submitted PA for Vyndamax 61MG capsules  Via [facility] (Key: [id number] STATUS: PENDING.    Follow up done daily; if no decision with in three days we will refax.  If another three days goes by with no decision will call the insurance for status.

## 2025-02-11 DIAGNOSIS — I43 CARDIAC AMYLOIDOSIS (HCC): ICD-10-CM

## 2025-02-11 DIAGNOSIS — E85.4 CARDIAC AMYLOIDOSIS (HCC): ICD-10-CM

## 2025-02-11 RX ORDER — TAFAMIDIS 61 MG/1
61 CAPSULE, LIQUID FILLED ORAL DAILY
Qty: 30 CAPSULE | Refills: 0 | Status: SHIPPED | OUTPATIENT
Start: 2025-02-11 | End: 2025-02-15

## 2025-02-11 NOTE — TELEPHONE ENCOUNTER
Pt called requesting a refill for   VYNDAMAX 61 MG CAPS     Preferred pharmacy  [id number] - [place], [place] - [address] - P [phone] - F [phone] [[id number]]     [facility] ov 11/14/24 [facility]  Next 3/11/25 [facility]

## 2025-02-11 NOTE — TELEPHONE ENCOUNTER
Last Office Visit: 12/3/2024 Provider: BERTRAM  **Is provider OOT? Yes    Next Office Visit: 3/11/2025 Provider: MASOUD      Encounter provider correct? Yes If not, change provider  Script changes since last refill? no    LAST LABS:   CMP:   Lab Results   Component Value Date     12/18/2024    K 3.5 12/18/2024     12/18/2024    CO2 22 12/18/2024    BUN 13 12/18/2024    CREATININE 0.8 12/18/2024    GLUCOSE 159 (H) 12/18/2024    CALCIUM 9.1 12/18/2024    BILITOT 0.9 12/15/2024    ALKPHOS 86 12/15/2024    AST 36 12/15/2024    ALT 17 12/15/2024    LABGLOM >90 12/18/2024    GFRAA >60 05/22/2020    AGRATIO 1.3 12/15/2024    GLOB 3.5 01/20/2020

## 2025-02-15 DIAGNOSIS — I43 CARDIAC AMYLOIDOSIS (HCC): ICD-10-CM

## 2025-02-15 DIAGNOSIS — E85.4 CARDIAC AMYLOIDOSIS (HCC): ICD-10-CM

## 2025-02-15 RX ORDER — TAFAMIDIS 61 MG/1
61 CAPSULE, LIQUID FILLED ORAL DAILY
Qty: 30 CAPSULE | Refills: 5 | Status: SHIPPED | OUTPATIENT
Start: 2025-02-15

## 2025-02-17 ENCOUNTER — TELEPHONE (OUTPATIENT)
Dept: PULMONOLOGY | Age: 80
End: 2025-02-17

## 2025-02-17 NOTE — TELEPHONE ENCOUNTER
Patient is calling from [place] and says he is having second flair up of COPD. He took COVID and that's negative. Not tested for flu.   Constant cough-sometimes productive, sometimes dry. White phlegm.   SOB with exertion.   Feels weak. No appetite.   Wants to know if something can be called in to help get over this. Returns 3/7/25.   Please advise.       [place] [phone]  [address] [address]

## 2025-03-11 ENCOUNTER — OFFICE VISIT (OUTPATIENT)
Dept: PULMONOLOGY | Age: 80
End: 2025-03-11
Payer: MEDICARE

## 2025-03-11 ENCOUNTER — OFFICE VISIT (OUTPATIENT)
Dept: CARDIOLOGY CLINIC | Age: 80
End: 2025-03-11
Payer: MEDICARE

## 2025-03-11 VITALS
DIASTOLIC BLOOD PRESSURE: 57 MMHG | SYSTOLIC BLOOD PRESSURE: 105 MMHG | HEART RATE: 61 BPM | WEIGHT: 205.2 LBS | TEMPERATURE: 97.4 F | RESPIRATION RATE: 16 BRPM | HEIGHT: 72 IN | OXYGEN SATURATION: 94 % | BODY MASS INDEX: 27.79 KG/M2

## 2025-03-11 VITALS
OXYGEN SATURATION: 97 % | HEIGHT: 72 IN | BODY MASS INDEX: 27.77 KG/M2 | WEIGHT: 205 LBS | DIASTOLIC BLOOD PRESSURE: 64 MMHG | HEART RATE: 66 BPM | SYSTOLIC BLOOD PRESSURE: 112 MMHG

## 2025-03-11 DIAGNOSIS — I48.0 PAF (PAROXYSMAL ATRIAL FIBRILLATION) (HCC): ICD-10-CM

## 2025-03-11 DIAGNOSIS — I51.7 LVH (LEFT VENTRICULAR HYPERTROPHY): ICD-10-CM

## 2025-03-11 DIAGNOSIS — I43 CARDIAC AMYLOIDOSIS (HCC): ICD-10-CM

## 2025-03-11 DIAGNOSIS — Z95.810 PRESENCE OF CARDIAC RESYNCHRONIZATION THERAPY DEFIBRILLATOR (CRT-D): ICD-10-CM

## 2025-03-11 DIAGNOSIS — J42 CHRONIC BRONCHITIS, UNSPECIFIED CHRONIC BRONCHITIS TYPE (HCC): Primary | ICD-10-CM

## 2025-03-11 DIAGNOSIS — Z95.1 S/P CABG X 3: ICD-10-CM

## 2025-03-11 DIAGNOSIS — I42.9 CARDIOMYOPATHY, UNSPECIFIED TYPE (HCC): ICD-10-CM

## 2025-03-11 DIAGNOSIS — I25.10 ASHD (ARTERIOSCLEROTIC HEART DISEASE): Primary | ICD-10-CM

## 2025-03-11 DIAGNOSIS — I25.722 ATHEROSCLEROSIS OF AUTOLOGOUS ARTERY CORONARY ARTERY BYPASS GRAFT(S) WITH REFRACTORY ANGINA PECTORIS: ICD-10-CM

## 2025-03-11 DIAGNOSIS — E85.4 CARDIAC AMYLOIDOSIS (HCC): ICD-10-CM

## 2025-03-11 DIAGNOSIS — I25.10 CORONARY ARTERY DISEASE INVOLVING NATIVE CORONARY ARTERY OF NATIVE HEART WITHOUT ANGINA PECTORIS: ICD-10-CM

## 2025-03-11 DIAGNOSIS — I44.7 LEFT BUNDLE BRANCH BLOCK: ICD-10-CM

## 2025-03-11 DIAGNOSIS — E78.00 PURE HYPERCHOLESTEROLEMIA: ICD-10-CM

## 2025-03-11 PROCEDURE — 1123F ACP DISCUSS/DSCN MKR DOCD: CPT | Performed by: INTERNAL MEDICINE

## 2025-03-11 PROCEDURE — 99214 OFFICE O/P EST MOD 30 MIN: CPT | Performed by: INTERNAL MEDICINE

## 2025-03-11 PROCEDURE — 1159F MED LIST DOCD IN RCRD: CPT | Performed by: INTERNAL MEDICINE

## 2025-03-11 PROCEDURE — 3078F DIAST BP <80 MM HG: CPT | Performed by: INTERNAL MEDICINE

## 2025-03-11 PROCEDURE — 3074F SYST BP LT 130 MM HG: CPT | Performed by: INTERNAL MEDICINE

## 2025-03-11 PROCEDURE — G2211 COMPLEX E/M VISIT ADD ON: HCPCS | Performed by: INTERNAL MEDICINE

## 2025-03-11 RX ORDER — FLUTICASONE FUROATE, UMECLIDINIUM BROMIDE AND VILANTEROL TRIFENATATE 200; 62.5; 25 UG/1; UG/1; UG/1
1 POWDER RESPIRATORY (INHALATION) DAILY
Qty: 1 EACH | Refills: 5 | Status: SHIPPED | OUTPATIENT
Start: 2025-03-11

## 2025-03-11 RX ORDER — METOPROLOL SUCCINATE 25 MG/1
12.5 TABLET, EXTENDED RELEASE ORAL DAILY
Qty: 45 TABLET | Refills: 1 | Status: SHIPPED | OUTPATIENT
Start: 2025-03-11

## 2025-03-11 RX ORDER — ATORVASTATIN CALCIUM 80 MG/1
80 TABLET, FILM COATED ORAL DAILY
Qty: 90 TABLET | Refills: 3 | Status: SHIPPED | OUTPATIENT
Start: 2025-03-11

## 2025-03-11 RX ORDER — SACUBITRIL AND VALSARTAN 49; 51 MG/1; MG/1
1 TABLET, FILM COATED ORAL 2 TIMES DAILY
Qty: 180 TABLET | Refills: 3 | Status: SHIPPED | OUTPATIENT
Start: 2025-03-11

## 2025-03-11 RX ORDER — TORSEMIDE 20 MG/1
TABLET ORAL
Qty: 180 TABLET | Refills: 3 | Status: SHIPPED | OUTPATIENT
Start: 2025-03-11

## 2025-03-11 NOTE — PROGRESS NOTES
[facility]   Cardiac Follow up     Referring Provider:  Raysa Eli MD     Chief Complaint   Patient presents with    Follow-up    Congestive Heart Failure    Hypertension    Hyperlipidemia    Cardiomyopathy    Shortness of Breath    Dizziness     Going up steps to fast      Benji Israel   1945    History of Present Illness:   Benji Israel is a 80 y.o. 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 His echocardiogram from 4/2020 showed an EF of 55%, (EF 28% by stress test 2017).  Exposed to second hand smoke for years. Admitted 11/22/22-11/29/22 with shortness of breath, treated for acute on chronic diastolic heart failure with lasix infusion admission weight 273lbs dishcarge weight down to 253 lbs. PYP scan + for ATTR amyloid. Started on Tafamidis, Jardiacne and entresto.   EKG at  with heart failure on 2/24/2023 showed AF with progressing block. Chest X-Ray 4/7/2023 showed no acute process. BNP 1,199. Stress test 4/13/2023- Mild to moderately reduced calculated LVEF 45%, Inferolateral hypokinesis/scar with minimal familia-infarct ischemia. He underwent ILR placement on 7/14/20233. Patient is not on a Beta blocker due to concerns of conduction system. Admitted 3/9/2024 with multifocal severe pneumonia, CHF, and respiratory failure.   NSVT noted on ILR, significant VT induced with EP study 12/2024-s/p BiV ICD implant 12/2024. Admitted on 12/2024 with pocket hematoma s/p BiV ICD implant and subsequent lead revision 12/11/2024.   Today he states his weight is down 100 lbs, now 205 lbs. He states he has good days and days where he does not want to get out of bed. He states he walks often, recently was in [place] for 2 months and was walking several days per week. States he has

## 2025-03-11 NOTE — PATIENT INSTRUCTIONS
Will increase Trelegy from 100 to 200 mcg.  Wash mouth after use  Continue albuterol as needed  Pulmonary rehab referral.  No interest in Roflumilast due to risk of weight loss.  Patient had extensive weight loss with his illness already.  Follow-up in 6 months      Health Maintenance/Preventive measures:        >>  Avoid smoking, vaping or secondhand exposure.  Avoid exposure to irritants, allergens as possible as well as contact with patients with infectious respiratory illness.        >>  Stay up-to-date with influenza & pneumonia vaccines, RSV, & COVID-19 vaccine as recommended by the Advisory Committee on Immunization Practices (ACIP)        >>  Healthy diet and activity as able.        >>  Acid reflux precautions: Head of bed elevation, avoiding tight clothes, avoiding big meals or snacking 3 hours before bedtime, targeting healthy weight.        >>  Practice sleep hygiene measures. Avoid driving or operating heavy machines if tired or sleepy.

## 2025-03-11 NOTE — PATIENT INSTRUCTIONS
~Repeat echocardiogram    ~Call [facility] scheduling at [phone] to schedule testing    ~Labs- fasting lipids and CMP when you see Raysa Eli MD     Cardiac medications reviewed including indications and pertinent side effects. Medication list updated at this visit.   Patient verbalizes understanding of the need for treatment and education has been provided at today's visit. Additional education material will be provided in after visit summary.    Check blood pressure and heart rate at home a few times per week- keep a log with dates and times and bring to office visit   Regular exercise and following a healthy diet encouraged   Follow up with me in 6 months

## 2025-03-11 NOTE — PROGRESS NOTES
MA Communication:  The following orders are received by verbal communication from Brittnee Alberts MD    Orders include:    6 month  Order faxed to pulmonary rehab    
Disp: , Rfl:     GuaiFENesin (MUCINEX PO), Take 1,200 mg by mouth 2 times daily, Disp: , Rfl:     Probiotic Product (PROBIOTIC DAILY PO), Take by mouth daily , Disp: , Rfl:     fish oil-omega-3 fatty acids 1000 MG capsule, Take 1 capsule by mouth nightly, Disp: , Rfl:     Multiple Vitamin (MULTI-VITAMIN PO), Take 1 tablet by mouth daily , Disp: , Rfl:     Lysine 1000 MG TABS, Take 1 tablet by mouth Daily , Disp: , Rfl:     cetirizine (ZYRTEC) 10 MG tablet, Take 1 tablet by mouth daily, Disp: , Rfl:     UNABLE TO FIND, VESIVEST TWICE DAILY FOR 20 MINUTES - lung congestion (Patient not taking: Reported on 3/11/2025), Disp: , Rfl:        Test Results:  Radiology:  I have reviewed radiology images personally.    CT chest July 5, 2024  IMPRESSION:  Stable emphysema with no suspicious pulmonary nodule     Near complete resolved airspace disease both lungs with minimal atelectasis  or infiltrate both lung bases greater on the left.     LUNG RADS:  Lung-RADS 1S - Negative, Significant or Potentially Significant Incidental  Findings (v2022)     Management:  12 month screening LDCT    March 24 images left, July 24 images right            CT chest March 9, 2024  IMPRESSION:  1. No evidence of pulmonary embolism.  2. New extensive ground-glass nodular opacities throughout both lungs, new  from 10/05/2022, which are likely infectious or inflammatory in etiology, to  include atypical multifocal pneumonia.  3. Diffuse ground-glass opacity throughout both lungs may be in part be  secondary to pulmonary edema.  4. Small left pleural effusion.  5. Mild mediastinal and bilateral hilar lymphadenopathy, most likely benign  and reactive in etiology given the patient's underlying lung findings.  6. Atherosclerotic disease of the intrathoracic aorta, with an approximate  4.2 cm fusiform ectasia of the ascending aorta.  7. Chronic left ventricular hypertrophy. Chronic post CABG changes.  8. Additional findings, as above.          CT

## 2025-03-26 NOTE — PROGRESS NOTES
[facility]   Electrophysiology Outpatient Note              Date:  March 27, 2025  Patient name: Benji Israel  YOB: 1945    Primary Care physician: Raysa Eli MD    HISTORY OF PRESENT ILLNESS: The patient is a 80 y.o.  male with a history of ischemic cardiomyopathy, CAD s/p CABG 2017, HTN, HLD, and PAF and amyloid, VT, BiV ICD    He was admitted in 11/2022 with SOB and was treated with acute CHF. 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 On 2/24/2023 ECG revealed AF.  PYP scan + for ATTR amyloid.  He was started on tafamidis on 1/3/2023    On 3/9/2023 ECG revealed SR. Patient sees Sarah Fox CNP for HF. His metoprolol was discontinued and he was started on Jardiance and Entresto. Patient reported feeling somewhat better after stopping the metoprolol. 7/14/2023 IRL implantation    On 11/1/2023 he was seen for AF management. EKG shows atrial fibrillation with a HR of 70 . Patient complains of feeling short of breath at times. He is able to exercise on flat surface without issue but he gets short of breath with steps or incline. IRL revealed AF with controlled rate at 0006 last evening. We made changes to his device alerts today since the AF alert was not on.   3/29/2024 he was seen for his history of paroxysmal atrial fibrillation. ILR check reveals atrial fibrillation.  ECG reveals atrial fibrillation rate 66. Patient was recently admitted with pneumonia for 1 week. Patient denies chest pain, shortness of breath and palpitations.  Patient was switched from Eliquis to Xarelto by the hospitalist team recently because they thought his hemoptysis was related to the Eliquis and Xarelto would be a better choice.  Long discussion with patient and states he is hemoptysis was likely not related to that he was admitted for pneumonia.  If he has any continued bleeding we will switch back to Eliquis and discontinue aspirin. Patient is agreeable. He denies chest pain, palpitations, and

## 2025-03-27 ENCOUNTER — CLINICAL SUPPORT (OUTPATIENT)
Dept: CARDIOLOGY CLINIC | Age: 80
End: 2025-03-27

## 2025-03-27 ENCOUNTER — OFFICE VISIT (OUTPATIENT)
Dept: CARDIOLOGY CLINIC | Age: 80
End: 2025-03-27

## 2025-03-27 VITALS
OXYGEN SATURATION: 96 % | SYSTOLIC BLOOD PRESSURE: 106 MMHG | HEART RATE: 76 BPM | HEIGHT: 72 IN | WEIGHT: 203.71 LBS | BODY MASS INDEX: 27.59 KG/M2 | DIASTOLIC BLOOD PRESSURE: 52 MMHG

## 2025-03-27 DIAGNOSIS — I44.7 LEFT BUNDLE BRANCH BLOCK: ICD-10-CM

## 2025-03-27 DIAGNOSIS — I47.20 VENTRICULAR TACHYARRHYTHMIA (HCC): ICD-10-CM

## 2025-03-27 DIAGNOSIS — I49.3 PVC (PREMATURE VENTRICULAR CONTRACTION): ICD-10-CM

## 2025-03-27 DIAGNOSIS — I44.0 1ST DEGREE AV BLOCK: ICD-10-CM

## 2025-03-27 DIAGNOSIS — Z95.810 PRESENCE OF CARDIAC RESYNCHRONIZATION THERAPY DEFIBRILLATOR (CRT-D): ICD-10-CM

## 2025-03-27 DIAGNOSIS — E85.4 CARDIAC AMYLOIDOSIS (HCC): Primary | ICD-10-CM

## 2025-03-27 DIAGNOSIS — I48.0 PAF (PAROXYSMAL ATRIAL FIBRILLATION) (HCC): Primary | ICD-10-CM

## 2025-03-27 DIAGNOSIS — I25.5 ISCHEMIC CARDIOMYOPATHY: ICD-10-CM

## 2025-03-27 DIAGNOSIS — I43 CARDIAC AMYLOIDOSIS (HCC): Primary | ICD-10-CM

## 2025-03-27 NOTE — PATIENT INSTRUCTIONS
Continue Xarelto 20 mg daily for stroke risk reduction  Continue Toprol XL 12.5 mg daily  Continue Lipitor 80 mg daily   Continue Entresto 49-51 mg twice daily  Continue Demadex 20 mg twice daily  Continue aspirin 81 mg daily  Follow up with Dr Saleh 9/9/2025  Follow up in Sarah Fox CNP per her recommendation  Schedule Echo -call [phone]    Follow up in 6  months Jaz SAM

## 2025-04-07 ENCOUNTER — HOSPITAL ENCOUNTER (OUTPATIENT)
Dept: CARDIAC REHAB | Age: 80
Setting detail: THERAPIES SERIES
Discharge: HOME | End: 2025-04-07
Attending: INTERNAL MEDICINE

## 2025-04-14 ENCOUNTER — HOSPITAL ENCOUNTER (OUTPATIENT)
Dept: CARDIAC REHAB | Age: 80
Setting detail: THERAPIES SERIES
Discharge: HOME | End: 2025-04-14
Attending: INTERNAL MEDICINE
Payer: MEDICARE

## 2025-04-14 PROCEDURE — G0239 OTH RESP PROC, GROUP: HCPCS

## 2025-04-15 LAB
GLUCOSE BLD-MCNC: 118 MG/DL (ref 70–99)
GLUCOSE BLD-MCNC: 168 MG/DL (ref 70–99)
PERFORMED ON: ABNORMAL
PERFORMED ON: ABNORMAL

## 2025-04-18 ENCOUNTER — HOSPITAL ENCOUNTER (OUTPATIENT)
Dept: CARDIAC REHAB | Age: 80
Setting detail: THERAPIES SERIES
Discharge: HOME | End: 2025-04-18
Attending: INTERNAL MEDICINE
Payer: MEDICARE

## 2025-04-18 PROCEDURE — G0239 OTH RESP PROC, GROUP: HCPCS

## 2025-04-21 ENCOUNTER — HOSPITAL ENCOUNTER (OUTPATIENT)
Dept: CARDIAC REHAB | Age: 80
Setting detail: THERAPIES SERIES
Discharge: HOME | End: 2025-04-21
Attending: INTERNAL MEDICINE
Payer: MEDICARE

## 2025-04-21 PROCEDURE — G0239 OTH RESP PROC, GROUP: HCPCS

## 2025-04-22 LAB
GLUCOSE BLD-MCNC: 105 MG/DL (ref 70–99)
GLUCOSE BLD-MCNC: 109 MG/DL (ref 70–99)
GLUCOSE BLD-MCNC: 137 MG/DL (ref 70–99)
GLUCOSE BLD-MCNC: 139 MG/DL (ref 70–99)
GLUCOSE BLD-MCNC: 78 MG/DL (ref 70–99)
PERFORMED ON: ABNORMAL
PERFORMED ON: NORMAL

## 2025-04-25 ENCOUNTER — APPOINTMENT (OUTPATIENT)
Dept: GENERAL RADIOLOGY | Age: 80
End: 2025-04-25
Payer: MEDICARE

## 2025-04-25 ENCOUNTER — HOSPITAL ENCOUNTER (EMERGENCY)
Age: 80
Discharge: HOME | End: 2025-04-25
Payer: MEDICARE

## 2025-04-25 ENCOUNTER — APPOINTMENT (OUTPATIENT)
Dept: CARDIAC REHAB | Age: 80
End: 2025-04-25
Attending: INTERNAL MEDICINE
Payer: MEDICARE

## 2025-04-25 VITALS
OXYGEN SATURATION: 98 % | WEIGHT: 210.6 LBS | TEMPERATURE: 97.9 F | DIASTOLIC BLOOD PRESSURE: 56 MMHG | SYSTOLIC BLOOD PRESSURE: 126 MMHG | RESPIRATION RATE: 18 BRPM | BODY MASS INDEX: 28.53 KG/M2 | HEART RATE: 69 BPM | HEIGHT: 72 IN

## 2025-04-25 DIAGNOSIS — M25.561 ACUTE PAIN OF RIGHT KNEE: ICD-10-CM

## 2025-04-25 DIAGNOSIS — M25.512 ACUTE PAIN OF LEFT SHOULDER: ICD-10-CM

## 2025-04-25 DIAGNOSIS — M25.551 RIGHT HIP PAIN: ICD-10-CM

## 2025-04-25 DIAGNOSIS — Z79.01 ON CONTINUOUS ORAL ANTICOAGULATION: ICD-10-CM
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
## 2025-04-25 PROCEDURE — 73030 X-RAY EXAM OF SHOULDER: CPT

## 2025-04-25 PROCEDURE — 73502 X-RAY EXAM HIP UNI 2-3 VIEWS: CPT

## 2025-04-25 PROCEDURE — 99283 EMERGENCY DEPT VISIT LOW MDM: CPT

## 2025-04-25 PROCEDURE — 6370000000 HC RX 637 (ALT 250 FOR IP): Performed by: PHYSICIAN ASSISTANT

## 2025-04-25 PROCEDURE — 73560 X-RAY EXAM OF KNEE 1 OR 2: CPT

## 2025-04-25 RX ORDER — OXYCODONE AND ACETAMINOPHEN 5; 325 MG/1; MG/1
1 TABLET ORAL ONCE
Refills: 0 | Status: COMPLETED | OUTPATIENT
Start: 2025-04-25 | End: 2025-04-25

## 2025-04-25 RX ORDER — METHOCARBAMOL 500 MG/1
500 TABLET, FILM COATED ORAL 2 TIMES DAILY
Qty: 10 TABLET | Refills: 0 | Status: ON HOLD | OUTPATIENT
Start: 2025-04-25 | End: 2025-05-03 | Stop reason: HOSPADM

## 2025-04-25 RX ORDER — OXYCODONE AND ACETAMINOPHEN 5; 325 MG/1; MG/1
1 TABLET ORAL EVERY 6 HOURS PRN
Qty: 12 TABLET | Refills: 0 | Status: SHIPPED | OUTPATIENT
Start: 2025-04-25 | End: 2025-04-28

## 2025-04-25 RX ADMIN — OXYCODONE AND ACETAMINOPHEN 1 TABLET: 5; 325 TABLET ORAL at 12:43

## 2025-04-25 ASSESSMENT — PAIN SCALES - GENERAL: PAINLEVEL_OUTOF10: 5

## 2025-04-25 NOTE — ED PROVIDER NOTES
[facility] EMERGENCY DEPARTMENT  EMERGENCY DEPARTMENT ENCOUNTER        Pt Name: Benji Israel  MRN: [id number]  Birthdate 1945  Date of evaluation: 4/25/2025  Provider: ELICEO Banks  PCP: Raysa Eli MD  Note Started: 3:51 PM EDT 4/25/25      TAMMY. I have evaluated this patient.        CHIEF COMPLAINT       Chief Complaint   Patient presents with    Fall     Pt states on Tuesday he fell in the yard, c/o right knee and hip pain and left shoulder pain.   Pt states he hit his head when he fell and is on a blood thinner.   Denies LOC.        HISTORY OF PRESENT ILLNESS: 1 or more Elements     History From: Patient      Benji Israel is a 80 y.o. male who presents to the emergency department complaining of right knee pain, right hip pain and left shoulder pain after a fall.  He states he was mowing his lawn when he got off the tractor and spun around falling onto his right knee and hip.  He did hit his head and is on a blood thinner.  He states he did not have a headache or any loss of consciousness so he did not come to the emergency department.  He does have bruising and swelling noted to his right knee and a history of a total knee replacement.  He did try taking Percocet at home without significant pain relief.    Nursing Notes were all reviewed and agreed with or any disagreements were addressed in the HPI.    REVIEW OF SYSTEMS :      Review of Systems    Positives and Pertinent negatives as per HPI.     SURGICAL HISTORY     Past Surgical History:   Procedure Laterality Date    APPENDECTOMY      BRONCHOSCOPY N/A 1/22/2020    BRONCHOSCOPY ALVEOLAR LAVAGE performed by Lila Carreon MD at [facility] ENDOSCOPY    BRONCHOSCOPY  1/22/2020    BRONCHOSCOPY THERAPUTIC ASPIRATION INITIAL performed by Lila Careron MD at [facility] ENDOSCOPY    BRONCHOSCOPY N/A 6/11/2021    BRONCHOSCOPY ALVEOLAR LAVAGE performed by Lila Carreon MD at [facility] ENDOSCOPY    BRONCHOSCOPY  6/11/2021    BRONCHOSCOPY THERAPUTIC  Pneumonia.     EMERGENCY DEPARTMENT COURSE and DIFFERENTIAL DIAGNOSIS/MDM:   Vitals:    Vitals:    04/25/25 1226   BP: (!) 126/56   Pulse: 69   Resp: 18   Temp: 97.9 °F (36.6 °C)   TempSrc: Oral   SpO2: 98%   Weight: 95.5 kg (210 lb 9.6 oz)   Height: 1.829 m (6')       Is this patient to be included in the SEP-1 Core Measure due to severe sepsis or septic shock?   {Ddn1CpwUtUa:36005}    Patient was given the following medications:  Medications   oxyCODONE-acetaminophen (PERCOCET) 5-325 MG per tablet 1 tablet (1 tablet Oral Given 4/25/25 1243)             Chronic Conditions affecting care: ***   has a past medical history of Bronchitis, CAD (coronary artery disease) (07/10/2017), Cardiomyopathy (HCC) (07/2017), CHF (congestive heart failure) ([facility]) (07/2017), COPD (chronic obstructive pulmonary disease) ([facility]), Diabetes ([facility]) (07/2017), Diaphragmatic paralysis, Diastolic dysfunction, Encounter for loop recorder check (07/14/2023), Family history of early CAD, Former smoker, High cholesterol (07/2017), Hypertension, Knee replacement (2009), Obesity, Class II, 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    CONSULTS: (Who and What was discussed)  None  ***    {Social Determinants Significantly Affecting Health (Optional):95382}    Records Reviewed (External, Source and Summary) ***    CC/HPI Summary, DDx, ED Course, and Reassessment: ***    Disposition Considerations (tests considered but not done, Admit vs D/C, Shared Decision Making, Pt Expectation of Test or Tx.): ***       I am the Primary Clinician of Record.  FINAL IMPRESSION    No diagnosis found.      DISPOSITION/PLAN     DISPOSITION                 PATIENT REFERRED TO:  No follow-up provider specified.    DISCHARGE MEDICATIONS:  New Prescriptions    No medications on file       DISCONTINUED MEDICATIONS:  Discontinued Medications    No medications on file              (Please note that portions of this note were completed with a voice recognition program.

## 2025-04-28 ENCOUNTER — APPOINTMENT (OUTPATIENT)
Dept: CARDIAC REHAB | Age: 80
End: 2025-04-28
Attending: INTERNAL MEDICINE
Payer: MEDICARE

## 2025-04-30 ENCOUNTER — HOSPITAL ENCOUNTER (INPATIENT)
Age: 80
LOS: 3 days | Discharge: HOME HEALTH | End: 2025-05-03
Attending: STUDENT IN AN ORGANIZED HEALTH CARE EDUCATION/TRAINING PROGRAM | Admitting: INTERNAL MEDICINE
Payer: MEDICARE

## 2025-04-30 DIAGNOSIS — M25.561 ACUTE PAIN OF RIGHT KNEE: ICD-10-CM

## 2025-04-30 DIAGNOSIS — S80.01XA TRAUMATIC HEMATOMA OF RIGHT KNEE, INITIAL ENCOUNTER: Primary | ICD-10-CM

## 2025-04-30 LAB
ANION GAP SERPL CALCULATED.3IONS-SCNC: 18 MMOL/L (ref 3–16)
BASOPHILS # BLD: 0.1 K/UL (ref 0–0.2)
BASOPHILS NFR BLD: 0.9 %
BUN SERPL-MCNC: 33 MG/DL (ref 7–20)
CALCIUM SERPL-MCNC: 9.4 MG/DL (ref 8.3–10.6)
CHLORIDE SERPL-SCNC: 102 MMOL/L (ref 99–110)
CO2 SERPL-SCNC: 21 MMOL/L (ref 21–32)
CREAT SERPL-MCNC: 1.1 MG/DL (ref 0.8–1.3)
CRP SERPL-MCNC: 43.3 MG/L (ref 0–5.1)
DEPRECATED RDW RBC AUTO: 15.2 % (ref 12.4–15.4)
EOSINOPHIL # BLD: 0 K/UL (ref 0–0.6)
EOSINOPHIL NFR BLD: 0.4 %
ERYTHROCYTE [SEDIMENTATION RATE] IN BLOOD BY WESTERGREN METHOD: 50 MM/HR (ref 0–20)
GFR SERPLBLD CREATININE-BSD FMLA CKD-EPI: 69 ML/MIN/{1.73_M2}
GLUCOSE BLD-MCNC: 116 MG/DL (ref 70–99)
GLUCOSE BLD-MCNC: 123 MG/DL (ref 70–99)
GLUCOSE SERPL-MCNC: 110 MG/DL (ref 70–99)
HCT VFR BLD AUTO: 31.9 % (ref 40.5–52.5)
HGB BLD-MCNC: 10.7 G/DL (ref 13.5–17.5)
LACTATE BLDV-SCNC: 1.9 MMOL/L (ref 0.4–2)
LYMPHOCYTES # BLD: 2.7 K/UL (ref 1–5.1)
LYMPHOCYTES NFR BLD: 28.2 %
MCH RBC QN AUTO: 30.3 PG (ref 26–34)
MCHC RBC AUTO-ENTMCNC: 33.7 G/DL (ref 31–36)
MCV RBC AUTO: 90 FL (ref 80–100)
MONOCYTES # BLD: 1 K/UL (ref 0–1.3)
MONOCYTES NFR BLD: 10.5 %
NEUTROPHILS # BLD: 5.7 K/UL (ref 1.7–7.7)
NEUTROPHILS NFR BLD: 60 %
PERFORMED ON: ABNORMAL
PERFORMED ON: ABNORMAL
PLATELET # BLD AUTO: 292 K/UL (ref 135–450)
PMV BLD AUTO: 7.8 FL (ref 5–10.5)
POTASSIUM SERPL-SCNC: 3.9 MMOL/L (ref 3.5–5.1)
RBC # BLD AUTO: 3.54 M/UL (ref 4.2–5.9)
SODIUM SERPL-SCNC: 141 MMOL/L (ref 136–145)
WBC # BLD AUTO: 9.5 K/UL (ref 4–11)

## 2025-04-30 PROCEDURE — 80048 BASIC METABOLIC PNL TOTAL CA: CPT

## 2025-04-30 PROCEDURE — 36415 COLL VENOUS BLD VENIPUNCTURE: CPT

## 2025-04-30 PROCEDURE — 6360000002 HC RX W HCPCS: Performed by: INTERNAL MEDICINE

## 2025-04-30 PROCEDURE — 85652 RBC SED RATE AUTOMATED: CPT

## 2025-04-30 PROCEDURE — 6370000000 HC RX 637 (ALT 250 FOR IP): Performed by: INTERNAL MEDICINE

## 2025-04-30 PROCEDURE — 86140 C-REACTIVE PROTEIN: CPT

## 2025-04-30 PROCEDURE — 20610 DRAIN/INJ JOINT/BURSA W/O US: CPT

## 2025-04-30 PROCEDURE — 99285 EMERGENCY DEPT VISIT HI MDM: CPT

## 2025-04-30 PROCEDURE — 85025 COMPLETE CBC W/AUTO DIFF WBC: CPT

## 2025-04-30 PROCEDURE — 83605 ASSAY OF LACTIC ACID: CPT

## 2025-04-30 PROCEDURE — 2580000003 HC RX 258: Performed by: STUDENT IN AN ORGANIZED HEALTH CARE EDUCATION/TRAINING PROGRAM

## 2025-04-30 PROCEDURE — 6360000002 HC RX W HCPCS: Performed by: STUDENT IN AN ORGANIZED HEALTH CARE EDUCATION/TRAINING PROGRAM

## 2025-04-30 PROCEDURE — 96365 THER/PROPH/DIAG IV INF INIT: CPT

## 2025-04-30 PROCEDURE — 2500000003 HC RX 250 WO HCPCS: Performed by: INTERNAL MEDICINE

## 2025-04-30 PROCEDURE — 6370000000 HC RX 637 (ALT 250 FOR IP): Performed by: STUDENT IN AN ORGANIZED HEALTH CARE EDUCATION/TRAINING PROGRAM

## 2025-04-30 PROCEDURE — 93005 ELECTROCARDIOGRAM TRACING: CPT | Performed by: INTERNAL MEDICINE

## 2025-04-30 PROCEDURE — 1200000000 HC SEMI PRIVATE

## 2025-04-30 RX ORDER — GLUCAGON 1 MG/ML
1 KIT INJECTION PRN
Status: DISCONTINUED | OUTPATIENT
Start: 2025-04-30 | End: 2025-05-03 | Stop reason: HOSPADM

## 2025-04-30 RX ORDER — TORSEMIDE 20 MG/1
20 TABLET ORAL DAILY
Status: DISCONTINUED | OUTPATIENT
Start: 2025-05-01 | End: 2025-05-03 | Stop reason: HOSPADM

## 2025-04-30 RX ORDER — BUDESONIDE AND FORMOTEROL FUMARATE DIHYDRATE 160; 4.5 UG/1; UG/1
2 AEROSOL RESPIRATORY (INHALATION)
Status: CANCELLED | OUTPATIENT
Start: 2025-04-30

## 2025-04-30 RX ORDER — MAGNESIUM SULFATE IN WATER 40 MG/ML
2000 INJECTION, SOLUTION INTRAVENOUS PRN
Status: DISCONTINUED | OUTPATIENT
Start: 2025-04-30 | End: 2025-05-03 | Stop reason: HOSPADM

## 2025-04-30 RX ORDER — POLYETHYLENE GLYCOL 3350 17 G/17G
17 POWDER, FOR SOLUTION ORAL DAILY PRN
Status: DISCONTINUED | OUTPATIENT
Start: 2025-04-30 | End: 2025-05-03 | Stop reason: HOSPADM

## 2025-04-30 RX ORDER — ACETAMINOPHEN 325 MG/1
650 TABLET ORAL EVERY 6 HOURS PRN
Status: DISCONTINUED | OUTPATIENT
Start: 2025-04-30 | End: 2025-05-03 | Stop reason: HOSPADM

## 2025-04-30 RX ORDER — METHOCARBAMOL 500 MG/1
500 TABLET, FILM COATED ORAL 2 TIMES DAILY
Status: DISCONTINUED | OUTPATIENT
Start: 2025-05-01 | End: 2025-05-03 | Stop reason: HOSPADM

## 2025-04-30 RX ORDER — SODIUM CHLORIDE 0.9 % (FLUSH) 0.9 %
5-40 SYRINGE (ML) INJECTION EVERY 12 HOURS SCHEDULED
Status: DISCONTINUED | OUTPATIENT
Start: 2025-04-30 | End: 2025-05-03 | Stop reason: HOSPADM

## 2025-04-30 RX ORDER — CETIRIZINE HYDROCHLORIDE 10 MG/1
10 TABLET ORAL NIGHTLY
Status: DISCONTINUED | OUTPATIENT
Start: 2025-04-30 | End: 2025-05-03 | Stop reason: HOSPADM

## 2025-04-30 RX ORDER — POTASSIUM CHLORIDE 1500 MG/1
40 TABLET, EXTENDED RELEASE ORAL PRN
Status: DISCONTINUED | OUTPATIENT
Start: 2025-04-30 | End: 2025-05-03 | Stop reason: HOSPADM

## 2025-04-30 RX ORDER — DEXTROSE MONOHYDRATE 100 MG/ML
INJECTION, SOLUTION INTRAVENOUS CONTINUOUS PRN
Status: DISCONTINUED | OUTPATIENT
Start: 2025-04-30 | End: 2025-05-03 | Stop reason: HOSPADM

## 2025-04-30 RX ORDER — DOXYCYCLINE HYCLATE 100 MG
100 TABLET ORAL EVERY 12 HOURS SCHEDULED
Status: DISCONTINUED | OUTPATIENT
Start: 2025-04-30 | End: 2025-05-01

## 2025-04-30 RX ORDER — TRAZODONE HYDROCHLORIDE 50 MG/1
50 TABLET ORAL NIGHTLY PRN
Status: DISCONTINUED | OUTPATIENT
Start: 2025-04-30 | End: 2025-05-03 | Stop reason: HOSPADM

## 2025-04-30 RX ORDER — ACETAMINOPHEN 650 MG/1
650 SUPPOSITORY RECTAL EVERY 6 HOURS PRN
Status: DISCONTINUED | OUTPATIENT
Start: 2025-04-30 | End: 2025-05-03 | Stop reason: HOSPADM

## 2025-04-30 RX ORDER — VITAMIN B COMPLEX
2000 TABLET ORAL DAILY
Status: DISCONTINUED | OUTPATIENT
Start: 2025-04-30 | End: 2025-05-03 | Stop reason: HOSPADM

## 2025-04-30 RX ORDER — ATORVASTATIN CALCIUM 80 MG/1
80 TABLET, FILM COATED ORAL NIGHTLY
Status: DISCONTINUED | OUTPATIENT
Start: 2025-04-30 | End: 2025-05-03 | Stop reason: HOSPADM

## 2025-04-30 RX ORDER — METOPROLOL SUCCINATE 25 MG/1
12.5 TABLET, EXTENDED RELEASE ORAL DAILY
Status: DISCONTINUED | OUTPATIENT
Start: 2025-05-01 | End: 2025-05-03 | Stop reason: HOSPADM

## 2025-04-30 RX ORDER — SODIUM CHLORIDE 9 MG/ML
INJECTION, SOLUTION INTRAVENOUS PRN
Status: DISCONTINUED | OUTPATIENT
Start: 2025-04-30 | End: 2025-05-03 | Stop reason: HOSPADM

## 2025-04-30 RX ORDER — ALBUTEROL SULFATE 90 UG/1
2 INHALANT RESPIRATORY (INHALATION) 4 TIMES DAILY PRN
Status: DISCONTINUED | OUTPATIENT
Start: 2025-04-30 | End: 2025-05-03 | Stop reason: HOSPADM

## 2025-04-30 RX ORDER — POTASSIUM CHLORIDE 7.45 MG/ML
10 INJECTION INTRAVENOUS PRN
Status: DISCONTINUED | OUTPATIENT
Start: 2025-04-30 | End: 2025-05-03 | Stop reason: HOSPADM

## 2025-04-30 RX ORDER — INSULIN LISPRO 100 [IU]/ML
0-4 INJECTION, SOLUTION INTRAVENOUS; SUBCUTANEOUS
Status: DISCONTINUED | OUTPATIENT
Start: 2025-04-30 | End: 2025-05-03 | Stop reason: HOSPADM

## 2025-04-30 RX ORDER — SODIUM CHLORIDE 0.9 % (FLUSH) 0.9 %
5-40 SYRINGE (ML) INJECTION PRN
Status: DISCONTINUED | OUTPATIENT
Start: 2025-04-30 | End: 2025-05-03 | Stop reason: HOSPADM

## 2025-04-30 RX ORDER — IPRATROPIUM BROMIDE AND ALBUTEROL SULFATE 2.5; .5 MG/3ML; MG/3ML
1 SOLUTION RESPIRATORY (INHALATION) EVERY 4 HOURS PRN
Status: DISCONTINUED | OUTPATIENT
Start: 2025-04-30 | End: 2025-05-03 | Stop reason: HOSPADM

## 2025-04-30 RX ORDER — OMEGA-3/DHA/EPA/FISH OIL 300-1000MG
1 CAPSULE ORAL NIGHTLY
Status: DISCONTINUED | OUTPATIENT
Start: 2025-04-30 | End: 2025-04-30

## 2025-04-30 RX ORDER — ONDANSETRON 2 MG/ML
4 INJECTION INTRAMUSCULAR; INTRAVENOUS EVERY 6 HOURS PRN
Status: DISCONTINUED | OUTPATIENT
Start: 2025-04-30 | End: 2025-05-01

## 2025-04-30 RX ORDER — OXYCODONE AND ACETAMINOPHEN 5; 325 MG/1; MG/1
1 TABLET ORAL ONCE
Refills: 0 | Status: COMPLETED | OUTPATIENT
Start: 2025-04-30 | End: 2025-04-30

## 2025-04-30 RX ORDER — ONDANSETRON 4 MG/1
4 TABLET, ORALLY DISINTEGRATING ORAL EVERY 8 HOURS PRN
Status: DISCONTINUED | OUTPATIENT
Start: 2025-04-30 | End: 2025-05-01

## 2025-04-30 RX ORDER — METFORMIN HYDROCHLORIDE 500 MG/1
500 TABLET, EXTENDED RELEASE ORAL DAILY
Status: DISCONTINUED | OUTPATIENT
Start: 2025-04-30 | End: 2025-05-01

## 2025-04-30 RX ORDER — ASPIRIN 81 MG/1
81 TABLET ORAL DAILY
Status: DISCONTINUED | OUTPATIENT
Start: 2025-04-30 | End: 2025-05-03 | Stop reason: HOSPADM

## 2025-04-30 RX ORDER — GUAIFENESIN 600 MG/1
1200 TABLET, EXTENDED RELEASE ORAL 2 TIMES DAILY
Status: DISCONTINUED | OUTPATIENT
Start: 2025-04-30 | End: 2025-05-03 | Stop reason: HOSPADM

## 2025-04-30 RX ADMIN — Medication 10 ML: at 19:57

## 2025-04-30 RX ADMIN — OXYCODONE AND ACETAMINOPHEN 1 TABLET: 5; 325 TABLET ORAL at 14:10

## 2025-04-30 RX ADMIN — HYDROMORPHONE HYDROCHLORIDE 0.5 MG: 1 INJECTION, SOLUTION INTRAMUSCULAR; INTRAVENOUS; SUBCUTANEOUS at 23:48

## 2025-04-30 RX ADMIN — HYDROMORPHONE HYDROCHLORIDE 0.5 MG: 1 INJECTION, SOLUTION INTRAMUSCULAR; INTRAVENOUS; SUBCUTANEOUS at 19:56

## 2025-04-30 RX ADMIN — SACUBITRIL AND VALSARTAN 1 TABLET: 49; 51 TABLET, FILM COATED ORAL at 19:56

## 2025-04-30 RX ADMIN — VANCOMYCIN HYDROCHLORIDE 1500 MG: 10 INJECTION, POWDER, LYOPHILIZED, FOR SOLUTION INTRAVENOUS at 16:24

## 2025-04-30 RX ADMIN — ATORVASTATIN CALCIUM 80 MG: 80 TABLET, FILM COATED ORAL at 19:56

## 2025-04-30 RX ADMIN — GUAIFENESIN 1200 MG: 600 TABLET ORAL at 19:56

## 2025-04-30 RX ADMIN — CETIRIZINE HYDROCHLORIDE 10 MG: 10 TABLET, FILM COATED ORAL at 19:56

## 2025-04-30 RX ADMIN — DOXYCYCLINE HYCLATE 100 MG: 100 TABLET, FILM COATED ORAL at 23:48

## 2025-04-30 ASSESSMENT — PAIN DESCRIPTION - FREQUENCY
FREQUENCY: CONTINUOUS

## 2025-04-30 ASSESSMENT — PAIN DESCRIPTION - ONSET
ONSET: ON-GOING

## 2025-04-30 ASSESSMENT — PAIN SCALES - GENERAL
PAINLEVEL_OUTOF10: 6
PAINLEVEL_OUTOF10: 7
PAINLEVEL_OUTOF10: 6
PAINLEVEL_OUTOF10: 2
PAINLEVEL_OUTOF10: 6
PAINLEVEL_OUTOF10: 7

## 2025-04-30 ASSESSMENT — PAIN DESCRIPTION - LOCATION
LOCATION: KNEE

## 2025-04-30 ASSESSMENT — PAIN DESCRIPTION - PAIN TYPE
TYPE: ACUTE PAIN

## 2025-04-30 ASSESSMENT — PAIN - FUNCTIONAL ASSESSMENT
PAIN_FUNCTIONAL_ASSESSMENT: PREVENTS OR INTERFERES SOME ACTIVE ACTIVITIES AND ADLS
PAIN_FUNCTIONAL_ASSESSMENT: 0-10
PAIN_FUNCTIONAL_ASSESSMENT: ACTIVITIES ARE NOT PREVENTED
PAIN_FUNCTIONAL_ASSESSMENT: ACTIVITIES ARE NOT PREVENTED

## 2025-04-30 ASSESSMENT — PAIN DESCRIPTION - DESCRIPTORS
DESCRIPTORS: ACHING
DESCRIPTORS: ACHING
DESCRIPTORS: ACHING;DISCOMFORT

## 2025-04-30 ASSESSMENT — PAIN DESCRIPTION - ORIENTATION
ORIENTATION: RIGHT
ORIENTATION: RIGHT
ORIENTATION: RIGHT;LEFT

## 2025-04-30 NOTE — CONSULTS
Consult Placed     Who: Kirby Heath,   Date:04/30/25  Time:1924     Electronically signed by Jayy Bowens on 4/30/2025 at 7:24 PM

## 2025-04-30 NOTE — ED NOTES
Benji Israel is a 80 y.o. male admitted for  Principal Problem:    Traumatic hematoma of right knee, initial encounter  Resolved Problems:    * No resolved hospital problems. *  .   Patient Home via family with   Chief Complaint   Patient presents with    Knee Pain     Pt has injury to right knee, pt has hx of knee replacement. PCP sent patient to ed for concern of septic joint. Pt reports fell on knee last Tuesday, pt reports increasing pain, swelling, redness. Was seen here last week following the fall. Pt reports pain has been causing nausea.    .  Patient is alert and Person, Place, Time, and Situation  Patient's baseline mobility: Baseline Mobility: Cane   Code Status: Full Code   Cardiac Rhythm:       Abnormal Assessment Findings: right knee swelling    Isolation: None      NIH Score:    C-SSRS: Risk of Suicide: No Risk  Bedside swallow:        Active LDA's:   Peripheral IV 04/30/25 Right Antecubital (Active)     Family/Caregiver Present no Any Concerns: no   Restraints no  Sitter no         Vitals: MEWS Score: 1    Vitals:    04/30/25 1327 04/30/25 1624   BP: 126/76 122/69   Pulse: 72 66   Resp: 18 16   Temp: 98.5 °F (36.9 °C)    TempSrc: Oral    SpO2: 99% 99%   Weight: 93.8 kg (206 lb 12.8 oz)    Height: 1.829 m (6')        Last documented pain score (0-10 scale) Pain Level: 2  Pain medication administered Yes- see MAR.    Pertinent or High Risk Medications/Drips: No.    Pending Blood Product Administration: no    Abnormal labs:   Abnormal Labs Reviewed   CBC WITH AUTO DIFFERENTIAL - Abnormal; Notable for the following components:       Result Value    RBC 3.54 (*)     Hemoglobin 10.7 (*)     Hematocrit 31.9 (*)     All other components within normal limits   BASIC METABOLIC PANEL W/ REFLEX TO MG FOR LOW K - Abnormal; Notable for the following components:    Anion Gap 18 (*)     Glucose 110 (*)     BUN 33 (*)     All other components within normal limits   SEDIMENTATION RATE - Abnormal; Notable for the  following components:    Sed Rate, Automated 50 (*)     All other components within normal limits   C-REACTIVE PROTEIN - Abnormal; Notable for the following components:    CRP 43.3 (*)     All other components within normal limits   POCT GLUCOSE - Abnormal; Notable for the following components:    POC Glucose 123 (*)     All other components within normal limits     Critical values: no  Intervention for critical value(s):     Abnormal Imaging: yes,              You may also review the ED PT Care Timeline found under the Summary Tab, ED Encounter Summary, Timeline Reports, ED Patient Care Timeline.     Recommendation    Pending orders/Uncompleted orders to hand off:  none    Additional Comments: none  If any further questions, please call Sending RN at [phone]

## 2025-04-30 NOTE — ED PROVIDER NOTES
[facility] EMERGENCY DEPARTMENT     EMERGENCY DEPARTMENT ENCOUNTER         Pt Name: Benji Israel   MRN: [id number]   Birthdate 1945   Date of evaluation: 4/30/2025   Provider: Kayode Bravo MD   PCP: Raysa Eli MD   Note Started: 2:15 PM EDT 4/30/25       Chief Complaint     Knee Pain (Pt has injury to right knee, pt has hx of knee replacement. PCP sent patient to ed for concern of septic joint. Pt reports fell on knee last Tuesday, pt reports increasing pain, swelling, redness. Was seen here last week following the fall. Pt reports pain has been causing nausea. )      History of Present Illness     Benji Israel is a 80 y.o. male who presents with progressive pain of the knee after a trauma several days ago.  The patient suffered a mechanical fall about a week ago injuring his right knee which is status post total knee replacement also with pain of the shoulder and hip which persisted for several days and he was evaluated in the emergency department about 5 days ago with a reassuring workup and was discharged for continued outpatient management he states that since that time his pain has increased in the right knee now with some discoloration warmth and prominent swelling and the patient is no longer ambulatory.  Of note he is on anticoagulation and has been continuing this medication.  He visited his primary care doctor and was referred to the emerge department for concern for infection of the knee.  He has had subjective fever chills.      Review of Systems     Positives and pertinent negatives as per HPI.    Past Medical, Surgical, Family, and Social History     He has a past medical history of Bronchitis, CAD (coronary artery disease), Cardiomyopathy (HCC), CHF (congestive heart failure) (HCC), COPD (chronic obstructive pulmonary disease) (HCC), Diabetes (HCC), Diaphragmatic paralysis, Diastolic dysfunction, Encounter for loop recorder check, Family history of early CAD, Former smoker,  VITALS: BP: 126/76, Temp: 98.5 °F (36.9 °C), Pulse: 72, Respirations: 18, SpO2: 99 %     General: alert and conversant chronically ill-appearing medically frail  Skin: warm, dry and pink  Head: normocephalic atraumatic  Eyes: pupils equal, extra ocular movements intact  Neck: normal range of motion without pain  Extremities: warm and well perfused, see image of right knee below decreased range of motion due to pain warm and tender overall would raise concern for a significant hemarthrosis given his anticoagulation now complicated by perhaps a septic arthritis    Additional Exams:        DiagnosticResults     ECG    I have reviewed the ECG as follows:    na      RADIOLOGY:    My independent review of medical imaging is as follows:    See ED course    Final interpretation of all medical imaging per radiology as below:    No orders to display       LABS:   Results for orders placed or performed during the hospital encounter of 04/30/25   CBC with Auto Differential   Result Value Ref Range    WBC 9.5 4.0 - 11.0 K/uL    RBC 3.54 (L) 4.20 - 5.90 M/uL    Hemoglobin 10.7 (L) 13.5 - 17.5 g/dL    Hematocrit 31.9 (L) 40.5 - 52.5 %    MCV 90.0 80.0 - 100.0 fL    MCH 30.3 26.0 - 34.0 pg    MCHC 33.7 31.0 - 36.0 g/dL    RDW 15.2 12.4 - 15.4 %    Platelets 292 135 - 450 K/uL    MPV 7.8 5.0 - 10.5 fL    Neutrophils % 60.0 %    Lymphocytes % 28.2 %    Monocytes % 10.5 %    Eosinophils % 0.4 %    Basophils % 0.9 %    Neutrophils Absolute 5.7 1.7 - 7.7 K/uL    Lymphocytes Absolute 2.7 1.0 - 5.1 K/uL    Monocytes Absolute 1.0 0.0 - 1.3 K/uL    Eosinophils Absolute 0.0 0.0 - 0.6 K/uL    Basophils Absolute 0.1 0.0 - 0.2 K/uL   BMP w/ Reflex to MG   Result Value Ref Range    Sodium 141 136 - 145 mmol/L    Potassium reflex Magnesium 3.9 3.5 - 5.1 mmol/L    Chloride 102 99 - 110 mmol/L    CO2 21 21 - 32 mmol/L    Anion Gap 18 (H) 3 - 16    Glucose 110 (H) 70 - 99 mg/dL    BUN 33 (H) 7 - 20 mg/dL    Creatinine 1.1 0.8 - 1.3 mg/dL    Est,

## 2025-04-30 NOTE — H&P
[facility] Medicine History & Physical    V 5.1    Date of Admission: 4/30/2025    Date of Service:  Pt seen/examined on 4/30/2025     [x]Admitted to Inpatient with expected LOS greater than two midnights due to medical therapy.  []Placed in Observation status.    Chief Admission Complaint:    S/p fall and has  Right knee swelling and pain    Presenting Admission History:      80 y.o. 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  He is s/p BiV ICD implantation done in 12/24.    He gives history that he fell approximately 5 days prior to this admit.  States is a mechanical fall and he struck his right knee.  He reports he did get evaluated in the ED after the fall had an x-ray completed did not reveal any fractures and was discharged home with pain control with Robaxin and Percocet.  He reports that over the past several days he has had increased swelling and pain of his right knee.  Pain has become quite severe and therefore presented to the ED for further evaluation.    Arthrocentesis was attempted in the ED however was unsuccessful.  He is not admitted to receive IV antibiotics, analgesics  and for orthopedic consultation.    Assessment/Plan:      Right knee swelling and pain : As noted he did fall and strike his right knee about 5 days prior to this admit.  He is maintained on Xarelto, so there is concern for hematoma.  Will hold Xarelto at this time.  Will continue IV vancomycin and doxycycline at this time.  Continue analgesics.  Orthopedics consulted for further evaluation.  Will keep n.p.o. after midnight in case procedure is needed in the AM.    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  Continue on daily torsemide.  He is also on Vyndamax daily for amyloidosis.  Patient is  by mouth daily 3/11/25   Nixon Saleh MD   rivaroxaban (XARELTO) 20 MG TABS tablet Take 1 tablet by mouth daily 3/11/25   Nixon Saleh MD   sacubitril-valsartan (ENTRESTO) 49-51 MG per tablet Take 1 tablet by mouth 2 times daily 3/11/25   Nixon Saleh MD   torsemide (DEMADEX) 20 MG tablet TAKE 1 TABLET BY MOUTH EVERY DAY OKAY TO TAKE 2ND TAB DAILY IF WEIGHT GAIN 3LBS IN DAY 3/11/25   Nixon Saleh MD   VYNDAMAX 61 MG CAPS Take 61 mg by mouth daily 2/15/25   Nixon Saleh MD   metFORMIN (GLUCOPHAGE-XR) 500 MG extended release tablet Take 1 tablet by mouth daily 7/5/24   Giuseppe Ferguson MD   albuterol sulfate HFA (PROVENTIL;VENTOLIN;PROAIR) 108 (90 Base) MCG/ACT inhaler Inhale 2 puffs into the lungs 4 times daily as needed for Wheezing 7/12/24   Brittnee Alberts MD   ipratropium 0.5 mg-albuterol 2.5 mg (DUONEB) 0.5-2.5 (3) MG/3ML SOLN nebulizer solution Take 3 mLs by nebulization every 6 hours as needed for Shortness of Breath 7/12/24   Brittnee Alberts MD   LORazepam (ATIVAN) 0.5 MG tablet Take 1 tablet by mouth 2 times daily as needed. As needed 4/12/24   Giuseppe Ferguson MD   magnesium 30 MG tablet Take 1 tablet by mouth daily    Giuseppe Ferguson MD   vitamin D (CHOLECALCIFEROL) 50 MCG (2000 UT) TABS tablet Take 1 tablet by mouth daily    Giuseppe Ferguson MD   traZODone (DESYREL) 50 MG tablet Take 1 tablet by mouth nightly as needed 11/15/22   Giuseppe Ferguson MD   UNABLE TO FIND VESIVEST TWICE DAILY FOR 20 MINUTES - lung congestion  Patient not taking: Reported on 3/27/2025    Giuseppe Ferguson MD   Spacer/Aero-Holding Chambers LETA 1 Device by Does not apply route daily as needed (sob) 3/15/19   Sylvain Marquis DO   aspirin EC 81 MG EC tablet Take 1 tablet by mouth daily 5/29/18   Nixon Saleh MD   Glucosamine-Chondroitin (OSTEO BI-FLEX REGULAR STRENGTH PO) Take 1 capsule by mouth daily     Provider, MD Deloris Chin

## 2025-04-30 NOTE — ED NOTES
Pt transported to C5 via this RN.  Taken on tele with 1 belonging bag and 1 personal cane.  Paperwork including surgical consent sent with patient.

## 2025-05-01 LAB
ALBUMIN SERPL-MCNC: 3.5 G/DL (ref 3.4–5)
ALBUMIN/GLOB SERPL: 1.2 {RATIO} (ref 1.1–2.2)
ALP SERPL-CCNC: 63 U/L (ref 40–129)
ALT SERPL-CCNC: 16 U/L (ref 10–40)
ANION GAP SERPL CALCULATED.3IONS-SCNC: 11 MMOL/L (ref 3–16)
APTT BLD: 43 SEC (ref 22.1–36.4)
AST SERPL-CCNC: 24 U/L (ref 15–37)
BASOPHILS # BLD: 0 K/UL (ref 0–0.2)
BASOPHILS NFR BLD: 0.4 %
BILIRUB SERPL-MCNC: 0.8 MG/DL (ref 0–1)
BUN SERPL-MCNC: 22 MG/DL (ref 7–20)
CALCIUM SERPL-MCNC: 8.9 MG/DL (ref 8.3–10.6)
CHLORIDE SERPL-SCNC: 105 MMOL/L (ref 99–110)
CO2 SERPL-SCNC: 24 MMOL/L (ref 21–32)
CREAT SERPL-MCNC: 0.9 MG/DL (ref 0.8–1.3)
DEPRECATED RDW RBC AUTO: 15.1 % (ref 12.4–15.4)
EKG ATRIAL RATE: 80 BPM
EKG DIAGNOSIS: NORMAL
EKG P AXIS: 35 DEGREES
EKG Q-T INTERVAL: 500 MS
EKG QRS DURATION: 172 MS
EKG QTC CALCULATION (BAZETT): 576 MS
EKG R AXIS: 157 DEGREES
EKG T AXIS: 92 DEGREES
EKG VENTRICULAR RATE: 80 BPM
EOSINOPHIL # BLD: 0.1 K/UL (ref 0–0.6)
EOSINOPHIL NFR BLD: 1.1 %
EST. AVERAGE GLUCOSE BLD GHB EST-MCNC: 119.8 MG/DL
GFR SERPLBLD CREATININE-BSD FMLA CKD-EPI: 87 ML/MIN/{1.73_M2}
GLUCOSE BLD-MCNC: 103 MG/DL (ref 70–99)
GLUCOSE BLD-MCNC: 112 MG/DL (ref 70–99)
GLUCOSE BLD-MCNC: 131 MG/DL (ref 70–99)
GLUCOSE BLD-MCNC: 134 MG/DL (ref 70–99)
GLUCOSE SERPL-MCNC: 119 MG/DL (ref 70–99)
HBA1C MFR BLD: 5.8 %
HCT VFR BLD AUTO: 28.2 % (ref 40.5–52.5)
HGB BLD-MCNC: 9.8 G/DL (ref 13.5–17.5)
INR PPP: 1.66 (ref 0.85–1.15)
LYMPHOCYTES # BLD: 1.8 K/UL (ref 1–5.1)
LYMPHOCYTES NFR BLD: 23.1 %
MCH RBC QN AUTO: 31.1 PG (ref 26–34)
MCHC RBC AUTO-ENTMCNC: 34.6 G/DL (ref 31–36)
MCV RBC AUTO: 89.9 FL (ref 80–100)
MONOCYTES # BLD: 0.9 K/UL (ref 0–1.3)
MONOCYTES NFR BLD: 12.1 %
NEUTROPHILS # BLD: 4.8 K/UL (ref 1.7–7.7)
NEUTROPHILS NFR BLD: 63.3 %
PERFORMED ON: ABNORMAL
PLATELET # BLD AUTO: 245 K/UL (ref 135–450)
PMV BLD AUTO: 7.5 FL (ref 5–10.5)
POTASSIUM SERPL-SCNC: 4.1 MMOL/L (ref 3.5–5.1)
PROT SERPL-MCNC: 6.5 G/DL (ref 6.4–8.2)
PROTHROMBIN TIME: 19.8 SEC (ref 11.9–14.9)
RBC # BLD AUTO: 3.14 M/UL (ref 4.2–5.9)
SODIUM SERPL-SCNC: 140 MMOL/L (ref 136–145)
VANCOMYCIN SERPL-MCNC: 7.1 UG/ML
WBC # BLD AUTO: 7.6 K/UL (ref 4–11)

## 2025-05-01 PROCEDURE — 1200000000 HC SEMI PRIVATE

## 2025-05-01 PROCEDURE — 83036 HEMOGLOBIN GLYCOSYLATED A1C: CPT

## 2025-05-01 PROCEDURE — 2500000003 HC RX 250 WO HCPCS: Performed by: INTERNAL MEDICINE

## 2025-05-01 PROCEDURE — 6370000000 HC RX 637 (ALT 250 FOR IP): Performed by: SPECIALIST/TECHNOLOGIST

## 2025-05-01 PROCEDURE — 51701 INSERT BLADDER CATHETER: CPT

## 2025-05-01 PROCEDURE — 2580000003 HC RX 258: Performed by: INTERNAL MEDICINE

## 2025-05-01 PROCEDURE — 36415 COLL VENOUS BLD VENIPUNCTURE: CPT

## 2025-05-01 PROCEDURE — 85025 COMPLETE CBC W/AUTO DIFF WBC: CPT

## 2025-05-01 PROCEDURE — 85610 PROTHROMBIN TIME: CPT

## 2025-05-01 PROCEDURE — 51798 US URINE CAPACITY MEASURE: CPT

## 2025-05-01 PROCEDURE — 80053 COMPREHEN METABOLIC PANEL: CPT

## 2025-05-01 PROCEDURE — 6360000002 HC RX W HCPCS: Performed by: INTERNAL MEDICINE

## 2025-05-01 PROCEDURE — 99222 1ST HOSP IP/OBS MODERATE 55: CPT | Performed by: SPECIALIST/TECHNOLOGIST

## 2025-05-01 PROCEDURE — 85730 THROMBOPLASTIN TIME PARTIAL: CPT

## 2025-05-01 PROCEDURE — 93010 ELECTROCARDIOGRAM REPORT: CPT | Performed by: INTERNAL MEDICINE

## 2025-05-01 PROCEDURE — 6370000000 HC RX 637 (ALT 250 FOR IP): Performed by: INTERNAL MEDICINE

## 2025-05-01 PROCEDURE — 80202 ASSAY OF VANCOMYCIN: CPT

## 2025-05-01 RX ORDER — OXYCODONE HYDROCHLORIDE 5 MG/1
5 TABLET ORAL EVERY 4 HOURS PRN
Refills: 0 | Status: DISCONTINUED | OUTPATIENT
Start: 2025-05-01 | End: 2025-05-03 | Stop reason: HOSPADM

## 2025-05-01 RX ORDER — OXYCODONE HYDROCHLORIDE 5 MG/1
10 TABLET ORAL EVERY 4 HOURS PRN
Refills: 0 | Status: DISCONTINUED | OUTPATIENT
Start: 2025-05-01 | End: 2025-05-03 | Stop reason: HOSPADM

## 2025-05-01 RX ADMIN — ATORVASTATIN CALCIUM 80 MG: 80 TABLET, FILM COATED ORAL at 20:34

## 2025-05-01 RX ADMIN — OXYCODONE 10 MG: 5 TABLET ORAL at 11:51

## 2025-05-01 RX ADMIN — METOPROLOL SUCCINATE 12.5 MG: 25 TABLET, EXTENDED RELEASE ORAL at 08:55

## 2025-05-01 RX ADMIN — SACUBITRIL AND VALSARTAN 1 TABLET: 49; 51 TABLET, FILM COATED ORAL at 08:55

## 2025-05-01 RX ADMIN — Medication 10 ML: at 20:34

## 2025-05-01 RX ADMIN — TORSEMIDE 20 MG: 20 TABLET ORAL at 08:55

## 2025-05-01 RX ADMIN — CETIRIZINE HYDROCHLORIDE 10 MG: 10 TABLET, FILM COATED ORAL at 20:34

## 2025-05-01 RX ADMIN — METHOCARBAMOL 500 MG: 500 TABLET ORAL at 08:55

## 2025-05-01 RX ADMIN — SODIUM CHLORIDE: 0.9 INJECTION, SOLUTION INTRAVENOUS at 10:04

## 2025-05-01 RX ADMIN — Medication 10 ML: at 08:48

## 2025-05-01 RX ADMIN — Medication 2000 UNITS: at 08:55

## 2025-05-01 RX ADMIN — VANCOMYCIN HYDROCHLORIDE 1500 MG: 10 INJECTION, POWDER, LYOPHILIZED, FOR SOLUTION INTRAVENOUS at 22:00

## 2025-05-01 RX ADMIN — GUAIFENESIN 1200 MG: 600 TABLET ORAL at 08:55

## 2025-05-01 RX ADMIN — SODIUM CHLORIDE: 0.9 INJECTION, SOLUTION INTRAVENOUS at 01:02

## 2025-05-01 RX ADMIN — HYDROMORPHONE HYDROCHLORIDE 0.5 MG: 1 INJECTION, SOLUTION INTRAMUSCULAR; INTRAVENOUS; SUBCUTANEOUS at 14:46

## 2025-05-01 RX ADMIN — VANCOMYCIN HYDROCHLORIDE 750 MG: 750 INJECTION, POWDER, LYOPHILIZED, FOR SOLUTION INTRAVENOUS at 01:03

## 2025-05-01 RX ADMIN — METHOCARBAMOL 500 MG: 500 TABLET ORAL at 14:46

## 2025-05-01 RX ADMIN — GUAIFENESIN 1200 MG: 600 TABLET ORAL at 20:34

## 2025-05-01 RX ADMIN — HYDROMORPHONE HYDROCHLORIDE 0.5 MG: 1 INJECTION, SOLUTION INTRAMUSCULAR; INTRAVENOUS; SUBCUTANEOUS at 04:30

## 2025-05-01 RX ADMIN — DOXYCYCLINE HYCLATE 100 MG: 100 TABLET, FILM COATED ORAL at 08:55

## 2025-05-01 RX ADMIN — VANCOMYCIN HYDROCHLORIDE 1500 MG: 10 INJECTION, POWDER, LYOPHILIZED, FOR SOLUTION INTRAVENOUS at 10:05

## 2025-05-01 RX ADMIN — HYDROMORPHONE HYDROCHLORIDE 0.5 MG: 1 INJECTION, SOLUTION INTRAMUSCULAR; INTRAVENOUS; SUBCUTANEOUS at 08:48

## 2025-05-01 RX ADMIN — TRAZODONE HYDROCHLORIDE 50 MG: 50 TABLET ORAL at 20:38

## 2025-05-01 RX ADMIN — HYDROMORPHONE HYDROCHLORIDE 0.5 MG: 1 INJECTION, SOLUTION INTRAMUSCULAR; INTRAVENOUS; SUBCUTANEOUS at 20:34

## 2025-05-01 RX ADMIN — SACUBITRIL AND VALSARTAN 1 TABLET: 49; 51 TABLET, FILM COATED ORAL at 20:34

## 2025-05-01 ASSESSMENT — PAIN DESCRIPTION - LOCATION
LOCATION: KNEE
LOCATION_2: SHOULDER
LOCATION: KNEE

## 2025-05-01 ASSESSMENT — PAIN DESCRIPTION - FREQUENCY
FREQUENCY: CONTINUOUS
FREQUENCY: CONTINUOUS

## 2025-05-01 ASSESSMENT — PAIN DESCRIPTION - PAIN TYPE
TYPE: ACUTE PAIN
TYPE: ACUTE PAIN

## 2025-05-01 ASSESSMENT — PAIN SCALES - GENERAL
PAINLEVEL_OUTOF10: 8
PAINLEVEL_OUTOF10: 6
PAINLEVEL_OUTOF10: 7
PAINLEVEL_OUTOF10: 9
PAINLEVEL_OUTOF10: 8

## 2025-05-01 ASSESSMENT — PAIN - FUNCTIONAL ASSESSMENT
PAIN_FUNCTIONAL_ASSESSMENT: ACTIVITIES ARE NOT PREVENTED
PAIN_FUNCTIONAL_ASSESSMENT: ACTIVITIES ARE NOT PREVENTED
PAIN_FUNCTIONAL_ASSESSMENT_SITE2: ACTIVITIES ARE NOT PREVENTED

## 2025-05-01 ASSESSMENT — PAIN DESCRIPTION - DESCRIPTORS
DESCRIPTORS: THROBBING
DESCRIPTORS: ACHING
DESCRIPTORS_2: ACHING
DESCRIPTORS: ACHING

## 2025-05-01 ASSESSMENT — PAIN DESCRIPTION - ONSET
ONSET: ON-GOING
ONSET: ON-GOING

## 2025-05-01 ASSESSMENT — PAIN DESCRIPTION - ORIENTATION
ORIENTATION_2: LEFT
ORIENTATION: RIGHT

## 2025-05-01 ASSESSMENT — PAIN DESCRIPTION - INTENSITY: RATING_2: 8

## 2025-05-01 NOTE — CONSULTS
Pharmacy Note  Vancomycin Consult    Benji Israel is a 80 y.o. male started on Vancomycin for SSTI; consult received from Dr. Malin to manage therapy.   Recent Labs     04/30/25  1409   CREATININE 1.1       Recent Labs     04/30/25  1409   WBC 9.5       Estimated Creatinine Clearance: 66 mL/min (based on SCr of 1.1 mg/dL).      Intake/Output Summary (Last 24 hours) at 5/1/2025 0019  Last data filed at 4/30/2025 2327  Gross per 24 hour   Intake 288.28 ml   Output 300 ml   Net -11.72 ml       Wt Readings from Last 1 Encounters:   04/30/25 93.8 kg (206 lb 12.8 oz)         Body mass index is 28.05 kg/m².    Loading dose (critically ill or in ICU, require dialysis or renal replacement therapy): Vancomycin 25 mg/kg IVPB x 1 (maximum 2500 mg).  Maintenance dose: 10-20 mg/kg (maximum: 2000 mg/dose and 4500 mg/day) starting at the next dosing interval determined by renal function  Pulse dose: fluctuating renal function, ANA, ESRD  CRRT: 7.5-10 mg/kg q12h   Goal Vancomycin trough: 15-20 mcg/mL   Goal Vancomycin AUC: 400-600     Assessment/Plan:  Will initiate Vancomycin with a one time loading dose of 1500 mg x1, followed by 750 mg IV every 12 hours. Calculated Vancomycin AUC = 457 mg/L*h with an estimated steady-state vancomycin trough = 14.6 mcg/mL. Vancomycin level ordered for 1100. Timing of trough level will be determined based on culture results, renal function, and clinical response.     Thank you for the consult.  Anoop Swenson PharmD 5/1/2025  12:20 AM    --------------------------------------------------------------------------------------  5/1/2025 9:07 AM                                           Vancomycin Progress Note  Day: 1/5 Indication: SSTI Other Antibiotics: Doxycycline     Recent Labs     05/01/25  0833   VANCORANDOM 7.1     Recent Labs     04/30/25  1409 05/01/25  0514   CREATININE 1.1 0.9     Recent Labs     04/30/25  1409 05/01/25  0514   WBC 9.5 7.6     Estimated Creatinine Clearance: 82 mL/min (based on

## 2025-05-01 NOTE — CARE COORDINATION
Case Management Assessment  Initial Evaluation    Date/Time of Evaluation: 5/1/2025 1:54 PM  Assessment Completed by: Jaz Crowder RN    If patient is discharged prior to next notation, then this note serves as note for discharge by case management.    Patient Name: Benji Israel                   YOB: 1945  Diagnosis: Acute pain of right knee [M25.561]  Traumatic hematoma of right knee, initial encounter [S80.01XA]                   Date / Time: 4/30/2025  3:13 PM    Patient Admission Status: Inpatient   Readmission Risk (Low < 19, Mod (19-27), High > 27): Readmission Risk Score: 16    Current PCP: Raysa Eli MD  PCP verified by CM? Yes (Dr. Eli)    Chart Reviewed: Yes      History Provided by: Patient, Medical Record  Patient Orientation: Alert and Oriented    Patient Cognition: Alert    Hospitalization in the last 30 days (Readmission):  No    If yes, Readmission Assessment in CM Navigator will be completed.    Advance Directives:      Code Status: Full Code   Patient's Primary Decision Maker is: Named in Scanned ACP Document    Primary Decision Maker: Yamile Desir [facility] - [phone]    Discharge Planning:    Patient lives with: Spouse/Significant Other Type of Home: House  Primary Care Giver: Self  Patient Support Systems include: Spouse/Significant Other, Children   Current Financial resources: Medicare  Current community resources: None  Current services prior to admission: None            Current DME:              Type of Home Care services:  None    ADLS  Prior functional level: Independent in ADLs/IADLs  Current functional level: Assistance with the following:, Shopping, Housework    PT AM-PAC:   /24  OT AM-PAC:   /24    Family can provide assistance at DC: Yes  Would you like Case Management to discuss the discharge plan with any other family members/significant others, and if so, who? No  Plans to Return to Present Housing: Yes  Other Identified Issues/Barriers to RETURNING

## 2025-05-01 NOTE — PROGRESS NOTES
[facility] Medicine Progress Note  V 5.1      Date of Admission: 4/30/2025    Hospital Day: 2      Chief Admission Complaint:  S/p fall  week ago and  worsening   Right knee swelling and pain     Subjective:   right knee swollen , pain , left shoulder pain   Fell 1 week ago    Presenting Admission History:       80 y.o. 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  He is s/p BiV ICD implantation done in 12/24.     He gives history that he fell approximately 5 days prior to this admit.  States is a mechanical fall and he struck his right knee.  He reports he did get evaluated in the ED after the fall had an x-ray completed did not reveal any fractures and was discharged home with pain control with Robaxin and Percocet.  He reports that over the past several days he has had increased swelling and pain of his right knee.  Pain has become quite severe and therefore presented to the ED for further evaluation.     Arthrocentesis was attempted in the ED however was unsuccessful.  He is not admitted to receive IV antibiotics, analgesics  and for orthopedic consultation.    Assessment/Plan:        Right knee swelling and pain : As noted he did fall and strike his right knee about 5 days prior to this admit.  He is maintained on Xarelto, so there is concern for hematoma.  Will hold Xarelto at this time.  Will continue IV vancomycin and dc doxycycline  Continue analgesics.  Orthopedics consulted for further evaluation.  Will keep n.p.o. after midnight in case procedure is needed in the AM.     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  Continue on daily torsemide.  He is also on Vyndamax daily for amyloidosis.  Patient is to bring this medication in from home.  Continue on

## 2025-05-01 NOTE — CONSULTS
Department of Orthopedic Surgery  Physician Assistant   Consult Note        Reason for Consult:  right knee swelling possible hematoma  Requesting Physician: Bouchra Wilson MD  Date of Service: 5/1/2025 9:25 AM    CHIEF COMPLAINT:  As Above    History Obtained From:  patient, electronic medical record    HISTORY OF PRESENT ILLNESS:                The patient is a 80 y.o. 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  Patient states that approximately 1 week ago he was trying to step down off of his riding lawnmower when he stepped into a hole with his left leg and fell landing directly onto his right knee.  He attempted to manage his knee pain at home but eventually presented to the emergency department where he was given pain control recommendations and was discharged.  He followed up in his primary care physician yesterday who was concerned for infection and sent him to the emergency department for evaluation.  In the emergency department aspiration was attempted due to concerns for septic arthritis but no fluid was found.  The patient was admitted for IV antibiotics and orthopedics was consulted regarding recommendations related to the right knee swelling.  At baseline, patient is independent, does not use an assistive device, still drives, still works.  The patient states that when he first tries to walk on the leg he feels like a knife is stabbing him directly in the knee, then it subsides and he is able to walk with a walker assistance short distances.  Denies any numbness or tingling to the right lower extremity.  Endorses on and off subjective fever, chills, hot flashes for the last few days.    Past Medical History:        Diagnosis Date    Bronchitis     chronic bronchitis once or twice per year twice has gone into pneumonia    CAD  MD  [phone]     4/25/2025      Narrative & Impression  EXAM: XR KNEE RIGHT (1-2 VIEWS)     HISTORY: fall     COMPARISON: None     FINDINGS:  Bones: A right total knee arthroplasty is present. The orthopedic hardware is  intact without evidence of loosening.     Soft tissues: Soft tissue swelling is seen overlying the patella.     IMPRESSION:  1.  No acute fracture or dislocation.  2.  Soft tissue swelling seen overlying the patella.     Electronically signed by James Farrell        Exam Ended: 04/25/25 13:35 EDT Last Resulted: 04/25/25 13:38 EDT       XR SHOULDER LEFT (MIN 2 VIEWS) [[id number]]  Status: Final result     PACS Images     Show images for XR SHOULDER LEFT (MIN 2 VIEWS)  XR SHOULDER LEFT (MIN 2 VIEWS)  Order: [id number]   Status: Final result       Next appt: 05/02/2025 at 01:15 PM in Cardiac Rehabilitation (SCHEDULE, [facility] PULMONARY  RM)    Test Result Released: Yes (not seen)    0 Result Notes  Details    Reading Physician Reading Date Result Priority   James Farrell MD  [phone]     4/25/2025      Narrative & Impression  EXAM: XR SHOULDER LEFT (MIN 2 VIEWS)     HISTORY: Injury     COMPARISON: None     FINDINGS:     Bones: The osseous structures are well mineralized. No acute fracture or  dislocation.     Joints: The joint space is well maintained. No effusion. Degenerative changes  are present at the acromioclavicular and glenohumeral joints.     Soft tissues: No significant soft tissue swelling or radiopaque foreign body.     IMPRESSION:  1.  No acute fracture or dislocation.  2.  Degenerative changes are present in the left shoulder.     Electronically signed by James Farrell        Exam Ended: 04/25/25 13:35 EDT Last Resulted: 04/25/25 13:39 EDT         IMPRESSION/RECOMMENDATIONS:    Assessment: Right knee hematoma/hemarthrosis    Plan:  1) Discussed case with on-call surgeon, Dr. Kirby Heath. Imaging reviewed, including right knee, left shoulder plain films. Discussed management of

## 2025-05-01 NOTE — PROGRESS NOTES
Patient admitted to room 550 from ED.  Patient oriented to room, call light, bed rails, phone, lights and bathroom.  Patient instructed about the schedule of the day including: vital sign frequency, lab draws, possible tests, frequency of MD and staff rounds, including RN/MD rounding together at bedside, daily weights, and I &O's.  Patient instructed about prescribed diet, how to use 8MENU, and television.   bed alarm in place, patient aware of placement and reason.   Telemetry box  in place, patient aware of placement and reason.  Bed locked, in lowest position, side rails up 2/4, call light within reach.  Will continue to monitor.

## 2025-05-02 LAB
BASOPHILS # BLD: 0 K/UL (ref 0–0.2)
BASOPHILS NFR BLD: 0.4 %
DEPRECATED RDW RBC AUTO: 15.4 % (ref 12.4–15.4)
EOSINOPHIL # BLD: 0.1 K/UL (ref 0–0.6)
EOSINOPHIL NFR BLD: 1 %
GLUCOSE BLD-MCNC: 115 MG/DL (ref 70–99)
GLUCOSE BLD-MCNC: 119 MG/DL (ref 70–99)
GLUCOSE BLD-MCNC: 120 MG/DL (ref 70–99)
GLUCOSE BLD-MCNC: 133 MG/DL (ref 70–99)
HCT VFR BLD AUTO: 30.7 % (ref 40.5–52.5)
HGB BLD-MCNC: 10.5 G/DL (ref 13.5–17.5)
LYMPHOCYTES # BLD: 2 K/UL (ref 1–5.1)
LYMPHOCYTES NFR BLD: 23.7 %
MCH RBC QN AUTO: 30.8 PG (ref 26–34)
MCHC RBC AUTO-ENTMCNC: 34.1 G/DL (ref 31–36)
MCV RBC AUTO: 90.3 FL (ref 80–100)
MONOCYTES # BLD: 1 K/UL (ref 0–1.3)
MONOCYTES NFR BLD: 11.4 %
NEUTROPHILS # BLD: 5.4 K/UL (ref 1.7–7.7)
NEUTROPHILS NFR BLD: 63.5 %
PERFORMED ON: ABNORMAL
PLATELET # BLD AUTO: 277 K/UL (ref 135–450)
PMV BLD AUTO: 7.2 FL (ref 5–10.5)
RBC # BLD AUTO: 3.4 M/UL (ref 4.2–5.9)
VANCOMYCIN SERPL-MCNC: 18.5 UG/ML
WBC # BLD AUTO: 8.5 K/UL (ref 4–11)

## 2025-05-02 PROCEDURE — 6370000000 HC RX 637 (ALT 250 FOR IP): Performed by: SPECIALIST/TECHNOLOGIST

## 2025-05-02 PROCEDURE — 6360000002 HC RX W HCPCS: Performed by: INTERNAL MEDICINE

## 2025-05-02 PROCEDURE — 6370000000 HC RX 637 (ALT 250 FOR IP): Performed by: INTERNAL MEDICINE

## 2025-05-02 PROCEDURE — 36415 COLL VENOUS BLD VENIPUNCTURE: CPT

## 2025-05-02 PROCEDURE — 1200000000 HC SEMI PRIVATE

## 2025-05-02 PROCEDURE — 2500000003 HC RX 250 WO HCPCS: Performed by: INTERNAL MEDICINE

## 2025-05-02 PROCEDURE — 80202 ASSAY OF VANCOMYCIN: CPT

## 2025-05-02 PROCEDURE — 97165 OT EVAL LOW COMPLEX 30 MIN: CPT

## 2025-05-02 PROCEDURE — 97116 GAIT TRAINING THERAPY: CPT

## 2025-05-02 PROCEDURE — 97530 THERAPEUTIC ACTIVITIES: CPT

## 2025-05-02 PROCEDURE — 99222 1ST HOSP IP/OBS MODERATE 55: CPT | Performed by: SPECIALIST/TECHNOLOGIST

## 2025-05-02 PROCEDURE — 2580000003 HC RX 258: Performed by: INTERNAL MEDICINE

## 2025-05-02 PROCEDURE — 97162 PT EVAL MOD COMPLEX 30 MIN: CPT

## 2025-05-02 PROCEDURE — 85025 COMPLETE CBC W/AUTO DIFF WBC: CPT

## 2025-05-02 PROCEDURE — 97535 SELF CARE MNGMENT TRAINING: CPT

## 2025-05-02 RX ORDER — POLYETHYLENE GLYCOL 3350 17 G/17G
17 POWDER, FOR SOLUTION ORAL DAILY PRN
COMMUNITY
Start: 2025-05-02 | End: 2025-06-01

## 2025-05-02 RX ORDER — ACETAMINOPHEN 325 MG/1
650 TABLET ORAL EVERY 6 HOURS
COMMUNITY
Start: 2025-05-02 | End: 2025-06-01

## 2025-05-02 RX ORDER — VANCOMYCIN 1.75 G/350ML
1250 INJECTION, SOLUTION INTRAVENOUS EVERY 12 HOURS
Status: DISCONTINUED | OUTPATIENT
Start: 2025-05-02 | End: 2025-05-02 | Stop reason: SDUPTHER

## 2025-05-02 RX ORDER — OXYCODONE HYDROCHLORIDE 5 MG/1
5 TABLET ORAL EVERY 6 HOURS PRN
Qty: 28 TABLET | Refills: 0 | Status: SHIPPED | OUTPATIENT
Start: 2025-05-02 | End: 2025-05-09

## 2025-05-02 RX ORDER — TORSEMIDE 20 MG/1
10 TABLET ORAL NIGHTLY PRN
Status: DISCONTINUED | OUTPATIENT
Start: 2025-05-02 | End: 2025-05-03 | Stop reason: HOSPADM

## 2025-05-02 RX ADMIN — CETIRIZINE HYDROCHLORIDE 10 MG: 10 TABLET, FILM COATED ORAL at 20:11

## 2025-05-02 RX ADMIN — VANCOMYCIN HYDROCHLORIDE 1250 MG: 10 INJECTION, POWDER, LYOPHILIZED, FOR SOLUTION INTRAVENOUS at 22:33

## 2025-05-02 RX ADMIN — SODIUM CHLORIDE: 0.9 INJECTION, SOLUTION INTRAVENOUS at 10:09

## 2025-05-02 RX ADMIN — Medication 2000 UNITS: at 08:36

## 2025-05-02 RX ADMIN — HYDROMORPHONE HYDROCHLORIDE 0.5 MG: 1 INJECTION, SOLUTION INTRAMUSCULAR; INTRAVENOUS; SUBCUTANEOUS at 04:15

## 2025-05-02 RX ADMIN — VANCOMYCIN HYDROCHLORIDE 1500 MG: 10 INJECTION, POWDER, LYOPHILIZED, FOR SOLUTION INTRAVENOUS at 10:09

## 2025-05-02 RX ADMIN — OXYCODONE 10 MG: 5 TABLET ORAL at 08:36

## 2025-05-02 RX ADMIN — SACUBITRIL AND VALSARTAN 1 TABLET: 49; 51 TABLET, FILM COATED ORAL at 20:11

## 2025-05-02 RX ADMIN — Medication 10 ML: at 08:37

## 2025-05-02 RX ADMIN — GUAIFENESIN 1200 MG: 600 TABLET ORAL at 08:37

## 2025-05-02 RX ADMIN — METHOCARBAMOL 500 MG: 500 TABLET ORAL at 14:58

## 2025-05-02 RX ADMIN — METHOCARBAMOL 500 MG: 500 TABLET ORAL at 08:37

## 2025-05-02 RX ADMIN — POLYETHYLENE GLYCOL 3350 17 G: 17 POWDER, FOR SOLUTION ORAL at 10:05

## 2025-05-02 RX ADMIN — TORSEMIDE 20 MG: 20 TABLET ORAL at 08:36

## 2025-05-02 RX ADMIN — GUAIFENESIN 1200 MG: 600 TABLET ORAL at 20:11

## 2025-05-02 RX ADMIN — SACUBITRIL AND VALSARTAN 1 TABLET: 49; 51 TABLET, FILM COATED ORAL at 08:37

## 2025-05-02 RX ADMIN — Medication 10 ML: at 20:12

## 2025-05-02 RX ADMIN — OXYCODONE 10 MG: 5 TABLET ORAL at 22:30

## 2025-05-02 RX ADMIN — METOPROLOL SUCCINATE 12.5 MG: 25 TABLET, EXTENDED RELEASE ORAL at 08:37

## 2025-05-02 RX ADMIN — OXYCODONE 10 MG: 5 TABLET ORAL at 00:12

## 2025-05-02 RX ADMIN — ATORVASTATIN CALCIUM 80 MG: 80 TABLET, FILM COATED ORAL at 20:11

## 2025-05-02 RX ADMIN — OXYCODONE 10 MG: 5 TABLET ORAL at 18:45

## 2025-05-02 RX ADMIN — TRAZODONE HYDROCHLORIDE 50 MG: 50 TABLET ORAL at 20:18

## 2025-05-02 ASSESSMENT — PAIN - FUNCTIONAL ASSESSMENT: PAIN_FUNCTIONAL_ASSESSMENT: PREVENTS OR INTERFERES SOME ACTIVE ACTIVITIES AND ADLS

## 2025-05-02 ASSESSMENT — PAIN DESCRIPTION - FREQUENCY
FREQUENCY: CONTINUOUS
FREQUENCY: CONTINUOUS

## 2025-05-02 ASSESSMENT — PAIN SCALES - GENERAL
PAINLEVEL_OUTOF10: 6
PAINLEVEL_OUTOF10: 7
PAINLEVEL_OUTOF10: 8
PAINLEVEL_OUTOF10: 4
PAINLEVEL_OUTOF10: 7
PAINLEVEL_OUTOF10: 6

## 2025-05-02 ASSESSMENT — PAIN DESCRIPTION - ORIENTATION
ORIENTATION: RIGHT
ORIENTATION: RIGHT

## 2025-05-02 ASSESSMENT — PAIN DESCRIPTION - PAIN TYPE
TYPE: ACUTE PAIN
TYPE: ACUTE PAIN

## 2025-05-02 ASSESSMENT — PAIN DESCRIPTION - DESCRIPTORS
DESCRIPTORS: THROBBING
DESCRIPTORS: THROBBING

## 2025-05-02 ASSESSMENT — PAIN DESCRIPTION - ONSET
ONSET: PROGRESSIVE
ONSET: PROGRESSIVE

## 2025-05-02 ASSESSMENT — PAIN DESCRIPTION - LOCATION
LOCATION: KNEE
LOCATION: KNEE;SHOULDER

## 2025-05-02 NOTE — DISCHARGE INSTRUCTIONS
Weightbearing as tolerated to the right leg with assistive device  Ice, elevation, and compression for pain and swelling reduction  Ace wrap to the right leg starting at toes and wrapping to mid thigh, place wrap daily and remove nightly  Ice to the right knee for 20 minutes every hour. Make sure there is a barrier between the ice and your skin  Follow up with Dr Kirby Heath in 1-2 weeks for symptom check. Call [facility] at [phone] to schedule an appointment or with any questions

## 2025-05-02 NOTE — PROGRESS NOTES
Department of Orthopedic Surgery  Physician Assistant   Progress Note    Subjective:       Systemic or Specific Complaints:Pain Control and still occasional sharp pains in the knee, specifically at the medial aspect where the attempted aspiration was. Tolerating the ace wrap well, has been elevating and icing. No family at bedside    Objective:     Patient Vitals for the past 24 hrs:   BP Temp Temp src Pulse Resp SpO2 Weight   05/02/25 0827 123/76 98.2 °F (36.8 °C) Oral 95 16 96 % --   05/02/25 0458 -- -- -- -- -- -- 97.8 kg (215 lb 9.8 oz)   05/02/25 0336 114/67 98.2 °F (36.8 °C) Oral 80 18 92 % --   05/01/25 2318 113/71 99.1 °F (37.3 °C) Oral 75 18 96 % --   05/01/25 2104 -- -- -- -- 18 -- --   05/01/25 2022 136/76 99 °F (37.2 °C) Oral 77 18 100 % --   05/01/25 1517 138/71 98.6 °F (37 °C) Oral 73 18 96 % --   05/01/25 1147 131/70 98.1 °F (36.7 °C) Oral 72 18 97 % --       General: alert, appears stated age, cooperative, and no distress   Wound: No wounds, there is a blister on the anteromedial knee, it remains intact   Motion: Painful range of Motion in affected extremity, approximately 50 degrees flexion   DVT Exam: No evidence of DVT seen on physical exam.  Negative Gabriele's sign.     Additional exam: Patient seen laying in bed with leg elevated at time of interview  Leg lengths equal, rotational alignment neutral  Knee swelling remains, there is a tense effusion relatively unchanged, thigh and calf compartments compressible  EHL, FHL, gastroc, and anterior tibialis motor intact  Able to complete short arc quad and straight leg raise unassisted with no extensor lag  Sensation intact to light touch  DP and PT pulses 2+      Data Review  CBC:   Lab Results   Component Value Date/Time    WBC 8.5 05/02/2025 08:44 AM    RBC 3.40 05/02/2025 08:44 AM    HGB 10.5 05/02/2025 08:44 AM    HCT 30.7 05/02/2025 08:44 AM     05/02/2025 08:44 AM       Renal:   Lab Results   Component Value Date/Time     05/01/2025

## 2025-05-02 NOTE — CARE COORDINATION
[facility] can accept patient patient for PT/OT SOC on 5/5. Patient needs a rolling walker at [place].     Jaz Crowder RN

## 2025-05-02 NOTE — PROGRESS NOTES
[facility] Medicine Progress Note  V 5.1      Date of Admission: 4/30/2025    Hospital Day: 3      Chief Admission Complaint:  S/p fall  week ago and  worsening   Right knee swelling and pain     Subjective:   right knee  and left shoulder pain is better   Fell 1 week ago    Presenting Admission History:       80 y.o. 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  He is s/p BiV ICD implantation done in 12/24.     He gives history that he fell approximately 5 days prior to this admit.  States is a mechanical fall and he struck his right knee.  He reports he did get evaluated in the ED after the fall had an x-ray completed did not reveal any fractures and was discharged home with pain control with Robaxin and Percocet.  He reports that over the past several days he has had increased swelling and pain of his right knee.  Pain has become quite severe and therefore presented to the ED for further evaluation.     Arthrocentesis was attempted in the ED however was unsuccessful.  He is not admitted to receive IV antibiotics, analgesics  and for orthopedic consultation.    Assessment/Plan:        Right knee swelling and pain : As noted he did fall and strike his right knee about 5 days prior to this admit.  He is maintained on Xarelto, so there is concern for hematoma.  Will hold Xarelto at this time.  Will continue IV vancomycin and dc doxycycline  Continue analgesics.  Orthopedics consulted for further evaluation.appreciated   Plan for nonsurgical management of the right knee hematoma. Pt weightbearing status is weightbearing as tolerated to the right lower extremity, gentle progressive range of motion as tolerated.  Recommend aggressive ice, compression, elevation to help with resolution of the hematoma.  If appropriate, continue to hold Xarelto for 1-2 more days.  OK for  knee     --------------------------------------------------    [facility] (any 2 required for High level billing)    A. Problems (any 1)  [x] Acute/Chronic Illness/injury posing ongoing threat to life and/or bodily function without ongoing treatment    [] Severe exacerbation of chronic illness    --------------------------------------------------  B. Risk of Treatment (any 1)    [x] Drugs/treatments that require intensive monitoring for toxicity    [] IV ABX (Vancomycin, Aminoglycosides, etc)     [] Post-Cath/Contrast study requiring serial monitoring    [] IV Narcotic analgesia    [] Aggressive IV diuresis    [] Hypertonic Saline    [] Critical electrolyte abnormalities requiring IV replacement    [x] Insulin - Scheduled/SSI or Insulin gtt    [x] Anticoagulation (Heparin gtt or Coumadin - other anticoagulants in special circumstances)    [] Cardiac Medications (IV Amiodarone/Diltiazem, Tikosyn, etc)    [] Hemodialysis    [] Other -    [] Change in code status    [] Decision to escalate care    [] Major surgery/procedure with associated risk factors    --------------------------------------------------  C. Data (any 2)    [x] Data Review (any 3)    [x] Consultant notes from yesterday/today    [x] All available current labs reviewed interpreted for clinical significance    [x] Appropriate follow-up labs were ordered  [] Collateral history obtained     [] Independent Interpretation of tests (any 1)    [] Telemetry (Rhythm Strip) personally reviewed and interpreted        [] Imaging personally reviewed and interpreted     [x] Discussion (any 1)  [x] Multi-Disciplinary Rounds with Case Management  [x] Discussed management of the case with    ortho       Labs:  Personally reviewed on 5/2/2025 and interpreted for clinical significance as documented above.     Recent Labs     04/30/25  1409 05/01/25  0514 05/02/25  0844   WBC 9.5 7.6 8.5   HGB 10.7* 9.8* 10.5*   HCT 31.9* 28.2* 30.7*    245 277     Recent Labs

## 2025-05-02 NOTE — CARE COORDINATION
Writer reviewed chart, and spoke with RN, and MD, waiting for PT/OT recs to determine if he needs HHC. I would anticipate he does. Electronic Ref sent to [facility].    Jaz Crowder RN

## 2025-05-02 NOTE — PROGRESS NOTES
Occupational Therapy  Facility/Department: [place] [address] - MED SURG/ORTHO  Occupational Therapy Initial/discharge Assessment    Name: Benji Israel  : 1945  MRN: [id number]  Date of Service: 2025    Discharge Recommendations:  24 hour supervision or assist (use of 3-in-1 commode to increase independence/ease of toilet transfers;)          Patient Diagnosis(es): The primary encounter diagnosis was Acute pain of right knee. A diagnosis of Traumatic hematoma of right knee, initial encounter was also pertinent to this visit.  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  Past Surgical History:  has a past surgical history that includes Appendectomy; meniscectomy (Right, 1965); Tonsillectomy; Cardiac catheterization (07/10/2017); Carpal tunnel release (Bilateral); Coronary artery bypass graft (2017); transesophageal echocardiogram (2017); Total knee arthroplasty (Right); Coronary angioplasty with stent (2018); bronchoscopy (N/A, 2020); bronchoscopy (2020); Thoracoscopy (Left, 2020); bronchoscopy (N/A, 2021); bronchoscopy (2021); bronchoscopy (N/A, 3/13/2024); ep device procedure (N/A, 2024); ep device procedure (N/A, 2024); ep device procedure (Left, 2024); and ep device procedure (N/A, 2024).           Assessment  Assessment: pt from home with wife, normally independent with IADL's without AD; admitted s/p fall at home with RIght knee hematoma, non-surgical; now with Right knee pain at rest; CGA with functional/toilet transfers , SBA  assist;  Education Method: Verbal  Barriers to Learning: None  Education Outcome: Verbalized understanding                     G-Code     OutComes Score                                                  AM-PAC - ADL  AM-PAC Daily Activity - Inpatient   How much help is needed for putting on and taking off regular lower body clothing?: A Little  How much help is needed for bathing (which includes washing, rinsing, drying)?: A Little  How much help is needed for toileting (which includes using toilet, bedpan, or urinal)?: None  How much help is needed for putting on and taking off regular upper body clothing?: None  How much help is needed for taking care of personal grooming?: None  How much help for eating meals?: None  AM-PAC Inpatient Daily Activity Raw Score: 22  AM-PAC Inpatient ADL T-Scale Score : 47.1  ADL Inpatient CMS 0-100% Score: 25.8  ADL Inpatient CMS G-Code Modifier : CJ    Tinneti Score       Goals  Short Term Goals  Time Frame for Short Term Goals: 1 visit  Short Term Goal 1: independent with toileting; STG met  Short Term Goal 2: SBA with bathroom mobility with RW; STG met  Patient Goals   Patient goals : \"home when able\"      Therapy Time   Individual Concurrent Group Co-treatment   Time In 1710         Time Out 1743         Minutes 33                 Gabriela Beltran, OT

## 2025-05-02 NOTE — PROGRESS NOTES
Physical Therapy  Facility/Department: [place] [address] - MED SURG/ORTHO  Physical Therapy Initial Assessment/Treatment     Name: Benji Israel  : 1945  MRN: [id number]  Date of Service: 2025    Discharge Recommendations:  24 hour supervision or assist, Home with Home health PT   PT Equipment Recommendations  Equipment Needed: Yes  Mobility Devices: Walker  Walker: Rolling      Patient Diagnosis(es): The primary encounter diagnosis was Acute pain of right knee. A diagnosis of Traumatic hematoma of right knee, initial encounter was also pertinent to this visit.  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  Past Surgical History:  has a past surgical history that includes Appendectomy; meniscectomy (Right, 1965); Tonsillectomy; Cardiac catheterization (07/10/2017); Carpal tunnel release (Bilateral); Coronary artery bypass graft (2017); transesophageal echocardiogram (2017); Total knee arthroplasty (Right); Coronary angioplasty with stent (2018); bronchoscopy (N/A, 2020); bronchoscopy (2020); Thoracoscopy (Left, 2020); bronchoscopy (N/A, 2021); bronchoscopy (2021); bronchoscopy (N/A, 3/13/2024); ep device procedure (N/A, 2024); ep device procedure (N/A, 2024); ep device procedure (Left, 2024); and ep device procedure (N/A, 2024).    Assessment  Body Structures, Functions, Activity Limitations Requiring Skilled Therapeutic Intervention: Decreased functional mobility ;Increased pain;Decreased balance;Decreased strength;Decreased

## 2025-05-02 NOTE — DISCHARGE INSTR - COC
Continuity of Care Form    Patient Name: Benji Croft   :  1945  MRN:  [id number]    Admit date:  2025  Discharge date:  ***    Code Status Order: Full Code   Advance Directives:     Admitting Physician:  Josephine Malin MD  PCP: Raysa Eli MD    Discharging Nurse: ***  Discharging Hospital Unit/Room#: 0550/0550-01  Discharging Unit Phone Number: ***    Emergency Contact:   Extended Emergency Contact Information  Primary Emergency Contact: LETICIA CROFT  Address: [address] [place] [address]  Home Phone: [phone]  Mobile Phone: [phone]  Relation: Spouse  Secondary Emergency Contact: KarleeYamile MALAIKA   [facility]  Home Phone: [phone]  Mobile Phone: [phone]  Relation: Child    Past Surgical History:  Past Surgical History:   Procedure Laterality Date    APPENDECTOMY      BRONCHOSCOPY N/A 2020    BRONCHOSCOPY ALVEOLAR LAVAGE performed by Lila Carreon MD at [facility] ENDOSCOPY    BRONCHOSCOPY  2020    BRONCHOSCOPY THERAPUTIC ASPIRATION INITIAL performed by Lila Carreon MD at [facility] ENDOSCOPY    BRONCHOSCOPY N/A 2021    BRONCHOSCOPY ALVEOLAR LAVAGE performed by Lila Carreon MD at [facility] ENDOSCOPY    BRONCHOSCOPY  2021    BRONCHOSCOPY THERAPUTIC ASPIRATION INITIAL performed by Lila Carreon MD at [facility] ENDOSCOPY    BRONCHOSCOPY N/A 3/13/2024    BRONCHOSCOPY DIAGNOSTIC OR CELL WASH ONLY performed by Brittnee Alberts MD at [facility] ENDOSCOPY    CARDIAC CATHETERIZATION  07/10/2017    Dr. Gill - multi-vessel CAD, referred for CABG    CARPAL TUNNEL RELEASE Bilateral     CORONARY ANGIOPLASTY WITH STENT PLACEMENT  2018    YUVAL- 2.75 x 18 pRCA    CORONARY ARTERY BYPASS GRAFT  2017    Dr. Mccarthy - x4 (LIMA-LAD, reverse R SVG-diagonal, OM & PDA) w/placement of temporary pacing wire    EP DEVICE PROCEDURE N/A 2024    Ep study complete performed by IDALIA Torres Jr., MD at [facility] CARDIAC CATH

## 2025-05-03 VITALS
BODY MASS INDEX: 27.77 KG/M2 | OXYGEN SATURATION: 97 % | WEIGHT: 205.03 LBS | HEART RATE: 71 BPM | RESPIRATION RATE: 16 BRPM | TEMPERATURE: 98.4 F | DIASTOLIC BLOOD PRESSURE: 65 MMHG | SYSTOLIC BLOOD PRESSURE: 111 MMHG | HEIGHT: 72 IN

## 2025-05-03 LAB
GLUCOSE BLD-MCNC: 104 MG/DL (ref 70–99)
GLUCOSE BLD-MCNC: 118 MG/DL (ref 70–99)
PERFORMED ON: ABNORMAL
PERFORMED ON: ABNORMAL
VANCOMYCIN SERPL-MCNC: 15.3 UG/ML

## 2025-05-03 PROCEDURE — 6360000002 HC RX W HCPCS: Performed by: INTERNAL MEDICINE

## 2025-05-03 PROCEDURE — 80202 ASSAY OF VANCOMYCIN: CPT

## 2025-05-03 PROCEDURE — 2500000003 HC RX 250 WO HCPCS: Performed by: INTERNAL MEDICINE

## 2025-05-03 PROCEDURE — 2580000003 HC RX 258: Performed by: INTERNAL MEDICINE

## 2025-05-03 PROCEDURE — 6370000000 HC RX 637 (ALT 250 FOR IP): Performed by: INTERNAL MEDICINE

## 2025-05-03 PROCEDURE — 99232 SBSQ HOSP IP/OBS MODERATE 35: CPT | Performed by: PHYSICIAN ASSISTANT

## 2025-05-03 PROCEDURE — 6370000000 HC RX 637 (ALT 250 FOR IP): Performed by: SPECIALIST/TECHNOLOGIST

## 2025-05-03 PROCEDURE — 36415 COLL VENOUS BLD VENIPUNCTURE: CPT

## 2025-05-03 RX ORDER — NEOMYCIN/BACITRACIN/POLYMYXINB 3.5-400-5K
OINTMENT (GRAM) TOPICAL 2 TIMES DAILY
Status: DISCONTINUED | OUTPATIENT
Start: 2025-05-03 | End: 2025-05-03 | Stop reason: HOSPADM

## 2025-05-03 RX ORDER — DOXYCYCLINE HYCLATE 100 MG
100 TABLET ORAL 2 TIMES DAILY
DISCHARGE
Start: 2025-05-03 | End: 2025-05-10

## 2025-05-03 RX ORDER — DOXYCYCLINE HYCLATE 100 MG
100 TABLET ORAL EVERY 12 HOURS SCHEDULED
Status: DISCONTINUED | OUTPATIENT
Start: 2025-05-03 | End: 2025-05-03 | Stop reason: HOSPADM

## 2025-05-03 RX ORDER — DOXYCYCLINE HYCLATE 100 MG
100 TABLET ORAL 2 TIMES DAILY
Qty: 14 TABLET | Refills: 0 | DISCHARGE
Start: 2025-05-03 | End: 2025-05-10

## 2025-05-03 RX ADMIN — VANCOMYCIN HYDROCHLORIDE 1250 MG: 10 INJECTION, POWDER, LYOPHILIZED, FOR SOLUTION INTRAVENOUS at 10:32

## 2025-05-03 RX ADMIN — OXYCODONE 10 MG: 5 TABLET ORAL at 13:18

## 2025-05-03 RX ADMIN — SODIUM CHLORIDE 25 ML/HR: 0.9 INJECTION, SOLUTION INTRAVENOUS at 10:30

## 2025-05-03 RX ADMIN — GUAIFENESIN 1200 MG: 600 TABLET ORAL at 09:04

## 2025-05-03 RX ADMIN — OXYCODONE 5 MG: 5 TABLET ORAL at 04:05

## 2025-05-03 RX ADMIN — METHOCARBAMOL 500 MG: 500 TABLET ORAL at 09:03

## 2025-05-03 RX ADMIN — METHOCARBAMOL 500 MG: 500 TABLET ORAL at 13:18

## 2025-05-03 RX ADMIN — METOPROLOL SUCCINATE 12.5 MG: 25 TABLET, EXTENDED RELEASE ORAL at 09:03

## 2025-05-03 RX ADMIN — Medication 10 ML: at 09:04

## 2025-05-03 RX ADMIN — TORSEMIDE 20 MG: 20 TABLET ORAL at 09:03

## 2025-05-03 RX ADMIN — ASPIRIN 81 MG: 81 TABLET, COATED ORAL at 09:03

## 2025-05-03 RX ADMIN — Medication 2000 UNITS: at 09:02

## 2025-05-03 RX ADMIN — OXYCODONE 10 MG: 5 TABLET ORAL at 09:15

## 2025-05-03 RX ADMIN — SACUBITRIL AND VALSARTAN 1 TABLET: 49; 51 TABLET, FILM COATED ORAL at 09:03

## 2025-05-03 ASSESSMENT — PAIN DESCRIPTION - ORIENTATION
ORIENTATION: RIGHT
ORIENTATION: RIGHT

## 2025-05-03 ASSESSMENT — PAIN SCALES - GENERAL
PAINLEVEL_OUTOF10: 0
PAINLEVEL_OUTOF10: 8
PAINLEVEL_OUTOF10: 7
PAINLEVEL_OUTOF10: 5

## 2025-05-03 ASSESSMENT — PAIN DESCRIPTION - LOCATION
LOCATION: LEG
LOCATION: KNEE

## 2025-05-03 ASSESSMENT — PAIN DESCRIPTION - DESCRIPTORS
DESCRIPTORS: THROBBING
DESCRIPTORS: ACHING

## 2025-05-03 NOTE — DISCHARGE SUMMARY
Hospital Medicine Discharge Summary    Patient: Benji Israel   : 1945     Hospital:  [facility]  Admit Date: 2025   Discharge Date: 5/3/2025    Disposition:   Home with home health care  Code status:  Full  Condition at Discharge: Stable  Primary Care Provider: aRysa Eli MD    Admitting Provider: Josephine Malin MD  Discharge Provider: JOSEPHINE MALIN MD     Discharge Diagnoses:      Active Hospital Problems    Diagnosis     Traumatic hematoma of right knee, initial encounter [S80.01XA]        Presenting Admission History:      80 y.o. 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  He is s/p BiV ICD implantation done in .     He gives history that he fell approximately 5 days prior to this admit.  States is a mechanical fall and he struck his right knee.  He reports he did get evaluated in the ED after the fall had an x-ray completed did not reveal any fractures and was discharged home with pain control with Robaxin and Percocet.  He reports that over the past several days he has had increased swelling and pain of his right knee.  Pain has become quite severe and therefore presented to the ED for further evaluation.     Arthrocentesis was attempted in the ED however was unsuccessful.  He is not admitted to receive IV antibiotics, analgesics  and for orthopedic consultation.        Assessment/Plan:      Right knee swelling and pain : As noted he did fall and strike his right knee about 5 days prior to this admit.  He is maintained on Xarelto, so there is concern for hematoma.  Did hold Xarelto during this admission.  He did receive IV vancomycin.  He is discharged home on oral doxycycline to continue the 7-day course  Continue analgesics and he was given a prescription for narcotic analgesics with no refills.  Orthopedics

## 2025-05-03 NOTE — CARE COORDINATION
CASE MANAGEMENT DISCHARGE SUMMARY      Discharge to: home with [facility]    Precertification completed:   Hospital Exemption Notification (HENS) completed:     IMM given: (date)     New Durable Medical Equipment ordered/agency: RW delivered to room through [employer]     Transportation:    Family/car: private     Confirmed discharge plan with: RN, [place]     Patient: yes/no     Family:  yes/no    Name: Contact number:     Facility/Agency, name:  [facility] faxed [facility] pulling orders   Phone number for report to facility:      RN, name: Katherine     Note: Discharging nurse to complete [facility], reconcile [facility], and place final copy with patient's discharge packet. RN to ensure that written prescriptions for  Level II medications are sent with patient to the facility as per protocol.

## 2025-05-03 NOTE — PROGRESS NOTES
Department of Orthopedic Surgery  Physician Assistant   Progress Note    Subjective:       Systemic or Specific Complaints:Pain Control and still occasional sharp pains in the knee, specifically at the medial aspect where the attempted aspiration was. Tolerating the ace wrap well, has been elevating and icing. No family at bedside    Objective:     Patient Vitals for the past 24 hrs:   BP Temp Temp src Pulse Resp SpO2 Weight   05/03/25 0845 123/82 98.4 °F (36.9 °C) Oral 85 16 96 % --   05/03/25 0715 -- -- -- -- -- -- 93 kg (205 lb 0.4 oz)   05/03/25 0435 -- -- -- -- 16 -- --   05/03/25 0345 119/65 98.4 °F (36.9 °C) Oral 73 16 96 % --   05/02/25 2300 -- -- -- -- 14 -- --   05/02/25 2230 114/60 99.1 °F (37.3 °C) Oral 88 16 94 % --   05/02/25 2006 113/74 99.5 °F (37.5 °C) Oral 88 16 98 % --   05/02/25 1915 -- -- -- -- 16 -- --   05/02/25 1844 118/64 -- -- 78 18 -- --   05/02/25 1627 121/64 99.1 °F (37.3 °C) Oral 76 16 95 % --   05/02/25 1433 116/74 -- -- 83 -- 96 % --   05/02/25 1128 115/80 98.4 °F (36.9 °C) Oral 81 18 96 % --       General: alert, appears stated age, cooperative, and no distress   Wound: No wounds, there is a blister on the anteromedial knee, it remains intact   Motion: Painful range of Motion in affected extremity, approximately 50 degrees flexion   DVT Exam: No evidence of DVT seen on physical exam.  Negative Gabriele's sign.     Additional exam: Patient seen laying in bed with leg elevated at time of interview  Leg lengths equal, rotational alignment neutral  Knee swelling remains, there is a tense effusion relatively unchanged, thigh and calf compartments compressible  EHL, FHL, gastroc, and anterior tibialis motor intact  Able to complete short arc quad and straight leg raise unassisted with no extensor lag  Sensation intact to light touch  DP and PT pulses 2+      Data Review  CBC:   Lab Results   Component Value Date/Time    WBC 8.5 05/02/2025 08:44 AM    RBC 3.40 05/02/2025 08:44 AM    HGB 10.5

## 2025-05-03 NOTE — PLAN OF CARE
Problem: Chronic Conditions and Co-morbidities  Goal: Patient's chronic conditions and co-morbidity symptoms are monitored and maintained or improved  5/1/2025 1030 by Tiffany Goel RN  Outcome: Progressing     Problem: Discharge Planning  Goal: Discharge to home or other facility with appropriate resources  5/1/2025 1030 by Tiffany Goel, RN  Outcome: Progressing     Problem: Pain  Goal: Verbalizes/displays adequate comfort level or baseline comfort level  5/1/2025 1030 by Tiffany Goel RN  Outcome: Progressing     Problem: Safety - Adult  Goal: Free from fall injury  5/1/2025 1030 by Tiffany Goel RN  Outcome: Progressing     Problem: Skin/Tissue Integrity  Goal: Skin integrity remains intact  Description: 1.  Monitor for areas of redness and/or skin breakdown2.  Assess vascular access sites hourly3.  Every 4-6 hours minimum:  Change oxygen saturation probe site4.  Every 4-6 hours:  If on nasal continuous positive airway pressure, respiratory therapy assess nares and determine need for appliance change or resting period  Outcome: Progressing     
  Problem: Chronic Conditions and Co-morbidities  Goal: Patient's chronic conditions and co-morbidity symptoms are monitored and maintained or improved  5/1/2025 2155 by Angelo Ferro RN  Outcome: Progressing  5/1/2025 1030 by Tiffany Goel RN  Outcome: Progressing     Problem: Discharge Planning  Goal: Discharge to home or other facility with appropriate resources  5/1/2025 2155 by Angelo Frero RN  Outcome: Progressing  5/1/2025 1030 by Tiffany Goel RN  Outcome: Progressing     Problem: Pain  Goal: Verbalizes/displays adequate comfort level or baseline comfort level  5/1/2025 2155 by Angelo Ferro RN  Outcome: Progressing  5/1/2025 1030 by Tiffany Goel RN  Outcome: Progressing     Problem: Safety - Adult  Goal: Free from fall injury  5/1/2025 2155 by Angelo Ferro RN  Outcome: Progressing  5/1/2025 1030 by Tiffany Goel RN  Outcome: Progressing     Problem: Skin/Tissue Integrity  Goal: Skin integrity remains intact  Description: 1.  Monitor for areas of redness and/or skin breakdown2.  Assess vascular access sites hourly3.  Every 4-6 hours minimum:  Change oxygen saturation probe site4.  Every 4-6 hours:  If on nasal continuous positive airway pressure, respiratory therapy assess nares and determine need for appliance change or resting period  5/1/2025 2155 by Angelo Ferro RN  Outcome: Progressing  5/1/2025 1030 by Tiffany Goel RN  Outcome: Progressing     
  Problem: Chronic Conditions and Co-morbidities  Goal: Patient's chronic conditions and co-morbidity symptoms are monitored and maintained or improved  5/2/2025 1025 by Sandi Lagunas RN  Outcome: Progressing  5/1/2025 2155 by Angelo Ferro RN  Outcome: Progressing     Problem: Discharge Planning  Goal: Discharge to home or other facility with appropriate resources  5/2/2025 1025 by Sandi Lagunas RN  Outcome: Progressing  5/1/2025 2155 by Angelo Ferro RN  Outcome: Progressing     Problem: Pain  Goal: Verbalizes/displays adequate comfort level or baseline comfort level  5/2/2025 1025 by Sandi Lagunas RN  Outcome: Progressing  5/1/2025 2155 by Angelo Ferro RN  Outcome: Progressing     Problem: Safety - Adult  Goal: Free from fall injury  5/2/2025 1025 by Sandi Lagunas RN  Outcome: Progressing  5/1/2025 2155 by Angelo Ferro RN  Outcome: Progressing     Problem: Skin/Tissue Integrity  Goal: Skin integrity remains intact  Description: 1.  Monitor for areas of redness and/or skin breakdown2.  Assess vascular access sites hourly3.  Every 4-6 hours minimum:  Change oxygen saturation probe site4.  Every 4-6 hours:  If on nasal continuous positive airway pressure, respiratory therapy assess nares and determine need for appliance change or resting period  5/2/2025 1025 by Sandi Lagunas RN  Outcome: Progressing  5/1/2025 2155 by Angelo Ferro RN  Outcome: Progressing     
  Problem: Chronic Conditions and Co-morbidities  Goal: Patient's chronic conditions and co-morbidity symptoms are monitored and maintained or improved  Outcome: Progressing  Flowsheets (Taken 4/30/2025 1956)  Care Plan - Patient's Chronic Conditions and Co-Morbidity Symptoms are Monitored and Maintained or Improved: Monitor and assess patient's chronic conditions and comorbid symptoms for stability, deterioration, or improvement     Problem: Discharge Planning  Goal: Discharge to home or other facility with appropriate resources  Outcome: Progressing  Flowsheets (Taken 4/30/2025 1956)  Discharge to home or other facility with appropriate resources: Identify barriers to discharge with patient and caregiver     Problem: Pain  Goal: Verbalizes/displays adequate comfort level or baseline comfort level  Outcome: Progressing     Problem: Safety - Adult  Goal: Free from fall injury  Outcome: Progressing     
Bed in lowest position, wheels locked, 2/4 side rails up, nonskid footwear on. Bed/ chair check alarm in place, call light within reach. Pt instructed to call out when needing assistance. Pt stated understanding. Nurse will continue to monitor.    
No

## 2025-05-05 ENCOUNTER — TELEPHONE (OUTPATIENT)
Dept: ORTHOPEDIC SURGERY | Age: 80
End: 2025-05-05

## 2025-05-05 NOTE — TELEPHONE ENCOUNTER
Needs seen in office first.    Cosentyx Pregnancy And Lactation Text: This medication is Pregnancy Category B and is considered safe during pregnancy. It is unknown if this medication is excreted in breast milk.

## 2025-05-06 RX ORDER — DOXYCYCLINE HYCLATE 100 MG
100 TABLET ORAL 2 TIMES DAILY
Qty: 14 TABLET | Refills: 0 | Status: SHIPPED | OUTPATIENT
Start: 2025-05-06 | End: 2025-05-13

## 2025-05-07 ENCOUNTER — OFFICE VISIT (OUTPATIENT)
Dept: ORTHOPEDIC SURGERY | Age: 80
End: 2025-05-07
Payer: MEDICARE

## 2025-05-07 VITALS — WEIGHT: 205 LBS | BODY MASS INDEX: 27.77 KG/M2 | HEIGHT: 72 IN

## 2025-05-07 DIAGNOSIS — S80.01XD TRAUMATIC HEMATOMA OF RIGHT KNEE, SUBSEQUENT ENCOUNTER: Primary | ICD-10-CM

## 2025-05-07 PROCEDURE — 99213 OFFICE O/P EST LOW 20 MIN: CPT | Performed by: ORTHOPAEDIC SURGERY

## 2025-05-07 PROCEDURE — 1125F AMNT PAIN NOTED PAIN PRSNT: CPT | Performed by: ORTHOPAEDIC SURGERY

## 2025-05-07 PROCEDURE — 1123F ACP DISCUSS/DSCN MKR DOCD: CPT | Performed by: ORTHOPAEDIC SURGERY

## 2025-05-08 ENCOUNTER — TELEPHONE (OUTPATIENT)
Dept: CARDIOLOGY CLINIC | Age: 80
End: 2025-05-08

## 2025-05-08 NOTE — TELEPHONE ENCOUNTER
CARDIAC CLEARANCE REQUEST    What type of procedure are you havin lower teeth surgical extraction  Are you taking any blood thinners:  Yes- would like to hold for 3-5 days  Type on anesthesia:  none  When is your procedure scheduled for:  unknown  What physician is performing your procedure:  Dr. Daniels  Phone Number:  [phone]  Fax number to send the letter: [phone]    Last ov 3.11.25 [facility]  Next ov 9.29.25 [facility]

## 2025-05-09 NOTE — PROGRESS NOTES
ORTHOPAEDIC SURGERY FOLLOWUP VISIT    CHIEF COMPLAINT: Right knee swelling.    DATE OF INJURY: 4/23/2025  DIAGNOSIS: Right knee contusion, hematoma  DATE OF SURGERY: N/A, nonoperative management    HISTORY OF PRESENT ILLNESS:  80-year-old male is now approximately 2 weeks postinjury.  He had a hematoma which was fairly tense over the anterior knee.  He takes Xarelto.  The injury was sustained after a fall from a lawnmower. he has prior history of a total knee arthroplasty on that side.  He was seen at [facility].  He had some skin blistering at the time.  He was managed nonoperatively for this hematoma with ice, compression, observation.  He was also placed on antibiotics due to potential for cellulitis.  He reports that his mobility has been improving.  His pain is improving.  He indicates that the sensation of significant pressure/tightness over the anterior knee is improving overall.    PHYSICAL EXAM:  Anterior midline surgical scar is maturely healed.  Minimal skin slough over the anterior knee.  Mepilex dressing is in place.  There is fullness involving the prepatellar bursa.  This is likely hemorrhagic in nature related to hematoma.  There is tenderness to palpation diffusely about the area, but improved from prior evaluation.  Knee range of motion is full extension to 120 degrees of flexion without difficulty.  Ligamentous stress exam demonstrates a stable total knee arthroplasty. Sensation is intact to light touch in deep peroneal, superficial peroneal, tibial, sural, and saphenous nerve distributions.  Motor function is intact to EHL, FHL, tibialis anterior, and gastroc.  There is brisk capillary refill to the toes and a strong palpable dorsalis pedis pulse.  Compartments are soft and compressible.  There is no calf tenderness and a negative Homans' sign.      Media Information      Document Information    Wound Care Image: Wound      05/07/2025 11:49   Attached To:   Office Visit on 5/7/25 with

## 2025-05-19 ENCOUNTER — OFFICE VISIT (OUTPATIENT)
Dept: PULMONOLOGY | Age: 80
End: 2025-05-19
Payer: MEDICARE

## 2025-05-19 ENCOUNTER — TELEPHONE (OUTPATIENT)
Dept: PULMONOLOGY | Age: 80
End: 2025-05-19

## 2025-05-19 VITALS
HEART RATE: 97 BPM | DIASTOLIC BLOOD PRESSURE: 70 MMHG | WEIGHT: 205 LBS | OXYGEN SATURATION: 94 % | HEIGHT: 72 IN | BODY MASS INDEX: 27.77 KG/M2 | SYSTOLIC BLOOD PRESSURE: 111 MMHG | TEMPERATURE: 98 F | RESPIRATION RATE: 16 BRPM

## 2025-05-19 DIAGNOSIS — J98.6 PARALYZED HEMIDIAPHRAGM: ICD-10-CM

## 2025-05-19 DIAGNOSIS — J98.4 RESTRICTIVE LUNG DISEASE: ICD-10-CM

## 2025-05-19 DIAGNOSIS — J44.1 COPD EXACERBATION (HCC): Primary | ICD-10-CM

## 2025-05-19 DIAGNOSIS — I50.9 CONGESTIVE HEART FAILURE, NYHA CLASS 4, UNSPECIFIED CONGESTIVE HEART FAILURE TYPE (HCC): ICD-10-CM

## 2025-05-19 PROCEDURE — 3074F SYST BP LT 130 MM HG: CPT | Performed by: NURSE PRACTITIONER

## 2025-05-19 PROCEDURE — 3078F DIAST BP <80 MM HG: CPT | Performed by: NURSE PRACTITIONER

## 2025-05-19 PROCEDURE — 99214 OFFICE O/P EST MOD 30 MIN: CPT | Performed by: NURSE PRACTITIONER

## 2025-05-19 PROCEDURE — 1160F RVW MEDS BY RX/DR IN RCRD: CPT | Performed by: NURSE PRACTITIONER

## 2025-05-19 PROCEDURE — 1159F MED LIST DOCD IN RCRD: CPT | Performed by: NURSE PRACTITIONER

## 2025-05-19 PROCEDURE — 1123F ACP DISCUSS/DSCN MKR DOCD: CPT | Performed by: NURSE PRACTITIONER

## 2025-05-19 RX ORDER — PREDNISONE 10 MG/1
TABLET ORAL
Qty: 20 TABLET | Refills: 0 | Status: SHIPPED | OUTPATIENT
Start: 2025-05-19

## 2025-05-19 ASSESSMENT — ENCOUNTER SYMPTOMS
HEMOPTYSIS: 0
SHORTNESS OF BREATH: 1
ABDOMINAL PAIN: 0
EYE PAIN: 0
COUGH: 0
VOMITING: 0
WHEEZING: 1
SORE THROAT: 0
DIARRHEA: 0
NAUSEA: 0
SPUTUM PRODUCTION: 1
RHINORRHEA: 0
CHEST TIGHTNESS: 0

## 2025-05-19 NOTE — TELEPHONE ENCOUNTER
Pt called stating that he has used nebulizer all weekend without relief.  Pt scheduled appt with Tabatha today at 3:30 pm for illness visit. Pt is going to cancel his dental appt.

## 2025-05-19 NOTE — PATIENT INSTRUCTIONS
Call with worsening symptoms such as increased shortness of breath, productive cough, wheezing or symptoms not responding to treatment plan.     Prednisone taper 4 pills daily for 2 days then 3 pills daily for 2 days then 2 pills daily for 2 days then 1 pill daily for 2 days then off. Side effects may include but are not limited to : nervousness, insomnia, increase in hunger, GI upset (take with food), increases in blood sugar, mood changes such as irritation/agitation.     Follow up in September as scheduled.

## 2025-05-19 NOTE — PROGRESS NOTES
MA Communication: The following orders are received by verbal communication from [address]ACHE[address]    Communication:  Follow up as scheduled - Sept 2025  
exposure control, iterative  reconstruction, and/or weight based adjustment of the mA/kV was utilized to  reduce the radiation dose to as low as reasonably achievable.     COMPARISON:  7/5/2024, 3/9/2024, 3/20/2020, 7/28/2017     HISTORY:  ORDERING SYSTEM PROVIDED HISTORY: SOB, recent ICD device placement 2 days  ago, hematoma to chest wall.  TECHNOLOGIST PROVIDED HISTORY:  Reason for exam:->SOB, recent ICD device placement 2 days ago, hematoma to  chest wall.  Additional Contrast?->1  Reason for Exam: post-op ICD placement a few days ago,pt now has a hematoma  at the site  Additional signs and symptoms: some shortness of breath  Relevant Medical/Surgical History: hx of bypass surgery,stent placement,COPD  and CHF     FINDINGS:  Pulmonary Arteries: Pulmonary arteries are adequately opacified for  evaluation.  No evidence of intraluminal filling defect to suggest pulmonary  embolism.  Main pulmonary artery is normal in caliber.     Mediastinum: The thyroid is unremarkable.  There is no pathologic  supraclavicular or axillary lymphadenopathy.  There is stable mild chronic  bilateral hilar lymphadenopathy, unchanged from 03/09/2024, likely benign and  reactive in etiology given its stability.  No new pathologic lymphadenopathy  present within the chest or mediastinum.  There is atherosclerotic disease of  the thoracic aorta, without evidence of aneurysm or dissection.  There is  coronary atherosclerosis.  Pacemaker/AICD leads are noted.  No pericardial  abnormality is identified.     Lungs/pleura: There is mild mucous plugging within the right lower lobe.  The  central airways are otherwise patent.  There is stable chronic elevation of  the left hemidiaphragm causing chronic compressive atelectasis within the  lingula and left lower lobe.  This is unchanged from multiple prior exams.  There is scattered subsegmental atelectasis throughout both lungs.  There is  mild diffuse ground-glass opacity throughout both lungs,

## 2025-05-19 NOTE — TELEPHONE ENCOUNTER
Pt LVM stating he feels like he is having a flare up.  He is coughing and has clear and white mucous.  Patient states he wants to get some medication before it becomes worse.    Patient also stated he is supposed to have a tooth pulled today and wants to know if he should do that or not.

## 2025-05-27 ENCOUNTER — TELEPHONE (OUTPATIENT)
Dept: PULMONOLOGY | Age: 80
End: 2025-05-27

## 2025-05-27 ENCOUNTER — TELEPHONE (OUTPATIENT)
Dept: CARDIOLOGY CLINIC | Age: 80
End: 2025-05-27

## 2025-05-27 DIAGNOSIS — R06.02 SOB (SHORTNESS OF BREATH): Primary | ICD-10-CM

## 2025-05-27 NOTE — TELEPHONE ENCOUNTER
Pt called to report he has lost his   Rivaroxaban 20 mg tabs take 1 tablet by mouth daily.  Pt stated he has some Eliquis left over that he could take in replace of the Xarelto.  Please advise.  Pt uses   [id number] - [place], [place] - [address] [place], [place] [address]ZANDRA - P [phone] - F [phone]  [address] [place] [id number]  Phone: [phone]  Fax: [phone]

## 2025-05-27 NOTE — TELEPHONE ENCOUNTER
Tye Denis MD Decker, Patricia, MA; [facility] Clinical Staff1 hour ago (1:30 PM)       I ordered a CXR and labs to be done to investigate his SOB more. Can we advise him to get these done? Thank you.    KD     SW patient and relayed Dr. Denis's response. He said he will get the testing done either tomorrow or Thursday.  Will watch for results.

## 2025-05-27 NOTE — TELEPHONE ENCOUNTER
Called pharmacy, they stated they will contact pts insurance regarding a lost medication fill and will contact pt after.   Spoke with pt, pt v/u

## 2025-05-27 NOTE — TELEPHONE ENCOUNTER
Patient called stating that he was given predniSONE (DELTASONE) 10 MG tablet [[id number]] for his flair up of COPD and he took his last dose today and he stated he doesn't feel any better, please advise

## 2025-05-28 ENCOUNTER — HOSPITAL ENCOUNTER (OUTPATIENT)
Dept: GENERAL RADIOLOGY | Age: 80
Discharge: HOME | End: 2025-05-28
Payer: MEDICARE

## 2025-05-28 ENCOUNTER — HOSPITAL ENCOUNTER (OUTPATIENT)
Dept: LAB | Age: 80
Discharge: HOME | End: 2025-05-28
Payer: MEDICARE

## 2025-05-28 DIAGNOSIS — R06.02 SOB (SHORTNESS OF BREATH): ICD-10-CM

## 2025-05-28 LAB — NT-PROBNP SERPL-MCNC: 1274 PG/ML (ref 0–449)

## 2025-05-28 PROCEDURE — 71046 X-RAY EXAM CHEST 2 VIEWS: CPT

## 2025-05-28 PROCEDURE — 83880 ASSAY OF NATRIURETIC PEPTIDE: CPT

## 2025-05-28 PROCEDURE — 36415 COLL VENOUS BLD VENIPUNCTURE: CPT

## 2025-05-29 ENCOUNTER — HOSPITAL ENCOUNTER (EMERGENCY)
Age: 80
Discharge: HOME | End: 2025-05-29
Payer: MEDICARE

## 2025-05-29 ENCOUNTER — TELEPHONE (OUTPATIENT)
Dept: CARDIOLOGY CLINIC | Age: 80
End: 2025-05-29

## 2025-05-29 ENCOUNTER — APPOINTMENT (OUTPATIENT)
Dept: CT IMAGING | Age: 80
End: 2025-05-29
Payer: MEDICARE

## 2025-05-29 VITALS
TEMPERATURE: 97.8 F | OXYGEN SATURATION: 93 % | SYSTOLIC BLOOD PRESSURE: 111 MMHG | HEART RATE: 76 BPM | RESPIRATION RATE: 17 BRPM | WEIGHT: 201.6 LBS | DIASTOLIC BLOOD PRESSURE: 62 MMHG | BODY MASS INDEX: 27.34 KG/M2

## 2025-05-29 DIAGNOSIS — N28.1 RENAL CYST: ICD-10-CM

## 2025-05-29 DIAGNOSIS — J41.0 SIMPLE CHRONIC BRONCHITIS (HCC): ICD-10-CM

## 2025-05-29 DIAGNOSIS — R06.02 SHORTNESS OF BREATH: Primary | ICD-10-CM

## 2025-05-29 DIAGNOSIS — R91.1 PULMONARY NODULE: ICD-10-CM

## 2025-05-29 LAB
ALBUMIN SERPL-MCNC: 4.1 G/DL (ref 3.4–5)
ALBUMIN/GLOB SERPL: 1.4 {RATIO} (ref 1.1–2.2)
ALP SERPL-CCNC: 85 U/L (ref 40–129)
ALT SERPL-CCNC: 31 U/L (ref 10–40)
ANION GAP SERPL CALCULATED.3IONS-SCNC: 12 MMOL/L (ref 3–16)
AST SERPL-CCNC: 36 U/L (ref 15–37)
BACTERIA URNS QL MICRO: ABNORMAL /HPF
BASOPHILS # BLD: 0.1 K/UL (ref 0–0.2)
BASOPHILS NFR BLD: 1 %
BILIRUB SERPL-MCNC: 0.5 MG/DL (ref 0–1)
BILIRUB UR QL STRIP.AUTO: NEGATIVE
BUN SERPL-MCNC: 32 MG/DL (ref 7–20)
CALCIUM SERPL-MCNC: 9.3 MG/DL (ref 8.3–10.6)
CHLORIDE SERPL-SCNC: 100 MMOL/L (ref 99–110)
CLARITY UR: CLEAR
CO2 SERPL-SCNC: 28 MMOL/L (ref 21–32)
COLOR UR: YELLOW
CREAT SERPL-MCNC: 1.4 MG/DL (ref 0.8–1.3)
DEPRECATED RDW RBC AUTO: 16 % (ref 12.4–15.4)
EKG ATRIAL RATE: 76 BPM
EKG DIAGNOSIS: NORMAL
EKG P AXIS: 66 DEGREES
EKG P-R INTERVAL: 136 MS
EKG Q-T INTERVAL: 488 MS
EKG QRS DURATION: 182 MS
EKG QTC CALCULATION (BAZETT): 549 MS
EKG R AXIS: 242 DEGREES
EKG T AXIS: 95 DEGREES
EKG VENTRICULAR RATE: 76 BPM
EOSINOPHIL # BLD: 0.3 K/UL (ref 0–0.6)
EOSINOPHIL NFR BLD: 2.6 %
EPI CELLS #/AREA URNS HPF: ABNORMAL /HPF (ref 0–5)
GFR SERPLBLD CREATININE-BSD FMLA CKD-EPI: 51 ML/MIN/{1.73_M2}
GLUCOSE SERPL-MCNC: 96 MG/DL (ref 70–99)
GLUCOSE UR STRIP.AUTO-MCNC: NEGATIVE MG/DL
HCT VFR BLD AUTO: 36.2 % (ref 40.5–52.5)
HGB BLD-MCNC: 12.4 G/DL (ref 13.5–17.5)
HGB UR QL STRIP.AUTO: ABNORMAL
HYALINE CASTS #/AREA URNS LPF: ABNORMAL /LPF (ref 0–2)
KETONES UR STRIP.AUTO-MCNC: NEGATIVE MG/DL
LEUKOCYTE ESTERASE UR QL STRIP.AUTO: ABNORMAL
LYMPHOCYTES # BLD: 2.6 K/UL (ref 1–5.1)
LYMPHOCYTES NFR BLD: 21.9 %
MCH RBC QN AUTO: 31 PG (ref 26–34)
MCHC RBC AUTO-ENTMCNC: 34.3 G/DL (ref 31–36)
MCV RBC AUTO: 90.3 FL (ref 80–100)
MONOCYTES # BLD: 0.9 K/UL (ref 0–1.3)
MONOCYTES NFR BLD: 8.1 %
NEUTROPHILS # BLD: 7.8 K/UL (ref 1.7–7.7)
NEUTROPHILS NFR BLD: 66.4 %
NITRITE UR QL STRIP.AUTO: NEGATIVE
NT-PROBNP SERPL-MCNC: 1231 PG/ML (ref 0–449)
PH UR STRIP.AUTO: 6 [PH] (ref 5–8)
PLATELET # BLD AUTO: 265 K/UL (ref 135–450)
PMV BLD AUTO: 7.5 FL (ref 5–10.5)
POTASSIUM SERPL-SCNC: 4.5 MMOL/L (ref 3.5–5.1)
PROT SERPL-MCNC: 7.1 G/DL (ref 6.4–8.2)
PROT UR STRIP.AUTO-MCNC: NEGATIVE MG/DL
RBC # BLD AUTO: 4 M/UL (ref 4.2–5.9)
RBC #/AREA URNS HPF: ABNORMAL /HPF (ref 0–4)
SODIUM SERPL-SCNC: 140 MMOL/L (ref 136–145)
SP GR UR STRIP.AUTO: 1.01 (ref 1–1.03)
TROPONIN, HIGH SENSITIVITY: 70 NG/L (ref 0–22)
TROPONIN, HIGH SENSITIVITY: 73 NG/L (ref 0–22)
UA COMPLETE W REFLEX CULTURE PNL UR: ABNORMAL
UA DIPSTICK W REFLEX MICRO PNL UR: YES
URN SPEC COLLECT METH UR: ABNORMAL
UROBILINOGEN UR STRIP-ACNC: 0.2 E.U./DL
WBC # BLD AUTO: 11.7 K/UL (ref 4–11)
WBC #/AREA URNS HPF: ABNORMAL /HPF (ref 0–5)

## 2025-05-29 PROCEDURE — 71260 CT THORAX DX C+: CPT

## 2025-05-29 PROCEDURE — 99285 EMERGENCY DEPT VISIT HI MDM: CPT

## 2025-05-29 PROCEDURE — 93005 ELECTROCARDIOGRAM TRACING: CPT | Performed by: PHYSICIAN ASSISTANT

## 2025-05-29 PROCEDURE — 80053 COMPREHEN METABOLIC PANEL: CPT

## 2025-05-29 PROCEDURE — 83880 ASSAY OF NATRIURETIC PEPTIDE: CPT

## 2025-05-29 PROCEDURE — 36415 COLL VENOUS BLD VENIPUNCTURE: CPT

## 2025-05-29 PROCEDURE — 2580000003 HC RX 258: Performed by: PHYSICIAN ASSISTANT

## 2025-05-29 PROCEDURE — 81001 URINALYSIS AUTO W/SCOPE: CPT

## 2025-05-29 PROCEDURE — 84484 ASSAY OF TROPONIN QUANT: CPT

## 2025-05-29 PROCEDURE — 6360000004 HC RX CONTRAST MEDICATION: Performed by: PHYSICIAN ASSISTANT

## 2025-05-29 PROCEDURE — 85025 COMPLETE CBC W/AUTO DIFF WBC: CPT

## 2025-05-29 PROCEDURE — 93010 ELECTROCARDIOGRAM REPORT: CPT | Performed by: INTERNAL MEDICINE

## 2025-05-29 PROCEDURE — 74177 CT ABD & PELVIS W/CONTRAST: CPT

## 2025-05-29 RX ORDER — IOPAMIDOL 755 MG/ML
75 INJECTION, SOLUTION INTRAVASCULAR
Status: COMPLETED | OUTPATIENT
Start: 2025-05-29 | End: 2025-05-29

## 2025-05-29 RX ORDER — PREDNISONE 20 MG/1
TABLET ORAL
Qty: 15 TABLET | Refills: 0 | Status: SHIPPED | OUTPATIENT
Start: 2025-05-29 | End: 2025-06-08

## 2025-05-29 RX ORDER — 0.9 % SODIUM CHLORIDE 0.9 %
1000 INTRAVENOUS SOLUTION INTRAVENOUS ONCE
Status: COMPLETED | OUTPATIENT
Start: 2025-05-29 | End: 2025-05-29

## 2025-05-29 RX ORDER — DOXYCYCLINE HYCLATE 100 MG
100 TABLET ORAL 2 TIMES DAILY
Qty: 14 TABLET | Refills: 0 | Status: SHIPPED | OUTPATIENT
Start: 2025-05-29 | End: 2025-06-05

## 2025-05-29 RX ADMIN — SODIUM CHLORIDE 1000 ML: 9 INJECTION, SOLUTION INTRAVENOUS at 17:12

## 2025-05-29 RX ADMIN — IOPAMIDOL 75 ML: 755 INJECTION, SOLUTION INTRAVENOUS at 16:57

## 2025-05-29 ASSESSMENT — PAIN - FUNCTIONAL ASSESSMENT: PAIN_FUNCTIONAL_ASSESSMENT: NONE - DENIES PAIN

## 2025-05-29 NOTE — ED PROVIDER NOTES
[facility] EMERGENCY DEPARTMENT  EMERGENCY DEPARTMENT ENCOUNTER        Pt Name: Benji Israel  MRN: [id number]  Birthdate 1945  Date of evaluation: 5/29/2025  Provider: ELICEO Banks  PCP: Raysa Eli MD  Note Started: 4:44 PM EDT 5/29/25      TAMMY. I have evaluated this patient.        CHIEF COMPLAINT       Chief Complaint   Patient presents with    Fatigue     Increased fatigue, SOB, history of COPD, CHF. Pt states he's been sick for 3 weeks and isn't getting any better.       HISTORY OF PRESENT ILLNESS: 1 or more Elements     History From: patient      Benji Israel is a 80 y.o. male who presents to the emergency department complaining of generalized fatigue and shortness of breath.  He states he has been sick for about 3 weeks.  He reports he has already tried a round of steroids, antibiotics and uses his nebulizer treatment 4 times a day.  He states he continues to feel short of breath and fatigued.  He states he does have a history of CHF.  He denies any leg swelling.  He denies any chest pain.  No recent fevers or chills.  He states he called his cardiologist and primary care doctor who referred him to the emergency department today.    Nursing Notes were all reviewed and agreed with or any disagreements were addressed in the HPI.    REVIEW OF SYSTEMS :      Review of Systems    Positives and Pertinent negatives as per HPI.     SURGICAL HISTORY     Past Surgical History:   Procedure Laterality Date    APPENDECTOMY      BRONCHOSCOPY N/A 1/22/2020    BRONCHOSCOPY ALVEOLAR LAVAGE performed by Lila Carreon MD at [facility] ENDOSCOPY    BRONCHOSCOPY  1/22/2020    BRONCHOSCOPY THERAPUTIC ASPIRATION INITIAL performed by Lila Carreon MD at [facility] ENDOSCOPY    BRONCHOSCOPY N/A 6/11/2021    BRONCHOSCOPY ALVEOLAR LAVAGE performed by Lila Carreon MD at [facility] ENDOSCOPY    BRONCHOSCOPY  6/11/2021    BRONCHOSCOPY THERAPUTIC ASPIRATION INITIAL performed by Lila Carreon MD at [facility] ENDOSCOPY  32 and creatinine 1.4.  No transaminitis  Initial high sensitive troponin is 73 and repeat is stable at 70.  Although this is elevated is significantly downtrending compared to previous labs in December.  Urinalysis with trace blood and trace leukocytes but otherwise no evidence of infection  BNP is 1231  CT PE study shows no evidence of PE.  He has stable left lower lobe subsegmental atelectasis with air bronchograms.  He has a stable right upper lobe nodule and a new subpleural right upper lobe nodule  CT abdomen and pelvis shows no acute intra-abdominal abnormality.  He has a benign left renal cyst.  He also has severe central canal stenosis at L4-5 and moderate to severe central canal stenosis at L3-L4.    A discussion was had with the patient regarding her lab and imaging results.  His workup is overall very reassuring.  At this point he is hemodynamically stable and I think appropriate for discharge home.  The patient would prefer to be discharged home.  We did discuss all findings on CT imaging and recommendations for close follow-up with primary care physician.  We will trial a little bit stronger course of steroids for what I think is a persistent COPD exacerbation.  Will also trial doxycycline.  Prescriptions were sent over to the pharmacy.  The patient is in agreement treatment plan.  All questions are answered and he is discharged home in good condition.    Disposition Considerations (tests considered but not done, Admit vs D/C, Shared Decision Making, Pt Expectation of Test or Tx.):     I estimate there is LOW risk for ACUTE CORONARY SYNDROME, CHRONIC OBSTRUCTIVE PULMONARY DISEASE, CONGESTIVE HEART FAILURE, PERICARDIAL TAMPONADE, PNEUMONIA, PNEUMOTHORAX, PULMONARY EMBOLISM, SEPSIS, and THORACIC DISSECTION,  thus I consider the discharge disposition reasonable. Benji Israel and I have discussed the diagnosis and risks, and we agree with discharging home to follow-up with their primary doctor. We also

## 2025-05-29 NOTE — TELEPHONE ENCOUNTER
Pt. Advised of results of xray and labs. He was planning on calling Cardio this am anyway so told him to go ahead and do that. Given the message as outline by NP. Voiced understanding and thanked me for the call.

## 2025-05-29 NOTE — TELEPHONE ENCOUNTER
Pt called stated that he had labs and chest x-rays 05/28.  Pts BMP level was 1400 and was instructed to contact [facility].  Pt wants to know if he should go to the ED or alternatives.  Pt stated that he is having tingling and numbness in left leg along w/ fluid build up.  He denied any other symptoms at this time.  Please advise pt.

## 2025-05-29 NOTE — TELEPHONE ENCOUNTER
Call  Although BNP elevated, it is lower than prior  I reviewed notes and has been ill w/o improvement x 10 days  Rec ER

## 2025-05-29 NOTE — TELEPHONE ENCOUNTER
Chart reviewed. With no improvement with steroids, suggest follow up with cardiology or ED for further evaluation.  CXR did not show any acute findings.  His BNP however is elevated and maybe his CHF causing his increased SOB.

## 2025-05-30 ENCOUNTER — OFFICE VISIT (OUTPATIENT)
Dept: ORTHOPEDIC SURGERY | Age: 80
End: 2025-05-30
Payer: MEDICARE

## 2025-05-30 VITALS — HEIGHT: 72 IN | BODY MASS INDEX: 27.22 KG/M2 | WEIGHT: 201 LBS

## 2025-05-30 DIAGNOSIS — S80.01XD TRAUMATIC HEMATOMA OF RIGHT KNEE, SUBSEQUENT ENCOUNTER: Primary | ICD-10-CM

## 2025-05-30 PROCEDURE — 99213 OFFICE O/P EST LOW 20 MIN: CPT | Performed by: ORTHOPAEDIC SURGERY

## 2025-05-30 PROCEDURE — 1123F ACP DISCUSS/DSCN MKR DOCD: CPT | Performed by: ORTHOPAEDIC SURGERY

## 2025-05-30 PROCEDURE — 1160F RVW MEDS BY RX/DR IN RCRD: CPT | Performed by: ORTHOPAEDIC SURGERY

## 2025-05-30 PROCEDURE — 1159F MED LIST DOCD IN RCRD: CPT | Performed by: ORTHOPAEDIC SURGERY

## 2025-05-30 NOTE — PROGRESS NOTES
ORTHOPAEDIC SURGERY FOLLOWUP VISIT     CHIEF COMPLAINT: Right knee swelling.     DATE OF INJURY: 4/23/2025  DIAGNOSIS: Right knee contusion, hematoma  DATE OF SURGERY: N/A, nonoperative management     HISTORY OF PRESENT ILLNESS:  80-year-old male is 5 weeks postinjury.  He had a hematoma which was fairly tense over the anterior knee.  He takes Xarelto.  The injury was sustained after a fall from a lawnmower. he has prior history of a total knee arthroplasty on that side.  He was seen at [facility].  He had some skin blistering at the time.  He was managed nonoperatively for this hematoma with ice, compression, observation.  He was also placed on antibiotics due to potential for cellulitis.  He reports that his mobility has been improving.  He indicates that his pain has resolved.  He is very happy with his recovery including physical therapy.  His range of motion is appropriate.  No complaints today.    PHYSICAL EXAM:  Anterior midline surgical scar is maturely healed.  Previous skin blistering has epithelialized and resolved completely.  There is pigmentation about the anterior/anteromedial knee, improved from prior evaluation.  Degree of soft tissue swelling about this area has resolved.  There is no significant tenderness to palpation about the area.  Knee range of motion is full extension to 125 degrees of flexion without difficulty.  Ligamentous stress exam demonstrates a stable total knee arthroplasty. Sensation is intact to light touch in deep peroneal, superficial peroneal, tibial, sural, and saphenous nerve distributions.  Motor function is intact to EHL, FHL, tibialis anterior, and gastroc.  There is brisk capillary refill to the toes and a strong palpable dorsalis pedis pulse.  Compartments are soft and compressible.  There is no calf tenderness and a negative Homans' sign.     RADIOGRAPHIC EXAM:  No new x-rays obtained today.     ASSESSMENT AND PLAN:  Right anterior knee hematoma     Resolved.

## 2025-06-13 ENCOUNTER — TELEPHONE (OUTPATIENT)
Dept: CARDIOLOGY CLINIC | Age: 80
End: 2025-06-13

## 2025-06-13 NOTE — TELEPHONE ENCOUNTER
Pt stated his BP keeps bouncing around. Pt stated it was 90/61 this morning. Pt has no energy, lightheaded and has some left leg swelling. Pt has no dizziness. Pt cut Entresto in half and PCP was okay with it. Please advise    Call back: [phone]

## 2025-06-13 NOTE — TELEPHONE ENCOUNTER
Spoke with pt, gave [facility]'s message. He will call on Monday if no improvement

## 2025-06-30 ENCOUNTER — TELEPHONE (OUTPATIENT)
Dept: PULMONOLOGY | Age: 80
End: 2025-06-30

## 2025-07-01 DIAGNOSIS — J44.9 CHRONIC OBSTRUCTIVE PULMONARY DISEASE, UNSPECIFIED COPD TYPE (HCC): Primary | ICD-10-CM

## 2025-07-11 ENCOUNTER — TELEPHONE (OUTPATIENT)
Dept: CARDIOLOGY CLINIC | Age: 80
End: 2025-07-11

## 2025-07-11 ENCOUNTER — HOSPITAL ENCOUNTER (OUTPATIENT)
Dept: CARDIAC REHAB | Age: 80
Setting detail: THERAPIES SERIES
Discharge: HOME | End: 2025-07-11
Attending: INTERNAL MEDICINE
Payer: MEDICARE

## 2025-07-11 PROCEDURE — G0239 OTH RESP PROC, GROUP: HCPCS

## 2025-07-11 NOTE — TELEPHONE ENCOUNTER
Pt called to inform [facility] that he has completely stopped staking Entresto and his BP has been at 123/76.

## 2025-07-14 ENCOUNTER — HOSPITAL ENCOUNTER (OUTPATIENT)
Dept: CARDIAC REHAB | Age: 80
Setting detail: THERAPIES SERIES
Discharge: HOME | End: 2025-07-14
Attending: INTERNAL MEDICINE
Payer: MEDICARE

## 2025-07-14 LAB
GLUCOSE BLD-MCNC: 108 MG/DL (ref 70–99)
GLUCOSE BLD-MCNC: 112 MG/DL (ref 70–99)
GLUCOSE BLD-MCNC: 116 MG/DL (ref 70–99)
GLUCOSE BLD-MCNC: 140 MG/DL (ref 70–99)
GLUCOSE BLD-MCNC: 142 MG/DL (ref 70–99)
GLUCOSE BLD-MCNC: 98 MG/DL (ref 70–99)
PERFORMED ON: ABNORMAL
PERFORMED ON: NORMAL

## 2025-07-14 PROCEDURE — G0239 OTH RESP PROC, GROUP: HCPCS

## 2025-07-18 ENCOUNTER — HOSPITAL ENCOUNTER (OUTPATIENT)
Dept: CARDIAC REHAB | Age: 80
Setting detail: THERAPIES SERIES
Discharge: HOME | End: 2025-07-18
Attending: INTERNAL MEDICINE
Payer: MEDICARE

## 2025-07-18 PROCEDURE — G0239 OTH RESP PROC, GROUP: HCPCS

## 2025-07-20 LAB
GLUCOSE BLD-MCNC: 108 MG/DL (ref 70–99)
GLUCOSE BLD-MCNC: 114 MG/DL (ref 70–99)
PERFORMED ON: ABNORMAL
PERFORMED ON: ABNORMAL

## 2025-07-21 ENCOUNTER — HOSPITAL ENCOUNTER (OUTPATIENT)
Dept: CARDIAC REHAB | Age: 80
Setting detail: THERAPIES SERIES
Discharge: HOME | End: 2025-07-21
Attending: INTERNAL MEDICINE
Payer: MEDICARE

## 2025-07-21 LAB
GLUCOSE BLD-MCNC: 101 MG/DL (ref 70–99)
PERFORMED ON: ABNORMAL

## 2025-07-21 PROCEDURE — G0239 OTH RESP PROC, GROUP: HCPCS

## 2025-07-22 LAB
GLUCOSE BLD-MCNC: 92 MG/DL (ref 70–99)
PERFORMED ON: NORMAL

## 2025-07-24 NOTE — PROGRESS NOTES
PULMONARY CLINIC NOTE      Benji Israel   : 1945  MRN: [id number]     Date of Service: 2025    PCP: Raysa Eli MD    Referring provider: No ref. provider found      Chief Complaint   Patient presents with    Shortness of Breath         ASSESSMENT & PLAN       80 y.o. pleasant  male patient with:    # COPD.   EOS: 0.  Frequent exacerbations  # S/p multifocal pneumonia, bilateral lung infiltrates, mediastinal adenopathy.  Human metapneumovirus positive.  Admission 2024.  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   # Nicotine dependence history. 15 PY. Quit date: . LDCT: Not indicated.  Refills ordered for Trelegy 200 mcg.  Rinse mouth after use.  Continue albuterol/DuoNebs as needed and 15 minutes before anticipated exertion  Overnight pulse oximetry to see if the patient qualifies for home oxygen.  If he qualifies we will give it 4 to 6 weeks before deciding on doing home sleep study.  Patient could not sleep in the sleep lab previously and would not be able to  Continue pulmonary rehab exercises.  PFTs and 6-minute walk test before next visit.  No interest in Roflumilast due to risk of weight loss.  Patient had extensive weight loss with his illness already.  Will try to avoid azithromycin due to cardiac issues.  Education provided on risks of using lorazepam for sleep/anxiety.  Will order Atarax to use instead that does not have respiratory suppression side effect like lorazepam.  Patient will need CT chest without contrast in November to follow-up on the right

## 2025-07-25 ENCOUNTER — HOSPITAL ENCOUNTER (OUTPATIENT)
Dept: CARDIAC REHAB | Age: 80
Setting detail: THERAPIES SERIES
Discharge: HOME | End: 2025-07-25
Attending: INTERNAL MEDICINE
Payer: MEDICARE

## 2025-07-25 DIAGNOSIS — E85.4 CARDIAC AMYLOIDOSIS (HCC): ICD-10-CM

## 2025-07-25 DIAGNOSIS — I43 CARDIAC AMYLOIDOSIS (HCC): ICD-10-CM

## 2025-07-25 PROCEDURE — G0239 OTH RESP PROC, GROUP: HCPCS

## 2025-07-25 RX ORDER — TAFAMIDIS 61 MG/1
1 CAPSULE, LIQUID FILLED ORAL DAILY
Qty: 90 CAPSULE | Refills: 1 | Status: SHIPPED | OUTPATIENT
Start: 2025-07-25

## 2025-07-25 NOTE — TELEPHONE ENCOUNTER
Last Office Visit: 3/27/2025 Provider: MASOUD  **Is provider OOT? No    Next Office Visit: 9/25/2025 Provider: MAC  ***Has patient already had 30 day supply? No    Lab orders needed? no

## 2025-07-28 ENCOUNTER — HOSPITAL ENCOUNTER (OUTPATIENT)
Dept: CARDIAC REHAB | Age: 80
Setting detail: THERAPIES SERIES
Discharge: HOME | End: 2025-07-28
Attending: INTERNAL MEDICINE
Payer: MEDICARE

## 2025-07-28 PROCEDURE — G0239 OTH RESP PROC, GROUP: HCPCS

## 2025-07-29 ENCOUNTER — OFFICE VISIT (OUTPATIENT)
Dept: PULMONOLOGY | Age: 80
End: 2025-07-29
Payer: MEDICARE

## 2025-07-29 VITALS
SYSTOLIC BLOOD PRESSURE: 131 MMHG | HEIGHT: 72 IN | HEART RATE: 75 BPM | WEIGHT: 215.6 LBS | RESPIRATION RATE: 16 BRPM | DIASTOLIC BLOOD PRESSURE: 71 MMHG | BODY MASS INDEX: 29.2 KG/M2 | TEMPERATURE: 97 F | OXYGEN SATURATION: 92 %

## 2025-07-29 DIAGNOSIS — G47.00 INSOMNIA, UNSPECIFIED TYPE: ICD-10-CM

## 2025-07-29 DIAGNOSIS — J44.9 CHRONIC OBSTRUCTIVE PULMONARY DISEASE, UNSPECIFIED COPD TYPE (HCC): Primary | ICD-10-CM

## 2025-07-29 PROCEDURE — 99215 OFFICE O/P EST HI 40 MIN: CPT | Performed by: INTERNAL MEDICINE

## 2025-07-29 PROCEDURE — 3078F DIAST BP <80 MM HG: CPT | Performed by: INTERNAL MEDICINE

## 2025-07-29 PROCEDURE — 1123F ACP DISCUSS/DSCN MKR DOCD: CPT | Performed by: INTERNAL MEDICINE

## 2025-07-29 PROCEDURE — G2211 COMPLEX E/M VISIT ADD ON: HCPCS | Performed by: INTERNAL MEDICINE

## 2025-07-29 PROCEDURE — 3075F SYST BP GE 130 - 139MM HG: CPT | Performed by: INTERNAL MEDICINE

## 2025-07-29 PROCEDURE — 1159F MED LIST DOCD IN RCRD: CPT | Performed by: INTERNAL MEDICINE

## 2025-07-29 RX ORDER — IPRATROPIUM BROMIDE AND ALBUTEROL SULFATE 2.5; .5 MG/3ML; MG/3ML
1 SOLUTION RESPIRATORY (INHALATION) EVERY 6 HOURS PRN
Qty: 360 ML | Refills: 11 | Status: ON HOLD | OUTPATIENT
Start: 2025-07-29

## 2025-07-29 RX ORDER — FLUTICASONE FUROATE, UMECLIDINIUM BROMIDE AND VILANTEROL TRIFENATATE 200; 62.5; 25 UG/1; UG/1; UG/1
1 POWDER RESPIRATORY (INHALATION) DAILY
Qty: 1 EACH | Refills: 5 | Status: ON HOLD | OUTPATIENT
Start: 2025-07-29

## 2025-07-29 RX ORDER — HYDROXYZINE HYDROCHLORIDE 25 MG/1
25 TABLET, FILM COATED ORAL NIGHTLY
Qty: 30 TABLET | Refills: 3 | Status: ON HOLD | OUTPATIENT
Start: 2025-07-29 | End: 2025-11-26

## 2025-07-29 NOTE — PROGRESS NOTES
MA Communication:  The following orders are received by verbal communication from Brittnee Alberts MD    Orders include:    PFT/6MW  Follow up 4 weeks  Will call with the name of the DME for ONPO

## 2025-07-29 NOTE — PATIENT INSTRUCTIONS
Refills ordered for Trelegy 200 mcg.  Rinse mouth after use.  Continue albuterol/DuoNebs as needed and 15 minutes before anticipated exertion  Overnight pulse oximetry to see if the patient qualifies for home oxygen.  If he qualifies we will give it 4 to 6 weeks before deciding on doing home sleep study.  Patient could not sleep in the sleep lab previously and would not be able to  Continue pulmonary rehab exercises.  PFTs and 6-minute walk test before next visit.  No interest in Roflumilast due to risk of weight loss.  Patient had extensive weight loss with his illness already.  Will try to avoid azithromycin due to cardiac issues.  Education provided on risks of using lorazepam for sleep/anxiety.  Will order Atarax to use instead that does not have respiratory suppression side effect like lorazepam.  Patient will need CT chest without contrast in November to follow-up on the right upper lobe lung nodule  Follow-up in 4 weeks      Health Maintenance/Preventive measures:        >>  Avoid smoking, vaping or secondhand exposure.  Avoid exposure to irritants, allergens as possible as well as contact with patients with infectious respiratory illness.        >>  Stay up-to-date with influenza & pneumonia vaccines, RSV, & COVID-19 vaccine as recommended by the Advisory Committee on Immunization Practices (ACIP)        >>  Healthy diet and activity as able.        >>  Acid reflux precautions: Head of bed elevation, avoiding tight clothes, avoiding big meals or snacking 3 hours before bedtime, targeting healthy weight.        >>  Practice sleep hygiene measures. Avoid driving or operating heavy machines if tired or sleepy.

## 2025-07-31 ENCOUNTER — TELEPHONE (OUTPATIENT)
Dept: ADMINISTRATIVE | Age: 80
End: 2025-07-31

## 2025-08-01 ENCOUNTER — TELEPHONE (OUTPATIENT)
Dept: PULMONOLOGY | Age: 80
End: 2025-08-01

## 2025-08-01 ENCOUNTER — HOSPITAL ENCOUNTER (OUTPATIENT)
Dept: CARDIAC REHAB | Age: 80
Setting detail: THERAPIES SERIES
End: 2025-08-01
Attending: INTERNAL MEDICINE
Payer: MEDICARE

## 2025-08-01 ENCOUNTER — HOSPITAL ENCOUNTER (INPATIENT)
Age: 80
LOS: 3 days | Discharge: HOME | DRG: 291 | End: 2025-08-04
Attending: INTERNAL MEDICINE | Admitting: INTERNAL MEDICINE
Payer: MEDICARE

## 2025-08-01 ENCOUNTER — APPOINTMENT (OUTPATIENT)
Dept: GENERAL RADIOLOGY | Age: 80
DRG: 291 | End: 2025-08-01
Payer: MEDICARE

## 2025-08-01 DIAGNOSIS — I50.21 ACUTE SYSTOLIC CONGESTIVE HEART FAILURE (HCC): ICD-10-CM

## 2025-08-01 DIAGNOSIS — I50.9 ACUTE ON CHRONIC CONGESTIVE HEART FAILURE, UNSPECIFIED HEART FAILURE TYPE (HCC): Primary | ICD-10-CM

## 2025-08-01 PROBLEM — I50.33 ACUTE ON CHRONIC DIASTOLIC CHF (CONGESTIVE HEART FAILURE) (HCC): Status: ACTIVE | Noted: 2025-08-01

## 2025-08-01 LAB
ALBUMIN SERPL-MCNC: 4 G/DL (ref 3.4–5)
ALBUMIN/GLOB SERPL: 1.3 {RATIO} (ref 1.1–2.2)
ALP SERPL-CCNC: 78 U/L (ref 40–129)
ALT SERPL-CCNC: 16 U/L (ref 10–40)
ANION GAP SERPL CALCULATED.3IONS-SCNC: 12 MMOL/L (ref 3–16)
AST SERPL-CCNC: 28 U/L (ref 15–37)
BASE EXCESS BLDV CALC-SCNC: 4.9 MMOL/L (ref -3–3)
BASOPHILS # BLD: 0.1 K/UL (ref 0–0.2)
BASOPHILS NFR BLD: 0.6 %
BILIRUB SERPL-MCNC: 1 MG/DL (ref 0–1)
BUN SERPL-MCNC: 24 MG/DL (ref 7–20)
CALCIUM SERPL-MCNC: 9.3 MG/DL (ref 8.3–10.6)
CHLORIDE SERPL-SCNC: 100 MMOL/L (ref 99–110)
CO2 BLDV-SCNC: 30 MMOL/L
CO2 SERPL-SCNC: 25 MMOL/L (ref 21–32)
COHGB MFR BLDV: 2.3 % (ref 0–1.5)
CREAT SERPL-MCNC: 1 MG/DL (ref 0.8–1.3)
DEPRECATED RDW RBC AUTO: 15.4 % (ref 12.4–15.4)
EKG ATRIAL RATE: 67 BPM
EKG DIAGNOSIS: NORMAL
EKG Q-T INTERVAL: 552 MS
EKG QRS DURATION: 244 MS
EKG QTC CALCULATION (BAZETT): 591 MS
EKG R AXIS: 216 DEGREES
EKG T AXIS: 79 DEGREES
EKG VENTRICULAR RATE: 69 BPM
EOSINOPHIL # BLD: 0.1 K/UL (ref 0–0.6)
EOSINOPHIL NFR BLD: 0.8 %
GFR SERPLBLD CREATININE-BSD FMLA CKD-EPI: 77 ML/MIN/{1.73_M2}
GLUCOSE SERPL-MCNC: 138 MG/DL (ref 70–99)
HCO3 BLDV-SCNC: 28.8 MMOL/L (ref 23–29)
HCT VFR BLD AUTO: 33.4 % (ref 40.5–52.5)
HGB BLD-MCNC: 11.3 G/DL (ref 13.5–17.5)
LYMPHOCYTES # BLD: 1.8 K/UL (ref 1–5.1)
LYMPHOCYTES NFR BLD: 16.5 %
MAGNESIUM SERPL-MCNC: 2.15 MG/DL (ref 1.8–2.4)
MCH RBC QN AUTO: 30.1 PG (ref 26–34)
MCHC RBC AUTO-ENTMCNC: 33.7 G/DL (ref 31–36)
MCV RBC AUTO: 89.2 FL (ref 80–100)
METHGB MFR BLDV: 0.1 %
MONOCYTES # BLD: 1.1 K/UL (ref 0–1.3)
MONOCYTES NFR BLD: 9.8 %
NEUTROPHILS # BLD: 8 K/UL (ref 1.7–7.7)
NEUTROPHILS NFR BLD: 72.3 %
NT-PROBNP SERPL-MCNC: 2849 PG/ML (ref 0–449)
O2 THERAPY: ABNORMAL
PCO2 BLDV: 40.1 MMHG (ref 40–50)
PH BLDV: 7.47 [PH] (ref 7.35–7.45)
PLATELET # BLD AUTO: 262 K/UL (ref 135–450)
PMV BLD AUTO: 7.4 FL (ref 5–10.5)
PO2 BLDV: 68.1 MMHG (ref 25–40)
POTASSIUM SERPL-SCNC: 3.2 MMOL/L (ref 3.5–5.1)
POTASSIUM SERPL-SCNC: 3.4 MMOL/L (ref 3.5–5.1)
PROT SERPL-MCNC: 7 G/DL (ref 6.4–8.2)
RBC # BLD AUTO: 3.75 M/UL (ref 4.2–5.9)
SAO2 % BLDV: 94 %
SODIUM SERPL-SCNC: 137 MMOL/L (ref 136–145)
TROPONIN, HIGH SENSITIVITY: 67 NG/L (ref 0–22)
TROPONIN, HIGH SENSITIVITY: 68 NG/L (ref 0–22)
WBC # BLD AUTO: 11.1 K/UL (ref 4–11)

## 2025-08-01 PROCEDURE — 83735 ASSAY OF MAGNESIUM: CPT

## 2025-08-01 PROCEDURE — 83880 ASSAY OF NATRIURETIC PEPTIDE: CPT

## 2025-08-01 PROCEDURE — 84484 ASSAY OF TROPONIN QUANT: CPT

## 2025-08-01 PROCEDURE — 36415 COLL VENOUS BLD VENIPUNCTURE: CPT

## 2025-08-01 PROCEDURE — 1200000000 HC SEMI PRIVATE

## 2025-08-01 PROCEDURE — 2500000003 HC RX 250 WO HCPCS: Performed by: INTERNAL MEDICINE

## 2025-08-01 PROCEDURE — 71045 X-RAY EXAM CHEST 1 VIEW: CPT

## 2025-08-01 PROCEDURE — 93010 ELECTROCARDIOGRAM REPORT: CPT | Performed by: INTERNAL MEDICINE

## 2025-08-01 PROCEDURE — 99285 EMERGENCY DEPT VISIT HI MDM: CPT

## 2025-08-01 PROCEDURE — 80053 COMPREHEN METABOLIC PANEL: CPT

## 2025-08-01 PROCEDURE — 6370000000 HC RX 637 (ALT 250 FOR IP): Performed by: INTERNAL MEDICINE

## 2025-08-01 PROCEDURE — 6360000002 HC RX W HCPCS: Performed by: INTERNAL MEDICINE

## 2025-08-01 PROCEDURE — 2700000000 HC OXYGEN THERAPY PER DAY

## 2025-08-01 PROCEDURE — 96374 THER/PROPH/DIAG INJ IV PUSH: CPT

## 2025-08-01 PROCEDURE — 94761 N-INVAS EAR/PLS OXIMETRY MLT: CPT

## 2025-08-01 PROCEDURE — 82803 BLOOD GASES ANY COMBINATION: CPT

## 2025-08-01 PROCEDURE — 93005 ELECTROCARDIOGRAM TRACING: CPT | Performed by: INTERNAL MEDICINE

## 2025-08-01 PROCEDURE — 84132 ASSAY OF SERUM POTASSIUM: CPT

## 2025-08-01 PROCEDURE — 85025 COMPLETE CBC W/AUTO DIFF WBC: CPT

## 2025-08-01 PROCEDURE — 6360000002 HC RX W HCPCS: Performed by: PHYSICIAN ASSISTANT

## 2025-08-01 RX ORDER — ACETAMINOPHEN 650 MG/1
650 SUPPOSITORY RECTAL EVERY 6 HOURS PRN
Status: DISCONTINUED | OUTPATIENT
Start: 2025-08-01 | End: 2025-08-04 | Stop reason: HOSPADM

## 2025-08-01 RX ORDER — ACETAMINOPHEN 325 MG/1
650 TABLET ORAL EVERY 6 HOURS PRN
Status: DISCONTINUED | OUTPATIENT
Start: 2025-08-01 | End: 2025-08-04 | Stop reason: HOSPADM

## 2025-08-01 RX ORDER — MAGNESIUM 30 MG
30 TABLET ORAL DAILY
Status: DISCONTINUED | OUTPATIENT
Start: 2025-08-01 | End: 2025-08-01 | Stop reason: RX

## 2025-08-01 RX ORDER — ASPIRIN 81 MG/1
81 TABLET ORAL DAILY
Status: DISCONTINUED | OUTPATIENT
Start: 2025-08-02 | End: 2025-08-01

## 2025-08-01 RX ORDER — CETIRIZINE HYDROCHLORIDE 10 MG/1
5 TABLET ORAL DAILY
Status: DISCONTINUED | OUTPATIENT
Start: 2025-08-02 | End: 2025-08-01

## 2025-08-01 RX ORDER — FUROSEMIDE 10 MG/ML
40 INJECTION INTRAMUSCULAR; INTRAVENOUS ONCE
Status: COMPLETED | OUTPATIENT
Start: 2025-08-01 | End: 2025-08-01

## 2025-08-01 RX ORDER — MULTIVITAMIN WITH IRON
500 TABLET ORAL DAILY
Status: DISCONTINUED | OUTPATIENT
Start: 2025-08-01 | End: 2025-08-04 | Stop reason: HOSPADM

## 2025-08-01 RX ORDER — SACUBITRIL AND VALSARTAN 24; 26 MG/1; MG/1
1 TABLET, FILM COATED ORAL 2 TIMES DAILY
Status: DISCONTINUED | OUTPATIENT
Start: 2025-08-01 | End: 2025-08-04 | Stop reason: HOSPADM

## 2025-08-01 RX ORDER — ATORVASTATIN CALCIUM 80 MG/1
80 TABLET, FILM COATED ORAL DAILY
Status: DISCONTINUED | OUTPATIENT
Start: 2025-08-02 | End: 2025-08-01

## 2025-08-01 RX ORDER — VITAMIN B COMPLEX
2000 TABLET ORAL DAILY
Status: DISCONTINUED | OUTPATIENT
Start: 2025-08-02 | End: 2025-08-04 | Stop reason: HOSPADM

## 2025-08-01 RX ORDER — POTASSIUM CHLORIDE 7.45 MG/ML
10 INJECTION INTRAVENOUS PRN
Status: DISCONTINUED | OUTPATIENT
Start: 2025-08-01 | End: 2025-08-04 | Stop reason: HOSPADM

## 2025-08-01 RX ORDER — TRAZODONE HYDROCHLORIDE 50 MG/1
50 TABLET ORAL NIGHTLY PRN
Status: DISCONTINUED | OUTPATIENT
Start: 2025-08-01 | End: 2025-08-03

## 2025-08-01 RX ORDER — ALBUTEROL SULFATE 90 UG/1
2 INHALANT RESPIRATORY (INHALATION) 4 TIMES DAILY PRN
Status: DISCONTINUED | OUTPATIENT
Start: 2025-08-01 | End: 2025-08-04 | Stop reason: HOSPADM

## 2025-08-01 RX ORDER — ATORVASTATIN CALCIUM 80 MG/1
80 TABLET, FILM COATED ORAL NIGHTLY
Status: DISCONTINUED | OUTPATIENT
Start: 2025-08-01 | End: 2025-08-04 | Stop reason: HOSPADM

## 2025-08-01 RX ORDER — IPRATROPIUM BROMIDE AND ALBUTEROL SULFATE 2.5; .5 MG/3ML; MG/3ML
1 SOLUTION RESPIRATORY (INHALATION) EVERY 6 HOURS PRN
Status: DISCONTINUED | OUTPATIENT
Start: 2025-08-01 | End: 2025-08-04 | Stop reason: HOSPADM

## 2025-08-01 RX ORDER — UBIDECARENONE 75 MG
50 CAPSULE ORAL DAILY
COMMUNITY

## 2025-08-01 RX ORDER — METOPROLOL SUCCINATE 25 MG/1
12.5 TABLET, EXTENDED RELEASE ORAL DAILY
Status: DISCONTINUED | OUTPATIENT
Start: 2025-08-02 | End: 2025-08-04 | Stop reason: HOSPADM

## 2025-08-01 RX ORDER — ASPIRIN 81 MG/1
81 TABLET ORAL NIGHTLY
Status: DISCONTINUED | OUTPATIENT
Start: 2025-08-01 | End: 2025-08-04 | Stop reason: HOSPADM

## 2025-08-01 RX ORDER — POTASSIUM CHLORIDE 1500 MG/1
40 TABLET, EXTENDED RELEASE ORAL EVERY 4 HOURS
Status: COMPLETED | OUTPATIENT
Start: 2025-08-01 | End: 2025-08-01

## 2025-08-01 RX ORDER — PROCHLORPERAZINE EDISYLATE 5 MG/ML
10 INJECTION INTRAMUSCULAR; INTRAVENOUS EVERY 6 HOURS PRN
Status: DISCONTINUED | OUTPATIENT
Start: 2025-08-01 | End: 2025-08-04 | Stop reason: HOSPADM

## 2025-08-01 RX ORDER — POTASSIUM CHLORIDE 1500 MG/1
40 TABLET, EXTENDED RELEASE ORAL PRN
Status: DISCONTINUED | OUTPATIENT
Start: 2025-08-01 | End: 2025-08-04 | Stop reason: HOSPADM

## 2025-08-01 RX ORDER — HYDROXYZINE HYDROCHLORIDE 10 MG/1
25 TABLET, FILM COATED ORAL NIGHTLY PRN
Status: DISCONTINUED | OUTPATIENT
Start: 2025-08-01 | End: 2025-08-04 | Stop reason: HOSPADM

## 2025-08-01 RX ORDER — CETIRIZINE HYDROCHLORIDE 10 MG/1
5 TABLET ORAL NIGHTLY
Status: DISCONTINUED | OUTPATIENT
Start: 2025-08-01 | End: 2025-08-04 | Stop reason: HOSPADM

## 2025-08-01 RX ORDER — SODIUM CHLORIDE 9 MG/ML
INJECTION, SOLUTION INTRAVENOUS PRN
Status: DISCONTINUED | OUTPATIENT
Start: 2025-08-01 | End: 2025-08-04 | Stop reason: HOSPADM

## 2025-08-01 RX ORDER — POLYETHYLENE GLYCOL 3350 17 G/17G
17 POWDER, FOR SOLUTION ORAL DAILY PRN
Status: DISCONTINUED | OUTPATIENT
Start: 2025-08-01 | End: 2025-08-04 | Stop reason: HOSPADM

## 2025-08-01 RX ORDER — SODIUM CHLORIDE 0.9 % (FLUSH) 0.9 %
5-40 SYRINGE (ML) INJECTION EVERY 12 HOURS SCHEDULED
Status: DISCONTINUED | OUTPATIENT
Start: 2025-08-01 | End: 2025-08-04 | Stop reason: HOSPADM

## 2025-08-01 RX ORDER — BUDESONIDE AND FORMOTEROL FUMARATE DIHYDRATE 160; 4.5 UG/1; UG/1
2 AEROSOL RESPIRATORY (INHALATION)
Status: DISCONTINUED | OUTPATIENT
Start: 2025-08-01 | End: 2025-08-01

## 2025-08-01 RX ORDER — SODIUM CHLORIDE 0.9 % (FLUSH) 0.9 %
5-40 SYRINGE (ML) INJECTION PRN
Status: DISCONTINUED | OUTPATIENT
Start: 2025-08-01 | End: 2025-08-04 | Stop reason: HOSPADM

## 2025-08-01 RX ORDER — SPIRONOLACTONE 25 MG/1
25 TABLET ORAL DAILY
Status: DISCONTINUED | OUTPATIENT
Start: 2025-08-01 | End: 2025-08-04 | Stop reason: HOSPADM

## 2025-08-01 RX ORDER — FUROSEMIDE 10 MG/ML
40 INJECTION INTRAMUSCULAR; INTRAVENOUS 2 TIMES DAILY
Status: DISCONTINUED | OUTPATIENT
Start: 2025-08-01 | End: 2025-08-04 | Stop reason: HOSPADM

## 2025-08-01 RX ORDER — MAGNESIUM SULFATE IN WATER 40 MG/ML
2000 INJECTION, SOLUTION INTRAVENOUS PRN
Status: DISCONTINUED | OUTPATIENT
Start: 2025-08-01 | End: 2025-08-04 | Stop reason: HOSPADM

## 2025-08-01 RX ADMIN — POTASSIUM CHLORIDE 40 MEQ: 1500 TABLET, EXTENDED RELEASE ORAL at 21:00

## 2025-08-01 RX ADMIN — SODIUM CHLORIDE, PRESERVATIVE FREE 10 ML: 5 INJECTION INTRAVENOUS at 20:58

## 2025-08-01 RX ADMIN — FUROSEMIDE 40 MG: 10 INJECTION, SOLUTION INTRAMUSCULAR; INTRAVENOUS at 14:18

## 2025-08-01 RX ADMIN — POTASSIUM CHLORIDE 40 MEQ: 1500 TABLET, EXTENDED RELEASE ORAL at 18:37

## 2025-08-01 RX ADMIN — ASPIRIN 81 MG: 81 TABLET, COATED ORAL at 21:20

## 2025-08-01 RX ADMIN — SACUBITRIL AND VALSARTAN 1 TABLET: 24; 26 TABLET, FILM COATED ORAL at 21:00

## 2025-08-01 RX ADMIN — FUROSEMIDE 40 MG: 10 INJECTION, SOLUTION INTRAMUSCULAR; INTRAVENOUS at 20:57

## 2025-08-01 RX ADMIN — ATORVASTATIN CALCIUM 80 MG: 80 TABLET, FILM COATED ORAL at 21:20

## 2025-08-01 RX ADMIN — CETIRIZINE HYDROCHLORIDE 5 MG: 10 TABLET, FILM COATED ORAL at 21:20

## 2025-08-01 RX ADMIN — TRAZODONE HYDROCHLORIDE 50 MG: 50 TABLET ORAL at 21:20

## 2025-08-01 RX ADMIN — SPIRONOLACTONE 25 MG: 25 TABLET, FILM COATED ORAL at 18:37

## 2025-08-01 ASSESSMENT — PAIN - FUNCTIONAL ASSESSMENT: PAIN_FUNCTIONAL_ASSESSMENT: NONE - DENIES PAIN

## 2025-08-02 LAB
ANION GAP SERPL CALCULATED.3IONS-SCNC: 9 MMOL/L (ref 3–16)
BUN SERPL-MCNC: 18 MG/DL (ref 7–20)
CALCIUM SERPL-MCNC: 9.1 MG/DL (ref 8.3–10.6)
CHLORIDE SERPL-SCNC: 103 MMOL/L (ref 99–110)
CO2 SERPL-SCNC: 28 MMOL/L (ref 21–32)
CREAT SERPL-MCNC: 0.9 MG/DL (ref 0.8–1.3)
DEPRECATED RDW RBC AUTO: 15.2 % (ref 12.4–15.4)
GFR SERPLBLD CREATININE-BSD FMLA CKD-EPI: 87 ML/MIN/{1.73_M2}
GLUCOSE SERPL-MCNC: 108 MG/DL (ref 70–99)
HCT VFR BLD AUTO: 31.2 % (ref 40.5–52.5)
HGB BLD-MCNC: 10.6 G/DL (ref 13.5–17.5)
MAGNESIUM SERPL-MCNC: 2.18 MG/DL (ref 1.8–2.4)
MCH RBC QN AUTO: 30.3 PG (ref 26–34)
MCHC RBC AUTO-ENTMCNC: 34 G/DL (ref 31–36)
MCV RBC AUTO: 89.2 FL (ref 80–100)
PLATELET # BLD AUTO: 249 K/UL (ref 135–450)
PMV BLD AUTO: 7.9 FL (ref 5–10.5)
POTASSIUM SERPL-SCNC: 3.6 MMOL/L (ref 3.5–5.1)
RBC # BLD AUTO: 3.5 M/UL (ref 4.2–5.9)
SODIUM SERPL-SCNC: 140 MMOL/L (ref 136–145)
WBC # BLD AUTO: 7.1 K/UL (ref 4–11)

## 2025-08-02 PROCEDURE — 6370000000 HC RX 637 (ALT 250 FOR IP): Performed by: NURSE PRACTITIONER

## 2025-08-02 PROCEDURE — 1200000000 HC SEMI PRIVATE

## 2025-08-02 PROCEDURE — 36415 COLL VENOUS BLD VENIPUNCTURE: CPT

## 2025-08-02 PROCEDURE — 6370000000 HC RX 637 (ALT 250 FOR IP): Performed by: INTERNAL MEDICINE

## 2025-08-02 PROCEDURE — 83735 ASSAY OF MAGNESIUM: CPT

## 2025-08-02 PROCEDURE — 6360000002 HC RX W HCPCS: Performed by: INTERNAL MEDICINE

## 2025-08-02 PROCEDURE — 85027 COMPLETE CBC AUTOMATED: CPT

## 2025-08-02 PROCEDURE — 2500000003 HC RX 250 WO HCPCS: Performed by: INTERNAL MEDICINE

## 2025-08-02 PROCEDURE — 80048 BASIC METABOLIC PNL TOTAL CA: CPT

## 2025-08-02 RX ORDER — GUAIFENESIN 600 MG/1
600 TABLET, EXTENDED RELEASE ORAL 2 TIMES DAILY
Status: DISCONTINUED | OUTPATIENT
Start: 2025-08-02 | End: 2025-08-04 | Stop reason: HOSPADM

## 2025-08-02 RX ORDER — POTASSIUM CHLORIDE 1500 MG/1
40 TABLET, EXTENDED RELEASE ORAL 2 TIMES DAILY
Status: COMPLETED | OUTPATIENT
Start: 2025-08-02 | End: 2025-08-02

## 2025-08-02 RX ADMIN — CYANOCOBALAMIN TAB 500 MCG 500 MCG: 500 TAB at 09:55

## 2025-08-02 RX ADMIN — TRAZODONE HYDROCHLORIDE 50 MG: 50 TABLET ORAL at 20:40

## 2025-08-02 RX ADMIN — RIVAROXABAN 20 MG: 20 TABLET, FILM COATED ORAL at 09:59

## 2025-08-02 RX ADMIN — POTASSIUM CHLORIDE 40 MEQ: 1500 TABLET, EXTENDED RELEASE ORAL at 20:40

## 2025-08-02 RX ADMIN — CETIRIZINE HYDROCHLORIDE 5 MG: 10 TABLET, FILM COATED ORAL at 20:39

## 2025-08-02 RX ADMIN — FUROSEMIDE 40 MG: 10 INJECTION, SOLUTION INTRAMUSCULAR; INTRAVENOUS at 18:19

## 2025-08-02 RX ADMIN — POTASSIUM CHLORIDE 40 MEQ: 1500 TABLET, EXTENDED RELEASE ORAL at 09:55

## 2025-08-02 RX ADMIN — Medication 10 ML: at 18:27

## 2025-08-02 RX ADMIN — SODIUM CHLORIDE, PRESERVATIVE FREE 10 ML: 5 INJECTION INTRAVENOUS at 20:40

## 2025-08-02 RX ADMIN — HYDROXYZINE HYDROCHLORIDE 25 MG: 10 TABLET, FILM COATED ORAL at 20:39

## 2025-08-02 RX ADMIN — SPIRONOLACTONE 25 MG: 25 TABLET, FILM COATED ORAL at 09:59

## 2025-08-02 RX ADMIN — ASPIRIN 81 MG: 81 TABLET, COATED ORAL at 20:40

## 2025-08-02 RX ADMIN — SODIUM CHLORIDE, PRESERVATIVE FREE 10 ML: 5 INJECTION INTRAVENOUS at 10:21

## 2025-08-02 RX ADMIN — Medication 2000 UNITS: at 09:57

## 2025-08-02 RX ADMIN — GUAIFENESIN 600 MG: 600 TABLET, EXTENDED RELEASE ORAL at 20:57

## 2025-08-02 RX ADMIN — FUROSEMIDE 40 MG: 10 INJECTION, SOLUTION INTRAMUSCULAR; INTRAVENOUS at 10:11

## 2025-08-02 RX ADMIN — ATORVASTATIN CALCIUM 80 MG: 80 TABLET, FILM COATED ORAL at 20:39

## 2025-08-02 RX ADMIN — METOPROLOL SUCCINATE 12.5 MG: 25 TABLET, EXTENDED RELEASE ORAL at 10:00

## 2025-08-02 RX ADMIN — SACUBITRIL AND VALSARTAN 1 TABLET: 24; 26 TABLET, FILM COATED ORAL at 09:57

## 2025-08-03 LAB
ANION GAP SERPL CALCULATED.3IONS-SCNC: 7 MMOL/L (ref 3–16)
BUN SERPL-MCNC: 17 MG/DL (ref 7–20)
CALCIUM SERPL-MCNC: 9.2 MG/DL (ref 8.3–10.6)
CHLORIDE SERPL-SCNC: 104 MMOL/L (ref 99–110)
CO2 SERPL-SCNC: 28 MMOL/L (ref 21–32)
CREAT SERPL-MCNC: 0.9 MG/DL (ref 0.8–1.3)
DEPRECATED RDW RBC AUTO: 14.8 % (ref 12.4–15.4)
GFR SERPLBLD CREATININE-BSD FMLA CKD-EPI: 87 ML/MIN/{1.73_M2}
GLUCOSE SERPL-MCNC: 98 MG/DL (ref 70–99)
HCT VFR BLD AUTO: 33 % (ref 40.5–52.5)
HGB BLD-MCNC: 11.2 G/DL (ref 13.5–17.5)
MAGNESIUM SERPL-MCNC: 2.21 MG/DL (ref 1.8–2.4)
MCH RBC QN AUTO: 30.3 PG (ref 26–34)
MCHC RBC AUTO-ENTMCNC: 33.9 G/DL (ref 31–36)
MCV RBC AUTO: 89.3 FL (ref 80–100)
PLATELET # BLD AUTO: 249 K/UL (ref 135–450)
PMV BLD AUTO: 7.7 FL (ref 5–10.5)
POTASSIUM SERPL-SCNC: 4.2 MMOL/L (ref 3.5–5.1)
RBC # BLD AUTO: 3.69 M/UL (ref 4.2–5.9)
SODIUM SERPL-SCNC: 139 MMOL/L (ref 136–145)
WBC # BLD AUTO: 8.4 K/UL (ref 4–11)

## 2025-08-03 PROCEDURE — 6370000000 HC RX 637 (ALT 250 FOR IP): Performed by: INTERNAL MEDICINE

## 2025-08-03 PROCEDURE — 6370000000 HC RX 637 (ALT 250 FOR IP): Performed by: NURSE PRACTITIONER

## 2025-08-03 PROCEDURE — 83735 ASSAY OF MAGNESIUM: CPT

## 2025-08-03 PROCEDURE — 80048 BASIC METABOLIC PNL TOTAL CA: CPT

## 2025-08-03 PROCEDURE — 85027 COMPLETE CBC AUTOMATED: CPT

## 2025-08-03 PROCEDURE — 6360000002 HC RX W HCPCS: Performed by: INTERNAL MEDICINE

## 2025-08-03 PROCEDURE — 36415 COLL VENOUS BLD VENIPUNCTURE: CPT

## 2025-08-03 PROCEDURE — 1200000000 HC SEMI PRIVATE

## 2025-08-03 PROCEDURE — 2500000003 HC RX 250 WO HCPCS: Performed by: INTERNAL MEDICINE

## 2025-08-03 RX ORDER — MECOBALAMIN 5000 MCG
5 TABLET,DISINTEGRATING ORAL NIGHTLY
Status: DISCONTINUED | OUTPATIENT
Start: 2025-08-03 | End: 2025-08-04 | Stop reason: HOSPADM

## 2025-08-03 RX ORDER — TRAZODONE HYDROCHLORIDE 50 MG/1
100 TABLET ORAL NIGHTLY
Status: DISCONTINUED | OUTPATIENT
Start: 2025-08-03 | End: 2025-08-04 | Stop reason: HOSPADM

## 2025-08-03 RX ADMIN — ATORVASTATIN CALCIUM 80 MG: 80 TABLET, FILM COATED ORAL at 20:12

## 2025-08-03 RX ADMIN — SPIRONOLACTONE 25 MG: 25 TABLET, FILM COATED ORAL at 08:16

## 2025-08-03 RX ADMIN — FUROSEMIDE 40 MG: 10 INJECTION, SOLUTION INTRAMUSCULAR; INTRAVENOUS at 17:46

## 2025-08-03 RX ADMIN — TRAZODONE HYDROCHLORIDE 100 MG: 50 TABLET ORAL at 20:11

## 2025-08-03 RX ADMIN — CYANOCOBALAMIN TAB 500 MCG 500 MCG: 500 TAB at 08:17

## 2025-08-03 RX ADMIN — Medication 5 MG: at 20:12

## 2025-08-03 RX ADMIN — FUROSEMIDE 40 MG: 10 INJECTION, SOLUTION INTRAMUSCULAR; INTRAVENOUS at 08:15

## 2025-08-03 RX ADMIN — ASPIRIN 81 MG: 81 TABLET, COATED ORAL at 20:11

## 2025-08-03 RX ADMIN — SODIUM CHLORIDE, PRESERVATIVE FREE 10 ML: 5 INJECTION INTRAVENOUS at 20:14

## 2025-08-03 RX ADMIN — GUAIFENESIN 600 MG: 600 TABLET, EXTENDED RELEASE ORAL at 20:11

## 2025-08-03 RX ADMIN — GUAIFENESIN 600 MG: 600 TABLET, EXTENDED RELEASE ORAL at 08:16

## 2025-08-03 RX ADMIN — SODIUM CHLORIDE, PRESERVATIVE FREE 10 ML: 5 INJECTION INTRAVENOUS at 08:19

## 2025-08-03 RX ADMIN — CETIRIZINE HYDROCHLORIDE 5 MG: 10 TABLET, FILM COATED ORAL at 20:12

## 2025-08-03 RX ADMIN — METOPROLOL SUCCINATE 12.5 MG: 25 TABLET, EXTENDED RELEASE ORAL at 08:16

## 2025-08-03 RX ADMIN — Medication 2000 UNITS: at 08:16

## 2025-08-03 RX ADMIN — RIVAROXABAN 20 MG: 20 TABLET, FILM COATED ORAL at 08:17

## 2025-08-04 ENCOUNTER — APPOINTMENT (OUTPATIENT)
Age: 80
DRG: 291 | End: 2025-08-04
Attending: INTERNAL MEDICINE
Payer: MEDICARE

## 2025-08-04 ENCOUNTER — HOSPITAL ENCOUNTER (OUTPATIENT)
Dept: CARDIAC REHAB | Age: 80
Setting detail: THERAPIES SERIES
Discharge: HOME | End: 2025-08-04
Attending: INTERNAL MEDICINE

## 2025-08-04 VITALS
TEMPERATURE: 97.3 F | WEIGHT: 204.4 LBS | RESPIRATION RATE: 20 BRPM | SYSTOLIC BLOOD PRESSURE: 113 MMHG | HEIGHT: 72 IN | HEART RATE: 63 BPM | BODY MASS INDEX: 27.68 KG/M2 | DIASTOLIC BLOOD PRESSURE: 75 MMHG | OXYGEN SATURATION: 97 %

## 2025-08-04 LAB
ANION GAP SERPL CALCULATED.3IONS-SCNC: 8 MMOL/L (ref 3–16)
BUN SERPL-MCNC: 18 MG/DL (ref 7–20)
CALCIUM SERPL-MCNC: 9.6 MG/DL (ref 8.3–10.6)
CHLORIDE SERPL-SCNC: 102 MMOL/L (ref 99–110)
CO2 SERPL-SCNC: 29 MMOL/L (ref 21–32)
CREAT SERPL-MCNC: 0.9 MG/DL (ref 0.8–1.3)
ECHO AO ARCH DIAM: 2.8 CM
ECHO AO ROOT DIAM: 3.7 CM
ECHO AO ROOT INDEX: 1.72 CM/M2
ECHO AV AREA PEAK VELOCITY: 3.2 CM2
ECHO AV AREA VTI: 3 CM2
ECHO AV AREA/BSA PEAK VELOCITY: 1.5 CM2/M2
ECHO AV AREA/BSA VTI: 1.4 CM2/M2
ECHO AV MEAN GRADIENT: 3 MMHG
ECHO AV MEAN VELOCITY: 0.7 M/S
ECHO AV PEAK GRADIENT: 5 MMHG
ECHO AV PEAK VELOCITY: 1.1 M/S
ECHO AV VELOCITY RATIO: 1.09
ECHO AV VTI: 23.6 CM
ECHO EST RA PRESSURE: 8 MMHG
ECHO LA AREA 2C: 31.5 CM2
ECHO LA AREA 4C: 26.8 CM2
ECHO LA DIAMETER INDEX: 1.49 CM/M2
ECHO LA DIAMETER: 3.2 CM
ECHO LA MAJOR AXIS: 6.2 CM
ECHO LA MINOR AXIS: 6.4 CM
ECHO LA TO AORTIC ROOT RATIO: 0.86
ECHO LA VOL BP: 109 ML (ref 18–58)
ECHO LA VOL MOD A2C: 125 ML (ref 18–58)
ECHO LA VOL MOD A4C: 92 ML (ref 18–58)
ECHO LA VOL/BSA BIPLANE: 51 ML/M2 (ref 16–34)
ECHO LA VOLUME INDEX MOD A2C: 58 ML/M2 (ref 16–34)
ECHO LA VOLUME INDEX MOD A4C: 43 ML/M2 (ref 16–34)
ECHO LV EDV 3D: 144 ML
ECHO LV EDV INDEX 3D: 67 ML/M2
ECHO LV EF PHYSICIAN: 55 %
ECHO LV ESV 3D: 87 ML
ECHO LV ESV INDEX 3D: 40 ML/M2
ECHO LV FRACTIONAL SHORTENING: 20 % (ref 28–44)
ECHO LV GLOBAL LONGITUDINAL STRAIN (GLS): -8.4 %
ECHO LV INTERNAL DIMENSION DIASTOLE INDEX: 1.4 CM/M2
ECHO LV INTERNAL DIMENSION DIASTOLIC: 3 CM (ref 4.2–5.9)
ECHO LV INTERNAL DIMENSION SYSTOLIC INDEX: 1.12 CM/M2
ECHO LV INTERNAL DIMENSION SYSTOLIC: 2.4 CM
ECHO LV ISOVOLUMETRIC RELAXATION TIME (IVRT): 95 MS
ECHO LV IVSD: 2.4 CM (ref 0.6–1)
ECHO LV MASS 2D: 358 G (ref 88–224)
ECHO LV MASS 3D INDEX: 153 G/M2
ECHO LV MASS 3D: 329 G
ECHO LV MASS INDEX 2D: 166.5 G/M2 (ref 49–115)
ECHO LV POSTERIOR WALL DIASTOLIC: 2.3 CM (ref 0.6–1)
ECHO LV RELATIVE WALL THICKNESS RATIO: 1.53
ECHO LVOT AREA: 2.8 CM2
ECHO LVOT AV VTI INDEX: 1.07
ECHO LVOT DIAM: 1.9 CM
ECHO LVOT MEAN GRADIENT: 3 MMHG
ECHO LVOT PEAK GRADIENT: 6 MMHG
ECHO LVOT PEAK VELOCITY: 1.2 M/S
ECHO LVOT STROKE VOLUME INDEX: 33.2 ML/M2
ECHO LVOT SV: 71.4 ML
ECHO LVOT VTI: 25.2 CM
ECHO MV AREA VTI: 2.4 CM2
ECHO MV E DECELERATION TIME (DT): 166 MS
ECHO MV E VELOCITY: 1.23 M/S
ECHO MV LVOT VTI INDEX: 1.17
ECHO MV MAX VELOCITY: 1.2 M/S
ECHO MV MEAN GRADIENT: 2 MMHG
ECHO MV MEAN VELOCITY: 0.5 M/S
ECHO MV PEAK GRADIENT: 6 MMHG
ECHO MV VTI: 29.5 CM
ECHO RA AREA 4C: 16.7 CM2
ECHO RA END SYSTOLIC VOLUME APICAL 4 CHAMBER INDEX BSA: 21 ML/M2
ECHO RA VOLUME: 45 ML
ECHO RV BASAL DIMENSION: 4.7 CM
ECHO RV FREE WALL PEAK S': 6.3 CM/S
ECHO RV LONGITUDINAL DIMENSION: 9.2 CM
ECHO RV MID DIMENSION: 2.6 CM
ECHO RV TAPSE: 1.3 CM (ref 1.7–?)
GFR SERPLBLD CREATININE-BSD FMLA CKD-EPI: 87 ML/MIN/{1.73_M2}
GLUCOSE SERPL-MCNC: 110 MG/DL (ref 70–99)
MAGNESIUM SERPL-MCNC: 2.22 MG/DL (ref 1.8–2.4)
POTASSIUM SERPL-SCNC: 4.2 MMOL/L (ref 3.5–5.1)
SODIUM SERPL-SCNC: 139 MMOL/L (ref 136–145)

## 2025-08-04 PROCEDURE — 36415 COLL VENOUS BLD VENIPUNCTURE: CPT

## 2025-08-04 PROCEDURE — 93306 TTE W/DOPPLER COMPLETE: CPT | Performed by: INTERNAL MEDICINE

## 2025-08-04 PROCEDURE — 80048 BASIC METABOLIC PNL TOTAL CA: CPT

## 2025-08-04 PROCEDURE — 93356 MYOCRD STRAIN IMG SPCKL TRCK: CPT | Performed by: INTERNAL MEDICINE

## 2025-08-04 PROCEDURE — 6360000002 HC RX W HCPCS: Performed by: INTERNAL MEDICINE

## 2025-08-04 PROCEDURE — 6360000004 HC RX CONTRAST MEDICATION: Performed by: INTERNAL MEDICINE

## 2025-08-04 PROCEDURE — 6370000000 HC RX 637 (ALT 250 FOR IP): Performed by: INTERNAL MEDICINE

## 2025-08-04 PROCEDURE — 2500000003 HC RX 250 WO HCPCS: Performed by: INTERNAL MEDICINE

## 2025-08-04 PROCEDURE — C8929 TTE W OR WO FOL WCON,DOPPLER: HCPCS

## 2025-08-04 PROCEDURE — 6370000000 HC RX 637 (ALT 250 FOR IP): Performed by: NURSE PRACTITIONER

## 2025-08-04 PROCEDURE — 94640 AIRWAY INHALATION TREATMENT: CPT

## 2025-08-04 PROCEDURE — 83735 ASSAY OF MAGNESIUM: CPT

## 2025-08-04 RX ORDER — SPIRONOLACTONE 25 MG/1
25 TABLET ORAL DAILY
Qty: 30 TABLET | Refills: 3 | Status: SHIPPED | OUTPATIENT
Start: 2025-08-05

## 2025-08-04 RX ORDER — TRAZODONE HYDROCHLORIDE 100 MG/1
100 TABLET ORAL NIGHTLY
Qty: 30 TABLET | Refills: 2 | Status: SHIPPED | OUTPATIENT
Start: 2025-08-04

## 2025-08-04 RX ADMIN — SULFUR HEXAFLUORIDE 2 ML: KIT at 10:00

## 2025-08-04 RX ADMIN — Medication 2000 UNITS: at 09:06

## 2025-08-04 RX ADMIN — RIVAROXABAN 20 MG: 20 TABLET, FILM COATED ORAL at 09:05

## 2025-08-04 RX ADMIN — SODIUM CHLORIDE, PRESERVATIVE FREE 10 ML: 5 INJECTION INTRAVENOUS at 09:09

## 2025-08-04 RX ADMIN — GUAIFENESIN 600 MG: 600 TABLET, EXTENDED RELEASE ORAL at 09:07

## 2025-08-04 RX ADMIN — METOPROLOL SUCCINATE 12.5 MG: 25 TABLET, EXTENDED RELEASE ORAL at 09:07

## 2025-08-04 RX ADMIN — SPIRONOLACTONE 25 MG: 25 TABLET, FILM COATED ORAL at 09:06

## 2025-08-04 RX ADMIN — CYANOCOBALAMIN TAB 500 MCG 500 MCG: 500 TAB at 09:07

## 2025-08-04 RX ADMIN — FUROSEMIDE 40 MG: 10 INJECTION, SOLUTION INTRAMUSCULAR; INTRAVENOUS at 09:08

## 2025-08-06 ENCOUNTER — FOLLOWUP TELEPHONE ENCOUNTER (OUTPATIENT)
Dept: TELEMETRY | Age: 80
End: 2025-08-06

## 2025-08-08 ENCOUNTER — HOSPITAL ENCOUNTER (OUTPATIENT)
Dept: CARDIAC REHAB | Age: 80
Setting detail: THERAPIES SERIES
End: 2025-08-08
Attending: INTERNAL MEDICINE
Payer: MEDICARE

## 2025-08-11 ENCOUNTER — HOSPITAL ENCOUNTER (OUTPATIENT)
Dept: CARDIAC REHAB | Age: 80
Setting detail: THERAPIES SERIES
End: 2025-08-11
Attending: INTERNAL MEDICINE
Payer: MEDICARE

## 2025-08-15 ENCOUNTER — HOSPITAL ENCOUNTER (OUTPATIENT)
Dept: CARDIAC REHAB | Age: 80
Setting detail: THERAPIES SERIES
Discharge: HOME | End: 2025-08-15
Attending: INTERNAL MEDICINE
Payer: MEDICARE

## 2025-08-15 PROCEDURE — G0239 OTH RESP PROC, GROUP: HCPCS

## 2025-08-18 ENCOUNTER — HOSPITAL ENCOUNTER (OUTPATIENT)
Dept: CARDIAC REHAB | Age: 80
Setting detail: THERAPIES SERIES
Discharge: HOME | End: 2025-08-18
Attending: INTERNAL MEDICINE
Payer: MEDICARE

## 2025-08-18 PROCEDURE — G0239 OTH RESP PROC, GROUP: HCPCS

## 2025-08-21 ENCOUNTER — HOSPITAL ENCOUNTER (OUTPATIENT)
Dept: PULMONOLOGY | Age: 80
Discharge: HOME | End: 2025-08-21
Payer: MEDICARE

## 2025-08-21 VITALS — OXYGEN SATURATION: 97 %

## 2025-08-21 DIAGNOSIS — J44.9 CHRONIC OBSTRUCTIVE PULMONARY DISEASE, UNSPECIFIED COPD TYPE (HCC): ICD-10-CM

## 2025-08-21 LAB
DLCO %PRED: 76 %
DLCO PRED: NORMAL
DLCO/VA %PRED: NORMAL
DLCO/VA PRED: NORMAL
DLCO/VA: NORMAL
DLCO: NORMAL
EXPIRATORY TIME-POST: NORMAL
EXPIRATORY TIME: NORMAL
FEF 25-75 %CHNG: NORMAL
FEF 25-75 POST %PRED: NORMAL
FEF 25-75% %PRED-PRE: NORMAL
FEF 25-75% PRED: NORMAL
FEF 25-75-POST: NORMAL
FEF 25-75-PRE: NORMAL
FEV1 %PRED-POST: 85 %
FEV1 %PRED-PRE: 78 %
FEV1 PRED: NORMAL
FEV1-POST: NORMAL
FEV1-PRE: NORMAL
FEV1/FVC %PRED-POST: 116 %
FEV1/FVC %PRED-PRE: 117 %
FEV1/FVC PRED: NORMAL
FEV1/FVC-POST: NORMAL
FEV1/FVC-PRE: NORMAL
FVC %PRED-POST: 71 L
FVC %PRED-PRE: 64 %
FVC PRED: NORMAL
FVC-POST: NORMAL
FVC-PRE: NORMAL
GAW %PRED: NORMAL
GAW PRED: NORMAL
GAW: NORMAL
IC PRE %PRED: NORMAL
IC PRED: NORMAL
IC: NORMAL
MEP: NORMAL
MIP: NORMAL
MVV %PRED-PRE: NORMAL
MVV PRED: NORMAL
MVV-PRE: NORMAL
PEF %PRED-POST: NORMAL
PEF %PRED-PRE: NORMAL
PEF PRED: NORMAL
PEF%CHNG: NORMAL
PEF-POST: NORMAL
PEF-PRE: NORMAL
RAW %PRED: NORMAL
RAW PRED: NORMAL
RAW: NORMAL
RV PRE %PRED: NORMAL
RV PRED: NORMAL
RV: NORMAL
SVC %PRED: NORMAL
SVC PRED: NORMAL
SVC: NORMAL
TLC PRE %PRED: 87 %
TLC PRED: NORMAL
TLC: NORMAL
VA %PRED: NORMAL
VA PRED: NORMAL
VA: NORMAL
VTG %PRED: NORMAL
VTG PRED: NORMAL
VTG: NORMAL

## 2025-08-21 PROCEDURE — 94726 PLETHYSMOGRAPHY LUNG VOLUMES: CPT

## 2025-08-21 PROCEDURE — 6370000000 HC RX 637 (ALT 250 FOR IP): Performed by: INTERNAL MEDICINE

## 2025-08-21 PROCEDURE — 94729 DIFFUSING CAPACITY: CPT

## 2025-08-21 PROCEDURE — 94060 EVALUATION OF WHEEZING: CPT

## 2025-08-21 PROCEDURE — 94618 PULMONARY STRESS TESTING: CPT

## 2025-08-21 RX ORDER — ALBUTEROL SULFATE 90 UG/1
4 INHALANT RESPIRATORY (INHALATION) ONCE
Status: DISCONTINUED | OUTPATIENT
Start: 2025-08-21 | End: 2025-08-22 | Stop reason: HOSPADM

## 2025-08-21 ASSESSMENT — PULMONARY FUNCTION TESTS
FEV1_PERCENT_PREDICTED_POST: 85
FEV1/FVC_PERCENT_PREDICTED_POST: 116
FEV1/FVC_PERCENT_PREDICTED_PRE: 117
FVC_PERCENT_PREDICTED_PRE: 64
FEV1_PERCENT_PREDICTED_PRE: 78
FVC_PERCENT_PREDICTED_POST: 71

## 2025-08-22 ENCOUNTER — HOSPITAL ENCOUNTER (OUTPATIENT)
Dept: CARDIAC REHAB | Age: 80
Setting detail: THERAPIES SERIES
Discharge: HOME | End: 2025-08-22
Attending: INTERNAL MEDICINE
Payer: MEDICARE

## 2025-08-22 ENCOUNTER — APPOINTMENT (OUTPATIENT)
Dept: CARDIAC REHAB | Age: 80
End: 2025-08-22
Attending: INTERNAL MEDICINE
Payer: MEDICARE

## 2025-08-22 PROCEDURE — G0239 OTH RESP PROC, GROUP: HCPCS

## 2025-08-25 ENCOUNTER — HOSPITAL ENCOUNTER (OUTPATIENT)
Dept: CARDIAC REHAB | Age: 80
Setting detail: THERAPIES SERIES
Discharge: HOME | End: 2025-08-25
Attending: INTERNAL MEDICINE
Payer: MEDICARE

## 2025-08-25 PROCEDURE — G0239 OTH RESP PROC, GROUP: HCPCS

## 2025-08-29 ENCOUNTER — HOSPITAL ENCOUNTER (OUTPATIENT)
Dept: CARDIAC REHAB | Age: 80
Setting detail: THERAPIES SERIES
Discharge: HOME | End: 2025-08-29
Attending: INTERNAL MEDICINE
Payer: MEDICARE

## 2025-08-29 PROCEDURE — G0239 OTH RESP PROC, GROUP: HCPCS

## 2025-09-01 ENCOUNTER — APPOINTMENT (OUTPATIENT)
Dept: CARDIAC REHAB | Age: 80
End: 2025-09-01
Attending: INTERNAL MEDICINE
Payer: MEDICARE

## 2025-09-05 ENCOUNTER — HOSPITAL ENCOUNTER (OUTPATIENT)
Dept: CARDIAC REHAB | Age: 80
Setting detail: THERAPIES SERIES
Discharge: HOME | End: 2025-09-05
Attending: INTERNAL MEDICINE
Payer: MEDICARE

## 2025-09-05 ENCOUNTER — APPOINTMENT (OUTPATIENT)
Dept: CARDIAC REHAB | Age: 80
End: 2025-09-05
Attending: INTERNAL MEDICINE
Payer: MEDICARE

## 2025-09-05 PROCEDURE — G0239 OTH RESP PROC, GROUP: HCPCS

## 2025-09-08 ENCOUNTER — APPOINTMENT (OUTPATIENT)
Dept: CARDIAC REHAB | Age: 80
End: 2025-09-08
Attending: INTERNAL MEDICINE
Payer: MEDICARE

## 2025-09-12 ENCOUNTER — APPOINTMENT (OUTPATIENT)
Dept: CARDIAC REHAB | Age: 80
End: 2025-09-12
Attending: INTERNAL MEDICINE
Payer: MEDICARE

## 2025-09-15 ENCOUNTER — APPOINTMENT (OUTPATIENT)
Dept: CARDIAC REHAB | Age: 80
End: 2025-09-15
Attending: INTERNAL MEDICINE
Payer: MEDICARE

## 2025-09-19 ENCOUNTER — APPOINTMENT (OUTPATIENT)
Dept: CARDIAC REHAB | Age: 80
End: 2025-09-19
Attending: INTERNAL MEDICINE
Payer: MEDICARE

## 2025-09-22 ENCOUNTER — APPOINTMENT (OUTPATIENT)
Dept: CARDIAC REHAB | Age: 80
End: 2025-09-22
Attending: INTERNAL MEDICINE
Payer: MEDICARE

## 2025-09-26 ENCOUNTER — APPOINTMENT (OUTPATIENT)
Dept: CARDIAC REHAB | Age: 80
End: 2025-09-26
Attending: INTERNAL MEDICINE
Payer: MEDICARE

## 2025-09-29 ENCOUNTER — APPOINTMENT (OUTPATIENT)
Dept: CARDIAC REHAB | Age: 80
End: 2025-09-29
Attending: INTERNAL MEDICINE
Payer: MEDICARE

## 2025-10-03 ENCOUNTER — APPOINTMENT (OUTPATIENT)
Dept: CARDIAC REHAB | Age: 80
End: 2025-10-03
Attending: INTERNAL MEDICINE
Payer: MEDICARE

## 2025-10-06 ENCOUNTER — APPOINTMENT (OUTPATIENT)
Dept: CARDIAC REHAB | Age: 80
End: 2025-10-06
Attending: INTERNAL MEDICINE
Payer: MEDICARE

## 2025-10-10 ENCOUNTER — APPOINTMENT (OUTPATIENT)
Dept: CARDIAC REHAB | Age: 80
End: 2025-10-10
Attending: INTERNAL MEDICINE
Payer: MEDICARE

## 2025-10-13 ENCOUNTER — APPOINTMENT (OUTPATIENT)
Dept: CARDIAC REHAB | Age: 80
End: 2025-10-13
Attending: INTERNAL MEDICINE
Payer: MEDICARE

## 2025-10-17 ENCOUNTER — APPOINTMENT (OUTPATIENT)
Dept: CARDIAC REHAB | Age: 80
End: 2025-10-17
Attending: INTERNAL MEDICINE
Payer: MEDICARE

## 2025-10-20 ENCOUNTER — APPOINTMENT (OUTPATIENT)
Dept: CARDIAC REHAB | Age: 80
End: 2025-10-20
Attending: INTERNAL MEDICINE
Payer: MEDICARE

## (undated) DEVICE — KIT COLON W/ 1.1OZ LUB AND 2 END

## (undated) DEVICE — Z INACTIVE USE 2735373 APPLICATOR FBR LAIN COT WOOD TIP ECONOMICAL

## (undated) DEVICE — YANKAUER,BULB TIP,W/O VENT,RIGID,STERILE: Brand: MEDLINE

## (undated) DEVICE — Device

## (undated) DEVICE — LINER,SOFT,SUCTION CANISTER,1500CC: Brand: MEDLINE

## (undated) DEVICE — SUTURE ETHBND EXCEL SZ 2-0 L36IN NONABSORBABLE GRN L26MM SH X523H

## (undated) DEVICE — 3M™ IOBAN™ 2 ANTIMICROBIAL INCISE DRAPE 6650EZ: Brand: IOBAN™ 2

## (undated) DEVICE — LEAD DELIVERY SYSTEM: Brand: ACUITY® PRO

## (undated) DEVICE — CANNULA,OXY,ADULT,SUPERSOFT,W/7'TUB,SC: Brand: MEDLINE INDUSTRIES, INC.

## (undated) DEVICE — GUIDE CATHETER: Brand: ACUITY® PRO

## (undated) DEVICE — TRAP,MUCUS SPECIMEN, 80CC: Brand: MEDLINE

## (undated) DEVICE — TRAY CATH 16FR F INCLUDE LUB DRNGE BG STATLOK STBL DEV

## (undated) DEVICE — YANKAUER,OPEN TIP,W/O VENT,STERILE: Brand: MEDLINE INDUSTRIES, INC.

## (undated) DEVICE — SUTURE PERMA-HAND SZ 2-0 L30IN NONABSORBABLE BLK L26MM SH K833H

## (undated) DEVICE — TROCAR: Brand: KII FIOS FIRST ENTRY

## (undated) DEVICE — GOWN ISOLATN REGULAR L BLU POLYPR POLY OVR HD NK OPN BK

## (undated) DEVICE — SINGLE USE BIOPSY VALVE MAJ-210: Brand: SINGLE USE BIOPSY VALVE (STERILE)

## (undated) DEVICE — SUTURE PERMA-HAND SZ 0 L18IN NONABSORBABLE BLK L30MM FSL 678G

## (undated) DEVICE — TUBING, SUCTION, 3/16" X 6', STRAIGHT: Brand: MEDLINE

## (undated) DEVICE — SINGLE USE SUCTION VALVE MAJ-209: Brand: SINGLE USE SUCTION VALVE (STERILE)

## (undated) DEVICE — Z DISCONTINUED USE 2276105 GOWN PROTCT UNIV CHST W28IN L49IN SL 24IN BLU SPUNBOND FLM

## (undated) DEVICE — PACK PROCEDURE SURG GEN LAPAROSCOPY CDS

## (undated) DEVICE — INTEGRATED BIPOLAR PACE/SENSE AND DEFIBRILLATION LEAD
Type: IMPLANTABLE DEVICE | Site: HEART | Status: NON-FUNCTIONAL
Brand: RELIANCE 4-FRONT™
Removed: 2024-12-11

## (undated) DEVICE — TK® TI-KNOT® DEVICE: Brand: TK® TI-KNOT®

## (undated) DEVICE — BOWL MED M 16OZ PLAS CAP GRAD

## (undated) DEVICE — UNIDIRECTIONAL STEERABLE DIAGNOSTIC CATHETER: Brand: DYNAMIC TIP™

## (undated) DEVICE — TUBING, SUCTION, 3/16" X 12', STRAIGHT: Brand: MEDLINE

## (undated) DEVICE — SYRINGE MED 10ML POLYPR LUERSLIP TIP FLAT TOP W/O SFTY DISP

## (undated) DEVICE — AGENT HEMSTAT W2XL4IN OXIDIZED REGENERATED CELOS ABSRB

## (undated) DEVICE — SYRINGE, LUER SLIP, STERILE, 60ML: Brand: MEDLINE

## (undated) DEVICE — ELECTRODE LAP L36CM PTFE WIRE J HK CLEANCOAT

## (undated) DEVICE — CONMED SCOPE SAVER BITE BLOCK, 20X27 MM: Brand: SCOPE SAVER

## (undated) DEVICE — SUTURE VICRYL SZ 4-0 L18IN ABSRB UD L19MM PS-2 3/8 CIR PRIM J496H

## (undated) DEVICE — NEPTUNE E-SEP SMOKE EVACUATION PENCIL, COATED, 70MM BLADE, PUSH BUTTON SWITCH: Brand: NEPTUNE E-SEP

## (undated) DEVICE — 6FR SAFESHEATH PEEL-AWAY SHEATH

## (undated) DEVICE — SYRINGE MED 10ML SLIP TIP BLNT FILL AND LUERLOCK DISP

## (undated) DEVICE — MEDIA CONTRAST ISOVUE 370 100ML

## (undated) DEVICE — CATHETER PULM ART 7FR BLLN 1.25CC L110CM DIA11MM DBL LUMN

## (undated) DEVICE — SUTURE ETHBND EXCEL SZ 2-0 L36IN NONABSORBABLE GRN SH-2 X559H

## (undated) DEVICE — PLASMABLADE PS200-040 4.0: Brand: PLASMABLADE™

## (undated) DEVICE — ELECTRODE,ECG,STRESS,FOAM,3PK: Brand: MEDLINE

## (undated) DEVICE — HI-TORQUE WHISPER MS GUIDE WIRE .014 STRAIGHT TIP 3.0 CM X 190 CM: Brand: HI-TORQUE WHISPER

## (undated) DEVICE — SYRINGE MED 50ML LUERSLIP TIP

## (undated) DEVICE — LENS EYEGLASS LTWT REPL DISP SAFEVIEW

## (undated) DEVICE — SUTURE VICRYL + SZ 3-0 L18IN ABSRB UD SH 1/2 CIR TAPERCUT NDL VCP864D

## (undated) DEVICE — DRAPE 33X23IN INCISE ANTIMICROB IOBAN 2

## (undated) DEVICE — ELECTRODE,RADIOTRANSLUCENT,FOAM,3PK: Brand: MEDLINE

## (undated) DEVICE — ENDO CARRY-ON PROCEDURE KIT INCLUDES SUCTION TUBING, LUBRICANT, GAUZE, BIOHAZARD STICKER, TRANSPORT PAD AND INTERCEPT BEDSIDE KIT.: Brand: ENDO CARRY-ON PROCEDURE KIT

## (undated) DEVICE — 8 FOOT DISPOSABLE EXTENSION CABLE WITH SAFE CONNECT / ALLIGATOR CLIP

## (undated) DEVICE — SUTURE ETHIBOND EXCEL SZ 0 L18IN NONABSORBABLE GRN L26MM CT-2 CX27D

## (undated) DEVICE — SHEARS ENDOSCP L36CM DIA5MM ULTRASONIC CRV TIP W/ ADV

## (undated) DEVICE — PACEMAKER PACK: Brand: MEDLINE INDUSTRIES, INC.

## (undated) DEVICE — TROCAR: Brand: KII SLEEVE

## (undated) DEVICE — GUIDEWIRE VASC L145CM DIA0035IN FLPY TIP EXTN COMPATIBLE STR

## (undated) DEVICE — PENCIL ES CRD L10FT HND SWCHING ROCK SWCH W/ EDGE COAT BLDE

## (undated) DEVICE — CONTAINER,SPEC,PNEUM TUBE,3OZ,STRL PATH: Brand: MEDLINE

## (undated) DEVICE — 3M™ TEGADERM™ TRANSPARENT FILM DRESSING FRAME STYLE, 1624W, 2-3/8 IN X 2-3/4 IN (6 CM X 7 CM), 100/CT 4CT/CASE: Brand: 3M™ TEGADERM™

## (undated) DEVICE — SUTURE MCRYL + SZ 4-0 L18IN ABSRB UD L19MM PS-2 3/8 CIR MCP496G

## (undated) DEVICE — PATCH REF EXT FOR CARTO 3 SYS (EA = 6 PACKS)

## (undated) DEVICE — EP VASCULAR PACK: Brand: MEDLINE INDUSTRIES, INC.

## (undated) DEVICE — TK® QUICK LOAD® UNIT: Brand: TK® QUICK LOAD®